# Patient Record
Sex: FEMALE | Race: WHITE | NOT HISPANIC OR LATINO | Employment: OTHER | RURAL
[De-identification: names, ages, dates, MRNs, and addresses within clinical notes are randomized per-mention and may not be internally consistent; named-entity substitution may affect disease eponyms.]

---

## 2021-08-10 ENCOUNTER — OFFICE VISIT (OUTPATIENT)
Dept: FAMILY MEDICINE | Facility: CLINIC | Age: 71
End: 2021-08-10
Payer: MEDICARE

## 2021-08-10 VITALS
HEART RATE: 92 BPM | WEIGHT: 132.63 LBS | DIASTOLIC BLOOD PRESSURE: 73 MMHG | TEMPERATURE: 98 F | BODY MASS INDEX: 21.32 KG/M2 | HEIGHT: 66 IN | SYSTOLIC BLOOD PRESSURE: 145 MMHG | OXYGEN SATURATION: 87 %

## 2021-08-10 DIAGNOSIS — L30.1 DYSHIDROTIC ECZEMA: Primary | ICD-10-CM

## 2021-08-10 PROCEDURE — 99204 OFFICE O/P NEW MOD 45 MIN: CPT | Mod: GC,,, | Performed by: FAMILY MEDICINE

## 2021-08-10 PROCEDURE — 99204 PR OFFICE/OUTPT VISIT, NEW, LEVL IV, 45-59 MIN: ICD-10-PCS | Mod: GC,,, | Performed by: FAMILY MEDICINE

## 2021-08-10 RX ORDER — POTASSIUM CHLORIDE 20 MEQ/1
TABLET, EXTENDED RELEASE ORAL
COMMUNITY
Start: 2021-05-24 | End: 2023-11-09 | Stop reason: SDUPTHER

## 2021-08-10 RX ORDER — HYDROXYZINE HYDROCHLORIDE 25 MG/1
25 TABLET, FILM COATED ORAL 3 TIMES DAILY
Qty: 30 TABLET | Refills: 0 | Status: SHIPPED | OUTPATIENT
Start: 2021-08-10 | End: 2021-08-20

## 2021-08-10 RX ORDER — APIXABAN 5 MG/1
5 TABLET, FILM COATED ORAL 2 TIMES DAILY
COMMUNITY
Start: 2021-07-20 | End: 2023-11-09 | Stop reason: SDUPTHER

## 2021-08-10 RX ORDER — BUPROPION HYDROCHLORIDE 100 MG/1
TABLET, EXTENDED RELEASE ORAL
COMMUNITY
Start: 2021-07-20

## 2021-08-10 RX ORDER — HYDROCHLOROTHIAZIDE 25 MG/1
25 TABLET ORAL DAILY
COMMUNITY
Start: 2021-05-16

## 2021-08-10 RX ORDER — FUROSEMIDE 40 MG/1
40 TABLET ORAL DAILY
COMMUNITY
Start: 2021-07-20 | End: 2023-11-09 | Stop reason: SDUPTHER

## 2021-08-10 RX ORDER — HYDROXYZINE HYDROCHLORIDE 25 MG/1
25 TABLET, FILM COATED ORAL 3 TIMES DAILY
Qty: 30 TABLET | Refills: 0 | Status: SHIPPED | OUTPATIENT
Start: 2021-08-10 | End: 2021-08-10

## 2021-08-10 RX ORDER — ALBUTEROL SULFATE 90 UG/1
2 AEROSOL, METERED RESPIRATORY (INHALATION) EVERY 4 HOURS PRN
COMMUNITY
Start: 2021-03-11

## 2021-08-10 RX ORDER — FLUOCINONIDE 0.5 MG/G
CREAM TOPICAL 2 TIMES DAILY
Qty: 1 TUBE | Refills: 0 | Status: SHIPPED | OUTPATIENT
Start: 2021-08-10 | End: 2023-11-09

## 2021-08-17 ENCOUNTER — OFFICE VISIT (OUTPATIENT)
Dept: FAMILY MEDICINE | Facility: CLINIC | Age: 71
End: 2021-08-17
Payer: MEDICARE

## 2021-08-17 VITALS
WEIGHT: 130.81 LBS | HEART RATE: 90 BPM | SYSTOLIC BLOOD PRESSURE: 103 MMHG | OXYGEN SATURATION: 80 % | TEMPERATURE: 98 F | DIASTOLIC BLOOD PRESSURE: 64 MMHG | BODY MASS INDEX: 21.11 KG/M2

## 2021-08-17 DIAGNOSIS — L30.1 DYSHIDROTIC ECZEMA: Primary | ICD-10-CM

## 2021-08-17 PROCEDURE — 99202 PR OFFICE/OUTPT VISIT, NEW, LEVL II, 15-29 MIN: ICD-10-PCS | Mod: GC,,, | Performed by: SPECIALIST

## 2021-08-17 PROCEDURE — 99202 OFFICE O/P NEW SF 15 MIN: CPT | Mod: GC,,, | Performed by: SPECIALIST

## 2021-08-17 RX ORDER — METHYLPREDNISOLONE 4 MG/1
TABLET ORAL
Qty: 1 PACKAGE | Refills: 0 | Status: SHIPPED | OUTPATIENT
Start: 2021-08-17 | End: 2021-09-07

## 2021-08-25 ENCOUNTER — OFFICE VISIT (OUTPATIENT)
Dept: FAMILY MEDICINE | Facility: CLINIC | Age: 71
End: 2021-08-25
Payer: MEDICARE

## 2021-08-25 DIAGNOSIS — Z20.828 EXPOSURE TO SARS-ASSOCIATED CORONAVIRUS: Primary | ICD-10-CM

## 2021-08-25 PROCEDURE — 99203 PR OFFICE/OUTPT VISIT, NEW, LEVL III, 30-44 MIN: ICD-10-PCS | Mod: GC,,, | Performed by: FAMILY MEDICINE

## 2021-08-25 PROCEDURE — 87635 SARS-COV-2 COVID-19 AMP PRB: CPT | Mod: ,,, | Performed by: CLINICAL MEDICAL LABORATORY

## 2021-08-25 PROCEDURE — 87635 SARS-COV-2 (COVID-19) QUALITATIVE PCR: ICD-10-PCS | Mod: ,,, | Performed by: CLINICAL MEDICAL LABORATORY

## 2021-08-25 PROCEDURE — 99203 OFFICE O/P NEW LOW 30 MIN: CPT | Mod: GC,,, | Performed by: FAMILY MEDICINE

## 2021-08-26 LAB — SARS-COV-2 RNA RESP QL NAA+PROBE: NEGATIVE

## 2022-03-11 DIAGNOSIS — Z71.89 COMPLEX CARE COORDINATION: ICD-10-CM

## 2022-06-10 ENCOUNTER — OUTSIDE PLACE OF SERVICE (OUTPATIENT)
Dept: ADMINISTRATIVE | Facility: HOSPITAL | Age: 72
End: 2022-06-10
Payer: MEDICARE

## 2022-07-06 ENCOUNTER — HOSPITAL ENCOUNTER (OUTPATIENT)
Dept: RADIOLOGY | Facility: HOSPITAL | Age: 72
Discharge: HOME | End: 2022-07-06
Attending: ORTHOPAEDIC SURGERY
Payer: MEDICARE

## 2022-07-06 DIAGNOSIS — M25.551 RIGHT HIP PAIN: ICD-10-CM

## 2022-07-06 PROBLEM — S72.001S CLOSED RIGHT HIP FRACTURE, SEQUELA: Status: ACTIVE | Noted: 2022-07-06

## 2022-07-06 PROCEDURE — 73502 X-RAY EXAM HIP UNI 2-3 VIEWS: CPT | Mod: TC,RT

## 2023-03-31 ENCOUNTER — OFFICE VISIT (OUTPATIENT)
Dept: NEUROLOGY | Facility: CLINIC | Age: 73
End: 2023-03-31
Payer: MEDICARE

## 2023-03-31 VITALS
SYSTOLIC BLOOD PRESSURE: 104 MMHG | BODY MASS INDEX: 20.89 KG/M2 | HEART RATE: 60 BPM | HEIGHT: 66 IN | DIASTOLIC BLOOD PRESSURE: 62 MMHG | OXYGEN SATURATION: 93 % | WEIGHT: 130 LBS

## 2023-03-31 DIAGNOSIS — R29.898 RUE WEAKNESS: Primary | ICD-10-CM

## 2023-03-31 DIAGNOSIS — R29.818 OTHER SYMPTOMS AND SIGNS INVOLVING THE NERVOUS SYSTEM: ICD-10-CM

## 2023-03-31 PROCEDURE — 99204 OFFICE O/P NEW MOD 45 MIN: CPT | Mod: S$PBB,,, | Performed by: PSYCHIATRY & NEUROLOGY

## 2023-03-31 PROCEDURE — 99214 OFFICE O/P EST MOD 30 MIN: CPT | Mod: PBBFAC | Performed by: PSYCHIATRY & NEUROLOGY

## 2023-03-31 PROCEDURE — 99204 PR OFFICE/OUTPT VISIT, NEW, LEVL IV, 45-59 MIN: ICD-10-PCS | Mod: S$PBB,,, | Performed by: PSYCHIATRY & NEUROLOGY

## 2023-03-31 RX ORDER — AMLODIPINE BESYLATE 2.5 MG/1
2.5 TABLET ORAL DAILY
COMMUNITY
End: 2023-11-09 | Stop reason: SDUPTHER

## 2023-03-31 NOTE — PROGRESS NOTES
Subjective:       Patient ID: Mayra Kelly is a 72 y.o. female     Chief Complaint:    Chief Complaint   Patient presents with    Dx right arm weakness        Allergies:  Patient has no known allergies.    Current Medications:    Outpatient Encounter Medications as of 3/31/2023   Medication Sig Dispense Refill    albuterol (PROVENTIL/VENTOLIN HFA) 90 mcg/actuation inhaler Inhale 2 puffs into the lungs every 4 (four) hours as needed.      amLODIPine (NORVASC) 2.5 MG tablet Take 2.5 mg by mouth once daily.      buPROPion (WELLBUTRIN SR) 100 MG TBSR 12 hr tablet TAKE 1 TABLET BY MOUTH ONCE DAILY TO CURB THE URGE TO SMOKE      ELIQUIS 5 mg Tab Take 5 mg by mouth 2 (two) times daily.      fluocinonide 0.05% (LIDEX) 0.05 % cream Apply topically 2 (two) times daily. 1 Tube 0    furosemide (LASIX) 40 MG tablet Take 40 mg by mouth once daily.      hydroCHLOROthiazide (HYDRODIURIL) 25 MG tablet Take 25 mg by mouth once daily.      potassium chloride SA (K-DUR,KLOR-CON) 20 MEQ tablet        No facility-administered encounter medications on file as of 3/31/2023.       History of Present Illness  73 yo WF in clinic after having acute onset of RUE weakness mainly in hand - occurred about month ago and denies any neck trauma but went to ER at [facility] but I have no records for review?  Pt has known COPD,PAH, HTN, lung Ca however she cont to smoke tobacco  On Eliquis for OAC   Has some intermittent cervical neck pain w/o any radiculopathy symptoms per patient        Past Medical History:   Diagnosis Date    COPD (chronic obstructive pulmonary disease)     Impetigo     Scabies        Past Surgical History:   Procedure Laterality Date    APPENDECTOMY       SECTION  1980    2x       Social History  Ms. Kelly  reports that she has been smoking cigarettes. She has never used smokeless tobacco. She reports that she does not currently use alcohol.    Family History  Ms.'s Kelly family history includes  "Cancer in her father, maternal aunt, and maternal grandmother; Heart disease in her paternal grandmother.    Review of Systems  Review of Systems   Respiratory:  Positive for cough, sputum production and shortness of breath.    Neurological:  Positive for focal weakness.   Psychiatric/Behavioral:  The patient is nervous/anxious.    All other systems reviewed and are negative.   Objective:   /62 (BP Location: Right arm, Patient Position: Sitting, BP Method: Large (Manual))   Pulse 60   Ht 5' 6" (1.676 m)   Wt 59 kg (130 lb)   LMP  (LMP Unknown)   SpO2 (!) 93%   BMI 20.98 kg/m²    NEUROLOGICAL EXAMINATION:     MENTAL STATUS   Oriented to person, place, and time.   Level of consciousness: alert  Knowledge: good.     CRANIAL NERVES   Cranial nerves II through XII intact.     MOTOR EXAM     Strength   Strength 5/5 throughout.        Mild subjective R arm weakness bu tper this examiner NO loss of  strenght     REFLEXES     Reflexes   Right brachioradialis: 2+  Left brachioradialis: 2+  Right biceps: 2+  Left biceps: 2+  Right triceps: 2+  Left triceps: 2+  Right patellar: 2+  Left patellar: 2+  Right achilles: 2+  Left achilles: 2+  Right plantar: normal  Left plantar: normal    GAIT AND COORDINATION     Gait  Gait: normal     Physical Exam  Vitals reviewed.   Constitutional:       Appearance: She is normal weight.   Neurological:      General: No focal deficit present.      Mental Status: She is alert and oriented to person, place, and time. Mental status is at baseline.      Cranial Nerves: Cranial nerves 2-12 are intact.      Motor: Motor strength is normal.      Gait: Gait is intact.      Deep Tendon Reflexes:      Reflex Scores:       Tricep reflexes are 2+ on the right side and 2+ on the left side.       Bicep reflexes are 2+ on the right side and 2+ on the left side.       Brachioradialis reflexes are 2+ on the right side and 2+ on the left side.       Patellar reflexes are 2+ on the right side and " 2+ on the left side.       Achilles reflexes are 2+ on the right side and 2+ on the left side.       Assessment:     RUE weakness         Primary Diagnosis and ICD10  RUE weakness [R29.898]    Plan:     Patient Instructions   MRI brain w contrast   MRI cervical r/o pathology  Stop smoking tobacco   Control risk factors   F/u Heme/Onc for lung cancer eval   Pt not desiring PT/OT referral  F/u 3 months       There are no discontinued medications.    Requested Prescriptions      No prescriptions requested or ordered in this encounter

## 2023-03-31 NOTE — PATIENT INSTRUCTIONS
MRI brain w contrast   MRI cervical r/o pathology  Stop smoking tobacco   Control risk factors   F/u Heme/Onc for lung cancer eval   Pt not desiring PT/OT referral  F/u 3 months

## 2023-04-18 ENCOUNTER — OFFICE VISIT (OUTPATIENT)
Dept: DERMATOLOGY | Facility: CLINIC | Age: 73
End: 2023-04-18
Payer: MEDICARE

## 2023-04-18 DIAGNOSIS — L30.9 DERMATITIS, UNSPECIFIED: Primary | ICD-10-CM

## 2023-04-18 PROCEDURE — 99204 OFFICE O/P NEW MOD 45 MIN: CPT | Mod: ,,, | Performed by: STUDENT IN AN ORGANIZED HEALTH CARE EDUCATION/TRAINING PROGRAM

## 2023-04-18 PROCEDURE — 99204 PR OFFICE/OUTPT VISIT, NEW, LEVL IV, 45-59 MIN: ICD-10-PCS | Mod: ,,, | Performed by: STUDENT IN AN ORGANIZED HEALTH CARE EDUCATION/TRAINING PROGRAM

## 2023-04-18 RX ORDER — TRIAMCINOLONE ACETONIDE 1 MG/G
OINTMENT TOPICAL 2 TIMES DAILY
Qty: 454 G | Refills: 5 | Status: SHIPPED | OUTPATIENT
Start: 2023-04-18 | End: 2023-11-09

## 2023-04-18 RX ORDER — PREDNISONE 20 MG/1
TABLET ORAL
Qty: 25 TABLET | Refills: 0 | Status: SHIPPED | OUTPATIENT
Start: 2023-04-18 | End: 2023-05-09

## 2023-05-16 ENCOUNTER — TELEPHONE (OUTPATIENT)
Dept: NEUROLOGY | Facility: CLINIC | Age: 73
End: 2023-05-16
Payer: MEDICARE

## 2023-07-14 NOTE — PROGRESS NOTES
[facility]  Bryon Priest MD    [address] [place] [address]  [phone]    Fax: [phone]    Patient Name: Mayra Kelly  Medical Record Number: [id number]  PCP: Sona Taveras DO (Inactive)  Age: 72 y.o. : 1950  Contact: [phone] (home)     CC: rash  History of Present Illness:     Mayra Kelly is a 72 y.o.  female with no history of skin cancer who presents to clinic today for rash of the upper body.  This has been present for 3 months. Symptoms include severe pruritus . Previous treatments include OTC lotions.  Only new med started on 3/31/2023 is Norvasc.  Pt hasn't changed anything from normal routine    The patient has no other concerns today.    Review of Systems:     Unremarkable other than mentioned above.     Physical Exam:     General: Relaxed, oriented, alert    Skin examination of the scalp, face, neck, chest, back, abdomen, upper extremities and lower extremities were normal except for as listed below    Assessment and Plan:     1. Dermatitis Unspecified  - eczematous patches of bilateral arms, upper chest and back  DDx: ACD vs drug eruption vs adult onset eczema     Plan: TAC ointment bid   - Prednisone taper 40 mg daily x 7 days, 20 mg daily x 7 days, and 10 mg daily x 7 days    Counseling  I counseled the patient regarding the following:  Skin care: Patient instructed to use gentle skin care including dove unscented soap, CeraVe moisturizing cream, and fragrance free laundry detergent.  Expectations: The patient understands that there is not a definitive diagnosis at this time. Further testing or empiric therapy may be necessary to diagnose and improve the condition.  Contact office if: The patient develops a fever, or rash dramatically worsens despite treatment.          Return to clinic in 4 weeks.     AVS printed with patient instructions     Bryon Priest MD   Mohs Surgery/Dermatologic Oncology  Dermatology        Body Location Override (Optional - Billing Will Still Be Based On Selected Body Map Location If Applicable): right medial calcaneus Detail Level: Detailed Depth Of Biopsy: dermis Was A Bandage Applied: Yes Size Of Lesion In Cm: 1.5 X Size Of Lesion In Cm: 0 Biopsy Type: H and E Biopsy Method: Dermablade Anesthesia Type: 1% lidocaine with epinephrine Anesthesia Volume In Cc (Will Not Render If 0): 0.5 Hemostasis: Drysol Wound Care: Petrolatum Dressing: bandage Destruction After The Procedure: No Type Of Destruction Used: Curettage Curettage Text: The wound bed was treated with curettage after the biopsy was performed. Cryotherapy Text: The wound bed was treated with cryotherapy after the biopsy was performed. Electrodesiccation Text: The wound bed was treated with electrodesiccation after the biopsy was performed. Electrodesiccation And Curettage Text: The wound bed was treated with electrodesiccation and curettage after the biopsy was performed. Silver Nitrate Text: The wound bed was treated with silver nitrate after the biopsy was performed. Lab: [id number] [facility] Lab Facility: [address] [place] [address] Path Notes (To The Dermatopathologist): R/O: DN vs LM vs NUB vs pyogenic granuloma \\nSize: 1.5 cm Consent: Written consent was obtained and risks were reviewed including but not limited to scarring, infection, bleeding, scabbing, incomplete removal, nerve damage and allergy to anesthesia. Post-Care Instructions: I reviewed with the patient in detail post-care instructions. Patient is to keep the biopsy site dry overnight, and then apply bacitracin twice daily until healed. Patient may apply hydrogen peroxide soaks to remove any crusting. Notification Instructions: Patient will be notified of biopsy results. However, patient instructed to call the office if not contacted within 2 weeks. Billing Type: [employer] Information: Selecting Yes will display possible errors in your note based on the variables you have selected. This validation is only offered as a suggestion for you. PLEASE NOTE THAT THE VALIDATION TEXT WILL BE REMOVED WHEN YOU FINALIZE YOUR NOTE. IF YOU WANT TO FAX A PRELIMINARY NOTE YOU WILL NEED TO TOGGLE THIS TO 'NO' IF YOU DO NOT WANT IT IN YOUR FAXED NOTE.

## 2023-07-18 ENCOUNTER — OFFICE VISIT (OUTPATIENT)
Dept: NEUROLOGY | Facility: CLINIC | Age: 73
End: 2023-07-18
Payer: MEDICARE

## 2023-07-18 VITALS
OXYGEN SATURATION: 95 % | RESPIRATION RATE: 18 BRPM | WEIGHT: 130 LBS | DIASTOLIC BLOOD PRESSURE: 64 MMHG | HEIGHT: 66 IN | HEART RATE: 86 BPM | SYSTOLIC BLOOD PRESSURE: 99 MMHG | BODY MASS INDEX: 20.89 KG/M2

## 2023-07-18 DIAGNOSIS — M50.90 CERVICAL NECK PAIN WITH EVIDENCE OF DISC DISEASE: Primary | ICD-10-CM

## 2023-07-18 PROCEDURE — 99214 OFFICE O/P EST MOD 30 MIN: CPT | Mod: PBBFAC | Performed by: PSYCHIATRY & NEUROLOGY

## 2023-07-18 PROCEDURE — 99214 PR OFFICE/OUTPT VISIT, EST, LEVL IV, 30-39 MIN: ICD-10-PCS | Mod: S$PBB,,, | Performed by: PSYCHIATRY & NEUROLOGY

## 2023-07-18 PROCEDURE — 99214 OFFICE O/P EST MOD 30 MIN: CPT | Mod: S$PBB,,, | Performed by: PSYCHIATRY & NEUROLOGY

## 2023-07-18 NOTE — PROGRESS NOTES
Subjective:       Patient ID: Mayra Kelly is a 72 y.o. female     Chief Complaint:    Chief Complaint   Patient presents with    Follow-up     WEakness        Allergies:  Patient has no known allergies.    Current Medications:    Outpatient Encounter Medications as of 2023   Medication Sig Dispense Refill    albuterol (PROVENTIL/VENTOLIN HFA) 90 mcg/actuation inhaler Inhale 2 puffs into the lungs every 4 (four) hours as needed.      amLODIPine (NORVASC) 2.5 MG tablet Take 2.5 mg by mouth once daily.      buPROPion (WELLBUTRIN SR) 100 MG TBSR 12 hr tablet TAKE 1 TABLET BY MOUTH ONCE DAILY TO CURB THE URGE TO SMOKE      ELIQUIS 5 mg Tab Take 5 mg by mouth 2 (two) times daily.      fluocinonide 0.05% (LIDEX) 0.05 % cream Apply topically 2 (two) times daily. 1 Tube 0    furosemide (LASIX) 40 MG tablet Take 40 mg by mouth once daily.      hydroCHLOROthiazide (HYDRODIURIL) 25 MG tablet Take 25 mg by mouth once daily.      potassium chloride SA (K-DUR,KLOR-CON) 20 MEQ tablet       triamcinolone acetonide 0.1% (KENALOG) 0.1 % ointment Apply topically 2 (two) times daily. 454 g 5     No facility-administered encounter medications on file as of 2023.       History of Present Illness  73 yo WF s/p chemo and XRT for lung Ca 3356-6752- still  following Heme/Onc   Recent MRI brain showed nothing acute but only microvascualr ischemia  MRI C spine showed mult level DDD but no abnormal cord activity       Past Medical History:   Diagnosis Date    COPD (chronic obstructive pulmonary disease)     Impetigo     Scabies        Past Surgical History:   Procedure Laterality Date    APPENDECTOMY       SECTION  1980    2x       Social History  Ms. Kelly  reports that she has been smoking cigarettes. She has never used smokeless tobacco. She reports that she does not currently use alcohol.    Family History  Ms.'s Kelly family history includes Cancer in her father, maternal aunt, and maternal  "grandmother; Heart disease in her paternal grandmother.    Review of Systems  Review of Systems   All other systems reviewed and are negative.   Objective:   BP 99/64 (BP Location: Left arm, Patient Position: Sitting, BP Method: Large (Manual))   Pulse 86   Resp 18   Ht 5' 6" (1.676 m)   Wt 59 kg (130 lb)   SpO2 95%   BMI 20.98 kg/m²    NEUROLOGICAL EXAMINATION:     MENTAL STATUS   Oriented to person, place, and time.   Level of consciousness: alert  Knowledge: good.     CRANIAL NERVES   Cranial nerves II through XII intact.     MOTOR EXAM     Strength   Strength 5/5 throughout.     REFLEXES     Reflexes   Right brachioradialis: 2+  Left brachioradialis: 2+  Right biceps: 2+  Left biceps: 2+  Right triceps: 2+  Left triceps: 2+  Right patellar: 2+  Left patellar: 2+  Right achilles: 2+  Left achilles: 2+  Right plantar: normal  Left plantar: normal    GAIT AND COORDINATION     Gait  Gait: normal     Physical Exam  Vitals reviewed.   Constitutional:       Appearance: She is normal weight.   Neurological:      General: No focal deficit present.      Mental Status: She is alert and oriented to person, place, and time. Mental status is at baseline.      Cranial Nerves: Cranial nerves 2-12 are intact.      Motor: Motor strength is normal.      Gait: Gait is intact.      Deep Tendon Reflexes:      Reflex Scores:       Tricep reflexes are 2+ on the right side and 2+ on the left side.       Bicep reflexes are 2+ on the right side and 2+ on the left side.       Brachioradialis reflexes are 2+ on the right side and 2+ on the left side.       Patellar reflexes are 2+ on the right side and 2+ on the left side.       Achilles reflexes are 2+ on the right side and 2+ on the left side.       Assessment:     Cervical neck pain with evidence of disc disease         Primary Diagnosis and ICD10  Cervical neck pain with evidence of disc disease [M50.90]    Plan:     Patient Instructions   Stop smoking if possible  Cont f/u " Hematology/Oncology   Cont Eliquis for Afib  F/u one year        There are no discontinued medications.    Requested Prescriptions      No prescriptions requested or ordered in this encounter

## 2023-11-03 ENCOUNTER — TELEPHONE (OUTPATIENT)
Dept: PULMONOLOGY | Facility: CLINIC | Age: 73
End: 2023-11-03
Payer: MEDICARE

## 2023-11-08 ENCOUNTER — OFFICE VISIT (OUTPATIENT)
Dept: PULMONOLOGY | Facility: CLINIC | Age: 73
End: 2023-11-08
Payer: MEDICARE

## 2023-11-08 VITALS
RESPIRATION RATE: 14 BRPM | DIASTOLIC BLOOD PRESSURE: 54 MMHG | HEART RATE: 73 BPM | HEIGHT: 66 IN | OXYGEN SATURATION: 83 % | SYSTOLIC BLOOD PRESSURE: 92 MMHG | BODY MASS INDEX: 20.89 KG/M2 | WEIGHT: 130 LBS

## 2023-11-08 DIAGNOSIS — F17.219 CIGARETTE NICOTINE DEPENDENCE WITH NICOTINE-INDUCED DISORDER: ICD-10-CM

## 2023-11-08 DIAGNOSIS — J44.9 CHRONIC OBSTRUCTIVE PULMONARY DISEASE, UNSPECIFIED COPD TYPE: Primary | ICD-10-CM

## 2023-11-08 DIAGNOSIS — R91.8 ABNORMAL CT SCAN, LUNG: ICD-10-CM

## 2023-11-08 PROCEDURE — 99203 PR OFFICE/OUTPT VISIT, NEW, LEVL III, 30-44 MIN: ICD-10-PCS | Mod: S$PBB,25,, | Performed by: STUDENT IN AN ORGANIZED HEALTH CARE EDUCATION/TRAINING PROGRAM

## 2023-11-08 PROCEDURE — 99203 OFFICE O/P NEW LOW 30 MIN: CPT | Mod: S$PBB,25,, | Performed by: STUDENT IN AN ORGANIZED HEALTH CARE EDUCATION/TRAINING PROGRAM

## 2023-11-08 PROCEDURE — 99215 OFFICE O/P EST HI 40 MIN: CPT | Mod: PBBFAC | Performed by: STUDENT IN AN ORGANIZED HEALTH CARE EDUCATION/TRAINING PROGRAM

## 2023-11-08 PROCEDURE — 99406 PR TOBACCO USE CESSATION INTERMEDIATE 3-10 MINUTES: ICD-10-PCS | Mod: S$PBB,,, | Performed by: STUDENT IN AN ORGANIZED HEALTH CARE EDUCATION/TRAINING PROGRAM

## 2023-11-08 PROCEDURE — 99406 BEHAV CHNG SMOKING 3-10 MIN: CPT | Mod: S$PBB,,, | Performed by: STUDENT IN AN ORGANIZED HEALTH CARE EDUCATION/TRAINING PROGRAM

## 2023-11-08 RX ORDER — ALBUTEROL SULFATE 2.5 MG/.5ML
2.5 SOLUTION RESPIRATORY (INHALATION) EVERY 6 HOURS PRN
Qty: 90 EACH | Refills: 11 | Status: SHIPPED | OUTPATIENT
Start: 2023-11-08 | End: 2024-11-07

## 2023-11-08 RX ORDER — FLUTICASONE FUROATE, UMECLIDINIUM BROMIDE AND VILANTEROL TRIFENATATE 200; 62.5; 25 UG/1; UG/1; UG/1
1 POWDER RESPIRATORY (INHALATION) DAILY
Qty: 1 EACH | Refills: 11 | Status: SHIPPED | OUTPATIENT
Start: 2023-11-08

## 2023-11-08 NOTE — PATIENT INSTRUCTIONS
Check in on GROUND FLOOR of Ambulatory building.      Dont eat or drink  Follow any directions you are given for not eating or drinking before surgery. If you don't follow instructions about when to stop eating and drinking, your procedure may be postponed or rescheduled for another day. This is a safety issue.  You can brush your teeth and rinse your mouth, but dont swallow any water.    Day of surgery  Leave jewelry (including rings), watches, and other valuables at home.  Be sure to bring health insurance cards or forms, and a photo ID.  Bring a list of your medicines (include the name, dose, how often you take them, and the time last dose was taken).  You will be sedated for the procedure so you must have a  present with you.  Arrive on time at the hospital or surgery facility.        Flexible Bronchoscopy  A flexible bronchoscopy is an exam of the airways of your lungs. A thin, flexible tube called a bronchoscope is used. It has a light and small camera that allow the healthcare provider to view your airways.    Before your test  Follow your healthcare provider's instructions carefully. If you dont, the exam may be canceled. Or you may need to take it again.  If you are taking blood-thinning medicine, ask your healthcare provider if you should stop taking the medicine before this test.  Have no food or drink starting at midnight on day of the test. Also, avoid smoking for 24 hours before the test.  You will need to remove any dentures or removable devices from your mouth.  Right before the test, you will be given sedating medicines to help you relax. The medicine may be given by an IV (intravenously) into one of your veins. In addition, your nose and throat may be numbed with a special spray to help prevent gagging and coughing.  If you are having this test as an outpatient, make sure you have an adult friend or family member to drive you home.    During your test  Bronchoscopy takes 45 to 60 minutes  (**EBUS takes longer**) and includes the following steps:  You may be given medicine (anesthesia) so that you are unconscious or asleep during the procedure.  The healthcare provider inserts the tube into your nose or mouth.  If you have not been given anesthesia, you might feel a gagging sensation. To help ease this feeling, you will be told to swallow or take deep breaths. Your airway will remain open even with the tube in place. But you wont be able to talk.  The provider checks your breathing passage. He or she may also remove tiny tissue samples for biopsy.  After your test  You may have a mild sore throat or cough. Your voice may also be hoarse.  Don't eat or drink until the anesthesia wears off.  If you had a biopsy, you might see traces of blood being coughed up.  When to call your healthcare provider  Call your healthcare provider right away if you have any of the following:  Shortness of breath  Chest pain  Bleeding from your nose or throat  Coughing up a large amount of blood  A fever above 100.4°F (38°C) for more than 24 hours  Call 911  Call 911 if you have:  Chest pain  Severe shortness of breath

## 2023-11-09 ENCOUNTER — OFFICE VISIT (OUTPATIENT)
Dept: FAMILY MEDICINE | Facility: CLINIC | Age: 73
End: 2023-11-09
Payer: MEDICARE

## 2023-11-09 VITALS
WEIGHT: 128.31 LBS | TEMPERATURE: 98 F | SYSTOLIC BLOOD PRESSURE: 100 MMHG | HEIGHT: 66 IN | RESPIRATION RATE: 16 BRPM | BODY MASS INDEX: 20.62 KG/M2 | OXYGEN SATURATION: 88 % | DIASTOLIC BLOOD PRESSURE: 68 MMHG | HEART RATE: 77 BPM

## 2023-11-09 DIAGNOSIS — J44.9 CHRONIC OBSTRUCTIVE PULMONARY DISEASE, UNSPECIFIED COPD TYPE: Primary | ICD-10-CM

## 2023-11-09 DIAGNOSIS — I48.20 CHRONIC ATRIAL FIBRILLATION, UNSPECIFIED: ICD-10-CM

## 2023-11-09 DIAGNOSIS — C34.92 MALIGNANT NEOPLASM OF UNSPECIFIED PART OF LEFT BRONCHUS OR LUNG: ICD-10-CM

## 2023-11-09 DIAGNOSIS — I50.9 CONGESTIVE HEART FAILURE, UNSPECIFIED HF CHRONICITY, UNSPECIFIED HEART FAILURE TYPE: ICD-10-CM

## 2023-11-09 DIAGNOSIS — I10 PRIMARY HYPERTENSION: ICD-10-CM

## 2023-11-09 PROBLEM — J44.1 CHRONIC OBSTRUCTIVE PULMONARY DISEASE WITH (ACUTE) EXACERBATION: Status: ACTIVE | Noted: 2023-11-09

## 2023-11-09 PROBLEM — R91.8 ABNORMAL CT SCAN, LUNG: Status: ACTIVE | Noted: 2023-11-09

## 2023-11-09 PROBLEM — F17.219 CIGARETTE NICOTINE DEPENDENCE WITH NICOTINE-INDUCED DISORDER: Status: ACTIVE | Noted: 2023-11-09

## 2023-11-09 PROBLEM — J43.8 OTHER EMPHYSEMA: Status: ACTIVE | Noted: 2023-11-09

## 2023-11-09 PROBLEM — G89.4 CHRONIC PAIN SYNDROME: Status: ACTIVE | Noted: 2023-11-09

## 2023-11-09 PROCEDURE — 99214 PR OFFICE/OUTPT VISIT, EST, LEVL IV, 30-39 MIN: ICD-10-PCS | Mod: ,,, | Performed by: NURSE PRACTITIONER

## 2023-11-09 PROCEDURE — 99214 OFFICE O/P EST MOD 30 MIN: CPT | Mod: ,,, | Performed by: NURSE PRACTITIONER

## 2023-11-09 RX ORDER — POTASSIUM CHLORIDE 20 MEQ/1
20 TABLET, EXTENDED RELEASE ORAL DAILY
Qty: 90 TABLET | Refills: 1 | Status: SHIPPED | OUTPATIENT
Start: 2023-11-09

## 2023-11-09 RX ORDER — FUROSEMIDE 40 MG/1
40 TABLET ORAL DAILY
Qty: 90 TABLET | Refills: 1 | Status: SHIPPED | OUTPATIENT
Start: 2023-11-09

## 2023-11-09 RX ORDER — APIXABAN 5 MG/1
5 TABLET, FILM COATED ORAL 2 TIMES DAILY
Qty: 180 TABLET | Refills: 1 | Status: SHIPPED | OUTPATIENT
Start: 2023-11-09

## 2023-11-09 RX ORDER — AMLODIPINE BESYLATE 2.5 MG/1
2.5 TABLET ORAL DAILY
Qty: 90 TABLET | Refills: 1 | Status: SHIPPED | OUTPATIENT
Start: 2023-11-09

## 2023-11-09 NOTE — ASSESSMENT & PLAN NOTE
Dangers of cigarette smoking were reviewed with patient in detail. Recommend complete cessation. Patient was Counseled for 3-10 minutes. Nicotine replacement options were discussed. Nicotine replacement was discussed- not prescribed per patient's request. Action phase; will decrease cigarettes by 2 with goal for 1/4 pack a day from 1/2ppd.

## 2023-11-09 NOTE — PROGRESS NOTES
[facility]  Pulmonology  NEW VISIT     Patient Name:  Mayra Kelly  Primary Care Provider: Sona Gupta DO  Date of Service: 2023  Reason for Referral: Abnormal CT, EBUS consult      Chief Complaint: Shortness of breath, cough    SUBJECTIVE   HPI:  Mayra Kelly is a 73 y.o. female with Afib on Eliquis, COPD and prior history of adenocarcinoma of the lung who presents today upon referral with complaints of shortness of breath.     Robin reports having shortness of breath with ambulation. She has cough productive of clear phlegm. She denies hemoptysis. She has had no recent hospitalizations for exacerbations. CAT score 23    She reports having a previous history of lung cancer treated with chemotherapy and radiation to her brain. She has had an abnormal scan of her lung and she states that she came today to discuss bronchoscopy. She is planned for a PET on  and follow up with Dr. Kraft . No records available at time of this visit.     Past Medical History:   Diagnosis Date    COPD (chronic obstructive pulmonary disease)     Impetigo     Scabies        Past Surgical History:   Procedure Laterality Date    APPENDECTOMY       SECTION  1980    2x       Family History   Problem Relation Age of Onset    Cancer Father     Cancer Maternal Grandmother     Heart disease Paternal Grandmother     Cancer Maternal Aunt         Social History     Socioeconomic History    Marital status:    Tobacco Use    Smoking status: Every Day     Types: Cigarettes    Smokeless tobacco: Never   Substance and Sexual Activity    Alcohol use: Not Currently       Social History     Social History Narrative    Not on file       Review of patient's allergies indicates:  No Known Allergies     Medications: Medications reviewed to include over the counter medications.    Review of Systems: A focused ROS was completed and found to be negative except for that mentioned  "above.      OBJECTIVE   PHYSICAL EXAM:  Vitals:    11/08/23 1323 11/08/23 1331   BP: (!) 96/50 (!) 92/54   BP Location: Left arm Right arm   Patient Position: Sitting Sitting   BP Method: Medium (Manual) Medium (Manual)   Pulse: 73    Resp: 14    SpO2: (!) 83%    Weight: 59 kg (130 lb)    Height: 5' 6" (1.676 m)         GENERAL: NAD, thin  HEENT: normocephalic, non-icteric conjunctivae  LYMPHATIC: no lymphadenopathy of neck  RESPIRATORY: bilateral posterior expiratory wheeze, no rales or rhonchi, normal pulmonary effort  CARDIOVASCULAR: Regular rate and rhythm, no murmurs rubs or gallops.  SKIN: no rash, jaundice, ecchymosis or ulcers  MUSCULOSKELETAL: No clubbing or cyanosis; no pedal edema  NEUROLOGIC: AO ×3, no gross deficits  PSYCH: Normal mood and affect    LABS:  Lab studies reviewed and notable for SCr 0.94 (03/2023)    IMAGING:  No imaging available for review at time of visit.    LUNG FUNCTION TESTING:  None available to review or report      ASSESSMENT & PLAN     1. Chronic obstructive pulmonary disease, unspecified COPD type  Assessment & Plan:  COPD with high CAT score with decreased exercise capacity and cough predominating symptoms. Prior history of exacerbation. Will strart Trelegy (sample afforded as well) with albuterol PRN.     Orders:  -     fluticasone-umeclidin-vilanter (TRELEGY ELLIPTA) 200-62.5-25 mcg inhaler; Inhale 1 puff into the lungs once daily.  Dispense: 1 each; Refill: 11  -     albuterol sulfate 2.5 mg/0.5 mL Nebu; Take 2.5 mg by nebulization every 6 (six) hours as needed (Shortness of breath). Rescue  Dispense: 90 each; Refill: 11    2. Abnormal CT scan, lung  Assessment & Plan:  Patient referred for bronch and ?EBUS. No imaging on records available to review. Will request these and f/u on PET from the 17th. Patient is amenable to biopsy with goal treatment. She would like to keep her Nov 27th appointment with Dr. Kraft; tentative date for procedure Nov 20th vs 27th. Will need to " obtain records for planning. With regards to her Eliquis, will plan to hold at min 2 days prior to procedure date.       3. Cigarette nicotine dependence with nicotine-induced disorder  Assessment & Plan:  Dangers of cigarette smoking were reviewed with patient in detail. Recommend complete cessation. Patient was Counseled for 3-10 minutes. Nicotine replacement options were discussed. Nicotine replacement was discussed- not prescribed per patient's request. Action phase; will decrease cigarettes by 2 with goal for 1/4 pack a day from 1/2ppd.              Follow up in about 2 weeks (around 11/22/2023) for POST PROCEDURE FOLLOW UP.      Case was discussed with patient; all questions were answered to patient's satisfaction and patient verbalized understanding.     Milagros Munguia MD  Pulmonary Medicine  [facility]  Phone: [phone]

## 2023-11-09 NOTE — ASSESSMENT & PLAN NOTE
Patient referred for bronch and ?EBUS. No imaging on records available to review. Will request these and f/u on PET from the 17th. Patient is amenable to biopsy with goal treatment. She would like to keep her Nov 27th appointment with Dr. Kraft; tentative date for procedure Nov 20th vs 27th. Will need to obtain records for planning. With regards to her Eliquis, will plan to hold at min 2 days prior to procedure date.

## 2023-11-09 NOTE — PROGRESS NOTES
Darlene Campoverde NP   [address] [place] [address]     PATIENT NAME: Mayra Kelly  : 1950  DATE: 23  MRN: [id number]      Billing Provider: Darlene Campoverde NP  Level of Service:   Patient PCP Information       Provider PCP Type    Sona SMILEY DO General            Reason for Visit / Chief Complaint: Medication Refill and Hypertension (HTN 6 month follow up.)       Update PCP  Update Chief Complaint         History of Present Illness / Problem Focused Workflow     Mayra Kelly presents to the clinic with Medication Refill and Hypertension (HTN 6 month follow up.)     Medication Refill  Pertinent negatives include no abdominal pain, change in bowel habit, chest pain, chills, coughing, fever, headaches, joint swelling, myalgias, numbness or weakness.   Hypertension  Pertinent negatives include no chest pain, headaches, palpitations or shortness of breath.     Patient presents today for medication refills and follow up. She is taking all medications as prescribed. BP is wnl today in clinic. O2 sats 88%. Patient reports this is wnl for her. She has O2 at home and wears it approximately 2-3x/day. She also has portable O2  but did not bring it today to clinic. She had a follow up and lab work with Dr. Kraft on the . She received news of a new lesion to right lung and has a PET scan scheduled for the . She also has an upcoming bronchoscopy with biopsy but is unsure of date. She will follow up with Dr. Kraft again on the . She has seen Dr. Leyva in the past for pulmonology but has not seen him in several months.     Review of Systems     Review of Systems   Constitutional:  Negative for activity change, appetite change, chills and fever.   HENT:  Negative for ear pain, hearing loss, trouble swallowing and voice change.    Eyes:  Negative for visual disturbance.   Respiratory:  Negative for apnea, cough, chest tightness and shortness of breath.    Cardiovascular:  Negative  for chest pain, palpitations and leg swelling.   Gastrointestinal:  Negative for abdominal pain, blood in stool, change in bowel habit and reflux.   Genitourinary:  Negative for bladder incontinence, difficulty urinating and flank pain.   Musculoskeletal:  Negative for back pain, gait problem, joint swelling and myalgias.   Integumentary:  Negative for color change and pallor.   Neurological:  Negative for dizziness, weakness, numbness and headaches.   Psychiatric/Behavioral:  Negative for sleep disturbance. The patient is not nervous/anxious.         Medical / Social / Family History     Past Medical History:   Diagnosis Date    COPD (chronic obstructive pulmonary disease)     Hypertension     Impetigo     Scabies        Past Surgical History:   Procedure Laterality Date    APPENDECTOMY       SECTION  1980    2x       Social History  Ms.  reports that she has been smoking cigarettes. She has never used smokeless tobacco. She reports that she does not drink alcohol and does not use drugs.    Family History  Ms.'s family history includes Cancer in her father, maternal aunt, and maternal grandmother; Heart disease in her paternal grandmother.    Medications and Allergies     Medications  Outpatient Medications Marked as Taking for the 23 encounter (Office Visit) with Darlene Camopverde NP   Medication Sig Dispense Refill    albuterol (PROVENTIL/VENTOLIN HFA) 90 mcg/actuation inhaler Inhale 2 puffs into the lungs every 4 (four) hours as needed.      albuterol sulfate 2.5 mg/0.5 mL Nebu Take 2.5 mg by nebulization every 6 (six) hours as needed (Shortness of breath). Rescue 90 each 11    fluticasone-umeclidin-vilanter (TRELEGY ELLIPTA) 200-62.5-25 mcg inhaler Inhale 1 puff into the lungs once daily. 1 each 11    [DISCONTINUED] amLODIPine (NORVASC) 2.5 MG tablet Take 2.5 mg by mouth once daily.      [DISCONTINUED] ELIQUIS 5 mg Tab Take 5 mg by mouth 2 (two) times daily.      [DISCONTINUED] furosemide  "(LASIX) 40 MG tablet Take 40 mg by mouth once daily.      [DISCONTINUED] potassium chloride SA (K-DUR,KLOR-CON) 20 MEQ tablet          Allergies  Review of patient's allergies indicates:  No Known Allergies    Physical Examination   /68 (BP Location: Left arm, Patient Position: Sitting, BP Method: Medium (Manual))   Pulse 77   Temp 98.1 °F (36.7 °C) (Oral)   Resp 16   Ht 5' 6" (1.676 m)   Wt 58.2 kg (128 lb 4.8 oz)   LMP  (LMP Unknown)   SpO2 (!) 88%   BMI 20.71 kg/m²    Physical Exam  Vitals and nursing note reviewed.   Constitutional:       Appearance: Normal appearance. She is normal weight.   HENT:      Head: Normocephalic.      Nose: Nose normal.      Mouth/Throat:      Mouth: Mucous membranes are moist.   Eyes:      Extraocular Movements: Extraocular movements intact.      Conjunctiva/sclera: Conjunctivae normal.      Pupils: Pupils are equal, round, and reactive to light.   Cardiovascular:      Rate and Rhythm: Normal rate and regular rhythm.      Pulses: Normal pulses.      Heart sounds: Normal heart sounds.   Pulmonary:      Effort: Pulmonary effort is normal.      Breath sounds: Wheezing present.   Abdominal:      General: Abdomen is flat. Bowel sounds are normal.      Palpations: Abdomen is soft.   Musculoskeletal:         General: Normal range of motion.      Cervical back: Normal range of motion and neck supple.   Skin:     General: Skin is warm and dry.      Capillary Refill: Capillary refill takes less than 2 seconds.   Neurological:      General: No focal deficit present.      Mental Status: She is alert and oriented to person, place, and time.   Psychiatric:         Behavior: Behavior normal.         Thought Content: Thought content normal.          Assessment and Plan (including Health Maintenance)      Problem List  Smart Sets  Document Outside HM   :    Plan:   Medication refills sent to pharmacy. Continue as prescribed.   Follow up with Dr. Kraft as scheduled.   Recommended wearing " O2 with sats less than 90%.  RTC as needed.      Health Maintenance Due   Topic Date Due    Hepatitis C Screening  Never done    COVID-19 Vaccine (1) Never done    Pneumococcal Vaccines (Age 65+) (1 - PCV) Never done    TETANUS VACCINE  Never done    Mammogram  Never done    Shingles Vaccine (1 of 2) Never done    DEXA Scan  Never done    Colorectal Cancer Screening  Never done    RSV Vaccine (Age 60+) (1 - 1-dose 60+ series) Never done    Influenza Vaccine (1) Never done       Problem List Items Addressed This Visit          Pulmonary    Chronic obstructive pulmonary disease       Cardiac/Vascular    Chronic atrial fibrillation, unspecified    Relevant Medications    ELIQUIS 5 mg Tab    Congestive heart failure, unspecified HF chronicity, unspecified heart failure type       Oncology    Malignant neoplasm of unspecified part of left bronchus or lung - Primary     Other Visit Diagnoses       Primary hypertension        Relevant Medications    amLODIPine (NORVASC) 2.5 MG tablet    furosemide (LASIX) 40 MG tablet    potassium chloride SA (K-DUR,KLOR-CON) 20 MEQ tablet            Health Maintenance Topics with due status: Not Due       Topic Last Completion Date    Lipid Panel 02/16/2022       Future Appointments   Date Time Provider Department Center   7/18/2024  9:30 AM Boby Duenas MD [facility] NEURO [facility]            Signature:  Darlene Campoverde NP      [address]  [place] [address]    Date of encounter: 11/9/23

## 2023-11-09 NOTE — ASSESSMENT & PLAN NOTE
COPD with high CAT score with decreased exercise capacity and cough predominating symptoms. Prior history of exacerbation. Will strart Trelegy (sample afforded as well) with albuterol PRN.

## 2023-11-09 NOTE — H&P (VIEW-ONLY)
[facility]  Pulmonology  NEW VISIT     Patient Name:  Mayra Kelly  Primary Care Provider: Sona Gupta DO  Date of Service: 2023  Reason for Referral: Abnormal CT, EBUS consult      Chief Complaint: Shortness of breath, cough    SUBJECTIVE   HPI:  Mayra Kelly is a 73 y.o. female with Afib on Eliquis, COPD and prior history of adenocarcinoma of the lung who presents today upon referral with complaints of shortness of breath.     Robin reports having shortness of breath with ambulation. She has cough productive of clear phlegm. She denies hemoptysis. She has had no recent hospitalizations for exacerbations. CAT score 23    She reports having a previous history of lung cancer treated with chemotherapy and radiation to her brain. She has had an abnormal scan of her lung and she states that she came today to discuss bronchoscopy. She is planned for a PET on  and follow up with Dr. Kraft . No records available at time of this visit.     Past Medical History:   Diagnosis Date    COPD (chronic obstructive pulmonary disease)     Impetigo     Scabies        Past Surgical History:   Procedure Laterality Date    APPENDECTOMY       SECTION  1980    2x       Family History   Problem Relation Age of Onset    Cancer Father     Cancer Maternal Grandmother     Heart disease Paternal Grandmother     Cancer Maternal Aunt         Social History     Socioeconomic History    Marital status:    Tobacco Use    Smoking status: Every Day     Types: Cigarettes    Smokeless tobacco: Never   Substance and Sexual Activity    Alcohol use: Not Currently       Social History     Social History Narrative    Not on file       Review of patient's allergies indicates:  No Known Allergies     Medications: Medications reviewed to include over the counter medications.    Review of Systems: A focused ROS was completed and found to be negative except for that mentioned  "above.      OBJECTIVE   PHYSICAL EXAM:  Vitals:    11/08/23 1323 11/08/23 1331   BP: (!) 96/50 (!) 92/54   BP Location: Left arm Right arm   Patient Position: Sitting Sitting   BP Method: Medium (Manual) Medium (Manual)   Pulse: 73    Resp: 14    SpO2: (!) 83%    Weight: 59 kg (130 lb)    Height: 5' 6" (1.676 m)         GENERAL: NAD, thin  HEENT: normocephalic, non-icteric conjunctivae  LYMPHATIC: no lymphadenopathy of neck  RESPIRATORY: bilateral posterior expiratory wheeze, no rales or rhonchi, normal pulmonary effort  CARDIOVASCULAR: Regular rate and rhythm, no murmurs rubs or gallops.  SKIN: no rash, jaundice, ecchymosis or ulcers  MUSCULOSKELETAL: No clubbing or cyanosis; no pedal edema  NEUROLOGIC: AO ×3, no gross deficits  PSYCH: Normal mood and affect    LABS:  Lab studies reviewed and notable for SCr 0.94 (03/2023)    IMAGING:  No imaging available for review at time of visit.    LUNG FUNCTION TESTING:  None available to review or report      ASSESSMENT & PLAN     1. Chronic obstructive pulmonary disease, unspecified COPD type  Assessment & Plan:  COPD with high CAT score with decreased exercise capacity and cough predominating symptoms. Prior history of exacerbation. Will strart Trelegy (sample afforded as well) with albuterol PRN.     Orders:  -     fluticasone-umeclidin-vilanter (TRELEGY ELLIPTA) 200-62.5-25 mcg inhaler; Inhale 1 puff into the lungs once daily.  Dispense: 1 each; Refill: 11  -     albuterol sulfate 2.5 mg/0.5 mL Nebu; Take 2.5 mg by nebulization every 6 (six) hours as needed (Shortness of breath). Rescue  Dispense: 90 each; Refill: 11    2. Abnormal CT scan, lung  Assessment & Plan:  Patient referred for bronch and ?EBUS. No imaging on records available to review. Will request these and f/u on PET from the 17th. Patient is amenable to biopsy with goal treatment. She would like to keep her Nov 27th appointment with Dr. Kraft; tentative date for procedure Nov 20th vs 27th. Will need to " obtain records for planning. With regards to her Eliquis, will plan to hold at min 2 days prior to procedure date.       3. Cigarette nicotine dependence with nicotine-induced disorder  Assessment & Plan:  Dangers of cigarette smoking were reviewed with patient in detail. Recommend complete cessation. Patient was Counseled for 3-10 minutes. Nicotine replacement options were discussed. Nicotine replacement was discussed- not prescribed per patient's request. Action phase; will decrease cigarettes by 2 with goal for 1/4 pack a day from 1/2ppd.              Follow up in about 2 weeks (around 11/22/2023) for POST PROCEDURE FOLLOW UP.      Case was discussed with patient; all questions were answered to patient's satisfaction and patient verbalized understanding.     Milagros Munguia MD  Pulmonary Medicine  [facility]  Phone: [phone]

## 2023-11-16 DIAGNOSIS — R91.1 NODULE OF UPPER LOBE OF RIGHT LUNG: Primary | ICD-10-CM

## 2023-11-16 DIAGNOSIS — R59.0 MEDIASTINAL LYMPHADENOPATHY: ICD-10-CM

## 2023-11-16 NOTE — PROGRESS NOTES
Called patient and discussed procedure for RUL nodule with mediastinal adenopathy. She is agreeable to it with goal for procedure tentative date 11/20/2023.   She is to stop taking Eliquis today with am dose and NPO after midnight on 11/20 Monday for planned am procedure. No am medications.   She is undergoing PET CT 11/17/2023, report to be faxed.   All questions answered. She was notified of upcoming phone call upon finalization of scheduling.       Milagros Munguia MD  Pulmonary and Critical Care  [facility]

## 2023-11-17 ENCOUNTER — TELEPHONE (OUTPATIENT)
Dept: GASTROENTEROLOGY | Facility: HOSPITAL | Age: 73
End: 2023-11-17
Payer: MEDICARE

## 2023-11-18 NOTE — TELEPHONE ENCOUNTER
I tried to contact the patient 5x at the primary number listed to relay pre-procedure instructions. Pt did not answer. I left 4 voicemail messages. Pt has not returned calls, as of yet.

## 2023-11-20 ENCOUNTER — HOSPITAL ENCOUNTER (OUTPATIENT)
Dept: GASTROENTEROLOGY | Facility: HOSPITAL | Age: 73
Discharge: HOME | End: 2023-11-20
Attending: STUDENT IN AN ORGANIZED HEALTH CARE EDUCATION/TRAINING PROGRAM
Payer: MEDICARE

## 2023-11-20 DIAGNOSIS — R59.0 MEDIASTINAL LYMPHADENOPATHY: ICD-10-CM

## 2023-11-20 DIAGNOSIS — I45.10 RIGHT BUNDLE BRANCH BLOCK: Primary | ICD-10-CM

## 2023-11-20 DIAGNOSIS — R91.1 NODULE OF UPPER LOBE OF RIGHT LUNG: ICD-10-CM

## 2023-11-22 DIAGNOSIS — R59.0 MEDIASTINAL LYMPHADENOPATHY: Primary | ICD-10-CM

## 2023-11-22 DIAGNOSIS — R91.1 NODULE OF UPPER LOBE OF RIGHT LUNG: ICD-10-CM

## 2023-11-22 DIAGNOSIS — R91.8 ABNORMAL CT SCAN, LUNG: ICD-10-CM

## 2023-11-22 NOTE — PROGRESS NOTES
Called patient and discussed procedure for RUL nodule with mediastinal adenopathy of which she is amenable.  We will reschedule her missed appointment for 11/27/2023.  She is holding her Eliquis to date and will continue this hold until after procedure.    PET CT 11/17 reviewed; FDG avid RUL mass (SUV 16.1), R hilar LAD (SUV 9.64) and HÉCTOR chronic post-treatment changes with slightly increased SUV 3.22; no extrathoracic areas of uptake. Labs completed 10/23/2023 reviewed in Media folder with normal renal function.       Milagros Munguia MD  Pulmonary and Critical Care  [facility]

## 2023-11-27 ENCOUNTER — HOSPITAL ENCOUNTER (OUTPATIENT)
Dept: GASTROENTEROLOGY | Facility: HOSPITAL | Age: 73
Discharge: HOME | End: 2023-11-27
Attending: STUDENT IN AN ORGANIZED HEALTH CARE EDUCATION/TRAINING PROGRAM
Payer: MEDICARE

## 2023-11-27 ENCOUNTER — HOSPITAL ENCOUNTER (OUTPATIENT)
Dept: RADIOLOGY | Facility: HOSPITAL | Age: 73
Discharge: HOME | End: 2023-11-27
Attending: ANESTHESIOLOGY
Payer: MEDICARE

## 2023-11-27 ENCOUNTER — ANESTHESIA EVENT (OUTPATIENT)
Dept: GASTROENTEROLOGY | Facility: HOSPITAL | Age: 73
End: 2023-11-27
Payer: MEDICARE

## 2023-11-27 ENCOUNTER — ANESTHESIA (OUTPATIENT)
Dept: GASTROENTEROLOGY | Facility: HOSPITAL | Age: 73
End: 2023-11-27
Payer: MEDICARE

## 2023-11-27 ENCOUNTER — HOSPITAL ENCOUNTER (OUTPATIENT)
Dept: RADIOLOGY | Facility: HOSPITAL | Age: 73
Discharge: HOME | End: 2023-11-27
Attending: STUDENT IN AN ORGANIZED HEALTH CARE EDUCATION/TRAINING PROGRAM
Payer: MEDICARE

## 2023-11-27 VITALS
HEART RATE: 70 BPM | BODY MASS INDEX: 20.57 KG/M2 | SYSTOLIC BLOOD PRESSURE: 97 MMHG | RESPIRATION RATE: 14 BRPM | DIASTOLIC BLOOD PRESSURE: 53 MMHG | OXYGEN SATURATION: 94 % | TEMPERATURE: 98 F | HEIGHT: 66 IN | WEIGHT: 128 LBS

## 2023-11-27 DIAGNOSIS — I48.91 ATRIAL FIBRILLATION: ICD-10-CM

## 2023-11-27 DIAGNOSIS — R91.1 NODULE OF UPPER LOBE OF RIGHT LUNG: ICD-10-CM

## 2023-11-27 DIAGNOSIS — R09.02 HYPOXIA: Primary | ICD-10-CM

## 2023-11-27 DIAGNOSIS — C34.92 MALIGNANT NEOPLASM OF UNSPECIFIED PART OF LEFT BRONCHUS OR LUNG: Primary | ICD-10-CM

## 2023-11-27 DIAGNOSIS — R59.0 MEDIASTINAL LYMPHADENOPATHY: ICD-10-CM

## 2023-11-27 DIAGNOSIS — R91.8 ABNORMAL CT SCAN, LUNG: ICD-10-CM

## 2023-11-27 PROCEDURE — 88305 CYTOLOGY, FNA EUS/EBUS: ICD-10-PCS | Mod: 26,,, | Performed by: PATHOLOGY

## 2023-11-27 PROCEDURE — D9220A PRA ANESTHESIA: ICD-10-PCS | Mod: ANES,,, | Performed by: ANESTHESIOLOGY

## 2023-11-27 PROCEDURE — 31654 PR BRONCH W/ EBUS, DIAG OR THERA INTERVENTION PERIPHERAL LESION(S), INCL GUID, ADD ON CODE: ICD-10-PCS | Mod: ,,, | Performed by: STUDENT IN AN ORGANIZED HEALTH CARE EDUCATION/TRAINING PROGRAM

## 2023-11-27 PROCEDURE — 71045 X-RAY EXAM CHEST 1 VIEW: CPT | Mod: TC

## 2023-11-27 PROCEDURE — 88172 CYTOLOGY, FNA EUS/EBUS: ICD-10-PCS | Mod: 26,,, | Performed by: PATHOLOGY

## 2023-11-27 PROCEDURE — 31629 PR BRONCHOSCOPY,TRANSBRON ASPIR BX: ICD-10-PCS | Mod: 59,RT,, | Performed by: STUDENT IN AN ORGANIZED HEALTH CARE EDUCATION/TRAINING PROGRAM

## 2023-11-27 PROCEDURE — 93005 ELECTROCARDIOGRAM TRACING: CPT

## 2023-11-27 PROCEDURE — D9220A PRA ANESTHESIA: ICD-10-PCS | Mod: CRNA,,,

## 2023-11-27 PROCEDURE — 27000177 HC AIRWAY, LARYNGEAL MASK: Performed by: ANESTHESIOLOGY

## 2023-11-27 PROCEDURE — D9220A PRA ANESTHESIA: Mod: ANES,,, | Performed by: ANESTHESIOLOGY

## 2023-11-27 PROCEDURE — 25000003 PHARM REV CODE 250

## 2023-11-27 PROCEDURE — 31629 BRONCHOSCOPY/NEEDLE BX EACH: CPT | Mod: 59,RT,, | Performed by: STUDENT IN AN ORGANIZED HEALTH CARE EDUCATION/TRAINING PROGRAM

## 2023-11-27 PROCEDURE — 31654 BRONCH EBUS IVNTJ PERPH LES: CPT | Mod: ,,, | Performed by: STUDENT IN AN ORGANIZED HEALTH CARE EDUCATION/TRAINING PROGRAM

## 2023-11-27 PROCEDURE — 31653 BRONCH EBUS SAMPLNG 3/> NODE: CPT | Performed by: STUDENT IN AN ORGANIZED HEALTH CARE EDUCATION/TRAINING PROGRAM

## 2023-11-27 PROCEDURE — 31652 BRONCH EBUS SAMPLNG 1/2 NODE: CPT | Mod: ,,, | Performed by: STUDENT IN AN ORGANIZED HEALTH CARE EDUCATION/TRAINING PROGRAM

## 2023-11-27 PROCEDURE — 37000009 HC ANESTHESIA EA ADD 15 MINS: Performed by: STUDENT IN AN ORGANIZED HEALTH CARE EDUCATION/TRAINING PROGRAM

## 2023-11-27 PROCEDURE — 27000689 HC BLADE LARYNGOSCOPE ANY SIZE: Performed by: ANESTHESIOLOGY

## 2023-11-27 PROCEDURE — 88305 TISSUE EXAM BY PATHOLOGIST: CPT | Mod: TC,MCY | Performed by: STUDENT IN AN ORGANIZED HEALTH CARE EDUCATION/TRAINING PROGRAM

## 2023-11-27 PROCEDURE — 71045 XR CHEST AP PORTABLE: ICD-10-PCS | Mod: 26,,, | Performed by: RADIOLOGY

## 2023-11-27 PROCEDURE — 63600175 PHARM REV CODE 636 W HCPCS

## 2023-11-27 PROCEDURE — 27000655: Performed by: ANESTHESIOLOGY

## 2023-11-27 PROCEDURE — 37000008 HC ANESTHESIA 1ST 15 MINUTES: Performed by: STUDENT IN AN ORGANIZED HEALTH CARE EDUCATION/TRAINING PROGRAM

## 2023-11-27 PROCEDURE — 27201423 OPTIME MED/SURG SUP & DEVICES STERILE SUPPLY: Performed by: STUDENT IN AN ORGANIZED HEALTH CARE EDUCATION/TRAINING PROGRAM

## 2023-11-27 PROCEDURE — 31652 BRONCH EBUS SAMPLNG 1/2 NODE: CPT | Performed by: STUDENT IN AN ORGANIZED HEALTH CARE EDUCATION/TRAINING PROGRAM

## 2023-11-27 PROCEDURE — 27000165 HC TUBE, ETT CUFFED: Performed by: ANESTHESIOLOGY

## 2023-11-27 PROCEDURE — D9220A PRA ANESTHESIA: Mod: CRNA,,,

## 2023-11-27 PROCEDURE — 27000716 HC OXISENSOR PROBE, ANY SIZE: Performed by: ANESTHESIOLOGY

## 2023-11-27 PROCEDURE — 88305 TISSUE EXAM BY PATHOLOGIST: CPT | Mod: 26,,, | Performed by: PATHOLOGY

## 2023-11-27 PROCEDURE — 71045 X-RAY EXAM CHEST 1 VIEW: CPT | Mod: 26,,, | Performed by: RADIOLOGY

## 2023-11-27 PROCEDURE — 93010 EKG 12-LEAD: ICD-10-PCS | Mod: ,,, | Performed by: HOSPITALIST

## 2023-11-27 PROCEDURE — 88305 TISSUE EXAM BY PATHOLOGIST: CPT | Mod: TC,MCY,59 | Performed by: STUDENT IN AN ORGANIZED HEALTH CARE EDUCATION/TRAINING PROGRAM

## 2023-11-27 PROCEDURE — 93010 ELECTROCARDIOGRAM REPORT: CPT | Mod: ,,, | Performed by: HOSPITALIST

## 2023-11-27 PROCEDURE — 31652 PR BRONCH W/ EBUS, SAMPLING 1 OR 2 NODES, INCL GUIDE: ICD-10-PCS | Mod: ,,, | Performed by: STUDENT IN AN ORGANIZED HEALTH CARE EDUCATION/TRAINING PROGRAM

## 2023-11-27 PROCEDURE — 27000510 HC BLANKET BAIR HUGGER ANY SIZE: Performed by: ANESTHESIOLOGY

## 2023-11-27 PROCEDURE — 88172 CYTP DX EVAL FNA 1ST EA SITE: CPT | Mod: 26,,, | Performed by: PATHOLOGY

## 2023-11-27 RX ORDER — PROPOFOL 10 MG/ML
VIAL (ML) INTRAVENOUS
Status: DISCONTINUED | OUTPATIENT
Start: 2023-11-27 | End: 2023-11-27

## 2023-11-27 RX ORDER — DIPHENHYDRAMINE HYDROCHLORIDE 50 MG/ML
25 INJECTION INTRAMUSCULAR; INTRAVENOUS EVERY 6 HOURS PRN
Status: DISCONTINUED | OUTPATIENT
Start: 2023-11-27 | End: 2023-11-28 | Stop reason: HOSPADM

## 2023-11-27 RX ORDER — PHENYLEPHRINE HYDROCHLORIDE 10 MG/ML
INJECTION INTRAVENOUS
Status: DISCONTINUED | OUTPATIENT
Start: 2023-11-27 | End: 2023-11-27

## 2023-11-27 RX ORDER — MORPHINE SULFATE 10 MG/ML
4 INJECTION INTRAMUSCULAR; INTRAVENOUS; SUBCUTANEOUS EVERY 5 MIN PRN
Status: DISCONTINUED | OUTPATIENT
Start: 2023-11-27 | End: 2023-11-28 | Stop reason: HOSPADM

## 2023-11-27 RX ORDER — MEPERIDINE HYDROCHLORIDE 25 MG/ML
25 INJECTION INTRAMUSCULAR; INTRAVENOUS; SUBCUTANEOUS EVERY 10 MIN PRN
Status: DISCONTINUED | OUTPATIENT
Start: 2023-11-27 | End: 2023-11-27 | Stop reason: HOSPADM

## 2023-11-27 RX ORDER — SODIUM CHLORIDE 9 MG/ML
INJECTION, SOLUTION INTRAVENOUS CONTINUOUS
Status: DISCONTINUED | OUTPATIENT
Start: 2023-11-27 | End: 2023-11-28 | Stop reason: HOSPADM

## 2023-11-27 RX ORDER — FENTANYL CITRATE 50 UG/ML
INJECTION, SOLUTION INTRAMUSCULAR; INTRAVENOUS
Status: DISCONTINUED | OUTPATIENT
Start: 2023-11-27 | End: 2023-11-27

## 2023-11-27 RX ORDER — HYDROMORPHONE HYDROCHLORIDE 2 MG/ML
0.5 INJECTION, SOLUTION INTRAMUSCULAR; INTRAVENOUS; SUBCUTANEOUS EVERY 5 MIN PRN
Status: DISCONTINUED | OUTPATIENT
Start: 2023-11-27 | End: 2023-11-28 | Stop reason: HOSPADM

## 2023-11-27 RX ORDER — LIDOCAINE HYDROCHLORIDE 20 MG/ML
INJECTION, SOLUTION EPIDURAL; INFILTRATION; INTRACAUDAL; PERINEURAL
Status: DISCONTINUED | OUTPATIENT
Start: 2023-11-27 | End: 2023-11-27

## 2023-11-27 RX ORDER — ROCURONIUM BROMIDE 10 MG/ML
INJECTION, SOLUTION INTRAVENOUS
Status: DISCONTINUED | OUTPATIENT
Start: 2023-11-27 | End: 2023-11-27

## 2023-11-27 RX ORDER — ONDANSETRON 2 MG/ML
4 INJECTION INTRAMUSCULAR; INTRAVENOUS DAILY PRN
Status: DISCONTINUED | OUTPATIENT
Start: 2023-11-27 | End: 2023-11-28 | Stop reason: HOSPADM

## 2023-11-27 RX ORDER — ONDANSETRON 2 MG/ML
INJECTION INTRAMUSCULAR; INTRAVENOUS
Status: DISCONTINUED | OUTPATIENT
Start: 2023-11-27 | End: 2023-11-27

## 2023-11-27 RX ORDER — CEFAZOLIN SODIUM 1 G/3ML
INJECTION, POWDER, FOR SOLUTION INTRAMUSCULAR; INTRAVENOUS
Status: DISCONTINUED | OUTPATIENT
Start: 2023-11-27 | End: 2023-11-27

## 2023-11-27 RX ADMIN — PHENYLEPHRINE HYDROCHLORIDE 100 MCG: 10 INJECTION INTRAVENOUS at 11:11

## 2023-11-27 RX ADMIN — ONDANSETRON 8 MG: 2 INJECTION INTRAMUSCULAR; INTRAVENOUS at 10:11

## 2023-11-27 RX ADMIN — PHENYLEPHRINE HYDROCHLORIDE 100 MCG: 10 INJECTION INTRAVENOUS at 10:11

## 2023-11-27 RX ADMIN — FENTANYL CITRATE 100 MCG: 50 INJECTION INTRAMUSCULAR; INTRAVENOUS at 11:11

## 2023-11-27 RX ADMIN — LIDOCAINE HYDROCHLORIDE 60 MG: 20 INJECTION, SOLUTION INTRAVENOUS at 10:11

## 2023-11-27 RX ADMIN — SUGAMMADEX 200 MG: 100 INJECTION, SOLUTION INTRAVENOUS at 12:11

## 2023-11-27 RX ADMIN — CEFAZOLIN 2 G: 1 INJECTION, POWDER, FOR SOLUTION INTRAMUSCULAR; INTRAVENOUS; PARENTERAL at 10:11

## 2023-11-27 RX ADMIN — PROPOFOL 120 MG: 10 INJECTION, EMULSION INTRAVENOUS at 10:11

## 2023-11-27 RX ADMIN — SODIUM CHLORIDE: 9 INJECTION, SOLUTION INTRAVENOUS at 10:11

## 2023-11-27 RX ADMIN — FENTANYL CITRATE 100 MCG: 50 INJECTION INTRAMUSCULAR; INTRAVENOUS at 10:11

## 2023-11-27 RX ADMIN — ROCURONIUM BROMIDE 40 MG: 10 INJECTION, SOLUTION INTRAVENOUS at 10:11

## 2023-11-27 NOTE — ANESTHESIA POSTPROCEDURE EVALUATION
Anesthesia Post Evaluation    Patient: Mayra Kelly    Procedure(s) Performed: * No procedures listed *    Final Anesthesia Type: general      Patient location during evaluation: PACU  Post-procedure vital signs: reviewed and stable  Pain management: adequate  Airway patency: patent    PONV status at discharge: No PONV  Anesthetic complications: no      Cardiovascular status: hemodynamically stable  Respiratory status: unassisted  Hydration status: euvolemic  Follow-up not needed.          Vitals Value Taken Time   BP 90/51 11/27/23 1346   Temp 36.7 °C (98 °F) 11/27/23 1247   Pulse 72 11/27/23 1347   Resp 13 11/27/23 1331   SpO2 91 % 11/27/23 1347   Vitals shown include unvalidated device data.      Event Time   Out of Recovery 12:30:00         Pain/Orlando Score: Orlando Score: 9 (11/27/2023  1:15 PM)

## 2023-11-27 NOTE — INTERVAL H&P NOTE
The patient has been examined and the H&P has been reviewed:    I concur with the findings and changes have been noted since the H&P was written: 74 yo F with Afib on Eliquis, COPD and prior history of L adenocarcinoma of lung (Dx 2017) with serial imaging with new R hilar nodule with interval increase in size now s/p PET 11/17 with PET avid RUL mass (SUV 16.1), R hilar LAD (SUV 9.64) and HÉCTOR chronic post-treatment changes with slightly increased SUV 3.22; no extrathoracic areas of uptake. Labs completed 10/23/2023 reviewed in Media folder with normal renal function. Eliquis held since  11/16 after am dose. Plan for EBUS with goal for RUL and LAD diagnostics and possible HÉCTOR Bx as well if atypical findings under ultrasound.        There are no hospital problems to display for this patient.

## 2023-11-27 NOTE — ANESTHESIA PROCEDURE NOTES
Intubation    Date/Time: 11/27/2023 10:36 AM    Performed by: Mian Lozano CRNA  Authorized by: Chilo Prasad MD    Intubation:     Induction:  Intravenous    Intubated:  Postinduction    Mask Ventilation:  Easy mask    Attempts:  1    Attempted By:  CRNA    Method of Intubation:  Direct    Blade:  Mcgee 2    Laryngeal View Grade: Grade I - full view of cords      Difficult Airway Encountered?: No      Complications:  None    Airway Device:  Oral endotracheal tube    Airway Device Size:  9.0    Style/Cuff Inflation:  Cuffed (inflated to minimal occlusive pressure)    Inflation Amount (mL):  8    Tube secured:  20    Secured at:  The lips    Placement Verified By:  Capnometry    Complicating Factors:  None    Findings Post-Intubation:  BS equal bilateral and atraumatic/condition of teeth unchanged

## 2023-11-27 NOTE — DISCHARGE SUMMARY
[facility] - Endoscopy  Discharge Note  Short Stay    EBUS  Mayra Kelly   74 yo F with new R hilar nodule with interval increase in size now s/p PET 11/17 with PET avid RUL mass (SUV 16.1), R hilar LAD (SUV 9.64)  presents 11/27/2023 in outpatient setting for planned EBUS with TBNA of RUL mass and lymph nodes.    PROCEDURES: Bronchoscopy with Endobronchial ultrasound (EBUS) and Transbronchial needle aspiration of 3 or more lymph nodes or structures  See procedure note for further details.    OUTCOME: Patient tolerated the procedure well without complication and is now ready for discharge    DISPOSITION: Home or self care    FINAL DIAGNOSIS: RUL mass, Right hilar and mediastinal adenopathy.     FOLLOW UP: Will coordinate follow up vs referral thereafter. Follow up in clinic to discuss results, will call to schedule f/u visit.    DISCHARGE INSTRUCTIONS: Post-bronchoscopy instructions discussed with patient. Entered into AVS.     TIME SPENT ON DISCHARGE: <30 minutes      Discussed with patient and accompanying family/friend management plans, all questions were answered and they verbalized understanding.     Milagros Munguia MD  Pulmonary and Critical Care  [facility]

## 2023-11-27 NOTE — TRANSFER OF CARE
"Anesthesia Transfer of Care Note    Patient: Mayra Kelly    Procedure(s) Performed: * EBUS *    Patient location: PACU    Anesthesia Type: general    Transport from OR: Transported from OR on 6-10 L/min O2 by face mask with adequate spontaneous ventilation    Post pain: adequate analgesia    Post assessment: no apparent anesthetic complications and tolerated procedure well    Post vital signs: stable    Level of consciousness: responds to stimulation and awake    Nausea/Vomiting: no nausea/vomiting    Complications: none    Transfer of care protocol was followedComments: Report Given to PACU rn JOSE      Last vitals: Visit Vitals  /62 (BP Location: Right arm, Patient Position: Lying)   Pulse 86   Temp 36.7 °C (98 °F) (Oral)   Resp 14   Ht 5' 6" (1.676 m)   Wt 58.1 kg (128 lb)   LMP  (LMP Unknown)   SpO2 96%   Breastfeeding No   BMI 20.66 kg/m²     "

## 2023-11-27 NOTE — PROGRESS NOTES
1241 RECEIVED TO RR AWAKE, ALERT, COLOR PINK. O2 VIA FM. HOB ELEVATED. MOIST COUGH, NON-PRODUCTIVE. LUNGS COARSE. NO C/O PAIN OR SOB. IV INFUSING LEFT AC 20G. CATH. SCD HOSE IN PROGRESS. SEE FLOW SHEET.    1300 CHEST X-RAY DONE PER TECHS AND VIEWED BY DR. ULLOA. ENCOURAGED COUGH DEEP BREATHS.     1303 ICE SHIPS GIVEN PER ORDER DR. ULLOA. O2 SATS LOW ON NC 3L. TO KEEP O2 SATS BETWEEN 88-92%. ENCOURAGED COUGH DEEP BREATHS.    1315 O2 SATS DROPPED TO 83% ON NC 3L. FACE MASK O2 REAPPLIED. O2 SATS INCREASED 98%. OBSERVING CLOSELY.    1330 TRANSFERRED TO ROOM. AWAKE, ALERT. NO C/O PAIN OR SOB.NO DISTRESS NOTED. ENCOURAGED COUGH DEEP BREATHS. O2 VIA FM AT 6L AT PRESENT. WILL CONTINUE TO TRY TO WEAN DOWN.

## 2023-11-27 NOTE — ANESTHESIA PREPROCEDURE EVALUATION
11/27/2023  Mayra Kelly is a 73 y.o., female.      Pre-op Assessment    I have reviewed the Patient Summary Reports.    I have reviewed the NPO Status.   I have reviewed the Medications.     Review of Systems         Anesthesia Plan  Type of Anesthesia, risks & benefits discussed:    Anesthesia Type: Gen ETT  Intra-op Monitoring Plan: Standard ASA Monitors  Post Op Pain Control Plan: IV/PO Opioids PRN  Induction:  IV  Informed Consent: Informed consent signed with the Patient and all parties understand the risks and agree with anesthesia plan.  All questions answered.   ASA Score: 3    Ready For Surgery From Anesthesia Perspective.     .  NPO greater than 8 hours  No anesthetic complications  NKDA    11/27/23 EKG: NSR 70 bpm; RBBB    Medical History   Impetigo Scabies   COPD (chronic obstructive pulmonary disease) Hypertension   Malignant neoplasm left lung  Chronic afib  H/o CHF  Eczema  Chronic pain syndrome    Airway exam deferred (COVID precautions); adequate ROM at neck.

## 2023-11-27 NOTE — DISCHARGE INSTRUCTIONS
POST BRONCHOSCOPY DISCHARGE INSTRUCTIONS:  Today you have undergone a procedure called a bronchoscopy with biopsy (also referred to as Bronchoscopy with EBUS and biopsy). You underwent general anesthesia and the medication will be in your body for the following hours up to 24 hours. It is essential that you are accompanied home by a responsible adult and I recommend that they stay with you during this period. You should NOT drive a vehicle, operate any form of machinery, consume alcohol or sign legal documentation during this time. After 24 hours, the effect of sedation should have worn off and you will be able to start normal activities.     OXYGEN: It is important that you use your oxygen at all times. You were noted to have low oxygen levels. Please use 3L NC during the day and at night.      DIET: You may begin a normal diet and fluids 2 HOURS following leaving the hospital. This will make sure your swallowing muscles have recovered properly.      MEDICATIONS: You may resume your usual prescriptions tomorrow. This includes your Eliquis which you can resume taking tomorrow 11/28/2023.     BIOPSIES AND SPECIMENS: The biopsies that were taken following your procedure have been taken for processing and testing. It may take up to 1 week, and sometimes longer, to get the final result. You will receive a call from my office to schedule a follow up to discuss the results. If you prefer, I can also call your with the results once I have received and reviewed them.      SYMPTOMS:  You may have a sore throat after the procedure; this typically resolves in a day.  Because of the biopsies taken, you may experience a low grade fever for <24 hrs and coughing with some blood coming up.  You may cough after surgery. This is normal to clear secretions in your lung that collected during the time  you were asleep in surgery and could not cough on your own. You may also see some blood in your sputum.  This is normal and as long as  it is only a small amount there is no need for alarm.      Awareness of the following unusual symptoms should prompt you to call our office at [phone] to discuss a plan for management. If the symptoms come on suddenly go to the Emergency Department for evaluation.  These unusual symptoms include:  Sudden breathing distress, such as being more breathless than you normally are  Chest pain not responding to medication  Persistently high temperature or fever of 100.4°F or higher  Coughing up large amount of blood or blood clots (more than a teaspoon)   Sudden swelling of your previous IV sites      It was my honor to be your doctor to perform this diagnostic procedure.     Milagros Munguia MD  Pulmonary and Critical Care  [facility]

## 2023-11-28 PROBLEM — R59.0 MEDIASTINAL LYMPHADENOPATHY: Status: ACTIVE | Noted: 2023-11-28

## 2023-11-28 LAB
ESTROGEN SERPL-MCNC: NORMAL PG/ML
INSULIN SERPL-ACNC: NORMAL U[IU]/ML
LAB AP COMMENTS: NORMAL
LAB AP GROSS DESCRIPTION: NORMAL
RUSH AP QUICK STAIN DIAGNOSIS: NORMAL
T3RU NFR SERPL: NORMAL %

## 2023-12-01 ENCOUNTER — TELEPHONE (OUTPATIENT)
Dept: PULMONOLOGY | Facility: CLINIC | Age: 73
End: 2023-12-01
Payer: MEDICARE

## 2023-12-01 ENCOUNTER — CLINICAL SUPPORT (OUTPATIENT)
Dept: PULMONOLOGY | Facility: HOSPITAL | Age: 73
End: 2023-12-01
Attending: FAMILY MEDICINE
Payer: MEDICARE

## 2023-12-01 VITALS — WEIGHT: 128 LBS | HEIGHT: 66 IN | BODY MASS INDEX: 20.57 KG/M2

## 2023-12-01 DIAGNOSIS — R09.02 HYPOXIA: ICD-10-CM

## 2023-12-01 PROCEDURE — 94618 PULMONARY STRESS TESTING: CPT | Mod: 26,,, | Performed by: STUDENT IN AN ORGANIZED HEALTH CARE EDUCATION/TRAINING PROGRAM

## 2023-12-01 PROCEDURE — 94618 PULMONARY STRESS TESTING: CPT

## 2023-12-01 PROCEDURE — 94618 PULMONARY STRESS TESTING: ICD-10-PCS | Mod: 26,,, | Performed by: STUDENT IN AN ORGANIZED HEALTH CARE EDUCATION/TRAINING PROGRAM

## 2023-12-04 ENCOUNTER — OFFICE VISIT (OUTPATIENT)
Dept: PULMONOLOGY | Facility: CLINIC | Age: 73
End: 2023-12-04
Payer: MEDICARE

## 2023-12-04 VITALS
OXYGEN SATURATION: 85 % | SYSTOLIC BLOOD PRESSURE: 108 MMHG | HEIGHT: 66 IN | DIASTOLIC BLOOD PRESSURE: 54 MMHG | HEART RATE: 61 BPM | RESPIRATION RATE: 18 BRPM | WEIGHT: 128 LBS | BODY MASS INDEX: 20.57 KG/M2

## 2023-12-04 DIAGNOSIS — R91.8 ABNORMAL CT SCAN, LUNG: ICD-10-CM

## 2023-12-04 DIAGNOSIS — J43.2 CENTRILOBULAR EMPHYSEMA: ICD-10-CM

## 2023-12-04 DIAGNOSIS — J44.9 CHRONIC OBSTRUCTIVE PULMONARY DISEASE, UNSPECIFIED COPD TYPE: ICD-10-CM

## 2023-12-04 DIAGNOSIS — F17.219 CIGARETTE NICOTINE DEPENDENCE WITH NICOTINE-INDUCED DISORDER: ICD-10-CM

## 2023-12-04 DIAGNOSIS — J96.11 CHRONIC RESPIRATORY FAILURE WITH HYPOXIA: Primary | ICD-10-CM

## 2023-12-04 PROCEDURE — 99406 PR TOBACCO USE CESSATION INTERMEDIATE 3-10 MINUTES: ICD-10-PCS | Mod: S$PBB,,, | Performed by: STUDENT IN AN ORGANIZED HEALTH CARE EDUCATION/TRAINING PROGRAM

## 2023-12-04 PROCEDURE — 99406 BEHAV CHNG SMOKING 3-10 MIN: CPT | Mod: S$PBB,,, | Performed by: STUDENT IN AN ORGANIZED HEALTH CARE EDUCATION/TRAINING PROGRAM

## 2023-12-04 PROCEDURE — 99212 OFFICE O/P EST SF 10 MIN: CPT | Mod: S$PBB,25,, | Performed by: STUDENT IN AN ORGANIZED HEALTH CARE EDUCATION/TRAINING PROGRAM

## 2023-12-04 PROCEDURE — 99212 PR OFFICE/OUTPT VISIT, EST, LEVL II, 10-19 MIN: ICD-10-PCS | Mod: S$PBB,25,, | Performed by: STUDENT IN AN ORGANIZED HEALTH CARE EDUCATION/TRAINING PROGRAM

## 2023-12-04 PROCEDURE — 99214 OFFICE O/P EST MOD 30 MIN: CPT | Mod: PBBFAC | Performed by: STUDENT IN AN ORGANIZED HEALTH CARE EDUCATION/TRAINING PROGRAM

## 2023-12-04 NOTE — PROGRESS NOTES
[facility]  Pulmonology  ESTABLISHED VISIT     Patient Name:  Mayra Kelly  Primary Care Provider: Sona Gupta DO  Date of Service: 2023      Chief Complaint: Shortness of breath, cough    SUBJECTIVE   HPI:  Mayra Kelly is a 73 y.o. female with Afib on Eliquis, COPD and prior history of adenocarcinoma of the HÉCTOR who presents today to follow up after EBUS 2023. Last seen  with plan for EBUS and initiation of Trelegy inhaler with albuterol PRN and 6MWT.    Robin feels well on this evaluation. She had one episode of emesis on the night following her EBUS with no other symptoms thereafter. She has shortness of breath with exertion. She has oxygen available at home that she uses while at home. She has difficulty with transporting the oxygen tanks when she is up and about. She feels improved after starting Trelegy; still utilizing the sample and pending  with pharmacy. With regards to her EBUS, pathology with rare atypical cells in RUL which is non diagnostic. Discussed with Dr. Kraft with plan for IR guided biopsy.     Initial HPI:  Robin reports having shortness of breath with ambulation. She has cough productive of clear phlegm. She denies hemoptysis. She has had no recent hospitalizations for exacerbations. CAT score 23    She reports having a previous history of lung cancer treated with chemotherapy and radiation to her brain. She has had an abnormal scan of her lung and she states that she came today to discuss bronchoscopy. She is planned for a PET on  and follow up with Dr. Kraft . No records available at time of this visit.     Past Medical History:   Diagnosis Date    COPD (chronic obstructive pulmonary disease)     Hypertension     Impetigo     Scabies        Past Surgical History:   Procedure Laterality Date    APPENDECTOMY       SECTION  1980    2x       Family History   Problem Relation Age of Onset    Cancer Father   "   Cancer Maternal Grandmother     Heart disease Paternal Grandmother     Cancer Maternal Aunt         Social History     Socioeconomic History    Marital status:    Tobacco Use    Smoking status: Every Day     Current packs/day: 1.00     Types: Cigarettes    Smokeless tobacco: Never   Substance and Sexual Activity    Alcohol use: Never    Drug use: Never       Social History     Social History Narrative    Not on file       Review of patient's allergies indicates:  No Known Allergies     Medications: Medications reviewed to include over the counter medications.    Review of Systems: A focused ROS was completed and found to be negative except for that mentioned above.      OBJECTIVE   PHYSICAL EXAM:  Vitals:    23 1544   BP: (!) 108/54   BP Location: Right arm   Patient Position: Sitting   BP Method: Large (Manual)   Pulse: 61   Resp: 18   SpO2: (!) 85%  Comment: pt. use 2l   Weight: 58.1 kg (128 lb)   Height: 5' 6" (1.676 m)          GENERAL: NAD, thin  HEENT: normocephalic, non-icteric conjunctivae  RESPIRATORY: diminished air movement in bilateral posterior lung fields, no wheezing, rales or rhonchi, normal pulmonary effort, on RA  CARDIOVASCULAR: Regular rate and rhythm, no murmurs rubs or gallops.  SKIN: no rash, jaundice, ecchymosis or ulcers  MUSCULOSKELETAL: No clubbing or cyanosis; no pedal edema  NEUROLOGIC: AO ×3, no gross deficits  PSYCH: Normal mood and affect    LABS:  Lab studies reviewed and notable for SCr 0.94 (2023)    IMAGING:  Serial imaging with new R hilar nodule with interval increase in size now s/p PET  with PET avid RUL mass (SUV 16.1), R hilar LAD (SUV 9.64) and HÉCTOR chronic post-treatment changes with slightly increased SUV 3.22; no extrathoracic areas of uptake.     LUNG FUNCTION TESTINMWD:  Date Distance (ft) Resting SpO2; [place] SpO2 O2 Required   2023 854 89%, RA; 83% 3L                ASSESSMENT & PLAN     1. Chronic respiratory failure with " hypoxia  Assessment & Plan:  Completed 6MWT with demonstrated hypoxia at rest and with exertion.   - recommend patient to be on 1 L nasal cannula at rest and 3 L nasal cannula with ambulation  - will obtain overnight oximetry as well    Orders:  -     OXYGEN FOR HOME USE  -     Complete PFT w/ bronchodilator; Future  -     PULSE OXIMETRY OVERNIGHT; Future    2. Chronic obstructive pulmonary disease, unspecified COPD type  Assessment & Plan:  COPD with high CAT score with decreased exercise capacity and cough predominating symptoms. Prior history of exacerbation. Started on Trelegy (sample afforded as well) with albuterol PRN. Imaging with L lung post radiation changes and bilateral upper lobe centrilobular emphysema. Plan to obtain PFTs.     Orders:  -     Complete PFT w/ bronchodilator; Future    3. Abnormal CT scan, lung  Assessment & Plan:  72 yo F with prior history of L adenocarcinoma of lung (Dx 2017) and ongoing nicotine dependence followed by Oncology (Dr. Kraft) on surveillance CT with new R hilar nodule now s/p PET 11/17 with FDG avid RUL mass (SUV 16.1), R hilar LAD (SUV 9.64) and HÉCTOR chronic post-treatment changes with slightly increased SUV 3.22; no extrathoracic areas of uptake. Underwent EBUS 11/27 with RUL bx non diagnostic with plan for IR guided Bx (negative Lns) with goal for RUL and       4. Centrilobular emphysema  Assessment & Plan:  COPD with high CAT score with decreased exercise capacity and cough predominating symptoms. Prior history of exacerbation. Started on Trelegy (sample afforded as well) with albuterol PRN. Imaging with L lung post radiation changes and bilateral upper lobe centrilobular emphysema. Plan to obtain PFTs.     Orders:  -     PULSE OXIMETRY OVERNIGHT; Future    5. Cigarette nicotine dependence with nicotine-induced disorder  Assessment & Plan:  Dangers of cigarette smoking were reviewed with patient in detail. Recommend complete cessation. Patient was Counseled for 3-10  minutes. Nicotine replacement options were discussed. Nicotine replacement was discussed- not prescribed per patient's request. Action phase; will decrease cigarettes by 2 with goal for 1/4 pack a day from 1/2ppd. She wants to hold on nicotine patch prescription until after her next paycheck.                 Follow up in about 3 months (around 3/4/2024) for Routine follow up.      Case was discussed with patient; all questions were answered to patient's satisfaction and patient verbalized understanding.     Milagros Munguia MD  Pulmonary Medicine  [facility]  Phone: [phone]

## 2023-12-05 PROBLEM — J96.11 CHRONIC RESPIRATORY FAILURE WITH HYPOXIA: Status: ACTIVE | Noted: 2023-12-05

## 2023-12-05 NOTE — ASSESSMENT & PLAN NOTE
74 yo F with prior history of L adenocarcinoma of lung (Dx 2017) and ongoing nicotine dependence followed by Oncology (Dr. Kraft) on surveillance CT with new R hilar nodule now s/p PET 11/17 with FDG avid RUL mass (SUV 16.1), R hilar LAD (SUV 9.64) and HÉCTOR chronic post-treatment changes with slightly increased SUV 3.22; no extrathoracic areas of uptake. Underwent EBUS 11/27 with RUL bx non diagnostic with plan for IR guided Bx (negative Lns) with goal for RUL and

## 2023-12-05 NOTE — ASSESSMENT & PLAN NOTE
Completed 6MWT with demonstrated hypoxia at rest and with exertion.   - recommend patient to be on 1 L nasal cannula at rest and 3 L nasal cannula with ambulation  - will obtain overnight oximetry as well

## 2023-12-05 NOTE — ASSESSMENT & PLAN NOTE
Dangers of cigarette smoking were reviewed with patient in detail. Recommend complete cessation. Patient was Counseled for 3-10 minutes. Nicotine replacement options were discussed. Nicotine replacement was discussed- not prescribed per patient's request. Action phase; will decrease cigarettes by 2 with goal for 1/4 pack a day from 1/2ppd. She wants to hold on nicotine patch prescription until after her next paycheck.

## 2023-12-11 PROBLEM — I45.10 RIGHT BUNDLE BRANCH BLOCK: Status: ACTIVE | Noted: 2023-12-11

## 2024-01-16 DIAGNOSIS — J96.11 CHRONIC RESPIRATORY FAILURE WITH HYPOXIA: Primary | ICD-10-CM

## 2024-01-16 DIAGNOSIS — J44.9 CHRONIC OBSTRUCTIVE PULMONARY DISEASE, UNSPECIFIED COPD TYPE: ICD-10-CM

## 2024-03-11 PROBLEM — J96.11 CHRONIC RESPIRATORY FAILURE WITH HYPOXIA: Status: RESOLVED | Noted: 2023-12-05 | Resolved: 2024-03-11

## 2024-08-07 ENCOUNTER — HOSPITAL ENCOUNTER (INPATIENT)
Facility: HOSPITAL | Age: 74
LOS: 7 days | Discharge: NURSING, REHAB OR LONG-TERM CARE | DRG: 166 | End: 2024-08-15
Attending: EMERGENCY MEDICINE | Admitting: HOSPITALIST
Payer: MEDICARE

## 2024-08-07 DIAGNOSIS — F17.219 CIGARETTE NICOTINE DEPENDENCE WITH NICOTINE-INDUCED DISORDER: ICD-10-CM

## 2024-08-07 DIAGNOSIS — J18.9 OBSTRUCTIVE PNEUMONIA: Primary | ICD-10-CM

## 2024-08-07 DIAGNOSIS — J18.9 OBSTRUCTIVE PNEUMONIA: ICD-10-CM

## 2024-08-07 DIAGNOSIS — J18.9 PNEUMONIA: ICD-10-CM

## 2024-08-07 DIAGNOSIS — N17.9 AKI (ACUTE KIDNEY INJURY): ICD-10-CM

## 2024-08-07 DIAGNOSIS — I27.20 PULMONARY HYPERTENSION: ICD-10-CM

## 2024-08-07 DIAGNOSIS — J96.22 ACUTE ON CHRONIC RESPIRATORY FAILURE WITH HYPOXIA AND HYPERCAPNIA: ICD-10-CM

## 2024-08-07 DIAGNOSIS — R06.02 SHORTNESS OF BREATH: ICD-10-CM

## 2024-08-07 DIAGNOSIS — I45.10 RIGHT BUNDLE BRANCH BLOCK: ICD-10-CM

## 2024-08-07 DIAGNOSIS — R53.1 WEAK: ICD-10-CM

## 2024-08-07 DIAGNOSIS — J43.2 CENTRILOBULAR EMPHYSEMA: ICD-10-CM

## 2024-08-07 DIAGNOSIS — C34.90 MALIGNANT NEOPLASM OF LUNG, UNSPECIFIED LATERALITY, UNSPECIFIED PART OF LUNG: ICD-10-CM

## 2024-08-07 DIAGNOSIS — J96.21 ACUTE ON CHRONIC RESPIRATORY FAILURE WITH HYPOXIA AND HYPERCAPNIA: ICD-10-CM

## 2024-08-07 LAB
ALBUMIN SERPL BCP-MCNC: 2.3 G/DL (ref 3.5–5)
ALBUMIN/GLOB SERPL: 0.5 {RATIO}
ALP SERPL-CCNC: 87 U/L (ref 55–142)
ALT SERPL W P-5'-P-CCNC: 11 U/L (ref 13–56)
ANION GAP SERPL CALCULATED.3IONS-SCNC: 11 MMOL/L (ref 7–16)
AST SERPL W P-5'-P-CCNC: 17 U/L (ref 15–37)
BASOPHILS # BLD AUTO: 0.06 K/UL (ref 0–0.2)
BASOPHILS NFR BLD AUTO: 0.5 % (ref 0–1)
BILIRUB SERPL-MCNC: 0.7 MG/DL (ref ?–1.2)
BUN SERPL-MCNC: 25 MG/DL (ref 7–18)
BUN/CREAT SERPL: 17 (ref 6–20)
CALCIUM SERPL-MCNC: 8.4 MG/DL (ref 8.5–10.1)
CHLORIDE SERPL-SCNC: 105 MMOL/L (ref 98–107)
CO2 SERPL-SCNC: 29 MMOL/L (ref 21–32)
CREAT SERPL-MCNC: 1.44 MG/DL (ref 0.55–1.02)
D DIMER PPP FEU-MCNC: 2.17 ΜG/ML (ref 0–0.47)
DIFFERENTIAL METHOD BLD: ABNORMAL
EGFR (NO RACE VARIABLE) (RUSH/TITUS): 39 ML/MIN/1.73M2
EOSINOPHIL # BLD AUTO: 0.06 K/UL (ref 0–0.5)
EOSINOPHIL NFR BLD AUTO: 0.5 % (ref 1–4)
ERYTHROCYTE [DISTWIDTH] IN BLOOD BY AUTOMATED COUNT: 17.2 % (ref 11.5–14.5)
GLOBULIN SER-MCNC: 4.5 G/DL (ref 2–4)
GLUCOSE SERPL-MCNC: 140 MG/DL (ref 74–106)
HCO3 UR-SCNC: 29.4 MMOL/L (ref 21–28)
HCT VFR BLD AUTO: 35.5 % (ref 38–47)
HCT VFR BLD CALC: 37 % (ref 35–51)
HGB BLD-MCNC: 10.4 G/DL (ref 12–16)
IMM GRANULOCYTES # BLD AUTO: 0.04 K/UL (ref 0–0.04)
IMM GRANULOCYTES NFR BLD: 0.3 % (ref 0–0.4)
LACTATE SERPL-SCNC: 2 MMOL/L (ref 0.4–2)
LDH SERPL L TO P-CCNC: 2.7 MMOL/L (ref 0.3–1.2)
LYMPHOCYTES # BLD AUTO: 0.97 K/UL (ref 1–4.8)
LYMPHOCYTES NFR BLD AUTO: 7.4 % (ref 27–41)
MCH RBC QN AUTO: 26.3 PG (ref 27–31)
MCHC RBC AUTO-ENTMCNC: 29.3 G/DL (ref 32–36)
MCV RBC AUTO: 89.9 FL (ref 80–96)
MONOCYTES # BLD AUTO: 1.22 K/UL (ref 0–0.8)
MONOCYTES NFR BLD AUTO: 9.3 % (ref 2–6)
MPC BLD CALC-MCNC: 10.4 FL (ref 9.4–12.4)
NEUTROPHILS # BLD AUTO: 10.8 K/UL (ref 1.8–7.7)
NEUTROPHILS NFR BLD AUTO: 82 % (ref 53–65)
NRBC # BLD AUTO: 0 X10E3/UL
NRBC, AUTO (.00): 0 %
PCO2 BLDA: 52 MMHG (ref 35–48)
PH SMN: 7.36 [PH] (ref 7.35–7.45)
PLATELET # BLD AUTO: 361 K/UL (ref 150–400)
PO2 BLDA: 70 MMHG (ref 83–108)
POC BASE EXCESS: 3.1 MMOL/L (ref -2–3)
POC CO2: 31 MMOL/L
POC IONIZED CALCIUM: 1.03 MMOL/L (ref 1.15–1.35)
POC SATURATED O2: 93 % (ref 95–98)
POCT GLUCOSE: 149 MG/DL (ref 60–95)
POTASSIUM BLD-SCNC: 3.2 MMOL/L (ref 3.4–4.5)
POTASSIUM SERPL-SCNC: 3.4 MMOL/L (ref 3.5–5.1)
PROT SERPL-MCNC: 6.8 G/DL (ref 6.4–8.2)
RBC # BLD AUTO: 3.95 M/UL (ref 4.2–5.4)
SARS-COV-2 RDRP RESP QL NAA+PROBE: NEGATIVE
SODIUM BLD-SCNC: 139 MMOL/L (ref 136–145)
SODIUM SERPL-SCNC: 142 MMOL/L (ref 136–145)
TROPONIN I SERPL DL<=0.01 NG/ML-MCNC: 29.7 PG/ML
WBC # BLD AUTO: 13.15 K/UL (ref 4.5–11)

## 2024-08-07 PROCEDURE — 80053 COMPREHEN METABOLIC PANEL: CPT | Performed by: EMERGENCY MEDICINE

## 2024-08-07 PROCEDURE — 83605 ASSAY OF LACTIC ACID: CPT | Performed by: EMERGENCY MEDICINE

## 2024-08-07 PROCEDURE — 84484 ASSAY OF TROPONIN QUANT: CPT | Performed by: EMERGENCY MEDICINE

## 2024-08-07 PROCEDURE — 85014 HEMATOCRIT: CPT

## 2024-08-07 PROCEDURE — 87635 SARS-COV-2 COVID-19 AMP PRB: CPT | Performed by: EMERGENCY MEDICINE

## 2024-08-07 PROCEDURE — 96365 THER/PROPH/DIAG IV INF INIT: CPT

## 2024-08-07 PROCEDURE — 87502 INFLUENZA DNA AMP PROBE: CPT | Performed by: EMERGENCY MEDICINE

## 2024-08-07 PROCEDURE — 36415 COLL VENOUS BLD VENIPUNCTURE: CPT | Performed by: EMERGENCY MEDICINE

## 2024-08-07 PROCEDURE — 81003 URINALYSIS AUTO W/O SCOPE: CPT | Performed by: EMERGENCY MEDICINE

## 2024-08-07 PROCEDURE — 82947 ASSAY GLUCOSE BLOOD QUANT: CPT

## 2024-08-07 PROCEDURE — 84132 ASSAY OF SERUM POTASSIUM: CPT

## 2024-08-07 PROCEDURE — 82330 ASSAY OF CALCIUM: CPT

## 2024-08-07 PROCEDURE — 87040 BLOOD CULTURE FOR BACTERIA: CPT | Performed by: EMERGENCY MEDICINE

## 2024-08-07 PROCEDURE — 63600175 PHARM REV CODE 636 W HCPCS: Performed by: EMERGENCY MEDICINE

## 2024-08-07 PROCEDURE — 93005 ELECTROCARDIOGRAM TRACING: CPT

## 2024-08-07 PROCEDURE — 83605 ASSAY OF LACTIC ACID: CPT

## 2024-08-07 PROCEDURE — 25000003 PHARM REV CODE 250: Performed by: EMERGENCY MEDICINE

## 2024-08-07 PROCEDURE — 85379 FIBRIN DEGRADATION QUANT: CPT | Performed by: EMERGENCY MEDICINE

## 2024-08-07 PROCEDURE — 85025 COMPLETE CBC W/AUTO DIFF WBC: CPT | Performed by: EMERGENCY MEDICINE

## 2024-08-07 PROCEDURE — 83880 ASSAY OF NATRIURETIC PEPTIDE: CPT | Performed by: EMERGENCY MEDICINE

## 2024-08-07 PROCEDURE — 93010 ELECTROCARDIOGRAM REPORT: CPT | Mod: ,,, | Performed by: HOSPITALIST

## 2024-08-07 PROCEDURE — 99291 CRITICAL CARE FIRST HOUR: CPT

## 2024-08-07 PROCEDURE — 84295 ASSAY OF SERUM SODIUM: CPT

## 2024-08-07 PROCEDURE — 82803 BLOOD GASES ANY COMBINATION: CPT

## 2024-08-07 RX ADMIN — PIPERACILLIN AND TAZOBACTAM 4.5 G: 4; .5 INJECTION, POWDER, LYOPHILIZED, FOR SOLUTION INTRAVENOUS; PARENTERAL at 10:08

## 2024-08-07 RX ADMIN — DEXTROSE MONOHYDRATE 750 MG: 50 INJECTION, SOLUTION INTRAVENOUS at 11:08

## 2024-08-07 RX ADMIN — SODIUM CHLORIDE 1000 ML: 9 INJECTION, SOLUTION INTRAVENOUS at 10:08

## 2024-08-08 PROBLEM — I48.0 PAROXYSMAL ATRIAL FIBRILLATION: Status: ACTIVE | Noted: 2024-08-08

## 2024-08-08 PROBLEM — J18.9 OBSTRUCTIVE PNEUMONIA: Status: ACTIVE | Noted: 2024-08-08

## 2024-08-08 PROBLEM — J96.21 ACUTE ON CHRONIC RESPIRATORY FAILURE WITH HYPOXIA AND HYPERCAPNIA: Status: ACTIVE | Noted: 2024-08-08

## 2024-08-08 PROBLEM — I27.20 PULMONARY HYPERTENSION: Status: ACTIVE | Noted: 2024-08-08

## 2024-08-08 PROBLEM — N17.9 AKI (ACUTE KIDNEY INJURY): Status: ACTIVE | Noted: 2024-08-08

## 2024-08-08 PROBLEM — I45.10 RIGHT BUNDLE BRANCH BLOCK: Status: ACTIVE | Noted: 2023-12-11

## 2024-08-08 PROBLEM — J96.22 ACUTE ON CHRONIC RESPIRATORY FAILURE WITH HYPOXIA AND HYPERCAPNIA: Status: ACTIVE | Noted: 2024-08-08

## 2024-08-08 LAB
AORTIC ROOT ANNULUS: 2.4 CM
AORTIC VALVE CUSP SEPERATION: 1.34 CM
APICAL FOUR CHAMBER EJECTION FRACTION: 81 %
AV INDEX (PROSTH): 0.46
AV MEAN GRADIENT: 16 MMHG
AV PEAK GRADIENT: 34 MMHG
AV VALVE AREA BY VELOCITY RATIO: 1.19 CM²
AV VALVE AREA: 1.6 CM²
AV VELOCITY RATIO: 0.34
BACTERIA #/AREA URNS HPF: ABNORMAL /HPF
BILIRUB UR QL STRIP: NEGATIVE
BSA FOR ECHO PROCEDURE: 1.49 M2
CLARITY UR: CLEAR
COARSE GRAN CASTS #/AREA URNS LPF: ABNORMAL /LPF
COLOR UR: YELLOW
CV ECHO LV RWT: 0.63 CM
DOP CALC AO PEAK VEL: 2.9 M/S
DOP CALC AO VTI: 50.3 CM
DOP CALC LVOT AREA: 3.5 CM2
DOP CALC LVOT DIAMETER: 2.1 CM
DOP CALC LVOT PEAK VEL: 1 M/S
DOP CALC LVOT STROKE VOLUME: 80.66 CM3
DOP CALC MV VTI: 15.8 CM
DOP CALCLVOT PEAK VEL VTI: 23.3 CM
E WAVE DECELERATION TIME: 184.73 MSEC
E/A RATIO: 0.74
E/E' RATIO: 13.38 M/S
ECHO LV POSTERIOR WALL: 1.11 CM (ref 0.6–1.1)
EST. AVERAGE GLUCOSE BLD GHB EST-MCNC: 111 MG/DL
FRACTIONAL SHORTENING: 39 % (ref 28–44)
GLUCOSE SERPL-MCNC: 104 MG/DL (ref 70–105)
GLUCOSE UR STRIP-MCNC: 30 MG/DL
HBA1C MFR BLD HPLC: 5.5 % (ref 4.5–6.6)
HCO3 UR-SCNC: 31.5 MMOL/L (ref 21–28)
HCO3 UR-SCNC: 34.5 MMOL/L (ref 21–28)
HCT VFR BLD CALC: 32 % (ref 35–51)
HYALINE CASTS #/AREA URNS LPF: ABNORMAL /LPF
INFLUENZA A MOLECULAR (OHS): NEGATIVE
INFLUENZA B MOLECULAR (OHS): NEGATIVE
INTERVENTRICULAR SEPTUM: 1.06 CM (ref 0.6–1.1)
IVC DIAMETER: 2.19 CM
KETONES UR STRIP-SCNC: NEGATIVE MG/DL
LDH SERPL L TO P-CCNC: 0.7 MMOL/L (ref 0.3–1.2)
LEFT ATRIUM AREA SYSTOLIC (APICAL 4 CHAMBER): 8.65 CM2
LEFT ATRIUM SIZE: 3.46 CM
LEFT INTERNAL DIMENSION IN SYSTOLE: 2.17 CM (ref 2.1–4)
LEFT VENTRICLE DIASTOLIC VOLUME INDEX: 34.61 ML/M2
LEFT VENTRICLE DIASTOLIC VOLUME: 52.6 ML
LEFT VENTRICLE END DIASTOLIC VOLUME APICAL 4 CHAMBER: 39.11 ML
LEFT VENTRICLE END SYSTOLIC VOLUME APICAL 4 CHAMBER: 16.2 ML
LEFT VENTRICLE MASS INDEX: 78 G/M2
LEFT VENTRICLE SYSTOLIC VOLUME INDEX: 10.3 ML/M2
LEFT VENTRICLE SYSTOLIC VOLUME: 15.69 ML
LEFT VENTRICULAR INTERNAL DIMENSION IN DIASTOLE: 3.55 CM (ref 3.5–6)
LEFT VENTRICULAR MASS: 119.09 G
LEUKOCYTE ESTERASE UR QL STRIP: NEGATIVE
LV LATERAL E/E' RATIO: 10.88 M/S
LV SEPTAL E/E' RATIO: 17.4 M/S
LVED V (TEICH): 52.6 ML
LVES V (TEICH): 15.69 ML
LVOT MG: 2.24 MMHG
LVOT MV: 0.72 CM/S
MUCOUS, UA: ABNORMAL /LPF
MV MEAN GRADIENT: 1 MMHG
MV PEAK A VEL: 1.17 M/S
MV PEAK E VEL: 0.87 M/S
MV PEAK GRADIENT: 1 MMHG
MV STENOSIS PRESSURE HALF TIME: 38.33 MS
MV VALVE AREA BY CONTINUITY EQUATION: 5.11 CM2
MV VALVE AREA P 1/2 METHOD: 5.74 CM2
NITRITE UR QL STRIP: NEGATIVE
NT-PROBNP SERPL-MCNC: ABNORMAL PG/ML (ref 1–125)
OHS LV EJECTION FRACTION SIMPSONS BIPLANE MOD: 65 %
OHS QRS DURATION: 138 MS
OHS QTC CALCULATION: 455 MS
PCO2 BLDA: 57 MMHG (ref 35–48)
PCO2 BLDA: 61 MMHG (ref 35–48)
PH SMN: 7.35 [PH] (ref 7.35–7.45)
PH SMN: 7.36 [PH] (ref 7.35–7.45)
PH UR STRIP: 5.5 PH UNITS
PISA MRMAX VEL: 3.71 M/S
PISA TR MAX VEL: 3.55 M/S
PO2 BLDA: 51 MMHG (ref 83–108)
PO2 BLDA: 60 MMHG (ref 83–108)
POC BASE EXCESS: 4.8 MMOL/L (ref -2–3)
POC BASE EXCESS: 7.2 MMOL/L (ref -2–3)
POC CO2: 33.2 MMOL/L
POC IONIZED CALCIUM: 1.05 MMOL/L (ref 1.15–1.35)
POC SATURATED O2: 84 % (ref 95–98)
POC SATURATED O2: 90 % (ref 95–98)
POCT GLUCOSE: 155 MG/DL (ref 60–95)
POTASSIUM BLD-SCNC: 3 MMOL/L (ref 3.4–4.5)
PREALB SERPL NEPH-MCNC: <3 MG/DL (ref 20–40)
PROT UR QL STRIP: 100
PV PEAK GRADIENT: 3 MMHG
PV PEAK VELOCITY: 0.85 M/S
RA MAJOR: 4.99 CM
RA PRESSURE ESTIMATED: 15 MMHG
RBC # UR STRIP: ABNORMAL /UL
RBC #/AREA URNS HPF: 1 /HPF
RIGHT VENTRICLE DIASTOLIC BASEL DIMENSION: 5 CM
RIGHT VENTRICLE DIASTOLIC LENGTH: 5.5 CM
RIGHT VENTRICLE DIASTOLIC MID DIMENSION: 3.8 CM
RIGHT VENTRICULAR LENGTH IN DIASTOLE (APICAL 4-CHAMBER VIEW): 5.47 CM
RV MID DIAMA: 3.82 CM
RV TB RVSP: 19 MMHG
SODIUM BLD-SCNC: 140 MMOL/L (ref 136–145)
SP GR UR STRIP: 1.02
SQUAMOUS #/AREA URNS LPF: ABNORMAL /HPF
TDI LATERAL: 0.08 M/S
TDI SEPTAL: 0.05 M/S
TDI: 0.07 M/S
TR MAX PG: 50 MMHG
TROPONIN I SERPL DL<=0.01 NG/ML-MCNC: 25.1 PG/ML
TSH SERPL DL<=0.005 MIU/L-ACNC: 0.65 UIU/ML (ref 0.36–3.74)
TV REST PULMONARY ARTERY PRESSURE: 65 MMHG
UROBILINOGEN UR STRIP-ACNC: 3 MG/DL
WBC #/AREA URNS HPF: 5 /HPF
Z-SCORE OF LEFT VENTRICULAR DIMENSION IN END DIASTOLE: -2.27
Z-SCORE OF LEFT VENTRICULAR DIMENSION IN END SYSTOLE: -1.91

## 2024-08-08 PROCEDURE — 93005 ELECTROCARDIOGRAM TRACING: CPT

## 2024-08-08 PROCEDURE — 25000003 PHARM REV CODE 250: Performed by: HOSPITALIST

## 2024-08-08 PROCEDURE — 83036 HEMOGLOBIN GLYCOSYLATED A1C: CPT | Performed by: HOSPITALIST

## 2024-08-08 PROCEDURE — 63600175 PHARM REV CODE 636 W HCPCS: Performed by: INTERNAL MEDICINE

## 2024-08-08 PROCEDURE — 99223 1ST HOSP IP/OBS HIGH 75: CPT | Mod: ,,, | Performed by: HOSPITALIST

## 2024-08-08 PROCEDURE — 82803 BLOOD GASES ANY COMBINATION: CPT

## 2024-08-08 PROCEDURE — 25500020 PHARM REV CODE 255: Performed by: EMERGENCY MEDICINE

## 2024-08-08 PROCEDURE — 99233 SBSQ HOSP IP/OBS HIGH 50: CPT | Mod: ,,, | Performed by: HOSPITALIST

## 2024-08-08 PROCEDURE — 82962 GLUCOSE BLOOD TEST: CPT

## 2024-08-08 PROCEDURE — 96372 THER/PROPH/DIAG INJ SC/IM: CPT | Mod: 59

## 2024-08-08 PROCEDURE — 99900031 HC PATIENT EDUCATION (STAT)

## 2024-08-08 PROCEDURE — 27000221 HC OXYGEN, UP TO 24 HOURS

## 2024-08-08 PROCEDURE — 63600175 PHARM REV CODE 636 W HCPCS: Performed by: HOSPITALIST

## 2024-08-08 PROCEDURE — 25000242 PHARM REV CODE 250 ALT 637 W/ HCPCS: Performed by: HOSPITALIST

## 2024-08-08 PROCEDURE — 94640 AIRWAY INHALATION TREATMENT: CPT

## 2024-08-08 PROCEDURE — S4991 NICOTINE PATCH NONLEGEND: HCPCS | Performed by: HOSPITALIST

## 2024-08-08 PROCEDURE — 36415 COLL VENOUS BLD VENIPUNCTURE: CPT | Performed by: HOSPITALIST

## 2024-08-08 PROCEDURE — 99223 1ST HOSP IP/OBS HIGH 75: CPT | Mod: ,,, | Performed by: INTERNAL MEDICINE

## 2024-08-08 PROCEDURE — 84134 ASSAY OF PREALBUMIN: CPT | Performed by: HOSPITALIST

## 2024-08-08 PROCEDURE — 87641 MR-STAPH DNA AMP PROBE: CPT | Performed by: HOSPITALIST

## 2024-08-08 PROCEDURE — 94761 N-INVAS EAR/PLS OXIMETRY MLT: CPT

## 2024-08-08 PROCEDURE — 84443 ASSAY THYROID STIM HORMONE: CPT | Performed by: HOSPITALIST

## 2024-08-08 PROCEDURE — 99900035 HC TECH TIME PER 15 MIN (STAT)

## 2024-08-08 PROCEDURE — 11000001 HC ACUTE MED/SURG PRIVATE ROOM

## 2024-08-08 PROCEDURE — 84484 ASSAY OF TROPONIN QUANT: CPT | Performed by: HOSPITALIST

## 2024-08-08 RX ORDER — POTASSIUM CHLORIDE 20 MEQ/1
20 TABLET, EXTENDED RELEASE ORAL DAILY
Status: DISCONTINUED | OUTPATIENT
Start: 2024-08-08 | End: 2024-08-08

## 2024-08-08 RX ORDER — VITAMIN E 268 MG
400 CAPSULE ORAL DAILY
Status: ON HOLD | COMMUNITY

## 2024-08-08 RX ORDER — SIMETHICONE 80 MG
1 TABLET,CHEWABLE ORAL 3 TIMES DAILY PRN
Status: DISCONTINUED | OUTPATIENT
Start: 2024-08-08 | End: 2024-08-09

## 2024-08-08 RX ORDER — CHOLECALCIFEROL (VITAMIN D3) 25 MCG
1000 TABLET ORAL DAILY
Status: ON HOLD | COMMUNITY

## 2024-08-08 RX ORDER — AMLODIPINE BESYLATE 2.5 MG/1
2.5 TABLET ORAL DAILY
Status: DISCONTINUED | OUTPATIENT
Start: 2024-08-08 | End: 2024-08-08

## 2024-08-08 RX ORDER — IBUPROFEN 200 MG
1 TABLET ORAL DAILY
Status: DISCONTINUED | OUTPATIENT
Start: 2024-08-08 | End: 2024-08-15 | Stop reason: HOSPADM

## 2024-08-08 RX ORDER — BISACODYL 5 MG
10 TABLET, DELAYED RELEASE (ENTERIC COATED) ORAL DAILY PRN
Status: DISCONTINUED | OUTPATIENT
Start: 2024-08-08 | End: 2024-08-15 | Stop reason: HOSPADM

## 2024-08-08 RX ORDER — IPRATROPIUM BROMIDE AND ALBUTEROL SULFATE 2.5; .5 MG/3ML; MG/3ML
3 SOLUTION RESPIRATORY (INHALATION) 4 TIMES DAILY
Status: DISCONTINUED | OUTPATIENT
Start: 2024-08-08 | End: 2024-08-15 | Stop reason: HOSPADM

## 2024-08-08 RX ORDER — ENOXAPARIN SODIUM 100 MG/ML
30 INJECTION SUBCUTANEOUS EVERY 24 HOURS
Status: DISCONTINUED | OUTPATIENT
Start: 2024-08-08 | End: 2024-08-10

## 2024-08-08 RX ORDER — IOPAMIDOL 755 MG/ML
100 INJECTION, SOLUTION INTRAVASCULAR
Status: COMPLETED | OUTPATIENT
Start: 2024-08-08 | End: 2024-08-08

## 2024-08-08 RX ORDER — BUPROPION HYDROCHLORIDE 100 MG/1
100 TABLET, EXTENDED RELEASE ORAL 2 TIMES DAILY
Status: DISCONTINUED | OUTPATIENT
Start: 2024-08-08 | End: 2024-08-15 | Stop reason: HOSPADM

## 2024-08-08 RX ORDER — ACETAMINOPHEN 500 MG
1000 TABLET ORAL EVERY 6 HOURS PRN
Status: DISCONTINUED | OUTPATIENT
Start: 2024-08-08 | End: 2024-08-15 | Stop reason: HOSPADM

## 2024-08-08 RX ORDER — POTASSIUM CHLORIDE 20 MEQ/1
20 TABLET, EXTENDED RELEASE ORAL 2 TIMES DAILY
Status: DISCONTINUED | OUTPATIENT
Start: 2024-08-08 | End: 2024-08-15 | Stop reason: HOSPADM

## 2024-08-08 RX ORDER — FUROSEMIDE 40 MG/1
40 TABLET ORAL DAILY
Status: DISCONTINUED | OUTPATIENT
Start: 2024-08-08 | End: 2024-08-08

## 2024-08-08 RX ORDER — ALBUTEROL SULFATE 0.83 MG/ML
2.5 SOLUTION RESPIRATORY (INHALATION) EVERY 4 HOURS PRN
Status: DISCONTINUED | OUTPATIENT
Start: 2024-08-08 | End: 2024-08-15 | Stop reason: HOSPADM

## 2024-08-08 RX ORDER — TALC
6 POWDER (GRAM) TOPICAL NIGHTLY PRN
Status: DISCONTINUED | OUTPATIENT
Start: 2024-08-08 | End: 2024-08-15 | Stop reason: HOSPADM

## 2024-08-08 RX ORDER — IPRATROPIUM BROMIDE AND ALBUTEROL SULFATE 2.5; .5 MG/3ML; MG/3ML
3 SOLUTION RESPIRATORY (INHALATION) EVERY 6 HOURS
Status: DISCONTINUED | OUTPATIENT
Start: 2024-08-08 | End: 2024-08-08

## 2024-08-08 RX ORDER — ONDANSETRON HYDROCHLORIDE 2 MG/ML
8 INJECTION, SOLUTION INTRAVENOUS EVERY 6 HOURS PRN
Status: DISCONTINUED | OUTPATIENT
Start: 2024-08-08 | End: 2024-08-15 | Stop reason: HOSPADM

## 2024-08-08 RX ORDER — GUAIFENESIN AND DEXTROMETHORPHAN HYDROBROMIDE 10; 100 MG/5ML; MG/5ML
10 SYRUP ORAL EVERY 6 HOURS PRN
Status: DISCONTINUED | OUTPATIENT
Start: 2024-08-08 | End: 2024-08-15 | Stop reason: HOSPADM

## 2024-08-08 RX ORDER — HYDROCHLOROTHIAZIDE 25 MG/1
25 TABLET ORAL DAILY
Status: DISCONTINUED | OUTPATIENT
Start: 2024-08-08 | End: 2024-08-08

## 2024-08-08 RX ORDER — TRAZODONE HYDROCHLORIDE 50 MG/1
50 TABLET ORAL NIGHTLY PRN
Status: DISCONTINUED | OUTPATIENT
Start: 2024-08-08 | End: 2024-08-15 | Stop reason: HOSPADM

## 2024-08-08 RX ADMIN — PIPERACILLIN SODIUM AND TAZOBACTAM SODIUM 4.5 G: 4; .5 INJECTION, POWDER, LYOPHILIZED, FOR SOLUTION INTRAVENOUS at 07:08

## 2024-08-08 RX ADMIN — IPRATROPIUM BROMIDE AND ALBUTEROL SULFATE 3 ML: .5; 3 SOLUTION RESPIRATORY (INHALATION) at 08:08

## 2024-08-08 RX ADMIN — ENOXAPARIN SODIUM 30 MG: 30 INJECTION SUBCUTANEOUS at 06:08

## 2024-08-08 RX ADMIN — BUPROPION HYDROCHLORIDE 100 MG: 100 TABLET, FILM COATED, EXTENDED RELEASE ORAL at 08:08

## 2024-08-08 RX ADMIN — METHYLPREDNISOLONE SODIUM SUCCINATE 60 MG: 40 INJECTION, POWDER, FOR SOLUTION INTRAMUSCULAR; INTRAVENOUS at 11:08

## 2024-08-08 RX ADMIN — PIPERACILLIN SODIUM AND TAZOBACTAM SODIUM 4.5 G: 4; .5 INJECTION, POWDER, LYOPHILIZED, FOR SOLUTION INTRAVENOUS at 11:08

## 2024-08-08 RX ADMIN — IPRATROPIUM BROMIDE AND ALBUTEROL SULFATE 3 ML: .5; 3 SOLUTION RESPIRATORY (INHALATION) at 11:08

## 2024-08-08 RX ADMIN — NICOTINE 1 PATCH: 14 PATCH, EXTENDED RELEASE TRANSDERMAL at 12:08

## 2024-08-08 RX ADMIN — PIPERACILLIN SODIUM AND TAZOBACTAM SODIUM 4.5 G: 4; .5 INJECTION, POWDER, LYOPHILIZED, FOR SOLUTION INTRAVENOUS at 03:08

## 2024-08-08 RX ADMIN — METHYLPREDNISOLONE SODIUM SUCCINATE 60 MG: 40 INJECTION, POWDER, FOR SOLUTION INTRAMUSCULAR; INTRAVENOUS at 06:08

## 2024-08-08 RX ADMIN — IPRATROPIUM BROMIDE AND ALBUTEROL SULFATE 3 ML: .5; 3 SOLUTION RESPIRATORY (INHALATION) at 07:08

## 2024-08-08 RX ADMIN — IOPAMIDOL 65 ML: 755 INJECTION, SOLUTION INTRAVENOUS at 12:08

## 2024-08-08 RX ADMIN — IPRATROPIUM BROMIDE AND ALBUTEROL SULFATE 3 ML: .5; 3 SOLUTION RESPIRATORY (INHALATION) at 03:08

## 2024-08-08 RX ADMIN — APIXABAN 5 MG: 5 TABLET, FILM COATED ORAL at 08:08

## 2024-08-08 RX ADMIN — POTASSIUM CHLORIDE 20 MEQ: 1500 TABLET, EXTENDED RELEASE ORAL at 08:08

## 2024-08-08 NOTE — ASSESSMENT & PLAN NOTE
Patient's COPD is with exacerbation noted by continued dyspnea and worsening of baseline hypoxia currently.  Patient is currently on COPD Pathway. Continue scheduled inhalers Antibiotics and Supplemental oxygen and monitor respiratory status closely.     If scheduled bronchodilators and prn bronchodilators due not improve respiratory status may need to add steroids but would be cautious in due to pneumonia most likely caused by obstruction from bilateral hilar masses  (R>L)

## 2024-08-08 NOTE — HPI
73-year-old female with l history significant for adenocarcinoma (2018 per chart review), status post EBUS bronchoscopy in 11/20/23 by Dr. Munguia (station 4R, station 7 and bronchoalveolar lavage done)-negative for definitive malignancy at the time who then presented to the hospital in the setting of productive cough worsening with no orthopnea or edema.      I reviewed her imaging myself (CTA chest from 8/8/24 which showed evidence of right mainstem obstruction (endobronchial versus extrinsic compression) with a right upper lobe infiltrate along with increase in bilateral hilar lymph nodes/lesions.  Since her admission she has been continued on steroids at 60 mg thrice a day, broad-spectrum antibiotics, now down to Zosyn and scheduled DuoNebs.  She is not in any therapeutic anticoagulation.  No evidence of pulmonary embolus on her CTA.    When I saw her bedside she was satting at 93% on 2.5-3 L of oxygen.  She was not in any obvious distress at the time that I saw her.  She does have a history of COPD on oxygen at home.  From Dr. Morse note, it appears that she was diagnosed with squamous cell carcinoma with treatment discussed in January/February, but apparently she was lost to follow-up.

## 2024-08-08 NOTE — ASSESSMENT & PLAN NOTE
Echo pending to evaluate for PHTN.  She is on norvasc low dose without h/o HTN and BP soft at this time.  Will hold.  May have previous diagnosis and may be worsening.

## 2024-08-08 NOTE — ED TRIAGE NOTES
EMS from home for progressive weakness & cough that's been going on aprox 3 days. Pt reports she has hx of COPD & lung cancer. RA sats 55%-NRB placed on pt, 02 sats up to 91.

## 2024-08-08 NOTE — ASSESSMENT & PLAN NOTE
Patient has a diagnosis of pneumonia. The cause of the pneumonia is suspected to be bacterial in etiology but organism is not known. The pneumonia is stable. The patient has the following signs/symptoms of pneumonia: persistent hypoxia , cough, and shortness of breath. The patient does have a current oxygen requirement and the patient does have a home oxygen requirement. I have reviewed the pertinent imaging. The following cultures have been collected: Blood cultures The culture results are listed below.     Current antimicrobial regimen consists of the antibiotics listed below. Will monitor patient closely and Adjust treatment plan as follows add zithromax for atypicals if indicated.  MRSA nares pending.      Antibiotics (From admission, onward)      Start     Stop Route Frequency Ordered    08/08/24 0800  piperacillin-tazobactam (ZOSYN) 4.5 g in D5W 100 mL IVPB (MB+)         -- IV Every 8 hours (non-standard times) 08/08/24 0321            Microbiology Results (last 7 days)       Procedure Component Value Units Date/Time    Influenza A & B by Molecular [[id number]]  (Normal) Collected: 08/07/24 2302    Order Status: Completed Specimen: Nasopharyngeal Swab Updated: 08/08/24 0109     INFLUENZA A MOLECULAR Negative     INFLUENZA B MOLECULAR  Negative    Blood culture x two cultures. Draw prior to antibiotics. [[id number]] Collected: 08/07/24 2254    Order Status: Sent Specimen: Blood Updated: 08/07/24 2259    Blood culture x two cultures. Draw prior to antibiotics. [[id number]] Collected: 08/07/24 2251    Order Status: Sent Specimen: Blood Updated: 08/07/24 2256

## 2024-08-08 NOTE — PHARMACY MED REC
"Admission Medication History     The home medication history was taken by Kirstie Montemayor.    You may go to "Admission" then "Reconcile Home Medications" tabs to review and/or act upon these items.     The home medication list has been updated by the Pharmacy department.   Please read ALL comments highlighted in yellow.   Please address this information as you see fit.    Feel free to contact us if you have any questions or require assistance.    The patient stated that due to no transportation she has not been able to  any refills from the pharmacy in over a month      The medications listed below were removed from the home medication list. Please reorder if appropriate:  Patient reports no longer taking the following medication(s):  Bupropion 100 mg  Hydrochlorothiazide 25 mg  Prochlorperazine 5 mg    Medications listed below were obtained from: Patient/family, Analytic software- [employer], and Medical records  (Not in a hospital admission)        Current Outpatient Medications on File Prior to Encounter   Medication Sig Dispense Refill Last Dose    vitamin D (VITAMIN D3) 1000 units Tab Take 1,000 Units by mouth once daily.   Past Week    vitamin E 400 UNIT capsule Take 400 Units by mouth once daily.   Past Week    albuterol (PROVENTIL/VENTOLIN HFA) 90 mcg/actuation inhaler Inhale 2 puffs into the lungs every 4 (four) hours as needed.   More than a month    albuterol sulfate 2.5 mg/0.5 mL Nebu Take 2.5 mg by nebulization every 6 (six) hours as needed (Shortness of breath). Rescue 90 each 11 More than a month    amLODIPine (NORVASC) 2.5 MG tablet Take 1 tablet (2.5 mg total) by mouth once daily. 90 tablet 1 More than a month    ELIQUIS 5 mg Tab Take 1 tablet (5 mg total) by mouth 2 (two) times daily. 180 tablet 1 More than a month    fluticasone-umeclidin-vilanter (TRELEGY ELLIPTA) 200-62.5-25 mcg inhaler Inhale 1 puff into the lungs once daily. 1 each 11 More than a month    furosemide (LASIX) 40 MG tablet " Take 1 tablet (40 mg total) by mouth once daily. 90 tablet 1 More than a month    potassium chloride SA (K-DUR,KLOR-CON) 20 MEQ tablet Take 1 tablet (20 mEq total) by mouth once daily. 90 tablet 1 More than a month    [DISCONTINUED] buPROPion (WELLBUTRIN SR) 100 MG TBSR 12 hr tablet TAKE 1 TABLET BY MOUTH ONCE DAILY TO CURB THE URGE TO SMOKE       [DISCONTINUED] hydroCHLOROthiazide (HYDRODIURIL) 25 MG tablet Take 25 mg by mouth once daily.       [DISCONTINUED] prochlorperazine (COMPAZINE) 5 MG tablet Take by mouth.          Potential issues to be addressed PRIOR TO DISCHARGE  Please discuss with the patient barriers to adherence with medication treatment plans  Patient requested refills for the following medications: (All)  Prescriptions could not be filled prior to admission: (No transportation)      Kirstie Montemayor  EXT [id number]                 .

## 2024-08-08 NOTE — PLAN OF CARE
Problem: Skin Injury Risk Increased  Goal: Skin Health and Integrity  Outcome: Progressing  Intervention: Optimize Skin Protection  Flowsheets (Taken 8/8/2024 1807)  Pressure Reduction Techniques:   frequent weight shift encouraged   weight shift assistance provided   heels elevated off bed  Skin Protection:   drying agents applied   skin sealant/moisture barrier applied  Activity Management: Arm raise - L1  Head of Bed (HOB) Positioning: HOB at 30-45 degrees  Intervention: Promote and Optimize Oral Intake  Flowsheets (Taken 8/8/2024 1807)  Oral Nutrition Promotion:   adaptive equipment use encouraged   social interaction promoted   rest periods promoted   physical activity promoted  Nutrition Interventions: meal set-up provided

## 2024-08-08 NOTE — ASSESSMENT & PLAN NOTE
Looks like patient has obstructing lesion in the right upper right middle lobe she was too sick at this time to do bronchoscopy she may need some kind of intervention done there we improve her outcome I would treat with antibiotics and steroids.  Would ask would also ask Dr. Rush see her and make recommendations for any treatment for her lung cancer.

## 2024-08-08 NOTE — SUBJECTIVE & OBJECTIVE
Past Medical History:   Diagnosis Date    Chronic respiratory failure with hypoxia and hypercapnia     on home oxygen but hasn't been using it    Cigarette nicotine dependence with nicotine-induced disorder 2023    COPD (chronic obstructive pulmonary disease)     Dyshidrotic eczema 08/10/2021    Lung cancer     follows with Dr. Kraft    Right bundle branch block 2023    Scabies        Past Surgical History:   Procedure Laterality Date    APPENDECTOMY       SECTION  1980    2x    INSERTION OF VENOUS ACCESS PORT         Review of patient's allergies indicates:  No Known Allergies    Family History       Problem Relation (Age of Onset)    Cancer Father, Maternal Grandmother, Maternal Aunt    Heart disease Paternal Grandmother          Tobacco Use    Smoking status: Every Day     Current packs/day: 1.00     Types: Cigarettes    Smokeless tobacco: Never   Substance and Sexual Activity    Alcohol use: Never    Drug use: Never    Sexual activity: Not on file      Review of Systems   Constitutional:  Negative for activity change and appetite change.   HENT:  Negative for congestion and dental problem.    Eyes:  Negative for discharge and itching.   Respiratory:  Negative for apnea and chest tightness.    Cardiovascular:  Negative for chest pain and leg swelling.   Gastrointestinal:  Negative for abdominal distention.   Endocrine: Negative for cold intolerance and heat intolerance.   Genitourinary:  Negative for difficulty urinating and dysuria.   Musculoskeletal:  Negative for arthralgias and back pain.   Skin:  Negative for color change.   Allergic/Immunologic: Negative for environmental allergies.   Neurological:  Negative for dizziness and facial asymmetry.   Hematological:  Negative for adenopathy. Does not bruise/bleed easily.   Psychiatric/Behavioral:  Negative for agitation and behavioral problems.      Objective:     Vital Signs (Most Recent):  Temp: 98.5 °F (36.9 °C) (24 0758)  Pulse:  78 (08/08/24 1124)  Resp: 18 (08/08/24 1124)  BP: (!) 84/41 (08/08/24 1021)  SpO2: 95 % (08/08/24 1124) Vital Signs (24h Range):  Temp:  [97.6 °F (36.4 °C)-98.5 °F (36.9 °C)] 98.5 °F (36.9 °C)  Pulse:  [] 78  Resp:  [8-29] 18  SpO2:  [56 %-99 %] 95 %  BP: ()/(41-67) 84/41   Weight: 47.6 kg (105 lb)  Body mass index is 16.95 kg/m².      Intake/Output Summary (Last 24 hours) at 8/8/2024 1220  Last data filed at 8/8/2024 0057  Gross per 24 hour   Intake 1349.05 ml   Output --   Net 1349.05 ml          Physical Exam  Vitals reviewed.   Constitutional:       Appearance: Normal appearance.      Interventions: She is not intubated.  HENT:      Head: Normocephalic and atraumatic.      Nose: Nose normal.      Mouth/Throat:      Mouth: Mucous membranes are dry.      Pharynx: Oropharynx is clear.   Eyes:      Extraocular Movements: Extraocular movements intact.      Conjunctiva/sclera: Conjunctivae normal.      Pupils: Pupils are equal, round, and reactive to light.   Cardiovascular:      Rate and Rhythm: Normal rate.      Heart sounds: Normal heart sounds. No murmur heard.  Pulmonary:      Effort: Pulmonary effort is normal. She is not intubated.      Breath sounds: Normal breath sounds.   Abdominal:      General: Abdomen is flat. Bowel sounds are normal.      Palpations: Abdomen is soft.   Musculoskeletal:         General: Normal range of motion.      Cervical back: Normal range of motion and neck supple.      Right lower leg: No edema.      Left lower leg: No edema.   Skin:     General: Skin is warm and dry.      Capillary Refill: Capillary refill takes less than 2 seconds.   Neurological:      General: No focal deficit present.      Mental Status: She is alert and oriented to person, place, and time.   Psychiatric:         Mood and Affect: Mood normal.         Behavior: Behavior normal.            Vents:  Oxygen Concentration (%): 50 (08/08/24 1124)  Lines/Drains/Airways       Peripheral Intravenous Line   Duration                  Peripheral IV - Single Lumen 08/07/24 18 G Left Antecubital 1 day         Peripheral IV - Single Lumen 08/07/24 2255 20 G Anterior;Proximal;Right Forearm <1 day                  Significant Labs:    CBC/Anemia Profile:  Recent Labs   Lab 08/07/24  2148 08/07/24  2156 08/07/24  2356   WBC  --  13.15*  --    HGB  --  10.4*  --    HCT 37 35.5* 32*   PLT  --  361  --    MCV  --  89.9  --    RDW  --  17.2*  --         Chemistries:  Recent Labs   Lab 08/07/24  2156      K 3.4*      CO2 29   BUN 25*   CREATININE 1.44*   CALCIUM 8.4*   ALBUMIN 2.3*   PROT 6.8   BILITOT 0.7   ALKPHOS 87   ALT 11*   AST 17       Recent Lab Results  (Last 5 results in the past 24 hours)        08/08/24  0639   08/08/24  0600   08/08/24  0556   08/07/24  2356   08/07/24  2342        POC CO2       33.2         Appearance, UA         Clear       Bacteria, UA         Occasional       Bilirubin (UA)         Negative       Coarse Granular Casts, UA         2-5       Color, UA         Yellow       Estimated Avg Glucose 111               Glucose, UA         30       Hemoglobin A1C External 5.5  Comment:   Normal:               <5.7%  Pre-Diabetic:       5.7% to 6.4%  Diabetic:             >6.4%  Diabetic Goal:     <7%               Hyaline Casts, UA         25-50       Ketones, UA         Negative       Leukocyte Esterase, UA         Negative       Mucous         Few       NITRITE UA         Negative       Blood, UA         Trace       pH, UA         5.5       POC Base Excess   7.2     4.8         POC Glucose     104           POC HCO3   34.5     31.5         POC Hematocrit       32         POC Ionized Calcium       1.05         POC Lactate       0.7         POC PCO2   61     57         POC PH   7.36     7.35         POC PO2   60     51         POC Potassium       3.0         POC SATURATED O2   90     84         POC Sodium       140         POCT Glucose       155         Prealbumin <3               Protein, UA          100       RBC, UA         1       Spec Grav UA         1.021       Squamous Epithelial Cells, UA         Occasional       Troponin I High Sensitivity 25.1               TSH 0.647               UROBILINOGEN UA         3       WBC, UA         5                              Significant Imaging: I have reviewed all pertinent imaging results/findings within the past 24 hours.

## 2024-08-08 NOTE — ED PROVIDER NOTES
Encounter Date: 2024    SCRIBE #1 NOTE: I, Apryl Valenzuela, am scribing for, and in the presence of,  Allen Lockhart MD. I have scribed the entire note.       History     Chief Complaint   Patient presents with    Hypotension     Ems from home - low  bp     This is a 72 y/o female,who presents to the ED via EMS with complaints of shortness of breath. She has a known hx of COPD and lung cancer. EMS states the pt was sating on 55% and nonrebreather was placed on the pt and her O2 sats increased to 91. EMS states the pt's family reported a cough for the past 3 days as well. There are no other complaints/pain in the ED at this time. She is a current every day smoker.     The history is provided by the EMS personnel. The history is limited by the condition of the patient. No  was used.     Review of patient's allergies indicates:  No Known Allergies  Past Medical History:   Diagnosis Date    Chronic respiratory failure with hypoxia and hypercapnia     on home oxygen but hasn't been using it    Cigarette nicotine dependence with nicotine-induced disorder 2023    COPD (chronic obstructive pulmonary disease)     Dyshidrotic eczema 08/10/2021    Right bundle branch block 2023    Scabies     Squamous cell carcinoma of lung     follows with Dr. Kraft     Past Surgical History:   Procedure Laterality Date    APPENDECTOMY       SECTION  1980    2x    INSERTION OF VENOUS ACCESS PORT       Family History   Problem Relation Name Age of Onset    Cancer Father      Cancer Maternal Grandmother      Heart disease Paternal Grandmother      Cancer Maternal Aunt       Social History     Tobacco Use    Smoking status: Every Day     Current packs/day: 1.00     Types: Cigarettes    Smokeless tobacco: Never   Substance Use Topics    Alcohol use: Never    Drug use: Never     Review of Systems    Physical Exam     Initial Vitals [24 2143]   BP Pulse Resp Temp SpO2   107/60 103 20  97.6 °F (36.4 °C) (!) 91 %      MAP       --         Physical Exam    Nursing note and vitals reviewed.  Constitutional:   Thin appearing.      HENT:   Head: Normocephalic and atraumatic.   Eyes: Conjunctivae and EOM are normal. Pupils are equal, round, and reactive to light.   Neck: Neck supple.   Normal range of motion.  Cardiovascular:  Normal rate, regular rhythm and intact distal pulses.           Coarse breath sounds.      Pulmonary/Chest: Breath sounds normal.   Abdominal: Abdomen is soft. Bowel sounds are normal. There is abdominal tenderness (Diffuse abdominal tenderness.).   Musculoskeletal:         General: Normal range of motion.      Cervical back: Normal range of motion and neck supple.     Neurological: She has normal strength.   Delayed capillary refill .   Pt is nonverbal but will grunt in an attempt to communicate.   Somnolent and not responding.      Skin: Skin is warm and dry. Capillary refill takes less than 2 seconds.   Psychiatric: She has a normal mood and affect. Thought content normal.         ED Course   Procedures  Labs Reviewed   COMPREHENSIVE METABOLIC PANEL - Abnormal       Result Value    Sodium 142      Potassium 3.4 (*)     Chloride 105      CO2 29      Anion Gap 11      Glucose 140 (*)     BUN 25 (*)     Creatinine 1.44 (*)     BUN/Creatinine Ratio 17      Calcium 8.4 (*)     Total Protein 6.8      Albumin 2.3 (*)     Globulin 4.5 (*)     A/G Ratio 0.5      Bilirubin, Total 0.7      Alk Phos 87      ALT 11 (*)     AST 17      eGFR 39 (*)    CBC WITH DIFFERENTIAL - Abnormal    WBC 13.15 (*)     RBC 3.95 (*)     Hemoglobin 10.4 (*)     Hematocrit 35.5 (*)     MCV 89.9      MCH 26.3 (*)     MCHC 29.3 (*)     RDW 17.2 (*)     Platelet Count 361      MPV 10.4      Neutrophils % 82.0 (*)     Lymphocytes % 7.4 (*)     Monocytes % 9.3 (*)     Eosinophils % 0.5 (*)     Basophils % 0.5      Immature Granulocytes % 0.3      nRBC, Auto 0.0      Neutrophils, Abs 10.80 (*)     Lymphocytes,  Absolute 0.97 (*)     Monocytes, Absolute 1.22 (*)     Eosinophils, Absolute 0.06      Basophils, Absolute 0.06      Immature Granulocytes, Absolute 0.04      nRBC, Absolute 0.00      Diff Type Auto     NT-PRO NATRIURETIC PEPTIDE - Abnormal    ProBNP 82,316 (*)    URINALYSIS, REFLEX TO URINE CULTURE - Abnormal    Color, UA Yellow      Clarity, UA Clear      pH, UA 5.5      Leukocytes, UA Negative      Nitrites, UA Negative      Protein,  (*)     Glucose, UA 30 (*)     Ketones, UA Negative      Urobilinogen, UA 3 (*)     Bilirubin, UA Negative      Blood, UA Trace (*)     Specific Gravity, UA 1.021     D DIMER, QUANTITATIVE - Abnormal    D-Dimer 2.17 (*)    URINALYSIS, MICROSCOPIC - Abnormal    WBC, UA 5      RBC, UA 1      Bacteria, UA Occasional (*)     Squamous Epithelial Cells, UA Occasional (*)     Hyaline Casts, UA 25-50 (*)     Coarse Granular Casts, UA 2-5 (*)     Mucous Few (*)    PREALBUMIN - Abnormal    Prealbumin <3 (*)    INFLUENZA A & B BY MOLECULAR - Normal    INFLUENZA A MOLECULAR Negative      INFLUENZA B MOLECULAR  Negative     TROPONIN I - Normal    Troponin I High Sensitivity 29.7     LACTIC ACID, PLASMA - Normal    Lactic Acid 2.0     SARS-COV-2 RNA AMPLIFICATION, QUAL - Normal    SARS COV-2 Molecular Negative      Narrative:     Negative SARS-CoV results should not be used as the sole basis for treatment or patient management decisions; negative results should be considered in the context of a patient's recent exposures, history and the presene of clinical signs and symptoms consistent with COVID-19.  Negative results should be treated as presumptive and confirmed by molecular assay, if necessary for patient management.   TROPONIN I - Normal    Troponin I High Sensitivity 25.1     TSH - Normal    TSH 0.647     CBC W/ AUTO DIFFERENTIAL    Narrative:     The following orders were created for panel order CBC auto differential.  Procedure                               Abnormality          Status                     ---------                               -----------         ------                     CBC with Differential[[id number]]       Abnormal            Final result                 Please view results for these tests on the individual orders.   HEMOGLOBIN A1C    Hemoglobin A1C 5.5      Estimated Average Glucose 111     POCT GLUCOSE MONITORING CONTINUOUS    POC Glucose 104          ECG Results              EKG 12-lead (Final result)        Collection Time Result Time QRS Duration OHS QTC Calculation    08/07/24 21:43:14 08/08/24 17:10:09 138 455                     Final result by Interface, Lab In [facility] (08/08/24 17:10:15)                   Narrative:    Test Reason : R53.1,    Vent. Rate : 104 BPM     Atrial Rate : 000 BPM     P-R Int : 156 ms          QRS Dur : 138 ms      QT Int : 378 ms       P-R-T Axes : 094 114 -81 degrees     QTc Int : 455 ms    Sinus tachycardia with sinus arrhythmia  Right axis deviation  Right bundle branch block  Lateral infarct - age undetermined  Cannot rule out septal infarct - age undetermined  Inferior ST-T abnormality may be due to myocardial ischemia      Confirmed by Pierre BROCK, Jina DOUGHERTY ([id number]) on 8/8/2024 5:10:05 PM    Referred By: [email or web address]   SELF           Confirmed By:Jina Jay MD                                  Imaging Results              US Lower Extremity Veins Bilateral (Final result)  Result time 08/08/24 06:37:19      Final result by Yvon Shaw MD (08/08/24 06:37:19)                   Impression:      No evidence of deep venous thrombosis in either lower extremity.      Electronically signed by: Yvon Shaw  Date:    08/08/2024  Time:    06:37               Narrative:    EXAMINATION:  US LOWER EXTREMITY VEINS BILATERAL    CLINICAL HISTORY:  elevated DD with dyspnea and lung cancer;    TECHNIQUE:  Duplex and color flow Doppler and dynamic compression was performed of the bilateral lower extremity veins.  Ultrasound images captured  and stored.    COMPARISON:  None    FINDINGS:  Right thigh veins: The common femoral, femoral, popliteal, upper greater saphenous, and deep femoral veins are patent and free of thrombus. The veins are normally compressible and have normal phasic flow and augmentation response.    Right calf veins: The visualized calf veins are patent.    Left thigh veins: The common femoral, femoral, popliteal, upper greater saphenous, and deep femoral veins are patent and free of thrombus. The veins are normally compressible and have normal phasic flow and augmentation response.    Left calf veins: The visualized calf veins are patent.    Miscellaneous: Left popliteal fossa Baker's cyst incidentally noted.                                       CTA Chest Non-Coronary (PE Studies) (Final result)  Result time 08/08/24 06:25:30      Final result by Yvon Shaw MD (08/08/24 06:25:30)                   Impression:      No pulmonary embolus.    The bilateral hilar lesions appear more prominent than the prior cross-sectional imaging 11/17/2023.  There also bilateral opacities.  Findings may reflect progression of disease.  Underlying infectious/inflammatory process a consideration as well.      Electronically signed by: Yvon Shaw  Date:    08/08/2024  Time:    06:25               Narrative:    EXAMINATION:  CTA CHEST NON CORONARY (PE STUDIES)    CLINICAL HISTORY:  Pulmonary embolism (PE) suspected, high prob;    TECHNIQUE:  Low dose axial images, sagittal and coronal reformations were obtained from the thoracic inlet to the lung bases following the IV administration of 65 mL of Isovue 370.  Contrast timing was optimized to evaluate the pulmonary arteries.  MIP images were performed.    The CT examination was performed using one or more of the following dose reduction techniques: Automated exposure control, adjustment of the mA and kV according to patient's size, use of acute or iterative reconstruction  techniques.    COMPARISON:  08/07/2024 chest radiograph    FINDINGS:  No pulmonary embolus detected.    Heart size enlarged mildly.  Coronary and thoracic aortic calcifications.  Right and left hilar lesions are present.  There is opacification and consolidation of the anterior right upper lobe as well as of the right lower lobe.  There is emphysema.  Some motion degrades the exam.  No pneumothorax.  Bronchial thickening seen of the lung bases as well.  Interlobular septal thickening more notably of the lung bases.    No acute abnormality of the upper abdomen.  There is a left adrenal lesion that did not show uptake on prior PET CT.  No fracture detected.  Compression deformities lower thoracic spine.                                       X-Ray Chest AP (Final result)  Result time 08/08/24 06:38:35      Final result by Yvon Shaw MD (08/08/24 06:38:35)                   Impression:      Worsened bilateral opacities.  Progression of disease is a consideration as well as infectious/inflammatory process.      Electronically signed by: Yvon Shaw  Date:    08/08/2024  Time:    06:38               Narrative:    EXAMINATION:  XR CHEST AP PORTABLE    CLINICAL HISTORY:  weak;    TECHNIQUE:  Single frontal view of the chest was performed.    COMPARISON:  11/27/2023    FINDINGS:  Since the prior exam there is now increase in the perihilar opacities.  Development of right upper and lower lung opacities.  No pneumothorax or pleural effusion.                                       Medications   acetaminophen tablet 1,000 mg (has no administration in time range)   bisacodyL EC tablet 10 mg (has no administration in time range)   dextromethorphan-guaiFENesin  mg/5 ml liquid 10 mL (has no administration in time range)   melatonin tablet 6 mg (6 mg Oral Given 8/13/24 2112)   ondansetron injection 8 mg (has no administration in time range)   traZODone tablet 50 mg (50 mg Oral Given 8/13/24 2112)   piperacillin-tazobactam  (ZOSYN) 4.5 g in D5W 100 mL IVPB (MB+) (0 g Intravenous Stopped 8/14/24 0428)   albuterol nebulizer solution 2.5 mg (has no administration in time range)   potassium chloride SA CR tablet 20 mEq (20 mEq Oral Given 8/13/24 2112)   albuterol-ipratropium 2.5 mg-0.5 mg/3 mL nebulizer solution 3 mL (3 mLs Nebulization Given 8/13/24 1902)   buPROPion TBSR 12 hr tablet 100 mg (100 mg Oral Given 8/13/24 2112)   nicotine 14 mg/24 hr 1 patch (1 patch Transdermal Patch Removed 8/13/24 0835)   enoxaparin injection 40 mg (40 mg Subcutaneous Given 8/13/24 1511)   0.9% NaCl 100 mL flush bag ( Intravenous Verify Only 8/13/24 0359)   methylPREDNISolone sodium succinate injection 60 mg (60 mg Intravenous Given 8/13/24 2113)   piperacillin-tazobactam (ZOSYN) 4.5 g in D5W 100 mL IVPB (MB+) (0 g Intravenous Stopped 8/7/24 2329)   sodium chloride 0.9% bolus 1,000 mL 1,000 mL (0 mLs Intravenous Stopped 8/7/24 2357)   vancomycin (VANCOCIN) 750 mg in D5W 250 mL IVPB (Vial-Mate) (0 mg Intravenous Stopped 8/8/24 0057)   iopamidoL (ISOVUE-370) injection 100 mL (65 mLs Intravenous Given 8/8/24 0037)     Medical Decision Making  This is a 74 y/o female,who presents to the ED via EMS with complaints of shortness of breath. She has a known hx of COPD and lung cancer. EMS states the pt was sating on 55% and nonrebreather was placed on the pt and her O2 sats increased to 91. EMS states the pt's family reported a cough for the past 3 days as well. There are no other complaints/pain in the ED at this time. She is a current every day smoker.     Amount and/or Complexity of Data Reviewed  Independent Historian: EMS     Details: We spoke with EMS.     Labs: ordered.  Radiology: ordered.  ECG/medicine tests: independent interpretation performed.     Details: No stemi    Risk  Prescription drug management.  Decision regarding hospitalization.    Critical Care  Total time providing critical care: 45 minutes              Attending Attestation:            Physician Attestation for Scribe:  Physician Attestation Statement for Scribe #1: I, Allen Lockhart MD, reviewed documentation, as scribed by Apryl Cleaning in my presence, and it is both accurate and complete.             ED Course as of 08/14/24 0557   Wed Aug 07, 2024   2240 Patient was given 1 L of normal saline by EMS that was just completed 5 minutes ago at 10:35 p.m. [PK]   u Aug 08, 2024   0110 Patient much more awake than earlier.  She was very somnolent earlier.  Also her capillary refill had been down.  Now her capillary refill is brisk.  So the noninvasive perfusion exam is now normal [PK]   0117 Discussed with the hospitalist about need to hospitalize patient for sepsis and pneumonia.  Patient has lung cancer and is on chemotherapy.  She presents with sepsis with altered mental status.  Patient had elevated lactic acid and poor capillary refill.  However she was given a couple L normal saline now he was perfusing well it was documented.  Also lactic acid improved.  However she was requiring more oxygen than normal.  CT PE protocol shows right upper lobe pneumonia.  Treated with broad-spectrum antibiotics.   [PK]   0130 CTA Chest:  No pulmonary embolus. There is prominence of pulmonary arteries concerning for pulmonary artery hypertension. No aortic aneurysm or dissection. Diffuse bilateral airspace opacities are concerning for multifocal pneumonia and/or aspiration. There is underlying emphysema. There is narrowing of the right mainstem bronchus with bilateral hilar masses greater on the right, this likely represents the patient's malignancy. Suspect superimposed of pulmonary edema. The heart size within normal limits. No pathologically enlarged lymph nodes. Age in determinant mild L1 and L2  compression fractures.    [BW]      ED Course User Index  [BW] Apryl Valenzuela  [PK] Allen Lockhart MD                           Clinical Impression:  Final diagnoses:  [R53.1]  Weak  [J18.9] Pneumonia  [I27.20] Pulmonary hypertension  [J18.9] Obstructive pneumonia [J18.9] (Primary)  [J96.21, J96.22] Acute on chronic respiratory failure with hypoxia and hypercapnia [J96.21, J96.22]  [N17.9] JAMI (acute kidney injury) [N17.9]  [C34.90] Malignant neoplasm of lung, unspecified laterality, unspecified part of lung [C34.90]  [J43.2] Centrilobular emphysema [J43.2]  [I45.10] Right bundle branch block [I45.10]          ED Disposition Condition    Admit                 Allen Lockhart MD  08/14/24 0557

## 2024-08-08 NOTE — ASSESSMENT & PLAN NOTE
Patient with Hypercapnic and Hypoxic Respiratory failure which is Acute on chronic.  she is on home oxygen at 2 LPM. Supplemental oxygen was provided and noted- Oxygen Concentration (%):  [50] 50    .   Signs/symptoms of respiratory failure include- wheezing and lethargy. Contributing diagnoses includes - Pneumonia Labs and images were reviewed. Patient Has recent ABG, which has been reviewed. Will treat underlying causes and adjust management of respiratory failure as follows-     Will continue with supplemental oxygen.  May need bipap if continues with CO2 retention.  Monitor respiratory status closely.  Patient has home oxygen but she states that she does not use it but she thinks she may need to use it.  Will check echo for RVSP for PHTN.  She does have RBBB but no PTE.  Venous dopplers pending.

## 2024-08-08 NOTE — ASSESSMENT & PLAN NOTE
Follows with Dr. Kraft.  Has chemo every six weeks.  Mediport looks good without tenderness or erythema.  She says she never had it taken out when she finished chemo the first time in 2019.  It has always worked fine.

## 2024-08-08 NOTE — PROGRESS NOTES
"Pharmacokinetic Initial Assessment: IV Vancomycin    Assessment/Plan:    Initiate intravenous vancomycin given 8/7 in ED with dose of 750 mg once.  Will follow with a maintenance dose of vancomycin 1000mg IV every 36 hours starting 8/9 @ 1130.  Desired empiric serum trough concentration is 15 to 20 mcg/mL  Draw vancomycin trough level 30 min prior to fourth dose on 8/12 at approximately 1100  Pharmacy will continue to follow and monitor vancomycin.  Will check random levels as needed if renal function worsens.    Please contact pharmacy at extension [phone] with any questions regarding this assessment.        Patient brief summary:  Mayra Kelly is a 73 y.o. female initiated on antimicrobial therapy with IV Vancomycin for treatment of suspected  pneumonia.    Drug Allergies:   Review of patient's allergies indicates:  No Known Allergies    Actual Body Weight:   47.6kg    Renal Function:   Estimated Creatinine Clearance: 26.5 mL/min (A) (based on SCr of 1.44 mg/dL (H)).,     CBC (last 72 hours):  Recent Labs   Lab Result Units 08/07/24  2156   WBC K/uL 13.15*   Hemoglobin g/dL 10.4*   Hematocrit % 35.5*   Platelet Count K/uL 361   Lymphocytes % % 7.4*   Monocytes % % 9.3*   Eosinophils % % 0.5*   Basophils % % 0.5   Diff Type  Auto       Metabolic Panel (last 72 hours):  Recent Labs   Lab Result Units 08/07/24  2156 08/07/24  2342   Sodium mmol/L 142  --    Potassium mmol/L 3.4*  --    Chloride mmol/L 105  --    CO2 mmol/L 29  --    Glucose mg/dL 140*  --    Glucose, UA mg/dL  --  30*   BUN mg/dL 25*  --    Creatinine mg/dL 1.44*  --    Albumin g/dL 2.3*  --    Bilirubin, Total mg/dL 0.7  --    Alk Phos U/L 87  --    AST U/L 17  --    ALT U/L 11*  --        Drug levels (last 3 results):  No results for input(s): "VANCOMYCINRA", "VANCORANDOM", "VANCOMYCINPE", "VANCOPEAK", "VANCOMYCINTR", "VANCOTROUGH" in the last 72 hours.    Microbiologic Results:  Microbiology Results (last 7 days)       Procedure Component Value " Units Date/Time    Influenza A & B by Molecular [[id number]]  (Normal) Collected: 08/07/24 2302    Order Status: Completed Specimen: Nasopharyngeal Swab Updated: 08/08/24 0109     INFLUENZA A MOLECULAR Negative     INFLUENZA B MOLECULAR  Negative    Blood culture x two cultures. Draw prior to antibiotics. [[id number]] Collected: 08/07/24 2254    Order Status: Sent Specimen: Blood Updated: 08/07/24 2259    Blood culture x two cultures. Draw prior to antibiotics. [[id number]] Collected: 08/07/24 2251    Order Status: Sent Specimen: Blood Updated: 08/07/24 2256          Ki Faust, PharmD, BCPS

## 2024-08-08 NOTE — SUBJECTIVE & OBJECTIVE
Past Medical History:   Diagnosis Date    Chronic respiratory failure with hypoxia and hypercapnia     on home oxygen but hasn't been using it    Cigarette nicotine dependence with nicotine-induced disorder 2023    COPD (chronic obstructive pulmonary disease)     Dyshidrotic eczema 08/10/2021    Lung cancer     follows with Dr. Kraft    Right bundle branch block 2023    Scabies        Past Surgical History:   Procedure Laterality Date    APPENDECTOMY       SECTION  1980    2x    INSERTION OF VENOUS ACCESS PORT         Review of patient's allergies indicates:  No Known Allergies    No current facility-administered medications on file prior to encounter.     Current Outpatient Medications on File Prior to Encounter   Medication Sig    albuterol (PROVENTIL/VENTOLIN HFA) 90 mcg/actuation inhaler Inhale 2 puffs into the lungs every 4 (four) hours as needed.    albuterol sulfate 2.5 mg/0.5 mL Nebu Take 2.5 mg by nebulization every 6 (six) hours as needed (Shortness of breath). Rescue    amLODIPine (NORVASC) 2.5 MG tablet Take 1 tablet (2.5 mg total) by mouth once daily.    buPROPion (WELLBUTRIN SR) 100 MG TBSR 12 hr tablet TAKE 1 TABLET BY MOUTH ONCE DAILY TO CURB THE URGE TO SMOKE    ELIQUIS 5 mg Tab Take 1 tablet (5 mg total) by mouth 2 (two) times daily.    fluticasone-umeclidin-vilanter (TRELEGY ELLIPTA) 200-62.5-25 mcg inhaler Inhale 1 puff into the lungs once daily.    furosemide (LASIX) 40 MG tablet Take 1 tablet (40 mg total) by mouth once daily.    hydroCHLOROthiazide (HYDRODIURIL) 25 MG tablet Take 25 mg by mouth once daily.    potassium chloride SA (K-DUR,KLOR-CON) 20 MEQ tablet Take 1 tablet (20 mEq total) by mouth once daily.    prochlorperazine (COMPAZINE) 5 MG tablet Take by mouth.     Family History       Problem Relation (Age of Onset)    Cancer Father, Maternal Grandmother, Maternal Aunt    Heart disease Paternal Grandmother          Tobacco Use    Smoking status: Every Day      Current packs/day: 1.00     Types: Cigarettes    Smokeless tobacco: Never   Substance and Sexual Activity    Alcohol use: Never    Drug use: Never    Sexual activity: Not on file     Review of Systems   Constitutional:  Positive for fatigue. Negative for appetite change, chills, fever and unexpected weight change.   HENT:  Negative for congestion, mouth sores, nosebleeds, sinus pain, sore throat and trouble swallowing.    Respiratory:  Positive for cough and shortness of breath. Negative for apnea and chest tightness.    Cardiovascular:  Negative for chest pain, palpitations and leg swelling.   Gastrointestinal:  Negative for abdominal pain, blood in stool, constipation, diarrhea, nausea and vomiting.   Endocrine: Negative for polyuria.   Genitourinary:  Negative for decreased urine volume, difficulty urinating, dysuria and frequency.   Musculoskeletal:  Negative for arthralgias, back pain and neck pain.   Skin:  Negative for rash.   Neurological:  Negative for syncope, light-headedness and headaches.   Hematological:  Does not bruise/bleed easily.   Psychiatric/Behavioral:  Negative for confusion and suicidal ideas.      Objective:     Vital Signs (Most Recent):  Temp: 97.6 °F (36.4 °C) (08/07/24 2143)  Pulse: 77 (08/08/24 0601)  Resp: 14 (08/08/24 0601)  BP: (!) 104/59 (08/08/24 0601)  SpO2: (!) 90 % (08/08/24 0601) Vital Signs (24h Range):  Temp:  [97.6 °F (36.4 °C)] 97.6 °F (36.4 °C)  Pulse:  [] 77  Resp:  [14-23] 14  SpO2:  [81 %-97 %] 90 %  BP: ()/(52-67) 104/59     Weight: 47.6 kg (105 lb)  Body mass index is 16.95 kg/m².     Physical Exam  Vitals and nursing note reviewed. Exam conducted with a chaperone present.   Constitutional:       General: She is not in acute distress.     Appearance: She is ill-appearing. She is not toxic-appearing.   HENT:      Head: Atraumatic.      Mouth/Throat:      Mouth: Mucous membranes are moist.      Pharynx: Oropharynx is clear.   Eyes:      Conjunctiva/sclera:  Conjunctivae normal.      Pupils: Pupils are equal, round, and reactive to light.   Neck:      Vascular: No carotid bruit.   Cardiovascular:      Rate and Rhythm: Normal rate and regular rhythm.      Pulses: Normal pulses.      Heart sounds: Normal heart sounds.   Pulmonary:      Effort: Pulmonary effort is normal.      Breath sounds: Wheezing and rhonchi present. No rales.   Chest:      Chest wall: No tenderness.   Abdominal:      General: Abdomen is flat. Bowel sounds are normal.      Palpations: Abdomen is soft.      Tenderness: There is no abdominal tenderness. There is no right CVA tenderness or left CVA tenderness.   Musculoskeletal:         General: No deformity or signs of injury. Normal range of motion.      Cervical back: Neck supple.      Right lower leg: No edema.      Left lower leg: No edema.   Skin:     General: Skin is warm and dry.      Capillary Refill: Capillary refill takes less than 2 seconds.      Coloration: Skin is not jaundiced or pale.      Findings: No bruising, lesion or rash.   Neurological:      General: No focal deficit present.      Mental Status: She is alert and oriented to person, place, and time.   Psychiatric:         Mood and Affect: Mood normal.              CRANIAL NERVES     CN III, IV, VI   Pupils are equal, round, and reactive to light.       Significant Labs: All pertinent labs within the past 24 hours have been reviewed.    Significant Imaging: I have reviewed all pertinent imaging results/findings within the past 24 hours.

## 2024-08-08 NOTE — ASSESSMENT & PLAN NOTE
I do not have the complete records will get Dr. Kraft to come see and make recommendations about further therapy if needed

## 2024-08-08 NOTE — H&P
[facility] - Emergency Department  Hospital Medicine  History & Physical    Patient Name: Mayra Kelly  MRN: [id number]  Patient Class: IP- Inpatient  Admission Date: 8/7/2024  Attending Physician: Mark Barraza MD   Primary Care Provider: Darlene Campoverde NP         Patient information was obtained from patient, past medical records, and ER records.     Subjective:     Principal Problem:Obstructive pneumonia    Chief Complaint:   Chief Complaint   Patient presents with    Hypotension     Ems from home - low  bp        HPI: 72 yo F presents to [facility] ED with increasing dyspnea.  She has had a cough productive of whitish sputum but no hemoptysis.  She has been worsening over the past three days but no orthopnea or edema.  No chest pain or palpitations.  She does take eliquis for PAF and is currently in sinus tach.  Initial trop unremarkable and second is pending for the trend.  Her proBNP is now about 37K which is up from her baseline of 650 and with the RBBB could think PTE but is on full dose eliquis.  CT was negative for PTE but she has bilateral hilar masses with right greater than left with multilobar pneumonia.  She does meet sepsis criteria with WBC, tachycardia and JAMI (creat usually 0.9 and now 1.4) but lactic acid is normal.      Patient states that she was treated for lung cancer [id number] with XRT and chemo and though her FOBT with lymph node and endobronchial biopsy not diagnostic for malignancy in 2023, she was eventually diagnosed with lung cancer and follow with Dr. Kraft for chemo every six weeks.  COVID is negative and MRSA pcr nares swab pending.      Remainder of ROS as below.   See assessment and plan below for problem based evaluation      Past Medical History:   Diagnosis Date    Chronic respiratory failure with hypoxia and hypercapnia     on home oxygen but hasn't been using it    Cigarette nicotine dependence with nicotine-induced disorder 11/09/2023    COPD (chronic obstructive  pulmonary disease)     Dyshidrotic eczema 08/10/2021    Lung cancer     follows with Dr. Kraft    Right bundle branch block 2023    Scabies        Past Surgical History:   Procedure Laterality Date    APPENDECTOMY       SECTION  1980    2x    INSERTION OF VENOUS ACCESS PORT         Review of patient's allergies indicates:  No Known Allergies    No current facility-administered medications on file prior to encounter.     Current Outpatient Medications on File Prior to Encounter   Medication Sig    albuterol (PROVENTIL/VENTOLIN HFA) 90 mcg/actuation inhaler Inhale 2 puffs into the lungs every 4 (four) hours as needed.    albuterol sulfate 2.5 mg/0.5 mL Nebu Take 2.5 mg by nebulization every 6 (six) hours as needed (Shortness of breath). Rescue    amLODIPine (NORVASC) 2.5 MG tablet Take 1 tablet (2.5 mg total) by mouth once daily.    buPROPion (WELLBUTRIN SR) 100 MG TBSR 12 hr tablet TAKE 1 TABLET BY MOUTH ONCE DAILY TO CURB THE URGE TO SMOKE    ELIQUIS 5 mg Tab Take 1 tablet (5 mg total) by mouth 2 (two) times daily.    fluticasone-umeclidin-vilanter (TRELEGY ELLIPTA) 200-62.5-25 mcg inhaler Inhale 1 puff into the lungs once daily.    furosemide (LASIX) 40 MG tablet Take 1 tablet (40 mg total) by mouth once daily.    hydroCHLOROthiazide (HYDRODIURIL) 25 MG tablet Take 25 mg by mouth once daily.    potassium chloride SA (K-DUR,KLOR-CON) 20 MEQ tablet Take 1 tablet (20 mEq total) by mouth once daily.    prochlorperazine (COMPAZINE) 5 MG tablet Take by mouth.     Family History       Problem Relation (Age of Onset)    Cancer Father, Maternal Grandmother, Maternal Aunt    Heart disease Paternal Grandmother          Tobacco Use    Smoking status: Every Day     Current packs/day: 1.00     Types: Cigarettes    Smokeless tobacco: Never   Substance and Sexual Activity    Alcohol use: Never    Drug use: Never    Sexual activity: Not on file     Review of Systems   Constitutional:  Positive for fatigue.  Negative for appetite change, chills, fever and unexpected weight change.   HENT:  Negative for congestion, mouth sores, nosebleeds, sinus pain, sore throat and trouble swallowing.    Respiratory:  Positive for cough and shortness of breath. Negative for apnea and chest tightness.    Cardiovascular:  Negative for chest pain, palpitations and leg swelling.   Gastrointestinal:  Negative for abdominal pain, blood in stool, constipation, diarrhea, nausea and vomiting.   Endocrine: Negative for polyuria.   Genitourinary:  Negative for decreased urine volume, difficulty urinating, dysuria and frequency.   Musculoskeletal:  Negative for arthralgias, back pain and neck pain.   Skin:  Negative for rash.   Neurological:  Negative for syncope, light-headedness and headaches.   Hematological:  Does not bruise/bleed easily.   Psychiatric/Behavioral:  Negative for confusion and suicidal ideas.      Objective:     Vital Signs (Most Recent):  Temp: 97.6 °F (36.4 °C) (08/07/24 2143)  Pulse: 77 (08/08/24 0601)  Resp: 14 (08/08/24 0601)  BP: (!) 104/59 (08/08/24 0601)  SpO2: (!) 90 % (08/08/24 0601) Vital Signs (24h Range):  Temp:  [97.6 °F (36.4 °C)] 97.6 °F (36.4 °C)  Pulse:  [] 77  Resp:  [14-23] 14  SpO2:  [81 %-97 %] 90 %  BP: ()/(52-67) 104/59     Weight: 47.6 kg (105 lb)  Body mass index is 16.95 kg/m².     Physical Exam  Vitals and nursing note reviewed. Exam conducted with a chaperone present.   Constitutional:       General: She is not in acute distress.     Appearance: She is ill-appearing. She is not toxic-appearing.   HENT:      Head: Atraumatic.      Mouth/Throat:      Mouth: Mucous membranes are moist.      Pharynx: Oropharynx is clear.   Eyes:      Conjunctiva/sclera: Conjunctivae normal.      Pupils: Pupils are equal, round, and reactive to light.   Neck:      Vascular: No carotid bruit.   Cardiovascular:      Rate and Rhythm: Normal rate and regular rhythm.      Pulses: Normal pulses.      Heart sounds:  Normal heart sounds.   Pulmonary:      Effort: Pulmonary effort is normal.      Breath sounds: Wheezing and rhonchi present. No rales.   Chest:      Chest wall: No tenderness.   Abdominal:      General: Abdomen is flat. Bowel sounds are normal.      Palpations: Abdomen is soft.      Tenderness: There is no abdominal tenderness. There is no right CVA tenderness or left CVA tenderness.   Musculoskeletal:         General: No deformity or signs of injury. Normal range of motion.      Cervical back: Neck supple.      Right lower leg: No edema.      Left lower leg: No edema.   Skin:     General: Skin is warm and dry.      Capillary Refill: Capillary refill takes less than 2 seconds.      Coloration: Skin is not jaundiced or pale.      Findings: No bruising, lesion or rash.   Neurological:      General: No focal deficit present.      Mental Status: She is alert and oriented to person, place, and time.   Psychiatric:         Mood and Affect: Mood normal.              CRANIAL NERVES     CN III, IV, VI   Pupils are equal, round, and reactive to light.       Significant Labs: All pertinent labs within the past 24 hours have been reviewed.    Significant Imaging: I have reviewed all pertinent imaging results/findings within the past 24 hours.  Assessment/Plan:     * Obstructive pneumonia  Patient has a diagnosis of pneumonia. The cause of the pneumonia is suspected to be bacterial in etiology but organism is not known. The pneumonia is stable. The patient has the following signs/symptoms of pneumonia: persistent hypoxia , cough, and shortness of breath. The patient does have a current oxygen requirement and the patient does have a home oxygen requirement. I have reviewed the pertinent imaging. The following cultures have been collected: Blood cultures The culture results are listed below.     Current antimicrobial regimen consists of the antibiotics listed below. Will monitor patient closely and Adjust treatment plan as follows  add zithromax for atypicals if indicated.  MRSA nares pending.      Antibiotics (From admission, onward)      Start     Stop Route Frequency Ordered    08/08/24 0800  piperacillin-tazobactam (ZOSYN) 4.5 g in D5W 100 mL IVPB (MB+)         -- IV Every 8 hours (non-standard times) 08/08/24 0321            Microbiology Results (last 7 days)       Procedure Component Value Units Date/Time    Influenza A & B by Molecular [[id number]]  (Normal) Collected: 08/07/24 2302    Order Status: Completed Specimen: Nasopharyngeal Swab Updated: 08/08/24 0109     INFLUENZA A MOLECULAR Negative     INFLUENZA B MOLECULAR  Negative    Blood culture x two cultures. Draw prior to antibiotics. [[id number]] Collected: 08/07/24 2254    Order Status: Sent Specimen: Blood Updated: 08/07/24 2259    Blood culture x two cultures. Draw prior to antibiotics. [[id number]] Collected: 08/07/24 2251    Order Status: Sent Specimen: Blood Updated: 08/07/24 2256            Acute on chronic respiratory failure with hypoxia and hypercapnia  Patient with Hypercapnic and Hypoxic Respiratory failure which is Acute on chronic.  she is on home oxygen at 2 LPM. Supplemental oxygen was provided and noted- Oxygen Concentration (%):  [50] 50    .   Signs/symptoms of respiratory failure include- wheezing and lethargy. Contributing diagnoses includes - Pneumonia Labs and images were reviewed. Patient Has recent ABG, which has been reviewed. Will treat underlying causes and adjust management of respiratory failure as follows-     Will continue with supplemental oxygen.  May need bipap if continues with CO2 retention.  Monitor respiratory status closely.  Patient has home oxygen but she states that she does not use it but she thinks she may need to use it.  Will check echo for RVSP for PHTN.  She does have RBBB but no PTE.  Venous dopplers pending.      JAMI (acute kidney injury)  JAMI is likely due to pre-renal azotemia due to dehydration. Baseline creatinine is  o.9 .  Most recent creatinine and eGFR are listed below.  Recent Labs     08/07/24  2156   CREATININE 1.44*   EGFRNORACEVR 39*      Plan  - JAMI is stable  - Avoid nephrotoxins and renally dose meds for GFR listed above  - Monitor urine output, serial BMP, and adjust therapy as needed  - hold HCT and lasix.  Due to markedly elevated BNP will hold off for now on IVF   - hold all nephrotoxic agents.      Lung cancer  Follows with Dr. Kraft.  Has chemo every six weeks.  Mediport looks good without tenderness or erythema.  She says she never had it taken out when she finished chemo the first time in 2019.  It has always worked fine.        COPD (chronic obstructive pulmonary disease)  Patient's COPD is with exacerbation noted by continued dyspnea and worsening of baseline hypoxia currently.  Patient is currently on COPD Pathway. Continue scheduled inhalers Antibiotics and Supplemental oxygen and monitor respiratory status closely.     If scheduled bronchodilators and prn bronchodilators due not improve respiratory status may need to add steroids but would be cautious in due to pneumonia most likely caused by obstruction from bilateral hilar masses  (R>L)    Right bundle branch block  Echo pending to evaluate for PHTN.  She is on norvasc low dose without h/o HTN and BP soft at this time.  Will hold.  May have previous diagnosis and may be worsening.          VTE Risk Mitigation (From admission, onward)           Ordered     apixaban tablet 5 mg  2 times daily         08/08/24 0320                                    Jina Jay MD  Department of Hospital Medicine  [facility] - Emergency Department

## 2024-08-08 NOTE — PLAN OF CARE
[facility] - Emergency Department  Initial Discharge Assessment       Primary Care Provider: Darlene Campoverde NP    Admission Diagnosis: Pneumonia [J18.9]  Pneumonia [J18.9]    Admission Date: 8/7/2024  Expected Discharge Date:     Transition of Care Barriers: None    Payor: MEDICARE / Plan: MEDICARE PART A & B / Product Type: Government /     Extended Emergency Contact Information  Primary Emergency Contact: BREANA RAMOS  Mobile Phone: [phone]  Relation: Son  Preferred language: English   needed? No    Discharge Plan A: Home with family  Discharge Plan B: Home with family, Home Health      [facility] [address] [place] [address] [address] [place] [address]  [address] [place] [address] [place] [address]  Phone: [phone] Fax: [phone]      Initial Assessment (most recent)       Adult Discharge Assessment - 08/08/24 1107          Discharge Assessment    Assessment Type Discharge Planning Assessment     Confirmed/corrected address, phone number and insurance Yes     Confirmed Demographics Correct on Facesheet     Source of Information patient     People in Home child(areli), adult     Do you expect to return to your current living situation? Yes     Do you have help at home or someone to help you manage your care at home? Yes     Who are your caregiver(s) and their phone number(s)? Fredis Ramos, jessenia, [phone]     Prior to hospitilization cognitive status: Unable to Assess     Current cognitive status: Alert/Oriented     Walking or Climbing Stairs Difficulty yes     Walking or Climbing Stairs ambulation difficulty, requires equipment     Mobility Management rw     Dressing/Bathing Difficulty no     Home Accessibility wheelchair accessible     Home Layout Able to live on 1st floor     Equipment Currently Used at Home oxygen;nebulizer;walker, rolling;bedside commode     Readmission within 30 days? No     Patient currently being followed by outpatient case management? No     Do you currently  have service(s) that help you manage your care at home? No     Do you take prescription medications? Yes     Do you have prescription coverage? Yes     Coverage Medicare     Do you have any problems affording any of your prescribed medications? No     Is the patient taking medications as prescribed? yes     Who is going to help you get home at discharge? jessenia Mcneal     How do you get to doctors appointments? family or friend will provide     Are you on dialysis? No     Do you take coumadin? No     Discharge Plan A Home with family     Discharge Plan B Home with family;Home Health     DME Needed Upon Discharge  none     Discharge Plan discussed with: Patient     Transition of Care Barriers None        Physical Activity    On average, how many days per week do you engage in moderate to strenuous exercise (like a brisk walk)? 0 days     On average, how many minutes do you engage in exercise at this level? 0 min        Financial Resource Strain    How hard is it for you to pay for the very basics like food, housing, medical care, and heating? Not hard at all        Housing Stability    In the last 12 months, was there a time when you were not able to pay the mortgage or rent on time? No     At any time in the past 12 months, were you homeless or living in a shelter (including now)? No        Transportation Needs    Has the lack of transportation kept you from medical appointments, meetings, work or from getting things needed for daily living? No        Food Insecurity    Within the past 12 months, you worried that your food would run out before you got the money to buy more. Never true     Within the past 12 months, the food you bought just didn't last and you didn't have money to get more. Never true        Stress    Do you feel stress - tense, restless, nervous, or anxious, or unable to sleep at night because your mind is troubled all the time - these days? Not at all        Social Isolation    How often do you feel  lonely or isolated from those around you?  Never        Alcohol Use    Q1: How often do you have a drink containing alcohol? Never     Q2: How many drinks containing alcohol do you have on a typical day when you are drinking? Patient does not drink     Q3: How often do you have six or more drinks on one occasion? Never        Utilities    In the past 12 months has the electric, gas, oil, or water company threatened to shut off services in your home? No        Health Literacy    How often do you need to have someone help you when you read instructions, pamphlets, or other written material from your doctor or pharmacy? Rarely        OTHER    Name(s) of People in Home Fredis Kelly, son                   SS spoke with pt at bedside. Pt lives at home with her son, Fredis. Pt plans to return to current living arrangements when medically ready for discharge. Pt has required DME including O2. Pt is not current with hh. SDOH completed. IM obtained. SS following for anticipated dc needs.

## 2024-08-08 NOTE — CONSULTS
[facility] - Emergency Department  Critical Care Medicine  Consult Note    Patient Name: Mayra Kelly  MRN: [id number]  Admission Date: 2024  Hospital Length of Stay: 0 days  Code Status: No Order  Attending Physician: Mark Barraza MD   Primary Care Provider: Darlene Campoverde NP   Principal Problem: Obstructive pneumonia    Consults  Subjective:     HPI:  Seventy 73-year-old white female who presents with shortness of breath and respiratory distress her story starts in  when she was diagnosed with left-sided cancer sounds like she got radiation to the chest and to chemotherapy and she says she got brain radiation at that time I do not know the cell type.  She was seen by  last year and had a nondiagnostic EBUS on the right but had no endobronchial lesions she followed up with a needle biopsy with Dr. Kraft and he tells me that she was diagnosed with squamous cell carcinoma and they discussed in January and February treatment with radiation chemotherapy she apparently did not keep her follow-up and presents now with more shortness of breath productive sputum she has a history of bad COPD in chronic oxygen and acute on top of chronic respiratory failure during workup she was found to what looks like a right upper lobe obstruction with postobstructive pneumonia    Hospital/ICU Course:  No notes on file    Past Medical History:   Diagnosis Date    Chronic respiratory failure with hypoxia and hypercapnia     on home oxygen but hasn't been using it    Cigarette nicotine dependence with nicotine-induced disorder 2023    COPD (chronic obstructive pulmonary disease)     Dyshidrotic eczema 08/10/2021    Lung cancer     follows with Dr. Kraft    Right bundle branch block 2023    Scabies        Past Surgical History:   Procedure Laterality Date    APPENDECTOMY       SECTION  1980    2x    INSERTION OF VENOUS ACCESS PORT         Review of patient's allergies indicates:  No  Known Allergies    Family History       Problem Relation (Age of Onset)    Cancer Father, Maternal Grandmother, Maternal Aunt    Heart disease Paternal Grandmother          Tobacco Use    Smoking status: Every Day     Current packs/day: 1.00     Types: Cigarettes    Smokeless tobacco: Never   Substance and Sexual Activity    Alcohol use: Never    Drug use: Never    Sexual activity: Not on file      Review of Systems   Constitutional:  Negative for activity change and appetite change.   HENT:  Negative for congestion and dental problem.    Eyes:  Negative for discharge and itching.   Respiratory:  Negative for apnea and chest tightness.    Cardiovascular:  Negative for chest pain and leg swelling.   Gastrointestinal:  Negative for abdominal distention.   Endocrine: Negative for cold intolerance and heat intolerance.   Genitourinary:  Negative for difficulty urinating and dysuria.   Musculoskeletal:  Negative for arthralgias and back pain.   Skin:  Negative for color change.   Allergic/Immunologic: Negative for environmental allergies.   Neurological:  Negative for dizziness and facial asymmetry.   Hematological:  Negative for adenopathy. Does not bruise/bleed easily.   Psychiatric/Behavioral:  Negative for agitation and behavioral problems.      Objective:     Vital Signs (Most Recent):  Temp: 98.5 °F (36.9 °C) (08/08/24 0758)  Pulse: 78 (08/08/24 1124)  Resp: 18 (08/08/24 1124)  BP: (!) 84/41 (08/08/24 1021)  SpO2: 95 % (08/08/24 1124) Vital Signs (24h Range):  Temp:  [97.6 °F (36.4 °C)-98.5 °F (36.9 °C)] 98.5 °F (36.9 °C)  Pulse:  [] 78  Resp:  [8-29] 18  SpO2:  [56 %-99 %] 95 %  BP: ()/(41-67) 84/41   Weight: 47.6 kg (105 lb)  Body mass index is 16.95 kg/m².      Intake/Output Summary (Last 24 hours) at 8/8/2024 1220  Last data filed at 8/8/2024 0057  Gross per 24 hour   Intake 1349.05 ml   Output --   Net 1349.05 ml          Physical Exam  Vitals reviewed.   Constitutional:       Appearance: Normal  appearance.      Interventions: She is not intubated.  HENT:      Head: Normocephalic and atraumatic.      Nose: Nose normal.      Mouth/Throat:      Mouth: Mucous membranes are dry.      Pharynx: Oropharynx is clear.   Eyes:      Extraocular Movements: Extraocular movements intact.      Conjunctiva/sclera: Conjunctivae normal.      Pupils: Pupils are equal, round, and reactive to light.   Cardiovascular:      Rate and Rhythm: Normal rate.      Heart sounds: Normal heart sounds. No murmur heard.  Pulmonary:      Effort: Pulmonary effort is normal. She is not intubated.      Breath sounds: Normal breath sounds.   Abdominal:      General: Abdomen is flat. Bowel sounds are normal.      Palpations: Abdomen is soft.   Musculoskeletal:         General: Normal range of motion.      Cervical back: Normal range of motion and neck supple.      Right lower leg: No edema.      Left lower leg: No edema.   Skin:     General: Skin is warm and dry.      Capillary Refill: Capillary refill takes less than 2 seconds.   Neurological:      General: No focal deficit present.      Mental Status: She is alert and oriented to person, place, and time.   Psychiatric:         Mood and Affect: Mood normal.         Behavior: Behavior normal.            Vents:  Oxygen Concentration (%): 50 (08/08/24 1124)  Lines/Drains/Airways       Peripheral Intravenous Line  Duration                  Peripheral IV - Single Lumen 08/07/24 18 G Left Antecubital 1 day         Peripheral IV - Single Lumen 08/07/24 2255 20 G Anterior;Proximal;Right Forearm <1 day                  Significant Labs:    CBC/Anemia Profile:  Recent Labs   Lab 08/07/24  2148 08/07/24  2156 08/07/24  2356   WBC  --  13.15*  --    HGB  --  10.4*  --    HCT 37 35.5* 32*   PLT  --  361  --    MCV  --  89.9  --    RDW  --  17.2*  --         Chemistries:  Recent Labs   Lab 08/07/24  2156      K 3.4*      CO2 29   BUN 25*   CREATININE 1.44*   CALCIUM 8.4*   ALBUMIN 2.3*   PROT 6.8    BILITOT 0.7   ALKPHOS 87   ALT 11*   AST 17       Recent Lab Results  (Last 5 results in the past 24 hours)        08/08/24  0639   08/08/24  0600   08/08/24  0556   08/07/24  2356   08/07/24  2342        POC CO2       33.2         Appearance, UA         Clear       Bacteria, UA         Occasional       Bilirubin (UA)         Negative       Coarse Granular Casts, UA         2-5       Color, UA         Yellow       Estimated Avg Glucose 111               Glucose, UA         30       Hemoglobin A1C External 5.5  Comment:   Normal:               <5.7%  Pre-Diabetic:       5.7% to 6.4%  Diabetic:             >6.4%  Diabetic Goal:     <7%               Hyaline Casts, UA         25-50       Ketones, UA         Negative       Leukocyte Esterase, UA         Negative       Mucous         Few       NITRITE UA         Negative       Blood, UA         Trace       pH, UA         5.5       POC Base Excess   7.2     4.8         POC Glucose     104           POC HCO3   34.5     31.5         POC Hematocrit       32         POC Ionized Calcium       1.05         POC Lactate       0.7         POC PCO2   61     57         POC PH   7.36     7.35         POC PO2   60     51         POC Potassium       3.0         POC SATURATED O2   90     84         POC Sodium       140         POCT Glucose       155         Prealbumin <3               Protein, UA         100       RBC, UA         1       Spec Grav UA         1.021       Squamous Epithelial Cells, UA         Occasional       Troponin I High Sensitivity 25.1               TSH 0.647               UROBILINOGEN UA         3       WBC, UA         5                              Significant Imaging: I have reviewed all pertinent imaging results/findings within the past 24 hours.    ABG  Recent Labs   Lab 08/08/24  0600   PH 7.36   PO2 60*   PCO2 61*   HCO3 34.5*     Assessment/Plan:     Pulmonary  * Obstructive pneumonia  Looks like patient has obstructing lesion in the right upper right  middle lobe she was too sick at this time to do bronchoscopy she may need some kind of intervention done there we improve her outcome I would treat with antibiotics and steroids.  Would ask would also ask Dr. [facility] see her and make recommendations for any treatment for her lung cancer.    COPD (chronic obstructive pulmonary disease)  Certainly COPD exacerbation is playing a role.  Treat for COPD also antibiotics and I agree with getting an echo and making sure volume status is okay think her prognosis from her COPD is poor we do not have any PFTs on her but she had an abnormal 6 minute walk test last year uses oxygen full-time    Oncology  Lung cancer  I do not have the complete records will get Dr. Kraft to come see and make recommendations about further therapy if needed             Thank you for your consult. I will follow-up with patient. Please contact us if you have any additional questions.     Jose Carlos Whitmore MD  Critical Care Medicine  [facility] - Emergency Department

## 2024-08-08 NOTE — ASSESSMENT & PLAN NOTE
Certainly COPD exacerbation is playing a role.  Treat for COPD also antibiotics and I agree with getting an echo and making sure volume status is okay think her prognosis from her COPD is poor we do not have any PFTs on her but she had an abnormal 6 minute walk test last year uses oxygen full-time

## 2024-08-08 NOTE — ASSESSMENT & PLAN NOTE
JAMI is likely due to pre-renal azotemia due to dehydration. Baseline creatinine is  o.9 . Most recent creatinine and eGFR are listed below.  Recent Labs     08/07/24  2156   CREATININE 1.44*   EGFRNORACEVR 39*      Plan  - JAMI is stable  - Avoid nephrotoxins and renally dose meds for GFR listed above  - Monitor urine output, serial BMP, and adjust therapy as needed  - hold HCT and lasix.  Due to markedly elevated BNP will hold off for now on IVF   - hold all nephrotoxic agents.

## 2024-08-08 NOTE — HPI
74 yo F presents to [facility] ED with increasing dyspnea.  She has had a cough productive of whitish sputum but no hemoptysis.  She has been worsening over the past three days but no orthopnea or edema.  No chest pain or palpitations.  She does take eliquis for PAF and is currently in sinus tach.  Initial trop unremarkable and second is pending for the trend.  Her proBNP is now about 37K which is up from her baseline of 650 and with the RBBB could think PTE but is on full dose eliquis.  CT was negative for PTE but she has bilateral hilar masses with right greater than left with multilobar pneumonia.  She does meet sepsis criteria with WBC, tachycardia and JAMI (creat usually 0.9 and now 1.4) but lactic acid is normal.      Patient states that she was treated for lung cancer [id number] with XRT and chemo and though her FOBT with lymph node and endobronchial biopsy not diagnostic for malignancy in 2023, she was eventually diagnosed with lung cancer and follow with Dr. Kraft for chemo every six weeks.  COVID is negative and MRSA pcr nares swab pending.      Remainder of ROS as below.   See assessment and plan below for problem based evaluation

## 2024-08-08 NOTE — ED NOTES
Pt resting in bed eating breakfast. Denies CP/SOB. Sacral wound cleansed. Cream applied with meplex. Wound care consult completed

## 2024-08-08 NOTE — PROGRESS NOTES
Pharmacist Renal Dose Adjustment Note    Mayra Kelly is a 73 y.o. female being treated with the medication enoxaparin 40mg daily    Patient Data:    Vital Signs (Most Recent):  Temp: 98.5 °F (36.9 °C) (08/08/24 0758)  Pulse: 81 (08/08/24 0800)  Resp: (!) 21 (08/08/24 0800)  BP: (!) 90/58 (08/08/24 0758)  SpO2: (!) 87 % (08/08/24 0800) Vital Signs (72h Range):  Temp:  [97.6 °F (36.4 °C)-98.5 °F (36.9 °C)]   Pulse:  []   Resp:  [8-24]   BP: ()/(52-67)   SpO2:  [66 %-97 %]      Recent Labs   Lab 08/07/24 2156   CREATININE 1.44*     Serum creatinine: 1.44 mg/dL (H) 08/07/24 2156  Estimated creatinine clearance: 26.5 mL/min (A)    Medication: Enoxaparin dose: 40mg  frequency q24h will be changed to medication:enoxaparin dose:30mg frequency:q24hr    Pharmacist's Name: Basia Colbert  Pharmacist's Extension: [phone]

## 2024-08-09 PROBLEM — E43 SEVERE PROTEIN-CALORIE MALNUTRITION: Status: ACTIVE | Noted: 2024-08-09

## 2024-08-09 LAB
25(OH)D3 SERPL-MCNC: 33 NG/ML
ANION GAP SERPL CALCULATED.3IONS-SCNC: 8 MMOL/L (ref 7–16)
BASOPHILS # BLD AUTO: 0.01 K/UL (ref 0–0.2)
BASOPHILS NFR BLD AUTO: 0.1 % (ref 0–1)
BUN SERPL-MCNC: 18 MG/DL (ref 7–18)
BUN/CREAT SERPL: 20 (ref 6–20)
CALCIUM SERPL-MCNC: 8.1 MG/DL (ref 8.5–10.1)
CHLORIDE SERPL-SCNC: 104 MMOL/L (ref 98–107)
CO2 SERPL-SCNC: 31 MMOL/L (ref 21–32)
CREAT SERPL-MCNC: 0.9 MG/DL (ref 0.55–1.02)
CRP SERPL-MCNC: 25.4 MG/DL (ref 0–0.8)
DIFFERENTIAL METHOD BLD: ABNORMAL
EGFR (NO RACE VARIABLE) (RUSH/TITUS): 68 ML/MIN/1.73M2
EOSINOPHIL # BLD AUTO: 0 K/UL (ref 0–0.5)
EOSINOPHIL NFR BLD AUTO: 0 % (ref 1–4)
ERYTHROCYTE [DISTWIDTH] IN BLOOD BY AUTOMATED COUNT: 17.2 % (ref 11.5–14.5)
FOLATE SERPL-MCNC: 3.5 NG/ML (ref 3.1–17.5)
GLUCOSE SERPL-MCNC: 96 MG/DL (ref 74–106)
HCT VFR BLD AUTO: 30.6 % (ref 38–47)
HGB BLD-MCNC: 9.3 G/DL (ref 12–16)
IMM GRANULOCYTES # BLD AUTO: 0.03 K/UL (ref 0–0.04)
IMM GRANULOCYTES NFR BLD: 0.4 % (ref 0–0.4)
LYMPHOCYTES # BLD AUTO: 0.35 K/UL (ref 1–4.8)
LYMPHOCYTES NFR BLD AUTO: 4.5 % (ref 27–41)
MCH RBC QN AUTO: 26.5 PG (ref 27–31)
MCHC RBC AUTO-ENTMCNC: 30.4 G/DL (ref 32–36)
MCV RBC AUTO: 87.2 FL (ref 80–96)
METHICILLIN RESISTANT STAPHYLOCOCCUS AUREUS: NEGATIVE
MONOCYTES # BLD AUTO: 0.15 K/UL (ref 0–0.8)
MONOCYTES NFR BLD AUTO: 1.9 % (ref 2–6)
MPC BLD CALC-MCNC: 10.6 FL (ref 9.4–12.4)
NEUTROPHILS # BLD AUTO: 7.29 K/UL (ref 1.8–7.7)
NEUTROPHILS NFR BLD AUTO: 93.1 % (ref 53–65)
NRBC # BLD AUTO: 0 X10E3/UL
NRBC, AUTO (.00): 0 %
PLATELET # BLD AUTO: 296 K/UL (ref 150–400)
POTASSIUM SERPL-SCNC: 3.4 MMOL/L (ref 3.5–5.1)
PREALB SERPL NEPH-MCNC: <3 MG/DL (ref 20–40)
RBC # BLD AUTO: 3.51 M/UL (ref 4.2–5.4)
SODIUM SERPL-SCNC: 140 MMOL/L (ref 136–145)
VIT B12 SERPL-MCNC: 426 PG/ML (ref 193–986)
WBC # BLD AUTO: 7.83 K/UL (ref 4.5–11)

## 2024-08-09 PROCEDURE — 27000221 HC OXYGEN, UP TO 24 HOURS

## 2024-08-09 PROCEDURE — 80048 BASIC METABOLIC PNL TOTAL CA: CPT | Performed by: HOSPITALIST

## 2024-08-09 PROCEDURE — 86140 C-REACTIVE PROTEIN: CPT | Performed by: HOSPITALIST

## 2024-08-09 PROCEDURE — 25000242 PHARM REV CODE 250 ALT 637 W/ HCPCS: Performed by: HOSPITALIST

## 2024-08-09 PROCEDURE — 63600175 PHARM REV CODE 636 W HCPCS: Performed by: INTERNAL MEDICINE

## 2024-08-09 PROCEDURE — 99233 SBSQ HOSP IP/OBS HIGH 50: CPT | Mod: ,,, | Performed by: HOSPITALIST

## 2024-08-09 PROCEDURE — 94761 N-INVAS EAR/PLS OXIMETRY MLT: CPT

## 2024-08-09 PROCEDURE — 63600175 PHARM REV CODE 636 W HCPCS: Performed by: HOSPITALIST

## 2024-08-09 PROCEDURE — 82746 ASSAY OF FOLIC ACID SERUM: CPT | Performed by: HOSPITALIST

## 2024-08-09 PROCEDURE — 94640 AIRWAY INHALATION TREATMENT: CPT

## 2024-08-09 PROCEDURE — 99900035 HC TECH TIME PER 15 MIN (STAT)

## 2024-08-09 PROCEDURE — 25000003 PHARM REV CODE 250: Performed by: HOSPITALIST

## 2024-08-09 PROCEDURE — 84134 ASSAY OF PREALBUMIN: CPT | Performed by: HOSPITALIST

## 2024-08-09 PROCEDURE — 82607 VITAMIN B-12: CPT | Performed by: HOSPITALIST

## 2024-08-09 PROCEDURE — 82306 VITAMIN D 25 HYDROXY: CPT | Performed by: HOSPITALIST

## 2024-08-09 PROCEDURE — 85025 COMPLETE CBC W/AUTO DIFF WBC: CPT | Performed by: HOSPITALIST

## 2024-08-09 PROCEDURE — 36415 COLL VENOUS BLD VENIPUNCTURE: CPT | Performed by: HOSPITALIST

## 2024-08-09 PROCEDURE — 11000001 HC ACUTE MED/SURG PRIVATE ROOM

## 2024-08-09 PROCEDURE — 99232 SBSQ HOSP IP/OBS MODERATE 35: CPT | Mod: ,,, | Performed by: INTERNAL MEDICINE

## 2024-08-09 RX ADMIN — METHYLPREDNISOLONE SODIUM SUCCINATE 60 MG: 40 INJECTION, POWDER, FOR SOLUTION INTRAMUSCULAR; INTRAVENOUS at 01:08

## 2024-08-09 RX ADMIN — IPRATROPIUM BROMIDE AND ALBUTEROL SULFATE 3 ML: .5; 3 SOLUTION RESPIRATORY (INHALATION) at 04:08

## 2024-08-09 RX ADMIN — ENOXAPARIN SODIUM 30 MG: 30 INJECTION SUBCUTANEOUS at 04:08

## 2024-08-09 RX ADMIN — METHYLPREDNISOLONE SODIUM SUCCINATE 60 MG: 40 INJECTION, POWDER, FOR SOLUTION INTRAMUSCULAR; INTRAVENOUS at 05:08

## 2024-08-09 RX ADMIN — IPRATROPIUM BROMIDE AND ALBUTEROL SULFATE 3 ML: .5; 3 SOLUTION RESPIRATORY (INHALATION) at 08:08

## 2024-08-09 RX ADMIN — METHYLPREDNISOLONE SODIUM SUCCINATE 60 MG: 40 INJECTION, POWDER, FOR SOLUTION INTRAMUSCULAR; INTRAVENOUS at 04:08

## 2024-08-09 RX ADMIN — IPRATROPIUM BROMIDE AND ALBUTEROL SULFATE 3 ML: .5; 3 SOLUTION RESPIRATORY (INHALATION) at 01:08

## 2024-08-09 RX ADMIN — DEXTROSE MONOHYDRATE 1000 MG: 50 INJECTION, SOLUTION INTRAVENOUS at 01:08

## 2024-08-09 RX ADMIN — PIPERACILLIN SODIUM AND TAZOBACTAM SODIUM 4.5 G: 4; .5 INJECTION, POWDER, LYOPHILIZED, FOR SOLUTION INTRAVENOUS at 05:08

## 2024-08-09 RX ADMIN — POTASSIUM CHLORIDE 20 MEQ: 1500 TABLET, EXTENDED RELEASE ORAL at 08:08

## 2024-08-09 RX ADMIN — IPRATROPIUM BROMIDE AND ALBUTEROL SULFATE 3 ML: .5; 3 SOLUTION RESPIRATORY (INHALATION) at 07:08

## 2024-08-09 RX ADMIN — PIPERACILLIN SODIUM AND TAZOBACTAM SODIUM 4.5 G: 4; .5 INJECTION, POWDER, LYOPHILIZED, FOR SOLUTION INTRAVENOUS at 08:08

## 2024-08-09 RX ADMIN — POTASSIUM CHLORIDE 20 MEQ: 1500 TABLET, EXTENDED RELEASE ORAL at 09:08

## 2024-08-09 NOTE — ASSESSMENT & PLAN NOTE
Noted Dr. Barraza's note on treatment the issue here is I would treat her with antibiotics and whether or not she would tolerate more aggressive therapy for her obstruction.  This disease is clinically progressed.  Treat her COPD pneumonia over the weekend will get Dr. Munguia by look add again for possible intervention

## 2024-08-09 NOTE — HOSPITAL COURSE
Hx treated adenoCa in 2018. Last Nov underwent evaluation by Dr Munguia. Seen then again also by Dr Kraft. Dx new primary squamous cell CA. Treated chemotx. In April saw Dr Kraft and started on Keytruda and given through infusion center. Has continued this through July. Missed her appt with Dr Kraft in May. This information mainly from Dr Kraft in pt was not good historian. Pt continued to smoke 1/2 ppd; encourage to stop. Dr Whitmore and Dr Kraft ask to consult. Requires 50% ventimask to keep O2 sats in low 90s. At home been prior on 2L BNC O2. PHMx: also Eliquis for PAF. Now NSR.     Code status discussed this admission and pt changed her mind about DNR, Discussed with nurse in room and pt now wishes to be DNI.    Plan for EBUS on Wednesday 8/14 with disposition to be determined.

## 2024-08-09 NOTE — PROGRESS NOTES
08/09/24 1211        Wound 08/08/24 0230 Pressure Injury Right Coccyx   Date First Assessed/Time First Assessed: 08/08/24 0230   Present on Original Admission: Yes  Primary Wound Type: Pressure Injury  Side: Right  Location: Coccyx   Wound Image Images linked   Pressure Injury Stage 2   Dressing Appearance Dry;Intact;Clean   Drainage Amount None   Appearance Intact;Pink   Black (%), Wound Tissue Color 0 %   Red (%), Wound Tissue Color 0 %   Yellow (%), Wound Tissue Color 0 %   Periwound Area Intact   Wound Edges Defined   Wound Length (cm) 4.8 cm   Wound Width (cm) 2.2 cm   Wound Depth (cm) 0.1 cm   Wound Volume (cm^3) 1.056 cm^3   Wound Surface Area (cm^2) 10.56 cm^2   Care Cleansed with:;Antimicrobial agent   Dressing Applied;Silicone;Foam;Island/border  (Thin layer of protective cream applied)   Periwound Care Moisture barrier applied      Wound Care Recommendations:    Clean with Vashe  Apply a thin layer of protective cream  Apply mepilex sacral border  Change every 3 days  Peel back border each shift, assess, then reapply

## 2024-08-09 NOTE — PLAN OF CARE
SS consulted for medicaid eval. SS contacted [employer] in [employer] to request medicaid packet be delivered to pt, ss left vm. SS following

## 2024-08-09 NOTE — PROGRESS NOTES
[facility]  Hematology/Oncology  Progress Note    Patient Name: Mayra Kelly  Admission Date: 8/7/2024  Hospital Length of Stay: 1 days  Code Status: No Order     Subjective:     Interval History: patient seen an examine this morning. Known to me with left sided pulmonary adenocarcinoma 2017 with a new right side squamous cell primary in late 2023.  she received a few cycles of weekly chemotherapy around February or March in anticipation of radiation but radiation was not performed. Sidenote she received radiation chemotherapy as well as immunotherapy 2018.    she was last seen by me in April and the office and had a no-show from May. Shes broken both of her hips and 22 and 2023 and says she cannot get out of bed without assistance. She endorses pressure ulcers over the sacrum at this time. She is very frail and appearance. she has continued to receive immunotherapy with Keytruda IVQ6 weeks beginning in April with last dose in early July.    she has home oxygen, but states that she does not use this often. She has continued to smoke up until this present mission. Her chief complaint was primarily Malays. Unknown if she had fever although she did report sputum changing from clear to Green. She is currently being treated for pneumonia. hemoptysis denied. CT scan this admission shows some increase in bilateral hilar findings although the radiology report gives no dimensions.    exam shows decreased breath sounds consistent with advanced COPD with no wheezes at this time. She is on simple face mask, but has improved and hopefully can transition to nasal cannula later today. Will need to consider swing bad placement. She does have one son that lives with her who assist some with her care. She reports she does not have transportation at this time. Im doubtful that we will be able to pursue much more meaningful cancer therapy.    Oncology Treatment Plan:   [No matching plan  found]    Medications:  Continuous Infusions:  Scheduled Meds:   albuterol-ipratropium  3 mL Nebulization QID    buPROPion  100 mg Oral BID    enoxparin  30 mg Subcutaneous Q24H (prophylaxis, 1700)    methylPREDNISolone injection (PEDS and ADULTS)  60 mg Intravenous Q6H    nicotine  1 patch Transdermal Daily    piperacillin-tazobactam (Zosyn) IV (PEDS and ADULTS) (extended infusion is not appropriate)  4.5 g Intravenous Q8H    potassium chloride SA  20 mEq Oral BID    vancomycin (VANCOCIN) IV (PEDS and ADULTS)  1,000 mg Intravenous Q36H     PRN Meds:    Current Facility-Administered Medications:     acetaminophen, 1,000 mg, Oral, Q6H PRN    albuterol sulfate, 2.5 mg, Nebulization, Q4H PRN    bisacodyL, 10 mg, Oral, Daily PRN    dextromethorphan-guaiFENesin  mg/5 ml, 10 mL, Oral, Q6H PRN    melatonin, 6 mg, Oral, Nightly PRN    ondansetron, 8 mg, Intravenous, Q6H PRN    simethicone, 1 tablet, Oral, TID PRN    traZODone, 50 mg, Oral, Nightly PRN    vancomycin - pharmacy to dose, , Intravenous, pharmacy to manage frequency       Review of Systems  Objective:     Vital Signs (Most Recent):  Temp: 98.2 °F (36.8 °C) (08/09/24 0729)  Pulse: 61 (08/09/24 0729)  Resp: 18 (08/09/24 0729)  BP: (!) 114/53 (08/09/24 0729)  SpO2: 97 % (08/09/24 0729)   Vital Signs (24h Range):  Temp:  [97.4 °F (36.3 °C)-98.2 °F (36.8 °C)] 98.2 °F (36.8 °C)  Pulse:  [] 61  Resp:  [9-29] 18  SpO2:  [56 %-100 %] 97 %  BP: ()/(41-55) 114/53       Weight: 47.6 kg (105 lb)  Body mass index is 16.95 kg/m².  Body surface area is 1.49 meters squared.      Intake/Output Summary (Last 24 hours) at 8/9/2024 0850  Last data filed at 8/9/2024 0329  Gross per 24 hour   Intake 292.14 ml   Output --   Net 292.14 ml       Physical Exam    Significant Labs:   BMP:   Recent Labs   Lab 08/07/24 2156 08/09/24  0440   * 96    140   K 3.4* 3.4*    104   CO2 29 31   BUN 25* 18   CREATININE 1.44* 0.90   CALCIUM 8.4*  8.1*     Diagnostic Results:  CT: lung cancer    Assessment/Plan:     Active Diagnoses:    Diagnosis Date Noted POA    PRINCIPAL PROBLEM:  Obstructive pneumonia [J18.9] 08/08/2024 Yes    Acute on chronic respiratory failure with hypoxia and hypercapnia [J96.21, J96.22] 08/08/2024 Yes    JAMI (acute kidney injury) [N17.9] 08/08/2024 Yes    Pulmonary hypertension [I27.20] 08/08/2024 Yes    Paroxysmal atrial fibrillation [I48.0] 08/08/2024 Yes    Lung cancer [C34.90]  Yes    COPD (chronic obstructive pulmonary disease) [J44.9]  Yes    Right bundle branch block [I45.10] 12/11/2023 Yes    Cigarette nicotine dependence with nicotine-induced disorder [F17.219] 11/09/2023 Yes      Problems Resolved During this Admission:       Thank you for your consult. I will follow-up with patient. Please contact us if you have any additional questions.     Mina Kraft MD  Hematology/Oncology  [facility]

## 2024-08-09 NOTE — ASSESSMENT & PLAN NOTE
Follows with Dr. Kraft.  Has chemo every six weeks.  Mediport looks good without tenderness or erythema.  She says she never had it taken out when she finished chemo the first time in 2019.  It has always worked fine.      08/08 Talked with Dr Kraft. Treated 2018 for adenoCa and earlier this year for squamous cell ca both of lung. Last Nov underwent evaluation by Dr Munguia. Seen then again also by Dr Kraft. Dx new primary squamous cell CA. Treated chemotx. In April saw Dr Kraft and started on Keytruda and given through infusion center. Has continued this through July. Missed her appt with Dr Kraft in May

## 2024-08-09 NOTE — PROGRESS NOTES
[facility]  Critical Care Medicine  Progress Note    Patient Name: Mayra Kelly  MRN: [id number]  Admission Date: 8/7/2024  Hospital Length of Stay: 1 days  Code Status: No Order  Attending Provider: Mark Barraza MD  Primary Care Provider: Darlene Campoverde NP   Principal Problem: Obstructive pneumonia    Subjective:     HPI:  Seventy 73-year-old white female who presents with shortness of breath and respiratory distress her story starts in 2017 when she was diagnosed with left-sided cancer sounds like she got radiation to the chest and to chemotherapy and she says she got brain radiation at that time I do not know the cell type.  She was seen by  last year and had a nondiagnostic EBUS on the right but had no endobronchial lesions she followed up with a needle biopsy with Dr. Kraft and he tells me that she was diagnosed with squamous cell carcinoma and they discussed in January and February treatment with radiation chemotherapy she apparently did not keep her follow-up and presents now with more shortness of breath productive sputum she has a history of bad COPD in chronic oxygen and acute on top of chronic respiratory failure during workup she was found to what looks like a right upper lobe obstruction with postobstructive pneumonia    Hospital/ICU Course:  No notes on file    Interval History/Significant Events:  Patient without complaints breathing better    Review of Systems  Objective:     Vital Signs (Most Recent):  Temp: 97.6 °F (36.4 °C) (08/08/24 2034)  Pulse: 75 (08/09/24 0432)  Resp: 18 (08/09/24 0432)  BP: (!) 89/44 (08/09/24 0432)  SpO2: 95 % (08/09/24 0432) Vital Signs (24h Range):  Temp:  [97.4 °F (36.3 °C)-98.5 °F (36.9 °C)] 97.6 °F (36.4 °C)  Pulse:  [] 75  Resp:  [8-29] 18  SpO2:  [56 %-100 %] 95 %  BP: ()/(41-67) 89/44   Weight: 47.6 kg (105 lb)  Body mass index is 16.95 kg/m².      Intake/Output Summary (Last 24 hours) at 8/9/2024 0534  Last data  filed at 8/9/2024 0329  Gross per 24 hour   Intake 292.14 ml   Output --   Net 292.14 ml          Physical Exam  Vitals reviewed.   Constitutional:       Appearance: Normal appearance.      Interventions: She is not intubated.  HENT:      Head: Normocephalic and atraumatic.      Nose: Nose normal.      Mouth/Throat:      Mouth: Mucous membranes are dry.      Pharynx: Oropharynx is clear.   Eyes:      Extraocular Movements: Extraocular movements intact.      Conjunctiva/sclera: Conjunctivae normal.      Pupils: Pupils are equal, round, and reactive to light.   Cardiovascular:      Rate and Rhythm: Normal rate.      Heart sounds: Normal heart sounds. No murmur heard.  Pulmonary:      Effort: Pulmonary effort is normal. She is not intubated.      Breath sounds: Normal breath sounds.   Abdominal:      General: Abdomen is flat. Bowel sounds are normal.      Palpations: Abdomen is soft.   Musculoskeletal:         General: Normal range of motion.      Cervical back: Normal range of motion and neck supple.      Right lower leg: No edema.      Left lower leg: No edema.   Skin:     General: Skin is warm and dry.      Capillary Refill: Capillary refill takes less than 2 seconds.   Neurological:      General: No focal deficit present.      Mental Status: She is alert and oriented to person, place, and time.   Psychiatric:         Mood and Affect: Mood normal.         Behavior: Behavior normal.            Vents:  Oxygen Concentration (%): 40 (08/08/24 2013)  Lines/Drains/Airways       Peripheral Intravenous Line  Duration                  Peripheral IV - Single Lumen 08/07/24 18 G Left Antecubital 2 days         Peripheral IV - Single Lumen 08/07/24 2255 20 G Anterior;Proximal;Right Forearm 1 day                  Significant Labs:    CBC/Anemia Profile:  Recent Labs   Lab 08/07/24  2148 08/07/24  2156 08/07/24  2356   WBC  --  13.15*  --    HGB  --  10.4*  --    HCT 37 35.5* 32*   PLT  --  361  --    MCV  --  89.9  --    RDW  --   17.2*  --         Chemistries:  Recent Labs   Lab 08/07/24  2156      K 3.4*      CO2 29   BUN 25*   CREATININE 1.44*   CALCIUM 8.4*   ALBUMIN 2.3*   PROT 6.8   BILITOT 0.7   ALKPHOS 87   ALT 11*   AST 17       Recent Lab Results         08/08/24  0731   08/08/24  0639   08/08/24  0600   08/08/24  0556        A4C EF 81             Ao root annulus 2.40             Ao peak leonidas 2.90             Ao VTI 50.30             AV valve area 1.60             NATHALIE by Velocity Ratio 1.19             AORTIC VALVE CUSP SEPERATION 1.34             AV mean gradient 16             AV index (prosthetic) 0.46             AV peak gradient 34             AV Velocity Ratio 0.34             BSA 1.49             Left Ventricle Relative Wall Thickness 0.63             E/A ratio 0.74             E/E' ratio 13.38             Estimated Avg Glucose   111           E wave deceleration time 184.73             FS 39             Hemoglobin A1C External   5.5  Comment:   Normal:               <5.7%  Pre-Diabetic:       5.7% to 6.4%  Diabetic:             >6.4%  Diabetic Goal:     <7%           IVC diameter 2.19             IVSd 1.06             LA area A4C 8.65             LA size 3.46             LVOT area 3.5             LV LATERAL E/E' RATIO 10.88             LV SEPTAL E/E' RATIO 17.40             LV EDV BP 52.60             LV Diastolic Volume Index 34.61             Left Ventricular End Diastolic Volume by Teichholz Method 52.60             LV EDV A4C 39.11             Left Ventricular End Systolic Volume by Teichholz Method 15.69             LV ESV A4C 16.20             LVIDd 3.55             LVIDs 2.17             LV mass 119.09             LV Mass Index 78             Left Ventricular Outflow Tract Mean Gradient 2.24             Left Ventricular Outflow Tract Mean Velocity 0.72             LVOT diameter 2.1             LVOT peak leonidas 1.00             LVOT stroke volume 80.66             LVOT peak VTI 23.30             LV ESV BP  15.69             LV Systolic Volume Index 10.3             Mean e' 0.07             Mr max jorge 3.71             MV valve area p 1/2 method 5.74             MV valve area by continuity eq 5.11             MV mean gradient 1             MV peak gradient 1             MV Peak A Jorge 1.17             MV Peak E Jorge 0.87             MV stenosis pressure 1/2 time 38.33             MV VTI 15.8             Feng's Biplane MOD Ejection Fraction 65             POC Base Excess     7.2         POC Glucose       104       POC HCO3     34.5         POC PCO2     61         POC PH     7.36         POC PO2     60         POC SATURATED O2     90         Prealbumin   <3           PV peak gradient 3             PV PEAK VELOCITY 0.85             Posterior Wall 1.11             RA Major Axis 4.99             Est. RA pres 15             RV mid diameter 3.82             RV TB RVSP 19             RV- spencer basal diam 5.0             RV-spencer length 5.5             RV-spencer mid d 3.8             Right ventricular length in diastole (apical 4-chamber view) 5.47             TDI SEPTAL 0.05             TDI LATERAL 0.08             Triscuspid Valve Regurgitation Peak Gradient 50             TR Max Jorge 3.55             Troponin I High Sensitivity   25.1           TSH   0.647           TV resting pulmonary artery pressure 65             ZLVIDD -2.27             ZLVIDS -1.91                     Significant Imaging:  I have reviewed all pertinent imaging results/findings within the past 24 hours.    ABG  Recent Labs   Lab 08/08/24  0600   PH 7.36   PO2 60*   PCO2 61*   HCO3 34.5*     Assessment/Plan:     Pulmonary  * Obstructive pneumonia  Noted Dr. Barraza's note on treatment the issue here is I would treat her with antibiotics and whether or not she would tolerate more aggressive therapy for her obstruction.  This disease is clinically progressed.  Treat her COPD pneumonia over the weekend will get Dr. Munguia by look add again for possible  intervention    COPD (chronic obstructive pulmonary disease)  Patient is clinically better wean O2 as tolerated echo noted.  No evidence of clot does have right-sided heart disease and pulmonary hypertension secondary to COPD.    Oncology  Lung cancer  I do not have the complete records will get Dr. Kraft to come see and make recommendations about further therapy if needed    See Dr. Barraza's note         Jose Carlos Whitmore MD  Critical Care Medicine  [facility]

## 2024-08-09 NOTE — PLAN OF CARE
Problem: Adult Inpatient Plan of Care  Goal: Plan of Care Review  Outcome: Progressing  Goal: Patient-Specific Goal (Individualized)  Outcome: Progressing  Goal: Absence of Hospital-Acquired Illness or Injury  Outcome: Progressing  Goal: Optimal Comfort and Wellbeing  Outcome: Progressing  Goal: Readiness for Transition of Care  Outcome: Progressing     Problem: Skin Injury Risk Increased  Goal: Skin Health and Integrity  Outcome: Progressing     Problem: Breathing Pattern Ineffective  Goal: Effective Breathing Pattern  Outcome: Progressing     Problem: Gas Exchange Impaired  Goal: Optimal Gas Exchange  Outcome: Progressing     Problem: Acute Kidney Injury/Impairment  Goal: Fluid and Electrolyte Balance  Outcome: Progressing  Goal: Improved Oral Intake  Outcome: Progressing  Goal: Effective Renal Function  Outcome: Progressing     Problem: Pneumonia  Goal: Fluid Balance  Outcome: Progressing  Goal: Resolution of Infection Signs and Symptoms  Outcome: Progressing  Goal: Effective Oxygenation and Ventilation  Outcome: Progressing     Problem: Wound  Goal: Optimal Coping  Outcome: Progressing  Goal: Optimal Functional Ability  Outcome: Progressing  Goal: Absence of Infection Signs and Symptoms  Outcome: Progressing  Goal: Improved Oral Intake  Outcome: Progressing  Goal: Optimal Pain Control and Function  Outcome: Progressing  Goal: Skin Health and Integrity  Outcome: Progressing  Goal: Optimal Wound Healing  Outcome: Progressing

## 2024-08-09 NOTE — PROGRESS NOTES
[facility]  Adult Nutrition  First Assessment Note         Reason for Assessment  Reason For Assessment: identified at risk by screening criteria, low BMI (MST2)   Nutrition Risk Screen: no indicators present    Assessment and Plan  Patient is a 74yo female admitted 8/7 for obstructive pneumonia. She was identified at risk by screening criteria with MST2.     Patient is 47.6kg with a BMI of 16.95 and is severely underweight per geriatric standards. She has a PMH of lung cancer and advanced COPD. Visited with patient this morning and she stated she has been eating well while in the hospital due to the steroid therapy she is receiving, but has not had an appetite at home prior to admission. She is edentulous without her dentures. RD asked if she needed texture modification, patient declined.     Physical exam reveals severe fat and muscle depletion to orbital, upper arm, temple, and clavicle regions.     Per ASPEN guidelines, patient meets criteria for severe protein-calorie malnutrition as evidenced by poor PO intakes <50% for greater than one month and severe fat and muscle depletion to orbital, upper arm, temple, and clavicle regions.     Patient is ordered a cardiac diet. Recommend consider liberalizing to a Regular diet as appropriate. Encourage good PO intakes.     Last BM 8/8 per flowsheet.    Medications/labs reviewed. RD following.      Learning Needs/Social Determinants of Health  Learning Assessment       08/08/2024 1319 [facility] (8/7/2024 - Present)   Created by Barb Abarca, RN - RN (Nurse) Status: Complete                 PRIMARY LEARNER     Primary Learner Name:  Mayra Kelly BN - 08/08/2024 1319    Relationship:  Patient BN - 08/08/2024 1319    Does the primary learner have any barriers to learning?:  No Barriers  - 08/08/2024 1319    What is the preferred language of the primary learner?:  English  - 08/08/2024 1319    Is an   required?:  Yes  - 08/08/2024 1319    How does the primary learner prefer to learn new concepts?:  Listening  - 08/08/2024 1319    How often do you need to have someone help you read instructions, pamphlets, or written material from your doctor or pharmacy?:  Never  - 08/08/2024 1319        CO-LEARNER #1     No question answered        CO-LEARNER #2     No question answered        SPECIAL TOPICS     No question answered        ANSWERED BY:     No question answered        Comments         Edit History       Barb Abarca, RN - RN (Nurse)   08/08/2024 1319                           Social Determinants of Health     Tobacco Use: High Risk (8/8/2024)    Patient History     Smoking Tobacco Use: Every Day     Smokeless Tobacco Use: Never     Passive Exposure: Not on file   Alcohol Use: Not At Risk (8/8/2024)    AUDIT-C     Frequency of Alcohol Consumption: Never     Average Number of Drinks: Patient does not drink     Frequency of Binge Drinking: Never   Financial Resource Strain: Low Risk  (8/8/2024)    Overall Financial Resource Strain (CARDIA)     Difficulty of Paying Living Expenses: Not hard at all   Food Insecurity: No Food Insecurity (8/8/2024)    Hunger Vital Sign     Worried About Running Out of Food in the Last Year: Never true     Ran Out of Food in the Last Year: Never true   Transportation Needs: No Transportation Needs (8/8/2024)    TRANSPORTATION NEEDS     Transportation : No   Physical Activity: Inactive (8/8/2024)    Exercise Vital Sign     Days of Exercise per Week: 0 days     Minutes of Exercise per Session: 0 min   Stress: No Stress Concern Present (8/8/2024)    [employer] - Occupational Stress Questionnaire     Feeling of Stress : Not at all   Housing Stability: Low Risk  (8/8/2024)    Housing Stability Vital Sign     Unable to Pay for Housing in the Last Year: No     Homeless in the Last Year: No   Depression: Low Risk  (12/4/2023)    Depression     Last PHQ-4:  Flowsheet Data: 0   Utilities: Not At Risk (8/8/2024)    [facility] Utilities     Threatened with loss of utilities: No   Health Literacy: Adequate Health Literacy (8/8/2024)     Health Literacy     Frequency of need for help with medical instructions: Rarely   Social Isolation: Socially Integrated (8/8/2024)    Social Isolation     Social Isolation: 1            Malnutrition  Is Patient Malnourished: Yes Malnutrition Assessment  Malnutrition Context: chronic illness  Malnutrition Level: severe  Teeth (Micronutrient): edentulous       Energy Intake (Malnutrition): less than or equal to 50% for greater than or equal to 1 month  Subcutaneous Fat (Malnutrition): severe depletion  Muscle Mass (Malnutrition): severe depletion   Orbital Region (Subcutaneous Fat Loss): severe depletion  Upper Arm Region (Subcutaneous Fat Loss): severe depletion   Denominational Region (Muscle Loss): severe depletion  Clavicle Bone Region (Muscle Loss): severe depletion                 Nutrition Diagnosis  Malnutrition (Severe) related to Appetite loss, Catabolic illness, Chronic illness, and Inadequate Caloric intake as evidenced by poor PO intakes <50% for greater than one month and severe fat and muscle depletion to orbital, upper arm, temple, and clavicle regions.   Comments: consider liberalizing to Regular diet    Recent Labs   Lab 08/08/24  0556 08/09/24  0440   GLU  --  96   POCGLU 104  --        Nutrition Prescription / Recommendations  Recommendation/Intervention: Recommend consider liberalizing diet to a Regular diet as appropriate. Encourage good PO intakes.  Goals: Weight maintenance during admission, intake 50-75% of meals during admission  Nutrition Goal Status: new  Current Diet Order: Cardiac  Chewing or Swallowing Difficulty?: Edentulous : no dentures present  Recommended Diet: Regular  Recommended Oral Supplement: No Oral Supplements  Is Nutrition Support Recommended: [facility] Support: No  Is Nutrition Education  Recommended: No    Monitor and Evaluation  % current Intake: P.O. intake of 50 - 75 %  % intake to meet estimated needs: 50 - 75 %  Food and Nutrient Intake: food and beverage intake  Food and Nutrient Adminstration: diet order  Anthropometric Measurements: weight, weight change  Biochemical Data, Medical Tests and Procedures: gastrointestinal profile, electrolyte and renal panel, glucose/endocrine profile, inflammatory profile, lipid profile       Current Medical Diagnosis and Past Medical History     Past Medical History:   Diagnosis Date    Chronic respiratory failure with hypoxia and hypercapnia     on home oxygen but hasn't been using it    Cigarette nicotine dependence with nicotine-induced disorder 11/09/2023    COPD (chronic obstructive pulmonary disease)     Dyshidrotic eczema 08/10/2021    Right bundle branch block 12/11/2023    Scabies     Squamous cell carcinoma of lung     follows with Dr. Kraft       Nutrition/Diet History       Lab/Procedures/Meds  Recent Labs   Lab 08/07/24  2156 08/09/24  0440    140   K 3.4* 3.4*   BUN 25* 18   CREATININE 1.44* 0.90   CALCIUM 8.4* 8.1*   ALBUMIN 2.3*  --     104   ALT 11*  --    AST 17  --    Note: K+, Ca++ low    Last A1c:   Lab Results   Component Value Date    HGBA1C 5.5 08/08/2024     Lab Results   Component Value Date    RBC 3.51 (L) 08/09/2024    HGB 9.3 (L) 08/09/2024    HCT 30.6 (L) 08/09/2024    MCV 87.2 08/09/2024    MCH 26.5 (L) 08/09/2024    MCHC 30.4 (L) 08/09/2024   Note: H&H low    Pertinent Labs Reviewed: reviewed  Pertinent Medications Reviewed: reviewed  Scheduled Meds:   albuterol-ipratropium  3 mL Nebulization QID    buPROPion  100 mg Oral BID    enoxparin  30 mg Subcutaneous Q24H (prophylaxis, 1700)    methylPREDNISolone injection (PEDS and ADULTS)  60 mg Intravenous Q6H    nicotine  1 patch Transdermal Daily    piperacillin-tazobactam (Zosyn) IV (PEDS and ADULTS) (extended infusion is not appropriate)  4.5 g Intravenous Q8H     "potassium chloride SA  20 mEq Oral BID    vancomycin (VANCOCIN) IV (PEDS and ADULTS)  1,000 mg Intravenous Q36H     Continuous Infusions:  PRN Meds:.  Current Facility-Administered Medications:     acetaminophen, 1,000 mg, Oral, Q6H PRN    albuterol sulfate, 2.5 mg, Nebulization, Q4H PRN    bisacodyL, 10 mg, Oral, Daily PRN    dextromethorphan-guaiFENesin  mg/5 ml, 10 mL, Oral, Q6H PRN    melatonin, 6 mg, Oral, Nightly PRN    ondansetron, 8 mg, Intravenous, Q6H PRN    simethicone, 1 tablet, Oral, TID PRN    traZODone, 50 mg, Oral, Nightly PRN    vancomycin - pharmacy to dose, , Intravenous, pharmacy to manage frequency    Anthropometrics  Temp: 98.2 °F (36.8 °C)  Height Method: Stated  Height: 5' 6" (167.6 cm)  Height (inches): 66 in  Weight Method: Bed Scale  Weight: 47.6 kg (105 lb)  Weight (lb): 105 lb  Ideal Body Weight (IBW), Female: 130 lb  % Ideal Body Weight, Female (lb): 80.77 %  BMI (Calculated): 17       Estimated/Assessed Needs  RMR (Cooke-St. Jeor Equation): 1003.03     Temp: 98.2 °F (36.8 °C)Oral  Weight Used For Calorie Calculations: 47.6 kg (104 lb 15 oz)     Energy Calorie Requirements (kcal): 1666-1904kcal (35-40kcal/kg)  Weight Used For Protein Calculations: 59 kg (130 lb)  Protein Requirements: 71-89g (1.2-1.5g/kg ideal body weight)       RDA Method (mL): 1666       Nutrition by Nursing  Diet/Nutrition Received: regular  Intake (%): 50%  Diet/Feeding Assistance: tray set-up  Diet/Feeding Tolerance: good  Last Bowel Movement: 08/08/24                Nutrition Follow-Up  RD Follow-up?: Yes      Nutrition Discharge Planning: Unsure of discharge plans at this time. Will monitor and assess closer to discharge.          Rosa Maria Laws, MS, RD, LD  Available via Secure Chat  "

## 2024-08-09 NOTE — ASSESSMENT & PLAN NOTE
Dangers of cigarette smoking were reviewed with patient in detail. Patient was Counseled for 3-10 minutes. Nicotine replacement options were discussed. Nicotine replacement was discussed- prescribed    Patient counseled 4 minutes on importance to stopping use of tobacco. Patient was told that stopping smoking was one of the most important actions that could be taken to improve personal health. Discussed tobacco increases risk for poor outcomes in cardiovascular disease, COPD and cancer. Informed stopping smoking reduces risk of premature death by as much as 10 years. Numerous other benefits associated with quitting including helping others by protection from risks associated with secondhand smoke. Told written information with treatment options and help lines will be given at discharge. Patient given opportunity to ask questions. Treated nicoderm 14mg daily

## 2024-08-09 NOTE — NURSING
at the bedside to discuss code status with the patient in great length. Pt verbalizes an understanding of the conversation. Pt verbalizes wanting to be a DNI.

## 2024-08-09 NOTE — ASSESSMENT & PLAN NOTE
Patient is clinically better wean O2 as tolerated echo noted.  No evidence of clot does have right-sided heart disease and pulmonary hypertension secondary to COPD.

## 2024-08-09 NOTE — SUBJECTIVE & OBJECTIVE
Interval History/Significant Events:  Patient without complaints breathing better    Review of Systems  Objective:     Vital Signs (Most Recent):  Temp: 97.6 °F (36.4 °C) (08/08/24 2034)  Pulse: 75 (08/09/24 0432)  Resp: 18 (08/09/24 0432)  BP: (!) 89/44 (08/09/24 0432)  SpO2: 95 % (08/09/24 0432) Vital Signs (24h Range):  Temp:  [97.4 °F (36.3 °C)-98.5 °F (36.9 °C)] 97.6 °F (36.4 °C)  Pulse:  [] 75  Resp:  [8-29] 18  SpO2:  [56 %-100 %] 95 %  BP: ()/(41-67) 89/44   Weight: 47.6 kg (105 lb)  Body mass index is 16.95 kg/m².      Intake/Output Summary (Last 24 hours) at 8/9/2024 0534  Last data filed at 8/9/2024 0329  Gross per 24 hour   Intake 292.14 ml   Output --   Net 292.14 ml          Physical Exam  Vitals reviewed.   Constitutional:       Appearance: Normal appearance.      Interventions: She is not intubated.  HENT:      Head: Normocephalic and atraumatic.      Nose: Nose normal.      Mouth/Throat:      Mouth: Mucous membranes are dry.      Pharynx: Oropharynx is clear.   Eyes:      Extraocular Movements: Extraocular movements intact.      Conjunctiva/sclera: Conjunctivae normal.      Pupils: Pupils are equal, round, and reactive to light.   Cardiovascular:      Rate and Rhythm: Normal rate.      Heart sounds: Normal heart sounds. No murmur heard.  Pulmonary:      Effort: Pulmonary effort is normal. She is not intubated.      Breath sounds: Normal breath sounds.   Abdominal:      General: Abdomen is flat. Bowel sounds are normal.      Palpations: Abdomen is soft.   Musculoskeletal:         General: Normal range of motion.      Cervical back: Normal range of motion and neck supple.      Right lower leg: No edema.      Left lower leg: No edema.   Skin:     General: Skin is warm and dry.      Capillary Refill: Capillary refill takes less than 2 seconds.   Neurological:      General: No focal deficit present.      Mental Status: She is alert and oriented to person, place, and time.   Psychiatric:          Mood and Affect: Mood normal.         Behavior: Behavior normal.            Vents:  Oxygen Concentration (%): 40 (08/08/24 2013)  Lines/Drains/Airways       Peripheral Intravenous Line  Duration                  Peripheral IV - Single Lumen 08/07/24 18 G Left Antecubital 2 days         Peripheral IV - Single Lumen 08/07/24 2255 20 G Anterior;Proximal;Right Forearm 1 day                  Significant Labs:    CBC/Anemia Profile:  Recent Labs   Lab 08/07/24  2148 08/07/24  2156 08/07/24  2356   WBC  --  13.15*  --    HGB  --  10.4*  --    HCT 37 35.5* 32*   PLT  --  361  --    MCV  --  89.9  --    RDW  --  17.2*  --         Chemistries:  Recent Labs   Lab 08/07/24 2156      K 3.4*      CO2 29   BUN 25*   CREATININE 1.44*   CALCIUM 8.4*   ALBUMIN 2.3*   PROT 6.8   BILITOT 0.7   ALKPHOS 87   ALT 11*   AST 17       Recent Lab Results         08/08/24  0731   08/08/24  0639   08/08/24  0600   08/08/24  0556        A4C EF 81             Ao root annulus 2.40             Ao peak leonidas 2.90             Ao VTI 50.30             AV valve area 1.60             NATHALIE by Velocity Ratio 1.19             AORTIC VALVE CUSP SEPERATION 1.34             AV mean gradient 16             AV index (prosthetic) 0.46             AV peak gradient 34             AV Velocity Ratio 0.34             BSA 1.49             Left Ventricle Relative Wall Thickness 0.63             E/A ratio 0.74             E/E' ratio 13.38             Estimated Avg Glucose   111           E wave deceleration time 184.73             FS 39             Hemoglobin A1C External   5.5  Comment:   Normal:               <5.7%  Pre-Diabetic:       5.7% to 6.4%  Diabetic:             >6.4%  Diabetic Goal:     <7%           IVC diameter 2.19             IVSd 1.06             LA area A4C 8.65             LA size 3.46             LVOT area 3.5             LV LATERAL E/E' RATIO 10.88             LV SEPTAL E/E' RATIO 17.40             LV EDV BP 52.60             LV  Diastolic Volume Index 34.61             Left Ventricular End Diastolic Volume by Teichholz Method 52.60             LV EDV A4C 39.11             Left Ventricular End Systolic Volume by Teichholz Method 15.69             LV ESV A4C 16.20             LVIDd 3.55             LVIDs 2.17             LV mass 119.09             LV Mass Index 78             Left Ventricular Outflow Tract Mean Gradient 2.24             Left Ventricular Outflow Tract Mean Velocity 0.72             LVOT diameter 2.1             LVOT peak jorge 1.00             LVOT stroke volume 80.66             LVOT peak VTI 23.30             LV ESV BP 15.69             LV Systolic Volume Index 10.3             Mean e' 0.07             Mr max jorge 3.71             MV valve area p 1/2 method 5.74             MV valve area by continuity eq 5.11             MV mean gradient 1             MV peak gradient 1             MV Peak A Jorge 1.17             MV Peak E Jorge 0.87             MV stenosis pressure 1/2 time 38.33             MV VTI 15.8             Feng's Biplane MOD Ejection Fraction 65             POC Base Excess     7.2         POC Glucose       104       POC HCO3     34.5         POC PCO2     61         POC PH     7.36         POC PO2     60         POC SATURATED O2     90         Prealbumin   <3           PV peak gradient 3             PV PEAK VELOCITY 0.85             Posterior Wall 1.11             RA Major Axis 4.99             Est. RA pres 15             RV mid diameter 3.82             RV TB RVSP 19             RV- spencer basal diam 5.0             RV-spencer length 5.5             RV-spencer mid d 3.8             Right ventricular length in diastole (apical 4-chamber view) 5.47             TDI SEPTAL 0.05             TDI LATERAL 0.08             Triscuspid Valve Regurgitation Peak Gradient 50             TR Max Jorge 3.55             Troponin I High Sensitivity   25.1           TSH   0.647           TV resting pulmonary artery pressure 65             ZLVIDD  -2.27             ZLVIDS -1.91                     Significant Imaging:  I have reviewed all pertinent imaging results/findings within the past 24 hours.

## 2024-08-09 NOTE — PROGRESS NOTES
[facility]  [facility] Medicine  Progress Note    Patient Name: Mayra Kelly  MRN: [id number]  Patient Class: IP- Inpatient   Admission Date: 8/7/2024  Length of Stay: 0 days  Attending Physician: Mark Barraza MD  Primary Care Provider: Darlene Campoverde NP        Subjective:     Principal Problem:Obstructive pneumonia        HPI:  74 yo F presents to [facility] ED with increasing dyspnea.  She has had a cough productive of whitish sputum but no hemoptysis.  She has been worsening over the past three days but no orthopnea or edema.  No chest pain or palpitations.  She does take eliquis for PAF and is currently in sinus tach.  Initial trop unremarkable and second is pending for the trend.  Her proBNP is now about 37K which is up from her baseline of 650 and with the RBBB could think PTE but is on full dose eliquis.  CT was negative for PTE but she has bilateral hilar masses with right greater than left with multilobar pneumonia.  She does meet sepsis criteria with WBC, tachycardia and JAMI (creat usually 0.9 and now 1.4) but lactic acid is normal.      Patient states that she was treated for lung cancer [id number] with XRT and chemo and though her FOBT with lymph node and endobronchial biopsy not diagnostic for malignancy in 2023, she was eventually diagnosed with lung cancer and follow with Dr. Kraft for chemo every six weeks.  COVID is negative and MRSA pcr nares swab pending.      Remainder of ROS as below.   See assessment and plan below for problem based evaluation      Overview/Hospital Course:  08/08 Records reviewed. Presented increase SOB, cough and fatique. Some weight loss. Noted on evaluation worse CXR and CT chest. Hx treated adenoCa in 2018. Last Nov underwent evaluation by Dr Munguia. Seen then again also by Dr Kraft. Dx new primary squamous cell CA. Treated chemotx. In April saw Dr Kraft and started on Keytruda and given through infusion center. Has continued this through July. Missed her  appt with Dr Kraft in May. This information mainly from Dr Kraft in pt was not good historian. Pt continued to smoke 1/2 ppd; encourage to stop. Dr Whitmore and Dr Kraft ask to consult. Requires 50% ventimask to keep O2 sats in low 90s. At home been prior on 2L BNC O2. PHMx: also Eliquis for PAF. Now NSR.     Interval History:       Review of Systems  Objective:     Vital Signs (Most Recent):  Temp: 97.6 °F (36.4 °C) (08/08/24 2034)  Pulse: 96 (08/08/24 2035)  Resp: 18 (08/08/24 2013)  BP: (!) 84/43 (08/08/24 2035)  SpO2: (!) 94 % (08/08/24 2034) Vital Signs (24h Range):  Temp:  [97.4 °F (36.3 °C)-98.5 °F (36.9 °C)] 97.6 °F (36.4 °C)  Pulse:  [] 96  Resp:  [8-29] 18  SpO2:  [56 %-100 %] 94 %  BP: ()/(41-67) 84/43     Weight: 47.6 kg (105 lb)  Body mass index is 16.95 kg/m².    Intake/Output Summary (Last 24 hours) at 8/8/2024 2259  Last data filed at 8/8/2024 1812  Gross per 24 hour   Intake 1550.1 ml   Output --   Net 1550.1 ml         Physical Exam  Vitals reviewed.   Constitutional:       General: She is awake. She is not in acute distress.     Appearance: She is well-developed and underweight. She is ill-appearing.      Comments: Actually looks much better than would suggest by xrays and lab   HENT:      Head: Normocephalic.      Nose: Nose normal.      Mouth/Throat:      Pharynx: Oropharynx is clear.   Eyes:      Extraocular Movements: Extraocular movements intact.      Pupils: Pupils are equal, round, and reactive to light.   Neck:      Thyroid: No thyroid mass.      Vascular: No carotid bruit.   Cardiovascular:      Rate and Rhythm: Normal rate and regular rhythm.      Pulses: Normal pulses.      Heart sounds: Normal heart sounds. No murmur heard.  Pulmonary:      Effort: Pulmonary effort is normal.      Breath sounds: Normal air entry. Rales present. No wheezing.      Comments: Fairly good air exchange bilateral  Abdominal:      General: Bowel sounds are normal. There is no distension.       Palpations: Abdomen is soft.      Tenderness: There is no abdominal tenderness.   Musculoskeletal:         General: Normal range of motion.      Cervical back: Neck supple. No rigidity.   Skin:     General: Skin is warm.      Coloration: Skin is not jaundiced.      Findings: No lesion.   Neurological:      General: No focal deficit present.      Mental Status: She is alert and oriented to person, place, and time.      Cranial Nerves: No cranial nerve deficit.   Psychiatric:         Attention and Perception: Attention normal.         Mood and Affect: Mood normal.         Behavior: Behavior normal. Behavior is cooperative.         Thought Content: Thought content normal.         Cognition and Memory: Cognition normal.             Significant Labs: All pertinent labs within the past 24 hours have been reviewed.  BMP:   Recent Labs   Lab 08/07/24 2156   *      K 3.4*      CO2 29   BUN 25*   CREATININE 1.44*   CALCIUM 8.4*     CBC:   Recent Labs   Lab 08/07/24 2148 08/07/24 2156 08/07/24  2356   WBC  --  13.15*  --    HGB  --  10.4*  --    HCT 37 35.5* 32*   PLT  --  361  --      CMP:   Recent Labs   Lab 08/07/24 2156      K 3.4*      CO2 29   *   BUN 25*   CREATININE 1.44*   CALCIUM 8.4*   PROT 6.8   ALBUMIN 2.3*   BILITOT 0.7   ALKPHOS 87   AST 17   ALT 11*   ANIONGAP 11       Significant Imaging: I have reviewed all pertinent imaging results/findings within the past 24 hours.    Imaging Results              US Lower Extremity Veins Bilateral (Final result)  Result time 08/08/24 06:37:19      Final result by Yvon Shaw MD (08/08/24 06:37:19)                   Impression:      No evidence of deep venous thrombosis in either lower extremity.      Electronically signed by: Yvon Shaw  Date:    08/08/2024  Time:    06:37               Narrative:    EXAMINATION:  US LOWER EXTREMITY VEINS BILATERAL    CLINICAL HISTORY:  elevated DD with dyspnea and lung  cancer;    TECHNIQUE:  Duplex and color flow Doppler and dynamic compression was performed of the bilateral lower extremity veins.  Ultrasound images captured and stored.    COMPARISON:  None    FINDINGS:  Right thigh veins: The common femoral, femoral, popliteal, upper greater saphenous, and deep femoral veins are patent and free of thrombus. The veins are normally compressible and have normal phasic flow and augmentation response.    Right calf veins: The visualized calf veins are patent.    Left thigh veins: The common femoral, femoral, popliteal, upper greater saphenous, and deep femoral veins are patent and free of thrombus. The veins are normally compressible and have normal phasic flow and augmentation response.    Left calf veins: The visualized calf veins are patent.    Miscellaneous: Left popliteal fossa Baker's cyst incidentally noted.                                       CTA Chest Non-Coronary (PE Studies) (Final result)  Result time 08/08/24 06:25:30      Final result by Yvon Shaw MD (08/08/24 06:25:30)                   Impression:      No pulmonary embolus.    The bilateral hilar lesions appear more prominent than the prior cross-sectional imaging 11/17/2023.  There also bilateral opacities.  Findings may reflect progression of disease.  Underlying infectious/inflammatory process a consideration as well.      Electronically signed by: Yvon Shaw  Date:    08/08/2024  Time:    06:25               Narrative:    EXAMINATION:  CTA CHEST NON CORONARY (PE STUDIES)    CLINICAL HISTORY:  Pulmonary embolism (PE) suspected, high prob;    TECHNIQUE:  Low dose axial images, sagittal and coronal reformations were obtained from the thoracic inlet to the lung bases following the IV administration of 65 mL of Isovue 370.  Contrast timing was optimized to evaluate the pulmonary arteries.  MIP images were performed.    The CT examination was performed using one or more of the following dose reduction techniques:  Automated exposure control, adjustment of the mA and kV according to patient's size, use of acute or iterative reconstruction techniques.    COMPARISON:  08/07/2024 chest radiograph    FINDINGS:  No pulmonary embolus detected.    Heart size enlarged mildly.  Coronary and thoracic aortic calcifications.  Right and left hilar lesions are present.  There is opacification and consolidation of the anterior right upper lobe as well as of the right lower lobe.  There is emphysema.  Some motion degrades the exam.  No pneumothorax.  Bronchial thickening seen of the lung bases as well.  Interlobular septal thickening more notably of the lung bases.    No acute abnormality of the upper abdomen.  There is a left adrenal lesion that did not show uptake on prior PET CT.  No fracture detected.  Compression deformities lower thoracic spine.                                       X-Ray Chest AP (Final result)  Result time 08/08/24 06:38:35      Final result by Yvon Shaw MD (08/08/24 06:38:35)                   Impression:      Worsened bilateral opacities.  Progression of disease is a consideration as well as infectious/inflammatory process.      Electronically signed by: Yvon Shaw  Date:    08/08/2024  Time:    06:38               Narrative:    EXAMINATION:  XR CHEST AP PORTABLE    CLINICAL HISTORY:  weak;    TECHNIQUE:  Single frontal view of the chest was performed.    COMPARISON:  11/27/2023    FINDINGS:  Since the prior exam there is now increase in the perihilar opacities.  Development of right upper and lower lung opacities.  No pneumothorax or pleural effusion.                                    Intake/Output - Last 3 Shifts         08/07 0700  08/08 0659 08/08 0700 08/09 0659    P.O.  100    IV Piggyback 1349.1 101.1    Total Intake(mL/kg) 1349.1 (28.3) 201.1 (4.2)    Net +1349.1 +201.1          Urine Occurrence  3 x    Stool Occurrence  3 x          Microbiology Results (last 7 days)       Procedure Component Value  Units Date/Time    Influenza A & B by Molecular [[id number]]  (Normal) Collected: 08/07/24 2302    Order Status: Completed Specimen: Nasopharyngeal Swab Updated: 08/08/24 0109     INFLUENZA A MOLECULAR Negative     INFLUENZA B MOLECULAR  Negative    Blood culture x two cultures. Draw prior to antibiotics. [[id number]] Collected: 08/07/24 2254    Order Status: Sent Specimen: Blood Updated: 08/07/24 2259    Blood culture x two cultures. Draw prior to antibiotics. [[id number]] Collected: 08/07/24 2251    Order Status: Sent Specimen: Blood Updated: 08/07/24 2256              Assessment/Plan:      * Obstructive pneumonia  Patient has a diagnosis of pneumonia. The cause of the pneumonia is suspected to be bacterial in etiology but organism is not known. The pneumonia is stable. The patient has the following signs/symptoms of pneumonia: persistent hypoxia , cough, and shortness of breath. The patient does have a current oxygen requirement and the patient does have a home oxygen requirement. I have reviewed the pertinent imaging. The following cultures have been collected: Blood cultures The culture results are listed below.     Current antimicrobial regimen consists of the antibiotics listed below. Will monitor patient closely and Adjust treatment plan as follows add zithromax for atypicals if indicated.  MRSA nares pending.      Antibiotics (From admission, onward)      Start     Stop Route Frequency Ordered    08/08/24 0800  piperacillin-tazobactam (ZOSYN) 4.5 g in D5W 100 mL IVPB (MB+)         -- IV Every 8 hours (non-standard times) 08/08/24 0321            Microbiology Results (last 7 days)       Procedure Component Value Units Date/Time    Influenza A & B by Molecular [[id number]]  (Normal) Collected: 08/07/24 2302    Order Status: Completed Specimen: Nasopharyngeal Swab Updated: 08/08/24 0109     INFLUENZA A MOLECULAR Negative     INFLUENZA B MOLECULAR  Negative    Blood culture x two cultures. Draw prior to  antibiotics. [[id number]] Collected: 08/07/24 2254    Order Status: Sent Specimen: Blood Updated: 08/07/24 2259    Blood culture x two cultures. Draw prior to antibiotics. [[id number]] Collected: 08/07/24 2251    Order Status: Sent Specimen: Blood Updated: 08/07/24 2256            Paroxysmal atrial fibrillation  Patient with Paroxysmal (<7 days) atrial fibrillation which is controlled currently with Beta Blocker. Patient is currently in sinus rhythm.[id number] Score: 2. HASBLED Score: 2. Anticoagulation indicated. Anticoagulation done with eliquis . Eliquis hold if procedures needed    Pulmonary hypertension    Echo Summary  08/08/24     Show Result Comparison     Left Ventricle: The left ventricle is normal in size. Increased wall thickness. There is concentric remodeling. There is normal systolic function. Biplane (2D) method of discs ejection fraction is 65%.    Right Ventricle: Severe right ventricular enlargement. Systolic function could not be assesed.    Right Atrium: Right atrium is severely dilated.    Aortic Valve: The aortic valve is a trileaflet valve. Moderately calcified cusps. Mildly restricted motion. There is mild stenosis. Aortic valve area by VTI is 1.60 cm². Aortic valve peak velocity is 2.90 m/s. Mean gradient is 16 mmHg. The dimensionless index is 0.46.    Mitral Valve: There is bileaflet sclerosis. Moderately thickened anterior leaflet. There is no stenosis. The mean pressure gradient across the mitral valve is 1 mmHg at a heart rate of  bpm.    Tricuspid Valve: There is moderate regurgitation.    Pulmonary Artery: There is pulmonary hypertension. The estimated pulmonary artery systolic pressure is 65 mmHg.    IVC/SVC: Elevated venous pressure at 15 mmHg.       Right bundle branch block  Echo pending to evaluate for PHTN.  She is on norvasc low dose without h/o HTN and BP soft at this time.  Will hold.  May have previous diagnosis and may be worsening.        JAMI (acute kidney injury)  JAMI is  likely due to pre-renal azotemia due to dehydration. Baseline creatinine is  o.9 . Most recent creatinine and eGFR are listed below.  Recent Labs     08/07/24  2156   CREATININE 1.44*   EGFRNORACEVR 39*      Plan  - JAMI is stable  - Avoid nephrotoxins and renally dose meds for GFR listed above  - Monitor urine output, serial BMP, and adjust therapy as needed  - hold HCT and lasix.  Due to markedly elevated BNP will hold off for now on IVF   - hold all nephrotoxic agents.      COPD (chronic obstructive pulmonary disease)  Patient's COPD is with exacerbation noted by continued dyspnea and worsening of baseline hypoxia currently.  Patient is currently on COPD Pathway. Continue scheduled inhalers Antibiotics and Supplemental oxygen and monitor respiratory status closely.     If scheduled bronchodilators and prn bronchodilators due not improve respiratory status may need to add steroids but would be cautious in due to pneumonia most likely caused by obstruction from bilateral hilar masses  (R>L)    Lung cancer  Follows with Dr. Kraft.  Has chemo every six weeks.  Mediport looks good without tenderness or erythema.  She says she never had it taken out when she finished chemo the first time in 2019.  It has always worked fine.      08/08 Talked with Dr Kraft. Treated 2018 for adenoCa and earlier this year for squamous cell ca both of lung. Last Nov underwent evaluation by Dr Munguia. Seen then again also by Dr Kraft. Dx new primary squamous cell CA. Treated chemotx. In April saw Dr Kraft and started on Keytruda and given through infusion center. Has continued this through July. Missed her appt with Dr Kraft in May    Acute on chronic respiratory failure with hypoxia and hypercapnia  Patient with Hypercapnic and Hypoxic Respiratory failure which is Acute on chronic.  she is on home oxygen at 2 LPM. Supplemental oxygen was provided and noted- Oxygen Concentration (%):  [50] 50    .   Signs/symptoms of respiratory failure include-  wheezing and lethargy. Contributing diagnoses includes - Pneumonia Labs and images were reviewed. Patient Has recent ABG, which has been reviewed. Will treat underlying causes and adjust management of respiratory failure as follows-     Will continue with supplemental oxygen.  May need bipap if continues with CO2 retention.  Monitor respiratory status closely.  Patient has home oxygen but she states that she does not use it but she thinks she may need to use it.  Will check echo for RVSP for PHTN.  She does have RBBB but no PTE.  Venous dopplers pending.      Cigarette nicotine dependence with nicotine-induced disorder  Dangers of cigarette smoking were reviewed with patient in detail. Patient was Counseled for 3-10 minutes. Nicotine replacement options were discussed. Nicotine replacement was discussed- prescribed    Patient counseled 4 minutes on importance to stopping use of tobacco. Patient was told that stopping smoking was one of the most important actions that could be taken to improve personal health. Discussed tobacco increases risk for poor outcomes in cardiovascular disease, COPD and cancer. Informed stopping smoking reduces risk of premature death by as much as 10 years. Numerous other benefits associated with quitting including helping others by protection from risks associated with secondhand smoke. Told written information with treatment options and help lines will be given at discharge. Patient given opportunity to ask questions. Treated nicoderm 14mg daily            VTE Risk Mitigation (From admission, onward)           Ordered     enoxaparin injection 30 mg  Every 24 hours         08/08/24 1008                    Discharge Planning   TERE:      Code Status: Not on file   Is the patient medically ready for discharge?:     Reason for patient still in hospital (select all that apply): Laboratory test, Treatment, Imaging, and Consult recommendations  Discharge Plan A: Home with family                   Mark Barraza MD  Department of Hospital Medicine   [facility]

## 2024-08-09 NOTE — SUBJECTIVE & OBJECTIVE
Interval History:       Review of Systems  Objective:     Vital Signs (Most Recent):  Temp: 97.6 °F (36.4 °C) (08/08/24 2034)  Pulse: 96 (08/08/24 2035)  Resp: 18 (08/08/24 2013)  BP: (!) 84/43 (08/08/24 2035)  SpO2: (!) 94 % (08/08/24 2034) Vital Signs (24h Range):  Temp:  [97.4 °F (36.3 °C)-98.5 °F (36.9 °C)] 97.6 °F (36.4 °C)  Pulse:  [] 96  Resp:  [8-29] 18  SpO2:  [56 %-100 %] 94 %  BP: ()/(41-67) 84/43     Weight: 47.6 kg (105 lb)  Body mass index is 16.95 kg/m².    Intake/Output Summary (Last 24 hours) at 8/8/2024 0059  Last data filed at 8/8/2024 1812  Gross per 24 hour   Intake 1550.1 ml   Output --   Net 1550.1 ml         Physical Exam  Vitals reviewed.   Constitutional:       General: She is awake. She is not in acute distress.     Appearance: She is well-developed and underweight. She is ill-appearing.      Comments: Actually looks much better than would suggest by xrays and lab   HENT:      Head: Normocephalic.      Nose: Nose normal.      Mouth/Throat:      Pharynx: Oropharynx is clear.   Eyes:      Extraocular Movements: Extraocular movements intact.      Pupils: Pupils are equal, round, and reactive to light.   Neck:      Thyroid: No thyroid mass.      Vascular: No carotid bruit.   Cardiovascular:      Rate and Rhythm: Normal rate and regular rhythm.      Pulses: Normal pulses.      Heart sounds: Normal heart sounds. No murmur heard.  Pulmonary:      Effort: Pulmonary effort is normal.      Breath sounds: Normal air entry. Rales present. No wheezing.      Comments: Fairly good air exchange bilateral  Abdominal:      General: Bowel sounds are normal. There is no distension.      Palpations: Abdomen is soft.      Tenderness: There is no abdominal tenderness.   Musculoskeletal:         General: Normal range of motion.      Cervical back: Neck supple. No rigidity.   Skin:     General: Skin is warm.      Coloration: Skin is not jaundiced.      Findings: No lesion.   Neurological:      General:  No focal deficit present.      Mental Status: She is alert and oriented to person, place, and time.      Cranial Nerves: No cranial nerve deficit.   Psychiatric:         Attention and Perception: Attention normal.         Mood and Affect: Mood normal.         Behavior: Behavior normal. Behavior is cooperative.         Thought Content: Thought content normal.         Cognition and Memory: Cognition normal.             Significant Labs: All pertinent labs within the past 24 hours have been reviewed.  BMP:   Recent Labs   Lab 08/07/24 2156   *      K 3.4*      CO2 29   BUN 25*   CREATININE 1.44*   CALCIUM 8.4*     CBC:   Recent Labs   Lab 08/07/24 2148 08/07/24 2156 08/07/24  2356   WBC  --  13.15*  --    HGB  --  10.4*  --    HCT 37 35.5* 32*   PLT  --  361  --      CMP:   Recent Labs   Lab 08/07/24 2156      K 3.4*      CO2 29   *   BUN 25*   CREATININE 1.44*   CALCIUM 8.4*   PROT 6.8   ALBUMIN 2.3*   BILITOT 0.7   ALKPHOS 87   AST 17   ALT 11*   ANIONGAP 11       Significant Imaging: I have reviewed all pertinent imaging results/findings within the past 24 hours.    Imaging Results              US Lower Extremity Veins Bilateral (Final result)  Result time 08/08/24 06:37:19      Final result by Yvon Shaw MD (08/08/24 06:37:19)                   Impression:      No evidence of deep venous thrombosis in either lower extremity.      Electronically signed by: Yvon hSaw  Date:    08/08/2024  Time:    06:37               Narrative:    EXAMINATION:  US LOWER EXTREMITY VEINS BILATERAL    CLINICAL HISTORY:  elevated DD with dyspnea and lung cancer;    TECHNIQUE:  Duplex and color flow Doppler and dynamic compression was performed of the bilateral lower extremity veins.  Ultrasound images captured and stored.    COMPARISON:  None    FINDINGS:  Right thigh veins: The common femoral, femoral, popliteal, upper greater saphenous, and deep femoral veins are patent and free of  thrombus. The veins are normally compressible and have normal phasic flow and augmentation response.    Right calf veins: The visualized calf veins are patent.    Left thigh veins: The common femoral, femoral, popliteal, upper greater saphenous, and deep femoral veins are patent and free of thrombus. The veins are normally compressible and have normal phasic flow and augmentation response.    Left calf veins: The visualized calf veins are patent.    Miscellaneous: Left popliteal fossa Baker's cyst incidentally noted.                                       CTA Chest Non-Coronary (PE Studies) (Final result)  Result time 08/08/24 06:25:30      Final result by Yvon Shaw MD (08/08/24 06:25:30)                   Impression:      No pulmonary embolus.    The bilateral hilar lesions appear more prominent than the prior cross-sectional imaging 11/17/2023.  There also bilateral opacities.  Findings may reflect progression of disease.  Underlying infectious/inflammatory process a consideration as well.      Electronically signed by: Yvon Shaw  Date:    08/08/2024  Time:    06:25               Narrative:    EXAMINATION:  CTA CHEST NON CORONARY (PE STUDIES)    CLINICAL HISTORY:  Pulmonary embolism (PE) suspected, high prob;    TECHNIQUE:  Low dose axial images, sagittal and coronal reformations were obtained from the thoracic inlet to the lung bases following the IV administration of 65 mL of Isovue 370.  Contrast timing was optimized to evaluate the pulmonary arteries.  MIP images were performed.    The CT examination was performed using one or more of the following dose reduction techniques: Automated exposure control, adjustment of the mA and kV according to patient's size, use of acute or iterative reconstruction techniques.    COMPARISON:  08/07/2024 chest radiograph    FINDINGS:  No pulmonary embolus detected.    Heart size enlarged mildly.  Coronary and thoracic aortic calcifications.  Right and left hilar lesions are  present.  There is opacification and consolidation of the anterior right upper lobe as well as of the right lower lobe.  There is emphysema.  Some motion degrades the exam.  No pneumothorax.  Bronchial thickening seen of the lung bases as well.  Interlobular septal thickening more notably of the lung bases.    No acute abnormality of the upper abdomen.  There is a left adrenal lesion that did not show uptake on prior PET CT.  No fracture detected.  Compression deformities lower thoracic spine.                                       X-Ray Chest AP (Final result)  Result time 08/08/24 06:38:35      Final result by Yvon Shaw MD (08/08/24 06:38:35)                   Impression:      Worsened bilateral opacities.  Progression of disease is a consideration as well as infectious/inflammatory process.      Electronically signed by: Yvon Shaw  Date:    08/08/2024  Time:    06:38               Narrative:    EXAMINATION:  XR CHEST AP PORTABLE    CLINICAL HISTORY:  weak;    TECHNIQUE:  Single frontal view of the chest was performed.    COMPARISON:  11/27/2023    FINDINGS:  Since the prior exam there is now increase in the perihilar opacities.  Development of right upper and lower lung opacities.  No pneumothorax or pleural effusion.                                    Intake/Output - Last 3 Shifts         08/07 0700 08/08 0659 08/08 0700 08/09 0659    P.O.  100    IV Piggyback 1349.1 101.1    Total Intake(mL/kg) 1349.1 (28.3) 201.1 (4.2)    Net +1349.1 +201.1          Urine Occurrence  3 x    Stool Occurrence  3 x          Microbiology Results (last 7 days)       Procedure Component Value Units Date/Time    Influenza A & B by Molecular [[id number]]  (Normal) Collected: 08/07/24 2302    Order Status: Completed Specimen: Nasopharyngeal Swab Updated: 08/08/24 0109     INFLUENZA A MOLECULAR Negative     INFLUENZA B MOLECULAR  Negative    Blood culture x two cultures. Draw prior to antibiotics. [[id number]] Collected: 08/07/24  2254    Order Status: Sent Specimen: Blood Updated: 08/07/24 2259    Blood culture x two cultures. Draw prior to antibiotics. [[id number]] Collected: 08/07/24 2251    Order Status: Sent Specimen: Blood Updated: 08/07/24 2256

## 2024-08-09 NOTE — ASSESSMENT & PLAN NOTE
Echo Summary  08/08/24     Show Result Comparison     Left Ventricle: The left ventricle is normal in size. Increased wall thickness. There is concentric remodeling. There is normal systolic function. Biplane (2D) method of discs ejection fraction is 65%.    Right Ventricle: Severe right ventricular enlargement. Systolic function could not be assesed.    Right Atrium: Right atrium is severely dilated.    Aortic Valve: The aortic valve is a trileaflet valve. Moderately calcified cusps. Mildly restricted motion. There is mild stenosis. Aortic valve area by VTI is 1.60 cm². Aortic valve peak velocity is 2.90 m/s. Mean gradient is 16 mmHg. The dimensionless index is 0.46.    Mitral Valve: There is bileaflet sclerosis. Moderately thickened anterior leaflet. There is no stenosis. The mean pressure gradient across the mitral valve is 1 mmHg at a heart rate of  bpm.    Tricuspid Valve: There is moderate regurgitation.    Pulmonary Artery: There is pulmonary hypertension. The estimated pulmonary artery systolic pressure is 65 mmHg.    IVC/SVC: Elevated venous pressure at 15 mmHg.

## 2024-08-09 NOTE — ACP (ADVANCE CARE PLANNING)
Advance Care Planning     Date: 08/09/2024    Today a voluntary meeting took place: bedside    Patient Participation: Patient is able to participate     Attendees (Name and  Relationship to patient):  Patient's son present    Staff attendees (Name and  Role): Dr. Barraza but will have patient's day nurse go and confirm and documented in her note    ACP Conversation (General):  Advanced cancer    Code Status: DNR; status confirmed/order placed in chart     ACP Documents: None    Goals of care: The patient endorses that what is most important right now is to focus on spending time at home, remaining as independent as possible, symptom/pain control, and quality of life, even if it means sacrificing a little time    Accordingly, we have decided that the best plan to meet the patient's goals includes continuing with treatment.   to look into options to help with transportation problems patient has had in past to make better in future      Recommendations/  Follow-up tasks: To follow up with her oncologist as outpatient for further planning.        Length of ACP   conversation in minutes: 15 minutes

## 2024-08-09 NOTE — PLAN OF CARE
08/09/24 1348   Rounds   Attendance Provider;;Charge nurse;Physical therapist;Pharmacist   Discharge Plan A Home with family   Why the patient remains in the hospital Requires continued medical care   Transition of Care Barriers None     Chart reviewed. MD awaiting records from Dr. Kraft. SS following

## 2024-08-09 NOTE — ASSESSMENT & PLAN NOTE
Patient with Paroxysmal (<7 days) atrial fibrillation which is controlled currently with Beta Blocker. Patient is currently in sinus rhythm.[id number] Score: 2. HASBLED Score: 2. Anticoagulation indicated. Anticoagulation done with eliquis . Eliquis hold if procedures needed

## 2024-08-09 NOTE — PROGRESS NOTES
[facility]    Patient Name:  Mayra Kelly   MRN:  [id number]  Date: 8/9/2024  Diagnosis: Obstructive pneumonia    History:     Past Medical History:   Diagnosis Date    Chronic respiratory failure with hypoxia and hypercapnia     on home oxygen but hasn't been using it    Cigarette nicotine dependence with nicotine-induced disorder 11/09/2023    COPD (chronic obstructive pulmonary disease)     Dyshidrotic eczema 08/10/2021    Right bundle branch block 12/11/2023    Scabies     Squamous cell carcinoma of lung     follows with Dr. Kraft       Social History     Socioeconomic History    Marital status:    Tobacco Use    Smoking status: Every Day     Current packs/day: 1.00     Types: Cigarettes    Smokeless tobacco: Never   Substance and Sexual Activity    Alcohol use: Never    Drug use: Never     Social Determinants of Health     Financial Resource Strain: Low Risk  (8/8/2024)    Overall Financial Resource Strain (CARDIA)     Difficulty of Paying Living Expenses: Not hard at all   Food Insecurity: No Food Insecurity (8/8/2024)    Hunger Vital Sign     Worried About Running Out of Food in the Last Year: Never true     Ran Out of Food in the Last Year: Never true   Transportation Needs: No Transportation Needs (8/8/2024)    TRANSPORTATION NEEDS     Transportation : No   Physical Activity: Inactive (8/8/2024)    Exercise Vital Sign     Days of Exercise per Week: 0 days     Minutes of Exercise per Session: 0 min   Stress: No Stress Concern Present (8/8/2024)    [employer] - Occupational Stress Questionnaire     Feeling of Stress : Not at all   Housing Stability: Low Risk  (8/8/2024)    Housing Stability Vital Sign     Unable to Pay for Housing in the Last Year: No     Homeless in the Last Year: No       Precautions:     Allergies as of 08/07/2024    (No Known Allergies)       WO Assessment Details/Treatment       Narrative: Seen patient for  initiation of preventative skin care measures    Left buttock has pink area. Applied Remedy zinc cream to area per patient request. Reapplied new Mepliex foam border. Picture on chart, has improved.  Overlay to bed  Oxygen in use, no redness noted  Bilateral heels with out pressure injury noted.    Consult skin care for any skin issues          08/09/2024

## 2024-08-10 LAB
BUN SERPL-MCNC: 17 MG/DL (ref 7–18)
BUN/CREAT SERPL: 22 (ref 6–20)
CREAT SERPL-MCNC: 0.76 MG/DL (ref 0.55–1.02)
EGFR (NO RACE VARIABLE) (RUSH/TITUS): 83 ML/MIN/1.73M2
VANCOMYCIN SERPL-MCNC: 5.6 ΜG/ML (ref 0–20)

## 2024-08-10 PROCEDURE — 63600175 PHARM REV CODE 636 W HCPCS: Performed by: HOSPITALIST

## 2024-08-10 PROCEDURE — S4991 NICOTINE PATCH NONLEGEND: HCPCS | Performed by: HOSPITALIST

## 2024-08-10 PROCEDURE — 25000003 PHARM REV CODE 250: Performed by: HOSPITALIST

## 2024-08-10 PROCEDURE — 27000221 HC OXYGEN, UP TO 24 HOURS

## 2024-08-10 PROCEDURE — 99232 SBSQ HOSP IP/OBS MODERATE 35: CPT | Mod: ,,, | Performed by: FAMILY MEDICINE

## 2024-08-10 PROCEDURE — 94761 N-INVAS EAR/PLS OXIMETRY MLT: CPT

## 2024-08-10 PROCEDURE — 63600175 PHARM REV CODE 636 W HCPCS: Performed by: INTERNAL MEDICINE

## 2024-08-10 PROCEDURE — 99900035 HC TECH TIME PER 15 MIN (STAT)

## 2024-08-10 PROCEDURE — 11000001 HC ACUTE MED/SURG PRIVATE ROOM

## 2024-08-10 PROCEDURE — 94640 AIRWAY INHALATION TREATMENT: CPT

## 2024-08-10 PROCEDURE — 82565 ASSAY OF CREATININE: CPT | Performed by: HOSPITALIST

## 2024-08-10 PROCEDURE — 36415 COLL VENOUS BLD VENIPUNCTURE: CPT | Performed by: HOSPITALIST

## 2024-08-10 PROCEDURE — 25000242 PHARM REV CODE 250 ALT 637 W/ HCPCS: Performed by: HOSPITALIST

## 2024-08-10 PROCEDURE — 80202 ASSAY OF VANCOMYCIN: CPT | Performed by: FAMILY MEDICINE

## 2024-08-10 PROCEDURE — 99232 SBSQ HOSP IP/OBS MODERATE 35: CPT | Mod: ,,, | Performed by: INTERNAL MEDICINE

## 2024-08-10 PROCEDURE — 63600175 PHARM REV CODE 636 W HCPCS: Performed by: FAMILY MEDICINE

## 2024-08-10 PROCEDURE — 25000003 PHARM REV CODE 250: Performed by: FAMILY MEDICINE

## 2024-08-10 PROCEDURE — 36415 COLL VENOUS BLD VENIPUNCTURE: CPT | Performed by: FAMILY MEDICINE

## 2024-08-10 PROCEDURE — 84520 ASSAY OF UREA NITROGEN: CPT | Performed by: HOSPITALIST

## 2024-08-10 RX ORDER — ENOXAPARIN SODIUM 100 MG/ML
40 INJECTION SUBCUTANEOUS EVERY 24 HOURS
Status: DISCONTINUED | OUTPATIENT
Start: 2024-08-10 | End: 2024-08-15 | Stop reason: HOSPADM

## 2024-08-10 RX ADMIN — METHYLPREDNISOLONE SODIUM SUCCINATE 60 MG: 40 INJECTION, POWDER, FOR SOLUTION INTRAMUSCULAR; INTRAVENOUS at 12:08

## 2024-08-10 RX ADMIN — IPRATROPIUM BROMIDE AND ALBUTEROL SULFATE 3 ML: .5; 3 SOLUTION RESPIRATORY (INHALATION) at 07:08

## 2024-08-10 RX ADMIN — METHYLPREDNISOLONE SODIUM SUCCINATE 60 MG: 40 INJECTION, POWDER, FOR SOLUTION INTRAMUSCULAR; INTRAVENOUS at 11:08

## 2024-08-10 RX ADMIN — NICOTINE 1 PATCH: 14 PATCH, EXTENDED RELEASE TRANSDERMAL at 09:08

## 2024-08-10 RX ADMIN — PIPERACILLIN SODIUM AND TAZOBACTAM SODIUM 4.5 G: 4; .5 INJECTION, POWDER, LYOPHILIZED, FOR SOLUTION INTRAVENOUS at 12:08

## 2024-08-10 RX ADMIN — PIPERACILLIN SODIUM AND TAZOBACTAM SODIUM 4.5 G: 4; .5 INJECTION, POWDER, LYOPHILIZED, FOR SOLUTION INTRAVENOUS at 04:08

## 2024-08-10 RX ADMIN — METHYLPREDNISOLONE SODIUM SUCCINATE 60 MG: 40 INJECTION, POWDER, FOR SOLUTION INTRAMUSCULAR; INTRAVENOUS at 06:08

## 2024-08-10 RX ADMIN — ENOXAPARIN SODIUM 40 MG: 40 INJECTION SUBCUTANEOUS at 04:08

## 2024-08-10 RX ADMIN — IPRATROPIUM BROMIDE AND ALBUTEROL SULFATE 3 ML: .5; 3 SOLUTION RESPIRATORY (INHALATION) at 04:08

## 2024-08-10 RX ADMIN — PIPERACILLIN SODIUM AND TAZOBACTAM SODIUM 4.5 G: 4; .5 INJECTION, POWDER, LYOPHILIZED, FOR SOLUTION INTRAVENOUS at 08:08

## 2024-08-10 RX ADMIN — POTASSIUM CHLORIDE 20 MEQ: 1500 TABLET, EXTENDED RELEASE ORAL at 09:08

## 2024-08-10 RX ADMIN — METHYLPREDNISOLONE SODIUM SUCCINATE 60 MG: 40 INJECTION, POWDER, FOR SOLUTION INTRAMUSCULAR; INTRAVENOUS at 05:08

## 2024-08-10 RX ADMIN — POTASSIUM CHLORIDE 20 MEQ: 1500 TABLET, EXTENDED RELEASE ORAL at 08:08

## 2024-08-10 RX ADMIN — BUPROPION HYDROCHLORIDE 100 MG: 100 TABLET, FILM COATED, EXTENDED RELEASE ORAL at 08:08

## 2024-08-10 RX ADMIN — IPRATROPIUM BROMIDE AND ALBUTEROL SULFATE 3 ML: .5; 3 SOLUTION RESPIRATORY (INHALATION) at 12:08

## 2024-08-10 RX ADMIN — VANCOMYCIN HYDROCHLORIDE 1000 MG: 1 INJECTION, POWDER, LYOPHILIZED, FOR SOLUTION INTRAVENOUS at 05:08

## 2024-08-10 RX ADMIN — PIPERACILLIN SODIUM AND TAZOBACTAM SODIUM 4.5 G: 4; .5 INJECTION, POWDER, LYOPHILIZED, FOR SOLUTION INTRAVENOUS at 11:08

## 2024-08-10 RX ADMIN — BUPROPION HYDROCHLORIDE 100 MG: 100 TABLET, FILM COATED, EXTENDED RELEASE ORAL at 09:08

## 2024-08-10 NOTE — PROGRESS NOTES
Pharmacy Consult    Consulted to assist in the management of  Vanc  therapy.  Patient is currently receiving Vanc 1000mg iv q36hr.  Renal function has been improving so ran Vanc Random level 24hr post previous admin. Random was 5.6. Bun 17 Scr 0.76 Crcl = 51.   Adjusted frequency to Vanc 1000mg iv q18hr.  Next Trough due on 8/12 at 0330.    Will recheck trough prior to 3rd dose of new regimen and make further adjustments as necessary.    CAROLYNE Ortega.Ph.

## 2024-08-10 NOTE — ASSESSMENT & PLAN NOTE
Nutrition consulted. Most recent weight and BMI monitored-     Measurements:  Wt Readings from Last 1 Encounters:   08/08/24 47.6 kg (105 lb)   Body mass index is 16.95 kg/m².    Patient has been screened and assessed by RD.    Malnutrition Type:  Context: chronic illness  Level: severe    Malnutrition Characteristic Summary:  Energy Intake (Malnutrition): less than or equal to 50% for greater than or equal to 1 month  Subcutaneous Fat (Malnutrition): severe depletion  Muscle Mass (Malnutrition): severe depletion    Interventions/Recommendations (treatment strategy):  Recommend consider liberalizing diet to a Regular diet as appropriate. Encourage good PO intakes.    Per dietary:    Per ASPEN guidelines, patient meets criteria for severe protein-calorie malnutrition as evidenced by poor PO intakes <50% for greater than one month and severe fat and muscle depletion to orbital, upper arm, temple, and clavicle regions.     Prealbumin 3 and BMI 16

## 2024-08-10 NOTE — SUBJECTIVE & OBJECTIVE
Interval History:       Review of Systems   Constitutional:  Positive for fatigue and unexpected weight change. Negative for appetite change and fever.   HENT:  Negative for congestion, hearing loss and trouble swallowing.    Respiratory:  Positive for cough and shortness of breath. Negative for chest tightness and wheezing.    Cardiovascular:  Negative for chest pain and palpitations.   Gastrointestinal:  Negative for abdominal pain, constipation and nausea.   Genitourinary:  Negative for difficulty urinating and dysuria.   Musculoskeletal:  Positive for gait problem. Negative for back pain and neck stiffness.   Skin:  Negative for pallor and rash.   Neurological:  Negative for dizziness, speech difficulty and headaches.   Psychiatric/Behavioral:  Negative for confusion and suicidal ideas.      Objective:     Vital Signs (Most Recent):  Temp: 97.6 °F (36.4 °C) (08/10/24 1503)  Pulse: 60 (08/10/24 1503)  Resp: 17 (08/10/24 1503)  BP: 137/68 (08/10/24 1503)  SpO2: 96 % (08/10/24 1503) Vital Signs (24h Range):  Temp:  [97.1 °F (36.2 °C)-97.9 °F (36.6 °C)] 97.6 °F (36.4 °C)  Pulse:  [51-85] 60  Resp:  [15-28] 17  SpO2:  [86 %-97 %] 96 %  BP: (100-137)/(40-73) 137/68     Weight: 47.6 kg (105 lb)  Body mass index is 16.95 kg/m².    Intake/Output Summary (Last 24 hours) at 8/10/2024 1552  Last data filed at 8/9/2024 1838  Gross per 24 hour   Intake 392.68 ml   Output --   Net 392.68 ml         Physical Exam  Vitals reviewed.   Constitutional:       General: She is awake. She is not in acute distress.     Appearance: She is well-developed and underweight. She is not toxic-appearing.      Comments: Actually looks much better than would suggest by xrays and lab   HENT:      Head: Normocephalic.      Nose: Nose normal.      Mouth/Throat:      Pharynx: Oropharynx is clear.   Eyes:      Extraocular Movements: Extraocular movements intact.      Pupils: Pupils are equal, round, and reactive to light.   Neck:      Thyroid: No  thyroid mass.      Vascular: No carotid bruit.   Cardiovascular:      Rate and Rhythm: Normal rate and regular rhythm.      Pulses: Normal pulses.      Heart sounds: Normal heart sounds. No murmur heard.  Pulmonary:      Effort: Pulmonary effort is normal.      Breath sounds: Normal air entry. Rales present. No wheezing.      Comments: Fairly good air exchange bilateral  Abdominal:      General: Bowel sounds are normal. There is no distension.      Palpations: Abdomen is soft.      Tenderness: There is no abdominal tenderness.   Musculoskeletal:         General: Normal range of motion.      Cervical back: Neck supple. No rigidity.   Skin:     General: Skin is warm.      Coloration: Skin is not jaundiced.      Findings: No lesion.   Neurological:      General: No focal deficit present.      Mental Status: She is alert and oriented to person, place, and time.      Cranial Nerves: No cranial nerve deficit.   Psychiatric:         Attention and Perception: Attention normal.         Mood and Affect: Mood normal.         Behavior: Behavior normal. Behavior is cooperative.         Thought Content: Thought content normal.         Cognition and Memory: Cognition normal.             Significant Labs: All pertinent labs within the past 24 hours have been reviewed.  BMP:   Recent Labs   Lab 08/09/24  0440 08/10/24  0408   GLU 96  --      --    K 3.4*  --      --    CO2 31  --    BUN 18 17   CREATININE 0.90 0.76   CALCIUM 8.1*  --      CBC:   Recent Labs   Lab 08/09/24  0440   WBC 7.83   HGB 9.3*   HCT 30.6*        CMP:   Recent Labs   Lab 08/09/24 0440 08/10/24  0408     --    K 3.4*  --      --    CO2 31  --    GLU 96  --    BUN 18 17   CREATININE 0.90 0.76   CALCIUM 8.1*  --    ANIONGAP 8  --        Significant Imaging: I have reviewed all pertinent imaging results/findings within the past 24 hours.    Imaging Results              US Lower Extremity Veins Bilateral (Final result)  Result time  08/08/24 06:37:19      Final result by Yvon Shaw MD (08/08/24 06:37:19)                   Impression:      No evidence of deep venous thrombosis in either lower extremity.      Electronically signed by: vYon Shaw  Date:    08/08/2024  Time:    06:37               Narrative:    EXAMINATION:  US LOWER EXTREMITY VEINS BILATERAL    CLINICAL HISTORY:  elevated DD with dyspnea and lung cancer;    TECHNIQUE:  Duplex and color flow Doppler and dynamic compression was performed of the bilateral lower extremity veins.  Ultrasound images captured and stored.    COMPARISON:  None    FINDINGS:  Right thigh veins: The common femoral, femoral, popliteal, upper greater saphenous, and deep femoral veins are patent and free of thrombus. The veins are normally compressible and have normal phasic flow and augmentation response.    Right calf veins: The visualized calf veins are patent.    Left thigh veins: The common femoral, femoral, popliteal, upper greater saphenous, and deep femoral veins are patent and free of thrombus. The veins are normally compressible and have normal phasic flow and augmentation response.    Left calf veins: The visualized calf veins are patent.    Miscellaneous: Left popliteal fossa Baker's cyst incidentally noted.                                       CTA Chest Non-Coronary (PE Studies) (Final result)  Result time 08/08/24 06:25:30      Final result by Yvon Shaw MD (08/08/24 06:25:30)                   Impression:      No pulmonary embolus.    The bilateral hilar lesions appear more prominent than the prior cross-sectional imaging 11/17/2023.  There also bilateral opacities.  Findings may reflect progression of disease.  Underlying infectious/inflammatory process a consideration as well.      Electronically signed by: Yvon Shaw  Date:    08/08/2024  Time:    06:25               Narrative:    EXAMINATION:  CTA CHEST NON CORONARY (PE STUDIES)    CLINICAL HISTORY:  Pulmonary embolism (PE) suspected,  high prob;    TECHNIQUE:  Low dose axial images, sagittal and coronal reformations were obtained from the thoracic inlet to the lung bases following the IV administration of 65 mL of Isovue 370.  Contrast timing was optimized to evaluate the pulmonary arteries.  MIP images were performed.    The CT examination was performed using one or more of the following dose reduction techniques: Automated exposure control, adjustment of the mA and kV according to patient's size, use of acute or iterative reconstruction techniques.    COMPARISON:  08/07/2024 chest radiograph    FINDINGS:  No pulmonary embolus detected.    Heart size enlarged mildly.  Coronary and thoracic aortic calcifications.  Right and left hilar lesions are present.  There is opacification and consolidation of the anterior right upper lobe as well as of the right lower lobe.  There is emphysema.  Some motion degrades the exam.  No pneumothorax.  Bronchial thickening seen of the lung bases as well.  Interlobular septal thickening more notably of the lung bases.    No acute abnormality of the upper abdomen.  There is a left adrenal lesion that did not show uptake on prior PET CT.  No fracture detected.  Compression deformities lower thoracic spine.                                       X-Ray Chest AP (Final result)  Result time 08/08/24 06:38:35      Final result by Yvon Shaw MD (08/08/24 06:38:35)                   Impression:      Worsened bilateral opacities.  Progression of disease is a consideration as well as infectious/inflammatory process.      Electronically signed by: Yvon Shaw  Date:    08/08/2024  Time:    06:38               Narrative:    EXAMINATION:  XR CHEST AP PORTABLE    CLINICAL HISTORY:  weak;    TECHNIQUE:  Single frontal view of the chest was performed.    COMPARISON:  11/27/2023    FINDINGS:  Since the prior exam there is now increase in the perihilar opacities.  Development of right upper and lower lung opacities.  No pneumothorax  or pleural effusion.                                    Intake/Output - Last 3 Shifts         08/08 0700 08/09 0659 08/09 0700  08/10 0659 08/10 0700  08/11 0659    P.O. 100 360     IV Piggyback 192.1 392.7     Total Intake(mL/kg) 292.1 (6.1) 752.7 (15.8)     Net +292.1 +752.7            Urine Occurrence 3 x 2 x 2 x    Stool Occurrence 3 x 2 x           Microbiology Results (last 7 days)       Procedure Component Value Units Date/Time    Blood culture x two cultures. Draw prior to antibiotics. [[id number]] Collected: 08/07/24 2251    Order Status: Completed Specimen: Blood Updated: 08/10/24 0623     Culture, Blood No Growth At 48 Hours    Blood culture x two cultures. Draw prior to antibiotics. [[id number]] Collected: 08/07/24 2254    Order Status: Completed Specimen: Blood Updated: 08/10/24 0623     Culture, Blood No Growth At 48 Hours    Influenza A & B by Molecular [[id number]]  (Normal) Collected: 08/07/24 2302    Order Status: Completed Specimen: Nasopharyngeal Swab Updated: 08/08/24 0109     INFLUENZA A MOLECULAR Negative     INFLUENZA B MOLECULAR  Negative

## 2024-08-10 NOTE — PROGRESS NOTES
[facility]  [facility] Medicine  Progress Note    Patient Name: Mayra Kelly  MRN: [id number]  Patient Class: IP- Inpatient   Admission Date: 8/7/2024  Length of Stay: 1 days  Attending Physician: Mark Barraza MD  Primary Care Provider: Darlene Campoverde NP        Subjective:     Principal Problem:Obstructive pneumonia        HPI:  74 yo F presents to [facility] ED with increasing dyspnea.  She has had a cough productive of whitish sputum but no hemoptysis.  She has been worsening over the past three days but no orthopnea or edema.  No chest pain or palpitations.  She does take eliquis for PAF and is currently in sinus tach.  Initial trop unremarkable and second is pending for the trend.  Her proBNP is now about 37K which is up from her baseline of 650 and with the RBBB could think PTE but is on full dose eliquis.  CT was negative for PTE but she has bilateral hilar masses with right greater than left with multilobar pneumonia.  She does meet sepsis criteria with WBC, tachycardia and JAMI (creat usually 0.9 and now 1.4) but lactic acid is normal.      Patient states that she was treated for lung cancer [id number] with XRT and chemo and though her FOBT with lymph node and endobronchial biopsy not diagnostic for malignancy in 2023, she was eventually diagnosed with lung cancer and follow with Dr. Kraft for chemo every six weeks.  COVID is negative and MRSA pcr nares swab pending.      Remainder of ROS as below.   See assessment and plan below for problem based evaluation      Overview/Hospital Course:  08/08 Records reviewed. Presented increase SOB, cough and fatique. Some weight loss. Noted on evaluation worse CXR and CT chest. Hx treated adenoCa in 2018. Last Nov underwent evaluation by Dr Munguia. Seen then again also by Dr Kraft. Dx new primary squamous cell CA. Treated chemotx. In April saw Dr Kraft and started on Keytruda and given through infusion center. Has continued this through July. Missed her  appt with Dr Kraft in May. This information mainly from Dr Kraft in pt was not good historian. Pt continued to smoke 1/2 ppd; encourage to stop. Dr Whitmore and Dr Kraft ask to consult. Requires 50% ventimask to keep O2 sats in low 90s. At home been prior on 2L BNC O2. PHMx: also Eliquis for PAF. Now NSR.   08/09 Better now on only 4 L. Continue treat pneumonia. Talked a couple times today with her about code status in pt changed her mind about DNR, Discussed with nurse in room and pt now wishes to be DNI. SW to look if can get help with transportation outpt like medicaid. Discussed earlier with Dr Kraft. As improves DC home and follow up outpt.    Interval History:       Review of Systems   Constitutional:  Positive for fatigue and unexpected weight change. Negative for appetite change and fever.   HENT:  Negative for congestion, hearing loss and trouble swallowing.    Respiratory:  Positive for cough and shortness of breath. Negative for chest tightness and wheezing.    Cardiovascular:  Negative for chest pain and palpitations.   Gastrointestinal:  Negative for abdominal pain, constipation and nausea.   Genitourinary:  Negative for difficulty urinating and dysuria.   Musculoskeletal:  Positive for gait problem. Negative for back pain and neck stiffness.   Skin:  Negative for pallor and rash.   Neurological:  Negative for dizziness, speech difficulty and headaches.   Psychiatric/Behavioral:  Negative for confusion and suicidal ideas.      Objective:     Vital Signs (Most Recent):  Temp: 97.9 °F (36.6 °C) (08/09/24 1956)  Pulse: 65 (08/09/24 2003)  Resp: 18 (08/09/24 2003)  BP: (!) 103/40 (08/09/24 1956)  SpO2: (!) 94 % (08/09/24 2003) Vital Signs (24h Range):  Temp:  [97.3 °F (36.3 °C)-98.2 °F (36.8 °C)] 97.9 °F (36.6 °C)  Pulse:  [58-90] 65  Resp:  [18-28] 18  SpO2:  [83 %-97 %] 94 %  BP: ()/(40-55) 103/40     Weight: 47.6 kg (105 lb)  Body mass index is 16.95 kg/m².    Intake/Output Summary (Last 24 hours) at  8/9/2024 2301  Last data filed at 8/9/2024 1838  Gross per 24 hour   Intake 843.77 ml   Output --   Net 843.77 ml         Physical Exam  Vitals reviewed.   Constitutional:       General: She is awake. She is not in acute distress.     Appearance: She is well-developed and underweight. She is not toxic-appearing.      Comments: Actually looks much better than would suggest by xrays and lab   HENT:      Head: Normocephalic.      Nose: Nose normal.      Mouth/Throat:      Pharynx: Oropharynx is clear.   Eyes:      Extraocular Movements: Extraocular movements intact.      Pupils: Pupils are equal, round, and reactive to light.   Neck:      Thyroid: No thyroid mass.      Vascular: No carotid bruit.   Cardiovascular:      Rate and Rhythm: Normal rate and regular rhythm.      Pulses: Normal pulses.      Heart sounds: Normal heart sounds. No murmur heard.  Pulmonary:      Effort: Pulmonary effort is normal.      Breath sounds: Normal air entry. Rales present. No wheezing.      Comments: Fairly good air exchange bilateral  Abdominal:      General: Bowel sounds are normal. There is no distension.      Palpations: Abdomen is soft.      Tenderness: There is no abdominal tenderness.   Musculoskeletal:         General: Normal range of motion.      Cervical back: Neck supple. No rigidity.   Skin:     General: Skin is warm.      Coloration: Skin is not jaundiced.      Findings: No lesion.   Neurological:      General: No focal deficit present.      Mental Status: She is alert and oriented to person, place, and time.      Cranial Nerves: No cranial nerve deficit.   Psychiatric:         Attention and Perception: Attention normal.         Mood and Affect: Mood normal.         Behavior: Behavior normal. Behavior is cooperative.         Thought Content: Thought content normal.         Cognition and Memory: Cognition normal.             Significant Labs: All pertinent labs within the past 24 hours have been reviewed.  BMP:   Recent Labs    Lab 08/09/24  0440   GLU 96      K 3.4*      CO2 31   BUN 18   CREATININE 0.90   CALCIUM 8.1*     CBC:   Recent Labs   Lab 08/07/24  2356 08/09/24  0440   WBC  --  7.83   HGB  --  9.3*   HCT 32* 30.6*   PLT  --  296     CMP:   Recent Labs   Lab 08/09/24  0440      K 3.4*      CO2 31   GLU 96   BUN 18   CREATININE 0.90   CALCIUM 8.1*   ANIONGAP 8       Significant Imaging: I have reviewed all pertinent imaging results/findings within the past 24 hours.    Imaging Results              US Lower Extremity Veins Bilateral (Final result)  Result time 08/08/24 06:37:19      Final result by Yvon Shaw MD (08/08/24 06:37:19)                   Impression:      No evidence of deep venous thrombosis in either lower extremity.      Electronically signed by: Yvon Shaw  Date:    08/08/2024  Time:    06:37               Narrative:    EXAMINATION:  US LOWER EXTREMITY VEINS BILATERAL    CLINICAL HISTORY:  elevated DD with dyspnea and lung cancer;    TECHNIQUE:  Duplex and color flow Doppler and dynamic compression was performed of the bilateral lower extremity veins.  Ultrasound images captured and stored.    COMPARISON:  None    FINDINGS:  Right thigh veins: The common femoral, femoral, popliteal, upper greater saphenous, and deep femoral veins are patent and free of thrombus. The veins are normally compressible and have normal phasic flow and augmentation response.    Right calf veins: The visualized calf veins are patent.    Left thigh veins: The common femoral, femoral, popliteal, upper greater saphenous, and deep femoral veins are patent and free of thrombus. The veins are normally compressible and have normal phasic flow and augmentation response.    Left calf veins: The visualized calf veins are patent.    Miscellaneous: Left popliteal fossa Baker's cyst incidentally noted.                                       CTA Chest Non-Coronary (PE Studies) (Final result)  Result time 08/08/24 06:25:30       Final result by Yvon Shaw MD (08/08/24 06:25:30)                   Impression:      No pulmonary embolus.    The bilateral hilar lesions appear more prominent than the prior cross-sectional imaging 11/17/2023.  There also bilateral opacities.  Findings may reflect progression of disease.  Underlying infectious/inflammatory process a consideration as well.      Electronically signed by: Yvon Shaw  Date:    08/08/2024  Time:    06:25               Narrative:    EXAMINATION:  CTA CHEST NON CORONARY (PE STUDIES)    CLINICAL HISTORY:  Pulmonary embolism (PE) suspected, high prob;    TECHNIQUE:  Low dose axial images, sagittal and coronal reformations were obtained from the thoracic inlet to the lung bases following the IV administration of 65 mL of Isovue 370.  Contrast timing was optimized to evaluate the pulmonary arteries.  MIP images were performed.    The CT examination was performed using one or more of the following dose reduction techniques: Automated exposure control, adjustment of the mA and kV according to patient's size, use of acute or iterative reconstruction techniques.    COMPARISON:  08/07/2024 chest radiograph    FINDINGS:  No pulmonary embolus detected.    Heart size enlarged mildly.  Coronary and thoracic aortic calcifications.  Right and left hilar lesions are present.  There is opacification and consolidation of the anterior right upper lobe as well as of the right lower lobe.  There is emphysema.  Some motion degrades the exam.  No pneumothorax.  Bronchial thickening seen of the lung bases as well.  Interlobular septal thickening more notably of the lung bases.    No acute abnormality of the upper abdomen.  There is a left adrenal lesion that did not show uptake on prior PET CT.  No fracture detected.  Compression deformities lower thoracic spine.                                       X-Ray Chest AP (Final result)  Result time 08/08/24 06:38:35      Final result by Yvon Shaw MD (08/08/24  06:38:35)                   Impression:      Worsened bilateral opacities.  Progression of disease is a consideration as well as infectious/inflammatory process.      Electronically signed by: Yvon Shaw  Date:    08/08/2024  Time:    06:38               Narrative:    EXAMINATION:  XR CHEST AP PORTABLE    CLINICAL HISTORY:  weak;    TECHNIQUE:  Single frontal view of the chest was performed.    COMPARISON:  11/27/2023    FINDINGS:  Since the prior exam there is now increase in the perihilar opacities.  Development of right upper and lower lung opacities.  No pneumothorax or pleural effusion.                                    Intake/Output - Last 3 Shifts         08/08 0700  08/09 0659 08/09 0700  08/10 0659    P.O. 100 360    IV Piggyback 192.1 392.7    Total Intake(mL/kg) 292.1 (6.1) 752.7 (15.8)    Net +292.1 +752.7          Urine Occurrence 3 x 2 x    Stool Occurrence 3 x 2 x          Microbiology Results (last 7 days)       Procedure Component Value Units Date/Time    Blood culture x two cultures. Draw prior to antibiotics. [[id number]] Collected: 08/07/24 2251    Order Status: Completed Specimen: Blood Updated: 08/09/24 0715     Culture, Blood No Growth At 24 Hours    Blood culture x two cultures. Draw prior to antibiotics. [[id number]] Collected: 08/07/24 2254    Order Status: Completed Specimen: Blood Updated: 08/09/24 0715     Culture, Blood No Growth At 24 Hours    Influenza A & B by Molecular [[id number]]  (Normal) Collected: 08/07/24 2302    Order Status: Completed Specimen: Nasopharyngeal Swab Updated: 08/08/24 0109     INFLUENZA A MOLECULAR Negative     INFLUENZA B MOLECULAR  Negative              Assessment/Plan:      * Obstructive pneumonia  Patient has a diagnosis of pneumonia. The cause of the pneumonia is suspected to be bacterial in etiology but organism is not known. The pneumonia is stable. The patient has the following signs/symptoms of pneumonia: persistent hypoxia , cough, and shortness of  breath. The patient does have a current oxygen requirement and the patient does have a home oxygen requirement. I have reviewed the pertinent imaging. The following cultures have been collected: Blood cultures The culture results are listed below.     Current antimicrobial regimen consists of the antibiotics listed below. Will monitor patient closely and Adjust treatment plan as follows add zithromax for atypicals if indicated.  MRSA nares pending.      Antibiotics (From admission, onward)      Start     Stop Route Frequency Ordered    08/09/24 1130  vancomycin (VANCOCIN) 1,000 mg in D5W 250 mL IVPB         -- IV Every 36 hours 08/08/24 1042    08/08/24 1141  vancomycin - pharmacy to dose         -- IV pharmacy to manage frequency 08/08/24 1042    08/08/24 0800  piperacillin-tazobactam (ZOSYN) 4.5 g in D5W 100 mL IVPB (MB+)         -- IV Every 8 hours (non-standard times) 08/08/24 0321            Microbiology Results (last 7 days)       Procedure Component Value Units Date/Time    Blood culture x two cultures. Draw prior to antibiotics. [[id number]] Collected: 08/07/24 2251    Order Status: Completed Specimen: Blood Updated: 08/09/24 0715     Culture, Blood No Growth At 24 Hours    Blood culture x two cultures. Draw prior to antibiotics. [[id number]] Collected: 08/07/24 2254    Order Status: Completed Specimen: Blood Updated: 08/09/24 0715     Culture, Blood No Growth At 24 Hours    Influenza A & B by Molecular [[id number]]  (Normal) Collected: 08/07/24 2302    Order Status: Completed Specimen: Nasopharyngeal Swab Updated: 08/08/24 0109     INFLUENZA A MOLECULAR Negative     INFLUENZA B MOLECULAR  Negative            Paroxysmal atrial fibrillation  Patient with Paroxysmal (<7 days) atrial fibrillation which is controlled currently with Beta Blocker. Patient is currently in sinus rhythm.[id number] Score: 2. HASBLED Score: 2. Anticoagulation indicated. Anticoagulation done with eliquis . Eliquis hold if procedures  needed    Pulmonary hypertension    Echo Summary  08/08/24     Show Result Comparison     Left Ventricle: The left ventricle is normal in size. Increased wall thickness. There is concentric remodeling. There is normal systolic function. Biplane (2D) method of discs ejection fraction is 65%.    Right Ventricle: Severe right ventricular enlargement. Systolic function could not be assesed.    Right Atrium: Right atrium is severely dilated.    Aortic Valve: The aortic valve is a trileaflet valve. Moderately calcified cusps. Mildly restricted motion. There is mild stenosis. Aortic valve area by VTI is 1.60 cm². Aortic valve peak velocity is 2.90 m/s. Mean gradient is 16 mmHg. The dimensionless index is 0.46.    Mitral Valve: There is bileaflet sclerosis. Moderately thickened anterior leaflet. There is no stenosis. The mean pressure gradient across the mitral valve is 1 mmHg at a heart rate of  bpm.    Tricuspid Valve: There is moderate regurgitation.    Pulmonary Artery: There is pulmonary hypertension. The estimated pulmonary artery systolic pressure is 65 mmHg.    IVC/SVC: Elevated venous pressure at 15 mmHg.       Right bundle branch block  Echo pending to evaluate for PHTN.  She is on norvasc low dose without h/o HTN and BP soft at this time.  Will hold.  May have previous diagnosis and may be worsening.        JAMI (acute kidney injury)  JAMI is likely due to pre-renal azotemia due to dehydration. Baseline creatinine is  o.9 . Most recent creatinine and eGFR are listed below.  Recent Labs     08/07/24  2156 08/09/24  0440   CREATININE 1.44* 0.90   EGFRNORACEVR 39* 68        Plan  - JAMI is stable  - Avoid nephrotoxins and renally dose meds for GFR listed above  - Monitor urine output, serial BMP, and adjust therapy as needed  - hold HCT and lasix.  Due to markedly elevated BNP will hold off for now on IVF   - hold all nephrotoxic agents.      COPD (chronic obstructive pulmonary disease)  Patient's COPD is with  exacerbation noted by continued dyspnea and worsening of baseline hypoxia currently.  Patient is currently on COPD Pathway. Continue scheduled inhalers Antibiotics and Supplemental oxygen and monitor respiratory status closely.     If scheduled bronchodilators and prn bronchodilators due not improve respiratory status may need to add steroids but would be cautious in due to pneumonia most likely caused by obstruction from bilateral hilar masses  (R>L)    Lung cancer  Follows with Dr. Kraft.  Has chemo every six weeks.  Mediport looks good without tenderness or erythema.  She says she never had it taken out when she finished chemo the first time in 2019.  It has always worked fine.      08/08 Talked with Dr Kraft. Treated 2018 for adenoCa and earlier this year for squamous cell ca both of lung. Last Nov underwent evaluation by Dr Munguia. Seen then again also by Dr Kraft. Dx new primary squamous cell CA. Treated chemotx. In April saw Dr Kraft and started on Keytruda and given through infusion center. Has continued this through July. Missed her appt with Dr Kraft in May    Acute on chronic respiratory failure with hypoxia and hypercapnia  Patient with Hypercapnic and Hypoxic Respiratory failure which is Acute on chronic.  she is on home oxygen at 2 LPM. Supplemental oxygen was provided and noted- Oxygen Concentration (%):  [50] 50    .   Signs/symptoms of respiratory failure include- wheezing and lethargy. Contributing diagnoses includes - Pneumonia Labs and images were reviewed. Patient Has recent ABG, which has been reviewed. Will treat underlying causes and adjust management of respiratory failure as follows-     Will continue with supplemental oxygen.  May need bipap if continues with CO2 retention.  Monitor respiratory status closely.  Patient has home oxygen but she states that she does not use it but she thinks she may need to use it.  Will check echo for RVSP for PHTN.  She does have RBBB but no PTE.  Venous dopplers  pending.      Cigarette nicotine dependence with nicotine-induced disorder  Dangers of cigarette smoking were reviewed with patient in detail. Patient was Counseled for 3-10 minutes. Nicotine replacement options were discussed. Nicotine replacement was discussed- prescribed    Patient counseled 4 minutes on importance to stopping use of tobacco. Patient was told that stopping smoking was one of the most important actions that could be taken to improve personal health. Discussed tobacco increases risk for poor outcomes in cardiovascular disease, COPD and cancer. Informed stopping smoking reduces risk of premature death by as much as 10 years. Numerous other benefits associated with quitting including helping others by protection from risks associated with secondhand smoke. Told written information with treatment options and help lines will be given at discharge. Patient given opportunity to ask questions. Treated nicoderm 14mg daily      Severe protein-calorie malnutrition                  Code: [id number]: 8/9/2024Share w/ Pt: []Updated:   Today    Mark Barraza MD       Nutrition consulted. Most recent weight and BMI monitored-      Measurements:      Wt Readings from Last 1 Encounters:   08/08/24 47.6 kg (105 lb)   Body mass index is 16.95 kg/m².     Patient has been screened and assessed by RD.     Malnutrition Type:  Context: chronic illness  Level: severe     Malnutrition Characteristic Summary:  Energy Intake (Malnutrition): less than or equal to 50% for greater than or equal to 1 month  Subcutaneous Fat (Malnutrition): severe depletion  Muscle Mass (Malnutrition): severe depletion     Interventions/Recommendations (treatment strategy):  Recommend consider liberalizing diet to a Regular diet as appropriate. Encourage good PO intakes.     Per dietary:     Per ASPEN guidelines, patient meets criteria for severe protein-calorie malnutrition as evidenced by poor PO intakes <50% for greater than one month and  severe fat and muscle depletion to orbital, upper arm, temple, and clavicle regions.      Prealbumin 3 and BMI 16               VTE Risk Mitigation (From admission, onward)           Ordered     enoxaparin injection 30 mg  Every 24 hours         08/08/24 1008                    Discharge Planning   TERE:      Code Status: Partial Code   Is the patient medically ready for discharge?:     Reason for patient still in hospital (select all that apply): Laboratory test, Treatment, Imaging, and Consult recommendations  Discharge Plan A: Home with family                  Mark Barraza MD  Department of Hospital Medicine   [facility]

## 2024-08-10 NOTE — ASSESSMENT & PLAN NOTE
JAMI is likely due to pre-renal azotemia due to dehydration. Baseline creatinine is  o.9 . Most recent creatinine and eGFR are listed below.  Recent Labs     08/07/24  2156 08/09/24  0440   CREATININE 1.44* 0.90   EGFRNORACEVR 39* 68        Plan  - JAMI is stable  - Avoid nephrotoxins and renally dose meds for GFR listed above  - Monitor urine output, serial BMP, and adjust therapy as needed  - hold HCT and lasix.  Due to markedly elevated BNP will hold off for now on IVF   - hold all nephrotoxic agents.

## 2024-08-10 NOTE — PROGRESS NOTES
[facility]  Critical Care Medicine  Progress Note    Patient Name: Mayra Kelly  MRN: [id number]  Admission Date: 8/7/2024  Hospital Length of Stay: 2 days  Code Status: Partial Code  Attending Provider: Devendra Palmer DO  Primary Care Provider: Darlene Campoverde NP   Principal Problem: Obstructive pneumonia    Subjective:     HPI:  Seventy 73-year-old white female who presents with shortness of breath and respiratory distress her story starts in 2017 when she was diagnosed with left-sided cancer sounds like she got radiation to the chest and to chemotherapy and she says she got brain radiation at that time I do not know the cell type.  She was seen by  last year and had a nondiagnostic EBUS on the right but had no endobronchial lesions she followed up with a needle biopsy with Dr. Kraft and he tells me that she was diagnosed with squamous cell carcinoma and they discussed in January and February treatment with radiation chemotherapy she apparently did not keep her follow-up and presents now with more shortness of breath productive sputum she has a history of bad COPD in chronic oxygen and acute on top of chronic respiratory failure during workup she was found to what looks like a right upper lobe obstruction with postobstructive pneumonia    Hospital/ICU Course:  No notes on file    Interval History/Significant Events:  Patient without complaints    Review of Systems  Objective:     Vital Signs (Most Recent):  Temp: 97.1 °F (36.2 °C) (08/10/24 0428)  Pulse: (!) 51 (08/10/24 0428)  Resp: 15 (08/10/24 0428)  BP: 127/73 (08/10/24 0428)  SpO2: 95 % (08/10/24 0428) Vital Signs (24h Range):  Temp:  [97.1 °F (36.2 °C)-98.2 °F (36.8 °C)] 97.1 °F (36.2 °C)  Pulse:  [51-90] 51  Resp:  [15-28] 15  SpO2:  [91 %-97 %] 95 %  BP: (102-127)/(40-73) 127/73   Weight: 47.6 kg (105 lb)  Body mass index is 16.95 kg/m².      Intake/Output Summary (Last 24 hours) at 8/10/2024 0632  Last data filed at  8/9/2024 1838  Gross per 24 hour   Intake 752.68 ml   Output --   Net 752.68 ml          Physical Exam  Vitals reviewed.   Constitutional:       Appearance: Normal appearance.      Interventions: She is not intubated.  HENT:      Head: Normocephalic and atraumatic.      Nose: Nose normal.      Mouth/Throat:      Mouth: Mucous membranes are dry.      Pharynx: Oropharynx is clear.   Eyes:      Extraocular Movements: Extraocular movements intact.      Conjunctiva/sclera: Conjunctivae normal.      Pupils: Pupils are equal, round, and reactive to light.   Cardiovascular:      Rate and Rhythm: Normal rate.      Heart sounds: Normal heart sounds. No murmur heard.  Pulmonary:      Effort: Pulmonary effort is normal. She is not intubated.      Breath sounds: Normal breath sounds.   Abdominal:      General: Abdomen is flat. Bowel sounds are normal.      Palpations: Abdomen is soft.   Musculoskeletal:         General: Normal range of motion.      Cervical back: Normal range of motion and neck supple.      Right lower leg: No edema.      Left lower leg: No edema.   Skin:     General: Skin is warm and dry.      Capillary Refill: Capillary refill takes less than 2 seconds.   Neurological:      General: No focal deficit present.      Mental Status: She is alert and oriented to person, place, and time.   Psychiatric:         Mood and Affect: Mood normal.         Behavior: Behavior normal.            Vents:  Oxygen Concentration (%): 32 (08/09/24 2003)  Lines/Drains/Airways       Peripheral Intravenous Line  Duration                  Peripheral IV - Single Lumen 08/07/24 18 G Left Antecubital 3 days         Peripheral IV - Single Lumen 08/07/24 2255 20 G Anterior;Proximal;Right Forearm 2 days                  Significant Labs:    CBC/Anemia Profile:  Recent Labs   Lab 08/09/24  0440   WBC 7.83   HGB 9.3*   HCT 30.6*      MCV 87.2   RDW 17.2*   FOLATE 3.5   [id number]        Chemistries:  Recent Labs   Lab 08/09/24  0440       K 3.4*      CO2 31   BUN 18   CREATININE 0.90   CALCIUM 8.1*       Recent Lab Results       None            Significant Imaging:  I have reviewed all pertinent imaging results/findings within the past 24 hours.    ABG  Recent Labs   Lab 08/08/24  0600   PH 7.36   PO2 60*   PCO2 61*   HCO3 34.5*     Assessment/Plan:     Pulmonary  * Obstructive pneumonia   and Cande plan to do an EBUS on Wednesday he can be inpatient her out patient and consider intervention with a stent.  She is not a great candidate for further chemotherapy however this may give her significant palliation of her symptoms    COPD (chronic obstructive pulmonary disease)  Patient continues to improve    Oncology  Lung cancer  Reviewed Dr. Jose's note        .     Jose Carlos Whitmore MD  Critical Care Medicine  [facility]

## 2024-08-10 NOTE — PROGRESS NOTES
[facility]  [facility] Medicine  Progress Note    Patient Name: Mayra Kelly  MRN: [id number]  Patient Class: IP- Inpatient   Admission Date: 8/7/2024  Length of Stay: 2 days  Attending Physician: Devendra Palmer DO  Primary Care Provider: Darlene Campoverde NP        Subjective:     Principal Problem:Obstructive pneumonia        HPI:  74 yo F presents to [facility] ED with increasing dyspnea.  She has had a cough productive of whitish sputum but no hemoptysis.  She has been worsening over the past three days but no orthopnea or edema.  No chest pain or palpitations.  She does take eliquis for PAF and is currently in sinus tach.  Initial trop unremarkable and second is pending for the trend.  Her proBNP is now about 37K which is up from her baseline of 650 and with the RBBB could think PTE but is on full dose eliquis.  CT was negative for PTE but she has bilateral hilar masses with right greater than left with multilobar pneumonia.  She does meet sepsis criteria with WBC, tachycardia and JAMI (creat usually 0.9 and now 1.4) but lactic acid is normal.      Patient states that she was treated for lung cancer [id number] with XRT and chemo and though her FOBT with lymph node and endobronchial biopsy not diagnostic for malignancy in 2023, she was eventually diagnosed with lung cancer and follow with Dr. Kraft for chemo every six weeks.  COVID is negative and MRSA pcr nares swab pending.      Remainder of ROS as below.   See assessment and plan below for problem based evaluation      Overview/Hospital Course:  08/08 Records reviewed. Presented increase SOB, cough and fatique. Some weight loss. Noted on evaluation worse CXR and CT chest. Hx treated adenoCa in 2018. Last Nov underwent evaluation by Dr Munguia. Seen then again also by Dr Kraft. Dx new primary squamous cell CA. Treated chemotx. In April saw Dr Kraft and started on Keytruda and given through infusion center. Has continued this through July. Missed  her appt with Dr Kraft in May. This information mainly from Dr Kraft in pt was not good historian. Pt continued to smoke 1/2 ppd; encourage to stop. Dr Whitmore and Dr Kraft ask to consult. Requires 50% ventimask to keep O2 sats in low 90s. At home been prior on 2L BNC O2. PHMx: also Eliquis for PAF. Now NSR.   08/09 Better now on only 4 L. Continue treat pneumonia. Talked a couple times today with her about code status in pt changed her mind about DNR, Discussed with nurse in room and pt now wishes to be DNI. SW to look if can get help with transportation outpt like medicaid. Discussed earlier with Dr Kraft. As improves DC home and follow up outpt.  08/10- the patient was seen examined resting comfortably in bed today, in no acute distress, with no acute events overnight.  Patient currently being treated for obstructive pneumonia and COPD.  Patient states that her respiratory status continues to improve.  She denies any shortness of breath or chest pain overnight.  Feels she is actually doing better.  Continues on nasal cannula O2 and IV antibiotics.  Plan is for EBUS this coming Wednesday by Interventional pulmonology.    Interval History:       Review of Systems   Constitutional:  Positive for fatigue and unexpected weight change. Negative for appetite change and fever.   HENT:  Negative for congestion, hearing loss and trouble swallowing.    Respiratory:  Positive for cough and shortness of breath. Negative for chest tightness and wheezing.    Cardiovascular:  Negative for chest pain and palpitations.   Gastrointestinal:  Negative for abdominal pain, constipation and nausea.   Genitourinary:  Negative for difficulty urinating and dysuria.   Musculoskeletal:  Positive for gait problem. Negative for back pain and neck stiffness.   Skin:  Negative for pallor and rash.   Neurological:  Negative for dizziness, speech difficulty and headaches.   Psychiatric/Behavioral:  Negative for confusion and suicidal ideas.       Objective:     Vital Signs (Most Recent):  Temp: 97.6 °F (36.4 °C) (08/10/24 1503)  Pulse: 60 (08/10/24 1503)  Resp: 17 (08/10/24 1503)  BP: 137/68 (08/10/24 1503)  SpO2: 96 % (08/10/24 1503) Vital Signs (24h Range):  Temp:  [97.1 °F (36.2 °C)-97.9 °F (36.6 °C)] 97.6 °F (36.4 °C)  Pulse:  [51-85] 60  Resp:  [15-28] 17  SpO2:  [86 %-97 %] 96 %  BP: (100-137)/(40-73) 137/68     Weight: 47.6 kg (105 lb)  Body mass index is 16.95 kg/m².    Intake/Output Summary (Last 24 hours) at 8/10/2024 1552  Last data filed at 8/9/2024 1838  Gross per 24 hour   Intake 392.68 ml   Output --   Net 392.68 ml         Physical Exam  Vitals reviewed.   Constitutional:       General: She is awake. She is not in acute distress.     Appearance: She is well-developed and underweight. She is not toxic-appearing.      Comments: Actually looks much better than would suggest by xrays and lab   HENT:      Head: Normocephalic.      Nose: Nose normal.      Mouth/Throat:      Pharynx: Oropharynx is clear.   Eyes:      Extraocular Movements: Extraocular movements intact.      Pupils: Pupils are equal, round, and reactive to light.   Neck:      Thyroid: No thyroid mass.      Vascular: No carotid bruit.   Cardiovascular:      Rate and Rhythm: Normal rate and regular rhythm.      Pulses: Normal pulses.      Heart sounds: Normal heart sounds. No murmur heard.  Pulmonary:      Effort: Pulmonary effort is normal.      Breath sounds: Normal air entry. Rales present. No wheezing.      Comments: Fairly good air exchange bilateral  Abdominal:      General: Bowel sounds are normal. There is no distension.      Palpations: Abdomen is soft.      Tenderness: There is no abdominal tenderness.   Musculoskeletal:         General: Normal range of motion.      Cervical back: Neck supple. No rigidity.   Skin:     General: Skin is warm.      Coloration: Skin is not jaundiced.      Findings: No lesion.   Neurological:      General: No focal deficit present.      Mental  Status: She is alert and oriented to person, place, and time.      Cranial Nerves: No cranial nerve deficit.   Psychiatric:         Attention and Perception: Attention normal.         Mood and Affect: Mood normal.         Behavior: Behavior normal. Behavior is cooperative.         Thought Content: Thought content normal.         Cognition and Memory: Cognition normal.             Significant Labs: All pertinent labs within the past 24 hours have been reviewed.  BMP:   Recent Labs   Lab 08/09/24 0440 08/10/24  0408   GLU 96  --      --    K 3.4*  --      --    CO2 31  --    BUN 18 17   CREATININE 0.90 0.76   CALCIUM 8.1*  --      CBC:   Recent Labs   Lab 08/09/24 0440   WBC 7.83   HGB 9.3*   HCT 30.6*        CMP:   Recent Labs   Lab 08/09/24  0440 08/10/24  0408     --    K 3.4*  --      --    CO2 31  --    GLU 96  --    BUN 18 17   CREATININE 0.90 0.76   CALCIUM 8.1*  --    ANIONGAP 8  --        Significant Imaging: I have reviewed all pertinent imaging results/findings within the past 24 hours.    Imaging Results              US Lower Extremity Veins Bilateral (Final result)  Result time 08/08/24 06:37:19      Final result by Yvon Shaw MD (08/08/24 06:37:19)                   Impression:      No evidence of deep venous thrombosis in either lower extremity.      Electronically signed by: Yvon Shaw  Date:    08/08/2024  Time:    06:37               Narrative:    EXAMINATION:  US LOWER EXTREMITY VEINS BILATERAL    CLINICAL HISTORY:  elevated DD with dyspnea and lung cancer;    TECHNIQUE:  Duplex and color flow Doppler and dynamic compression was performed of the bilateral lower extremity veins.  Ultrasound images captured and stored.    COMPARISON:  None    FINDINGS:  Right thigh veins: The common femoral, femoral, popliteal, upper greater saphenous, and deep femoral veins are patent and free of thrombus. The veins are normally compressible and have normal phasic flow and  augmentation response.    Right calf veins: The visualized calf veins are patent.    Left thigh veins: The common femoral, femoral, popliteal, upper greater saphenous, and deep femoral veins are patent and free of thrombus. The veins are normally compressible and have normal phasic flow and augmentation response.    Left calf veins: The visualized calf veins are patent.    Miscellaneous: Left popliteal fossa Baker's cyst incidentally noted.                                       CTA Chest Non-Coronary (PE Studies) (Final result)  Result time 08/08/24 06:25:30      Final result by Yvon Shaw MD (08/08/24 06:25:30)                   Impression:      No pulmonary embolus.    The bilateral hilar lesions appear more prominent than the prior cross-sectional imaging 11/17/2023.  There also bilateral opacities.  Findings may reflect progression of disease.  Underlying infectious/inflammatory process a consideration as well.      Electronically signed by: Yvon Shaw  Date:    08/08/2024  Time:    06:25               Narrative:    EXAMINATION:  CTA CHEST NON CORONARY (PE STUDIES)    CLINICAL HISTORY:  Pulmonary embolism (PE) suspected, high prob;    TECHNIQUE:  Low dose axial images, sagittal and coronal reformations were obtained from the thoracic inlet to the lung bases following the IV administration of 65 mL of Isovue 370.  Contrast timing was optimized to evaluate the pulmonary arteries.  MIP images were performed.    The CT examination was performed using one or more of the following dose reduction techniques: Automated exposure control, adjustment of the mA and kV according to patient's size, use of acute or iterative reconstruction techniques.    COMPARISON:  08/07/2024 chest radiograph    FINDINGS:  No pulmonary embolus detected.    Heart size enlarged mildly.  Coronary and thoracic aortic calcifications.  Right and left hilar lesions are present.  There is opacification and consolidation of the anterior right  upper lobe as well as of the right lower lobe.  There is emphysema.  Some motion degrades the exam.  No pneumothorax.  Bronchial thickening seen of the lung bases as well.  Interlobular septal thickening more notably of the lung bases.    No acute abnormality of the upper abdomen.  There is a left adrenal lesion that did not show uptake on prior PET CT.  No fracture detected.  Compression deformities lower thoracic spine.                                       X-Ray Chest AP (Final result)  Result time 08/08/24 06:38:35      Final result by Yvon Shaw MD (08/08/24 06:38:35)                   Impression:      Worsened bilateral opacities.  Progression of disease is a consideration as well as infectious/inflammatory process.      Electronically signed by: Yvon Shaw  Date:    08/08/2024  Time:    06:38               Narrative:    EXAMINATION:  XR CHEST AP PORTABLE    CLINICAL HISTORY:  weak;    TECHNIQUE:  Single frontal view of the chest was performed.    COMPARISON:  11/27/2023    FINDINGS:  Since the prior exam there is now increase in the perihilar opacities.  Development of right upper and lower lung opacities.  No pneumothorax or pleural effusion.                                    Intake/Output - Last 3 Shifts         08/08 0700 08/09 0659 08/09 0700  08/10 0659 08/10 0700  08/11 0659    P.O. 100 360     IV Piggyback 192.1 392.7     Total Intake(mL/kg) 292.1 (6.1) 752.7 (15.8)     Net +292.1 +752.7            Urine Occurrence 3 x 2 x 2 x    Stool Occurrence 3 x 2 x           Microbiology Results (last 7 days)       Procedure Component Value Units Date/Time    Blood culture x two cultures. Draw prior to antibiotics. [[id number]] Collected: 08/07/24 2251    Order Status: Completed Specimen: Blood Updated: 08/10/24 0623     Culture, Blood No Growth At 48 Hours    Blood culture x two cultures. Draw prior to antibiotics. [[id number]] Collected: 08/07/24 2254    Order Status: Completed Specimen: Blood Updated:  08/10/24 0623     Culture, Blood No Growth At 48 Hours    Influenza A & B by Molecular [[id number]]  (Normal) Collected: 08/07/24 2302    Order Status: Completed Specimen: Nasopharyngeal Swab Updated: 08/08/24 0109     INFLUENZA A MOLECULAR Negative     INFLUENZA B MOLECULAR  Negative              Assessment/Plan:      * Obstructive pneumonia  Patient has a diagnosis of pneumonia. The cause of the pneumonia is suspected to be bacterial in etiology but organism is not known. The pneumonia is stable. The patient has the following signs/symptoms of pneumonia: persistent hypoxia , cough, and shortness of breath. The patient does have a current oxygen requirement and the patient does have a home oxygen requirement. I have reviewed the pertinent imaging. The following cultures have been collected: Blood cultures The culture results are listed below.     Current antimicrobial regimen consists of the antibiotics listed below. Will monitor patient closely and Adjust treatment plan as follows add zithromax for atypicals if indicated.  MRSA nares pending.      Antibiotics (From admission, onward)      Start     Stop Route Frequency Ordered    08/09/24 1130  vancomycin (VANCOCIN) 1,000 mg in D5W 250 mL IVPB         -- IV Every 36 hours 08/08/24 1042    08/08/24 1141  vancomycin - pharmacy to dose         -- IV pharmacy to manage frequency 08/08/24 1042    08/08/24 0800  piperacillin-tazobactam (ZOSYN) 4.5 g in D5W 100 mL IVPB (MB+)         -- IV Every 8 hours (non-standard times) 08/08/24 0321            Microbiology Results (last 7 days)       Procedure Component Value Units Date/Time    Blood culture x two cultures. Draw prior to antibiotics. [[id number]] Collected: 08/07/24 2251    Order Status: Completed Specimen: Blood Updated: 08/09/24 0715     Culture, Blood No Growth At 24 Hours    Blood culture x two cultures. Draw prior to antibiotics. [[id number]] Collected: 08/07/24 2254    Order Status: Completed Specimen: Blood  Updated: 08/09/24 0715     Culture, Blood No Growth At 24 Hours    Influenza A & B by Molecular [[id number]]  (Normal) Collected: 08/07/24 2302    Order Status: Completed Specimen: Nasopharyngeal Swab Updated: 08/08/24 0109     INFLUENZA A MOLECULAR Negative     INFLUENZA B MOLECULAR  Negative            Severe protein-calorie malnutrition  Nutrition consulted. Most recent weight and BMI monitored-     Measurements:  Wt Readings from Last 1 Encounters:   08/08/24 47.6 kg (105 lb)   Body mass index is 16.95 kg/m².    Patient has been screened and assessed by RD.    Malnutrition Type:  Context: chronic illness  Level: severe    Malnutrition Characteristic Summary:  Energy Intake (Malnutrition): less than or equal to 50% for greater than or equal to 1 month  Subcutaneous Fat (Malnutrition): severe depletion  Muscle Mass (Malnutrition): severe depletion    Interventions/Recommendations (treatment strategy):  Recommend consider liberalizing diet to a Regular diet as appropriate. Encourage good PO intakes.    Per dietary:    Per ASPEN guidelines, patient meets criteria for severe protein-calorie malnutrition as evidenced by poor PO intakes <50% for greater than one month and severe fat and muscle depletion to orbital, upper arm, temple, and clavicle regions.     Prealbumin 3 and BMI 16       Paroxysmal atrial fibrillation  Patient with Paroxysmal (<7 days) atrial fibrillation which is controlled currently with Beta Blocker. Patient is currently in sinus rhythm.[id number] Score: 2. HASBLED Score: 2. Anticoagulation indicated. Anticoagulation done with eliquis . Eliquis hold if procedures needed    Pulmonary hypertension    Echo Summary  08/08/24     Show Result Comparison     Left Ventricle: The left ventricle is normal in size. Increased wall thickness. There is concentric remodeling. There is normal systolic function. Biplane (2D) method of discs ejection fraction is 65%.    Right Ventricle: Severe right ventricular  enlargement. Systolic function could not be assesed.    Right Atrium: Right atrium is severely dilated.    Aortic Valve: The aortic valve is a trileaflet valve. Moderately calcified cusps. Mildly restricted motion. There is mild stenosis. Aortic valve area by VTI is 1.60 cm². Aortic valve peak velocity is 2.90 m/s. Mean gradient is 16 mmHg. The dimensionless index is 0.46.    Mitral Valve: There is bileaflet sclerosis. Moderately thickened anterior leaflet. There is no stenosis. The mean pressure gradient across the mitral valve is 1 mmHg at a heart rate of  bpm.    Tricuspid Valve: There is moderate regurgitation.    Pulmonary Artery: There is pulmonary hypertension. The estimated pulmonary artery systolic pressure is 65 mmHg.    IVC/SVC: Elevated venous pressure at 15 mmHg.       Right bundle branch block  Echo pending to evaluate for PHTN.  She is on norvasc low dose without h/o HTN and BP soft at this time.  Will hold.  May have previous diagnosis and may be worsening.        AJMI (acute kidney injury)  JAMI is likely due to pre-renal azotemia due to dehydration. Baseline creatinine is  o.9 . Most recent creatinine and eGFR are listed below.  Recent Labs     08/07/24  2156 08/09/24  0440   CREATININE 1.44* 0.90   EGFRNORACEVR 39* 68        Plan  - JAMI is stable  - Avoid nephrotoxins and renally dose meds for GFR listed above  - Monitor urine output, serial BMP, and adjust therapy as needed  - hold HCT and lasix.  Due to markedly elevated BNP will hold off for now on IVF   - hold all nephrotoxic agents.      COPD (chronic obstructive pulmonary disease)  Patient's COPD is with exacerbation noted by continued dyspnea and worsening of baseline hypoxia currently.  Patient is currently on COPD Pathway. Continue scheduled inhalers Antibiotics and Supplemental oxygen and monitor respiratory status closely.     If scheduled bronchodilators and prn bronchodilators due not improve respiratory status may need to add steroids  but would be cautious in due to pneumonia most likely caused by obstruction from bilateral hilar masses  (R>L)    Lung cancer  Follows with Dr. Kraft.  Has chemo every six weeks.  Mediport looks good without tenderness or erythema.  She says she never had it taken out when she finished chemo the first time in 2019.  It has always worked fine.      08/08 Talked with Dr Kraft. Treated 2018 for adenoCa and earlier this year for squamous cell ca both of lung. Last Nov underwent evaluation by Dr Munguia. Seen then again also by Dr Kraft. Dx new primary squamous cell CA. Treated chemotx. In April saw Dr Kraft and started on Keytruda and given through infusion center. Has continued this through July. Missed her appt with Dr Kraft in May    Acute on chronic respiratory failure with hypoxia and hypercapnia  Patient with Hypercapnic and Hypoxic Respiratory failure which is Acute on chronic.  she is on home oxygen at 2 LPM. Supplemental oxygen was provided and noted- Oxygen Concentration (%):  [50] 50    .   Signs/symptoms of respiratory failure include- wheezing and lethargy. Contributing diagnoses includes - Pneumonia Labs and images were reviewed. Patient Has recent ABG, which has been reviewed. Will treat underlying causes and adjust management of respiratory failure as follows-     Will continue with supplemental oxygen.  May need bipap if continues with CO2 retention.  Monitor respiratory status closely.  Patient has home oxygen but she states that she does not use it but she thinks she may need to use it.  Will check echo for RVSP for PHTN.  She does have RBBB but no PTE.  Venous dopplers pending.      Cigarette nicotine dependence with nicotine-induced disorder  Dangers of cigarette smoking were reviewed with patient in detail. Patient was Counseled for 3-10 minutes. Nicotine replacement options were discussed. Nicotine replacement was discussed- prescribed    Patient counseled 4 minutes on importance to stopping use of  tobacco. Patient was told that stopping smoking was one of the most important actions that could be taken to improve personal health. Discussed tobacco increases risk for poor outcomes in cardiovascular disease, COPD and cancer. Informed stopping smoking reduces risk of premature death by as much as 10 years. Numerous other benefits associated with quitting including helping others by protection from risks associated with secondhand smoke. Told written information with treatment options and help lines will be given at discharge. Patient given opportunity to ask questions. Treated nicoderm 14mg daily            VTE Risk Mitigation (From admission, onward)           Ordered     enoxaparin injection 40 mg  Every 24 hours         08/10/24 1307                    Discharge Planning   TERE:      Code Status: Partial Code   Is the patient medically ready for discharge?:     Reason for patient still in hospital (select all that apply): Treatment  Discharge Plan A: Home with family                  Devendra Palmer DO  Department of Hospital Medicine   [facility]

## 2024-08-10 NOTE — SUBJECTIVE & OBJECTIVE
Interval History/Significant Events:  Patient without complaints    Review of Systems  Objective:     Vital Signs (Most Recent):  Temp: 97.1 °F (36.2 °C) (08/10/24 0428)  Pulse: (!) 51 (08/10/24 0428)  Resp: 15 (08/10/24 0428)  BP: 127/73 (08/10/24 0428)  SpO2: 95 % (08/10/24 0428) Vital Signs (24h Range):  Temp:  [97.1 °F (36.2 °C)-98.2 °F (36.8 °C)] 97.1 °F (36.2 °C)  Pulse:  [51-90] 51  Resp:  [15-28] 15  SpO2:  [91 %-97 %] 95 %  BP: (102-127)/(40-73) 127/73   Weight: 47.6 kg (105 lb)  Body mass index is 16.95 kg/m².      Intake/Output Summary (Last 24 hours) at 8/10/2024 0632  Last data filed at 8/9/2024 1838  Gross per 24 hour   Intake 752.68 ml   Output --   Net 752.68 ml          Physical Exam  Vitals reviewed.   Constitutional:       Appearance: Normal appearance.      Interventions: She is not intubated.  HENT:      Head: Normocephalic and atraumatic.      Nose: Nose normal.      Mouth/Throat:      Mouth: Mucous membranes are dry.      Pharynx: Oropharynx is clear.   Eyes:      Extraocular Movements: Extraocular movements intact.      Conjunctiva/sclera: Conjunctivae normal.      Pupils: Pupils are equal, round, and reactive to light.   Cardiovascular:      Rate and Rhythm: Normal rate.      Heart sounds: Normal heart sounds. No murmur heard.  Pulmonary:      Effort: Pulmonary effort is normal. She is not intubated.      Breath sounds: Normal breath sounds.   Abdominal:      General: Abdomen is flat. Bowel sounds are normal.      Palpations: Abdomen is soft.   Musculoskeletal:         General: Normal range of motion.      Cervical back: Normal range of motion and neck supple.      Right lower leg: No edema.      Left lower leg: No edema.   Skin:     General: Skin is warm and dry.      Capillary Refill: Capillary refill takes less than 2 seconds.   Neurological:      General: No focal deficit present.      Mental Status: She is alert and oriented to person, place, and time.   Psychiatric:         Mood and  Affect: Mood normal.         Behavior: Behavior normal.            Vents:  Oxygen Concentration (%): 32 (08/09/24 2003)  Lines/Drains/Airways       Peripheral Intravenous Line  Duration                  Peripheral IV - Single Lumen 08/07/24 18 G Left Antecubital 3 days         Peripheral IV - Single Lumen 08/07/24 2255 20 G Anterior;Proximal;Right Forearm 2 days                  Significant Labs:    CBC/Anemia Profile:  Recent Labs   Lab 08/09/24  0440   WBC 7.83   HGB 9.3*   HCT 30.6*      MCV 87.2   RDW 17.2*   FOLATE 3.5   [id number]        Chemistries:  Recent Labs   Lab 08/09/24  0440      K 3.4*      CO2 31   BUN 18   CREATININE 0.90   CALCIUM 8.1*       Recent Lab Results       None            Significant Imaging:  I have reviewed all pertinent imaging results/findings within the past 24 hours.

## 2024-08-10 NOTE — ASSESSMENT & PLAN NOTE
and Cande plan to do an EBUS on Wednesday he can be inpatient her out patient and consider intervention with a stent.  She is not a great candidate for further chemotherapy however this may give her significant palliation of her symptoms

## 2024-08-10 NOTE — ASSESSMENT & PLAN NOTE
Patient has a diagnosis of pneumonia. The cause of the pneumonia is suspected to be bacterial in etiology but organism is not known. The pneumonia is stable. The patient has the following signs/symptoms of pneumonia: persistent hypoxia , cough, and shortness of breath. The patient does have a current oxygen requirement and the patient does have a home oxygen requirement. I have reviewed the pertinent imaging. The following cultures have been collected: Blood cultures The culture results are listed below.     Current antimicrobial regimen consists of the antibiotics listed below. Will monitor patient closely and Adjust treatment plan as follows add zithromax for atypicals if indicated.  MRSA nares pending.      Antibiotics (From admission, onward)    Start     Stop Route Frequency Ordered    08/09/24 1130  vancomycin (VANCOCIN) 1,000 mg in D5W 250 mL IVPB         -- IV Every 36 hours 08/08/24 1042    08/08/24 1141  vancomycin - pharmacy to dose         -- IV pharmacy to manage frequency 08/08/24 1042    08/08/24 0800  piperacillin-tazobactam (ZOSYN) 4.5 g in D5W 100 mL IVPB (MB+)         -- IV Every 8 hours (non-standard times) 08/08/24 0321          Microbiology Results (last 7 days)     Procedure Component Value Units Date/Time    Blood culture x two cultures. Draw prior to antibiotics. [[id number]] Collected: 08/07/24 2251    Order Status: Completed Specimen: Blood Updated: 08/09/24 0715     Culture, Blood No Growth At 24 Hours    Blood culture x two cultures. Draw prior to antibiotics. [[id number]] Collected: 08/07/24 2254    Order Status: Completed Specimen: Blood Updated: 08/09/24 0715     Culture, Blood No Growth At 24 Hours    Influenza A & B by Molecular [[id number]]  (Normal) Collected: 08/07/24 2302    Order Status: Completed Specimen: Nasopharyngeal Swab Updated: 08/08/24 0109     INFLUENZA A MOLECULAR Negative     INFLUENZA B MOLECULAR  Negative

## 2024-08-10 NOTE — PROGRESS NOTES
Pharmacist Renal Dose Adjustment Note    Mayra Kelly is a 73 y.o. female being treated with the medication Lovenox    Patient Data:    Vital Signs (Most Recent):  Temp: 97.6 °F (36.4 °C) (08/10/24 1136)  Pulse: (!) 52 (08/10/24 1213)  Resp: 20 (08/10/24 1213)  BP: (!) 121/59 (08/10/24 1136)  SpO2: 97 % (08/10/24 1213) Vital Signs (72h Range):  Temp:  [97.1 °F (36.2 °C)-98.5 °F (36.9 °C)]   Pulse:  []   Resp:  [8-29]   BP: ()/(40-73)   SpO2:  [56 %-100 %]      Recent Labs   Lab 08/07/24  2156 08/09/24  0440 08/10/24  0408   CREATININE 1.44* 0.90 0.76     Serum creatinine: 0.76 mg/dL 08/10/24 0408  Estimated creatinine clearance: 50.3 mL/min    Medication:Lovenox dose: 30mg frequency q24hr will be changed to medication:Lovenox dose:40mg frequency:q24hr    Pharmacist's Name: Antonio Dnun  Pharmacist's Extension: [phone]

## 2024-08-10 NOTE — SUBJECTIVE & OBJECTIVE
Interval History:       Review of Systems   Constitutional:  Positive for fatigue and unexpected weight change. Negative for appetite change and fever.   HENT:  Negative for congestion, hearing loss and trouble swallowing.    Respiratory:  Positive for cough and shortness of breath. Negative for chest tightness and wheezing.    Cardiovascular:  Negative for chest pain and palpitations.   Gastrointestinal:  Negative for abdominal pain, constipation and nausea.   Genitourinary:  Negative for difficulty urinating and dysuria.   Musculoskeletal:  Positive for gait problem. Negative for back pain and neck stiffness.   Skin:  Negative for pallor and rash.   Neurological:  Negative for dizziness, speech difficulty and headaches.   Psychiatric/Behavioral:  Negative for confusion and suicidal ideas.      Objective:     Vital Signs (Most Recent):  Temp: 97.9 °F (36.6 °C) (08/09/24 1956)  Pulse: 65 (08/09/24 2003)  Resp: 18 (08/09/24 2003)  BP: (!) 103/40 (08/09/24 1956)  SpO2: (!) 94 % (08/09/24 2003) Vital Signs (24h Range):  Temp:  [97.3 °F (36.3 °C)-98.2 °F (36.8 °C)] 97.9 °F (36.6 °C)  Pulse:  [58-90] 65  Resp:  [18-28] 18  SpO2:  [83 %-97 %] 94 %  BP: ()/(40-55) 103/40     Weight: 47.6 kg (105 lb)  Body mass index is 16.95 kg/m².    Intake/Output Summary (Last 24 hours) at 8/9/2024 2301  Last data filed at 8/9/2024 1838  Gross per 24 hour   Intake 843.77 ml   Output --   Net 843.77 ml         Physical Exam  Vitals reviewed.   Constitutional:       General: She is awake. She is not in acute distress.     Appearance: She is well-developed and underweight. She is not toxic-appearing.      Comments: Actually looks much better than would suggest by xrays and lab   HENT:      Head: Normocephalic.      Nose: Nose normal.      Mouth/Throat:      Pharynx: Oropharynx is clear.   Eyes:      Extraocular Movements: Extraocular movements intact.      Pupils: Pupils are equal, round, and reactive to light.   Neck:      Thyroid:  No thyroid mass.      Vascular: No carotid bruit.   Cardiovascular:      Rate and Rhythm: Normal rate and regular rhythm.      Pulses: Normal pulses.      Heart sounds: Normal heart sounds. No murmur heard.  Pulmonary:      Effort: Pulmonary effort is normal.      Breath sounds: Normal air entry. Rales present. No wheezing.      Comments: Fairly good air exchange bilateral  Abdominal:      General: Bowel sounds are normal. There is no distension.      Palpations: Abdomen is soft.      Tenderness: There is no abdominal tenderness.   Musculoskeletal:         General: Normal range of motion.      Cervical back: Neck supple. No rigidity.   Skin:     General: Skin is warm.      Coloration: Skin is not jaundiced.      Findings: No lesion.   Neurological:      General: No focal deficit present.      Mental Status: She is alert and oriented to person, place, and time.      Cranial Nerves: No cranial nerve deficit.   Psychiatric:         Attention and Perception: Attention normal.         Mood and Affect: Mood normal.         Behavior: Behavior normal. Behavior is cooperative.         Thought Content: Thought content normal.         Cognition and Memory: Cognition normal.             Significant Labs: All pertinent labs within the past 24 hours have been reviewed.  BMP:   Recent Labs   Lab 08/09/24  0440   GLU 96      K 3.4*      CO2 31   BUN 18   CREATININE 0.90   CALCIUM 8.1*     CBC:   Recent Labs   Lab 08/07/24  2356 08/09/24  0440   WBC  --  7.83   HGB  --  9.3*   HCT 32* 30.6*   PLT  --  296     CMP:   Recent Labs   Lab 08/09/24  0440      K 3.4*      CO2 31   GLU 96   BUN 18   CREATININE 0.90   CALCIUM 8.1*   ANIONGAP 8       Significant Imaging: I have reviewed all pertinent imaging results/findings within the past 24 hours.    Imaging Results              US Lower Extremity Veins Bilateral (Final result)  Result time 08/08/24 06:37:19      Final result by Yvon Shaw MD (08/08/24 06:37:19)                    Impression:      No evidence of deep venous thrombosis in either lower extremity.      Electronically signed by: Yvon Shaw  Date:    08/08/2024  Time:    06:37               Narrative:    EXAMINATION:  US LOWER EXTREMITY VEINS BILATERAL    CLINICAL HISTORY:  elevated DD with dyspnea and lung cancer;    TECHNIQUE:  Duplex and color flow Doppler and dynamic compression was performed of the bilateral lower extremity veins.  Ultrasound images captured and stored.    COMPARISON:  None    FINDINGS:  Right thigh veins: The common femoral, femoral, popliteal, upper greater saphenous, and deep femoral veins are patent and free of thrombus. The veins are normally compressible and have normal phasic flow and augmentation response.    Right calf veins: The visualized calf veins are patent.    Left thigh veins: The common femoral, femoral, popliteal, upper greater saphenous, and deep femoral veins are patent and free of thrombus. The veins are normally compressible and have normal phasic flow and augmentation response.    Left calf veins: The visualized calf veins are patent.    Miscellaneous: Left popliteal fossa Baker's cyst incidentally noted.                                       CTA Chest Non-Coronary (PE Studies) (Final result)  Result time 08/08/24 06:25:30      Final result by Yvon Shaw MD (08/08/24 06:25:30)                   Impression:      No pulmonary embolus.    The bilateral hilar lesions appear more prominent than the prior cross-sectional imaging 11/17/2023.  There also bilateral opacities.  Findings may reflect progression of disease.  Underlying infectious/inflammatory process a consideration as well.      Electronically signed by: Yvon Shaw  Date:    08/08/2024  Time:    06:25               Narrative:    EXAMINATION:  CTA CHEST NON CORONARY (PE STUDIES)    CLINICAL HISTORY:  Pulmonary embolism (PE) suspected, high prob;    TECHNIQUE:  Low dose axial images, sagittal and coronal  reformations were obtained from the thoracic inlet to the lung bases following the IV administration of 65 mL of Isovue 370.  Contrast timing was optimized to evaluate the pulmonary arteries.  MIP images were performed.    The CT examination was performed using one or more of the following dose reduction techniques: Automated exposure control, adjustment of the mA and kV according to patient's size, use of acute or iterative reconstruction techniques.    COMPARISON:  08/07/2024 chest radiograph    FINDINGS:  No pulmonary embolus detected.    Heart size enlarged mildly.  Coronary and thoracic aortic calcifications.  Right and left hilar lesions are present.  There is opacification and consolidation of the anterior right upper lobe as well as of the right lower lobe.  There is emphysema.  Some motion degrades the exam.  No pneumothorax.  Bronchial thickening seen of the lung bases as well.  Interlobular septal thickening more notably of the lung bases.    No acute abnormality of the upper abdomen.  There is a left adrenal lesion that did not show uptake on prior PET CT.  No fracture detected.  Compression deformities lower thoracic spine.                                       X-Ray Chest AP (Final result)  Result time 08/08/24 06:38:35      Final result by Yvon Shaw MD (08/08/24 06:38:35)                   Impression:      Worsened bilateral opacities.  Progression of disease is a consideration as well as infectious/inflammatory process.      Electronically signed by: Yvon Shaw  Date:    08/08/2024  Time:    06:38               Narrative:    EXAMINATION:  XR CHEST AP PORTABLE    CLINICAL HISTORY:  weak;    TECHNIQUE:  Single frontal view of the chest was performed.    COMPARISON:  11/27/2023    FINDINGS:  Since the prior exam there is now increase in the perihilar opacities.  Development of right upper and lower lung opacities.  No pneumothorax or pleural effusion.                                    Intake/Output  - Last 3 Shifts         08/08 0700 08/09 0659 08/09 0700  08/10 0659    P.O. 100 360    IV Piggyback 192.1 392.7    Total Intake(mL/kg) 292.1 (6.1) 752.7 (15.8)    Net +292.1 +752.7          Urine Occurrence 3 x 2 x    Stool Occurrence 3 x 2 x          Microbiology Results (last 7 days)       Procedure Component Value Units Date/Time    Blood culture x two cultures. Draw prior to antibiotics. [[id number]] Collected: 08/07/24 2251    Order Status: Completed Specimen: Blood Updated: 08/09/24 0715     Culture, Blood No Growth At 24 Hours    Blood culture x two cultures. Draw prior to antibiotics. [[id number]] Collected: 08/07/24 2254    Order Status: Completed Specimen: Blood Updated: 08/09/24 0715     Culture, Blood No Growth At 24 Hours    Influenza A & B by Molecular [[id number]]  (Normal) Collected: 08/07/24 2302    Order Status: Completed Specimen: Nasopharyngeal Swab Updated: 08/08/24 0109     INFLUENZA A MOLECULAR Negative     INFLUENZA B MOLECULAR  Negative

## 2024-08-11 LAB
ANION GAP SERPL CALCULATED.3IONS-SCNC: 6 MMOL/L (ref 7–16)
BASOPHILS # BLD AUTO: 0.01 K/UL (ref 0–0.2)
BASOPHILS NFR BLD AUTO: 0.1 % (ref 0–1)
BUN SERPL-MCNC: 19 MG/DL (ref 7–18)
BUN/CREAT SERPL: 26 (ref 6–20)
CALCIUM SERPL-MCNC: 8.9 MG/DL (ref 8.5–10.1)
CHLORIDE SERPL-SCNC: 104 MMOL/L (ref 98–107)
CO2 SERPL-SCNC: 35 MMOL/L (ref 21–32)
CREAT SERPL-MCNC: 0.73 MG/DL (ref 0.55–1.02)
DIFFERENTIAL METHOD BLD: ABNORMAL
EGFR (NO RACE VARIABLE) (RUSH/TITUS): 88 ML/MIN/1.73M2
EOSINOPHIL # BLD AUTO: 0 K/UL (ref 0–0.5)
EOSINOPHIL NFR BLD AUTO: 0 % (ref 1–4)
ERYTHROCYTE [DISTWIDTH] IN BLOOD BY AUTOMATED COUNT: 17.3 % (ref 11.5–14.5)
GLUCOSE SERPL-MCNC: 165 MG/DL (ref 74–106)
HCT VFR BLD AUTO: 31.6 % (ref 38–47)
HGB BLD-MCNC: 9.1 G/DL (ref 12–16)
IMM GRANULOCYTES # BLD AUTO: 0.09 K/UL (ref 0–0.04)
IMM GRANULOCYTES NFR BLD: 0.9 % (ref 0–0.4)
LYMPHOCYTES # BLD AUTO: 0.37 K/UL (ref 1–4.8)
LYMPHOCYTES NFR BLD AUTO: 3.8 % (ref 27–41)
MCH RBC QN AUTO: 26 PG (ref 27–31)
MCHC RBC AUTO-ENTMCNC: 28.8 G/DL (ref 32–36)
MCV RBC AUTO: 90.3 FL (ref 80–96)
MONOCYTES # BLD AUTO: 0.22 K/UL (ref 0–0.8)
MONOCYTES NFR BLD AUTO: 2.3 % (ref 2–6)
MPC BLD CALC-MCNC: 10.4 FL (ref 9.4–12.4)
NEUTROPHILS # BLD AUTO: 8.99 K/UL (ref 1.8–7.7)
NEUTROPHILS NFR BLD AUTO: 92.9 % (ref 53–65)
NRBC # BLD AUTO: 0 X10E3/UL
NRBC, AUTO (.00): 0 %
PLATELET # BLD AUTO: 334 K/UL (ref 150–400)
POTASSIUM SERPL-SCNC: 4.1 MMOL/L (ref 3.5–5.1)
RBC # BLD AUTO: 3.5 M/UL (ref 4.2–5.4)
SODIUM SERPL-SCNC: 141 MMOL/L (ref 136–145)
WBC # BLD AUTO: 9.68 K/UL (ref 4.5–11)

## 2024-08-11 PROCEDURE — 25000003 PHARM REV CODE 250: Performed by: HOSPITALIST

## 2024-08-11 PROCEDURE — 85025 COMPLETE CBC W/AUTO DIFF WBC: CPT | Performed by: FAMILY MEDICINE

## 2024-08-11 PROCEDURE — 80048 BASIC METABOLIC PNL TOTAL CA: CPT | Performed by: FAMILY MEDICINE

## 2024-08-11 PROCEDURE — 63600175 PHARM REV CODE 636 W HCPCS: Performed by: FAMILY MEDICINE

## 2024-08-11 PROCEDURE — S4991 NICOTINE PATCH NONLEGEND: HCPCS | Performed by: HOSPITALIST

## 2024-08-11 PROCEDURE — 63600175 PHARM REV CODE 636 W HCPCS: Performed by: HOSPITALIST

## 2024-08-11 PROCEDURE — 27000221 HC OXYGEN, UP TO 24 HOURS

## 2024-08-11 PROCEDURE — 11000001 HC ACUTE MED/SURG PRIVATE ROOM

## 2024-08-11 PROCEDURE — 99232 SBSQ HOSP IP/OBS MODERATE 35: CPT | Mod: ,,, | Performed by: FAMILY MEDICINE

## 2024-08-11 PROCEDURE — 36415 COLL VENOUS BLD VENIPUNCTURE: CPT | Performed by: FAMILY MEDICINE

## 2024-08-11 PROCEDURE — 25000003 PHARM REV CODE 250: Performed by: FAMILY MEDICINE

## 2024-08-11 PROCEDURE — 25000242 PHARM REV CODE 250 ALT 637 W/ HCPCS: Performed by: HOSPITALIST

## 2024-08-11 PROCEDURE — 63600175 PHARM REV CODE 636 W HCPCS: Performed by: INTERNAL MEDICINE

## 2024-08-11 PROCEDURE — 94640 AIRWAY INHALATION TREATMENT: CPT

## 2024-08-11 PROCEDURE — 99900035 HC TECH TIME PER 15 MIN (STAT)

## 2024-08-11 PROCEDURE — 94761 N-INVAS EAR/PLS OXIMETRY MLT: CPT

## 2024-08-11 RX ADMIN — BUPROPION HYDROCHLORIDE 100 MG: 100 TABLET, FILM COATED, EXTENDED RELEASE ORAL at 09:08

## 2024-08-11 RX ADMIN — PIPERACILLIN SODIUM AND TAZOBACTAM SODIUM 4.5 G: 4; .5 INJECTION, POWDER, LYOPHILIZED, FOR SOLUTION INTRAVENOUS at 04:08

## 2024-08-11 RX ADMIN — POTASSIUM CHLORIDE 20 MEQ: 1500 TABLET, EXTENDED RELEASE ORAL at 09:08

## 2024-08-11 RX ADMIN — IPRATROPIUM BROMIDE AND ALBUTEROL SULFATE 3 ML: .5; 3 SOLUTION RESPIRATORY (INHALATION) at 07:08

## 2024-08-11 RX ADMIN — METHYLPREDNISOLONE SODIUM SUCCINATE 60 MG: 40 INJECTION, POWDER, FOR SOLUTION INTRAMUSCULAR; INTRAVENOUS at 06:08

## 2024-08-11 RX ADMIN — SODIUM CHLORIDE: 9 INJECTION, SOLUTION INTRAVENOUS at 01:08

## 2024-08-11 RX ADMIN — IPRATROPIUM BROMIDE AND ALBUTEROL SULFATE 3 ML: .5; 3 SOLUTION RESPIRATORY (INHALATION) at 04:08

## 2024-08-11 RX ADMIN — METHYLPREDNISOLONE SODIUM SUCCINATE 60 MG: 40 INJECTION, POWDER, FOR SOLUTION INTRAMUSCULAR; INTRAVENOUS at 12:08

## 2024-08-11 RX ADMIN — PIPERACILLIN SODIUM AND TAZOBACTAM SODIUM 4.5 G: 4; .5 INJECTION, POWDER, LYOPHILIZED, FOR SOLUTION INTRAVENOUS at 09:08

## 2024-08-11 RX ADMIN — VANCOMYCIN HYDROCHLORIDE 1000 MG: 1 INJECTION, POWDER, LYOPHILIZED, FOR SOLUTION INTRAVENOUS at 01:08

## 2024-08-11 RX ADMIN — IPRATROPIUM BROMIDE AND ALBUTEROL SULFATE 3 ML: .5; 3 SOLUTION RESPIRATORY (INHALATION) at 12:08

## 2024-08-11 RX ADMIN — NICOTINE 1 PATCH: 14 PATCH, EXTENDED RELEASE TRANSDERMAL at 09:08

## 2024-08-11 RX ADMIN — ENOXAPARIN SODIUM 40 MG: 40 INJECTION SUBCUTANEOUS at 05:08

## 2024-08-11 NOTE — SUBJECTIVE & OBJECTIVE
Interval History:       Review of Systems   Constitutional:  Positive for fatigue and unexpected weight change. Negative for appetite change and fever.   HENT:  Negative for congestion, hearing loss and trouble swallowing.    Respiratory:  Positive for cough and shortness of breath. Negative for chest tightness and wheezing.    Cardiovascular:  Negative for chest pain and palpitations.   Gastrointestinal:  Negative for abdominal pain, constipation and nausea.   Genitourinary:  Negative for difficulty urinating and dysuria.   Musculoskeletal:  Positive for gait problem. Negative for back pain and neck stiffness.   Skin:  Negative for pallor and rash.   Neurological:  Negative for dizziness, speech difficulty and headaches.   Psychiatric/Behavioral:  Negative for confusion and suicidal ideas.      Objective:     Vital Signs (Most Recent):  Temp: 97.3 °F (36.3 °C) (08/11/24 1143)  Pulse: 73 (08/11/24 1235)  Resp: (!) 24 (08/11/24 1235)  BP: 129/61 (08/11/24 1143)  SpO2: (!) 86 % (Patient having coughing spell upon entering room. 96% post treatment) (08/11/24 1235) Vital Signs (24h Range):  Temp:  [97.3 °F (36.3 °C)-97.7 °F (36.5 °C)] 97.3 °F (36.3 °C)  Pulse:  [] 73  Resp:  [16-24] 24  SpO2:  [75 %-99 %] 86 %  BP: ()/(48-68) 129/61     Weight: 47.6 kg (105 lb)  Body mass index is 16.95 kg/m².    Intake/Output Summary (Last 24 hours) at 8/11/2024 1431  Last data filed at 8/11/2024 1244  Gross per 24 hour   Intake 700 ml   Output 6 ml   Net 694 ml         Physical Exam  Vitals reviewed.   Constitutional:       General: She is awake. She is not in acute distress.     Appearance: She is well-developed and underweight. She is not toxic-appearing.      Comments: Actually looks much better than would suggest by xrays and lab   HENT:      Head: Normocephalic.      Nose: Nose normal.      Mouth/Throat:      Pharynx: Oropharynx is clear.   Eyes:      Extraocular Movements: Extraocular movements intact.      Pupils:  Pupils are equal, round, and reactive to light.   Neck:      Thyroid: No thyroid mass.      Vascular: No carotid bruit.   Cardiovascular:      Rate and Rhythm: Normal rate and regular rhythm.      Pulses: Normal pulses.      Heart sounds: Normal heart sounds. No murmur heard.  Pulmonary:      Effort: Pulmonary effort is normal.      Breath sounds: Normal air entry. Rales present. No wheezing.      Comments: Fairly good air exchange bilateral  Abdominal:      General: Bowel sounds are normal. There is no distension.      Palpations: Abdomen is soft.      Tenderness: There is no abdominal tenderness.   Musculoskeletal:         General: Normal range of motion.      Cervical back: Neck supple. No rigidity.   Skin:     General: Skin is warm.      Coloration: Skin is not jaundiced.      Findings: No lesion.   Neurological:      General: No focal deficit present.      Mental Status: She is alert and oriented to person, place, and time.      Cranial Nerves: No cranial nerve deficit.   Psychiatric:         Attention and Perception: Attention normal.         Mood and Affect: Mood normal.         Behavior: Behavior normal. Behavior is cooperative.         Thought Content: Thought content normal.         Cognition and Memory: Cognition normal.             Significant Labs: All pertinent labs within the past 24 hours have been reviewed.  BMP:   Recent Labs   Lab 08/11/24 0312   *      K 4.1      CO2 35*   BUN 19*   CREATININE 0.73   CALCIUM 8.9     CBC:   Recent Labs   Lab 08/11/24 0312   WBC 9.68   HGB 9.1*   HCT 31.6*        CMP:   Recent Labs   Lab 08/10/24  0408 08/11/24 0312   NA  --  141   K  --  4.1   CL  --  104   CO2  --  35*   GLU  --  165*   BUN 17 19*   CREATININE 0.76 0.73   CALCIUM  --  8.9   ANIONGAP  --  6*       Significant Imaging: I have reviewed all pertinent imaging results/findings within the past 24 hours.    Imaging Results              US Lower Extremity Veins Bilateral  (Final result)  Result time 08/08/24 06:37:19      Final result by Yvon Shaw MD (08/08/24 06:37:19)                   Impression:      No evidence of deep venous thrombosis in either lower extremity.      Electronically signed by: Yvon Shaw  Date:    08/08/2024  Time:    06:37               Narrative:    EXAMINATION:  US LOWER EXTREMITY VEINS BILATERAL    CLINICAL HISTORY:  elevated DD with dyspnea and lung cancer;    TECHNIQUE:  Duplex and color flow Doppler and dynamic compression was performed of the bilateral lower extremity veins.  Ultrasound images captured and stored.    COMPARISON:  None    FINDINGS:  Right thigh veins: The common femoral, femoral, popliteal, upper greater saphenous, and deep femoral veins are patent and free of thrombus. The veins are normally compressible and have normal phasic flow and augmentation response.    Right calf veins: The visualized calf veins are patent.    Left thigh veins: The common femoral, femoral, popliteal, upper greater saphenous, and deep femoral veins are patent and free of thrombus. The veins are normally compressible and have normal phasic flow and augmentation response.    Left calf veins: The visualized calf veins are patent.    Miscellaneous: Left popliteal fossa Baker's cyst incidentally noted.                                       CTA Chest Non-Coronary (PE Studies) (Final result)  Result time 08/08/24 06:25:30      Final result by Yvon Shaw MD (08/08/24 06:25:30)                   Impression:      No pulmonary embolus.    The bilateral hilar lesions appear more prominent than the prior cross-sectional imaging 11/17/2023.  There also bilateral opacities.  Findings may reflect progression of disease.  Underlying infectious/inflammatory process a consideration as well.      Electronically signed by: Yvon Shaw  Date:    08/08/2024  Time:    06:25               Narrative:    EXAMINATION:  CTA CHEST NON CORONARY (PE STUDIES)    CLINICAL  HISTORY:  Pulmonary embolism (PE) suspected, high prob;    TECHNIQUE:  Low dose axial images, sagittal and coronal reformations were obtained from the thoracic inlet to the lung bases following the IV administration of 65 mL of Isovue 370.  Contrast timing was optimized to evaluate the pulmonary arteries.  MIP images were performed.    The CT examination was performed using one or more of the following dose reduction techniques: Automated exposure control, adjustment of the mA and kV according to patient's size, use of acute or iterative reconstruction techniques.    COMPARISON:  08/07/2024 chest radiograph    FINDINGS:  No pulmonary embolus detected.    Heart size enlarged mildly.  Coronary and thoracic aortic calcifications.  Right and left hilar lesions are present.  There is opacification and consolidation of the anterior right upper lobe as well as of the right lower lobe.  There is emphysema.  Some motion degrades the exam.  No pneumothorax.  Bronchial thickening seen of the lung bases as well.  Interlobular septal thickening more notably of the lung bases.    No acute abnormality of the upper abdomen.  There is a left adrenal lesion that did not show uptake on prior PET CT.  No fracture detected.  Compression deformities lower thoracic spine.                                       X-Ray Chest AP (Final result)  Result time 08/08/24 06:38:35      Final result by Yvon Shaw MD (08/08/24 06:38:35)                   Impression:      Worsened bilateral opacities.  Progression of disease is a consideration as well as infectious/inflammatory process.      Electronically signed by: Yvon Shaw  Date:    08/08/2024  Time:    06:38               Narrative:    EXAMINATION:  XR CHEST AP PORTABLE    CLINICAL HISTORY:  weak;    TECHNIQUE:  Single frontal view of the chest was performed.    COMPARISON:  11/27/2023    FINDINGS:  Since the prior exam there is now increase in the perihilar opacities.  Development of right  upper and lower lung opacities.  No pneumothorax or pleural effusion.                                    Intake/Output - Last 3 Shifts         08/09 0700  08/10 0659 08/10 0700 08/11 0659 08/11 0700 08/12 0659    P.O. 360 960 360    IV Piggyback 392.7 100     Total Intake(mL/kg) 752.7 (15.8) 1060 (22.3) 360 (7.6)    Urine (mL/kg/hr)  6 (0)     Total Output  6     Net +752.7 +1054 +360           Urine Occurrence 2 x 2 x     Stool Occurrence 2 x            Microbiology Results (last 7 days)       Procedure Component Value Units Date/Time    Blood culture x two cultures. Draw prior to antibiotics. [[id number]] Collected: 08/07/24 2251    Order Status: Completed Specimen: Blood Updated: 08/11/24 0651     Culture, Blood No Growth At 72 Hours    Blood culture x two cultures. Draw prior to antibiotics. [[id number]] Collected: 08/07/24 2254    Order Status: Completed Specimen: Blood Updated: 08/11/24 0651     Culture, Blood No Growth At 72 Hours    Influenza A & B by Molecular [[id number]]  (Normal) Collected: 08/07/24 2302    Order Status: Completed Specimen: Nasopharyngeal Swab Updated: 08/08/24 0109     INFLUENZA A MOLECULAR Negative     INFLUENZA B MOLECULAR  Negative

## 2024-08-11 NOTE — PROGRESS NOTES
[facility]  [facility] Medicine  Progress Note    Patient Name: Mayra Kelly  MRN: [id number]  Patient Class: IP- Inpatient   Admission Date: 8/7/2024  Length of Stay: 3 days  Attending Physician: Devendra Palmer DO  Primary Care Provider: Darlene Campoverde NP        Subjective:     Principal Problem:Obstructive pneumonia        HPI:  72 yo F presents to [facility] ED with increasing dyspnea.  She has had a cough productive of whitish sputum but no hemoptysis.  She has been worsening over the past three days but no orthopnea or edema.  No chest pain or palpitations.  She does take eliquis for PAF and is currently in sinus tach.  Initial trop unremarkable and second is pending for the trend.  Her proBNP is now about 37K which is up from her baseline of 650 and with the RBBB could think PTE but is on full dose eliquis.  CT was negative for PTE but she has bilateral hilar masses with right greater than left with multilobar pneumonia.  She does meet sepsis criteria with WBC, tachycardia and JAMI (creat usually 0.9 and now 1.4) but lactic acid is normal.      Patient states that she was treated for lung cancer [id number] with XRT and chemo and though her FOBT with lymph node and endobronchial biopsy not diagnostic for malignancy in 2023, she was eventually diagnosed with lung cancer and follow with Dr. Kraft for chemo every six weeks.  COVID is negative and MRSA pcr nares swab pending.      Remainder of ROS as below.   See assessment and plan below for problem based evaluation      Overview/Hospital Course:  08/08 Records reviewed. Presented increase SOB, cough and fatique. Some weight loss. Noted on evaluation worse CXR and CT chest. Hx treated adenoCa in 2018. Last Nov underwent evaluation by Dr Munguia. Seen then again also by Dr Kraft. Dx new primary squamous cell CA. Treated chemotx. In April saw Dr Kraft and started on Keytruda and given through infusion center. Has continued this through July. Missed  her appt with Dr Kraft in May. This information mainly from Dr Kraft in pt was not good historian. Pt continued to smoke 1/2 ppd; encourage to stop. Dr Whitmore and Dr Kraft ask to consult. Requires 50% ventimask to keep O2 sats in low 90s. At home been prior on 2L BNC O2. PHMx: also Eliquis for PAF. Now NSR.   08/09 Better now on only 4 L. Continue treat pneumonia. Talked a couple times today with her about code status in pt changed her mind about DNR, Discussed with nurse in room and pt now wishes to be DNI. SW to look if can get help with transportation outpt like medicaid. Discussed earlier with Dr Kraft. As improves DC home and follow up outpt.  08/10- the patient was seen examined resting comfortably in bed today, in no acute distress, with no acute events overnight.  Patient currently being treated for obstructive pneumonia and COPD.  Patient states that her respiratory status continues to improve.  She denies any shortness of breath or chest pain overnight.  Feels she is actually doing better.  Continues on nasal cannula O2 and IV antibiotics.  Plan is for EBUS this coming Wednesday by Interventional pulmonology.  08/11-   The patient was seen examined resting comfortably in bed,  in no acute distress with no acute events overnight.  Patient states that she continues to have improved respiratory status.  Denies any complaints today.  Scheduled for EBUS on Wednesday by Interventional pulmonology.    Interval History:       Review of Systems   Constitutional:  Positive for fatigue and unexpected weight change. Negative for appetite change and fever.   HENT:  Negative for congestion, hearing loss and trouble swallowing.    Respiratory:  Positive for cough and shortness of breath. Negative for chest tightness and wheezing.    Cardiovascular:  Negative for chest pain and palpitations.   Gastrointestinal:  Negative for abdominal pain, constipation and nausea.   Genitourinary:  Negative for difficulty urinating and  dysuria.   Musculoskeletal:  Positive for gait problem. Negative for back pain and neck stiffness.   Skin:  Negative for pallor and rash.   Neurological:  Negative for dizziness, speech difficulty and headaches.   Psychiatric/Behavioral:  Negative for confusion and suicidal ideas.      Objective:     Vital Signs (Most Recent):  Temp: 97.3 °F (36.3 °C) (08/11/24 1143)  Pulse: 73 (08/11/24 1235)  Resp: (!) 24 (08/11/24 1235)  BP: 129/61 (08/11/24 1143)  SpO2: (!) 86 % (Patient having coughing spell upon entering room. 96% post treatment) (08/11/24 1235) Vital Signs (24h Range):  Temp:  [97.3 °F (36.3 °C)-97.7 °F (36.5 °C)] 97.3 °F (36.3 °C)  Pulse:  [] 73  Resp:  [16-24] 24  SpO2:  [75 %-99 %] 86 %  BP: ()/(48-68) 129/61     Weight: 47.6 kg (105 lb)  Body mass index is 16.95 kg/m².    Intake/Output Summary (Last 24 hours) at 8/11/2024 1431  Last data filed at 8/11/2024 1244  Gross per 24 hour   Intake 700 ml   Output 6 ml   Net 694 ml         Physical Exam  Vitals reviewed.   Constitutional:       General: She is awake. She is not in acute distress.     Appearance: She is well-developed and underweight. She is not toxic-appearing.      Comments: Actually looks much better than would suggest by xrays and lab   HENT:      Head: Normocephalic.      Nose: Nose normal.      Mouth/Throat:      Pharynx: Oropharynx is clear.   Eyes:      Extraocular Movements: Extraocular movements intact.      Pupils: Pupils are equal, round, and reactive to light.   Neck:      Thyroid: No thyroid mass.      Vascular: No carotid bruit.   Cardiovascular:      Rate and Rhythm: Normal rate and regular rhythm.      Pulses: Normal pulses.      Heart sounds: Normal heart sounds. No murmur heard.  Pulmonary:      Effort: Pulmonary effort is normal.      Breath sounds: Normal air entry. Rales present. No wheezing.      Comments: Fairly good air exchange bilateral  Abdominal:      General: Bowel sounds are normal. There is no distension.       Palpations: Abdomen is soft.      Tenderness: There is no abdominal tenderness.   Musculoskeletal:         General: Normal range of motion.      Cervical back: Neck supple. No rigidity.   Skin:     General: Skin is warm.      Coloration: Skin is not jaundiced.      Findings: No lesion.   Neurological:      General: No focal deficit present.      Mental Status: She is alert and oriented to person, place, and time.      Cranial Nerves: No cranial nerve deficit.   Psychiatric:         Attention and Perception: Attention normal.         Mood and Affect: Mood normal.         Behavior: Behavior normal. Behavior is cooperative.         Thought Content: Thought content normal.         Cognition and Memory: Cognition normal.             Significant Labs: All pertinent labs within the past 24 hours have been reviewed.  BMP:   Recent Labs   Lab 08/11/24 0312   *      K 4.1      CO2 35*   BUN 19*   CREATININE 0.73   CALCIUM 8.9     CBC:   Recent Labs   Lab 08/11/24 0312   WBC 9.68   HGB 9.1*   HCT 31.6*        CMP:   Recent Labs   Lab 08/10/24  0408 08/11/24 0312   NA  --  141   K  --  4.1   CL  --  104   CO2  --  35*   GLU  --  165*   BUN 17 19*   CREATININE 0.76 0.73   CALCIUM  --  8.9   ANIONGAP  --  6*       Significant Imaging: I have reviewed all pertinent imaging results/findings within the past 24 hours.    Imaging Results              US Lower Extremity Veins Bilateral (Final result)  Result time 08/08/24 06:37:19      Final result by Yvon Shaw MD (08/08/24 06:37:19)                   Impression:      No evidence of deep venous thrombosis in either lower extremity.      Electronically signed by: Yvon Shaw  Date:    08/08/2024  Time:    06:37               Narrative:    EXAMINATION:  US LOWER EXTREMITY VEINS BILATERAL    CLINICAL HISTORY:  elevated DD with dyspnea and lung cancer;    TECHNIQUE:  Duplex and color flow Doppler and dynamic compression was performed of the bilateral  lower extremity veins.  Ultrasound images captured and stored.    COMPARISON:  None    FINDINGS:  Right thigh veins: The common femoral, femoral, popliteal, upper greater saphenous, and deep femoral veins are patent and free of thrombus. The veins are normally compressible and have normal phasic flow and augmentation response.    Right calf veins: The visualized calf veins are patent.    Left thigh veins: The common femoral, femoral, popliteal, upper greater saphenous, and deep femoral veins are patent and free of thrombus. The veins are normally compressible and have normal phasic flow and augmentation response.    Left calf veins: The visualized calf veins are patent.    Miscellaneous: Left popliteal fossa Baker's cyst incidentally noted.                                       CTA Chest Non-Coronary (PE Studies) (Final result)  Result time 08/08/24 06:25:30      Final result by Yvon Shaw MD (08/08/24 06:25:30)                   Impression:      No pulmonary embolus.    The bilateral hilar lesions appear more prominent than the prior cross-sectional imaging 11/17/2023.  There also bilateral opacities.  Findings may reflect progression of disease.  Underlying infectious/inflammatory process a consideration as well.      Electronically signed by: Yvon Shaw  Date:    08/08/2024  Time:    06:25               Narrative:    EXAMINATION:  CTA CHEST NON CORONARY (PE STUDIES)    CLINICAL HISTORY:  Pulmonary embolism (PE) suspected, high prob;    TECHNIQUE:  Low dose axial images, sagittal and coronal reformations were obtained from the thoracic inlet to the lung bases following the IV administration of 65 mL of Isovue 370.  Contrast timing was optimized to evaluate the pulmonary arteries.  MIP images were performed.    The CT examination was performed using one or more of the following dose reduction techniques: Automated exposure control, adjustment of the mA and kV according to patient's size, use of acute or  iterative reconstruction techniques.    COMPARISON:  08/07/2024 chest radiograph    FINDINGS:  No pulmonary embolus detected.    Heart size enlarged mildly.  Coronary and thoracic aortic calcifications.  Right and left hilar lesions are present.  There is opacification and consolidation of the anterior right upper lobe as well as of the right lower lobe.  There is emphysema.  Some motion degrades the exam.  No pneumothorax.  Bronchial thickening seen of the lung bases as well.  Interlobular septal thickening more notably of the lung bases.    No acute abnormality of the upper abdomen.  There is a left adrenal lesion that did not show uptake on prior PET CT.  No fracture detected.  Compression deformities lower thoracic spine.                                       X-Ray Chest AP (Final result)  Result time 08/08/24 06:38:35      Final result by Yvon Shaw MD (08/08/24 06:38:35)                   Impression:      Worsened bilateral opacities.  Progression of disease is a consideration as well as infectious/inflammatory process.      Electronically signed by: Yvon Shaw  Date:    08/08/2024  Time:    06:38               Narrative:    EXAMINATION:  XR CHEST AP PORTABLE    CLINICAL HISTORY:  weak;    TECHNIQUE:  Single frontal view of the chest was performed.    COMPARISON:  11/27/2023    FINDINGS:  Since the prior exam there is now increase in the perihilar opacities.  Development of right upper and lower lung opacities.  No pneumothorax or pleural effusion.                                    Intake/Output - Last 3 Shifts         08/09 0700  08/10 0659 08/10 0700  08/11 0659 08/11 0700 08/12 0659    P.O. 360 960 360    IV Piggyback 392.7 100     Total Intake(mL/kg) 752.7 (15.8) 1060 (22.3) 360 (7.6)    Urine (mL/kg/hr)  6 (0)     Total Output  6     Net +752.7 +1054 +360           Urine Occurrence 2 x 2 x     Stool Occurrence 2 x            Microbiology Results (last 7 days)       Procedure Component Value Units  Date/Time    Blood culture x two cultures. Draw prior to antibiotics. [[id number]] Collected: 08/07/24 2251    Order Status: Completed Specimen: Blood Updated: 08/11/24 0651     Culture, Blood No Growth At 72 Hours    Blood culture x two cultures. Draw prior to antibiotics. [[id number]] Collected: 08/07/24 2254    Order Status: Completed Specimen: Blood Updated: 08/11/24 0651     Culture, Blood No Growth At 72 Hours    Influenza A & B by Molecular [[id number]]  (Normal) Collected: 08/07/24 2302    Order Status: Completed Specimen: Nasopharyngeal Swab Updated: 08/08/24 0109     INFLUENZA A MOLECULAR Negative     INFLUENZA B MOLECULAR  Negative              Assessment/Plan:      * Obstructive pneumonia  Patient has a diagnosis of pneumonia. The cause of the pneumonia is suspected to be bacterial in etiology but organism is not known. The pneumonia is stable. The patient has the following signs/symptoms of pneumonia: persistent hypoxia , cough, and shortness of breath. The patient does have a current oxygen requirement and the patient does have a home oxygen requirement. I have reviewed the pertinent imaging. The following cultures have been collected: Blood cultures The culture results are listed below.     Current antimicrobial regimen consists of the antibiotics listed below. Will monitor patient closely and Adjust treatment plan as follows add zithromax for atypicals if indicated.  MRSA nares pending.      Antibiotics (From admission, onward)      Start     Stop Route Frequency Ordered    08/09/24 1130  vancomycin (VANCOCIN) 1,000 mg in D5W 250 mL IVPB         -- IV Every 36 hours 08/08/24 1042    08/08/24 1141  vancomycin - pharmacy to dose         -- IV pharmacy to manage frequency 08/08/24 1042    08/08/24 0800  piperacillin-tazobactam (ZOSYN) 4.5 g in D5W 100 mL IVPB (MB+)         -- IV Every 8 hours (non-standard times) 08/08/24 0321            Microbiology Results (last 7 days)       Procedure Component Value  Units Date/Time    Blood culture x two cultures. Draw prior to antibiotics. [[id number]] Collected: 08/07/24 2251    Order Status: Completed Specimen: Blood Updated: 08/09/24 0715     Culture, Blood No Growth At 24 Hours    Blood culture x two cultures. Draw prior to antibiotics. [[id number]] Collected: 08/07/24 2254    Order Status: Completed Specimen: Blood Updated: 08/09/24 0715     Culture, Blood No Growth At 24 Hours    Influenza A & B by Molecular [[id number]]  (Normal) Collected: 08/07/24 2302    Order Status: Completed Specimen: Nasopharyngeal Swab Updated: 08/08/24 0109     INFLUENZA A MOLECULAR Negative     INFLUENZA B MOLECULAR  Negative            Severe protein-calorie malnutrition  Nutrition consulted. Most recent weight and BMI monitored-     Measurements:  Wt Readings from Last 1 Encounters:   08/08/24 47.6 kg (105 lb)   Body mass index is 16.95 kg/m².    Patient has been screened and assessed by RD.    Malnutrition Type:  Context: chronic illness  Level: severe    Malnutrition Characteristic Summary:  Energy Intake (Malnutrition): less than or equal to 50% for greater than or equal to 1 month  Subcutaneous Fat (Malnutrition): severe depletion  Muscle Mass (Malnutrition): severe depletion    Interventions/Recommendations (treatment strategy):  Recommend consider liberalizing diet to a Regular diet as appropriate. Encourage good PO intakes.    Per dietary:    Per ASPEN guidelines, patient meets criteria for severe protein-calorie malnutrition as evidenced by poor PO intakes <50% for greater than one month and severe fat and muscle depletion to orbital, upper arm, temple, and clavicle regions.     Prealbumin 3 and BMI 16       Paroxysmal atrial fibrillation  Patient with Paroxysmal (<7 days) atrial fibrillation which is controlled currently with Beta Blocker. Patient is currently in sinus rhythm.[id number] Score: 2. HASBLED Score: 2. Anticoagulation indicated. Anticoagulation done with eliquis .  Eliquis hold if procedures needed    Pulmonary hypertension    Echo Summary  08/08/24     Show Result Comparison     Left Ventricle: The left ventricle is normal in size. Increased wall thickness. There is concentric remodeling. There is normal systolic function. Biplane (2D) method of discs ejection fraction is 65%.    Right Ventricle: Severe right ventricular enlargement. Systolic function could not be assesed.    Right Atrium: Right atrium is severely dilated.    Aortic Valve: The aortic valve is a trileaflet valve. Moderately calcified cusps. Mildly restricted motion. There is mild stenosis. Aortic valve area by VTI is 1.60 cm². Aortic valve peak velocity is 2.90 m/s. Mean gradient is 16 mmHg. The dimensionless index is 0.46.    Mitral Valve: There is bileaflet sclerosis. Moderately thickened anterior leaflet. There is no stenosis. The mean pressure gradient across the mitral valve is 1 mmHg at a heart rate of  bpm.    Tricuspid Valve: There is moderate regurgitation.    Pulmonary Artery: There is pulmonary hypertension. The estimated pulmonary artery systolic pressure is 65 mmHg.    IVC/SVC: Elevated venous pressure at 15 mmHg.       Right bundle branch block  Echo pending to evaluate for PHTN.  She is on norvasc low dose without h/o HTN and BP soft at this time.  Will hold.  May have previous diagnosis and may be worsening.        JAMI (acute kidney injury)  JAMI is likely due to pre-renal azotemia due to dehydration. Baseline creatinine is  o.9 . Most recent creatinine and eGFR are listed below.  Recent Labs     08/07/24  2156 08/09/24  0440   CREATININE 1.44* 0.90   EGFRNORACEVR 39* 68        Plan  - JAMI is stable  - Avoid nephrotoxins and renally dose meds for GFR listed above  - Monitor urine output, serial BMP, and adjust therapy as needed  - hold HCT and lasix.  Due to markedly elevated BNP will hold off for now on IVF   - hold all nephrotoxic agents.      COPD (chronic obstructive pulmonary  disease)  Patient's COPD is with exacerbation noted by continued dyspnea and worsening of baseline hypoxia currently.  Patient is currently on COPD Pathway. Continue scheduled inhalers Antibiotics and Supplemental oxygen and monitor respiratory status closely.     If scheduled bronchodilators and prn bronchodilators due not improve respiratory status may need to add steroids but would be cautious in due to pneumonia most likely caused by obstruction from bilateral hilar masses  (R>L)    Lung cancer  Follows with Dr. Kraft.  Has chemo every six weeks.  Mediport looks good without tenderness or erythema.  She says she never had it taken out when she finished chemo the first time in 2019.  It has always worked fine.      08/08 Talked with Dr Kraft. Treated 2018 for adenoCa and earlier this year for squamous cell ca both of lung. Last Nov underwent evaluation by Dr Munguia. Seen then again also by Dr Kraft. Dx new primary squamous cell CA. Treated chemotx. In April saw Dr Kraft and started on Keytruda and given through infusion center. Has continued this through July. Missed her appt with Dr Kraft in May    Acute on chronic respiratory failure with hypoxia and hypercapnia  Patient with Hypercapnic and Hypoxic Respiratory failure which is Acute on chronic.  she is on home oxygen at 2 LPM. Supplemental oxygen was provided and noted- Oxygen Concentration (%):  [50] 50    .   Signs/symptoms of respiratory failure include- wheezing and lethargy. Contributing diagnoses includes - Pneumonia Labs and images were reviewed. Patient Has recent ABG, which has been reviewed. Will treat underlying causes and adjust management of respiratory failure as follows-     Will continue with supplemental oxygen.  May need bipap if continues with CO2 retention.  Monitor respiratory status closely.  Patient has home oxygen but she states that she does not use it but she thinks she may need to use it.  Will check echo for RVSP for PHTN.  She does have  RBBB but no PTE.  Venous dopplers pending.      Cigarette nicotine dependence with nicotine-induced disorder  Dangers of cigarette smoking were reviewed with patient in detail. Patient was Counseled for 3-10 minutes. Nicotine replacement options were discussed. Nicotine replacement was discussed- prescribed    Patient counseled 4 minutes on importance to stopping use of tobacco. Patient was told that stopping smoking was one of the most important actions that could be taken to improve personal health. Discussed tobacco increases risk for poor outcomes in cardiovascular disease, COPD and cancer. Informed stopping smoking reduces risk of premature death by as much as 10 years. Numerous other benefits associated with quitting including helping others by protection from risks associated with secondhand smoke. Told written information with treatment options and help lines will be given at discharge. Patient given opportunity to ask questions. Treated nicoderm 14mg daily            VTE Risk Mitigation (From admission, onward)           Ordered     enoxaparin injection 40 mg  Every 24 hours         08/10/24 1307                    Discharge Planning   TERE:      Code Status: Partial Code   Is the patient medically ready for discharge?:     Reason for patient still in hospital (select all that apply): Treatment  Discharge Plan A: Home with family                  Devendra Palmer DO  Department of Hospital Medicine   [facility]

## 2024-08-11 NOTE — PLAN OF CARE
Problem: Gas Exchange Impaired  Goal: Optimal Gas Exchange  Outcome: Progressing     Problem: Pneumonia  Goal: Effective Oxygenation and Ventilation  Outcome: Progressing

## 2024-08-11 NOTE — PLAN OF CARE
Problem: Breathing Pattern Ineffective  Goal: Effective Breathing Pattern  Outcome: Progressing  Intervention: Promote Improved Breathing Pattern  Flowsheets (Taken 8/10/2024 1913)  Airway/Ventilation Management: airway patency maintained  Breathing Techniques/Airway Clearance:   deep/controlled cough encouraged   pursed-lip breathing encouraged  Supportive Measures: active listening utilized  Head of Bed (HOB) Positioning:   HOB at 30-45 degrees   HOB at 45 degrees

## 2024-08-12 PROBLEM — N17.9 AKI (ACUTE KIDNEY INJURY): Status: RESOLVED | Noted: 2024-08-08 | Resolved: 2024-08-12

## 2024-08-12 LAB
BASOPHILS # BLD AUTO: 0.02 K/UL (ref 0–0.2)
BASOPHILS NFR BLD AUTO: 0.2 % (ref 0–1)
BUN SERPL-MCNC: 22 MG/DL (ref 7–18)
BUN/CREAT SERPL: 26 (ref 6–20)
CREAT SERPL-MCNC: 0.84 MG/DL (ref 0.55–1.02)
DIFFERENTIAL METHOD BLD: ABNORMAL
EGFR (NO RACE VARIABLE) (RUSH/TITUS): 74 ML/MIN/1.73M2
EOSINOPHIL # BLD AUTO: 0 K/UL (ref 0–0.5)
EOSINOPHIL NFR BLD AUTO: 0 % (ref 1–4)
ERYTHROCYTE [DISTWIDTH] IN BLOOD BY AUTOMATED COUNT: 17.4 % (ref 11.5–14.5)
HCT VFR BLD AUTO: 33.1 % (ref 38–47)
HGB BLD-MCNC: 9.8 G/DL (ref 12–16)
IMM GRANULOCYTES # BLD AUTO: 0.17 K/UL (ref 0–0.04)
IMM GRANULOCYTES NFR BLD: 1.7 % (ref 0–0.4)
LYMPHOCYTES # BLD AUTO: 0.39 K/UL (ref 1–4.8)
LYMPHOCYTES NFR BLD AUTO: 3.9 % (ref 27–41)
MCH RBC QN AUTO: 26.4 PG (ref 27–31)
MCHC RBC AUTO-ENTMCNC: 29.6 G/DL (ref 32–36)
MCV RBC AUTO: 89.2 FL (ref 80–96)
MONOCYTES # BLD AUTO: 0.32 K/UL (ref 0–0.8)
MONOCYTES NFR BLD AUTO: 3.2 % (ref 2–6)
MPC BLD CALC-MCNC: 10.4 FL (ref 9.4–12.4)
NEUTROPHILS # BLD AUTO: 9.08 K/UL (ref 1.8–7.7)
NEUTROPHILS NFR BLD AUTO: 91 % (ref 53–65)
NRBC # BLD AUTO: 0 X10E3/UL
NRBC, AUTO (.00): 0 %
PLATELET # BLD AUTO: 342 K/UL (ref 150–400)
RBC # BLD AUTO: 3.71 M/UL (ref 4.2–5.4)
VANCOMYCIN TROUGH SERPL-MCNC: 10.7 ΜG/ML (ref 10–20)
WBC # BLD AUTO: 9.98 K/UL (ref 4.5–11)

## 2024-08-12 PROCEDURE — S4991 NICOTINE PATCH NONLEGEND: HCPCS | Performed by: HOSPITALIST

## 2024-08-12 PROCEDURE — 25000003 PHARM REV CODE 250: Performed by: FAMILY MEDICINE

## 2024-08-12 PROCEDURE — 99233 SBSQ HOSP IP/OBS HIGH 50: CPT | Mod: ,,, | Performed by: FAMILY MEDICINE

## 2024-08-12 PROCEDURE — 25000003 PHARM REV CODE 250: Performed by: HOSPITALIST

## 2024-08-12 PROCEDURE — 85025 COMPLETE CBC W/AUTO DIFF WBC: CPT | Performed by: FAMILY MEDICINE

## 2024-08-12 PROCEDURE — 27000221 HC OXYGEN, UP TO 24 HOURS

## 2024-08-12 PROCEDURE — 63600175 PHARM REV CODE 636 W HCPCS: Performed by: INTERNAL MEDICINE

## 2024-08-12 PROCEDURE — 11000001 HC ACUTE MED/SURG PRIVATE ROOM

## 2024-08-12 PROCEDURE — 63600175 PHARM REV CODE 636 W HCPCS: Performed by: FAMILY MEDICINE

## 2024-08-12 PROCEDURE — 82565 ASSAY OF CREATININE: CPT | Performed by: HOSPITALIST

## 2024-08-12 PROCEDURE — 80202 ASSAY OF VANCOMYCIN: CPT | Performed by: FAMILY MEDICINE

## 2024-08-12 PROCEDURE — 94640 AIRWAY INHALATION TREATMENT: CPT

## 2024-08-12 PROCEDURE — 99900035 HC TECH TIME PER 15 MIN (STAT)

## 2024-08-12 PROCEDURE — 94761 N-INVAS EAR/PLS OXIMETRY MLT: CPT

## 2024-08-12 PROCEDURE — 36415 COLL VENOUS BLD VENIPUNCTURE: CPT | Performed by: FAMILY MEDICINE

## 2024-08-12 PROCEDURE — 99232 SBSQ HOSP IP/OBS MODERATE 35: CPT | Mod: ,,, | Performed by: INTERNAL MEDICINE

## 2024-08-12 PROCEDURE — 84520 ASSAY OF UREA NITROGEN: CPT | Performed by: HOSPITALIST

## 2024-08-12 PROCEDURE — 63600175 PHARM REV CODE 636 W HCPCS: Performed by: HOSPITALIST

## 2024-08-12 PROCEDURE — 25000242 PHARM REV CODE 250 ALT 637 W/ HCPCS: Performed by: HOSPITALIST

## 2024-08-12 RX ADMIN — METHYLPREDNISOLONE SODIUM SUCCINATE 60 MG: 40 INJECTION, POWDER, FOR SOLUTION INTRAMUSCULAR; INTRAVENOUS at 05:08

## 2024-08-12 RX ADMIN — PIPERACILLIN SODIUM AND TAZOBACTAM SODIUM 4.5 G: 4; .5 INJECTION, POWDER, LYOPHILIZED, FOR SOLUTION INTRAVENOUS at 11:08

## 2024-08-12 RX ADMIN — IPRATROPIUM BROMIDE AND ALBUTEROL SULFATE 3 ML: .5; 3 SOLUTION RESPIRATORY (INHALATION) at 07:08

## 2024-08-12 RX ADMIN — BUPROPION HYDROCHLORIDE 100 MG: 100 TABLET, FILM COATED, EXTENDED RELEASE ORAL at 08:08

## 2024-08-12 RX ADMIN — SODIUM CHLORIDE: 9 INJECTION, SOLUTION INTRAVENOUS at 11:08

## 2024-08-12 RX ADMIN — PIPERACILLIN SODIUM AND TAZOBACTAM SODIUM 4.5 G: 4; .5 INJECTION, POWDER, LYOPHILIZED, FOR SOLUTION INTRAVENOUS at 10:08

## 2024-08-12 RX ADMIN — PIPERACILLIN SODIUM AND TAZOBACTAM SODIUM 4.5 G: 4; .5 INJECTION, POWDER, LYOPHILIZED, FOR SOLUTION INTRAVENOUS at 04:08

## 2024-08-12 RX ADMIN — METHYLPREDNISOLONE SODIUM SUCCINATE 60 MG: 40 INJECTION, POWDER, FOR SOLUTION INTRAMUSCULAR; INTRAVENOUS at 11:08

## 2024-08-12 RX ADMIN — IPRATROPIUM BROMIDE AND ALBUTEROL SULFATE 3 ML: .5; 3 SOLUTION RESPIRATORY (INHALATION) at 02:08

## 2024-08-12 RX ADMIN — PIPERACILLIN SODIUM AND TAZOBACTAM SODIUM 4.5 G: 4; .5 INJECTION, POWDER, LYOPHILIZED, FOR SOLUTION INTRAVENOUS at 12:08

## 2024-08-12 RX ADMIN — POTASSIUM CHLORIDE 20 MEQ: 1500 TABLET, EXTENDED RELEASE ORAL at 08:08

## 2024-08-12 RX ADMIN — ENOXAPARIN SODIUM 40 MG: 40 INJECTION SUBCUTANEOUS at 05:08

## 2024-08-12 RX ADMIN — IPRATROPIUM BROMIDE AND ALBUTEROL SULFATE 3 ML: .5; 3 SOLUTION RESPIRATORY (INHALATION) at 11:08

## 2024-08-12 RX ADMIN — VANCOMYCIN HYDROCHLORIDE 1000 MG: 1 INJECTION, POWDER, LYOPHILIZED, FOR SOLUTION INTRAVENOUS at 08:08

## 2024-08-12 RX ADMIN — NICOTINE 1 PATCH: 14 PATCH, EXTENDED RELEASE TRANSDERMAL at 08:08

## 2024-08-12 RX ADMIN — METHYLPREDNISOLONE SODIUM SUCCINATE 60 MG: 40 INJECTION, POWDER, FOR SOLUTION INTRAMUSCULAR; INTRAVENOUS at 12:08

## 2024-08-12 RX ADMIN — METHYLPREDNISOLONE SODIUM SUCCINATE 60 MG: 40 INJECTION, POWDER, FOR SOLUTION INTRAMUSCULAR; INTRAVENOUS at 06:08

## 2024-08-12 NOTE — PROGRESS NOTES
[facility]  [facility] Medicine  Progress Note    Patient Name: Mayra Kelly  MRN: [id number]  Patient Class: IP- Inpatient   Admission Date: 8/7/2024  Length of Stay: 4 days  Attending Physician: Yeni Jones DO  Primary Care Provider: Darlene Campoverde NP        Subjective:     Principal Problem:Obstructive pneumonia        HPI:  74 yo F presents to [facility] ED with increasing dyspnea.  She has had a cough productive of whitish sputum but no hemoptysis.  She has been worsening over the past three days but no orthopnea or edema.  No chest pain or palpitations.  She does take eliquis for PAF and is currently in sinus tach.  Initial trop unremarkable and second is pending for the trend.  Her proBNP is now about 37K which is up from her baseline of 650 and with the RBBB could think PTE but is on full dose eliquis.  CT was negative for PTE but she has bilateral hilar masses with right greater than left with multilobar pneumonia.  She does meet sepsis criteria with WBC, tachycardia and JAMI (creat usually 0.9 and now 1.4) but lactic acid is normal.      Patient states that she was treated for lung cancer [id number] with XRT and chemo and though her FOBT with lymph node and endobronchial biopsy not diagnostic for malignancy in 2023, she was eventually diagnosed with lung cancer and follow with Dr. Kraft for chemo every six weeks.  COVID is negative and MRSA pcr nares swab pending.      Remainder of ROS as below.   See assessment and plan below for problem based evaluation      Overview/Hospital Course:  Hx treated adenoCa in 2018. Last Nov underwent evaluation by Dr Munguia. Seen then again also by Dr Kraft. Dx new primary squamous cell CA. Treated chemotx. In April saw Dr Kraft and started on Keytruda and given through infusion center. Has continued this through July. Missed her appt with Dr Kraft in May. This information mainly from Dr Kraft in pt was not good historian. Pt continued to smoke 1/2 ppd;  encourage to stop. Dr Whitmore and Dr Kraft ask to consult. Requires 50% ventimask to keep O2 sats in low 90s. At home been prior on 2L BNC O2. PHMx: also Eliquis for PAF. Now NSR.     Code status discussed this admission and pt changed her mind about DNR, Discussed with nurse in room and pt now wishes to be DNI.    Plan for EBUS on Wednesday 8/14 with disposition to be determined.         Interval History:     No significant events overnight, no new complaints or concerns. Continue antibiotics for underlying pneumonia.     Review of Systems   Constitutional:  Positive for fatigue and unexpected weight change. Negative for appetite change and fever.   HENT:  Negative for trouble swallowing.    Respiratory:  Positive for cough and shortness of breath. Negative for chest tightness and wheezing.    Cardiovascular:  Negative for chest pain and palpitations.   Gastrointestinal:  Negative for abdominal pain, constipation and nausea.   Musculoskeletal:  Positive for gait problem. Negative for back pain and neck stiffness.   Skin:  Negative for pallor and rash.   Neurological:  Negative for dizziness, speech difficulty and headaches.   Psychiatric/Behavioral:  Negative for confusion and suicidal ideas.      Objective:     Vital Signs (Most Recent):  Temp: 97.6 °F (36.4 °C) (08/12/24 1130)  Pulse: 96 (08/12/24 1130)  Resp: 16 (08/12/24 1130)  BP: (!) 106/46 (08/12/24 1130)  SpO2: (!) 90 % (08/12/24 1130) Vital Signs (24h Range):  Temp:  [97.3 °F (36.3 °C)-98.3 °F (36.8 °C)] 97.6 °F (36.4 °C)  Pulse:  [62-96] 96  Resp:  [16-24] 16  SpO2:  [84 %-96 %] 90 %  BP: ()/(46-75) 106/46     Weight: 47.6 kg (105 lb)  Body mass index is 16.95 kg/m².    Intake/Output Summary (Last 24 hours) at 8/12/2024 1218  Last data filed at 8/12/2024 0902  Gross per 24 hour   Intake 480 ml   Output --   Net 480 ml         Physical Exam  Constitutional:       General: She is awake. She is not in acute distress.     Appearance: She is well-developed  and underweight.   HENT:      Head: Normocephalic.      Mouth/Throat:      Pharynx: Oropharynx is clear.   Eyes:      Extraocular Movements: Extraocular movements intact.      Pupils: Pupils are equal, round, and reactive to light.   Neck:      Thyroid: No thyroid mass.   Cardiovascular:      Rate and Rhythm: Normal rate and regular rhythm.   Pulmonary:      Effort: Pulmonary effort is normal.      Breath sounds: Normal air entry. Rales present. No wheezing.   Abdominal:      Palpations: Abdomen is soft.      Tenderness: There is no abdominal tenderness.   Musculoskeletal:         General: Normal range of motion.   Skin:     Coloration: Skin is not jaundiced.      Findings: No lesion.   Neurological:      General: No focal deficit present.      Mental Status: She is alert and oriented to person, place, and time.   Psychiatric:         Behavior: Behavior is cooperative.             Significant Labs: All pertinent labs within the past 24 hours have been reviewed.    Significant Imaging: I have reviewed all pertinent imaging results/findings within the past 24 hours.    Assessment/Plan:      * Obstructive pneumonia  Patient has a diagnosis of pneumonia. The cause of the pneumonia is suspected to be bacterial in etiology but organism is not known. The pneumonia is stable. The patient has the following signs/symptoms of pneumonia: persistent hypoxia , cough, and shortness of breath. The patient does have a current oxygen requirement and the patient does have a home oxygen requirement. I have reviewed the pertinent imaging. The following cultures have been collected: Blood cultures The culture results are listed below.     Current antimicrobial regimen consists of the antibiotics listed below. Will monitor patient closely and continue current treatment plan unchanged.    Antibiotics (From admission, onward)      Start     Stop Route Frequency Ordered    08/10/24 1600  vancomycin (VANCOCIN) 1,000 mg in D5W 250 mL IVPB          -- IV Every 18 hours 08/10/24 1438    08/08/24 1141  vancomycin - pharmacy to dose         -- IV pharmacy to manage frequency 08/08/24 1042    08/08/24 0800  piperacillin-tazobactam (ZOSYN) 4.5 g in D5W 100 mL IVPB (MB+)         -- IV Every 8 hours (non-standard times) 08/08/24 0321            Microbiology Results (last 7 days)       Procedure Component Value Units Date/Time    Blood culture x two cultures. Draw prior to antibiotics. [[id number]] Collected: 08/07/24 2251    Order Status: Completed Specimen: Blood Updated: 08/12/24 0715     Culture, Blood No Growth At 72 Hours    Blood culture x two cultures. Draw prior to antibiotics. [[id number]] Collected: 08/07/24 2254    Order Status: Completed Specimen: Blood Updated: 08/12/24 0715     Culture, Blood No Growth At 72 Hours    Influenza A & B by Molecular [[id number]]  (Normal) Collected: 08/07/24 2302    Order Status: Completed Specimen: Nasopharyngeal Swab Updated: 08/08/24 0109     INFLUENZA A MOLECULAR Negative     INFLUENZA B MOLECULAR  Negative            Severe protein-calorie malnutrition  Nutrition consulted. Most recent weight and BMI monitored-     Measurements:  Wt Readings from Last 1 Encounters:   08/08/24 47.6 kg (105 lb)   Body mass index is 16.95 kg/m².    Patient has been screened and assessed by RD.    Malnutrition Type:  Context: chronic illness  Level: severe    Malnutrition Characteristic Summary:  Energy Intake (Malnutrition): less than or equal to 50% for greater than or equal to 1 month  Subcutaneous Fat (Malnutrition): severe depletion  Muscle Mass (Malnutrition): severe depletion    Interventions/Recommendations (treatment strategy):  Recommend consider liberalizing diet to a Regular diet as appropriate. Encourage good PO intakes.    Per dietary:    Per ASPEN guidelines, patient meets criteria for severe protein-calorie malnutrition as evidenced by poor PO intakes <50% for greater than one month and severe fat and muscle depletion to  orbital, upper arm, temple, and clavicle regions.     Prealbumin 3 and BMI 16       Paroxysmal atrial fibrillation  Patient with Paroxysmal (<7 days) atrial fibrillation which is controlled currently with Beta Blocker. Patient is currently in sinus rhythm.[id number] Score: 2. HASBLED Score: 2. Anticoagulation indicated. Anticoagulation done with eliquis . Eliquis hold if procedures needed    Pulmonary hypertension    Echo Summary  08/08/24     Show Result Comparison     Left Ventricle: The left ventricle is normal in size. Increased wall thickness. There is concentric remodeling. There is normal systolic function. Biplane (2D) method of discs ejection fraction is 65%.    Right Ventricle: Severe right ventricular enlargement. Systolic function could not be assesed.    Right Atrium: Right atrium is severely dilated.    Aortic Valve: The aortic valve is a trileaflet valve. Moderately calcified cusps. Mildly restricted motion. There is mild stenosis. Aortic valve area by VTI is 1.60 cm². Aortic valve peak velocity is 2.90 m/s. Mean gradient is 16 mmHg. The dimensionless index is 0.46.    Mitral Valve: There is bileaflet sclerosis. Moderately thickened anterior leaflet. There is no stenosis. The mean pressure gradient across the mitral valve is 1 mmHg at a heart rate of  bpm.    Tricuspid Valve: There is moderate regurgitation.    Pulmonary Artery: There is pulmonary hypertension. The estimated pulmonary artery systolic pressure is 65 mmHg.    IVC/SVC: Elevated venous pressure at 15 mmHg.       Right bundle branch block  She is on norvasc low dose without h/o HTN and BP soft at this time.  Will hold.      COPD (chronic obstructive pulmonary disease)  Patient's COPD is with exacerbation noted by continued dyspnea and worsening of baseline hypoxia currently.  Patient is currently on COPD Pathway. Continue scheduled inhalers Antibiotics and Supplemental oxygen and monitor respiratory status closely.     If scheduled  bronchodilators and prn bronchodilators due not improve respiratory status may need to add steroids but would be cautious in due to pneumonia most likely caused by obstruction from bilateral hilar masses  (R>L)    Lung cancer  Follows with Dr. Kraft.  Has chemo every six weeks.  Mediport looks good without tenderness or erythema.  She says she never had it taken out when she finished chemo the first time in 2019.  It has always worked fine.      08/08 Talked with Dr Kraft. Treated 2018 for adenoCa and earlier this year for squamous cell ca both of lung. Last Nov underwent evaluation by Dr Munguia. Seen then again also by Dr Kraft. Dx new primary squamous cell CA. Treated chemotx. In April saw Dr Kraft and started on Keytruda and given through infusion center. Has continued this through July. Missed her appt with Dr Kraft in May    Acute on chronic respiratory failure with hypoxia and hypercapnia  Patient with Hypercapnic and Hypoxic Respiratory failure which is Acute on chronic.  she is on home oxygen at 2 LPM. Supplemental oxygen was provided and noted- Oxygen Concentration (%):  [32] 32    .  Signs/symptoms of respiratory failure include- wheezing and lethargy. Contributing diagnoses includes - Pneumonia Labs and images were reviewed. Patient Has recent ABG, which has been reviewed. Will treat underlying causes and adjust management of respiratory failure as follows-     Will continue with supplemental oxygen.  May need bipap if continues with CO2 retention.  Monitor respiratory status closely.      Cigarette nicotine dependence with nicotine-induced disorder  Dangers of cigarette smoking were reviewed with patient in detail. Patient was Counseled for 3-10 minutes. Nicotine replacement options were discussed. Nicotine replacement was discussed- prescribed    Patient counseled 4 minutes on importance to stopping use of tobacco. Patient was told that stopping smoking was one of the most important actions that could be taken  to improve personal health. Discussed tobacco increases risk for poor outcomes in cardiovascular disease, COPD and cancer. Informed stopping smoking reduces risk of premature death by as much as 10 years. Numerous other benefits associated with quitting including helping others by protection from risks associated with secondhand smoke. Told written information with treatment options and help lines will be given at discharge. Patient given opportunity to ask questions. Treated nicoderm 14mg daily            VTE Risk Mitigation (From admission, onward)           Ordered     enoxaparin injection 40 mg  Every 24 hours         08/10/24 1307                    Discharge Planning   TERE:      Code Status: Partial Code   Is the patient medically ready for discharge?:     Reason for patient still in hospital (select all that apply): Treatment  Discharge Plan A: Home with family                  Yeni Jones DO  Department of Hospital Medicine   [facility]

## 2024-08-12 NOTE — PROGRESS NOTES
[facility]  Progress Note    Patient Name: Mayra Kelly  MRN: [id number]  Admission Date: 8/7/2024  Hospital Length of Stay: 4 days  Code Status: Partial Code  Attending Provider: Devendra Palmer DO  Primary Care Provider: Darlene Campoverde NP   Principal Problem: Obstructive pneumonia    Subjective:     HPI:  Seventy 73-year-old white female who presents with shortness of breath and respiratory distress her story starts in 2017 when she was diagnosed with left-sided cancer sounds like she got radiation to the chest and to chemotherapy and she says she got brain radiation at that time I do not know the cell type.  She was seen by  last year and had a nondiagnostic EBUS on the right but had no endobronchial lesions she followed up with a needle biopsy with Dr. Kraft and he tells me that she was diagnosed with squamous cell carcinoma and they discussed in January and February treatment with radiation chemotherapy she apparently did not keep her follow-up and presents now with more shortness of breath productive sputum she has a history of bad COPD in chronic oxygen and acute on top of chronic respiratory failure during workup she was found to what looks like a right upper lobe obstruction with postobstructive pneumonia    Hospital/ICU Course:  No notes on file    Interval History/Significant Events:  Patient without complaints this morning    Review of Systems  Objective:     Vital Signs (Most Recent):  Temp: 97.3 °F (36.3 °C) (08/12/24 0506)  Pulse: 64 (08/12/24 0506)  Resp: 16 (08/12/24 0506)  BP: (!) 118/50 (08/12/24 0506)  SpO2: (!) 94 % (08/12/24 0506) Vital Signs (24h Range):  Temp:  [97.3 °F (36.3 °C)-98.3 °F (36.8 °C)] 97.3 °F (36.3 °C)  Pulse:  [58-83] 64  Resp:  [16-24] 16  SpO2:  [81 %-96 %] 94 %  BP: ()/(50-75) 118/50   Weight: 47.6 kg (105 lb)  Body mass index is 16.95 kg/m².      Intake/Output Summary (Last 24 hours) at 8/12/2024 0525  Last  data filed at 8/11/2024 1244  Gross per 24 hour   Intake 360 ml   Output 5 ml   Net 355 ml          Physical Exam  Vitals reviewed.   Constitutional:       Appearance: Normal appearance.      Interventions: She is not intubated.  HENT:      Head: Normocephalic and atraumatic.      Nose: Nose normal.      Mouth/Throat:      Mouth: Mucous membranes are dry.      Pharynx: Oropharynx is clear.   Eyes:      Extraocular Movements: Extraocular movements intact.      Conjunctiva/sclera: Conjunctivae normal.      Pupils: Pupils are equal, round, and reactive to light.   Cardiovascular:      Rate and Rhythm: Normal rate.      Heart sounds: Normal heart sounds. No murmur heard.  Pulmonary:      Effort: Pulmonary effort is normal. She is not intubated.      Breath sounds: Normal breath sounds.   Abdominal:      General: Abdomen is flat. Bowel sounds are normal.      Palpations: Abdomen is soft.   Musculoskeletal:         General: Normal range of motion.      Cervical back: Normal range of motion and neck supple.      Right lower leg: No edema.      Left lower leg: No edema.   Skin:     General: Skin is warm and dry.      Capillary Refill: Capillary refill takes less than 2 seconds.   Neurological:      General: No focal deficit present.      Mental Status: She is alert and oriented to person, place, and time.   Psychiatric:         Mood and Affect: Mood normal.         Behavior: Behavior normal.            Vents:  Oxygen Concentration (%): 32 (08/11/24 1928)  Lines/Drains/Airways       Peripheral Intravenous Line  Duration                  Peripheral IV - Single Lumen 08/11/24 1816 22 G Left Antecubital <1 day                  Significant Labs:    CBC/Anemia Profile:  Recent Labs   Lab 08/11/24  0312   WBC 9.68   HGB 9.1*   HCT 31.6*      MCV 90.3   RDW 17.3*        Chemistries:  Recent Labs   Lab 08/11/24  0312      K 4.1      CO2 35*   BUN 19*   CREATININE 0.73   CALCIUM 8.9       Recent Lab Results        None            Significant Imaging:  I have reviewed all pertinent imaging results/findings within the past 24 hours.    ABG  Recent Labs   Lab 08/08/24  0600   PH 7.36   PO2 60*   PCO2 61*   HCO3 34.5*     Assessment/Plan:     Pulmonary  * Obstructive pneumonia   and Cande plan to do an EBUS on Wednesday he can be inpatient her out patient and consider intervention with a stent.  She is not a great candidate for further chemotherapy however this may give her significant palliation of her symptoms    COPD (chronic obstructive pulmonary disease)  Patient improving wean steroids as tolerated    Oncology  Lung cancer  Reviewed Dr. Jose's note             Jose Carlos Whitmore MD  Critical Care Medicine  [facility]

## 2024-08-12 NOTE — SUBJECTIVE & OBJECTIVE
Interval History/Significant Events:  Patient without complaints this morning    Review of Systems  Objective:     Vital Signs (Most Recent):  Temp: 97.3 °F (36.3 °C) (08/12/24 0506)  Pulse: 64 (08/12/24 0506)  Resp: 16 (08/12/24 0506)  BP: (!) 118/50 (08/12/24 0506)  SpO2: (!) 94 % (08/12/24 0506) Vital Signs (24h Range):  Temp:  [97.3 °F (36.3 °C)-98.3 °F (36.8 °C)] 97.3 °F (36.3 °C)  Pulse:  [58-83] 64  Resp:  [16-24] 16  SpO2:  [81 %-96 %] 94 %  BP: ()/(50-75) 118/50   Weight: 47.6 kg (105 lb)  Body mass index is 16.95 kg/m².      Intake/Output Summary (Last 24 hours) at 8/12/2024 0525  Last data filed at 8/11/2024 1244  Gross per 24 hour   Intake 360 ml   Output 5 ml   Net 355 ml          Physical Exam  Vitals reviewed.   Constitutional:       Appearance: Normal appearance.      Interventions: She is not intubated.  HENT:      Head: Normocephalic and atraumatic.      Nose: Nose normal.      Mouth/Throat:      Mouth: Mucous membranes are dry.      Pharynx: Oropharynx is clear.   Eyes:      Extraocular Movements: Extraocular movements intact.      Conjunctiva/sclera: Conjunctivae normal.      Pupils: Pupils are equal, round, and reactive to light.   Cardiovascular:      Rate and Rhythm: Normal rate.      Heart sounds: Normal heart sounds. No murmur heard.  Pulmonary:      Effort: Pulmonary effort is normal. She is not intubated.      Breath sounds: Normal breath sounds.   Abdominal:      General: Abdomen is flat. Bowel sounds are normal.      Palpations: Abdomen is soft.   Musculoskeletal:         General: Normal range of motion.      Cervical back: Normal range of motion and neck supple.      Right lower leg: No edema.      Left lower leg: No edema.   Skin:     General: Skin is warm and dry.      Capillary Refill: Capillary refill takes less than 2 seconds.   Neurological:      General: No focal deficit present.      Mental Status: She is alert and oriented to person, place, and time.   Psychiatric:          Mood and Affect: Mood normal.         Behavior: Behavior normal.            Vents:  Oxygen Concentration (%): 32 (08/11/24 1928)  Lines/Drains/Airways       Peripheral Intravenous Line  Duration                  Peripheral IV - Single Lumen 08/11/24 1816 22 G Left Antecubital <1 day                  Significant Labs:    CBC/Anemia Profile:  Recent Labs   Lab 08/11/24 0312   WBC 9.68   HGB 9.1*   HCT 31.6*      MCV 90.3   RDW 17.3*        Chemistries:  Recent Labs   Lab 08/11/24 0312      K 4.1      CO2 35*   BUN 19*   CREATININE 0.73   CALCIUM 8.9       Recent Lab Results       None            Significant Imaging:  I have reviewed all pertinent imaging results/findings within the past 24 hours.

## 2024-08-12 NOTE — PROGRESS NOTES
Pharmacokinetic Assessment Follow Up: IV Vancomycin    Vancomycin serum concentration assessment(s):    This trough level is not a true trough due to nursing getting off schedule with hanging vanc.    Vancomycin Regimen Plan:    Continue vanc as 1000 mg IV q18h.  A vanc trough has been ordered for 8/13 at 1930.    Drug levels (last 3 results):  Recent Labs   Lab Result Units 08/10/24  1312 08/12/24  0727   Vancomycin, Random µg/mL 5.6  --    Vancomycin, Trough µg/mL  --  10.7       Pharmacy will continue to follow and monitor vancomycin.    Please contact pharmacy at extension [phone] for questions regarding this assessment.    Patient brief summary:  Mayra Kelly is a 73 y.o. female initiated on antimicrobial therapy with IV Vancomycin for treatment of  pneumonia    The patient's current regimen is vanc 1000 mg IV q18h    Drug Allergies:   Review of patient's allergies indicates:  No Known Allergies    Actual Body Weight:   47.6 kg    Renal Function:   Estimated Creatinine Clearance: 45.5 mL/min (based on SCr of 0.84 mg/dL).,     Dialysis Method (if applicable):  N/A    CBC (last 72 hours):  Recent Labs   Lab Result Units 08/11/24  0312 08/12/24  0451   WBC K/uL 9.68 9.98   Hemoglobin g/dL 9.1* 9.8*   Hematocrit % 31.6* 33.1*   Platelet Count K/uL 334 342   Lymphocytes % % 3.8* 3.9*   Monocytes % % 2.3 3.2   Eosinophils % % 0.0* 0.0*   Basophils % % 0.1 0.2   Diff Type  Auto Auto       Metabolic Panel (last 72 hours):  Recent Labs   Lab Result Units 08/10/24  0408 08/11/24  0312 08/12/24  0451   Sodium mmol/L  --  141  --    Potassium mmol/L  --  4.1  --    Chloride mmol/L  --  104  --    CO2 mmol/L  --  35*  --    Glucose mg/dL  --  165*  --    BUN mg/dL 17 19* 22*   Creatinine mg/dL 0.76 0.73 0.84       Vancomycin Administrations:  vancomycin given in the last 96 hours                     vancomycin (VANCOCIN) 1,000 mg in D5W 250 mL IVPB (mg) 1,000 mg New Bag 08/11/24 1338     1,000 mg New Bag 08/10/24 0151     vancomycin (VANCOCIN) 1,000 mg in D5W 250 mL IVPB (mg) 1,000 mg New Bag 08/09/24 1325                    Microbiologic Results:  Microbiology Results (last 7 days)       Procedure Component Value Units Date/Time    Blood culture x two cultures. Draw prior to antibiotics. [[id number]] Collected: 08/07/24 2251    Order Status: Completed Specimen: Blood Updated: 08/12/24 0715     Culture, Blood No Growth At 72 Hours    Blood culture x two cultures. Draw prior to antibiotics. [[id number]] Collected: 08/07/24 2254    Order Status: Completed Specimen: Blood Updated: 08/12/24 0715     Culture, Blood No Growth At 72 Hours    Influenza A & B by Molecular [[id number]]  (Normal) Collected: 08/07/24 2302    Order Status: Completed Specimen: Nasopharyngeal Swab Updated: 08/08/24 0109     INFLUENZA A MOLECULAR Negative     INFLUENZA B MOLECULAR  Negative

## 2024-08-12 NOTE — PROGRESS NOTES
08/12/24 0713   Wound Care Follow Up   Wound Care Follow-up? Yes   Wound Care- Next Visit Date 08/14/24   Follow Up Plan Kassandra POC

## 2024-08-12 NOTE — ASSESSMENT & PLAN NOTE
Patient has a diagnosis of pneumonia. The cause of the pneumonia is suspected to be bacterial in etiology but organism is not known. The pneumonia is stable. The patient has the following signs/symptoms of pneumonia: persistent hypoxia , cough, and shortness of breath. The patient does have a current oxygen requirement and the patient does have a home oxygen requirement. I have reviewed the pertinent imaging. The following cultures have been collected: Blood cultures The culture results are listed below.     Current antimicrobial regimen consists of the antibiotics listed below. Will monitor patient closely and continue current treatment plan unchanged.    Antibiotics (From admission, onward)      Start     Stop Route Frequency Ordered    08/10/24 1600  vancomycin (VANCOCIN) 1,000 mg in D5W 250 mL IVPB         -- IV Every 18 hours 08/10/24 1438    08/08/24 1141  vancomycin - pharmacy to dose         -- IV pharmacy to manage frequency 08/08/24 1042    08/08/24 0800  piperacillin-tazobactam (ZOSYN) 4.5 g in D5W 100 mL IVPB (MB+)         -- IV Every 8 hours (non-standard times) 08/08/24 0321            Microbiology Results (last 7 days)       Procedure Component Value Units Date/Time    Blood culture x two cultures. Draw prior to antibiotics. [[id number]] Collected: 08/07/24 2251    Order Status: Completed Specimen: Blood Updated: 08/12/24 0715     Culture, Blood No Growth At 72 Hours    Blood culture x two cultures. Draw prior to antibiotics. [[id number]] Collected: 08/07/24 2254    Order Status: Completed Specimen: Blood Updated: 08/12/24 0715     Culture, Blood No Growth At 72 Hours    Influenza A & B by Molecular [[id number]]  (Normal) Collected: 08/07/24 2302    Order Status: Completed Specimen: Nasopharyngeal Swab Updated: 08/08/24 0109     INFLUENZA A MOLECULAR Negative     INFLUENZA B MOLECULAR  Negative

## 2024-08-12 NOTE — PLAN OF CARE
Problem: Adult Inpatient Plan of Care  Goal: Plan of Care Review  Outcome: Progressing  Goal: Patient-Specific Goal (Individualized)  Outcome: Progressing  Goal: Absence of Hospital-Acquired Illness or Injury  Outcome: Progressing  Goal: Optimal Comfort and Wellbeing  Outcome: Progressing  Goal: Readiness for Transition of Care  Outcome: Progressing     Problem: Skin Injury Risk Increased  Goal: Skin Health and Integrity  Outcome: Progressing     Problem: Breathing Pattern Ineffective  Goal: Effective Breathing Pattern  Outcome: Progressing     Problem: Gas Exchange Impaired  Goal: Optimal Gas Exchange  Outcome: Progressing     Problem: Acute Kidney Injury/Impairment  Goal: Fluid and Electrolyte Balance  Outcome: Progressing  Goal: Improved Oral Intake  Outcome: Progressing  Goal: Effective Renal Function  Outcome: Progressing     Problem: Pneumonia  Goal: Fluid Balance  Outcome: Progressing  Goal: Resolution of Infection Signs and Symptoms  Outcome: Progressing  Goal: Effective Oxygenation and Ventilation  Outcome: Progressing     Problem: Wound  Goal: Optimal Coping  Outcome: Progressing  Goal: Optimal Functional Ability  Outcome: Progressing  Goal: Absence of Infection Signs and Symptoms  Outcome: Progressing  Goal: Improved Oral Intake  Outcome: Progressing  Goal: Optimal Pain Control and Function  Outcome: Progressing  Goal: Skin Health and Integrity  Outcome: Progressing  Goal: Optimal Wound Healing  Outcome: Progressing     Problem: Fall Injury Risk  Goal: Absence of Fall and Fall-Related Injury  Outcome: Progressing     Problem: Airway Clearance Ineffective  Goal: Effective Airway Clearance  Outcome: Progressing

## 2024-08-12 NOTE — PLAN OF CARE
Problem: Gas Exchange Impaired  Goal: Optimal Gas Exchange  Outcome: Progressing     Problem: Airway Clearance Ineffective  Goal: Effective Airway Clearance  8/12/2024 0720 by Adelaida Mckeon, RRT  Outcome: Progressing  8/12/2024 0720 by Adelaida Mckeon, RRT  Outcome: Progressing

## 2024-08-12 NOTE — SUBJECTIVE & OBJECTIVE
Interval History:     No significant events overnight, no new complaints or concerns. Continue antibiotics for underlying pneumonia.     Review of Systems   Constitutional:  Positive for fatigue and unexpected weight change. Negative for appetite change and fever.   HENT:  Negative for trouble swallowing.    Respiratory:  Positive for cough and shortness of breath. Negative for chest tightness and wheezing.    Cardiovascular:  Negative for chest pain and palpitations.   Gastrointestinal:  Negative for abdominal pain, constipation and nausea.   Musculoskeletal:  Positive for gait problem. Negative for back pain and neck stiffness.   Skin:  Negative for pallor and rash.   Neurological:  Negative for dizziness, speech difficulty and headaches.   Psychiatric/Behavioral:  Negative for confusion and suicidal ideas.      Objective:     Vital Signs (Most Recent):  Temp: 97.6 °F (36.4 °C) (08/12/24 1130)  Pulse: 96 (08/12/24 1130)  Resp: 16 (08/12/24 1130)  BP: (!) 106/46 (08/12/24 1130)  SpO2: (!) 90 % (08/12/24 1130) Vital Signs (24h Range):  Temp:  [97.3 °F (36.3 °C)-98.3 °F (36.8 °C)] 97.6 °F (36.4 °C)  Pulse:  [62-96] 96  Resp:  [16-24] 16  SpO2:  [84 %-96 %] 90 %  BP: ()/(46-75) 106/46     Weight: 47.6 kg (105 lb)  Body mass index is 16.95 kg/m².    Intake/Output Summary (Last 24 hours) at 8/12/2024 1218  Last data filed at 8/12/2024 0902  Gross per 24 hour   Intake 480 ml   Output --   Net 480 ml         Physical Exam  Constitutional:       General: She is awake. She is not in acute distress.     Appearance: She is well-developed and underweight.   HENT:      Head: Normocephalic.      Mouth/Throat:      Pharynx: Oropharynx is clear.   Eyes:      Extraocular Movements: Extraocular movements intact.      Pupils: Pupils are equal, round, and reactive to light.   Neck:      Thyroid: No thyroid mass.   Cardiovascular:      Rate and Rhythm: Normal rate and regular rhythm.   Pulmonary:      Effort: Pulmonary effort is  normal.      Breath sounds: Normal air entry. Rales present. No wheezing.   Abdominal:      Palpations: Abdomen is soft.      Tenderness: There is no abdominal tenderness.   Musculoskeletal:         General: Normal range of motion.   Skin:     Coloration: Skin is not jaundiced.      Findings: No lesion.   Neurological:      General: No focal deficit present.      Mental Status: She is alert and oriented to person, place, and time.   Psychiatric:         Behavior: Behavior is cooperative.             Significant Labs: All pertinent labs within the past 24 hours have been reviewed.    Significant Imaging: I have reviewed all pertinent imaging results/findings within the past 24 hours.

## 2024-08-12 NOTE — ASSESSMENT & PLAN NOTE
JAMI is likely due to pre-renal azotemia due to dehydration. Baseline creatinine is  o.9 . Most recent creatinine and eGFR are listed below.  Recent Labs     08/10/24  0408 08/11/24  0312 08/12/24  0451   CREATININE 0.76 0.73 0.84   EGFRNORACEVR 83 88 74        Plan  - JAMI is stable  - Avoid nephrotoxins and renally dose meds for GFR listed above  - Monitor urine output, serial BMP, and adjust therapy as needed  - hold HCT and lasix.  Due to markedly elevated BNP will hold off for now on IVF   - hold all nephrotoxic agents.

## 2024-08-12 NOTE — ASSESSMENT & PLAN NOTE
Patient with Hypercapnic and Hypoxic Respiratory failure which is Acute on chronic.  she is on home oxygen at 2 LPM. Supplemental oxygen was provided and noted- Oxygen Concentration (%):  [32] 32    .  Signs/symptoms of respiratory failure include- wheezing and lethargy. Contributing diagnoses includes - Pneumonia Labs and images were reviewed. Patient Has recent ABG, which has been reviewed. Will treat underlying causes and adjust management of respiratory failure as follows-     Will continue with supplemental oxygen.  May need bipap if continues with CO2 retention.  Monitor respiratory status closely.

## 2024-08-12 NOTE — PLAN OF CARE
08/12/24 1334   Rounds   Attendance Provider;;Charge nurse;Physical therapist;Pharmacist   Discharge Plan A Home with family   Why the patient remains in the hospital Requires continued medical care   Transition of Care Barriers None     Chart reviewed. Pt scheduled for EBUS on Wednesday. SS following for anticipated needs

## 2024-08-13 ENCOUNTER — ANESTHESIA EVENT (OUTPATIENT)
Dept: GASTROENTEROLOGY | Facility: HOSPITAL | Age: 74
End: 2024-08-13
Payer: MEDICARE

## 2024-08-13 LAB
BACTERIA BLD CULT: NORMAL
BACTERIA BLD CULT: NORMAL
BASOPHILS # BLD AUTO: 0.02 K/UL (ref 0–0.2)
BASOPHILS NFR BLD AUTO: 0.2 % (ref 0–1)
BUN SERPL-MCNC: 19 MG/DL (ref 7–18)
BUN/CREAT SERPL: 28 (ref 6–20)
CREAT SERPL-MCNC: 0.69 MG/DL (ref 0.55–1.02)
DIFFERENTIAL METHOD BLD: ABNORMAL
EGFR (NO RACE VARIABLE) (RUSH/TITUS): 92 ML/MIN/1.73M2
EOSINOPHIL # BLD AUTO: 0 K/UL (ref 0–0.5)
EOSINOPHIL NFR BLD AUTO: 0 % (ref 1–4)
ERYTHROCYTE [DISTWIDTH] IN BLOOD BY AUTOMATED COUNT: 17.5 % (ref 11.5–14.5)
HCT VFR BLD AUTO: 35.9 % (ref 38–47)
HGB BLD-MCNC: 10.4 G/DL (ref 12–16)
IMM GRANULOCYTES # BLD AUTO: 0.2 K/UL (ref 0–0.04)
IMM GRANULOCYTES NFR BLD: 1.9 % (ref 0–0.4)
LYMPHOCYTES # BLD AUTO: 0.46 K/UL (ref 1–4.8)
LYMPHOCYTES NFR BLD AUTO: 4.3 % (ref 27–41)
MCH RBC QN AUTO: 25.9 PG (ref 27–31)
MCHC RBC AUTO-ENTMCNC: 29 G/DL (ref 32–36)
MCV RBC AUTO: 89.5 FL (ref 80–96)
MONOCYTES # BLD AUTO: 0.37 K/UL (ref 0–0.8)
MONOCYTES NFR BLD AUTO: 3.5 % (ref 2–6)
MPC BLD CALC-MCNC: 10.2 FL (ref 9.4–12.4)
NEUTROPHILS # BLD AUTO: 9.61 K/UL (ref 1.8–7.7)
NEUTROPHILS NFR BLD AUTO: 90.1 % (ref 53–65)
NRBC # BLD AUTO: 0 X10E3/UL
NRBC, AUTO (.00): 0 %
PLATELET # BLD AUTO: 406 K/UL (ref 150–400)
RBC # BLD AUTO: 4.01 M/UL (ref 4.2–5.4)
WBC # BLD AUTO: 10.66 K/UL (ref 4.5–11)

## 2024-08-13 PROCEDURE — 36415 COLL VENOUS BLD VENIPUNCTURE: CPT | Performed by: FAMILY MEDICINE

## 2024-08-13 PROCEDURE — 25000003 PHARM REV CODE 250: Performed by: HOSPITALIST

## 2024-08-13 PROCEDURE — 11000001 HC ACUTE MED/SURG PRIVATE ROOM

## 2024-08-13 PROCEDURE — 85025 COMPLETE CBC W/AUTO DIFF WBC: CPT | Performed by: FAMILY MEDICINE

## 2024-08-13 PROCEDURE — 63600175 PHARM REV CODE 636 W HCPCS: Performed by: HOSPITALIST

## 2024-08-13 PROCEDURE — 94761 N-INVAS EAR/PLS OXIMETRY MLT: CPT

## 2024-08-13 PROCEDURE — 99231 SBSQ HOSP IP/OBS SF/LOW 25: CPT | Mod: ,,, | Performed by: INTERNAL MEDICINE

## 2024-08-13 PROCEDURE — 63600175 PHARM REV CODE 636 W HCPCS: Performed by: FAMILY MEDICINE

## 2024-08-13 PROCEDURE — S4991 NICOTINE PATCH NONLEGEND: HCPCS | Performed by: HOSPITALIST

## 2024-08-13 PROCEDURE — 27000221 HC OXYGEN, UP TO 24 HOURS

## 2024-08-13 PROCEDURE — 94640 AIRWAY INHALATION TREATMENT: CPT

## 2024-08-13 PROCEDURE — 63600175 PHARM REV CODE 636 W HCPCS: Performed by: INTERNAL MEDICINE

## 2024-08-13 PROCEDURE — 99232 SBSQ HOSP IP/OBS MODERATE 35: CPT | Mod: ,,, | Performed by: FAMILY MEDICINE

## 2024-08-13 PROCEDURE — 82565 ASSAY OF CREATININE: CPT | Performed by: FAMILY MEDICINE

## 2024-08-13 PROCEDURE — 99900035 HC TECH TIME PER 15 MIN (STAT)

## 2024-08-13 PROCEDURE — 25000003 PHARM REV CODE 250: Performed by: FAMILY MEDICINE

## 2024-08-13 PROCEDURE — 25000242 PHARM REV CODE 250 ALT 637 W/ HCPCS: Performed by: HOSPITALIST

## 2024-08-13 RX ADMIN — BUPROPION HYDROCHLORIDE 100 MG: 100 TABLET, FILM COATED, EXTENDED RELEASE ORAL at 08:08

## 2024-08-13 RX ADMIN — POTASSIUM CHLORIDE 20 MEQ: 1500 TABLET, EXTENDED RELEASE ORAL at 09:08

## 2024-08-13 RX ADMIN — POTASSIUM CHLORIDE 20 MEQ: 1500 TABLET, EXTENDED RELEASE ORAL at 08:08

## 2024-08-13 RX ADMIN — IPRATROPIUM BROMIDE AND ALBUTEROL SULFATE 3 ML: .5; 3 SOLUTION RESPIRATORY (INHALATION) at 07:08

## 2024-08-13 RX ADMIN — IPRATROPIUM BROMIDE AND ALBUTEROL SULFATE 3 ML: .5; 3 SOLUTION RESPIRATORY (INHALATION) at 04:08

## 2024-08-13 RX ADMIN — VANCOMYCIN HYDROCHLORIDE 1000 MG: 1 INJECTION, POWDER, LYOPHILIZED, FOR SOLUTION INTRAVENOUS at 01:08

## 2024-08-13 RX ADMIN — TRAZODONE HYDROCHLORIDE 50 MG: 50 TABLET ORAL at 09:08

## 2024-08-13 RX ADMIN — METHYLPREDNISOLONE SODIUM SUCCINATE 60 MG: 40 INJECTION, POWDER, FOR SOLUTION INTRAMUSCULAR; INTRAVENOUS at 09:08

## 2024-08-13 RX ADMIN — BUPROPION HYDROCHLORIDE 100 MG: 100 TABLET, FILM COATED, EXTENDED RELEASE ORAL at 09:08

## 2024-08-13 RX ADMIN — NICOTINE 1 PATCH: 14 PATCH, EXTENDED RELEASE TRANSDERMAL at 08:08

## 2024-08-13 RX ADMIN — Medication 6 MG: at 09:08

## 2024-08-13 RX ADMIN — PIPERACILLIN SODIUM AND TAZOBACTAM SODIUM 4.5 G: 4; .5 INJECTION, POWDER, LYOPHILIZED, FOR SOLUTION INTRAVENOUS at 08:08

## 2024-08-13 RX ADMIN — METHYLPREDNISOLONE SODIUM SUCCINATE 60 MG: 40 INJECTION, POWDER, FOR SOLUTION INTRAMUSCULAR; INTRAVENOUS at 08:08

## 2024-08-13 RX ADMIN — METHYLPREDNISOLONE SODIUM SUCCINATE 60 MG: 40 INJECTION, POWDER, FOR SOLUTION INTRAMUSCULAR; INTRAVENOUS at 05:08

## 2024-08-13 RX ADMIN — IPRATROPIUM BROMIDE AND ALBUTEROL SULFATE 3 ML: .5; 3 SOLUTION RESPIRATORY (INHALATION) at 12:08

## 2024-08-13 RX ADMIN — METHYLPREDNISOLONE SODIUM SUCCINATE 60 MG: 40 INJECTION, POWDER, FOR SOLUTION INTRAMUSCULAR; INTRAVENOUS at 03:08

## 2024-08-13 RX ADMIN — ENOXAPARIN SODIUM 40 MG: 40 INJECTION SUBCUTANEOUS at 03:08

## 2024-08-13 RX ADMIN — PIPERACILLIN SODIUM AND TAZOBACTAM SODIUM 4.5 G: 4; .5 INJECTION, POWDER, LYOPHILIZED, FOR SOLUTION INTRAVENOUS at 03:08

## 2024-08-13 NOTE — PROGRESS NOTES
[facility]  Critical Care Medicine  Progress Note    Patient Name: Mayra Kelly  MRN: [id number]  Admission Date: 8/7/2024  Hospital Length of Stay: 5 days  Code Status: Partial Code  Attending Provider: Yeni Jones DO  Primary Care Provider: Darlene Campoverde NP   Principal Problem: Obstructive pneumonia    Subjective:     HPI:  Seventy 73-year-old white female who presents with shortness of breath and respiratory distress her story starts in 2017 when she was diagnosed with left-sided cancer sounds like she got radiation to the chest and to chemotherapy and she says she got brain radiation at that time I do not know the cell type.  She was seen by  last year and had a nondiagnostic EBUS on the right but had no endobronchial lesions she followed up with a needle biopsy with Dr. Kraft and he tells me that she was diagnosed with squamous cell carcinoma and they discussed in January and February treatment with radiation chemotherapy she apparently did not keep her follow-up and presents now with more shortness of breath productive sputum she has a history of bad COPD in chronic oxygen and acute on top of chronic respiratory failure during workup she was found to what looks like a right upper lobe obstruction with postobstructive pneumonia    Hospital/ICU Course:  No notes on file    Interval History/Significant Events:  Patient without complaints    Review of Systems  Objective:     Vital Signs (Most Recent):  Temp: 97.7 °F (36.5 °C) (08/13/24 0448)  Pulse: 60 (08/13/24 0448)  Resp: 18 (08/13/24 0448)  BP: 116/66 (08/13/24 0448)  SpO2: 96 % (08/13/24 0448) Vital Signs (24h Range):  Temp:  [97.4 °F (36.3 °C)-98 °F (36.7 °C)] 97.7 °F (36.5 °C)  Pulse:  [60-96] 60  Resp:  [14-20] 18  SpO2:  [84 %-96 %] 96 %  BP: ()/(46-76) 116/66   Weight: 47.6 kg (105 lb)  Body mass index is 16.95 kg/m².      Intake/Output Summary (Last 24 hours) at 8/13/2024 0536  Last data filed at  8/13/2024 0359  Gross per 24 hour   Intake 2257.53 ml   Output --   Net 2257.53 ml          Physical Exam  Vitals reviewed.   Constitutional:       Appearance: Normal appearance.      Interventions: She is not intubated.  HENT:      Head: Normocephalic and atraumatic.      Nose: Nose normal.      Mouth/Throat:      Mouth: Mucous membranes are dry.      Pharynx: Oropharynx is clear.   Eyes:      Extraocular Movements: Extraocular movements intact.      Conjunctiva/sclera: Conjunctivae normal.      Pupils: Pupils are equal, round, and reactive to light.   Cardiovascular:      Rate and Rhythm: Normal rate.      Heart sounds: Normal heart sounds. No murmur heard.  Pulmonary:      Effort: Pulmonary effort is normal. She is not intubated.      Breath sounds: Normal breath sounds.   Abdominal:      General: Abdomen is flat. Bowel sounds are normal.      Palpations: Abdomen is soft.   Musculoskeletal:         General: Normal range of motion.      Cervical back: Normal range of motion and neck supple.      Right lower leg: No edema.      Left lower leg: No edema.   Skin:     General: Skin is warm and dry.      Capillary Refill: Capillary refill takes less than 2 seconds.   Neurological:      General: No focal deficit present.      Mental Status: She is alert and oriented to person, place, and time.   Psychiatric:         Mood and Affect: Mood normal.         Behavior: Behavior normal.            Vents:  Oxygen Concentration (%): 32 (08/12/24 1944)  Lines/Drains/Airways       Peripheral Intravenous Line  Duration                  Peripheral IV - Single Lumen 08/11/24 1816 22 G Left Antecubital 1 day                  Significant Labs:    CBC/Anemia Profile:  Recent Labs   Lab 08/12/24  0451   WBC 9.98   HGB 9.8*   HCT 33.1*      MCV 89.2   RDW 17.4*        Chemistries:  Recent Labs   Lab 08/12/24  0451   BUN 22*   CREATININE 0.84       Recent Lab Results         08/12/24  0727        Vancomycin-Trough 10.7                Significant Imaging:  I have reviewed all pertinent imaging results/findings within the past 24 hours.    ABG  Recent Labs   Lab 08/08/24  0600   PH 7.36   PO2 60*   PCO2 61*   HCO3 34.5*     Assessment/Plan:     Pulmonary  * Obstructive pneumonia   and Cande plan to do an EBUS on Wednesday     COPD (chronic obstructive pulmonary disease)  Will start weaning steroids I will sign off Dr. Castellon and Dr. Munguia will work through the issues around her possible recurrence    Oncology  Lung cancer  Reviewed Dr. Jose's note             Jose Carlos Whitmore MD  Critical Care Medicine  [facility]

## 2024-08-13 NOTE — ASSESSMENT & PLAN NOTE
Will start weaning steroids I will sign off Dr. Castellon and Dr. Munguia will work through the issues around her possible recurrence

## 2024-08-13 NOTE — PLAN OF CARE
Problem: Skin Injury Risk Increased  Goal: Skin Health and Integrity  Outcome: Progressing     Problem: Breathing Pattern Ineffective  Goal: Effective Breathing Pattern  Outcome: Progressing     Problem: Gas Exchange Impaired  Goal: Optimal Gas Exchange  Outcome: Progressing

## 2024-08-13 NOTE — SUBJECTIVE & OBJECTIVE
Interval History/Significant Events:  Patient without complaints    Review of Systems  Objective:     Vital Signs (Most Recent):  Temp: 97.7 °F (36.5 °C) (08/13/24 0448)  Pulse: 60 (08/13/24 0448)  Resp: 18 (08/13/24 0448)  BP: 116/66 (08/13/24 0448)  SpO2: 96 % (08/13/24 0448) Vital Signs (24h Range):  Temp:  [97.4 °F (36.3 °C)-98 °F (36.7 °C)] 97.7 °F (36.5 °C)  Pulse:  [60-96] 60  Resp:  [14-20] 18  SpO2:  [84 %-96 %] 96 %  BP: ()/(46-76) 116/66   Weight: 47.6 kg (105 lb)  Body mass index is 16.95 kg/m².      Intake/Output Summary (Last 24 hours) at 8/13/2024 0536  Last data filed at 8/13/2024 0359  Gross per 24 hour   Intake 2257.53 ml   Output --   Net 2257.53 ml          Physical Exam  Vitals reviewed.   Constitutional:       Appearance: Normal appearance.      Interventions: She is not intubated.  HENT:      Head: Normocephalic and atraumatic.      Nose: Nose normal.      Mouth/Throat:      Mouth: Mucous membranes are dry.      Pharynx: Oropharynx is clear.   Eyes:      Extraocular Movements: Extraocular movements intact.      Conjunctiva/sclera: Conjunctivae normal.      Pupils: Pupils are equal, round, and reactive to light.   Cardiovascular:      Rate and Rhythm: Normal rate.      Heart sounds: Normal heart sounds. No murmur heard.  Pulmonary:      Effort: Pulmonary effort is normal. She is not intubated.      Breath sounds: Normal breath sounds.   Abdominal:      General: Abdomen is flat. Bowel sounds are normal.      Palpations: Abdomen is soft.   Musculoskeletal:         General: Normal range of motion.      Cervical back: Normal range of motion and neck supple.      Right lower leg: No edema.      Left lower leg: No edema.   Skin:     General: Skin is warm and dry.      Capillary Refill: Capillary refill takes less than 2 seconds.   Neurological:      General: No focal deficit present.      Mental Status: She is alert and oriented to person, place, and time.   Psychiatric:         Mood and  Affect: Mood normal.         Behavior: Behavior normal.            Vents:  Oxygen Concentration (%): 32 (08/12/24 1944)  Lines/Drains/Airways       Peripheral Intravenous Line  Duration                  Peripheral IV - Single Lumen 08/11/24 1816 22 G Left Antecubital 1 day                  Significant Labs:    CBC/Anemia Profile:  Recent Labs   Lab 08/12/24  0451   WBC 9.98   HGB 9.8*   HCT 33.1*      MCV 89.2   RDW 17.4*        Chemistries:  Recent Labs   Lab 08/12/24  0451   BUN 22*   CREATININE 0.84       Recent Lab Results         08/12/24  0727        Vancomycin-Trough 10.7               Significant Imaging:  I have reviewed all pertinent imaging results/findings within the past 24 hours.

## 2024-08-14 ENCOUNTER — ANESTHESIA (OUTPATIENT)
Dept: GASTROENTEROLOGY | Facility: HOSPITAL | Age: 74
End: 2024-08-14
Payer: MEDICARE

## 2024-08-14 LAB
ABORH RETYPE: NORMAL
ANION GAP SERPL CALCULATED.3IONS-SCNC: 4 MMOL/L (ref 7–16)
BASOPHILS # BLD AUTO: 0.02 K/UL (ref 0–0.2)
BASOPHILS NFR BLD AUTO: 0.2 % (ref 0–1)
BUN SERPL-MCNC: 24 MG/DL (ref 7–18)
BUN SERPL-MCNC: 26 MG/DL (ref 7–18)
BUN/CREAT SERPL: 35 (ref 6–20)
BUN/CREAT SERPL: 35 (ref 6–20)
CALCIUM SERPL-MCNC: 8.6 MG/DL (ref 8.5–10.1)
CHLORIDE SERPL-SCNC: 102 MMOL/L (ref 98–107)
CO2 SERPL-SCNC: 39 MMOL/L (ref 21–32)
CREAT SERPL-MCNC: 0.68 MG/DL (ref 0.55–1.02)
CREAT SERPL-MCNC: 0.74 MG/DL (ref 0.55–1.02)
DIFFERENTIAL METHOD BLD: ABNORMAL
EGFR (NO RACE VARIABLE) (RUSH/TITUS): 86 ML/MIN/1.73M2
EGFR (NO RACE VARIABLE) (RUSH/TITUS): 93 ML/MIN/1.73M2
EOSINOPHIL # BLD AUTO: 0 K/UL (ref 0–0.5)
EOSINOPHIL NFR BLD AUTO: 0 % (ref 1–4)
ERYTHROCYTE [DISTWIDTH] IN BLOOD BY AUTOMATED COUNT: 17.4 % (ref 11.5–14.5)
GLUCOSE SERPL-MCNC: 95 MG/DL (ref 74–106)
GRAM STN SPEC: NORMAL
GRAM STN SPEC: NORMAL
HCT VFR BLD AUTO: 35.9 % (ref 38–47)
HGB BLD-MCNC: 10.8 G/DL (ref 12–16)
IMM GRANULOCYTES # BLD AUTO: 0.13 K/UL (ref 0–0.04)
IMM GRANULOCYTES NFR BLD: 1.3 % (ref 0–0.4)
INDIRECT COOMBS: NORMAL
LYMPHOCYTES # BLD AUTO: 0.59 K/UL (ref 1–4.8)
LYMPHOCYTES NFR BLD AUTO: 5.7 % (ref 27–41)
MCH RBC QN AUTO: 26.3 PG (ref 27–31)
MCHC RBC AUTO-ENTMCNC: 30.1 G/DL (ref 32–36)
MCV RBC AUTO: 87.3 FL (ref 80–96)
MONOCYTES # BLD AUTO: 0.46 K/UL (ref 0–0.8)
MONOCYTES NFR BLD AUTO: 4.5 % (ref 2–6)
MPC BLD CALC-MCNC: 10 FL (ref 9.4–12.4)
NEUTROPHILS # BLD AUTO: 9.07 K/UL (ref 1.8–7.7)
NEUTROPHILS NFR BLD AUTO: 88.3 % (ref 53–65)
NRBC # BLD AUTO: 0 X10E3/UL
NRBC, AUTO (.00): 0 %
PLATELET # BLD AUTO: 405 K/UL (ref 150–400)
POTASSIUM SERPL-SCNC: 4.8 MMOL/L (ref 3.5–5.1)
RBC # BLD AUTO: 4.11 M/UL (ref 4.2–5.4)
RH BLD: NORMAL
SODIUM SERPL-SCNC: 140 MMOL/L (ref 136–145)
SPECIMEN OUTDATE: NORMAL
WBC # BLD AUTO: 10.27 K/UL (ref 4.5–11)

## 2024-08-14 PROCEDURE — 82565 ASSAY OF CREATININE: CPT | Performed by: FAMILY MEDICINE

## 2024-08-14 PROCEDURE — 94640 AIRWAY INHALATION TREATMENT: CPT

## 2024-08-14 PROCEDURE — 36415 COLL VENOUS BLD VENIPUNCTURE: CPT | Performed by: STUDENT IN AN ORGANIZED HEALTH CARE EDUCATION/TRAINING PROGRAM

## 2024-08-14 PROCEDURE — 88172 CYTP DX EVAL FNA 1ST EA SITE: CPT | Mod: 26,,, | Performed by: PATHOLOGY

## 2024-08-14 PROCEDURE — 36415 COLL VENOUS BLD VENIPUNCTURE: CPT | Performed by: FAMILY MEDICINE

## 2024-08-14 PROCEDURE — 27000221 HC OXYGEN, UP TO 24 HOURS

## 2024-08-14 PROCEDURE — 25000003 PHARM REV CODE 250: Performed by: STUDENT IN AN ORGANIZED HEALTH CARE EDUCATION/TRAINING PROGRAM

## 2024-08-14 PROCEDURE — 85025 COMPLETE CBC W/AUTO DIFF WBC: CPT | Performed by: STUDENT IN AN ORGANIZED HEALTH CARE EDUCATION/TRAINING PROGRAM

## 2024-08-14 PROCEDURE — 0B738DZ DILATION OF RIGHT MAIN BRONCHUS WITH INTRALUMINAL DEVICE, VIA NATURAL OR ARTIFICIAL OPENING ENDOSCOPIC: ICD-10-PCS | Performed by: STUDENT IN AN ORGANIZED HEALTH CARE EDUCATION/TRAINING PROGRAM

## 2024-08-14 PROCEDURE — 94761 N-INVAS EAR/PLS OXIMETRY MLT: CPT

## 2024-08-14 PROCEDURE — 21400001 HC TELEMETRY ROOM

## 2024-08-14 PROCEDURE — 87205 SMEAR GRAM STAIN: CPT | Performed by: STUDENT IN AN ORGANIZED HEALTH CARE EDUCATION/TRAINING PROGRAM

## 2024-08-14 PROCEDURE — 07B74ZX EXCISION OF THORAX LYMPHATIC, PERCUTANEOUS ENDOSCOPIC APPROACH, DIAGNOSTIC: ICD-10-PCS | Performed by: STUDENT IN AN ORGANIZED HEALTH CARE EDUCATION/TRAINING PROGRAM

## 2024-08-14 PROCEDURE — 99900035 HC TECH TIME PER 15 MIN (STAT)

## 2024-08-14 PROCEDURE — 25000242 PHARM REV CODE 250 ALT 637 W/ HCPCS: Performed by: HOSPITALIST

## 2024-08-14 PROCEDURE — 25000003 PHARM REV CODE 250: Performed by: HOSPITALIST

## 2024-08-14 PROCEDURE — 63600175 PHARM REV CODE 636 W HCPCS: Performed by: INTERNAL MEDICINE

## 2024-08-14 PROCEDURE — 31624 DX BRONCHOSCOPE/LAVAGE: CPT | Performed by: STUDENT IN AN ORGANIZED HEALTH CARE EDUCATION/TRAINING PROGRAM

## 2024-08-14 PROCEDURE — 31652 BRONCH EBUS SAMPLNG 1/2 NODE: CPT | Performed by: STUDENT IN AN ORGANIZED HEALTH CARE EDUCATION/TRAINING PROGRAM

## 2024-08-14 PROCEDURE — 99233 SBSQ HOSP IP/OBS HIGH 50: CPT | Mod: ,,, | Performed by: STUDENT IN AN ORGANIZED HEALTH CARE EDUCATION/TRAINING PROGRAM

## 2024-08-14 PROCEDURE — 27000716 HC OXISENSOR PROBE, ANY SIZE: Performed by: ANESTHESIOLOGY

## 2024-08-14 PROCEDURE — 87070 CULTURE OTHR SPECIMN AEROBIC: CPT | Performed by: STUDENT IN AN ORGANIZED HEALTH CARE EDUCATION/TRAINING PROGRAM

## 2024-08-14 PROCEDURE — 63600175 PHARM REV CODE 636 W HCPCS: Performed by: HOSPITALIST

## 2024-08-14 PROCEDURE — 63600175 PHARM REV CODE 636 W HCPCS: Performed by: NURSE ANESTHETIST, CERTIFIED REGISTERED

## 2024-08-14 PROCEDURE — 86900 BLOOD TYPING SEROLOGIC ABO: CPT | Performed by: STUDENT IN AN ORGANIZED HEALTH CARE EDUCATION/TRAINING PROGRAM

## 2024-08-14 PROCEDURE — 27000655: Performed by: ANESTHESIOLOGY

## 2024-08-14 PROCEDURE — 25000242 PHARM REV CODE 250 ALT 637 W/ HCPCS: Performed by: STUDENT IN AN ORGANIZED HEALTH CARE EDUCATION/TRAINING PROGRAM

## 2024-08-14 PROCEDURE — 63600175 PHARM REV CODE 636 W HCPCS: Performed by: FAMILY MEDICINE

## 2024-08-14 PROCEDURE — 99232 SBSQ HOSP IP/OBS MODERATE 35: CPT | Mod: ,,, | Performed by: FAMILY MEDICINE

## 2024-08-14 PROCEDURE — 88112 CYTOPATH CELL ENHANCE TECH: CPT | Mod: 26,,, | Performed by: PATHOLOGY

## 2024-08-14 PROCEDURE — 88305 TISSUE EXAM BY PATHOLOGIST: CPT | Mod: TC,MCY | Performed by: STUDENT IN AN ORGANIZED HEALTH CARE EDUCATION/TRAINING PROGRAM

## 2024-08-14 PROCEDURE — 84520 ASSAY OF UREA NITROGEN: CPT | Performed by: FAMILY MEDICINE

## 2024-08-14 PROCEDURE — 80048 BASIC METABOLIC PNL TOTAL CA: CPT | Performed by: STUDENT IN AN ORGANIZED HEALTH CARE EDUCATION/TRAINING PROGRAM

## 2024-08-14 PROCEDURE — 86850 RBC ANTIBODY SCREEN: CPT | Performed by: STUDENT IN AN ORGANIZED HEALTH CARE EDUCATION/TRAINING PROGRAM

## 2024-08-14 PROCEDURE — 27202055 HC BRONCHOSCOPE, DISP

## 2024-08-14 PROCEDURE — 0B9J8ZX DRAINAGE OF LEFT LOWER LUNG LOBE, VIA NATURAL OR ARTIFICIAL OPENING ENDOSCOPIC, DIAGNOSTIC: ICD-10-PCS | Performed by: STUDENT IN AN ORGANIZED HEALTH CARE EDUCATION/TRAINING PROGRAM

## 2024-08-14 PROCEDURE — 31636 BRONCHOSCOPY BRONCH STENTS: CPT | Performed by: STUDENT IN AN ORGANIZED HEALTH CARE EDUCATION/TRAINING PROGRAM

## 2024-08-14 PROCEDURE — 27000177 HC AIRWAY, LARYNGEAL MASK: Performed by: ANESTHESIOLOGY

## 2024-08-14 PROCEDURE — 25000003 PHARM REV CODE 250: Performed by: NURSE ANESTHETIST, CERTIFIED REGISTERED

## 2024-08-14 PROCEDURE — 88305 TISSUE EXAM BY PATHOLOGIST: CPT | Mod: 26,,, | Performed by: PATHOLOGY

## 2024-08-14 PROCEDURE — 27000510 HC BLANKET BAIR HUGGER ANY SIZE: Performed by: ANESTHESIOLOGY

## 2024-08-14 RX ORDER — ONDANSETRON HYDROCHLORIDE 2 MG/ML
INJECTION, SOLUTION INTRAVENOUS
Status: DISCONTINUED | OUTPATIENT
Start: 2024-08-14 | End: 2024-08-14

## 2024-08-14 RX ORDER — MEPERIDINE HYDROCHLORIDE 25 MG/ML
25 INJECTION INTRAMUSCULAR; INTRAVENOUS; SUBCUTANEOUS EVERY 10 MIN PRN
Status: ACTIVE | OUTPATIENT
Start: 2024-08-14 | End: 2024-08-14

## 2024-08-14 RX ORDER — HYDROMORPHONE HYDROCHLORIDE 2 MG/ML
0.5 INJECTION, SOLUTION INTRAMUSCULAR; INTRAVENOUS; SUBCUTANEOUS EVERY 5 MIN PRN
Status: DISCONTINUED | OUTPATIENT
Start: 2024-08-14 | End: 2024-08-15 | Stop reason: HOSPADM

## 2024-08-14 RX ORDER — LIDOCAINE HYDROCHLORIDE 20 MG/ML
INJECTION, SOLUTION EPIDURAL; INFILTRATION; INTRACAUDAL; PERINEURAL
Status: DISCONTINUED | OUTPATIENT
Start: 2024-08-14 | End: 2024-08-14

## 2024-08-14 RX ORDER — PHENYLEPHRINE HYDROCHLORIDE 10 MG/ML
INJECTION INTRAVENOUS
Status: DISCONTINUED | OUTPATIENT
Start: 2024-08-14 | End: 2024-08-14

## 2024-08-14 RX ORDER — ROCURONIUM BROMIDE 10 MG/ML
INJECTION, SOLUTION INTRAVENOUS
Status: DISCONTINUED | OUTPATIENT
Start: 2024-08-14 | End: 2024-08-14

## 2024-08-14 RX ORDER — DIPHENHYDRAMINE HYDROCHLORIDE 50 MG/ML
25 INJECTION INTRAMUSCULAR; INTRAVENOUS EVERY 6 HOURS PRN
Status: DISCONTINUED | OUTPATIENT
Start: 2024-08-14 | End: 2024-08-15 | Stop reason: HOSPADM

## 2024-08-14 RX ORDER — IPRATROPIUM BROMIDE AND ALBUTEROL SULFATE 2.5; .5 MG/3ML; MG/3ML
3 SOLUTION RESPIRATORY (INHALATION) ONCE
Status: COMPLETED | OUTPATIENT
Start: 2024-08-14 | End: 2024-08-14

## 2024-08-14 RX ORDER — SODIUM CHLORIDE FOR INHALATION 3 %
4 VIAL, NEBULIZER (ML) INHALATION EVERY 8 HOURS
Status: DISCONTINUED | OUTPATIENT
Start: 2024-08-14 | End: 2024-08-15 | Stop reason: HOSPADM

## 2024-08-14 RX ORDER — MORPHINE SULFATE 10 MG/ML
4 INJECTION INTRAMUSCULAR; INTRAVENOUS; SUBCUTANEOUS EVERY 5 MIN PRN
Status: DISCONTINUED | OUTPATIENT
Start: 2024-08-14 | End: 2024-08-15 | Stop reason: HOSPADM

## 2024-08-14 RX ORDER — TRANEXAMIC ACID 100 MG/ML
INJECTION, SOLUTION INTRAVENOUS
Status: COMPLETED | OUTPATIENT
Start: 2024-08-14 | End: 2024-08-14

## 2024-08-14 RX ORDER — GLUCAGON 1 MG
1 KIT INJECTION
Status: DISCONTINUED | OUTPATIENT
Start: 2024-08-14 | End: 2024-08-15 | Stop reason: HOSPADM

## 2024-08-14 RX ORDER — ONDANSETRON HYDROCHLORIDE 2 MG/ML
4 INJECTION, SOLUTION INTRAVENOUS DAILY PRN
Status: DISCONTINUED | OUTPATIENT
Start: 2024-08-14 | End: 2024-08-15 | Stop reason: HOSPADM

## 2024-08-14 RX ORDER — PROPOFOL 10 MG/ML
VIAL (ML) INTRAVENOUS
Status: DISCONTINUED | OUTPATIENT
Start: 2024-08-14 | End: 2024-08-14

## 2024-08-14 RX ORDER — FENTANYL CITRATE 50 UG/ML
INJECTION, SOLUTION INTRAMUSCULAR; INTRAVENOUS
Status: DISCONTINUED | OUTPATIENT
Start: 2024-08-14 | End: 2024-08-14

## 2024-08-14 RX ADMIN — LIDOCAINE HYDROCHLORIDE 50 MG: 20 INJECTION, SOLUTION INTRAVENOUS at 11:08

## 2024-08-14 RX ADMIN — ENOXAPARIN SODIUM 40 MG: 40 INJECTION SUBCUTANEOUS at 05:08

## 2024-08-14 RX ADMIN — IPRATROPIUM BROMIDE AND ALBUTEROL SULFATE 3 ML: .5; 3 SOLUTION RESPIRATORY (INHALATION) at 07:08

## 2024-08-14 RX ADMIN — IPRATROPIUM BROMIDE AND ALBUTEROL SULFATE 3 ML: .5; 3 SOLUTION RESPIRATORY (INHALATION) at 03:08

## 2024-08-14 RX ADMIN — PROPOFOL 100 MCG/KG/MIN: 10 INJECTION, EMULSION INTRAVENOUS at 11:08

## 2024-08-14 RX ADMIN — SODIUM CHLORIDE: 9 INJECTION, SOLUTION INTRAVENOUS at 11:08

## 2024-08-14 RX ADMIN — PIPERACILLIN SODIUM AND TAZOBACTAM SODIUM 4.5 G: 4; .5 INJECTION, POWDER, LYOPHILIZED, FOR SOLUTION INTRAVENOUS at 12:08

## 2024-08-14 RX ADMIN — SODIUM CHLORIDE SOLN NEBU 3% 4 ML: 3 NEBU SOLN at 05:08

## 2024-08-14 RX ADMIN — PROPOFOL 100 MG: 10 INJECTION, EMULSION INTRAVENOUS at 11:08

## 2024-08-14 RX ADMIN — TRANEXAMIC ACID 200 MG: 100 INJECTION, SOLUTION INTRAVENOUS at 12:08

## 2024-08-14 RX ADMIN — TRAZODONE HYDROCHLORIDE 50 MG: 50 TABLET ORAL at 09:08

## 2024-08-14 RX ADMIN — PIPERACILLIN SODIUM AND TAZOBACTAM SODIUM 4.5 G: 4; .5 INJECTION, POWDER, LYOPHILIZED, FOR SOLUTION INTRAVENOUS at 05:08

## 2024-08-14 RX ADMIN — ROCURONIUM BROMIDE 50 MG: 10 INJECTION, SOLUTION INTRAVENOUS at 12:08

## 2024-08-14 RX ADMIN — BUPROPION HYDROCHLORIDE 100 MG: 100 TABLET, FILM COATED, EXTENDED RELEASE ORAL at 09:08

## 2024-08-14 RX ADMIN — ONDANSETRON 4 MG: 2 INJECTION INTRAMUSCULAR; INTRAVENOUS at 11:08

## 2024-08-14 RX ADMIN — PHENYLEPHRINE HYDROCHLORIDE 100 MCG: 10 INJECTION INTRAVENOUS at 12:08

## 2024-08-14 RX ADMIN — POTASSIUM CHLORIDE 20 MEQ: 1500 TABLET, EXTENDED RELEASE ORAL at 09:08

## 2024-08-14 RX ADMIN — ROCURONIUM BROMIDE 50 MG: 10 INJECTION, SOLUTION INTRAVENOUS at 11:08

## 2024-08-14 RX ADMIN — SUGAMMADEX 200 MG: 100 INJECTION, SOLUTION INTRAVENOUS at 12:08

## 2024-08-14 RX ADMIN — METHYLPREDNISOLONE SODIUM SUCCINATE 60 MG: 40 INJECTION, POWDER, FOR SOLUTION INTRAMUSCULAR; INTRAVENOUS at 09:08

## 2024-08-14 RX ADMIN — METHYLPREDNISOLONE SODIUM SUCCINATE 60 MG: 40 INJECTION, POWDER, FOR SOLUTION INTRAMUSCULAR; INTRAVENOUS at 03:08

## 2024-08-14 RX ADMIN — PIPERACILLIN SODIUM AND TAZOBACTAM SODIUM 4.5 G: 4; .5 INJECTION, POWDER, LYOPHILIZED, FOR SOLUTION INTRAVENOUS at 08:08

## 2024-08-14 RX ADMIN — IPRATROPIUM BROMIDE AND ALBUTEROL SULFATE 3 ML: .5; 3 SOLUTION RESPIRATORY (INHALATION) at 01:08

## 2024-08-14 RX ADMIN — METHYLPREDNISOLONE SODIUM SUCCINATE 60 MG: 40 INJECTION, POWDER, FOR SOLUTION INTRAMUSCULAR; INTRAVENOUS at 08:08

## 2024-08-14 RX ADMIN — FENTANYL CITRATE 100 MCG: 50 INJECTION, SOLUTION INTRAMUSCULAR; INTRAVENOUS at 11:08

## 2024-08-14 NOTE — NURSING
Called respiratory to make sure someone was coming up to give patient her breathing treatment, informed them that her breathing treatments are time critical due to her stent.

## 2024-08-14 NOTE — ASSESSMENT & PLAN NOTE
Appears to have malignant airway obstruction (intrinsic versus extrinsic compression), mostly affecting the right mainstem, and proximal bronchus intermedius.  This is appears to be some aeration of the right upper lobe (although on CT scan there is no opening seen).      She is now a candidate for rigid bronchoscopy plus/minus placement of self expanding metallic stent along with EBUS bronchoscopy of hilar lesions in order to make a diagnosis of progression of disease.    Given her clinical status, oxygen requirements and anatomy, she is considered a high-risk bronchoscopy procedure.  I discussed this in considerable detail with her, and she understands this aspect.  I offered her the option of continuing to wait with the possibility that she may have worsening extrinsic compression (before she is considered for any oncological treatment) and at this time she has opted to proceed with the procedure understanding the risks.  I attempted to get in touch with the son (unfortunately her son who is listed on the medical chart passed away in 2023 as per the patient, and her other son does not have a cell phone.  We were unable to get in touch with the son in-person though we will continue to try).  She is not on any therapeutic anticoagulation at this time.     We will attempt airway examination, bronchoalveolar lavage, balloon dilation and placement of endobronchial stent.  If she does have a stent placed, would recommend scheduled DuoNebs and hypertonic saline in an attempt to keep the stent patent.        The risks of bronchoscopy, and endobronchial ultrasound-guided biopsies +/- endobronchial and transbronchial biopsies    +/-  rigid bronchoscopy +/-ablative therapies were discussed with the patient in detail, including the alternatives to bronchoscopy.  I also discussed the possibility of balloon dilation and placing a stent, which she has agreed to.  Risks discussed included, but not limited to, bleeding,  infection, injury to the teeth and vocal cords, pneumothorax, worsening shortness of breath and respiratory failure.  The patient/ their family verbalized understanding of this risk and agreed to proceed with the procedure as above.      Rapid on-site evaluation (FELIPE)  may be used during the case above, which we will provide a preliminary diagnosis.  I have offered to share this preliminary diagnosis with the patient/their family with their understanding that these results are preliminary and the final results me change.  The patient's/their family have opted to be made aware of these preliminary results.       Final pathology results will be subjected to a delayed release in the electronic medical record to the patient as per their request, so that the results can be interpreted by their physicians and explained to them in a satisfactory manner.  The patient is aware and has agreed to this.

## 2024-08-14 NOTE — PROGRESS NOTES
[facility]  [facility] Medicine  Progress Note    Patient Name: Mayra Kelly  MRN: [id number]  Patient Class: IP- Inpatient   Admission Date: 8/7/2024  Length of Stay: 6 days  Attending Physician: Yeni Jones DO  Primary Care Provider: Darlene Campoevrde NP        Subjective:     Principal Problem:Obstructive pneumonia        HPI:  74 yo F presents to [facility] ED with increasing dyspnea.  She has had a cough productive of whitish sputum but no hemoptysis.  She has been worsening over the past three days but no orthopnea or edema.  No chest pain or palpitations.  She does take eliquis for PAF and is currently in sinus tach.  Initial trop unremarkable and second is pending for the trend.  Her proBNP is now about 37K which is up from her baseline of 650 and with the RBBB could think PTE but is on full dose eliquis.  CT was negative for PTE but she has bilateral hilar masses with right greater than left with multilobar pneumonia.  She does meet sepsis criteria with WBC, tachycardia and JAMI (creat usually 0.9 and now 1.4) but lactic acid is normal.      Patient states that she was treated for lung cancer [id number] with XRT and chemo and though her FOBT with lymph node and endobronchial biopsy not diagnostic for malignancy in 2023, she was eventually diagnosed with lung cancer and follow with Dr. Kraft for chemo every six weeks.  COVID is negative and MRSA pcr nares swab pending.      Remainder of ROS as below.   See assessment and plan below for problem based evaluation      Overview/Hospital Course:  Hx treated adenoCa in 2018. Last Nov underwent evaluation by Dr Munguia. Seen then again also by Dr Kraft. Dx new primary squamous cell CA. Treated chemotx. In April saw Dr Kraft and started on Keytruda and given through infusion center. Has continued this through July. Missed her appt with Dr Kraft in May. This information mainly from Dr Kraft in pt was not good historian. Pt continued to smoke 1/2 ppd;  encourage to stop. Dr Whitmore and Dr Kraft ask to consult. Requires 50% ventimask to keep O2 sats in low 90s. At home been prior on 2L BNC O2. PHMx: also Eliquis for PAF. Now NSR.     Code status discussed this admission and pt changed her mind about DNR, Discussed with nurse in room and pt now wishes to be DNI.    Plan for EBUS on Wednesday 8/14 with disposition to be determined.         Interval History:     No significant events overnight, no new complaints or concerns. Status-post EBUS with bronchial stent and tolerated well. Continue antibiotics and respiratory therapy.     Review of Systems   Constitutional:  Positive for fatigue and unexpected weight change. Negative for appetite change and fever.   HENT:  Negative for trouble swallowing.    Respiratory:  Positive for cough and shortness of breath. Negative for chest tightness and wheezing.    Cardiovascular:  Negative for chest pain and palpitations.   Gastrointestinal:  Negative for abdominal pain, constipation and nausea.   Musculoskeletal:  Positive for gait problem. Negative for back pain and neck stiffness.   Skin:  Negative for pallor and rash.   Neurological:  Negative for dizziness, speech difficulty and headaches.   Psychiatric/Behavioral:  Negative for confusion and suicidal ideas.      Objective:     Vital Signs (Most Recent):  Temp: 97.7 °F (36.5 °C) (08/14/24 1700)  Pulse: 71 (08/14/24 1755)  Resp: 15 (08/14/24 1755)  BP: (!) 94/48 (08/14/24 1700)  SpO2: (!) 90 % (08/14/24 1755) Vital Signs (24h Range):  Temp:  [97.3 °F (36.3 °C)-98.7 °F (37.1 °C)] 97.7 °F (36.5 °C)  Pulse:  [57-90] 71  Resp:  [12-22] 15  SpO2:  [84 %-98 %] 90 %  BP: ()/(43-66) 94/48     Weight: 47.6 kg (105 lb)  Body mass index is 16.95 kg/m².    Intake/Output Summary (Last 24 hours) at 8/14/2024 5560  Last data filed at 8/14/2024 1248  Gross per 24 hour   Intake 840 ml   Output --   Net 840 ml         Physical Exam  Constitutional:       General: She is awake. She is not  in acute distress.     Appearance: She is well-developed and underweight.   HENT:      Head: Normocephalic.      Mouth/Throat:      Pharynx: Oropharynx is clear.   Eyes:      Extraocular Movements: Extraocular movements intact.      Pupils: Pupils are equal, round, and reactive to light.   Neck:      Thyroid: No thyroid mass.   Cardiovascular:      Rate and Rhythm: Normal rate and regular rhythm.   Pulmonary:      Effort: Pulmonary effort is normal.      Breath sounds: Normal air entry. Rales present. No wheezing.   Abdominal:      Palpations: Abdomen is soft.      Tenderness: There is no abdominal tenderness.   Musculoskeletal:         General: Normal range of motion.   Skin:     Coloration: Skin is not jaundiced.      Findings: No lesion.   Neurological:      General: No focal deficit present.      Mental Status: She is alert and oriented to person, place, and time.   Psychiatric:         Behavior: Behavior is cooperative.             Significant Labs: All pertinent labs within the past 24 hours have been reviewed.    Significant Imaging: I have reviewed all pertinent imaging results/findings within the past 24 hours.    Assessment/Plan:      * Obstructive pneumonia  Status-post bronchoscopy with bronchoalveolar lavage, balloon dilation and placement of endobronchial stent earlier today and tolerated well.     Pulmonology managing, strict orders for scheduled DuoNebs and hypertonic saline in an attempt to keep the stent patent.Goal 4-5L NC to maintain O2 >88%.  ------  Patient has a diagnosis of pneumonia. The cause of the pneumonia is suspected to be bacterial in etiology but organism is not known. The pneumonia is stable. The patient has the following signs/symptoms of pneumonia: persistent hypoxia , cough, and shortness of breath. The patient does have a current oxygen requirement and the patient does have a home oxygen requirement. I have reviewed the pertinent imaging. The following cultures have been  collected: Blood cultures The culture results are listed below.     Current antimicrobial regimen consists of the antibiotics listed below. Will monitor patient closely and continue current treatment plan unchanged.    Antibiotics (From admission, onward)      Start     Stop Route Frequency Ordered    08/08/24 0800  piperacillin-tazobactam (ZOSYN) 4.5 g in D5W 100 mL IVPB (MB+)         -- IV Every 8 hours (non-standard times) 08/08/24 0321            Microbiology Results (last 7 days)       Procedure Component Value Units Date/Time    Gram Stain [[id number]] Collected: 08/14/24 1244    Order Status: Completed Specimen: Respiratory from Bronchial Alveolar Lavage (BAL) Updated: 08/14/24 1550     Gram Stain Result Moderate WBC observed      No organisms seen    Culture, Lower Respiratory [[id number]] Collected: 08/14/24 1244    Order Status: Resulted Specimen: Respiratory from Bronchial Alveolar Lavage (BAL) Updated: 08/14/24 1348    Blood culture x two cultures. Draw prior to antibiotics. [[id number]] Collected: 08/07/24 2254    Order Status: Completed Specimen: Blood Updated: 08/13/24 0615     Culture, Blood No Growth at 5 days    Blood culture x two cultures. Draw prior to antibiotics. [[id number]] Collected: 08/07/24 2251    Order Status: Completed Specimen: Blood Updated: 08/13/24 0615     Culture, Blood No Growth at 5 days    Influenza A & B by Molecular [[id number]]  (Normal) Collected: 08/07/24 2302    Order Status: Completed Specimen: Nasopharyngeal Swab Updated: 08/08/24 0109     INFLUENZA A MOLECULAR Negative     INFLUENZA B MOLECULAR  Negative          MRSA PCR negative    Severe protein-calorie malnutrition  Nutrition consulted. Most recent weight and BMI monitored-     Measurements:  Wt Readings from Last 1 Encounters:   08/08/24 47.6 kg (105 lb)   Body mass index is 16.95 kg/m².    Patient has been screened and assessed by RD.    Malnutrition Type:  Context: chronic illness  Level: severe    Malnutrition  Characteristic Summary:  Energy Intake (Malnutrition): less than or equal to 50% for greater than or equal to 1 month  Subcutaneous Fat (Malnutrition): severe depletion  Muscle Mass (Malnutrition): severe depletion    Interventions/Recommendations (treatment strategy):  Recommend consider liberalizing diet to a Regular diet as appropriate. Encourage good PO intakes.    Per dietary:    Per ASPEN guidelines, patient meets criteria for severe protein-calorie malnutrition as evidenced by poor PO intakes <50% for greater than one month and severe fat and muscle depletion to orbital, upper arm, temple, and clavicle regions.     Prealbumin 3 and BMI 16       Paroxysmal atrial fibrillation  Patient with Paroxysmal (<7 days) atrial fibrillation which is controlled currently with Beta Blocker. Patient is currently in sinus rhythm.[id number] Score: 2. HASBLED Score: 2. Anticoagulation indicated. Anticoagulation done with eliquis . Eliquis hold if procedures needed    Pulmonary hypertension    Echo Summary  08/08/24     Show Result Comparison     Left Ventricle: The left ventricle is normal in size. Increased wall thickness. There is concentric remodeling. There is normal systolic function. Biplane (2D) method of discs ejection fraction is 65%.    Right Ventricle: Severe right ventricular enlargement. Systolic function could not be assesed.    Right Atrium: Right atrium is severely dilated.    Aortic Valve: The aortic valve is a trileaflet valve. Moderately calcified cusps. Mildly restricted motion. There is mild stenosis. Aortic valve area by VTI is 1.60 cm². Aortic valve peak velocity is 2.90 m/s. Mean gradient is 16 mmHg. The dimensionless index is 0.46.    Mitral Valve: There is bileaflet sclerosis. Moderately thickened anterior leaflet. There is no stenosis. The mean pressure gradient across the mitral valve is 1 mmHg at a heart rate of  bpm.    Tricuspid Valve: There is moderate regurgitation.    Pulmonary Artery: There  is pulmonary hypertension. The estimated pulmonary artery systolic pressure is 65 mmHg.    IVC/SVC: Elevated venous pressure at 15 mmHg.       Right bundle branch block  She is on norvasc low dose without h/o HTN and BP soft at this time.  Will hold.      COPD (chronic obstructive pulmonary disease)  Patient's COPD is with exacerbation noted by continued dyspnea and worsening of baseline hypoxia currently.  Patient is currently on COPD Pathway. Continue scheduled inhalers Antibiotics and Supplemental oxygen and monitor respiratory status closely.     If scheduled bronchodilators and prn bronchodilators due not improve respiratory status may need to add steroids but would be cautious in due to pneumonia most likely caused by obstruction from bilateral hilar masses  (R>L)    Lung cancer  Follows with Dr. Kraft.  Has chemo every six weeks.  Mediport looks good without tenderness or erythema.  She says she never had it taken out when she finished chemo the first time in 2019.  It has always worked fine.      08/08 Talked with Dr Kraft. Treated 2018 for adenoCa and earlier this year for squamous cell ca both of lung. Last Nov underwent evaluation by Dr Munguia. Seen then again also by Dr Kraft. Dx new primary squamous cell CA. Treated chemotx. In April saw Dr Kraft and started on Keytruda and given through infusion center. Has continued this through July. Missed her appt with Dr Kraft in May    Acute on chronic respiratory failure with hypoxia and hypercapnia  Patient with Hypercapnic and Hypoxic Respiratory failure which is Acute on chronic.  she is on home oxygen at 2 LPM. Supplemental oxygen was provided and noted- Oxygen Concentration (%):  [32] 32    .  Signs/symptoms of respiratory failure include- wheezing and lethargy. Contributing diagnoses includes - Pneumonia Labs and images were reviewed. Patient Has recent ABG, which has been reviewed. Will treat underlying causes and adjust management of respiratory failure as  follows-     Will continue with supplemental oxygen.  May need bipap if continues with CO2 retention.  Monitor respiratory status closely.      Cigarette nicotine dependence with nicotine-induced disorder  Dangers of cigarette smoking were reviewed with patient in detail. Patient was Counseled for 3-10 minutes. Nicotine replacement options were discussed. Nicotine replacement was discussed- prescribed    Patient counseled 4 minutes on importance to stopping use of tobacco. Patient was told that stopping smoking was one of the most important actions that could be taken to improve personal health. Discussed tobacco increases risk for poor outcomes in cardiovascular disease, COPD and cancer. Informed stopping smoking reduces risk of premature death by as much as 10 years. Numerous other benefits associated with quitting including helping others by protection from risks associated with secondhand smoke. Told written information with treatment options and help lines will be given at discharge. Patient given opportunity to ask questions. Treated nicoderm 14mg daily            VTE Risk Mitigation (From admission, onward)           Ordered     enoxaparin injection 40 mg  Every 24 hours         08/10/24 1307                    Discharge Planning   TERE: 8/16/2024     Code Status: Partial Code   Is the patient medically ready for discharge?:     Reason for patient still in hospital (select all that apply): Treatment  Discharge Plan A: Home with family                  Yeni Jones DO  Department of Hospital Medicine   [facility]

## 2024-08-14 NOTE — ASSESSMENT & PLAN NOTE
History of home oxygen use, we will continue with monitoring her O2 levels.  Now at 2.5-3 L of oxygen and saturating at 93%.  No acute respiratory distress.

## 2024-08-14 NOTE — PROGRESS NOTES
[facility]  Adult Nutrition  First Assessment Note         Reason for Assessment  Reason For Assessment: RD follow-up   Nutrition Risk Screen: no indicators present    Assessment and Plan  8/14/2024 RD Follow up: Patient currently NPO s/p bronchoscopy. Patient with prior diet order for cardiac diet with 50% intakes per flowsheets. No new weight noted. Last BM noted 8/11.     Recommend regular diet when diet resumed. Consider bowel regimen as constipation can affect po tolerance. RD Following.     Patient is  72yo female admitted 8/7 for obstructive pneumonia. She was identified at risk by screening criteria with MST2.     Patient is 47.6kg with a BMI of 16.95 and is severely underweight per geriatric standards. She has a PMH of lung cancer and advanced COPD. Visited with patient this morning and she stated she has been eating well while in the hospital due to the steroid therapy she is receiving, but has not had an appetite at home prior to admission. She is edentulous without her dentures. RD asked if she needed texture modification, patient declined.     Physical exam reveals severe fat and muscle depletion to orbital, upper arm, temple, and clavicle regions.     Per ASPEN guidelines, patient meets criteria for severe protein-calorie malnutrition as evidenced by poor PO intakes <50% for greater than one month and severe fat and muscle depletion to orbital, upper arm, temple, and clavicle regions.     Patient is ordered a cardiac diet. Recommend consider liberalizing to a Regular diet as appropriate. Encourage good PO intakes.     Last BM 8/8 per flowsheet.    Medications/labs reviewed. RD following.      Learning Needs/Social Determinants of Health  Learning Assessment       08/08/2024 1319 [facility] (8/7/2024 - Present)   Created by Barb Abarca, RN - RN (Nurse) Status: Complete                 PRIMARY LEARNER     Primary Learner Name:  Mayra WHITE -  08/08/2024 1319    Relationship:  Patient BN - 08/08/2024 1319    Does the primary learner have any barriers to learning?:  No Barriers BN - 08/08/2024 1319    What is the preferred language of the primary learner?:  English BN - 08/08/2024 1319    Is an  required?:  Yes BN - 08/08/2024 1319    How does the primary learner prefer to learn new concepts?:  Listening BN - 08/08/2024 1319    How often do you need to have someone help you read instructions, pamphlets, or written material from your doctor or pharmacy?:  Never BN - 08/08/2024 1319        CO-LEARNER #1     No question answered        CO-LEARNER #2     No question answered        SPECIAL TOPICS     No question answered        ANSWERED BY:     No question answered        Comments         Edit History       Barb Abarca, RN - RN (Nurse)   08/08/2024 1319                           Social Determinants of Health     Tobacco Use: High Risk (8/8/2024)    Patient History     Smoking Tobacco Use: Every Day     Smokeless Tobacco Use: Never     Passive Exposure: Not on file   Alcohol Use: Not At Risk (8/8/2024)    AUDIT-C     Frequency of Alcohol Consumption: Never     Average Number of Drinks: Patient does not drink     Frequency of Binge Drinking: Never   Financial Resource Strain: Low Risk  (8/8/2024)    Overall Financial Resource Strain (CARDIA)     Difficulty of Paying Living Expenses: Not hard at all   Food Insecurity: No Food Insecurity (8/8/2024)    Hunger Vital Sign     Worried About Running Out of Food in the Last Year: Never true     Ran Out of Food in the Last Year: Never true   Transportation Needs: No Transportation Needs (8/8/2024)    TRANSPORTATION NEEDS     Transportation : No   Physical Activity: Inactive (8/8/2024)    Exercise Vital Sign     Days of Exercise per Week: 0 days     Minutes of Exercise per Session: 0 min   Stress: No Stress Concern Present (8/8/2024)    [employer] - Occupational Stress  Questionnaire     Feeling of Stress : Not at all   Housing Stability: Low Risk  (8/8/2024)    Housing Stability Vital Sign     Unable to Pay for Housing in the Last Year: No     Homeless in the Last Year: No   Depression: Low Risk  (12/4/2023)    Depression     Last PHQ-4: Flowsheet Data: 0   Utilities: Not At Risk (8/8/2024)    [facility] Utilities     Threatened with loss of utilities: No   Health Literacy: Adequate Health Literacy (8/8/2024)     Health Literacy     Frequency of need for help with medical instructions: Rarely   Social Isolation: Socially Integrated (8/8/2024)    Social Isolation     Social Isolation: 1            Malnutrition  Is Patient Malnourished: Yes Malnutrition Assessment  Malnutrition Context: chronic illness  Malnutrition Level: severe  Teeth (Micronutrient): edentulous       Energy Intake (Malnutrition): less than or equal to 50% for greater than or equal to 1 month  Subcutaneous Fat (Malnutrition): severe depletion  Muscle Mass (Malnutrition): severe depletion   Orbital Region (Subcutaneous Fat Loss): severe depletion  Upper Arm Region (Subcutaneous Fat Loss): severe depletion   Sabianism Region (Muscle Loss): severe depletion  Clavicle Bone Region (Muscle Loss): severe depletion                 Nutrition Diagnosis  Malnutrition (Severe) related to Appetite loss, Catabolic illness, Chronic illness, and Inadequate Caloric intake as evidenced by poor PO intakes <50% for greater than one month and severe fat and muscle depletion to orbital, upper arm, temple, and clavicle regions.   Comments: consider liberalizing to Regular diet    Recent Labs   Lab 08/14/24  0636   GLU 95       Nutrition Prescription / Recommendations  Recommendation/Intervention: Recommend consider liberalizing diet to a Regular diet as appropriate. Encourage good PO intakes.  Goals: Weight maintenance during admission, intake 50-75% of meals during admission  Nutrition Goal Status: progressing towards goal  Current Diet  Order: Cardiac  Chewing or Swallowing Difficulty?: Edentulous : no dentures present  Recommended Diet: Regular  Recommended Oral Supplement: No Oral Supplements  Is Nutrition Support Recommended: [facility] Support: No  Is Nutrition Education Recommended: No    Monitor and Evaluation  % current Intake: P.O. intake of 50 - 75 %  % intake to meet estimated needs: 50 - 75 %  Food and Nutrient Intake: food and beverage intake  Food and Nutrient Adminstration: diet order  Anthropometric Measurements: weight, weight change  Biochemical Data, Medical Tests and Procedures: gastrointestinal profile, electrolyte and renal panel, glucose/endocrine profile, inflammatory profile, lipid profile  Nutrition-Focused Physical Findings: overall appearance  Tolerance: tolerating    Current Medical Diagnosis and Past Medical History     Past Medical History:   Diagnosis Date    Chronic respiratory failure with hypoxia and hypercapnia     on home oxygen but hasn't been using it    Cigarette nicotine dependence with nicotine-induced disorder 11/09/2023    COPD (chronic obstructive pulmonary disease)     Dyshidrotic eczema 08/10/2021    Right bundle branch block 12/11/2023    Scabies     Squamous cell carcinoma of lung     follows with Dr. Kraft       Nutrition/Diet History  Food Allergies: NKFA    Lab/Procedures/Meds  Recent Labs   Lab 08/14/24  0636      K 4.8   BUN 24*   CREATININE 0.68   CALCIUM 8.6      Note: BUN elevated; encourage po fluids    Last A1c:   Lab Results   Component Value Date    HGBA1C 5.5 08/08/2024     Lab Results   Component Value Date    RBC 4.11 (L) 08/14/2024    HGB 10.8 (L) 08/14/2024    HCT 35.9 (L) 08/14/2024    MCV 87.3 08/14/2024    MCH 26.3 (L) 08/14/2024    MCHC 30.1 (L) 08/14/2024   Note: H&H low    Pertinent Labs Reviewed: reviewed  Pertinent Medications Reviewed: reviewed  Scheduled Meds:   albuterol-ipratropium  3 mL Nebulization QID    albuterol-ipratropium  3 mL Nebulization  "Once    buPROPion  100 mg Oral BID    enoxparin  40 mg Subcutaneous Q24H (prophylaxis, 1700)    methylPREDNISolone injection (PEDS and ADULTS)  60 mg Intravenous TID    nicotine  1 patch Transdermal Daily    piperacillin-tazobactam (Zosyn) IV (PEDS and ADULTS) (extended infusion is not appropriate)  4.5 g Intravenous Q8H    potassium chloride SA  20 mEq Oral BID     Continuous Infusions:  PRN Meds:.  Current Facility-Administered Medications:     0.9% NaCl 100 mL flush bag, , Intravenous, PRN    acetaminophen, 1,000 mg, Oral, Q6H PRN    albuterol sulfate, 2.5 mg, Nebulization, Q4H PRN    bisacodyL, 10 mg, Oral, Daily PRN    dextromethorphan-guaiFENesin  mg/5 ml, 10 mL, Oral, Q6H PRN    dextrose 10%, 12.5 g, Intravenous, PRN    dextrose 10%, 25 g, Intravenous, PRN    diphenhydrAMINE, 25 mg, Intravenous, Q6H PRN    glucagon (human recombinant), 1 mg, Intramuscular, PRN    HYDROmorphone, 0.5 mg, Intravenous, Q5 Min PRN    melatonin, 6 mg, Oral, Nightly PRN    meperidine, 25 mg, Intravenous, Q10 Min PRN    morphine, 4 mg, Intravenous, Q5 Min PRN    ondansetron, 4 mg, Intravenous, Daily PRN    ondansetron, 8 mg, Intravenous, Q6H PRN    traZODone, 50 mg, Oral, Nightly PRN    Anthropometrics  Temp: 97.3 °F (36.3 °C)  Height Method: Stated  Height: 5' 6" (167.6 cm)  Height (inches): 66 in  Weight Method: Bed Scale  Weight: 47.6 kg (105 lb)  Weight (lb): 105 lb  Ideal Body Weight (IBW), Female: 130 lb  % Ideal Body Weight, Female (lb): 80.77 %  BMI (Calculated): 17       Estimated/Assessed Needs  RMR ([place] Equation): 1003.03     Temp: 97.3 °F (36.3 °C)Oral  Weight Used For Calorie Calculations: 47.6 kg (104 lb 15 oz)     Energy Calorie Requirements (kcal): 1666-1904kcal (35-40kcal/kg)  Weight Used For Protein Calculations: 59 kg (130 lb)  Protein Requirements: 71-89g (1.2-1.5g/kg ideal body weight)       RDA Method (mL): 1666       Nutrition by Nursing  Diet/Nutrition Received: low saturated fat/low " cholesterol  Intake (%): 50%  Diet/Feeding Assistance: none  Diet/Feeding Tolerance: good  Last Bowel Movement: 08/11/24                Nutrition Follow-Up  RD Follow-up?: Yes      Nutrition Discharge Planning: unsure of d/c plans; recommend regular diet on d/c          Available via Secure Chat

## 2024-08-14 NOTE — PLAN OF CARE
SS spoke with MD, pt qualifies for Specialty for short term stay. SS and MD spoke with pt at bedside and obtained choice for [facility]. [facility]. [facility] following

## 2024-08-14 NOTE — PROGRESS NOTES
[facility]  [facility] Medicine  Progress Note    Patient Name: Mayra Kelly  MRN: [id number]  Patient Class: IP- Inpatient   Admission Date: 8/7/2024  Length of Stay: 5 days  Attending Physician: Yeni Jones DO  Primary Care Provider: Darlene Campoverde NP        Subjective:     Principal Problem:Obstructive pneumonia        HPI:  72 yo F presents to [facility] ED with increasing dyspnea.  She has had a cough productive of whitish sputum but no hemoptysis.  She has been worsening over the past three days but no orthopnea or edema.  No chest pain or palpitations.  She does take eliquis for PAF and is currently in sinus tach.  Initial trop unremarkable and second is pending for the trend.  Her proBNP is now about 37K which is up from her baseline of 650 and with the RBBB could think PTE but is on full dose eliquis.  CT was negative for PTE but she has bilateral hilar masses with right greater than left with multilobar pneumonia.  She does meet sepsis criteria with WBC, tachycardia and JAMI (creat usually 0.9 and now 1.4) but lactic acid is normal.      Patient states that she was treated for lung cancer [id number] with XRT and chemo and though her FOBT with lymph node and endobronchial biopsy not diagnostic for malignancy in 2023, she was eventually diagnosed with lung cancer and follow with Dr. Kraft for chemo every six weeks.  COVID is negative and MRSA pcr nares swab pending.      Remainder of ROS as below.   See assessment and plan below for problem based evaluation      Overview/Hospital Course:  Hx treated adenoCa in 2018. Last Nov underwent evaluation by Dr Munguia. Seen then again also by Dr Kraft. Dx new primary squamous cell CA. Treated chemotx. In April saw Dr Kraft and started on Keytruda and given through infusion center. Has continued this through July. Missed her appt with Dr Kraft in May. This information mainly from Dr Kraft in pt was not good historian. Pt continued to smoke 1/2 ppd;  encourage to stop. Dr Whitmore and Dr Kraft ask to consult. Requires 50% ventimask to keep O2 sats in low 90s. At home been prior on 2L BNC O2. PHMx: also Eliquis for PAF. Now NSR.     Code status discussed this admission and pt changed her mind about DNR, Discussed with nurse in room and pt now wishes to be DNI.    Plan for EBUS on Wednesday 8/14 with disposition to be determined.         Interval History:     No significant events overnight, no new complaints or concerns. MRSA PCR negative so will discontinue vancomycin. Plan to continue antibiotics for 10-14 days. EBUS tomorrow morning.     Review of Systems   Constitutional:  Positive for fatigue and unexpected weight change. Negative for appetite change and fever.   HENT:  Negative for trouble swallowing.    Respiratory:  Positive for cough and shortness of breath. Negative for chest tightness and wheezing.    Cardiovascular:  Negative for chest pain and palpitations.   Gastrointestinal:  Negative for abdominal pain, constipation and nausea.   Musculoskeletal:  Positive for gait problem. Negative for back pain and neck stiffness.   Skin:  Negative for pallor and rash.   Neurological:  Negative for dizziness, speech difficulty and headaches.   Psychiatric/Behavioral:  Negative for confusion and suicidal ideas.      Objective:     Vital Signs (Most Recent):  Temp: 97.7 °F (36.5 °C) (08/13/24 1628)  Pulse: 62 (08/13/24 1640)  Resp: 18 (08/13/24 1640)  BP: (!) 125/55 (08/13/24 1628)  SpO2: 96 % (08/13/24 1640) Vital Signs (24h Range):  Temp:  [97.5 °F (36.4 °C)-98.9 °F (37.2 °C)] 97.7 °F (36.5 °C)  Pulse:  [58-80] 62  Resp:  [14-20] 18  SpO2:  [90 %-97 %] 96 %  BP: (116-133)/(55-69) 125/55     Weight: 47.6 kg (105 lb)  Body mass index is 16.95 kg/m².    Intake/Output Summary (Last 24 hours) at 8/13/2024 1901  Last data filed at 8/13/2024 1702  Gross per 24 hour   Intake 2980.53 ml   Output --   Net 2980.53 ml         Physical Exam  Constitutional:       General: She  is awake. She is not in acute distress.     Appearance: She is well-developed and underweight.   HENT:      Head: Normocephalic.      Mouth/Throat:      Pharynx: Oropharynx is clear.   Eyes:      Extraocular Movements: Extraocular movements intact.      Pupils: Pupils are equal, round, and reactive to light.   Neck:      Thyroid: No thyroid mass.   Cardiovascular:      Rate and Rhythm: Normal rate and regular rhythm.   Pulmonary:      Effort: Pulmonary effort is normal.      Breath sounds: Normal air entry. Rales present. No wheezing.   Abdominal:      Palpations: Abdomen is soft.      Tenderness: There is no abdominal tenderness.   Musculoskeletal:         General: Normal range of motion.   Skin:     Coloration: Skin is not jaundiced.      Findings: No lesion.   Neurological:      General: No focal deficit present.      Mental Status: She is alert and oriented to person, place, and time.   Psychiatric:         Behavior: Behavior is cooperative.             Significant Labs: All pertinent labs within the past 24 hours have been reviewed.    Significant Imaging: I have reviewed all pertinent imaging results/findings within the past 24 hours.    Assessment/Plan:      * Obstructive pneumonia  Patient has a diagnosis of pneumonia. The cause of the pneumonia is suspected to be bacterial in etiology but organism is not known. The pneumonia is stable. The patient has the following signs/symptoms of pneumonia: persistent hypoxia , cough, and shortness of breath. The patient does have a current oxygen requirement and the patient does have a home oxygen requirement. I have reviewed the pertinent imaging. The following cultures have been collected: Blood cultures The culture results are listed below.     Current antimicrobial regimen consists of the antibiotics listed below. Will monitor patient closely and continue current treatment plan unchanged.    Antibiotics (From admission, onward)      Start     Stop Route Frequency  Ordered    08/08/24 0800  piperacillin-tazobactam (ZOSYN) 4.5 g in D5W 100 mL IVPB (MB+)         -- IV Every 8 hours (non-standard times) 08/08/24 0321            Microbiology Results (last 7 days)       Procedure Component Value Units Date/Time    Blood culture x two cultures. Draw prior to antibiotics. [[id number]] Collected: 08/07/24 2254    Order Status: Completed Specimen: Blood Updated: 08/13/24 0615     Culture, Blood No Growth at 5 days    Blood culture x two cultures. Draw prior to antibiotics. [[id number]] Collected: 08/07/24 2251    Order Status: Completed Specimen: Blood Updated: 08/13/24 0615     Culture, Blood No Growth at 5 days    Influenza A & B by Molecular [[id number]]  (Normal) Collected: 08/07/24 2302    Order Status: Completed Specimen: Nasopharyngeal Swab Updated: 08/08/24 0109     INFLUENZA A MOLECULAR Negative     INFLUENZA B MOLECULAR  Negative          MRSA PCR negative    Severe protein-calorie malnutrition  Nutrition consulted. Most recent weight and BMI monitored-     Measurements:  Wt Readings from Last 1 Encounters:   08/08/24 47.6 kg (105 lb)   Body mass index is 16.95 kg/m².    Patient has been screened and assessed by RD.    Malnutrition Type:  Context: chronic illness  Level: severe    Malnutrition Characteristic Summary:  Energy Intake (Malnutrition): less than or equal to 50% for greater than or equal to 1 month  Subcutaneous Fat (Malnutrition): severe depletion  Muscle Mass (Malnutrition): severe depletion    Interventions/Recommendations (treatment strategy):  Recommend consider liberalizing diet to a Regular diet as appropriate. Encourage good PO intakes.    Per dietary:    Per ASPEN guidelines, patient meets criteria for severe protein-calorie malnutrition as evidenced by poor PO intakes <50% for greater than one month and severe fat and muscle depletion to orbital, upper arm, temple, and clavicle regions.     Prealbumin 3 and BMI 16       Paroxysmal atrial  fibrillation  Patient with Paroxysmal (<7 days) atrial fibrillation which is controlled currently with Beta Blocker. Patient is currently in sinus rhythm.[id number] Score: 2. HASBLED Score: 2. Anticoagulation indicated. Anticoagulation done with eliquis . Eliquis hold if procedures needed    Pulmonary hypertension    Echo Summary  08/08/24     Show Result Comparison     Left Ventricle: The left ventricle is normal in size. Increased wall thickness. There is concentric remodeling. There is normal systolic function. Biplane (2D) method of discs ejection fraction is 65%.    Right Ventricle: Severe right ventricular enlargement. Systolic function could not be assesed.    Right Atrium: Right atrium is severely dilated.    Aortic Valve: The aortic valve is a trileaflet valve. Moderately calcified cusps. Mildly restricted motion. There is mild stenosis. Aortic valve area by VTI is 1.60 cm². Aortic valve peak velocity is 2.90 m/s. Mean gradient is 16 mmHg. The dimensionless index is 0.46.    Mitral Valve: There is bileaflet sclerosis. Moderately thickened anterior leaflet. There is no stenosis. The mean pressure gradient across the mitral valve is 1 mmHg at a heart rate of  bpm.    Tricuspid Valve: There is moderate regurgitation.    Pulmonary Artery: There is pulmonary hypertension. The estimated pulmonary artery systolic pressure is 65 mmHg.    IVC/SVC: Elevated venous pressure at 15 mmHg.       Right bundle branch block  She is on norvasc low dose without h/o HTN and BP soft at this time.  Will hold.      COPD (chronic obstructive pulmonary disease)  Patient's COPD is with exacerbation noted by continued dyspnea and worsening of baseline hypoxia currently.  Patient is currently on COPD Pathway. Continue scheduled inhalers Antibiotics and Supplemental oxygen and monitor respiratory status closely.     If scheduled bronchodilators and prn bronchodilators due not improve respiratory status may need to add steroids but would  be cautious in due to pneumonia most likely caused by obstruction from bilateral hilar masses  (R>L)    Lung cancer  Follows with Dr. Kraft.  Has chemo every six weeks.  Arnoldo looks good without tenderness or erythema.  She says she never had it taken out when she finished chemo the first time in 2019.  It has always worked fine.      08/08 Talked with Dr Kraft. Treated 2018 for adenoCa and earlier this year for squamous cell ca both of lung. Last Nov underwent evaluation by Dr Munguia. Seen then again also by Dr Kraft. Dx new primary squamous cell CA. Treated chemotx. In April saw Dr Kraft and started on Keytruda and given through infusion center. Has continued this through July. Missed her appt with Dr Kraft in May    Acute on chronic respiratory failure with hypoxia and hypercapnia  Patient with Hypercapnic and Hypoxic Respiratory failure which is Acute on chronic.  she is on home oxygen at 2 LPM. Supplemental oxygen was provided and noted- Oxygen Concentration (%):  [32] 32    .  Signs/symptoms of respiratory failure include- wheezing and lethargy. Contributing diagnoses includes - Pneumonia Labs and images were reviewed. Patient Has recent ABG, which has been reviewed. Will treat underlying causes and adjust management of respiratory failure as follows-     Will continue with supplemental oxygen.  May need bipap if continues with CO2 retention.  Monitor respiratory status closely.      Cigarette nicotine dependence with nicotine-induced disorder  Dangers of cigarette smoking were reviewed with patient in detail. Patient was Counseled for 3-10 minutes. Nicotine replacement options were discussed. Nicotine replacement was discussed- prescribed    Patient counseled 4 minutes on importance to stopping use of tobacco. Patient was told that stopping smoking was one of the most important actions that could be taken to improve personal health. Discussed tobacco increases risk for poor outcomes in cardiovascular disease,  COPD and cancer. Informed stopping smoking reduces risk of premature death by as much as 10 years. Numerous other benefits associated with quitting including helping others by protection from risks associated with secondhand smoke. Told written information with treatment options and help lines will be given at discharge. Patient given opportunity to ask questions. Treated nicoderm 14mg daily            VTE Risk Mitigation (From admission, onward)           Ordered     enoxaparin injection 40 mg  Every 24 hours         08/10/24 1307                    Discharge Planning   TERE:      Code Status: Partial Code   Is the patient medically ready for discharge?:     Reason for patient still in hospital (select all that apply): Treatment  Discharge Plan A: Home with family                  Yeni Jones DO  Department of Hospital Medicine   [facility]

## 2024-08-14 NOTE — TRANSFER OF CARE
"Anesthesia Transfer of Care Note    Patient: Mayra Kelly    Procedure(s) Performed: EBUS, Stent insertion    Patient location: PACU    Anesthesia Type: general    Transport from OR: Transported from OR on 6-10 L/min O2 by face mask with adequate spontaneous ventilation    Post pain: adequate analgesia    Post assessment: no apparent anesthetic complications    Post vital signs: stable    Level of consciousness: responds to stimulation, awake and sedated    Nausea/Vomiting: no nausea/vomiting    Complications: none    Transfer of care protocol was followed    Last vitals: Visit Vitals  BP (!) 113/53 (BP Location: Right arm, Patient Position: Lying)   Pulse 64   Temp 37.1 °C (98.7 °F) (Oral)   Resp 12   Ht 5' 6" (1.676 m)   Wt 47.6 kg (105 lb)   LMP  (LMP Unknown)   SpO2 (!) 92%   Breastfeeding No   BMI 16.95 kg/m²     "

## 2024-08-14 NOTE — SUBJECTIVE & OBJECTIVE
Interval History:     No significant events overnight, no new complaints or concerns. Status-post EBUS with bronchial stent and tolerated well. Continue antibiotics and respiratory therapy.     Review of Systems   Constitutional:  Positive for fatigue and unexpected weight change. Negative for appetite change and fever.   HENT:  Negative for trouble swallowing.    Respiratory:  Positive for cough and shortness of breath. Negative for chest tightness and wheezing.    Cardiovascular:  Negative for chest pain and palpitations.   Gastrointestinal:  Negative for abdominal pain, constipation and nausea.   Musculoskeletal:  Positive for gait problem. Negative for back pain and neck stiffness.   Skin:  Negative for pallor and rash.   Neurological:  Negative for dizziness, speech difficulty and headaches.   Psychiatric/Behavioral:  Negative for confusion and suicidal ideas.      Objective:     Vital Signs (Most Recent):  Temp: 97.7 °F (36.5 °C) (08/14/24 1700)  Pulse: 71 (08/14/24 1755)  Resp: 15 (08/14/24 1755)  BP: (!) 94/48 (08/14/24 1700)  SpO2: (!) 90 % (08/14/24 1755) Vital Signs (24h Range):  Temp:  [97.3 °F (36.3 °C)-98.7 °F (37.1 °C)] 97.7 °F (36.5 °C)  Pulse:  [57-90] 71  Resp:  [12-22] 15  SpO2:  [84 %-98 %] 90 %  BP: ()/(43-66) 94/48     Weight: 47.6 kg (105 lb)  Body mass index is 16.95 kg/m².    Intake/Output Summary (Last 24 hours) at 8/14/2024 1819  Last data filed at 8/14/2024 1248  Gross per 24 hour   Intake 840 ml   Output --   Net 840 ml         Physical Exam  Constitutional:       General: She is awake. She is not in acute distress.     Appearance: She is well-developed and underweight.   HENT:      Head: Normocephalic.      Mouth/Throat:      Pharynx: Oropharynx is clear.   Eyes:      Extraocular Movements: Extraocular movements intact.      Pupils: Pupils are equal, round, and reactive to light.   Neck:      Thyroid: No thyroid mass.   Cardiovascular:      Rate and Rhythm: Normal rate and regular  rhythm.   Pulmonary:      Effort: Pulmonary effort is normal.      Breath sounds: Normal air entry. Rales present. No wheezing.   Abdominal:      Palpations: Abdomen is soft.      Tenderness: There is no abdominal tenderness.   Musculoskeletal:         General: Normal range of motion.   Skin:     Coloration: Skin is not jaundiced.      Findings: No lesion.   Neurological:      General: No focal deficit present.      Mental Status: She is alert and oriented to person, place, and time.   Psychiatric:         Behavior: Behavior is cooperative.             Significant Labs: All pertinent labs within the past 24 hours have been reviewed.    Significant Imaging: I have reviewed all pertinent imaging results/findings within the past 24 hours.

## 2024-08-14 NOTE — PROGRESS NOTES
[facility]  Wound Care    Patient Name:  Mayra Kelly   MRN:  [id number]  Date: 8/14/2024  Diagnosis: Obstructive pneumonia    History:     Past Medical History:   Diagnosis Date    Chronic respiratory failure with hypoxia and hypercapnia     on home oxygen but hasn't been using it    Cigarette nicotine dependence with nicotine-induced disorder 11/09/2023    COPD (chronic obstructive pulmonary disease)     Dyshidrotic eczema 08/10/2021    Right bundle branch block 12/11/2023    Scabies     Squamous cell carcinoma of lung     follows with Dr. Kraft       Social History     Socioeconomic History    Marital status:    Tobacco Use    Smoking status: Every Day     Current packs/day: 1.00     Types: Cigarettes    Smokeless tobacco: Never   Substance and Sexual Activity    Alcohol use: Never    Drug use: Never     Social Determinants of Health     Financial Resource Strain: Low Risk  (8/8/2024)    Overall Financial Resource Strain (CARDIA)     Difficulty of Paying Living Expenses: Not hard at all   Food Insecurity: No Food Insecurity (8/8/2024)    Hunger Vital Sign     Worried About Running Out of Food in the Last Year: Never true     Ran Out of Food in the Last Year: Never true   Transportation Needs: No Transportation Needs (8/8/2024)    TRANSPORTATION NEEDS     Transportation : No   Physical Activity: Inactive (8/8/2024)    Exercise Vital Sign     Days of Exercise per Week: 0 days     Minutes of Exercise per Session: 0 min   Stress: No Stress Concern Present (8/8/2024)    [place] [employer] - Occupational Stress Questionnaire     Feeling of Stress : Not at all   Housing Stability: Low Risk  (8/8/2024)    Housing Stability Vital Sign     Unable to Pay for Housing in the Last Year: No     Homeless in the Last Year: No       Precautions:     Allergies as of 08/07/2024    (No Known Allergies)       WO Assessment Details/Treatment        08/14/24 09   WOCN Assessment  "  WOCN Total Time (mins) 75   Visit Date 08/14/24   Visit Time 0945   Consult Type Follow Up   WOCN Speciality Wound   Wound pressure   Number of Wounds 1   Intervention chart review;assessed;applied;orders   Teaching on-going   Skin Interventions   Pressure Reduction Devices foam padding utilized   Pressure Reduction Techniques frequent weight shift encouraged   Skin Protection incontinence pads utilized   Positioning   Body Position position changed independently;weight shifting   Head of Bed (HOB) Positioning HOB at 20-30 degrees   Positioning/Transfer Devices pillows;in use   Pressure Injury Prevention    Check Moisture Management Pad Done   Sacral Foam Dressing Peel back sacral foam dressing, assess skin and reapply        Wound 08/08/24 0230 Pressure Injury Right Coccyx   Date First Assessed/Time First Assessed: 08/08/24 0230   Present on Original Admission: Yes  Primary Wound Type: Pressure Injury  Side: Right  Location: Coccyx   Wound Image   (0.5 x 0.5)   Pressure Injury Stage 2   Dressing Appearance Intact;Dry;Clean   Drainage Amount None   Appearance [place];Dry   Periwound Area Intact;Dry   Wound Edges Defined   Care Cleansed with:;Antimicrobial agent;Wound cleanser   Dressing Changed;Silicone;[place]/border;Foam  (Thin layer of protective cream)   Periwound Care Absorptive dressing applied;Cleansed with pH balanced cleanser;Moisture barrier applied     WOC Team consulted for "Buttocks"    Narrative: Pt alert and oriented, assist pt to bedside commode, pt back in bed with bed in lowest position, call bell in reach. Pt tolerated wound care well.      Active Wounds and Recommendations: Continue POC    Goals for Wound Healing: Moisture management, Apply antimicrobial, Reduce bioburden, and Educate on proper wound management post D/C     Barriers to Wound Healing: multiple co-morbidities advanced age fragile skin no protective sensation    Orders placed.    Thank you for the consult.     We will continue to " follow. See additional note under Notes Tab for tentative f/u plan/dates.    08/14/2024

## 2024-08-14 NOTE — SUBJECTIVE & OBJECTIVE
Interval History/Significant Events:  Refer to HPI    Review of Systems  Objective:     Vital Signs (Most Recent):  Temp: 97.6 °F (36.4 °C) (08/14/24 0439)  Pulse: (!) 59 (08/14/24 0439)  Resp: 16 (08/14/24 0439)  BP: 121/61 (08/14/24 0439)  SpO2: (!) 92 % (08/14/24 0439) Vital Signs (24h Range):  Temp:  [97.6 °F (36.4 °C)-98.9 °F (37.2 °C)] 97.6 °F (36.4 °C)  Pulse:  [58-67] 59  Resp:  [16-20] 16  SpO2:  [90 %-98 %] 92 %  BP: (114-133)/(51-69) 121/61   Weight: 47.6 kg (105 lb)  Body mass index is 16.95 kg/m².      Intake/Output Summary (Last 24 hours) at 8/14/2024 0628  Last data filed at 8/13/2024 1702  Gross per 24 hour   Intake 1200 ml   Output --   Net 1200 ml          Physical Exam  Constitutional:       General: She is awake. She is not in acute distress.     Appearance: She is well-developed and underweight.   HENT:      Head: Normocephalic.      Mouth/Throat:      Pharynx: Oropharynx is clear.   Eyes:      Extraocular Movements: Extraocular movements intact.      Pupils: Pupils are equal, round, and reactive to light.   Neck:      Thyroid: No thyroid mass.   Cardiovascular:      Rate and Rhythm: Normal rate and regular rhythm.   Pulmonary:      Effort: Pulmonary effort is normal. No respiratory distress.      Breath sounds: Normal air entry. Rales present. No wheezing.      Comments: Decreased breath sounds right base.  Abdominal:      Palpations: Abdomen is soft.      Tenderness: There is no abdominal tenderness.   Musculoskeletal:         General: Normal range of motion.   Skin:     Coloration: Skin is not jaundiced.      Findings: No lesion.   Neurological:      General: No focal deficit present.      Mental Status: She is alert and oriented to person, place, and time.   Psychiatric:         Behavior: Behavior is cooperative.            Vents:  Oxygen Concentration (%): 32 (08/13/24 1902)  Lines/Drains/Airways       Peripheral Intravenous Line  Duration                  Peripheral IV - Single Lumen  08/11/24 1816 22 G Left Antecubital 2 days         Peripheral IV - Single Lumen 08/13/24 1026 22 G Anterior;Left Forearm <1 day                  Significant Labs:    CBC/Anemia Profile:  Recent Labs   Lab 08/13/24  0521   WBC 10.66   HGB 10.4*   HCT 35.9*   *   MCV 89.5   RDW 17.5*        Chemistries:  Recent Labs   Lab 08/13/24  0646   BUN 19*   CREATININE 0.69       All pertinent labs within the past 24 hours have been reviewed.    Significant Imaging:  I have reviewed all pertinent imaging results/findings within the past 24 hours.

## 2024-08-14 NOTE — ASSESSMENT & PLAN NOTE
Patient has a diagnosis of pneumonia. The cause of the pneumonia is suspected to be bacterial in etiology but organism is not known. The pneumonia is stable. The patient has the following signs/symptoms of pneumonia: persistent hypoxia , cough, and shortness of breath. The patient does have a current oxygen requirement and the patient does have a home oxygen requirement. I have reviewed the pertinent imaging. The following cultures have been collected: Blood cultures The culture results are listed below.     Current antimicrobial regimen consists of the antibiotics listed below. Will monitor patient closely and continue current treatment plan unchanged.    Antibiotics (From admission, onward)      Start     Stop Route Frequency Ordered    08/08/24 0800  piperacillin-tazobactam (ZOSYN) 4.5 g in D5W 100 mL IVPB (MB+)         -- IV Every 8 hours (non-standard times) 08/08/24 0321            Microbiology Results (last 7 days)       Procedure Component Value Units Date/Time    Blood culture x two cultures. Draw prior to antibiotics. [[id number]] Collected: 08/07/24 2254    Order Status: Completed Specimen: Blood Updated: 08/13/24 0615     Culture, Blood No Growth at 5 days    Blood culture x two cultures. Draw prior to antibiotics. [[id number]] Collected: 08/07/24 2251    Order Status: Completed Specimen: Blood Updated: 08/13/24 0615     Culture, Blood No Growth at 5 days    Influenza A & B by Molecular [[id number]]  (Normal) Collected: 08/07/24 2302    Order Status: Completed Specimen: Nasopharyngeal Swab Updated: 08/08/24 0109     INFLUENZA A MOLECULAR Negative     INFLUENZA B MOLECULAR  Negative          MRSA PCR negative

## 2024-08-14 NOTE — PROGRESS NOTES
Dr. Castellon at bedside to evaluate pt's oxygenation status. He is okay for pt to be on 4-5L NC to maintain O2 sat 88% or above. Okay per Dr. Castellon to transfer to floor.

## 2024-08-14 NOTE — PROGRESS NOTES
Arterial line removed. Pressure held for 15 min and then pressure dressing applied. No active bleeding noted. No hematoma noted. Cap refill < 3 seconds. Pt tolerated well.

## 2024-08-14 NOTE — ASSESSMENT & PLAN NOTE
We will obtain bronchoalveolar lavage.  Has completed 5-6 days of antibiotic treatment at least.  We will recommend at least 10 days of treatment pending clinical improvement and can target any organisms isolated on bronchoalveolar lavage.

## 2024-08-14 NOTE — ANESTHESIA PREPROCEDURE EVALUATION
08/14/2024  Mayra Kelly is a 73 y.o., female.      Pre-op Assessment    I have reviewed the Patient Summary Reports.    I have reviewed the NPO Status.   I have reviewed the Medications.     Review of Systems         Anesthesia Plan  Type of Anesthesia, risks & benefits discussed:    Anesthesia Type: Gen Supraglottic Airway  Intra-op Monitoring Plan: Standard ASA Monitors  Post Op Pain Control Plan: IV/PO Opioids PRN  Induction:  IV  Informed Consent: Informed consent signed with the Patient and all parties understand the risks and agree with anesthesia plan.  All questions answered.   ASA Score: 3    Ready For Surgery From Anesthesia Perspective.     .  NPO greater than 8 hours  No anesthetic complications  NKDA    Hct 36  8/7/24 EKG: Sinus tachycardia with sinus arrhythmia   Right bundle branch block   Lateral infarct - age undetermined   Cannot rule out septal infarct - age undetermined   Inferior ST-T abnormality may be due to myocardial ischemia   8/8/24 Echo: mild AS; trace MR; pulmonary HTN; EF 65%    Obstructive pneumonia  Lung cancer  COPD  Pulmonary HTN  Paroxysmal atrial fibrillation    Airway exam deferred (COVID precautions)

## 2024-08-14 NOTE — PLAN OF CARE
08/14/24 1245   Rounds   Attendance Provider;;Physical therapist;Pharmacist   Discharge Plan A Home with family   Why the patient remains in the hospital Requires continued medical care   Transition of Care Barriers None     SS discussed plan of care with MD at [facility] rounds. SS messaged [facility] via secure chat to determine if pt will qualify for short term stay in specialty. SS following

## 2024-08-14 NOTE — PROGRESS NOTES
Report given and care released to Janette IBARRA RN. VSS- 108/60, HR 62, O2 88% 4L NC, resp 15, temp 97.5 oral. Resp even and unlabored. Denies needs. Family at bedside. JEANMARIE.

## 2024-08-14 NOTE — SUBJECTIVE & OBJECTIVE
Interval History:     No significant events overnight, no new complaints or concerns. MRSA PCR negative so will discontinue vancomycin. Plan to continue antibiotics for 10-14 days. EBUS tomorrow morning.     Review of Systems   Constitutional:  Positive for fatigue and unexpected weight change. Negative for appetite change and fever.   HENT:  Negative for trouble swallowing.    Respiratory:  Positive for cough and shortness of breath. Negative for chest tightness and wheezing.    Cardiovascular:  Negative for chest pain and palpitations.   Gastrointestinal:  Negative for abdominal pain, constipation and nausea.   Musculoskeletal:  Positive for gait problem. Negative for back pain and neck stiffness.   Skin:  Negative for pallor and rash.   Neurological:  Negative for dizziness, speech difficulty and headaches.   Psychiatric/Behavioral:  Negative for confusion and suicidal ideas.      Objective:     Vital Signs (Most Recent):  Temp: 97.7 °F (36.5 °C) (08/13/24 1628)  Pulse: 62 (08/13/24 1640)  Resp: 18 (08/13/24 1640)  BP: (!) 125/55 (08/13/24 1628)  SpO2: 96 % (08/13/24 1640) Vital Signs (24h Range):  Temp:  [97.5 °F (36.4 °C)-98.9 °F (37.2 °C)] 97.7 °F (36.5 °C)  Pulse:  [58-80] 62  Resp:  [14-20] 18  SpO2:  [90 %-97 %] 96 %  BP: (116-133)/(55-69) 125/55     Weight: 47.6 kg (105 lb)  Body mass index is 16.95 kg/m².    Intake/Output Summary (Last 24 hours) at 8/13/2024 1901  Last data filed at 8/13/2024 1702  Gross per 24 hour   Intake 2980.53 ml   Output --   Net 2980.53 ml         Physical Exam  Constitutional:       General: She is awake. She is not in acute distress.     Appearance: She is well-developed and underweight.   HENT:      Head: Normocephalic.      Mouth/Throat:      Pharynx: Oropharynx is clear.   Eyes:      Extraocular Movements: Extraocular movements intact.      Pupils: Pupils are equal, round, and reactive to light.   Neck:      Thyroid: No thyroid mass.   Cardiovascular:      Rate and Rhythm:  Normal rate and regular rhythm.   Pulmonary:      Effort: Pulmonary effort is normal.      Breath sounds: Normal air entry. Rales present. No wheezing.   Abdominal:      Palpations: Abdomen is soft.      Tenderness: There is no abdominal tenderness.   Musculoskeletal:         General: Normal range of motion.   Skin:     Coloration: Skin is not jaundiced.      Findings: No lesion.   Neurological:      General: No focal deficit present.      Mental Status: She is alert and oriented to person, place, and time.   Psychiatric:         Behavior: Behavior is cooperative.             Significant Labs: All pertinent labs within the past 24 hours have been reviewed.    Significant Imaging: I have reviewed all pertinent imaging results/findings within the past 24 hours.

## 2024-08-14 NOTE — ASSESSMENT & PLAN NOTE
Status-post bronchoscopy with bronchoalveolar lavage, balloon dilation and placement of endobronchial stent earlier today and tolerated well.     Pulmonology managing, strict orders for scheduled DuoNebs and hypertonic saline in an attempt to keep the stent patent.Goal 4-5L NC to maintain O2 >88%.  ------  Patient has a diagnosis of pneumonia. The cause of the pneumonia is suspected to be bacterial in etiology but organism is not known. The pneumonia is stable. The patient has the following signs/symptoms of pneumonia: persistent hypoxia , cough, and shortness of breath. The patient does have a current oxygen requirement and the patient does have a home oxygen requirement. I have reviewed the pertinent imaging. The following cultures have been collected: Blood cultures The culture results are listed below.     Current antimicrobial regimen consists of the antibiotics listed below. Will monitor patient closely and continue current treatment plan unchanged.    Antibiotics (From admission, onward)      Start     Stop Route Frequency Ordered    08/08/24 0800  piperacillin-tazobactam (ZOSYN) 4.5 g in D5W 100 mL IVPB (MB+)         -- IV Every 8 hours (non-standard times) 08/08/24 0321            Microbiology Results (last 7 days)       Procedure Component Value Units Date/Time    Gram Stain [[id number]] Collected: 08/14/24 1244    Order Status: Completed Specimen: Respiratory from Bronchial Alveolar Lavage (BAL) Updated: 08/14/24 1550     Gram Stain Result Moderate WBC observed      No organisms seen    Culture, Lower Respiratory [[id number]] Collected: 08/14/24 1244    Order Status: Resulted Specimen: Respiratory from Bronchial Alveolar Lavage (BAL) Updated: 08/14/24 1348    Blood culture x two cultures. Draw prior to antibiotics. [[id number]] Collected: 08/07/24 2255    Order Status: Completed Specimen: Blood Updated: 08/13/24 0615     Culture, Blood No Growth at 5 days    Blood culture x two cultures. Draw prior to  antibiotics. [[id number]] Collected: 08/07/24 2251    Order Status: Completed Specimen: Blood Updated: 08/13/24 0615     Culture, Blood No Growth at 5 days    Influenza A & B by Molecular [[id number]]  (Normal) Collected: 08/07/24 2302    Order Status: Completed Specimen: Nasopharyngeal Swab Updated: 08/08/24 0109     INFLUENZA A MOLECULAR Negative     INFLUENZA B MOLECULAR  Negative          MRSA PCR negative

## 2024-08-14 NOTE — PROGRESS NOTES
[facility]  Critical Care Medicine  Progress Note    Patient Name: Mayra Kelly  MRN: [id number]  Admission Date: 8/7/2024  Hospital Length of Stay: 6 days  Code Status: Partial Code  Attending Provider: Yeni Jones DO  Primary Care Provider: Darlene Campoverde NP   Principal Problem: Obstructive pneumonia    Subjective:     HPI:  73-year-old female with l history significant for adenocarcinoma (2018 per chart review), status post EBUS bronchoscopy in 11/20/23 by Dr. Munguia (station 4R, station 7 and bronchoalveolar lavage done)-negative for definitive malignancy at the time who then presented to the hospital in the setting of productive cough worsening with no orthopnea or edema.      I reviewed her imaging myself (CTA chest from 8/8/24 which showed evidence of right mainstem obstruction (endobronchial versus extrinsic compression) with a right upper lobe infiltrate along with increase in bilateral hilar lymph nodes/lesions.  Since her admission she has been continued on steroids at 60 mg thrice a day, broad-spectrum antibiotics, now down to Zosyn and scheduled DuoNebs.  She is not in any therapeutic anticoagulation.  No evidence of pulmonary embolus on her CTA.    When I saw her bedside she was satting at 93% on 2.5-3 L of oxygen.  She was not in any obvious distress at the time that I saw her.  She does have a history of COPD on oxygen at home.  From Dr. Whitmore's note, it appears that she was diagnosed with squamous cell carcinoma with treatment discussed in January/February, but apparently she was lost to follow-up.      Hospital/ICU Course:  No notes on file    Interval History/Significant Events:  Refer to [facility]    Review of Systems  Objective:     Vital Signs (Most Recent):  Temp: 97.6 °F (36.4 °C) (08/14/24 0439)  Pulse: (!) 59 (08/14/24 0439)  Resp: 16 (08/14/24 0439)  BP: 121/61 (08/14/24 0439)  SpO2: (!) 92 % (08/14/24 0439) Vital Signs (24h Range):  Temp:  [97.6 °F (36.4  °C)-98.9 °F (37.2 °C)] 97.6 °F (36.4 °C)  Pulse:  [58-67] 59  Resp:  [16-20] 16  SpO2:  [90 %-98 %] 92 %  BP: (114-133)/(51-69) 121/61   Weight: 47.6 kg (105 lb)  Body mass index is 16.95 kg/m².      Intake/Output Summary (Last 24 hours) at 8/14/2024 0628  Last data filed at 8/13/2024 1702  Gross per 24 hour   Intake 1200 ml   Output --   Net 1200 ml          Physical Exam  Constitutional:       General: She is awake. She is not in acute distress.     Appearance: She is well-developed and underweight.   HENT:      Head: Normocephalic.      Mouth/Throat:      Pharynx: Oropharynx is clear.   Eyes:      Extraocular Movements: Extraocular movements intact.      Pupils: Pupils are equal, round, and reactive to light.   Neck:      Thyroid: No thyroid mass.   Cardiovascular:      Rate and Rhythm: Normal rate and regular rhythm.   Pulmonary:      Effort: Pulmonary effort is normal. No respiratory distress.      Breath sounds: Normal air entry. Rales present. No wheezing.      Comments: Decreased breath sounds right base.  Abdominal:      Palpations: Abdomen is soft.      Tenderness: There is no abdominal tenderness.   Musculoskeletal:         General: Normal range of motion.   Skin:     Coloration: Skin is not jaundiced.      Findings: No lesion.   Neurological:      General: No focal deficit present.      Mental Status: She is alert and oriented to person, place, and time.   Psychiatric:         Behavior: Behavior is cooperative.            Vents:  Oxygen Concentration (%): 32 (08/13/24 1902)  Lines/Drains/Airways       Peripheral Intravenous Line  Duration                  Peripheral IV - Single Lumen 08/11/24 1816 22 G Left Antecubital 2 days         Peripheral IV - Single Lumen 08/13/24 1026 22 G Anterior;Left Forearm <1 day                  Significant Labs:    CBC/Anemia Profile:  Recent Labs   Lab 08/13/24  0521   WBC 10.66   HGB 10.4*   HCT 35.9*   *   MCV 89.5   RDW 17.5*        Chemistries:  Recent Labs    Lab 08/13/24  0646   BUN 19*   CREATININE 0.69       All pertinent labs within the past 24 hours have been reviewed.    Significant Imaging:  I have reviewed all pertinent imaging results/findings within the past 24 hours.    ABG  Recent Labs   Lab 08/08/24  0600   PH 7.36   PO2 60*   PCO2 61*   HCO3 34.5*     Assessment/Plan:     Pulmonary  * Obstructive pneumonia  We will obtain bronchoalveolar lavage.  Has completed 5-6 days of antibiotic treatment at least.  We will recommend at least 10 days of treatment pending clinical improvement and can target any organisms isolated on bronchoalveolar lavage.    COPD (chronic obstructive pulmonary disease)  Post procedure will wean off steroids    Acute on chronic respiratory failure with hypoxia and hypercapnia  History of home oxygen use, we will continue with monitoring her O2 levels.  Now at 2.5-3 L of oxygen and saturating at 93%.  No acute respiratory distress.    Oncology  Lung cancer  Appears to have malignant airway obstruction (intrinsic versus extrinsic compression), mostly affecting the right mainstem, and proximal bronchus intermedius.  This is appears to be some aeration of the right upper lobe (although on CT scan there is no opening seen).      She is now a candidate for rigid bronchoscopy plus/minus placement of self expanding metallic stent along with EBUS bronchoscopy of hilar lesions in order to make a diagnosis of progression of disease.    Given her clinical status, oxygen requirements and anatomy, she is considered a high-risk bronchoscopy procedure.  I discussed this in considerable detail with her, and she understands this aspect.  I offered her the option of continuing to wait with the possibility that she may have worsening extrinsic compression (before she is considered for any oncological treatment) and at this time she has opted to proceed with the procedure understanding the risks.  I attempted to get in touch with the son (unfortunately her  son who is listed on the medical chart passed away in 2023 as per the patient, and her other son does not have a cell phone.  We were unable to get in touch with the son in-person though we will continue to try).  She is not on any therapeutic anticoagulation at this time.     We will attempt airway examination, bronchoalveolar lavage, balloon dilation and placement of endobronchial stent.  If she does have a stent placed, would recommend scheduled DuoNebs and hypertonic saline in an attempt to keep the stent patent.        The risks of bronchoscopy, and endobronchial ultrasound-guided biopsies +/- endobronchial and transbronchial biopsies    +/-  rigid bronchoscopy +/-ablative therapies were discussed with the patient in detail, including the alternatives to bronchoscopy.  I also discussed the possibility of balloon dilation and placing a stent, which she has agreed to.  Risks discussed included, but not limited to, bleeding, infection, injury to the teeth and vocal cords, pneumothorax, worsening shortness of breath and respiratory failure.  The patient/ their family verbalized understanding of this risk and agreed to proceed with the procedure as above.      Rapid on-site evaluation (FELIPE)  may be used during the case above, which we will provide a preliminary diagnosis.  I have offered to share this preliminary diagnosis with the patient/their family with their understanding that these results are preliminary and the final results me change.  The patient's/their family have opted to be made aware of these preliminary results.       Final pathology results will be subjected to a delayed release in the electronic medical record to the patient as per their request, so that the results can be interpreted by their physicians and explained to them in a satisfactory manner.  The patient is aware and has agreed to this.               Varghese Castellon MD  Critical Care Medicine  [facility]

## 2024-08-14 NOTE — PROGRESS NOTES
08/14/24 1126   Wound Care Follow Up   Wound Care Follow-up? Yes   Wound Care- Next Visit Date 08/20/24   Follow Up Plan Kassandra POC

## 2024-08-14 NOTE — ANESTHESIA PROCEDURE NOTES
Intubation    Date/Time: 8/14/2024 11:11 AM    Performed by: Austin Joe CRNA  Authorized by: Chilo Prasad MD    Intubation:     Induction:  Intravenous    Intubated:  Postinduction    Mask Ventilation:  Easy mask    Attempts:  1    Attempted By:  CRNA    Method of Intubation:  Direct    Difficult Airway Encountered?: No      Complications:  None    Airway Device:  Supraglottic airway/LMA    Airway Device Size:  5.0    Style/Cuff Inflation:  Cuffed (inflated to minimal occlusive pressure)    Placement Verified By:  Capnometry    Complicating Factors:  None    Findings Post-Intubation:  BS equal bilateral and atraumatic/condition of teeth unchanged

## 2024-08-15 ENCOUNTER — HOSPITAL ENCOUNTER (INPATIENT)
Facility: HOSPITAL | Age: 74
LOS: 14 days | Discharge: NURSING, REHAB OR LONG-TERM CARE | DRG: 177 | End: 2024-08-29
Attending: FAMILY MEDICINE | Admitting: FAMILY MEDICINE
Payer: MEDICARE

## 2024-08-15 VITALS
HEIGHT: 66 IN | WEIGHT: 105 LBS | HEART RATE: 64 BPM | OXYGEN SATURATION: 86 % | BODY MASS INDEX: 16.88 KG/M2 | TEMPERATURE: 98 F | RESPIRATION RATE: 16 BRPM | SYSTOLIC BLOOD PRESSURE: 101 MMHG | DIASTOLIC BLOOD PRESSURE: 57 MMHG

## 2024-08-15 DIAGNOSIS — J96.21 ACUTE ON CHRONIC RESPIRATORY FAILURE WITH HYPOXIA AND HYPERCAPNIA: ICD-10-CM

## 2024-08-15 DIAGNOSIS — J96.22 ACUTE ON CHRONIC RESPIRATORY FAILURE WITH HYPOXIA AND HYPERCAPNIA: ICD-10-CM

## 2024-08-15 DIAGNOSIS — J18.9 OBSTRUCTIVE PNEUMONIA: Primary | ICD-10-CM

## 2024-08-15 DIAGNOSIS — F17.219 CIGARETTE NICOTINE DEPENDENCE WITH NICOTINE-INDUCED DISORDER: ICD-10-CM

## 2024-08-15 DIAGNOSIS — R53.0 NEOPLASTIC MALIGNANT RELATED FATIGUE: ICD-10-CM

## 2024-08-15 DIAGNOSIS — C34.91 MALIGNANT NEOPLASM OF RIGHT LUNG, UNSPECIFIED PART OF LUNG: ICD-10-CM

## 2024-08-15 DIAGNOSIS — I48.0 PAROXYSMAL ATRIAL FIBRILLATION: ICD-10-CM

## 2024-08-15 DIAGNOSIS — J44.1 COPD EXACERBATION: ICD-10-CM

## 2024-08-15 DIAGNOSIS — J18.9 POSTOBSTRUCTIVE PNEUMONIA: ICD-10-CM

## 2024-08-15 DIAGNOSIS — E43 SEVERE PROTEIN-CALORIE MALNUTRITION: ICD-10-CM

## 2024-08-15 LAB
BUN SERPL-MCNC: 22 MG/DL (ref 7–18)
BUN/CREAT SERPL: 31 (ref 6–20)
CREAT SERPL-MCNC: 0.72 MG/DL (ref 0.55–1.02)
EGFR (NO RACE VARIABLE) (RUSH/TITUS): 89 ML/MIN/1.73M2
ESTROGEN SERPL-MCNC: NORMAL PG/ML
INSULIN SERPL-ACNC: NORMAL U[IU]/ML
LAB AP GROSS DESCRIPTION: NORMAL
RUSH AP QUICK STAIN DIAGNOSIS: NORMAL
T3RU NFR SERPL: NORMAL %

## 2024-08-15 PROCEDURE — 25000242 PHARM REV CODE 250 ALT 637 W/ HCPCS: Performed by: STUDENT IN AN ORGANIZED HEALTH CARE EDUCATION/TRAINING PROGRAM

## 2024-08-15 PROCEDURE — S4991 NICOTINE PATCH NONLEGEND: HCPCS | Performed by: HOSPITALIST

## 2024-08-15 PROCEDURE — 94640 AIRWAY INHALATION TREATMENT: CPT

## 2024-08-15 PROCEDURE — 25000003 PHARM REV CODE 250: Performed by: FAMILY MEDICINE

## 2024-08-15 PROCEDURE — 25000003 PHARM REV CODE 250: Performed by: HOSPITALIST

## 2024-08-15 PROCEDURE — 27000221 HC OXYGEN, UP TO 24 HOURS

## 2024-08-15 PROCEDURE — 25000242 PHARM REV CODE 250 ALT 637 W/ HCPCS: Performed by: FAMILY MEDICINE

## 2024-08-15 PROCEDURE — 84520 ASSAY OF UREA NITROGEN: CPT | Performed by: FAMILY MEDICINE

## 2024-08-15 PROCEDURE — 94761 N-INVAS EAR/PLS OXIMETRY MLT: CPT

## 2024-08-15 PROCEDURE — 99900035 HC TECH TIME PER 15 MIN (STAT)

## 2024-08-15 PROCEDURE — 94667 MNPJ CHEST WALL 1ST: CPT

## 2024-08-15 PROCEDURE — 63600175 PHARM REV CODE 636 W HCPCS: Performed by: INTERNAL MEDICINE

## 2024-08-15 PROCEDURE — 63600175 PHARM REV CODE 636 W HCPCS: Performed by: FAMILY MEDICINE

## 2024-08-15 PROCEDURE — 82565 ASSAY OF CREATININE: CPT | Performed by: FAMILY MEDICINE

## 2024-08-15 PROCEDURE — 36415 COLL VENOUS BLD VENIPUNCTURE: CPT | Performed by: FAMILY MEDICINE

## 2024-08-15 PROCEDURE — A6212 FOAM DRG <=16 SQ IN W/BORDER: HCPCS

## 2024-08-15 PROCEDURE — 11000008

## 2024-08-15 PROCEDURE — 63600175 PHARM REV CODE 636 W HCPCS: Performed by: HOSPITALIST

## 2024-08-15 PROCEDURE — 99239 HOSP IP/OBS DSCHRG MGMT >30: CPT | Mod: ,,, | Performed by: FAMILY MEDICINE

## 2024-08-15 RX ORDER — IPRATROPIUM BROMIDE AND ALBUTEROL SULFATE 2.5; .5 MG/3ML; MG/3ML
3 SOLUTION RESPIRATORY (INHALATION) 4 TIMES DAILY
Status: DISCONTINUED | OUTPATIENT
Start: 2024-08-15 | End: 2024-08-29 | Stop reason: HOSPADM

## 2024-08-15 RX ORDER — POTASSIUM CHLORIDE 20 MEQ/1
20 TABLET, EXTENDED RELEASE ORAL 2 TIMES DAILY
Status: DISCONTINUED | OUTPATIENT
Start: 2024-08-15 | End: 2024-08-29 | Stop reason: HOSPADM

## 2024-08-15 RX ORDER — IBUPROFEN 200 MG
1 TABLET ORAL DAILY
Status: DISCONTINUED | OUTPATIENT
Start: 2024-08-16 | End: 2024-08-29 | Stop reason: HOSPADM

## 2024-08-15 RX ORDER — DIPHENHYDRAMINE HYDROCHLORIDE 50 MG/ML
25 INJECTION INTRAMUSCULAR; INTRAVENOUS EVERY 6 HOURS PRN
Status: DISCONTINUED | OUTPATIENT
Start: 2024-08-15 | End: 2024-08-29 | Stop reason: HOSPADM

## 2024-08-15 RX ORDER — GLUCAGON 1 MG
1 KIT INJECTION
Status: DISCONTINUED | OUTPATIENT
Start: 2024-08-15 | End: 2024-08-29 | Stop reason: HOSPADM

## 2024-08-15 RX ORDER — ENOXAPARIN SODIUM 100 MG/ML
40 INJECTION SUBCUTANEOUS EVERY 24 HOURS
Status: DISCONTINUED | OUTPATIENT
Start: 2024-08-15 | End: 2024-08-29 | Stop reason: HOSPADM

## 2024-08-15 RX ORDER — BUPROPION HYDROCHLORIDE 100 MG/1
100 TABLET, EXTENDED RELEASE ORAL 2 TIMES DAILY
Status: DISCONTINUED | OUTPATIENT
Start: 2024-08-15 | End: 2024-08-29 | Stop reason: HOSPADM

## 2024-08-15 RX ORDER — HYDROMORPHONE HYDROCHLORIDE 2 MG/ML
0.5 INJECTION, SOLUTION INTRAMUSCULAR; INTRAVENOUS; SUBCUTANEOUS EVERY 5 MIN PRN
Status: DISCONTINUED | OUTPATIENT
Start: 2024-08-15 | End: 2024-08-29 | Stop reason: HOSPADM

## 2024-08-15 RX ORDER — MUPIROCIN 20 MG/G
OINTMENT TOPICAL 2 TIMES DAILY
Status: DISPENSED | OUTPATIENT
Start: 2024-08-15 | End: 2024-08-20

## 2024-08-15 RX ORDER — GUAIFENESIN AND DEXTROMETHORPHAN HYDROBROMIDE 10; 100 MG/5ML; MG/5ML
10 SYRUP ORAL EVERY 6 HOURS PRN
Status: DISCONTINUED | OUTPATIENT
Start: 2024-08-15 | End: 2024-08-29 | Stop reason: HOSPADM

## 2024-08-15 RX ORDER — TALC
6 POWDER (GRAM) TOPICAL NIGHTLY PRN
Status: DISCONTINUED | OUTPATIENT
Start: 2024-08-15 | End: 2024-08-29 | Stop reason: HOSPADM

## 2024-08-15 RX ORDER — ONDANSETRON HYDROCHLORIDE 2 MG/ML
8 INJECTION, SOLUTION INTRAVENOUS EVERY 6 HOURS PRN
Status: DISCONTINUED | OUTPATIENT
Start: 2024-08-15 | End: 2024-08-29 | Stop reason: HOSPADM

## 2024-08-15 RX ORDER — MORPHINE SULFATE 4 MG/ML
4 INJECTION, SOLUTION INTRAMUSCULAR; INTRAVENOUS EVERY 5 MIN PRN
Status: DISCONTINUED | OUTPATIENT
Start: 2024-08-15 | End: 2024-08-29 | Stop reason: HOSPADM

## 2024-08-15 RX ORDER — ALBUTEROL SULFATE 0.83 MG/ML
2.5 SOLUTION RESPIRATORY (INHALATION) EVERY 4 HOURS PRN
Status: DISCONTINUED | OUTPATIENT
Start: 2024-08-15 | End: 2024-08-29 | Stop reason: HOSPADM

## 2024-08-15 RX ORDER — BISACODYL 5 MG
10 TABLET, DELAYED RELEASE (ENTERIC COATED) ORAL DAILY PRN
Status: DISCONTINUED | OUTPATIENT
Start: 2024-08-15 | End: 2024-08-29 | Stop reason: HOSPADM

## 2024-08-15 RX ORDER — ONDANSETRON HYDROCHLORIDE 2 MG/ML
4 INJECTION, SOLUTION INTRAVENOUS DAILY PRN
Status: DISCONTINUED | OUTPATIENT
Start: 2024-08-15 | End: 2024-08-29 | Stop reason: HOSPADM

## 2024-08-15 RX ORDER — SODIUM CHLORIDE FOR INHALATION 3 %
4 VIAL, NEBULIZER (ML) INHALATION EVERY 8 HOURS
Status: DISCONTINUED | OUTPATIENT
Start: 2024-08-15 | End: 2024-08-29 | Stop reason: HOSPADM

## 2024-08-15 RX ORDER — TRAZODONE HYDROCHLORIDE 50 MG/1
50 TABLET ORAL NIGHTLY PRN
Status: DISCONTINUED | OUTPATIENT
Start: 2024-08-15 | End: 2024-08-29 | Stop reason: HOSPADM

## 2024-08-15 RX ORDER — ACETAMINOPHEN 500 MG
1000 TABLET ORAL EVERY 6 HOURS PRN
Status: DISCONTINUED | OUTPATIENT
Start: 2024-08-15 | End: 2024-08-29 | Stop reason: HOSPADM

## 2024-08-15 RX ADMIN — IPRATROPIUM BROMIDE AND ALBUTEROL SULFATE 3 ML: .5; 3 SOLUTION RESPIRATORY (INHALATION) at 07:08

## 2024-08-15 RX ADMIN — NICOTINE 1 PATCH: 14 PATCH, EXTENDED RELEASE TRANSDERMAL at 08:08

## 2024-08-15 RX ADMIN — SODIUM CHLORIDE SOLN NEBU 3% 4 ML: 3 NEBU SOLN at 12:08

## 2024-08-15 RX ADMIN — SODIUM CHLORIDE SOLN NEBU 3% 4 ML: 3 NEBU SOLN at 03:08

## 2024-08-15 RX ADMIN — POTASSIUM CHLORIDE 20 MEQ: 1500 TABLET, EXTENDED RELEASE ORAL at 08:08

## 2024-08-15 RX ADMIN — IPRATROPIUM BROMIDE AND ALBUTEROL SULFATE 3 ML: 2.5; .5 SOLUTION RESPIRATORY (INHALATION) at 07:08

## 2024-08-15 RX ADMIN — PIPERACILLIN SODIUM AND TAZOBACTAM SODIUM 4.5 G: 4; .5 INJECTION, POWDER, LYOPHILIZED, FOR SOLUTION INTRAVENOUS at 08:08

## 2024-08-15 RX ADMIN — ENOXAPARIN SODIUM 40 MG: 40 INJECTION SUBCUTANEOUS at 04:08

## 2024-08-15 RX ADMIN — PIPERACILLIN SODIUM AND TAZOBACTAM SODIUM 4.5 G: 4; .5 INJECTION, POWDER, LYOPHILIZED, FOR SOLUTION INTRAVENOUS at 12:08

## 2024-08-15 RX ADMIN — METHYLPREDNISOLONE SODIUM SUCCINATE 60 MG: 40 INJECTION, POWDER, FOR SOLUTION INTRAMUSCULAR; INTRAVENOUS at 08:08

## 2024-08-15 RX ADMIN — PIPERACILLIN SODIUM AND TAZOBACTAM SODIUM 4.5 G: 4; .5 INJECTION, POWDER, LYOPHILIZED, FOR SOLUTION INTRAVENOUS at 04:08

## 2024-08-15 RX ADMIN — POTASSIUM CHLORIDE 20 MEQ: 20 TABLET, EXTENDED RELEASE ORAL at 08:08

## 2024-08-15 RX ADMIN — IPRATROPIUM BROMIDE AND ALBUTEROL SULFATE 3 ML: 2.5; .5 SOLUTION RESPIRATORY (INHALATION) at 03:08

## 2024-08-15 RX ADMIN — SODIUM CHLORIDE SOLN NEBU 3% 4 ML: 3 NEBU SOLN at 07:08

## 2024-08-15 RX ADMIN — BUPROPION HYDROCHLORIDE 100 MG: 100 TABLET, FILM COATED, EXTENDED RELEASE ORAL at 08:08

## 2024-08-15 NOTE — HOSPITAL COURSE
8/15/24-no acute events overnight I reviewed her chest x-ray from this morning which showed that the stent continues to remain patent.  Clinically she is doing well today.  Continues to require 3-5 L of oxygen to keep her saturations at above 88%.

## 2024-08-15 NOTE — ANESTHESIA POSTPROCEDURE EVALUATION
Anesthesia Post Evaluation    Patient: Mayra Kelly    Procedure(s) Performed: * No procedures listed *    Final Anesthesia Type: general      Patient location during evaluation: PACU  Post-procedure vital signs: reviewed and stable  Pain management: adequate  Airway patency: patent    PONV status at discharge: No PONV  Anesthetic complications: no      Cardiovascular status: hemodynamically stable  Respiratory status: unassisted  Hydration status: euvolemic  Follow-up not needed.              Vitals Value Taken Time   /51 08/15/24 1100   Temp 36.7 °C (98.1 °F) 08/15/24 1200   Pulse 57 08/15/24 1534   Resp 16 08/15/24 1534   SpO2 94 % 08/15/24 1534         Event Time   Out of Recovery 14:20:00         Pain/Orlando Score: Orlando Score: 9 (8/14/2024  2:15 PM)

## 2024-08-15 NOTE — BRIEF OP NOTE
Mayra Kelly  presents 08/14/2024 in inpatient setting for planned rigid bronchoscopy, right mainstem stent placement EBUS TBNA.    PROCEDURES: Bronchoscopy with Endobronchial ultrasound (EBUS), rigid bronchoscopy, right mainstem stent placement (10 x 40 mm stent), bronchoalveolar lavage.        See procedure note for further details.    OUTCOME: Patient tolerated the procedure well without complication and is now ready for discharge    DISPOSITION:  Back to inpatient bed    FINAL DIAGNOSIS:  Successful placement of right mainstem stent.  Station 11 L negative for malignancy (biopsies from endobronchial lesion, right hilum and station 4R not obtained due to significantly elevated bleeding risk in the setting of elevated pulmonary artery systolic pressures)      FOLLOW UP: Will coordinate follow up vs referral thereafter.  Patient follows outpatient with Dr. Munguia.    Inpatient INSTRUCTIONS:  I have conveyed these instructions to the patient's primary attending as well as the nursing staff and respiratory staff.  I have placed orders for scheduled DuoNebs and hypertonic saline which must be administered on a scheduled basis.    Discussed with patient management plans, all questions were answered and they verbalized understanding.       Varghese Castellon MD  Interventional Pulmonary and Critical Care  [facility]       Wire inserted in the left axillary vein.

## 2024-08-15 NOTE — PROGRESS NOTES
[facility]  Critical Care Medicine  Progress Note    Patient Name: Mayra Kelly  MRN: [id number]  Admission Date: 8/15/2024  Hospital Length of Stay: 0 days  Code Status: Partial Code  Attending Provider: Yeni Jones DO  Primary Care Provider: Darlene Campoverde NP   Principal Problem: <principal problem not specified>    Subjective:     HPI:  No notes on file    Hospital/ICU Course:  8/15/24-no acute events overnight I reviewed her chest x-ray from this morning which showed that the stent continues to remain patent.  Clinically she is doing well today.  Continues to require 3-5 L of oxygen to keep her saturations at above 88%.    Interval History/Significant Events:  Refer to hospital course    Review of Systems  Objective:     Vital Signs (Most Recent):  Temp: 98 °F (36.7 °C) (08/15/24 1015)  Pulse: (!) 57 (08/15/24 1130)  Resp: 14 (08/15/24 1130)  BP: (!) 117/51 (08/15/24 1100)  SpO2: (!) 89 % (08/15/24 1130) Vital Signs (24h Range):  Temp:  [97.5 °F (36.4 °C)-98.7 °F (37.1 °C)] 98 °F (36.7 °C)  Pulse:  [56-77] 57  Resp:  [11-22] 14  SpO2:  [84 %-96 %] 89 %  BP: ()/(43-71) 117/51      There is no height or weight on file to calculate BMI.    No intake or output data in the 24 hours ending 08/15/24 1206       Physical Exam  Constitutional:       General: She is awake. She is not in acute distress.     Appearance: She is well-developed and underweight.   HENT:      Head: Normocephalic.      Mouth/Throat:      Pharynx: Oropharynx is clear.   Eyes:      Extraocular Movements: Extraocular movements intact.      Pupils: Pupils are equal, round, and reactive to light.   Neck:      Thyroid: No thyroid mass.   Cardiovascular:      Rate and Rhythm: Normal rate and regular rhythm.   Pulmonary:      Effort: Pulmonary effort is normal. No respiratory distress.      Breath sounds: Normal air entry. Rales present. No wheezing.      Comments: Decreased breath sounds right  "base.  Abdominal:      Palpations: Abdomen is soft.      Tenderness: There is no abdominal tenderness.   Musculoskeletal:         General: Normal range of motion.   Skin:     Coloration: Skin is not jaundiced.      Findings: No lesion.   Neurological:      General: No focal deficit present.      Mental Status: She is alert and oriented to person, place, and time.   Psychiatric:         Behavior: Behavior is cooperative.            Vents:  Oxygen Concentration (%): 36 (08/15/24 1020)  Lines/Drains/Airways       Airway  Duration                  Airway - Non-Surgical 08/14/24 1111 LMA 1 day              Peripheral Intravenous Line  Duration                  Peripheral IV - Single Lumen 08/11/24 1816 22 G Left Antecubital 3 days         Peripheral IV - Single Lumen 08/13/24 1026 22 G Anterior;Left Forearm 2 days         Peripheral IV - Single Lumen 08/14/24 1112 18 G 1 in No Anterior;Right Forearm 1 day                  Significant Labs:    CBC/Anemia Profile:  Recent Labs   Lab 08/14/24  0545   WBC 10.27   HGB 10.8*   HCT 35.9*   *   MCV 87.3   RDW 17.4*        Chemistries:  Recent Labs   Lab 08/14/24  0545 08/14/24  0636 08/15/24  0546   NA  --  140  --    K  --  4.8  --    CL  --  102  --    CO2  --  39*  --    BUN 26* 24* 22*   CREATININE 0.74 0.68 0.72   CALCIUM  --  8.6  --        All pertinent labs within the past 24 hours have been reviewed.    Significant Imaging:  I have reviewed all pertinent imaging results/findings within the past 24 hours.    ABG  No results for input(s): "PH", "PO2", "PCO2", "HCO3", "BE" in the last 168 hours.  Assessment/Plan:     Pulmonary  COPD exacerbation  Severe COPD exacerbation when she initially came in.  Is on oxygen at home at 3 L. maintain her SpO2 at 88% or higher (may require 3-5 L).  Recommend tapering down her systemic corticosteroids use from 60 t.i.d. down to 60 b.i.d. today and then can continue at 50 mg prednisone from the third day.  Would recommend 10 day " taper down from 50 mg prednisone, based on her symptoms.          Oncology  Malignant neoplasm of right lung  Status post placement of endobronchial stent in the right mainstem/bronchus intermedius.  Right upper lobe completely stenosed and therefore jailed off with endobronchial stent.      Must get scheduled DuoNebs and scheduled hypertonic saline.  DuoNebs are every 6 hours and hypotonic saline is every 8 hours (to be combined with DuoNebs so that she does not have bronchospasm).  I have also placed orders for chest physical therapy and physical therapy which he should get.             Varghese Castellon MD  Critical Care Medicine  [facility]

## 2024-08-15 NOTE — ASSESSMENT & PLAN NOTE
Severe COPD exacerbation when she initially came in.  Is on oxygen at home at 3 L. maintain her SpO2 at 88% or higher (may require 3-5 L).  Recommend tapering down her systemic corticosteroids use from 60 t.i.d. down to 60 b.i.d. today and then can continue at 50 mg prednisone from the third day.  Would recommend 10 day taper down from 50 mg prednisone, based on her symptoms.

## 2024-08-15 NOTE — SUBJECTIVE & OBJECTIVE
Interval History/Significant Events:  Refer to hospital course    Review of Systems  Objective:     Vital Signs (Most Recent):  Temp: 98 °F (36.7 °C) (08/15/24 1015)  Pulse: (!) 57 (08/15/24 1130)  Resp: 14 (08/15/24 1130)  BP: (!) 117/51 (08/15/24 1100)  SpO2: (!) 89 % (08/15/24 1130) Vital Signs (24h Range):  Temp:  [97.5 °F (36.4 °C)-98.7 °F (37.1 °C)] 98 °F (36.7 °C)  Pulse:  [56-77] 57  Resp:  [11-22] 14  SpO2:  [84 %-96 %] 89 %  BP: ()/(43-71) 117/51      There is no height or weight on file to calculate BMI.    No intake or output data in the 24 hours ending 08/15/24 1206       Physical Exam  Constitutional:       General: She is awake. She is not in acute distress.     Appearance: She is well-developed and underweight.   HENT:      Head: Normocephalic.      Mouth/Throat:      Pharynx: Oropharynx is clear.   Eyes:      Extraocular Movements: Extraocular movements intact.      Pupils: Pupils are equal, round, and reactive to light.   Neck:      Thyroid: No thyroid mass.   Cardiovascular:      Rate and Rhythm: Normal rate and regular rhythm.   Pulmonary:      Effort: Pulmonary effort is normal. No respiratory distress.      Breath sounds: Normal air entry. Rales present. No wheezing.      Comments: Decreased breath sounds right base.  Abdominal:      Palpations: Abdomen is soft.      Tenderness: There is no abdominal tenderness.   Musculoskeletal:         General: Normal range of motion.   Skin:     Coloration: Skin is not jaundiced.      Findings: No lesion.   Neurological:      General: No focal deficit present.      Mental Status: She is alert and oriented to person, place, and time.   Psychiatric:         Behavior: Behavior is cooperative.            Vents:  Oxygen Concentration (%): 36 (08/15/24 1020)  Lines/Drains/Airways       Airway  Duration                  Airway - Non-Surgical 08/14/24 1111 LMA 1 day              Peripheral Intravenous Line  Duration                  Peripheral IV - Single  Lumen 08/11/24 1816 22 G Left Antecubital 3 days         Peripheral IV - Single Lumen 08/13/24 1026 22 G Anterior;Left Forearm 2 days         Peripheral IV - Single Lumen 08/14/24 1112 18 G 1 in No Anterior;Right Forearm 1 day                  Significant Labs:    CBC/Anemia Profile:  Recent Labs   Lab 08/14/24  0545   WBC 10.27   HGB 10.8*   HCT 35.9*   *   MCV 87.3   RDW 17.4*        Chemistries:  Recent Labs   Lab 08/14/24  0545 08/14/24  0636 08/15/24  0546   NA  --  140  --    K  --  4.8  --    CL  --  102  --    CO2  --  39*  --    BUN 26* 24* 22*   CREATININE 0.74 0.68 0.72   CALCIUM  --  8.6  --        All pertinent labs within the past 24 hours have been reviewed.    Significant Imaging:  I have reviewed all pertinent imaging results/findings within the past 24 hours.

## 2024-08-15 NOTE — PLAN OF CARE
[facility]  Discharge Final Note    Primary Care Provider: Darlene Campoverde NP    Expected Discharge Date: 8/15/2024    Final Discharge Note (most recent)       Final Note - 08/15/24 0923          Final Note    Assessment Type Final Discharge Note     Anticipated Discharge Disposition Long Term Acute Care     What phone number can be called within the next 1-3 days to see how you are doing after discharge? [phone]        Post-Acute Status    Post-Acute Authorization Placement     Post-Acute Placement Status Set-up Complete/Auth obtained     Coverage Medicare     Patient choice form signed by patient/caregiver List with quality metrics by geographic area provided;List from CMS Compare;List from System Post-Acute Care     Discharge Delays None known at this time                     Important Message from Medicare  Important Message from Medicare regarding Discharge Appeal Rights: Given to patient/caregiver, Explained to patient/caregiver, Signed/date by patient/caregiver     Date IMM was signed: 08/15/24  Time IMM was signed: 0900    After-discharge care                Destination       [facility]   Service: Long Term Acute Care    [address] [facility] [address]   Phone: [phone]                     Pt to dc to [facility]. IM updated. No further needs noted.

## 2024-08-15 NOTE — PLAN OF CARE
Problem: Adult Inpatient Plan of Care  Goal: Plan of Care Review  Outcome: Progressing  Goal: Absence of Hospital-Acquired Illness or Injury  Outcome: Progressing  Goal: Optimal Comfort and Wellbeing  Outcome: Progressing     Problem: Pneumonia  Goal: Fluid Balance  Outcome: Progressing  Goal: Resolution of Infection Signs and Symptoms  Outcome: Progressing  Goal: Effective Oxygenation and Ventilation  Outcome: Progressing     Problem: Wound  Goal: Optimal Coping  Outcome: Progressing  Goal: Optimal Functional Ability  Outcome: Progressing  Goal: Absence of Infection Signs and Symptoms  Outcome: Progressing  Goal: Improved Oral Intake  Outcome: Progressing

## 2024-08-15 NOTE — PLAN OF CARE
Problem: Adult Inpatient Plan of Care  Goal: Plan of Care Review  8/15/2024 0543 by Leroy Morgan RN  Outcome: Progressing  8/15/2024 0046 by Leroy Morgan RN  Outcome: Progressing  Goal: Patient-Specific Goal (Individualized)  8/15/2024 0543 by Leroy Morgan RN  Outcome: Progressing  8/15/2024 0046 by Leroy Morgan RN  Outcome: Progressing  Goal: Absence of Hospital-Acquired Illness or Injury  8/15/2024 0543 by Leroy Morgan RN  Outcome: Progressing  8/15/2024 0046 by Leroy Morgan RN  Outcome: Progressing  Goal: Optimal Comfort and Wellbeing  8/15/2024 0543 by Leroy Morgan RN  Outcome: Progressing  8/15/2024 0046 by Leroy Morgan RN  Outcome: Progressing  Goal: Readiness for Transition of Care  8/15/2024 0543 by Leroy Morgan RN  Outcome: Progressing  8/15/2024 0046 by Leroy Morgan RN  Outcome: Progressing     Problem: Skin Injury Risk Increased  Goal: Skin Health and Integrity  8/15/2024 0543 by Leroy Morgan RN  Outcome: Progressing  8/15/2024 0046 by Leroy Morgan RN  Outcome: Progressing     Problem: Breathing Pattern Ineffective  Goal: Effective Breathing Pattern  8/15/2024 0543 by Leroy Morgan RN  Outcome: Progressing  8/15/2024 0046 by Leroy Morgan RN  Outcome: Progressing     Problem: Gas Exchange Impaired  Goal: Optimal Gas Exchange  8/15/2024 0543 by Leroy Morgan RN  Outcome: Progressing  8/15/2024 0046 by Leroy Morgan RN  Outcome: Progressing     Problem: Acute Kidney Injury/Impairment  Goal: Fluid and Electrolyte Balance  8/15/2024 0543 by Leroy Morgan RN  Outcome: Progressing  8/15/2024 0046 by Leroy Morgan RN  Outcome: Progressing  Goal: Improved Oral Intake  8/15/2024 0543 by Leroy Morgan RN  Outcome: Progressing  8/15/2024 0046 by Leroy Morgan RN  Outcome: Progressing  Goal: Effective Renal Function  8/15/2024 0543 by Leroy Morgan RN  Outcome: Progressing  8/15/2024 0046 by Leroy Morgan,  RN  Outcome: Progressing     Problem: Pneumonia  Goal: Fluid Balance  8/15/2024 0543 by Leroy Morgan RN  Outcome: Progressing  8/15/2024 0046 by Leroy Morgan RN  Outcome: Progressing  Goal: Resolution of Infection Signs and Symptoms  8/15/2024 0543 by Leroy Morgan RN  Outcome: Progressing  8/15/2024 0046 by Leroy Morgan RN  Outcome: Progressing  Goal: Effective Oxygenation and Ventilation  8/15/2024 0543 by Leroy Morgan RN  Outcome: Progressing  8/15/2024 0046 by Leroy oMrgan RN  Outcome: Progressing     Problem: Wound  Goal: Optimal Coping  8/15/2024 0543 by Leroy Morgan RN  Outcome: Progressing  8/15/2024 0046 by Leroy Morgan RN  Outcome: Progressing  Goal: Optimal Functional Ability  8/15/2024 0543 by Leroy Morgan RN  Outcome: Progressing  8/15/2024 0046 by Leroy Morgan RN  Outcome: Progressing  Goal: Absence of Infection Signs and Symptoms  8/15/2024 0543 by Leroy Morgan RN  Outcome: Progressing  8/15/2024 0046 by Leroy Morgan RN  Outcome: Progressing  Goal: Improved Oral Intake  8/15/2024 0543 by Leroy Morgan RN  Outcome: Progressing  8/15/2024 0046 by Leroy Morgan RN  Outcome: Progressing  Goal: Optimal Pain Control and Function  8/15/2024 0543 by Leroy Morgan RN  Outcome: Progressing  8/15/2024 0046 by Leroy Morgan RN  Outcome: Progressing  Goal: Skin Health and Integrity  8/15/2024 0543 by Leroy Morgan RN  Outcome: Progressing  8/15/2024 0046 by Leroy Morgan RN  Outcome: Progressing  Goal: Optimal Wound Healing  8/15/2024 0543 by Leroy Morgan RN  Outcome: Progressing  8/15/2024 0046 by Leroy Morgan RN  Outcome: Progressing     Problem: Fall Injury Risk  Goal: Absence of Fall and Fall-Related Injury  8/15/2024 0543 by Leroy Morgan RN  Outcome: Progressing  8/15/2024 0046 by Leroy Morgan RN  Outcome: Progressing     Problem: Airway Clearance Ineffective  Goal: Effective Airway Clearance  8/15/2024 0529  by Leroy Morgan, RN  Outcome: Progressing  8/15/2024 0046 by Leroy Morgan, RN  Outcome: Progressing

## 2024-08-15 NOTE — ASSESSMENT & PLAN NOTE
Status post placement of endobronchial stent in the right mainstem/bronchus intermedius.  Right upper lobe completely stenosed and therefore jailed off with endobronchial stent.      Must get scheduled DuoNebs and scheduled hypertonic saline.  DuoNebs are every 6 hours and hypotonic saline is every 8 hours (to be combined with DuoNebs so that she does not have bronchospasm).  I have also placed orders for chest physical therapy and physical therapy which he should get.

## 2024-08-16 PROBLEM — R53.0 NEOPLASTIC MALIGNANT RELATED FATIGUE: Status: ACTIVE | Noted: 2024-08-16

## 2024-08-16 LAB
CULTURE, LOWER RESPIRATORY: ABNORMAL
GRAM STN SPEC: ABNORMAL
GRAM STN SPEC: ABNORMAL

## 2024-08-16 PROCEDURE — 99223 1ST HOSP IP/OBS HIGH 75: CPT | Mod: AI,,, | Performed by: FAMILY MEDICINE

## 2024-08-16 PROCEDURE — 94668 MNPJ CHEST WALL SBSQ: CPT

## 2024-08-16 PROCEDURE — 99900031 HC PATIENT EDUCATION (STAT)

## 2024-08-16 PROCEDURE — 27000221 HC OXYGEN, UP TO 24 HOURS

## 2024-08-16 PROCEDURE — 94761 N-INVAS EAR/PLS OXIMETRY MLT: CPT

## 2024-08-16 PROCEDURE — 99232 SBSQ HOSP IP/OBS MODERATE 35: CPT | Mod: ,,, | Performed by: INTERNAL MEDICINE

## 2024-08-16 PROCEDURE — 25000242 PHARM REV CODE 250 ALT 637 W/ HCPCS: Performed by: FAMILY MEDICINE

## 2024-08-16 PROCEDURE — 25000003 PHARM REV CODE 250: Performed by: FAMILY MEDICINE

## 2024-08-16 PROCEDURE — S4991 NICOTINE PATCH NONLEGEND: HCPCS | Performed by: FAMILY MEDICINE

## 2024-08-16 PROCEDURE — 63600175 PHARM REV CODE 636 W HCPCS: Performed by: FAMILY MEDICINE

## 2024-08-16 PROCEDURE — 97162 PT EVAL MOD COMPLEX 30 MIN: CPT

## 2024-08-16 PROCEDURE — 99900035 HC TECH TIME PER 15 MIN (STAT)

## 2024-08-16 PROCEDURE — 11000008

## 2024-08-16 PROCEDURE — 94640 AIRWAY INHALATION TREATMENT: CPT

## 2024-08-16 RX ORDER — PREDNISONE 20 MG/1
20 TABLET ORAL DAILY
Status: COMPLETED | OUTPATIENT
Start: 2024-08-24 | End: 2024-08-25

## 2024-08-16 RX ORDER — DEXTROSE MONOHYDRATE 50 MG/ML
INJECTION, SOLUTION INTRAVENOUS
Status: DISCONTINUED | OUTPATIENT
Start: 2024-08-16 | End: 2024-08-29 | Stop reason: HOSPADM

## 2024-08-16 RX ORDER — PREDNISONE 5 MG/1
5 TABLET ORAL DAILY
Status: COMPLETED | OUTPATIENT
Start: 2024-08-28 | End: 2024-08-29

## 2024-08-16 RX ORDER — PREDNISONE 20 MG/1
40 TABLET ORAL DAILY
Status: COMPLETED | OUTPATIENT
Start: 2024-08-19 | End: 2024-08-21

## 2024-08-16 RX ORDER — PREDNISONE 10 MG/1
10 TABLET ORAL DAILY
Status: COMPLETED | OUTPATIENT
Start: 2024-08-26 | End: 2024-08-27

## 2024-08-16 RX ADMIN — BUPROPION HYDROCHLORIDE 100 MG: 100 TABLET, FILM COATED, EXTENDED RELEASE ORAL at 08:08

## 2024-08-16 RX ADMIN — SODIUM CHLORIDE SOLN NEBU 3% 4 ML: 3 NEBU SOLN at 03:08

## 2024-08-16 RX ADMIN — NICOTINE 1 PATCH: 14 PATCH, EXTENDED RELEASE TRANSDERMAL at 08:08

## 2024-08-16 RX ADMIN — SODIUM CHLORIDE SOLN NEBU 3% 4 ML: 3 NEBU SOLN at 07:08

## 2024-08-16 RX ADMIN — IPRATROPIUM BROMIDE AND ALBUTEROL SULFATE 3 ML: 2.5; .5 SOLUTION RESPIRATORY (INHALATION) at 03:08

## 2024-08-16 RX ADMIN — SODIUM CHLORIDE: 9 INJECTION, SOLUTION INTRAVENOUS at 12:08

## 2024-08-16 RX ADMIN — PIPERACILLIN SODIUM AND TAZOBACTAM SODIUM 4.5 G: 4; .5 INJECTION, POWDER, LYOPHILIZED, FOR SOLUTION INTRAVENOUS at 03:08

## 2024-08-16 RX ADMIN — PIPERACILLIN SODIUM AND TAZOBACTAM SODIUM 4.5 G: 4; .5 INJECTION, POWDER, LYOPHILIZED, FOR SOLUTION INTRAVENOUS at 12:08

## 2024-08-16 RX ADMIN — IPRATROPIUM BROMIDE AND ALBUTEROL SULFATE 3 ML: 2.5; .5 SOLUTION RESPIRATORY (INHALATION) at 07:08

## 2024-08-16 RX ADMIN — ENOXAPARIN SODIUM 40 MG: 40 INJECTION SUBCUTANEOUS at 04:08

## 2024-08-16 RX ADMIN — POTASSIUM CHLORIDE 20 MEQ: 20 TABLET, EXTENDED RELEASE ORAL at 08:08

## 2024-08-16 RX ADMIN — SODIUM CHLORIDE SOLN NEBU 3% 4 ML: 3 NEBU SOLN at 12:08

## 2024-08-16 RX ADMIN — MELATONIN 6 MG: 3 TAB ORAL at 08:08

## 2024-08-16 RX ADMIN — DEXTROSE MONOHYDRATE: 12500 INJECTION, SOLUTION INTRAVENOUS at 03:08

## 2024-08-16 RX ADMIN — MUPIROCIN: 20 OINTMENT TOPICAL at 08:08

## 2024-08-16 RX ADMIN — PIPERACILLIN SODIUM AND TAZOBACTAM SODIUM 4.5 G: 4; .5 INJECTION, POWDER, LYOPHILIZED, FOR SOLUTION INTRAVENOUS at 08:08

## 2024-08-16 RX ADMIN — VANCOMYCIN HYDROCHLORIDE 1000 MG: 1 INJECTION, POWDER, LYOPHILIZED, FOR SOLUTION INTRAVENOUS at 04:08

## 2024-08-16 RX ADMIN — PIPERACILLIN SODIUM AND TAZOBACTAM SODIUM 4.5 G: 4; .5 INJECTION, POWDER, LYOPHILIZED, FOR SOLUTION INTRAVENOUS at 11:08

## 2024-08-16 RX ADMIN — DEXTROSE MONOHYDRATE: 12500 INJECTION, SOLUTION INTRAVENOUS at 11:08

## 2024-08-16 RX ADMIN — PREDNISONE 50 MG: 10 TABLET ORAL at 08:08

## 2024-08-16 RX ADMIN — IPRATROPIUM BROMIDE AND ALBUTEROL SULFATE 3 ML: 2.5; .5 SOLUTION RESPIRATORY (INHALATION) at 12:08

## 2024-08-16 NOTE — ASSESSMENT & PLAN NOTE
With general debility and failure to thrive. Currently requiring assistance for most ADLs. Will continue physical therapy this admission however her home situation is not ideal. She will likely need to consider long-term placement upon discharge.

## 2024-08-16 NOTE — ASSESSMENT & PLAN NOTE
Patient has a diagnosis of pneumonia. The cause of the pneumonia is suspected to be bacterial in etiology but organism is not known. The pneumonia is improving. The patient has the following signs/symptoms of pneumonia: persistent hypoxia , cough, and shortness of breath. The patient does have a current oxygen requirement and the patient does have a home oxygen requirement. I have reviewed the pertinent imaging. The following cultures have been collected: Bronchial lavage The culture results are listed below.     Current antimicrobial regimen consists of the antibiotics listed below. Will monitor patient closely and continue current treatment plan unchanged.    Recommend antibiotics until at least 8/22 (total 14 days therapy).    Antibiotics (From admission, onward)      Start     Stop Route Frequency Ordered    08/15/24 1600  piperacillin-tazobactam (ZOSYN) 4.5 g in D5W 100 mL IVPB (MB+)         -- IV Every 8 hours (non-standard times) 08/15/24 1036    08/15/24 1230  mupirocin 2 % ointment         08/20/24 0859 Nasl 2 times daily 08/15/24 1127            Microbiology Results (last 7 days)       ** No results found for the last 168 hours. **          Rigid bronchoscopy 8/14 with stent placed. Scheduled Duo Nebs and hypertonic saline in attempt to keep stent patent. Pulmonology managing.     Surgical Progress Note    Author: Venancio Castillo Date & Time created: 2017   8:32 AM     Interval Events:  Multiple issues between patient/ and staff noted  Complains of pain, LUQ abdomen.  Had BM ysesterday after suppository (large, well formed).  Denies flatus since    Review of Systems   Gastrointestinal: Positive for abdominal pain. Negative for nausea and vomiting.     Hemodynamics:  Temp (24hrs), Av.9 °C (98.4 °F), Min:36.6 °C (97.9 °F), Max:37.3 °C (99.1 °F)  Temperature: 37.3 °C (99.1 °F)  Pulse  Av.8  Min: 62  Max: 86   Blood Pressure: 145/76 mmHg     Respiratory:    Respiration: 18, Pulse Oximetry: 93 %        RUL Breath Sounds: Clear, RML Breath Sounds: Diminished, RLL Breath Sounds: Diminished, MANJINDER Breath Sounds: Clear, LLL Breath Sounds: Diminished  Neuro:  GCS = 15       Fluids:    Intake/Output Summary (Last 24 hours) at 17 0832  Last data filed at 17 0400   Gross per 24 hour   Intake   2000 ml   Output    515 ml   Net   1485 ml        Current Diet Order   Procedures   • Diet NPO     Physical Exam   Constitutional: She is oriented to person, place, and time. She appears well-developed and well-nourished. No distress.   HENT:   Head: Normocephalic.   Eyes: Pupils are equal, round, and reactive to light. No scleral icterus.   Cardiovascular: Normal rate and regular rhythm.    Pulmonary/Chest: Breath sounds normal. She is in respiratory distress.   Abdominal: Soft. She exhibits no distension. There is tenderness (Minimal tenderness, LUQ). There is no rebound and no guarding.   Minimal bowel sounds present   Neurological: She is alert and oriented to person, place, and time.     Labs:  Recent Results (from the past 24 hour(s))   BASIC METABOLIC PANEL    Collection Time: 17  1:04 PM   Result Value Ref Range    Sodium 140 135 - 145 mmol/L    Potassium 4.5 3.6 - 5.5 mmol/L    Chloride 107 96 - 112 mmol/L    Co2 25 20 - 33 mmol/L    Glucose 102 (H) 65 - 99 mg/dL     Bun 12 8 - 22 mg/dL    Creatinine 0.91 0.50 - 1.40 mg/dL    Calcium 9.5 8.5 - 10.5 mg/dL    Anion Gap 8.0 0.0 - 11.9   CBC WITH DIFFERENTIAL    Collection Time: 01/04/17  1:04 PM   Result Value Ref Range    WBC 4.3 (L) 4.8 - 10.8 K/uL    RBC 4.50 4.20 - 5.40 M/uL    Hemoglobin 12.8 12.0 - 16.0 g/dL    Hematocrit 38.6 37.0 - 47.0 %    MCV 85.8 81.4 - 97.8 fL    MCH 28.4 27.0 - 33.0 pg    MCHC 33.2 (L) 33.6 - 35.0 g/dL    RDW 45.5 35.9 - 50.0 fL    Platelet Count 74 (L) 164 - 446 K/uL    MPV 9.0 9.0 - 12.9 fL    Neutrophils-Polys 80.70 (H) 44.00 - 72.00 %    Lymphocytes 13.90 (L) 22.00 - 41.00 %    Monocytes 4.00 0.00 - 13.40 %    Eosinophils 0.50 0.00 - 6.90 %    Basophils 0.70 0.00 - 1.80 %    Immature Granulocytes 0.20 0.00 - 0.90 %    Nucleated RBC 0.00 /100 WBC    Neutrophils (Absolute) 3.43 2.00 - 7.15 K/uL    Lymphs (Absolute) 0.59 (L) 1.00 - 4.80 K/uL    Monos (Absolute) 0.17 0.00 - 0.85 K/uL    Eos (Absolute) 0.02 0.00 - 0.51 K/uL    Baso (Absolute) 0.03 0.00 - 0.12 K/uL    Immature Granulocytes (abs) 0.01 0.00 - 0.11 K/uL    NRBC (Absolute) 0.00 K/uL   PROTHROMBIN TIME    Collection Time: 01/04/17  1:04 PM   Result Value Ref Range    PT 13.3 12.0 - 14.6 sec    INR 0.98 0.87 - 1.13   ESTIMATED GFR    Collection Time: 01/04/17  1:04 PM   Result Value Ref Range    GFR If African American >60 >60 mL/min/1.73 m 2    GFR If Non African American >60 >60 mL/min/1.73 m 2   ACCU-CHEK GLUCOSE    Collection Time: 01/04/17  4:30 PM   Result Value Ref Range    Glucose - Accu-Ck 95 65 - 99 mg/dL   ACCU-CHEK GLUCOSE    Collection Time: 01/04/17  9:18 PM   Result Value Ref Range    Glucose - Accu-Ck 86 65 - 99 mg/dL   ACCU-CHEK GLUCOSE    Collection Time: 01/05/17  6:40 AM   Result Value Ref Range    Glucose - Accu-Ck 88 65 - 99 mg/dL     Medical Decision Making, by Problem:  Active Hospital Problems    Diagnosis   • Chronic respiratory failure (HCC) [J96.10]     Priority: Medium   • Diabetes mellitus, type 2 (HCC) [E11.9]      Priority: Medium   • Status post liver transplant Dr. Canada ([facility]) [Z94.4]     Priority: Medium     -December 2011: Status post liver [place] for end stage liver [place] [facility] [place]. [place].       • Anxiety [F41.9]     Priority: Low   • Hypothyroidism, adult [E03.9]     Priority: Low   • GERD (gastroesophageal reflux disease) [K21.9]   • SBO (small bowel obstruction) (HCC) [K56.69]   • Immunocompromised state (HCC) [D84.9]     Plan:  Small bowel follow through today    Quality Measures:  Medications reviewed and Radiology images reviewed (Labs not yet obtained)  Holbrook catheter: No Holbrook      DVT Prophylaxis: Enoxaparin (Lovenox)    Ulcer prophylaxis: Yes          Discussed patient condition with RN and Patient

## 2024-08-16 NOTE — ASSESSMENT & PLAN NOTE
Patient with Hypercapnic and Hypoxic Respiratory failure which is Acute on chronic. Supplemental oxygen was provided and noted- Oxygen Concentration (%):  [36] 36    Signs/symptoms of respiratory failure include- increased work of breathing. Contributing diagnoses includes - COPD, Pneumonia, and lung cancer  Labs and images were reviewed. Patient Has not had a recent ABG. Will treat underlying causes and adjust management of respiratory failure as follows- continue supplemental O2 to maintain saturation >88%, scheduled nebulizer therapy.

## 2024-08-16 NOTE — PROGRESS NOTES
[facility]  Critical Care Medicine  Progress Note    Patient Name: Mayra Kelly  MRN: [id number]  Admission Date: 8/15/2024  Hospital Length of Stay: 1 days  Code Status: Partial Code  Attending Provider: Yeni Jones DO  Primary Care Provider: Darlene Campoverde NP   Principal Problem: <principal problem not specified>    Subjective:     HPI:  No notes on file    Hospital/ICU Course:  8/15/24-no acute events overnight I reviewed her chest x-ray from this morning which showed that the stent continues to remain patent.  Clinically she is doing well today.  Continues to require 3-5 L of oxygen to keep her saturations at above 88%.    Interval History/Significant Events:  Patient without complaints    Review of Systems  Objective:     Vital Signs (Most Recent):  Temp: 98.2 °F (36.8 °C) (08/16/24 0400)  Pulse: (!) 55 (08/16/24 0500)  Resp: 19 (08/16/24 0500)  BP: (!) 100/41 (08/16/24 0500)  SpO2: (!) 90 % (08/16/24 0500) Vital Signs (24h Range):  Temp:  [97.5 °F (36.4 °C)-98.2 °F (36.8 °C)] 98.2 °F (36.8 °C)  Pulse:  [53-67] 55  Resp:  [0-21] 19  SpO2:  [79 %-96 %] 90 %  BP: ()/(40-71) 100/41   Weight: 51.8 kg (114 lb 3.2 oz)  Body mass index is 18.43 kg/m².      Intake/Output Summary (Last 24 hours) at 8/16/2024 0559  Last data filed at 8/16/2024 0505  Gross per 24 hour   Intake 958.83 ml   Output 1300 ml   Net -341.17 ml          Physical Exam  Vitals reviewed.   Constitutional:       Appearance: Normal appearance.      Interventions: She is not intubated.  HENT:      Head: Normocephalic and atraumatic.      Nose: Nose normal.      Mouth/Throat:      Mouth: Mucous membranes are dry.      Pharynx: Oropharynx is clear.   Eyes:      Extraocular Movements: Extraocular movements intact.      Conjunctiva/sclera: Conjunctivae normal.      Pupils: Pupils are equal, round, and reactive to light.   Cardiovascular:      Rate and Rhythm: Normal rate.      Heart sounds: Normal heart  "sounds. No murmur heard.  Pulmonary:      Effort: Pulmonary effort is normal. She is not intubated.      Breath sounds: Normal breath sounds.   Abdominal:      General: Abdomen is flat. Bowel sounds are normal.      Palpations: Abdomen is soft.   Musculoskeletal:         General: Normal range of motion.      Cervical back: Normal range of motion and neck supple.      Right lower leg: No edema.      Left lower leg: No edema.   Skin:     General: Skin is warm and dry.      Capillary Refill: Capillary refill takes less than 2 seconds.   Neurological:      General: No focal deficit present.      Mental Status: She is alert and oriented to person, place, and time.   Psychiatric:         Mood and Affect: Mood normal.         Behavior: Behavior normal.            Vents:  Oxygen Concentration (%): 36 (08/15/24 1918)  Lines/Drains/Airways       Drain  Duration             Female External Urinary Catheter w/ Suction 08/15/24 1700 <1 day              Airway  Duration                  Airway - Non-Surgical 08/14/24 1111 LMA 1 day              Peripheral Intravenous Line  Duration                  Peripheral IV - Single Lumen 08/11/24 1816 22 G Left Antecubital 4 days         Peripheral IV - Single Lumen 08/13/24 1026 22 G Anterior;Left Forearm 2 days         Peripheral IV - Single Lumen 08/14/24 1112 18 G 1 in No Anterior;Right Forearm 1 day                  Significant Labs:    CBC/Anemia Profile:  No results for input(s): "WBC", "HGB", "HCT", "PLT", "MCV", "RDW", "IRON", "FERRITIN", "RETIC", "FOLATE", "[id number]", "OCCULTBLOOD" in the last 48 hours.     Chemistries:  Recent Labs   Lab 08/14/24  0636 08/15/24  0546     --    K 4.8  --      --    CO2 39*  --    BUN 24* 22*   CREATININE 0.68 0.72   CALCIUM 8.6  --        Recent Lab Results       None            Significant Imaging:  I have reviewed all pertinent imaging results/findings within the past 24 hours.    ABG  No results for input(s): "PH", "PO2", "PCO2", " ""HCO3", "BE" in the last 168 hours.  Assessment/Plan:     Pulmonary  COPD exacerbation  Severe COPD exacerbation when she initially came in.  Is on oxygen at home at 3 L. maintain her SpO2 at 88% or higher (may require 3-5 L).  Recommend tapering down her systemic corticosteroids use from 60 t.i.d. down to 60 b.i.d. today and then can continue at 50 mg prednisone from the third day.  Would recommend 10 day taper down from 50 mg prednisone, based on her symptoms.          Acute on chronic respiratory failure with hypoxia and hypercapnia  Patient on low-flow O2 doing well    Obstructive pneumonia  Patient is status post stent placement doing well x-ray yesterday was clear no complaints of shortness of breath    Oncology  Malignant neoplasm of right lung  Status post placement of endobronchial stent in the right mainstem/bronchus intermedius.  Right upper lobe completely stenosed and therefore jailed off with endobronchial stent.      Must get scheduled DuoNebs and scheduled hypertonic saline.  DuoNebs are every 6 hours and hypotonic saline is every 8 hours (to be combined with DuoNebs so that she does not have bronchospasm).  I have also placed orders for chest physical therapy and physical therapy which he should get.                   Jose Carlos Whitmore MD  Critical Care Medicine  [facility]  "

## 2024-08-16 NOTE — PLAN OF CARE
Problem: Pneumonia  Goal: Fluid Balance  Outcome: Progressing  Goal: Effective Oxygenation and Ventilation  Outcome: Progressing     Problem: Gas Exchange Impaired  Goal: Optimal Gas Exchange  Outcome: Progressing

## 2024-08-16 NOTE — PROGRESS NOTES
[facility]  Adult Nutrition  Follow up         Reason for Assessment  Reason For Assessment: identified at risk by screening criteria   Nutrition Risk Screen: no indicators present    Assessment and Plan  8/16/2024 MST Notification sent. Patient admitted to LTAC from [facility]. Patient meets ASPEN criteria for severe protein calorie malnutrition. See Malnutrition assessment. Recommend to liberalize diet to regular. Encourage po intakes. RD Following.     8/14/2024 RD Follow up: Patient currently NPO s/p bronchoscopy. Patient with prior diet order for cardiac diet with 50% intakes per flowsheets. No new weight noted. Last BM noted 8/11.     Recommend regular diet when diet resumed. Consider bowel regimen as constipation can affect po tolerance. RD Following.     Patient is  72yo female admitted 8/7 for obstructive pneumonia. She was identified at risk by screening criteria with MST2.     Patient is 47.6kg with a BMI of 16.95 and is severely underweight per geriatric standards. She has a PMH of lung cancer and advanced COPD. Visited with patient this morning and she stated she has been eating well while in the hospital due to the steroid therapy she is receiving, but has not had an appetite at home prior to admission. She is edentulous without her dentures. RD asked if she needed texture modification, patient declined.     Physical exam reveals severe fat and muscle depletion to orbital, upper arm, temple, and clavicle regions.     Per ASPEN guidelines, patient meets criteria for severe protein-calorie malnutrition as evidenced by poor PO intakes <50% for greater than one month and severe fat and muscle depletion to orbital, upper arm, temple, and clavicle regions.     Patient is ordered a cardiac diet. Recommend consider liberalizing to a Regular diet as appropriate. Encourage good PO intakes.     Last BM 8/8 per flowsheet.    Medications/labs reviewed. RD following.      Learning Needs/Social  Determinants of Health  Learning Assessment       08/08/2024 1319 [facility] (8/7/2024 - Present)   Created by Barb Abarca RN - RN (Nurse) Status: Complete                 PRIMARY LEARNER     Primary Learner Name:  Mayra Kelly BN - 08/08/2024 1319    Relationship:  Patient BN - 08/08/2024 1319    Does the primary learner have any barriers to learning?:  No Barriers BN - 08/08/2024 1319    What is the preferred language of the primary learner?:  English BN - 08/08/2024 1319    Is an  required?:  Yes BN - 08/08/2024 1319    How does the primary learner prefer to learn new concepts?:  Listening BN - 08/08/2024 1319    How often do you need to have someone help you read instructions, pamphlets, or written material from your doctor or pharmacy?:  Never BN - 08/08/2024 1319        CO-LEARNER #1     No question answered        CO-LEARNER #2     No question answered        SPECIAL TOPICS     No question answered        ANSWERED BY:     No question answered        Comments         Edit History       Barb Abarca, RN - RN (Nurse)   08/08/2024 1319                           Social Determinants of Health     Tobacco Use: High Risk (8/8/2024)    Patient History     Smoking Tobacco Use: Every Day     Smokeless Tobacco Use: Never     Passive Exposure: Not on file   Alcohol Use: Not At Risk (8/8/2024)    AUDIT-C     Frequency of Alcohol Consumption: Never     Average Number of Drinks: Patient does not drink     Frequency of Binge Drinking: Never   Financial Resource Strain: Low Risk  (8/8/2024)    Overall Financial Resource Strain (CARDIA)     Difficulty of Paying Living Expenses: Not hard at all   Food Insecurity: No Food Insecurity (8/8/2024)    Hunger Vital Sign     Worried About Running Out of Food in the Last Year: Never true     Ran Out of Food in the Last Year: Never true   Transportation Needs: No Transportation Needs (8/8/2024)    TRANSPORTATION NEEDS     Transportation : No    Physical Activity: Inactive (8/8/2024)    Exercise Vital Sign     Days of Exercise per Week: 0 days     Minutes of Exercise per Session: 0 min   Stress: No Stress Concern Present (8/8/2024)    [employer] - Occupational Stress Questionnaire     Feeling of Stress : Not at all   Housing Stability: Low Risk  (8/8/2024)    Housing Stability Vital Sign     Unable to Pay for Housing in the Last Year: No     Homeless in the Last Year: No   Depression: Low Risk  (12/4/2023)    Depression     Last PHQ-4: Flowsheet Data: 0   Utilities: Not At Risk (8/8/2024)    [facility] Utilities     Threatened with loss of utilities: No   Health Literacy: Adequate Health Literacy (8/8/2024)     Health Literacy     Frequency of need for help with medical instructions: Rarely   Social Isolation: Socially Integrated (8/8/2024)    Social Isolation     Social Isolation: 1            Malnutrition  Is Patient Malnourished: Yes Malnutrition Assessment                                       Nutrition Diagnosis  Malnutrition (Severe) related to Appetite loss, Catabolic illness, Chronic illness, and Inadequate Caloric intake as evidenced by poor PO intakes <50% for greater than one month and severe fat and muscle depletion to orbital, upper arm, temple, and clavicle regions.   Comments: consider liberalizing to Regular diet    Recent Labs   Lab 08/14/24  0636   GLU 95       Nutrition Prescription / Recommendations  Nutrition Goal Status: progressing towards goal  Current Diet Order: cardiac  Nutrition Order Comments: rec diet change to regular  Chewing or Swallowing Difficulty?: Edentulous : no dentures present  Recommended Diet: Regular  Recommended Oral Supplement: No Oral Supplements  Is Nutrition Support Recommended: [facility] Support: No  Is Nutrition Education Recommended: No    Monitor and Evaluation  % current Intake: P.O. intake of 50 - 75 %  % intake to meet estimated needs: 50 - 75  %  Nutrition-Focused Physical Findings: overall appearance       Current Medical Diagnosis and Past Medical History     Past Medical History:   Diagnosis Date    Chronic respiratory failure with hypoxia and hypercapnia     on home oxygen but hasn't been using it    Cigarette nicotine dependence with nicotine-induced disorder 11/09/2023    COPD (chronic obstructive pulmonary disease)     Dyshidrotic eczema 08/10/2021    Right bundle branch block 12/11/2023    Scabies     Squamous cell carcinoma of lung     follows with Dr. Kraft       Nutrition/Diet History       Lab/Procedures/Meds  Recent Labs   Lab 08/14/24  0636 08/15/24  0546     --    K 4.8  --    BUN 24* 22*   CREATININE 0.68 0.72   CALCIUM 8.6  --      --    Note: BUN elevated; encourage po fluids    Last A1c:   Lab Results   Component Value Date    HGBA1C 5.5 08/08/2024     Lab Results   Component Value Date    RBC 4.11 (L) 08/14/2024    HGB 10.8 (L) 08/14/2024    HCT 35.9 (L) 08/14/2024    MCV 87.3 08/14/2024    MCH 26.3 (L) 08/14/2024    MCHC 30.1 (L) 08/14/2024   Note: H&H low    Pertinent Labs Reviewed: reviewed  Pertinent Medications Reviewed: reviewed  Scheduled Meds:   albuterol-ipratropium  3 mL Nebulization QID    buPROPion  100 mg Oral BID    enoxparin  40 mg Subcutaneous Q24H (prophylaxis, 1700)    mupirocin   Nasal BID    nicotine  1 patch Transdermal Daily    piperacillin-tazobactam (Zosyn) IV (PEDS and ADULTS) (extended infusion is not appropriate)  4.5 g Intravenous Q8H    potassium chloride SA  20 mEq Oral BID    predniSONE  50 mg Oral Daily    sodium chloride 3%  4 mL Nebulization Q8H     Continuous Infusions:  PRN Meds:.  Current Facility-Administered Medications:     0.9% NaCl 100 mL flush bag, , Intravenous, PRN    acetaminophen, 1,000 mg, Oral, Q6H PRN    albuterol sulfate, 2.5 mg, Nebulization, Q4H PRN    bisacodyL, 10 mg, Oral, Daily PRN    D5W, , Intravenous, PRN    dextromethorphan-guaiFENesin  mg/5 ml, 10 mL, Oral,  "Q6H PRN    diphenhydrAMINE, 25 mg, Intravenous, Q6H PRN    glucagon (human recombinant), 1 mg, Intramuscular, PRN    HYDROmorphone, 0.5 mg, Intravenous, Q5 Min PRN    melatonin, 6 mg, Oral, Nightly PRN    morphine, 4 mg, Intravenous, Q5 Min PRN    ondansetron, 4 mg, Intravenous, Daily PRN    ondansetron, 8 mg, Intravenous, Q6H PRN    traZODone, 50 mg, Oral, Nightly PRN    Anthropometrics  Temp: 97.6 °F (36.4 °C)  Height Method: Stated  Height: 5' 6" (167.6 cm)  Height (inches): 66 in  Weight Method: Bed Scale  Weight: 51.8 kg (114 lb 3.2 oz)  Weight (lb): 114.2 lb  Ideal Body Weight (IBW), Female: 130 lb  % Ideal Body Weight, Female (lb): 87.85 %  BMI (Calculated): 18.4       Estimated/Assessed Needs  RMR ([place]-St. Jeor Equation): 1044.75     Temp: 97.6 °F (36.4 °C)Oral  Weight Used For Calorie Calculations: 51.8 kg (114 lb 3.2 oz)   Energy Need Method: Kcal/kg Energy Calorie Requirements (kcal): 2776-0007  Weight Used For Protein Calculations: 51.8 kg (114 lb 3.2 oz)  Protein Requirements: 52-62       RDA Method (mL): 1813       Nutrition by Nursing  Diet/Nutrition Received: low saturated fat/low cholesterol  Intake (%): 75%  Diet/Feeding Assistance: none  Diet/Feeding Tolerance: good  Last Bowel Movement: 08/14/24                Nutrition Follow-Up  RD Follow-up?: Yes      Nutrition Discharge Planning: new admit to LTAC          Available via Secure Chat  "

## 2024-08-16 NOTE — PROGRESS NOTES
[facility]  Wound Care    Patient Name:  Mayra Kelly   MRN:  [id number]  Date: 8/16/2024  Diagnosis: <principal problem not specified>    History:     Past Medical History:   Diagnosis Date    Chronic respiratory failure with hypoxia and hypercapnia     on home oxygen but hasn't been using it    Cigarette nicotine dependence with nicotine-induced disorder 11/09/2023    COPD (chronic obstructive pulmonary disease)     Dyshidrotic eczema 08/10/2021    Right bundle branch block 12/11/2023    Scabies     Squamous cell carcinoma of lung     follows with Dr. Kraft       Social History     Socioeconomic History    Marital status:    Tobacco Use    Smoking status: Every Day     Current packs/day: 1.00     Types: Cigarettes    Smokeless tobacco: Never   Substance and Sexual Activity    Alcohol use: Never    Drug use: Never     Social Determinants of Health     Financial Resource Strain: Low Risk  (8/8/2024)    Overall Financial Resource Strain (CARDIA)     Difficulty of Paying Living Expenses: Not hard at all   Food Insecurity: No Food Insecurity (8/8/2024)    Hunger Vital Sign     Worried About Running Out of Food in the Last Year: Never true     Ran Out of Food in the Last Year: Never true   Transportation Needs: No Transportation Needs (8/8/2024)    TRANSPORTATION NEEDS     Transportation : No   Physical Activity: Inactive (8/8/2024)    Exercise Vital Sign     Days of Exercise per Week: 0 days     Minutes of Exercise per Session: 0 min   Stress: No Stress Concern Present (8/8/2024)    [employer] - Occupational Stress Questionnaire     Feeling of Stress : Not at all   Housing Stability: Low Risk  (8/8/2024)    Housing Stability Vital Sign     Unable to Pay for Housing in the Last Year: No     Homeless in the Last Year: No       Precautions:     Allergies as of 08/15/2024    (No Known Allergies)       WOC Assessment Details/Treatment       Narrative:  Seen patient for initiation of preventative skin care measures    Patient in bed, Alert. Has  wound of 0.5 cm x 0.5 cm x 0.1 cm to Left lower buttock. Photo in medial. Dry, open to air. Pink partial thickness skin loss noted. Sacral and Bilateral heels with out pressure injury.   On low air loss mattress  Oxygen in use, no redness noted. No Ez wrap noted.     Consult skin care for any issues      08/16/2024

## 2024-08-16 NOTE — H&P
[facility] Medicine  History & Physical    Patient Name: Mayra Kelly  MRN: [id number]  Patient Class: IP- Inpatient  Admission Date: 8/15/2024  Attending Physician: Yeni Jones DO   Primary Care Provider: Darlene Campoverde NP         Patient information was obtained from patient, past medical records, and ER records.     Subjective:     Principal Problem:Obstructive pneumonia    Chief Complaint: No chief complaint on file.       HPI:   Mayra Kelly is a 73-year-old female with history of lung cancer, COPD, pulmonary HTN, and a-fib. She initially presented to [facility] with acute-on-chronic respiratory failure secondary to obstructive pneumonia. This was treated with extended course of broad-spectrum IV antibiotics with some improvement. Patient underwent rigid bronchoscopy with bronchial lavage, dilation, and stent by Dr. Castellon 24. Patient tolerated procedure well with improvement in respiratory status. CXR 8/15 revealed stent has remained patent however will require aggressive respiratory therapy and continued antibiotics. Patient subsequently admitted to [facility] for continued IV antibiotics, scheduled respiratory therapy, and physical rehabilitation.             Past Medical History:   Diagnosis Date    Chronic respiratory failure with hypoxia and hypercapnia     on home oxygen but hasn't been using it    Cigarette nicotine dependence with nicotine-induced disorder 2023    COPD (chronic obstructive pulmonary disease)     Dyshidrotic eczema 08/10/2021    Right bundle branch block 2023    Scabies     Squamous cell carcinoma of lung     follows with Dr. Kraft       Past Surgical History:   Procedure Laterality Date    APPENDECTOMY       SECTION  1980    2x    INSERTION OF VENOUS ACCESS PORT         Review of patient's allergies indicates:  No Known Allergies    No current facility-administered medications on file  prior to encounter.     Current Outpatient Medications on File Prior to Encounter   Medication Sig    albuterol (PROVENTIL/VENTOLIN HFA) 90 mcg/actuation inhaler Inhale 2 puffs into the lungs every 4 (four) hours as needed.    albuterol sulfate 2.5 mg/0.5 mL Nebu Take 2.5 mg by nebulization every 6 (six) hours as needed (Shortness of breath). Rescue    amLODIPine (NORVASC) 2.5 MG tablet Take 1 tablet (2.5 mg total) by mouth once daily.    ELIQUIS 5 mg Tab Take 1 tablet (5 mg total) by mouth 2 (two) times daily.    fluticasone-umeclidin-vilanter (TRELEGY ELLIPTA) 200-62.5-25 mcg inhaler Inhale 1 puff into the lungs once daily.    furosemide (LASIX) 40 MG tablet Take 1 tablet (40 mg total) by mouth once daily.    potassium chloride SA (K-DUR,KLOR-CON) 20 MEQ tablet Take 1 tablet (20 mEq total) by mouth once daily.    vitamin D (VITAMIN D3) 1000 units Tab Take 1,000 Units by mouth once daily.    vitamin E 400 UNIT capsule Take 400 Units by mouth once daily.     Family History       Problem Relation (Age of Onset)    Cancer Father, Maternal Grandmother, Maternal Aunt    Heart disease Paternal Grandmother          Tobacco Use    Smoking status: Every Day     Current packs/day: 1.00     Types: Cigarettes    Smokeless tobacco: Never   Substance and Sexual Activity    Alcohol use: Never    Drug use: Never    Sexual activity: Not on file     Review of Systems   Constitutional:  Positive for activity change and fatigue.   Respiratory:  Positive for cough and shortness of breath.    Neurological:  Positive for weakness.   All other systems reviewed and are negative.    Objective:     Vital Signs (Most Recent):  Temp: 98 °F (36.7 °C) (08/16/24 1100)  Pulse: (!) 53 (08/16/24 1400)  Resp: 17 (08/16/24 1400)  BP: (!) 104/41 (08/16/24 1400)  SpO2: (!) 90 % (08/16/24 1400) Vital Signs (24h Range):  Temp:  [97.5 °F (36.4 °C)-98.2 °F (36.8 °C)] 98 °F (36.7 °C)  Pulse:  [51-67] 53  Resp:  [0-23] 17  SpO2:  [83 %-96 %] 90 %  BP:  ()/(40-55) 104/41     Weight: 51.8 kg (114 lb 3.2 oz)  Body mass index is 18.43 kg/m².     Physical Exam  Constitutional:       General: She is awake. She is not in acute distress.     Appearance: She is well-developed and underweight.   HENT:      Head: Normocephalic.      Mouth/Throat:      Pharynx: Oropharynx is clear.   Eyes:      Extraocular Movements: Extraocular movements intact.      Pupils: Pupils are equal, round, and reactive to light.   Neck:      Thyroid: No thyroid mass.   Cardiovascular:      Rate and Rhythm: Normal rate and regular rhythm.   Pulmonary:      Effort: Pulmonary effort is normal. No respiratory distress.      Breath sounds: Normal air entry. Rales present. No wheezing.      Comments: Decreased breath sounds right base.  Abdominal:      Palpations: Abdomen is soft.      Tenderness: There is no abdominal tenderness.   Musculoskeletal:         General: Normal range of motion.   Skin:     Coloration: Skin is not jaundiced.      Findings: No lesion.   Neurological:      General: No focal deficit present.      Mental Status: She is alert and oriented to person, place, and time.   Psychiatric:         Behavior: Behavior is cooperative.              CRANIAL NERVES     CN III, IV, VI   Pupils are equal, round, and reactive to light.       Significant Labs: All pertinent labs within the past 24 hours have been reviewed.    Significant Imaging: I have reviewed all pertinent imaging results/findings within the past 24 hours.  Assessment/Plan:     * Obstructive pneumonia  Patient has a diagnosis of pneumonia. The cause of the pneumonia is suspected to be bacterial in etiology but organism is not known. The pneumonia is improving. The patient has the following signs/symptoms of pneumonia: persistent hypoxia , cough, and shortness of breath. The patient does have a current oxygen requirement and the patient does have a home oxygen requirement. I have reviewed the pertinent imaging. The following  cultures have been collected: Bronchial lavage The culture results are listed below.     Current antimicrobial regimen consists of the antibiotics listed below. Will monitor patient closely and continue current treatment plan unchanged.    Recommend antibiotics until at least 8/22 (total 14 days therapy).    Antibiotics (From admission, onward)      Start     Stop Route Frequency Ordered    08/15/24 1600  piperacillin-tazobactam (ZOSYN) 4.5 g in D5W 100 mL IVPB (MB+)         -- IV Every 8 hours (non-standard times) 08/15/24 1036    08/15/24 1230  mupirocin 2 % ointment         08/20/24 0859 Nasl 2 times daily 08/15/24 1127            Microbiology Results (last 7 days)       ** No results found for the last 168 hours. **          Rigid bronchoscopy 8/14 with stent placed. Scheduled Duo Nebs and hypertonic saline in attempt to keep stent patent. Pulmonology managing.      Neoplastic malignant related fatigue  With general debility and failure to thrive. Currently requiring assistance for most ADLs. Will continue physical therapy this admission however her home situation is not ideal. She will likely need to consider long-term placement upon discharge.       Severe protein-calorie malnutrition  Nutrition consulted. Most recent weight and BMI monitored-     Measurements:  Wt Readings from Last 1 Encounters:   08/15/24 51.8 kg (114 lb 3.2 oz)   Body mass index is 18.43 kg/m².    Patient has been screened and assessed by RD.    Malnutrition Type:  Context:    Level:      Malnutrition Characteristic Summary:       Interventions/Recommendations (treatment strategy):         Paroxysmal atrial fibrillation  Patient with Paroxysmal (<7 days) atrial fibrillation which is controlled currently with  no medications . Patient is currently in sinus rhythm.[id number] Score: 2. HASBLED Score: 1. Anticoagulation indicated. Anticoagulation done with Eliquis - held for bronchoscopy w/biopsy and stent, resume as appropriate .    Malignant  neoplasm of right lung  Known to Dr. Kraft.     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 She received a few cycles of weekly chemotherapy around February or March in anticipation of radiation but radiation was not performed. Sidenote she received radiation chemotherapy as well as immunotherapy 2018.    She was last seen by me in April and the office and had a no-show from May....She has continued to receive immunotherapy with Keytruda IVQ6 weeks beginning in April with last dose in early July.      Acute on chronic respiratory failure with hypoxia and hypercapnia  Patient with Hypercapnic and Hypoxic Respiratory failure which is Acute on chronic. Supplemental oxygen was provided and noted- Oxygen Concentration (%):  [36] 36    Signs/symptoms of respiratory failure include- increased work of breathing. Contributing diagnoses includes - COPD, Pneumonia, and lung cancer  Labs and images were reviewed. Patient Has not had a recent ABG. Will treat underlying causes and adjust management of respiratory failure as follows- continue supplemental O2 to maintain saturation >88%, scheduled nebulizer therapy.    Cigarette nicotine dependence with nicotine-induced disorder  Dangers of cigarette smoking were reviewed with patient in detail. Patient was Counseled for 3-10 minutes. Nicotine replacement options were discussed. Nicotine replacement was discussed- prescribed      VTE Risk Mitigation (From admission, onward)           Ordered     enoxaparin injection 40 mg  Every 24 hours         08/15/24 1036                               [facility]  Wound Care    Patient Name:  Mayra eKlly   MRN:  [id number]  Date: 8/16/2024  Diagnosis: <principal problem not specified>    History:     Past Medical History:   Diagnosis Date    Chronic respiratory failure with hypoxia and hypercapnia     on home oxygen but hasn't  been using it    Cigarette nicotine dependence with nicotine-induced disorder 11/09/2023    COPD (chronic obstructive pulmonary disease)     Dyshidrotic eczema 08/10/2021    Right bundle branch block 12/11/2023    Scabies     Squamous cell carcinoma of lung     follows with Dr. Kraft       Social History     Socioeconomic History    Marital status:    Tobacco Use    Smoking status: Every Day     Current packs/day: 1.00     Types: Cigarettes    Smokeless tobacco: Never   Substance and Sexual Activity    Alcohol use: Never    Drug use: Never     Social Determinants of Health     Financial Resource Strain: Low Risk  (8/8/2024)    Overall Financial Resource Strain (CARDIA)     Difficulty of Paying Living Expenses: Not hard at all   Food Insecurity: No Food Insecurity (8/8/2024)    Hunger Vital Sign     Worried About Running Out of Food in the Last Year: Never true     Ran Out of Food in the Last Year: Never true   Transportation Needs: No Transportation Needs (8/8/2024)    TRANSPORTATION NEEDS     Transportation : No   Physical Activity: Inactive (8/8/2024)    Exercise Vital Sign     Days of Exercise per Week: 0 days     Minutes of Exercise per Session: 0 min   Stress: No Stress Concern Present (8/8/2024)    [employer] [place] [employer] - Occupational Stress Questionnaire     Feeling of Stress : Not at all   Housing Stability: Low Risk  (8/8/2024)    Housing Stability Vital Sign     Unable to Pay for Housing in the Last Year: No     Homeless in the Last Year: No       Precautions:     Allergies as of 08/15/2024    (No Known Allergies)       [facility] Assessment Details/Treatment       Narrative: Seen patient for initiation of preventative skin care measures    Patient in bed, Alert. Has  wound of 0.5 cm x 0.5 cm x 0.1 cm to Left lower buttock. Photo in medial. Dry, open to air. Pink partial thickness skin loss noted. Sacral and Bilateral heels with out pressure injury.   On low air loss mattress  Oxygen in  use, no redness noted. No Ez wrap noted.     Consult skin care for any issues      08/16/2024         Yeni Jones DO  Department of Hospital Medicine  [facility]

## 2024-08-16 NOTE — PLAN OF CARE
[facility]  Initial Discharge Assessment       Primary Care Provider: Darlene Campoverde NP    Admission Diagnosis: Postobstructive pneumonia [J18.9]    Admission Date: 8/15/2024  Expected Discharge Date:     Transition of Care Barriers: None    Payor: MEDICARE / Plan: MEDICARE PART A & B / Product Type: Government /     Extended Emergency Contact Information  Primary Emergency Contact: BREANA RAMOS  Mobile Phone: [phone]  Relation: Son  Preferred language: English   needed? No  Secondary Emergency Contact: Rainer North  Mobile Phone: [phone]  Relation: Brother    Discharge Plan A: Rehab  Discharge Plan B: Home with family, Home Health      [facility] [address] [place] [address] [address] [place] [id number]  Phone: [phone] Fax: [phone]      Initial Assessment (most recent)       Adult Discharge Assessment - 08/16/24 1413          Discharge Assessment    Assessment Type Discharge Planning Assessment     Confirmed/corrected address, phone number and insurance Yes     Source of Information patient     Communicated TERE with patient/caregiver Date not available/Unable to determine     Reason For Admission Malignant neoplasm of right lung     People in Home child(areli), adult     Do you expect to return to your current living situation? Yes     Do you have help at home or someone to help you manage your care at home? Yes     Who are your caregiver(s) and their phone number(s)? BREANA RAMOS (Son)  [phone]     Prior to hospitilization cognitive status: Unable to Assess     Current cognitive status: Alert/Oriented     Walking or Climbing Stairs Difficulty yes     Walking or Climbing Stairs ambulation difficulty, requires equipment     Mobility Management rolling walker     Dressing/Bathing Difficulty no     Home Accessibility wheelchair accessible     Home Layout Able to live on 1st floor     Equipment Currently Used  at Home oxygen;walker, rolling;bedside commode;nebulizer     Readmission within 30 days? No     Do you currently have service(s) that help you manage your care at home? No     Do you take prescription medications? Yes     Do you have prescription coverage? Yes     Coverage Medicare     Do you have any problems affording any of your prescribed medications? No     Is the patient taking medications as prescribed? yes     How do you get to doctors appointments? family or friend will provide     Are you on dialysis? No     Do you take coumadin? No     Discharge Plan A Rehab     Discharge Plan B Home with family;Home Health     DME Needed Upon Discharge  none     Discharge Plan discussed with: Patient     Transition of Care Barriers None        Physical Activity    On average, how many days per week do you engage in moderate to strenuous exercise (like a brisk walk)? 0 days     On average, how many minutes do you engage in exercise at this level? 0 min        Financial Resource Strain    How hard is it for you to pay for the very basics like food, housing, medical care, and heating? Not hard at all        Housing Stability    In the last 12 months, was there a time when you were not able to pay the mortgage or rent on time? No     At any time in the past 12 months, were you homeless or living in a shelter (including now)? No        Transportation Needs    Has the lack of transportation kept you from medical appointments, meetings, work or from getting things needed for daily living? No        Food Insecurity    Within the past 12 months, you worried that your food would run out before you got the money to buy more. Never true     Within the past 12 months, the food you bought just didn't last and you didn't have money to get more. Never true        Stress    Do you feel stress - tense, restless, nervous, or anxious, or unable to sleep at night because your mind is troubled all the time - these days? Not at all         Social Isolation    How often do you feel lonely or isolated from those around you?  Never        Alcohol Use    Q2: How many drinks containing alcohol do you have on a typical day when you are drinking? Patient does not drink     Q3: How often do you have six or more drinks on one occasion? Never        Utilities    In the past 12 months has the electric, gas, oil, or water company threatened to shut off services in your home? No        Health Literacy    How often do you need to have someone help you when you read instructions, pamphlets, or other written material from your doctor or pharmacy? Never                   SS spoke with pt at bedside. Pt resides with son, Otilio and is not current with . Pt uses a rolling walker, oxygen, nebulizer and BSC. Pt used [facility] in the past and if needed would like to use them again. Pt plan is to return home when medically ready. SS following.

## 2024-08-16 NOTE — ASSESSMENT & PLAN NOTE
Known to Dr. Kraft.     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 She received a few cycles of weekly chemotherapy around February or March in anticipation of radiation but radiation was not performed. Sidenote she received radiation chemotherapy as well as immunotherapy 2018.    She was last seen by me in April and the office and had a no-show from May....She has continued to receive immunotherapy with Keytruda IVQ6 weeks beginning in April with last dose in early July.

## 2024-08-16 NOTE — ASSESSMENT & PLAN NOTE
Nutrition consulted. Most recent weight and BMI monitored-     Measurements:  Wt Readings from Last 1 Encounters:   08/15/24 51.8 kg (114 lb 3.2 oz)   Body mass index is 18.43 kg/m².    Patient has been screened and assessed by RD.    Malnutrition Type:  Context:    Level:      Malnutrition Characteristic Summary:       Interventions/Recommendations (treatment strategy):

## 2024-08-16 NOTE — PLAN OF CARE
[facility]  Initial Discharge Assessment       Primary Care Provider: Darlene Campoverde NP    Admission Diagnosis: Right bundle branch block [I45.10]  Shortness of breath [R06.02]  Pneumonia [J18.9]  Weak [R53.1]  Pulmonary hypertension [I27.20]  Centrilobular emphysema [J43.2]  JAMI (acute kidney injury) [N17.9]  Obstructive pneumonia [J18.9]  Malignant neoplasm of lung, unspecified laterality, unspecified part of lung [C34.90]  Acute on chronic respiratory failure with hypoxia and hypercapnia [J96.21, J96.22]    Admission Date: 8/7/2024  Expected Discharge Date: 8/23/2024    Transition of Care Barriers: None    Payor: MEDICARE / Plan: MEDICARE PART A & B / Product Type: Government /     Extended Emergency Contact Information  Primary Emergency Contact: MENABREANA MAGDALENO  Mobile Phone: [phone]  Relation: Son  Preferred language: English   needed? No  Secondary Emergency Contact: Rainer North  Mobile Phone: [phone]  Relation: Brother    Discharge Plan A: Rehab  Discharge Plan B: Home with family, Home Health      [facility] [address] [place] [address]  Phone: [phone] Fax: [phone]      Initial Assessment (most recent)       Adult Discharge Assessment - 08/16/24 1401          Discharge Assessment    Assessment Type Discharge Planning Assessment     Confirmed Demographics Correct on Facesheet     Source of Information patient     Communicated TERE with patient/caregiver Date not available/Unable to determine     Reason For Admission Malignant neoplasm of right lung     People in Home child(areli), adult     Do you expect to return to your current living situation? No     Do you have help at home or someone to help you manage your care at home? Yes     Prior to hospitilization cognitive status: Unable to Assess     Current cognitive status: Alert/Oriented     Walking or Climbing Stairs Difficulty yes     Walking  or Climbing Stairs ambulation difficulty, requires equipment     Mobility Management Rollinf walker     Dressing/Bathing Difficulty no     Home Accessibility wheelchair accessible     Home Layout Able to live on 1st floor     Equipment Currently Used at Home oxygen;walker, rolling;bedside commode;nebulizer     Readmission within 30 days? No     Patient currently being followed by outpatient case management? No     Do you currently have service(s) that help you manage your care at home? No     Do you take prescription medications? Yes     Do you have prescription coverage? Yes     Do you have any problems affording any of your prescribed medications? No     Is the patient taking medications as prescribed? yes     How do you get to doctors appointments? family or friend will provide     Are you on dialysis? No     Do you take coumadin? No     Discharge Plan A Rehab     Discharge Plan B Home with family;Home Health     DME Needed Upon Discharge  none     Discharge Plan discussed with: Patient     Transition of Care Barriers None        Physical Activity    On average, how many days per week do you engage in moderate to strenuous exercise (like a brisk walk)? 0 days     On average, how many minutes do you engage in exercise at this level? 0 min        Financial Resource Strain    How hard is it for you to pay for the very basics like food, housing, medical care, and heating? Not hard at all        Housing Stability    In the last 12 months, was there a time when you were not able to pay the mortgage or rent on time? No     At any time in the past 12 months, were you homeless or living in a shelter (including now)? No        Transportation Needs    Has the lack of transportation kept you from medical appointments, meetings, work or from getting things needed for daily living? No        Food Insecurity    Within the past 12 months, you worried that your food would run out before you got the money to buy more. Never true      Within the past 12 months, the food you bought just didn't last and you didn't have money to get more. Never true        Stress    Do you feel stress - tense, restless, nervous, or anxious, or unable to sleep at night because your mind is troubled all the time - these days? Not at all        Social Isolation    How often do you feel lonely or isolated from those around you?  Never        Alcohol Use    Q1: How often do you have a drink containing alcohol? Never     Q2: How many drinks containing alcohol do you have on a typical day when you are drinking? Patient does not drink     Q3: How often do you have six or more drinks on one occasion? Never        Utilities    In the past 12 months has the electric, gas, oil, or water company threatened to shut off services in your home? No        OTHER    Name(s) of People in Home Fredis Kelly-son                 SS spoke with pt at bedside. Pt currently has a rolling ealker, BSC, nebulizer and oxygen at home. Not current with HH but used [facility] in the past and would like to resume if needed. Pt lives at home with son and plan is return home when medically stable. SS following.

## 2024-08-16 NOTE — HPI
Mayra Kelly is a 73-year-old female with history of lung cancer, COPD, pulmonary HTN, and a-fib. She initially presented to [facility] with acute-on-chronic respiratory failure secondary to obstructive pneumonia. This was treated with extended course of broad-spectrum IV antibiotics with some improvement. Patient underwent rigid bronchoscopy with bronchial lavage, dilation, and stent by Dr. Castellon 8/14/24. Patient tolerated procedure well with improvement in respiratory status. CXR 8/15 revealed stent has remained patent however will require aggressive respiratory therapy and continued antibiotics. [facility], scheduled respiratory therapy, and physical rehabilitation.

## 2024-08-16 NOTE — PLAN OF CARE
Problem: Adult Inpatient Plan of Care  Goal: Plan of Care Review  Outcome: Progressing  Goal: Patient-Specific Goal (Individualized)  Outcome: Progressing  Goal: Absence of Hospital-Acquired Illness or Injury  Outcome: Progressing  Goal: Optimal Comfort and Wellbeing  Outcome: Progressing  Goal: Readiness for Transition of Care  Outcome: Progressing     Problem: Pneumonia  Goal: Fluid Balance  Outcome: Progressing  Goal: Resolution of Infection Signs and Symptoms  Outcome: Progressing  Goal: Effective Oxygenation and Ventilation  Outcome: Progressing     Problem: Wound  Goal: Optimal Coping  Outcome: Progressing  Goal: Optimal Functional Ability  Outcome: Progressing  Goal: Absence of Infection Signs and Symptoms  Outcome: Progressing  Goal: Improved Oral Intake  Outcome: Progressing  Goal: Optimal Pain Control and Function  Outcome: Progressing  Goal: Skin Health and Integrity  Outcome: Progressing  Goal: Optimal Wound Healing  Outcome: Progressing     Problem: Fall Injury Risk  Goal: Absence of Fall and Fall-Related Injury  Outcome: Progressing     Problem: Gas Exchange Impaired  Goal: Optimal Gas Exchange  Outcome: Progressing

## 2024-08-16 NOTE — PLAN OF CARE
Problem: Physical Therapy  Goal: Physical Therapy Goal  Description: Short Term Goals to be met by: 24    Patient will increase functional independence with mobility by performin. Supine to sit with Stand by assist  2. Sit to stand transfer with contact guard assist using Rolling walker  3. Bed to chair transfer with contact guard assist using Rolling walker  4. Gait  x 100 feet with contact guard assist using Rolling walker  5. Lower extremity exercise program x30 reps per handout, with assistance as needed  6. Pt to negotiate 3 steps with rails with CGA.     Long Term Goals to be met by: 24    Pt will regain full independent functional mobility with Rolling walker to return to home situation and prior activities of daily living.   Outcome: Progressing       PT eval completed. Please see eval note for details.

## 2024-08-16 NOTE — ASSESSMENT & PLAN NOTE
Patient with Paroxysmal (<7 days) atrial fibrillation which is controlled currently with  no medications . Patient is currently in sinus rhythm.[id number] Score: 2. HASBLED Score: 1. Anticoagulation indicated. Anticoagulation done with Eliquis - held for bronchoscopy w/biopsy and stent, resume as appropriate .

## 2024-08-16 NOTE — SUBJECTIVE & OBJECTIVE
Past Medical History:   Diagnosis Date    Chronic respiratory failure with hypoxia and hypercapnia     on home oxygen but hasn't been using it    Cigarette nicotine dependence with nicotine-induced disorder 2023    COPD (chronic obstructive pulmonary disease)     Dyshidrotic eczema 08/10/2021    Right bundle branch block 2023    Scabies     Squamous cell carcinoma of lung     follows with Dr. Kraft       Past Surgical History:   Procedure Laterality Date    APPENDECTOMY       SECTION  1980    2x    INSERTION OF VENOUS ACCESS PORT         Review of patient's allergies indicates:  No Known Allergies    No current facility-administered medications on file prior to encounter.     Current Outpatient Medications on File Prior to Encounter   Medication Sig    albuterol (PROVENTIL/VENTOLIN HFA) 90 mcg/actuation inhaler Inhale 2 puffs into the lungs every 4 (four) hours as needed.    albuterol sulfate 2.5 mg/0.5 mL Nebu Take 2.5 mg by nebulization every 6 (six) hours as needed (Shortness of breath). Rescue    amLODIPine (NORVASC) 2.5 MG tablet Take 1 tablet (2.5 mg total) by mouth once daily.    ELIQUIS 5 mg Tab Take 1 tablet (5 mg total) by mouth 2 (two) times daily.    fluticasone-umeclidin-vilanter (TRELEGY ELLIPTA) 200-62.5-25 mcg inhaler Inhale 1 puff into the lungs once daily.    furosemide (LASIX) 40 MG tablet Take 1 tablet (40 mg total) by mouth once daily.    potassium chloride SA (K-DUR,KLOR-CON) 20 MEQ tablet Take 1 tablet (20 mEq total) by mouth once daily.    vitamin D (VITAMIN D3) 1000 units Tab Take 1,000 Units by mouth once daily.    vitamin E 400 UNIT capsule Take 400 Units by mouth once daily.     Family History       Problem Relation (Age of Onset)    Cancer Father, Maternal Grandmother, Maternal Aunt    Heart disease Paternal Grandmother          Tobacco Use    Smoking status: Every Day     Current packs/day: 1.00     Types: Cigarettes    Smokeless tobacco: Never   Substance and  Sexual Activity    Alcohol use: Never    Drug use: Never    Sexual activity: Not on file     Review of Systems   Constitutional:  Positive for activity change and fatigue.   Respiratory:  Positive for cough and shortness of breath.    Neurological:  Positive for weakness.   All other systems reviewed and are negative.    Objective:     Vital Signs (Most Recent):  Temp: 98 °F (36.7 °C) (08/16/24 1100)  Pulse: (!) 53 (08/16/24 1400)  Resp: 17 (08/16/24 1400)  BP: (!) 104/41 (08/16/24 1400)  SpO2: (!) 90 % (08/16/24 1400) Vital Signs (24h Range):  Temp:  [97.5 °F (36.4 °C)-98.2 °F (36.8 °C)] 98 °F (36.7 °C)  Pulse:  [51-67] 53  Resp:  [0-23] 17  SpO2:  [83 %-96 %] 90 %  BP: ()/(40-55) 104/41     Weight: 51.8 kg (114 lb 3.2 oz)  Body mass index is 18.43 kg/m².     Physical Exam  Constitutional:       General: She is awake. She is not in acute distress.     Appearance: She is well-developed and underweight.   HENT:      Head: Normocephalic.      Mouth/Throat:      Pharynx: Oropharynx is clear.   Eyes:      Extraocular Movements: Extraocular movements intact.      Pupils: Pupils are equal, round, and reactive to light.   Neck:      Thyroid: No thyroid mass.   Cardiovascular:      Rate and Rhythm: Normal rate and regular rhythm.   Pulmonary:      Effort: Pulmonary effort is normal. No respiratory distress.      Breath sounds: Normal air entry. Rales present. No wheezing.      Comments: Decreased breath sounds right base.  Abdominal:      Palpations: Abdomen is soft.      Tenderness: There is no abdominal tenderness.   Musculoskeletal:         General: Normal range of motion.   Skin:     Coloration: Skin is not jaundiced.      Findings: No lesion.   Neurological:      General: No focal deficit present.      Mental Status: She is alert and oriented to person, place, and time.   Psychiatric:         Behavior: Behavior is cooperative.              CRANIAL NERVES     CN III, IV, VI   Pupils are equal, round, and reactive  to light.       Significant Labs: All pertinent labs within the past 24 hours have been reviewed.    Significant Imaging: I have reviewed all pertinent imaging results/findings within the past 24 hours.

## 2024-08-16 NOTE — ASSESSMENT & PLAN NOTE
Patient is status post stent placement doing well x-ray yesterday was clear no complaints of shortness of breath

## 2024-08-16 NOTE — PROGRESS NOTES
"Pharmacokinetic Initial Assessment: IV Vancomycin    Assessment/Plan:    Initiate intravenous vancomycin [id number] mg every 18 hours   Desired empiric serum trough concentration is 15 to 20 mcg/mL  Draw vancomycin trough level 30 min prior to fourth dose on 8/18 at approximately 2200  Pharmacy will continue to follow and monitor vancomycin.      Please contact pharmacy at extension [phone] with any questions regarding this assessment.     Thank you for the consult,   Lina Mcneal       Patient brief summary:  Mayra Kelly is a 73 y.o. female initiated on antimicrobial therapy with IV Vancomycin for treatment of suspected  pneumonia    Drug Allergies:   Review of patient's allergies indicates:  No Known Allergies    Actual Body Weight:   51.8 kg     Renal Function:   Estimated Creatinine Clearance: 57.8 mL/min (based on SCr of 0.72 mg/dL).,     Dialysis Method (if applicable):  N/A    CBC (last 72 hours):  Recent Labs   Lab Result Units 08/14/24  0545   WBC K/uL 10.27   Hemoglobin g/dL 10.8*   Hematocrit % 35.9*   Platelet Count K/uL 405*   Lymphocytes % % 5.7*   Monocytes % % 4.5   Eosinophils % % 0.0*   Basophils % % 0.2   Diff Type  Auto       Metabolic Panel (last 72 hours):  Recent Labs   Lab Result Units 08/14/24  0545 08/14/24  0636 08/15/24  0546   Sodium mmol/L  --  140  --    Potassium mmol/L  --  4.8  --    Chloride mmol/L  --  102  --    CO2 mmol/L  --  39*  --    Glucose mg/dL  --  95  --    BUN mg/dL 26* 24* 22*   Creatinine mg/dL 0.74 0.68 0.72       Drug levels (last 3 results):  No results for input(s): "VANCOMYCINRA", "VANCORANDOM", "VANCOMYCINPE", "VANCOPEAK", "VANCOMYCINTR", "VANCOTROUGH" in the last 72 hours.    Microbiologic Results:  Microbiology Results (last 7 days)       ** No results found for the last 168 hours. **            "

## 2024-08-16 NOTE — SUBJECTIVE & OBJECTIVE
Interval History/Significant Events:  Patient without complaints    Review of Systems  Objective:     Vital Signs (Most Recent):  Temp: 98.2 °F (36.8 °C) (08/16/24 0400)  Pulse: (!) 55 (08/16/24 0500)  Resp: 19 (08/16/24 0500)  BP: (!) 100/41 (08/16/24 0500)  SpO2: (!) 90 % (08/16/24 0500) Vital Signs (24h Range):  Temp:  [97.5 °F (36.4 °C)-98.2 °F (36.8 °C)] 98.2 °F (36.8 °C)  Pulse:  [53-67] 55  Resp:  [0-21] 19  SpO2:  [79 %-96 %] 90 %  BP: ()/(40-71) 100/41   Weight: 51.8 kg (114 lb 3.2 oz)  Body mass index is 18.43 kg/m².      Intake/Output Summary (Last 24 hours) at 8/16/2024 0559  Last data filed at 8/16/2024 0505  Gross per 24 hour   Intake 958.83 ml   Output 1300 ml   Net -341.17 ml          Physical Exam  Vitals reviewed.   Constitutional:       Appearance: Normal appearance.      Interventions: She is not intubated.  HENT:      Head: Normocephalic and atraumatic.      Nose: Nose normal.      Mouth/Throat:      Mouth: Mucous membranes are dry.      Pharynx: Oropharynx is clear.   Eyes:      Extraocular Movements: Extraocular movements intact.      Conjunctiva/sclera: Conjunctivae normal.      Pupils: Pupils are equal, round, and reactive to light.   Cardiovascular:      Rate and Rhythm: Normal rate.      Heart sounds: Normal heart sounds. No murmur heard.  Pulmonary:      Effort: Pulmonary effort is normal. She is not intubated.      Breath sounds: Normal breath sounds.   Abdominal:      General: Abdomen is flat. Bowel sounds are normal.      Palpations: Abdomen is soft.   Musculoskeletal:         General: Normal range of motion.      Cervical back: Normal range of motion and neck supple.      Right lower leg: No edema.      Left lower leg: No edema.   Skin:     General: Skin is warm and dry.      Capillary Refill: Capillary refill takes less than 2 seconds.   Neurological:      General: No focal deficit present.      Mental Status: She is alert and oriented to person, place, and time.   Psychiatric:  "        Mood and Affect: Mood normal.         Behavior: Behavior normal.            Vents:  Oxygen Concentration (%): 36 (08/15/24 1918)  Lines/Drains/Airways       Drain  Duration             Female External Urinary Catheter w/ Suction 08/15/24 1700 <1 day              Airway  Duration                  Airway - Non-Surgical 08/14/24 1111 LMA 1 day              Peripheral Intravenous Line  Duration                  Peripheral IV - Single Lumen 08/11/24 1816 22 G Left Antecubital 4 days         Peripheral IV - Single Lumen 08/13/24 1026 22 G Anterior;Left Forearm 2 days         Peripheral IV - Single Lumen 08/14/24 1112 18 G 1 in No Anterior;Right Forearm 1 day                  Significant Labs:    CBC/Anemia Profile:  No results for input(s): "WBC", "HGB", "HCT", "PLT", "MCV", "RDW", "IRON", "FERRITIN", "RETIC", "FOLATE", "ELPURSQC21", "OCCULTBLOOD" in the last 48 hours.     Chemistries:  Recent Labs   Lab 08/14/24  0636 08/15/24  0546     --    K 4.8  --      --    CO2 39*  --    BUN 24* 22*   CREATININE 0.68 0.72   CALCIUM 8.6  --        Recent Lab Results       None            Significant Imaging:  I have reviewed all pertinent imaging results/findings within the past 24 hours.  "

## 2024-08-16 NOTE — PT/OT/SLP EVAL
Physical Therapy Evaluation     Patient Name: Mayra Kelly   MRN: [id number]  Recent Surgery: * No surgery found *      Recommendations:     Discharge Recommendations: Moderate Intensity Therapy   Discharge Equipment Recommendations: none   Barriers to discharge: Increased level of assist and Ongoing medical treatment    Assessment:     Mayra Kelly is a 73 y.o. female admitted with a medical diagnosis of lung cancer and Obstructive pneumonia. She presents with the following impairments/functional limitations: weakness, impaired endurance, impaired functional mobility, gait instability, impaired balance, decreased lower extremity function, decreased safety awareness, impaired cardiopulmonary response to activity. Pt demo significant weakness with posterior trunk lean when attempting standing act. Pt also demo some confusion, with slightly difficulty following instructions, req freq redirection.     Rehab Prognosis: Good and Fair; patient would benefit from acute PT services to address these deficits and reach maximum level of function.    Plan:     During this hospitalization, patient to be seen   to address the above listed problems via gait training, therapeutic activities, therapeutic exercises, neuromuscular re-education    Plan of Care Expires: 09/16/24    Subjective     Chief Complaint: Lung Cancer, obstructive pneumonia  Patient Comments/Goals: agreeable to eval  Pain/Comfort:  Pain Rating 1: 0/10    Social History:  Living Environment: Patient lives with their son in a single story home with number of outside stair(s): 3 without rails  Prior Level of Function: Prior to admission, patient was modified independent with ADLs using straight cane for mobility and driving  Equipment Used at Home: oxygen, walker, rolling, bedside commode, shower chair  DME owned (not currently used): none  Assistance Upon Discharge: family and facility staff    Objective:     Communicated with RN prior to session. Patient  found HOB elevated with oxygen, telemetry, peripheral IV, PureWick, blood pressure cuff, pulse ox (continuous) upon PT entry to room.    General Precautions: Standard, fall, respiratory   Orthopedic Precautions: N/A   Braces: N/A    Respiratory Status: Nasal cannula, flow 4.5 L/min    Exams:  Cognition: Patient is oriented to Person and Situation  RLE ROM: WFL  RLE Strength: Deficits: 3/5  LLE ROM: WFL  LLE Strength: Deficits: 3/5  Sensation: -       Intact    Functional Mobility:  Gait belt applied - Yes  Bed Mobility  Scooting: minimum assistance, moderate assistance, and of 2 persons  Supine to Sit: minimum assistance and of 2 persons for LE management and trunk management  Sit to Supine: minimum assistance and of 2 persons for LE management and trunk management  Transfers  Sit to Stand: minimum assistance and of 2 persons with hand-held assist and with cues for hand placement, foot placement, and upright posture  Gait  Patient ambulated short sidesteps to HOB with  manual assist using gait belt  and minimum assistance and of 2 persons. Patient demonstrates unsteady gait, decreased step length, narrow base of support, decreased weight shift, decreased foot clearance, flexed posture, decreased alessandro, and inconsistent bilateral foot placement. . All lines remained intact throughout ambulation trail.  Balance  Sitting: minimum assistance  Standing: minimum assistance and of 2 persons      Therapeutic Activities and Exercises:   Patient educated on role of acute care PT and PT POC, safety while in hospital including calling nurse for mobility, and call light usage  Patient educated about importance of OOB mobility and remaining up in chair most of the day.  Patient educated about pursed lip breathing technique and cued for use with mobility.      AM-PAC 6 CLICK MOBILITY  Total Score:13    Patient left HOB elevated with all lines intact, call button in reach, and RN notified.    GOALS:   Multidisciplinary Problems        Physical Therapy Goals          Problem: Physical Therapy    Goal Priority Disciplines Outcome Goal Variances Interventions   Physical Therapy Goal     PT, PT/OT Progressing     Description: Short Term Goals to be met by: 24    Patient will increase functional independence with mobility by performin. Supine to sit with Stand by assist  2. Sit to stand transfer with contact guard assist using Rolling walker  3. Bed to chair transfer with contact guard assist using Rolling walker  4. Gait  x 100 feet with contact guard assist using Rolling walker  5. Lower extremity exercise program x30 reps per handout, with assistance as needed  6. Pt to negotiate 3 steps with rails with CGA.     Long Term Goals to be met by: 24    Pt will regain full independent functional mobility with Rolling walker to return to home situation and prior activities of daily living.                        History:     Past Medical History:   Diagnosis Date    Chronic respiratory failure with hypoxia and hypercapnia     on home oxygen but hasn't been using it    Cigarette nicotine dependence with nicotine-induced disorder 2023    COPD (chronic obstructive pulmonary disease)     Dyshidrotic eczema 08/10/2021    Right bundle branch block 2023    Scabies     Squamous cell carcinoma of lung     follows with Dr. Kraft       Past Surgical History:   Procedure Laterality Date    APPENDECTOMY       SECTION  1980    2x    INSERTION OF VENOUS ACCESS PORT         Time Tracking:     PT Received On: 24  PT Start Time: 1534  PT Stop Time: 1545  PT Total Time (min): 11 min     Billable Minutes: Evaluation moderate complexity    2024

## 2024-08-17 PROBLEM — A49.02 MRSA INFECTION: Status: ACTIVE | Noted: 2024-08-17

## 2024-08-17 LAB
ANION GAP SERPL CALCULATED.3IONS-SCNC: 5 MMOL/L (ref 7–16)
BASOPHILS # BLD AUTO: 0.01 K/UL (ref 0–0.2)
BASOPHILS NFR BLD AUTO: 0.1 % (ref 0–1)
BUN SERPL-MCNC: 22 MG/DL (ref 7–18)
BUN/CREAT SERPL: 31 (ref 6–20)
CALCIUM SERPL-MCNC: 8.5 MG/DL (ref 8.5–10.1)
CHLORIDE SERPL-SCNC: 101 MMOL/L (ref 98–107)
CO2 SERPL-SCNC: 34 MMOL/L (ref 21–32)
CREAT SERPL-MCNC: 0.72 MG/DL (ref 0.55–1.02)
DIFFERENTIAL METHOD BLD: ABNORMAL
EGFR (NO RACE VARIABLE) (RUSH/TITUS): 89 ML/MIN/1.73M2
EOSINOPHIL # BLD AUTO: 0.09 K/UL (ref 0–0.5)
EOSINOPHIL NFR BLD AUTO: 0.9 % (ref 1–4)
ERYTHROCYTE [DISTWIDTH] IN BLOOD BY AUTOMATED COUNT: 17.6 % (ref 11.5–14.5)
GLUCOSE SERPL-MCNC: 106 MG/DL (ref 74–106)
HCT VFR BLD AUTO: 39.7 % (ref 38–47)
HGB BLD-MCNC: 12.1 G/DL (ref 12–16)
IMM GRANULOCYTES # BLD AUTO: 0.1 K/UL (ref 0–0.04)
IMM GRANULOCYTES NFR BLD: 1 % (ref 0–0.4)
LYMPHOCYTES # BLD AUTO: 1.04 K/UL (ref 1–4.8)
LYMPHOCYTES NFR BLD AUTO: 10.4 % (ref 27–41)
MCH RBC QN AUTO: 26 PG (ref 27–31)
MCHC RBC AUTO-ENTMCNC: 30.5 G/DL (ref 32–36)
MCV RBC AUTO: 85.4 FL (ref 80–96)
MONOCYTES # BLD AUTO: 0.53 K/UL (ref 0–0.8)
MONOCYTES NFR BLD AUTO: 5.3 % (ref 2–6)
MPC BLD CALC-MCNC: 9.7 FL (ref 9.4–12.4)
NEUTROPHILS # BLD AUTO: 8.24 K/UL (ref 1.8–7.7)
NEUTROPHILS NFR BLD AUTO: 82.3 % (ref 53–65)
NRBC # BLD AUTO: 0 X10E3/UL
NRBC, AUTO (.00): 0 %
PLATELET # BLD AUTO: 390 K/UL (ref 150–400)
POTASSIUM SERPL-SCNC: 4.2 MMOL/L (ref 3.5–5.1)
RBC # BLD AUTO: 4.65 M/UL (ref 4.2–5.4)
SODIUM SERPL-SCNC: 136 MMOL/L (ref 136–145)
WBC # BLD AUTO: 10.01 K/UL (ref 4.5–11)

## 2024-08-17 PROCEDURE — 94640 AIRWAY INHALATION TREATMENT: CPT

## 2024-08-17 PROCEDURE — 97165 OT EVAL LOW COMPLEX 30 MIN: CPT

## 2024-08-17 PROCEDURE — 25000003 PHARM REV CODE 250: Performed by: FAMILY MEDICINE

## 2024-08-17 PROCEDURE — 94668 MNPJ CHEST WALL SBSQ: CPT

## 2024-08-17 PROCEDURE — 99900031 HC PATIENT EDUCATION (STAT)

## 2024-08-17 PROCEDURE — S4991 NICOTINE PATCH NONLEGEND: HCPCS | Performed by: FAMILY MEDICINE

## 2024-08-17 PROCEDURE — 99233 SBSQ HOSP IP/OBS HIGH 50: CPT | Mod: ,,, | Performed by: HOSPITALIST

## 2024-08-17 PROCEDURE — 94761 N-INVAS EAR/PLS OXIMETRY MLT: CPT

## 2024-08-17 PROCEDURE — 80048 BASIC METABOLIC PNL TOTAL CA: CPT | Performed by: FAMILY MEDICINE

## 2024-08-17 PROCEDURE — 99900035 HC TECH TIME PER 15 MIN (STAT)

## 2024-08-17 PROCEDURE — 27000221 HC OXYGEN, UP TO 24 HOURS

## 2024-08-17 PROCEDURE — 36415 COLL VENOUS BLD VENIPUNCTURE: CPT | Performed by: FAMILY MEDICINE

## 2024-08-17 PROCEDURE — 11000008

## 2024-08-17 PROCEDURE — 99231 SBSQ HOSP IP/OBS SF/LOW 25: CPT | Mod: ,,, | Performed by: INTERNAL MEDICINE

## 2024-08-17 PROCEDURE — 63600175 PHARM REV CODE 636 W HCPCS: Performed by: FAMILY MEDICINE

## 2024-08-17 PROCEDURE — 85025 COMPLETE CBC W/AUTO DIFF WBC: CPT | Performed by: FAMILY MEDICINE

## 2024-08-17 PROCEDURE — 25000242 PHARM REV CODE 250 ALT 637 W/ HCPCS: Performed by: FAMILY MEDICINE

## 2024-08-17 RX ADMIN — BUPROPION HYDROCHLORIDE 100 MG: 100 TABLET, FILM COATED, EXTENDED RELEASE ORAL at 08:08

## 2024-08-17 RX ADMIN — POTASSIUM CHLORIDE 20 MEQ: 20 TABLET, EXTENDED RELEASE ORAL at 08:08

## 2024-08-17 RX ADMIN — TRAZODONE HYDROCHLORIDE 50 MG: 50 TABLET ORAL at 08:08

## 2024-08-17 RX ADMIN — IPRATROPIUM BROMIDE AND ALBUTEROL SULFATE 3 ML: 2.5; .5 SOLUTION RESPIRATORY (INHALATION) at 05:08

## 2024-08-17 RX ADMIN — IPRATROPIUM BROMIDE AND ALBUTEROL SULFATE 3 ML: 2.5; .5 SOLUTION RESPIRATORY (INHALATION) at 08:08

## 2024-08-17 RX ADMIN — VANCOMYCIN HYDROCHLORIDE 1000 MG: 1 INJECTION, POWDER, LYOPHILIZED, FOR SOLUTION INTRAVENOUS at 02:08

## 2024-08-17 RX ADMIN — PREDNISONE 50 MG: 20 TABLET ORAL at 09:08

## 2024-08-17 RX ADMIN — BUPROPION HYDROCHLORIDE 100 MG: 100 TABLET, FILM COATED, EXTENDED RELEASE ORAL at 09:08

## 2024-08-17 RX ADMIN — POTASSIUM CHLORIDE 20 MEQ: 20 TABLET, EXTENDED RELEASE ORAL at 09:08

## 2024-08-17 RX ADMIN — SODIUM CHLORIDE SOLN NEBU 3% 4 ML: 3 NEBU SOLN at 01:08

## 2024-08-17 RX ADMIN — ENOXAPARIN SODIUM 40 MG: 40 INJECTION SUBCUTANEOUS at 04:08

## 2024-08-17 RX ADMIN — MUPIROCIN: 20 OINTMENT TOPICAL at 08:08

## 2024-08-17 RX ADMIN — DEXTROSE MONOHYDRATE: 12500 INJECTION, SOLUTION INTRAVENOUS at 04:08

## 2024-08-17 RX ADMIN — NICOTINE 1 PATCH: 14 PATCH, EXTENDED RELEASE TRANSDERMAL at 09:08

## 2024-08-17 RX ADMIN — SODIUM CHLORIDE SOLN NEBU 3% 4 ML: 3 NEBU SOLN at 08:08

## 2024-08-17 RX ADMIN — PIPERACILLIN SODIUM AND TAZOBACTAM SODIUM 4.5 G: 4; .5 INJECTION, POWDER, LYOPHILIZED, FOR SOLUTION INTRAVENOUS at 09:08

## 2024-08-17 RX ADMIN — SODIUM CHLORIDE SOLN NEBU 3% 4 ML: 3 NEBU SOLN at 05:08

## 2024-08-17 RX ADMIN — MUPIROCIN: 20 OINTMENT TOPICAL at 09:08

## 2024-08-17 RX ADMIN — IPRATROPIUM BROMIDE AND ALBUTEROL SULFATE 3 ML: 2.5; .5 SOLUTION RESPIRATORY (INHALATION) at 01:08

## 2024-08-17 RX ADMIN — PIPERACILLIN SODIUM AND TAZOBACTAM SODIUM 4.5 G: 4; .5 INJECTION, POWDER, LYOPHILIZED, FOR SOLUTION INTRAVENOUS at 04:08

## 2024-08-17 NOTE — PT/OT/SLP EVAL
"Occupational Therapy   Evaluation    Name: Mayra Kelly  MRN: [id number]  Admitting Diagnosis: Obstructive pneumonia  Recent Surgery: * No surgery found *      Recommendations:     Discharge Recommendations: Moderate Intensity Therapy  Discharge Equipment Recommendations:     Barriers to discharge:  Other (Comment) (decreased endurance, requires increased assist for self-care)    Assessment:     Mayra Kelly is a 73 y.o. female with a medical diagnosis of Obstructive pneumonia.  She presents with peripheral IV, O2, telemetry, continuous pulse ox, blood pressure cuff. Performance deficits affecting function: weakness, impaired endurance, impaired self care skills, impaired functional mobility, impaired balance.      Rehab Prognosis: Good; patient would benefit from acute skilled OT services to address these deficits and reach maximum level of function.       Plan:     Patient to be seen 5 x/week to address the above listed problems via self-care/home management, therapeutic exercises, therapeutic activities  Plan of Care Expires: 09/17/24  Plan of Care Reviewed with: patient    Subjective     Chief Complaint: obstructive pneumonia   Patient/Family Comments/goals: Pt agreeable to OT treatment.  She reports feeling "drowsy" this morning.      Occupational Profile:  Living Environment: lives at home with son   Previous level of function: IND with ADLs  Roles and Routines: perform self-care  Equipment Used at Home: bedside commode, walker, rolling  Assistance upon Discharge: will need assist to complete ADLs and IADLs    Pain/Comfort:  Pain Rating 1: 0/10    Patients cultural, spiritual, Taoist conflicts given the current situation:      Objective:     Communicated with: Sydnee CARTER prior to session.  Patient found HOB elevated with peripheral IV, telemetry, blood pressure cuff, pulse ox (continuous), oxygen upon OT entry to room.    General Precautions: Standard, fall  Orthopedic Precautions:    Braces:  "   Respiratory Status: Nasal cannula, flow 4 L/min    Occupational Performance:    Bed Mobility:    Patient completed Rolling/Turning to Left with  minimum assistance  Patient completed Rolling/Turning to Right with minimum assistance      Activities of Daily Living:  Lower Body Dressing: maximal assistance donning socks     Cognitive/Visual Perceptual:  Cognitive/Psychosocial Skills:     -       Follows Commands/attention:Follows one-step commands and needs some repetition and cues.      Physical Exam:  Upper Extremity Range of Motion:     -       Right Upper Extremity: WFL  -       Left Upper Extremity: WFL  Upper Extremity Strength:    -       Right Upper Extremity: WFL except overall weakness, 3+/4- grossly   -       Left Upper Extremity: WFL except overall weakness, 3+/4- grossly     AMPAC 6 Click ADL:  AMPAC Total Score: 15    Treatment & Education:  Pt educated on OT role/POC.   Importance of OOB activity with staff assistance.  Importance of sitting up in the chair throughout the day as tolerated, especially for meals   Safety during functional t/f and mobility with use of AD  Importance of assisting with self-care activities   All questions/concerns answered within OT scope of practice     Patient left HOB elevated with all lines intact and call button in reach    GOALS:   Multidisciplinary Problems       Occupational Therapy Goals          Problem: Occupational Therapy    Goal Priority Disciplines Outcome Interventions   Occupational Therapy Goal     OT, PT/OT Progressing    Description: STG:  Pt will perform grooming with setup  Pt will bathe with CGA  Pt will perform UE dressing with CGA  Pt will perform LE dressing with CGA  Pt will sit EOB x 5 min with SBA assistance  Pt will transfer bed/chair/bsc with CGA  Pt will perform standing task x 5 min with CGA assistance  Pt will tolerate 15 minutes of tx without fatigue      LT.Restore to max I with self care and mobility.                           History:     Past Medical History:   Diagnosis Date    Chronic respiratory failure with hypoxia and hypercapnia     on home oxygen but hasn't been using it    Cigarette nicotine dependence with nicotine-induced disorder 2023    COPD (chronic obstructive pulmonary disease)     Dyshidrotic eczema 08/10/2021    Right bundle branch block 2023    Scabies     Squamous cell carcinoma of lung     follows with Dr. Kraft         Past Surgical History:   Procedure Laterality Date    APPENDECTOMY       SECTION  1980    2x    INSERTION OF VENOUS ACCESS PORT         Time Tracking:     OT Date of Treatment: 24  OT Start Time: 853  OT Stop Time: 903  OT Total Time (min): 10 min    Billable Minutes:Evaluation 10 minutes    2024

## 2024-08-17 NOTE — ASSESSMENT & PLAN NOTE
Known to Dr. Kraft.     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 She received a few cycles of weekly chemotherapy around February or March in anticipation of radiation but radiation was not performed. Sidenote she received radiation chemotherapy as well as immunotherapy 2018.    She was last seen by me in April and the office and had a no-show from May....She has continued to receive immunotherapy with Keytruda IVQ6 weeks beginning in April with last dose in early July.    08/17 can clarify treatment options if any by Dr. Kraft, Patient does look better than when last seen

## 2024-08-17 NOTE — SUBJECTIVE & OBJECTIVE
Interval History:       Review of Systems   Constitutional:  Positive for fatigue and unexpected weight change. Negative for appetite change and fever.   HENT:  Negative for congestion, hearing loss and trouble swallowing.    Respiratory:  Positive for shortness of breath. Negative for chest tightness and wheezing.    Cardiovascular:  Negative for chest pain and palpitations.   Gastrointestinal:  Negative for abdominal pain, constipation and nausea.   Genitourinary:  Negative for difficulty urinating and dysuria.   Musculoskeletal:  Positive for gait problem. Negative for back pain and neck stiffness.   Skin:  Negative for pallor and rash.   Neurological:  Negative for dizziness, speech difficulty and headaches.   Psychiatric/Behavioral:  Negative for confusion and suicidal ideas.      Objective:     Vital Signs (Most Recent):  Temp: 97.1 °F (36.2 °C) (08/17/24 1200)  Pulse: 64 (08/17/24 1300)  Resp: (!) 22 (08/17/24 1300)  BP: (!) 94/40 (08/17/24 1201)  SpO2: (!) 88 % (08/17/24 1300) Vital Signs (24h Range):  Temp:  [97.1 °F (36.2 °C)-98.3 °F (36.8 °C)] 97.1 °F (36.2 °C)  Pulse:  [51-64] 64  Resp:  [12-22] 22  SpO2:  [88 %-96 %] 88 %  BP: ()/(35-49) 94/40     Weight: 51.8 kg (114 lb 3.2 oz)  Body mass index is 18.43 kg/m².    Intake/Output Summary (Last 24 hours) at 8/17/2024 1523  Last data filed at 8/17/2024 1202  Gross per 24 hour   Intake 1016.97 ml   Output 2300 ml   Net -1283.03 ml         Physical Exam  Vitals reviewed.   Constitutional:       General: She is awake. She is not in acute distress.     Appearance: She is well-developed and underweight. She is not toxic-appearing.      Comments: Actually looks much better than would suggest by xrays and lab   HENT:      Head: Normocephalic.      Nose: Nose normal.      Mouth/Throat:      Pharynx: Oropharynx is clear.   Eyes:      Extraocular Movements: Extraocular movements intact.      Pupils: Pupils are equal, round, and reactive to light.   Neck:       Thyroid: No thyroid mass.      Vascular: No carotid bruit.   Cardiovascular:      Rate and Rhythm: Normal rate and regular rhythm.      Pulses: Normal pulses.      Heart sounds: Normal heart sounds. No murmur heard.  Pulmonary:      Effort: Pulmonary effort is normal.      Breath sounds: Normal air entry. No wheezing.      Comments: Fairly good air exchange bilateral  Abdominal:      General: Bowel sounds are normal. There is no distension.      Palpations: Abdomen is soft.      Tenderness: There is no abdominal tenderness.   Musculoskeletal:         General: Normal range of motion.      Cervical back: Neck supple. No rigidity.   Skin:     General: Skin is warm.      Coloration: Skin is not jaundiced.      Findings: No lesion.   Neurological:      General: No focal deficit present.      Mental Status: She is alert and oriented to person, place, and time.      Cranial Nerves: No cranial nerve deficit.   Psychiatric:         Attention and Perception: Attention normal.         Mood and Affect: Mood normal.         Behavior: Behavior normal. Behavior is cooperative.         Thought Content: Thought content normal.         Cognition and Memory: Cognition normal.             Significant Labs: All pertinent labs within the past 24 hours have been reviewed.  BMP:   Recent Labs   Lab 08/17/24  0459         K 4.2      CO2 34*   BUN 22*   CREATININE 0.72   CALCIUM 8.5     CBC:   Recent Labs   Lab 08/17/24 0459   WBC 10.01   HGB 12.1   HCT 39.7        CMP:   Recent Labs   Lab 08/17/24 0459      K 4.2      CO2 34*      BUN 22*   CREATININE 0.72   CALCIUM 8.5   ANIONGAP 5*       Significant Imaging: I have reviewed all pertinent imaging results/findings within the past 24 hours.        Intake/Output - Last 3 Shifts         08/15 0700 08/16 0659 08/16 0700 08/17 0659 08/17 0700 08/18 0659    P.O. 560 720 600    I.V. (mL/kg)  28.3 (0.5)     IV Piggyback 398.8 220.5     Total  Intake(mL/kg) 958.8 (18.5) 968.8 (18.7) 600 (11.6)    Urine (mL/kg/hr) 1300 2300 (1.9)     Other  0     Total Output 1300 2300     Net -341.2 -1331.2 +600           Urine Occurrence 2 x 2 x 1 x          Microbiology Results (last 7 days)       ** No results found for the last 168 hours. **

## 2024-08-17 NOTE — PLAN OF CARE
Problem: Gas Exchange Impaired  Goal: Optimal Gas Exchange  Outcome: Progressing     Problem: Breathing Pattern Ineffective  Goal: Effective Breathing Pattern  Outcome: Progressing

## 2024-08-17 NOTE — PROGRESS NOTES
[facility]  Critical Care Medicine  Progress Note    Patient Name: Mayra Kelly  MRN: [id number]  Admission Date: 8/15/2024  Hospital Length of Stay: 2 days  Code Status: Partial Code  Attending Provider: No att. providers found  Primary Care Provider: Darlene Campoverde NP   Principal Problem: Obstructive pneumonia    Subjective:     HPI:  No notes on file    Hospital/ICU Course:  8/15/24-no acute events overnight I reviewed her chest x-ray from this morning which showed that the stent continues to remain patent.  Clinically she is doing well today.  Continues to require 3-5 L of oxygen to keep her saturations at above 88%.    Interval History/Significant Events:  Patient without complaints    Review of Systems  Objective:     Vital Signs (Most Recent):  Temp: 97.4 °F (36.3 °C) (08/17/24 0802)  Pulse: (!) 55 (08/17/24 0802)  Resp: 12 (08/17/24 0802)  BP: (!) 97/35 (08/17/24 0802)  SpO2: (!) 91 % (08/17/24 0802) Vital Signs (24h Range):  Temp:  [97.4 °F (36.3 °C)-98.3 °F (36.8 °C)] 97.4 °F (36.3 °C)  Pulse:  [52-61] 55  Resp:  [11-23] 12  SpO2:  [84 %-96 %] 91 %  BP: ()/(34-49) 97/35   Weight: 51.8 kg (114 lb 3.2 oz)  Body mass index is 18.43 kg/m².      Intake/Output Summary (Last 24 hours) at 8/17/2024 0815  Last data filed at 8/17/2024 0607  Gross per 24 hour   Intake 968.84 ml   Output 2300 ml   Net -1331.16 ml          Physical Exam  Vitals reviewed.   Constitutional:       Appearance: Normal appearance.      Interventions: She is not intubated.  HENT:      Head: Normocephalic and atraumatic.      Nose: Nose normal.      Mouth/Throat:      Mouth: Mucous membranes are dry.      Pharynx: Oropharynx is clear.   Eyes:      Extraocular Movements: Extraocular movements intact.      Conjunctiva/sclera: Conjunctivae normal.      Pupils: Pupils are equal, round, and reactive to light.   Cardiovascular:      Rate and Rhythm: Normal rate.      Heart sounds: Normal heart sounds. No  murmur heard.  Pulmonary:      Effort: Pulmonary effort is normal. She is not intubated.      Breath sounds: Normal breath sounds.   Abdominal:      General: Abdomen is flat. Bowel sounds are normal.      Palpations: Abdomen is soft.   Musculoskeletal:         General: Normal range of motion.      Cervical back: Normal range of motion and neck supple.      Right lower leg: No edema.      Left lower leg: No edema.   Skin:     General: Skin is warm and dry.      Capillary Refill: Capillary refill takes less than 2 seconds.   Neurological:      General: No focal deficit present.      Mental Status: She is alert and oriented to person, place, and time.   Psychiatric:         Mood and Affect: Mood normal.         Behavior: Behavior normal.            Vents:  Oxygen Concentration (%): 36 (08/16/24 1520)  Lines/Drains/Airways       Drain  Duration             Female External Urinary Catheter w/ Suction 08/15/24 1700 1 day              Peripheral Intravenous Line  Duration                  Peripheral IV - Single Lumen 08/13/24 1026 22 G Anterior;Left Forearm 3 days                  Significant Labs:    CBC/Anemia Profile:  Recent Labs   Lab 08/17/24  0459   WBC 10.01   HGB 12.1   HCT 39.7      MCV 85.4   RDW 17.6*        Chemistries:  Recent Labs   Lab 08/17/24  0459      K 4.2      CO2 34*   BUN 22*   CREATININE 0.72   CALCIUM 8.5       Recent Lab Results         08/17/24  0459        Anion Gap 5       Baso # 0.01       Basophil % 0.1       BUN 22       BUN/CREAT RATIO 31       Calcium 8.5       Chloride 101       CO2 34       Creatinine 0.72       Differential Method Auto       eGFR 89       Eos # 0.09       Eos % 0.9       Glucose 106       Hematocrit 39.7       Hemoglobin 12.1       Immature Grans (Abs) 0.10       Immature Granulocytes 1.0       Lymph # 1.04       Lymph % 10.4       MCH 26.0       MCHC 30.5       MCV 85.4       Mono # 0.53       Mono % 5.3       MPV 9.7       Neutrophils, Abs 8.24     "   Neutrophils Relative 82.3       nRBC 0.0       NUCLEATED RBC ABSOLUTE 0.00       Platelet Count 390       Potassium 4.2       RBC 4.65       RDW 17.6       Sodium 136       WBC 10.01               Significant Imaging:  I have reviewed all pertinent imaging results/findings within the past 24 hours.    ABG  No results for input(s): "PH", "PO2", "PCO2", "HCO3", "BE" in the last 168 hours.  Assessment/Plan:     Pulmonary  * Obstructive pneumonia  Patient is status post stent placement doing well x-ray yesterday was clear no complaints of shortness of breath    COPD exacerbation  Severe COPD exacerbation when she initially came in.  Is on oxygen at home at 3 L. maintain her SpO2 at 88% or higher (may require 3-5 L).  Recommend tapering down her systemic corticosteroids use from 60 t.i.d. down to 60 b.i.d. today and then can continue at 50 mg prednisone from the third day.  Would recommend 10 day taper down from 50 mg prednisone, based on her symptoms.          Acute on chronic respiratory failure with hypoxia and hypercapnia  Patient on low-flow O2 doing well    Oncology  Malignant neoplasm of right lung  Status post placement of endobronchial stent in the right mainstem/bronchus intermedius.  Right upper lobe completely stenosed and therefore jailed off with endobronchial stent.      Must get scheduled DuoNebs and scheduled hypertonic saline.  DuoNebs are every 6 hours and hypotonic saline is every 8 hours (to be combined with DuoNebs so that she does not have bronchospasm).  I have also placed orders for chest physical therapy and physical therapy which he should get.      Patient without complaints no new issues             Jose Carlos Whitmore MD  Critical Care Medicine  [facility]  "

## 2024-08-17 NOTE — PROGRESS NOTES
[facility] Medicine  Progress Note    Patient Name: Mayra Kelly  MRN: [id number]  Patient Class: IP- Inpatient   Admission Date: 8/15/2024  Length of Stay: 2 days  Attending Physician: No att. providers found  Primary Care Provider: Darlene Campoverde NP        Subjective:     Principal Problem:Obstructive pneumonia        HPI:    Mayra Kelly is a 73-year-old female with history of lung cancer, COPD, pulmonary HTN, and a-fib. She initially presented to [facility] with acute-on-chronic respiratory failure secondary to obstructive pneumonia. This was treated with extended course of broad-spectrum IV antibiotics with some improvement. Patient underwent rigid bronchoscopy with bronchial lavage, dilation, and stent by Dr. Castellon 8/14/24. Patient tolerated procedure well with improvement in respiratory status. CXR 8/15 revealed stent has remained patent however will require aggressive respiratory therapy and continued antibiotics. Patient subsequently admitted to [facility] for continued IV antibiotics, scheduled respiratory therapy, and physical rehabilitation.             Overview/Hospital Course:        08/17-  Records reviewed. Presented 08/07 to [facility]  withincrease SOB, cough and fatique. Some weight loss. Noted on evaluation worse CXR and CT chest. Hx treated adenoCa in 2018. Last Nov underwent evaluation by Dr Munguia. Seen then again also by Dr Kraft. Dx new primary squamous cell CA. Treated chemotx. In April saw Dr Kraft and started on Keytruda and given through infusion center. Has continued this through July. Missed her appt with Dr Kraft in May. This information mainly from Dr Kraft in pt was not good historian. Pt continued to smoke 1/2 ppd; encourage to stop. Dr Whitmore and Dr Kraft ask to consult. Requires 50% ventimask to keep O2 sats in low 90s. At home been prior on 2L BNC O2. PHMx: also Eliquis for PAF. Now NSR.  Treated for pneumonia.  Seen during stay  by Dr. Kraft and pulmonary. Patient underwent rigid bronchoscopy with bronchial lavage, dilation, and stent by Dr. Castellon 8/14/24.  Admitted to [facility] 08/15 for continued treatment and general strengthening by therapy team.  Patient looks better than when last seen by me.  Seems to be in good spirits.           Interval History:       Review of Systems   Constitutional:  Positive for fatigue and unexpected weight change. Negative for appetite change and fever.   HENT:  Negative for congestion, hearing loss and trouble swallowing.    Respiratory:  Positive for shortness of breath. Negative for chest tightness and wheezing.    Cardiovascular:  Negative for chest pain and palpitations.   Gastrointestinal:  Negative for abdominal pain, constipation and nausea.   Genitourinary:  Negative for difficulty urinating and dysuria.   Musculoskeletal:  Positive for gait problem. Negative for back pain and neck stiffness.   Skin:  Negative for pallor and rash.   Neurological:  Negative for dizziness, speech difficulty and headaches.   Psychiatric/Behavioral:  Negative for confusion and suicidal ideas.      Objective:     Vital Signs (Most Recent):  Temp: 97.1 °F (36.2 °C) (08/17/24 1200)  Pulse: 64 (08/17/24 1300)  Resp: (!) 22 (08/17/24 1300)  BP: (!) 94/40 (08/17/24 1201)  SpO2: (!) 88 % (08/17/24 1300) Vital Signs (24h Range):  Temp:  [97.1 °F (36.2 °C)-98.3 °F (36.8 °C)] 97.1 °F (36.2 °C)  Pulse:  [51-64] 64  Resp:  [12-22] 22  SpO2:  [88 %-96 %] 88 %  BP: ()/(35-49) 94/40     Weight: 51.8 kg (114 lb 3.2 oz)  Body mass index is 18.43 kg/m².    Intake/Output Summary (Last 24 hours) at 8/17/2024 1523  Last data filed at 8/17/2024 1202  Gross per 24 hour   Intake 1016.97 ml   Output 2300 ml   Net -1283.03 ml         Physical Exam  Vitals reviewed.   Constitutional:       General: She is awake. She is not in acute distress.     Appearance: She is well-developed and underweight. She is not toxic-appearing.       Comments: Actually looks much better than would suggest by xrays and lab   HENT:      Head: Normocephalic.      Nose: Nose normal.      Mouth/Throat:      Pharynx: Oropharynx is clear.   Eyes:      Extraocular Movements: Extraocular movements intact.      Pupils: Pupils are equal, round, and reactive to light.   Neck:      Thyroid: No thyroid mass.      Vascular: No carotid bruit.   Cardiovascular:      Rate and Rhythm: Normal rate and regular rhythm.      Pulses: Normal pulses.      Heart sounds: Normal heart sounds. No murmur heard.  Pulmonary:      Effort: Pulmonary effort is normal.      Breath sounds: Normal air entry. No wheezing.      Comments: Fairly good air exchange bilateral  Abdominal:      General: Bowel sounds are normal. There is no distension.      Palpations: Abdomen is soft.      Tenderness: There is no abdominal tenderness.   Musculoskeletal:         General: Normal range of motion.      Cervical back: Neck supple. No rigidity.   Skin:     General: Skin is warm.      Coloration: Skin is not jaundiced.      Findings: No lesion.   Neurological:      General: No focal deficit present.      Mental Status: She is alert and oriented to person, place, and time.      Cranial Nerves: No cranial nerve deficit.   Psychiatric:         Attention and Perception: Attention normal.         Mood and Affect: Mood normal.         Behavior: Behavior normal. Behavior is cooperative.         Thought Content: Thought content normal.         Cognition and Memory: Cognition normal.             Significant Labs: All pertinent labs within the past 24 hours have been reviewed.  BMP:   Recent Labs   Lab 08/17/24  0459         K 4.2      CO2 34*   BUN 22*   CREATININE 0.72   CALCIUM 8.5     CBC:   Recent Labs   Lab 08/17/24 0459   WBC 10.01   HGB 12.1   HCT 39.7        CMP:   Recent Labs   Lab 08/17/24 0459      K 4.2      CO2 34*      BUN 22*   CREATININE 0.72   CALCIUM 8.5    ANIONGAP 5*       Significant Imaging: I have reviewed all pertinent imaging results/findings within the past 24 hours.        Intake/Output - Last 3 Shifts         08/15 0700 08/16 0659 08/16 0700 08/17 0659 08/17 0700 08/18 0659    P.O. 560 720 600    I.V. (mL/kg)  28.3 (0.5)     IV Piggyback 398.8 220.5     Total Intake(mL/kg) 958.8 (18.5) 968.8 (18.7) 600 (11.6)    Urine (mL/kg/hr) 1300 2300 (1.9)     Other  0     Total Output 1300 2300     Net -341.2 -1331.2 +600           Urine Occurrence 2 x 2 x 1 x          Microbiology Results (last 7 days)       ** No results found for the last 168 hours. **              Assessment/Plan:      * Obstructive pneumonia  Patient has a diagnosis of pneumonia. The cause of the pneumonia is suspected to be bacterial in etiology but organism is not known. The pneumonia is improving. The patient has the following signs/symptoms of pneumonia: persistent hypoxia , cough, and shortness of breath. The patient does have a current oxygen requirement and the patient does have a home oxygen requirement. I have reviewed the pertinent imaging. The following cultures have been collected: Bronchial lavage The culture results are listed below.     Current antimicrobial regimen consists of the antibiotics listed below. Will monitor patient closely and continue current treatment plan unchanged.    Recommend antibiotics until at least 8/22 (total 14 days therapy).    Antibiotics (From admission, onward)      Start     Stop Route Frequency Ordered    08/16/24 1632  vancomycin - pharmacy to dose         -- IV pharmacy to manage frequency 08/16/24 1534    08/16/24 1630  vancomycin (VANCOCIN) 1,000 mg in D5W 250 mL IVPB         -- IV Every 18 hours 08/16/24 1534    08/15/24 1600  piperacillin-tazobactam (ZOSYN) 4.5 g in D5W 100 mL IVPB (MB+)         -- IV Every 8 hours (non-standard times) 08/15/24 1036    08/15/24 1230  mupirocin 2 % ointment         08/20/24 0859 Nasl 2 times daily 08/15/24  1127            Microbiology Results (last 7 days)       ** No results found for the last 168 hours. **          Rigid bronchoscopy 8/14 with stent placed. Scheduled Duo Nebs and hypertonic saline in attempt to keep stent patent. Pulmonology managing.      Neoplastic malignant related fatigue  With general debility and failure to thrive. Currently requiring assistance for most ADLs. Will continue physical therapy this admission however her home situation is not ideal. She will likely need to consider long-term placement upon discharge.       Severe protein-calorie malnutrition  Nutrition consulted. Most recent weight and BMI monitored-     Measurements:  Wt Readings from Last 1 Encounters:   08/15/24 51.8 kg (114 lb 3.2 oz)   Body mass index is 18.43 kg/m².    Patient has been screened and assessed by RD.    Malnutrition Type:  Context:    Level:      Malnutrition Characteristic Summary:       Interventions/Recommendations (treatment strategy):         Paroxysmal atrial fibrillation  Patient with Paroxysmal (<7 days) atrial fibrillation which is controlled currently with  no medications . Patient is currently in sinus rhythm.[id number] Score: 2. HASBLED Score: 1. Anticoagulation indicated. Anticoagulation done with Eliquis - held for bronchoscopy w/biopsy and stent, resume as appropriate .    COPD exacerbation  Patient's COPD is controlled currently.  Patient is currently off COPD Pathway. Continue scheduled inhalers Steroids, Antibiotics, and Supplemental oxygen and monitor respiratory status closely.     Malignant neoplasm of right lung  Known to Dr. Kraft.     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 She received a few cycles of weekly chemotherapy around February or March in anticipation of radiation but radiation was not performed. Sidenote she received radiation chemotherapy as well as immunotherapy 2018.    She was last seen by  me in April and the office and had a no-show from May....She has continued to receive immunotherapy with Keytruda IVQ6 weeks beginning in April with last dose in early July.    08/17 can clarify treatment options if any by Dr. Kraft, Patient does look better than when last seen    Acute on chronic respiratory failure with hypoxia and hypercapnia  Patient with Hypercapnic and Hypoxic Respiratory failure which is Acute on chronic. Supplemental oxygen was provided and noted- Oxygen Concentration (%):  [36] 36    Signs/symptoms of respiratory failure include- increased work of breathing. Contributing diagnoses includes - COPD, Pneumonia, and lung cancer  Labs and images were reviewed. Patient Has not had a recent ABG. Will treat underlying causes and adjust management of respiratory failure as follows- continue supplemental O2 to maintain saturation >88%, scheduled nebulizer therapy.    Cigarette nicotine dependence with nicotine-induced disorder  Dangers of cigarette smoking were reviewed with patient in detail. Patient was Counseled for 3-10 minutes. Nicotine replacement options were discussed. Nicotine replacement was discussed- prescribed      VTE Risk Mitigation (From admission, onward)           Ordered     enoxaparin injection 40 mg  Every 24 hours         08/15/24 1036                    Discharge Planning   TERE:      Code Status: Partial Code   Is the patient medically ready for discharge?:     Reason for patient still in hospital (select all that apply): Laboratory test, Treatment, Imaging, and PT / OT recommendations  Discharge Plan A: Rehab                  Mark Barraza MD  Department of Hospital Medicine   [facility]

## 2024-08-17 NOTE — PLAN OF CARE
Problem: Occupational Therapy  Goal: Occupational Therapy Goal  Description: STG:  Pt will perform grooming with setup  Pt will bathe with CGA  Pt will perform UE dressing with CGA  Pt will perform LE dressing with CGA  Pt will sit EOB x 5 min with SBA assistance  Pt will transfer bed/chair/bsc with CGA  Pt will perform standing task x 5 min with CGA assistance  Pt will tolerate 15 minutes of tx without fatigue      LT.Restore to max I with self care and mobility.     Outcome: Progressing

## 2024-08-17 NOTE — ASSESSMENT & PLAN NOTE
Patient has a diagnosis of pneumonia. The cause of the pneumonia is suspected to be bacterial in etiology but organism is not known. The pneumonia is improving. The patient has the following signs/symptoms of pneumonia: persistent hypoxia , cough, and shortness of breath. The patient does have a current oxygen requirement and the patient does have a home oxygen requirement. I have reviewed the pertinent imaging. The following cultures have been collected: Bronchial lavage The culture results are listed below.     Current antimicrobial regimen consists of the antibiotics listed below. Will monitor patient closely and continue current treatment plan unchanged.    Recommend antibiotics until at least 8/22 (total 14 days therapy).    Antibiotics (From admission, onward)    Start     Stop Route Frequency Ordered    08/16/24 1632  vancomycin - pharmacy to dose         -- IV pharmacy to manage frequency 08/16/24 1534    08/16/24 1630  vancomycin (VANCOCIN) 1,000 mg in D5W 250 mL IVPB         -- IV Every 18 hours 08/16/24 1534    08/15/24 1600  piperacillin-tazobactam (ZOSYN) 4.5 g in D5W 100 mL IVPB (MB+)         -- IV Every 8 hours (non-standard times) 08/15/24 1036    08/15/24 1230  mupirocin 2 % ointment         08/20/24 0859 Nasl 2 times daily 08/15/24 1127          Microbiology Results (last 7 days)     ** No results found for the last 168 hours. **        Rigid bronchoscopy 8/14 with stent placed. Scheduled Duo Nebs and hypertonic saline in attempt to keep stent patent. Pulmonology managing.

## 2024-08-17 NOTE — PLAN OF CARE
Problem: Adult Inpatient Plan of Care  Goal: Absence of Hospital-Acquired Illness or Injury  Outcome: Progressing  Goal: Optimal Comfort and Wellbeing  Outcome: Progressing     Problem: Pneumonia  Goal: Fluid Balance  Outcome: Progressing  Goal: Resolution of Infection Signs and Symptoms  Outcome: Progressing  Goal: Effective Oxygenation and Ventilation  Outcome: Progressing     Problem: Wound  Goal: Optimal Coping  Outcome: Progressing  Goal: Optimal Functional Ability  Outcome: Progressing  Goal: Absence of Infection Signs and Symptoms  Outcome: Progressing  Goal: Improved Oral Intake  Outcome: Progressing     Problem: Fall Injury Risk  Goal: Absence of Fall and Fall-Related Injury  Outcome: Progressing     Problem: Gas Exchange Impaired  Goal: Optimal Gas Exchange  Outcome: Progressing

## 2024-08-17 NOTE — ASSESSMENT & PLAN NOTE
Status post placement of endobronchial stent in the right mainstem/bronchus intermedius.  Right upper lobe completely stenosed and therefore jailed off with endobronchial stent.      Must get scheduled DuoNebs and scheduled hypertonic saline.  DuoNebs are every 6 hours and hypotonic saline is every 8 hours (to be combined with DuoNebs so that she does not have bronchospasm).  I have also placed orders for chest physical therapy and physical therapy which he should get.      Patient without complaints no new issues

## 2024-08-17 NOTE — HOSPITAL COURSE
08/17-  Records reviewed. Presented 08/07 to [facility]  withincrease SOB, cough and fatique. Some weight loss. Noted on evaluation worse CXR and CT chest. Hx treated adenoCa in 2018. Last Nov underwent evaluation by Dr Munguia. Seen then again also by Dr Kraft. Dx new primary squamous cell CA. Treated chemotx. In April saw Dr Kraft and started on Keytruda and given through infusion center. Has continued this through July. Missed her appt with Dr Kraft in May. This information mainly from Dr Kraft in pt was not good historian. Pt continued to smoke 1/2 ppd; encourage to stop. Dr Whitmore and Dr Kraft ask to consult. Requires 50% ventimask to keep O2 sats in low 90s. At home been prior on 2L BNC O2. PHMx: also Eliquis for PAF. Now NSR.  Treated for pneumonia.  Seen during stay by Dr. Kraft and pulmonary. Patient underwent rigid bronchoscopy with bronchial lavage, dilation, and stent by Dr. Castellon 8/14/24.  Admitted to [facility] 08/15 for continued treatment and general strengthening by therapy team.  Patient looks better than when last seen by me.  Seems to be in good spirits.   08/18 continues to feel better without new complaints.  To work with therapy.  Treatment includes vancomycin for MRSA with positive respiratory culture from 08/14.   08/19 Better. CXR better. Reviewed Dr Kraft note. Some confusion. Stop zosyn and consider stop vancomycin soon. Look at DC needs.  08/20 Continues feeling / doing better. Look into DC needs.  08/21 Continues to do well and work with therapy  08/22 No complaints except wellness and not able to walk far. Recommendation from therapy SNF unit. Completed ATB    08/23 SW looking into SWB / SNF placement. Pt no new issues  08/24 continue to monitor  08/25 seen with no complaints watching TV, good appetite, weak but still issue with ambulating and caring for herself, look into swing bed , SNF unit placement.  As outpatient she would need to follow up with Dr. Kraft concerning treatment  options for cancer

## 2024-08-17 NOTE — SUBJECTIVE & OBJECTIVE
Interval History/Significant Events:  Patient without complaints    Review of Systems  Objective:     Vital Signs (Most Recent):  Temp: 97.4 °F (36.3 °C) (08/17/24 0802)  Pulse: (!) 55 (08/17/24 0802)  Resp: 12 (08/17/24 0802)  BP: (!) 97/35 (08/17/24 0802)  SpO2: (!) 91 % (08/17/24 0802) Vital Signs (24h Range):  Temp:  [97.4 °F (36.3 °C)-98.3 °F (36.8 °C)] 97.4 °F (36.3 °C)  Pulse:  [52-61] 55  Resp:  [11-23] 12  SpO2:  [84 %-96 %] 91 %  BP: ()/(34-49) 97/35   Weight: 51.8 kg (114 lb 3.2 oz)  Body mass index is 18.43 kg/m².      Intake/Output Summary (Last 24 hours) at 8/17/2024 0815  Last data filed at 8/17/2024 0607  Gross per 24 hour   Intake 968.84 ml   Output 2300 ml   Net -1331.16 ml          Physical Exam  Vitals reviewed.   Constitutional:       Appearance: Normal appearance.      Interventions: She is not intubated.  HENT:      Head: Normocephalic and atraumatic.      Nose: Nose normal.      Mouth/Throat:      Mouth: Mucous membranes are dry.      Pharynx: Oropharynx is clear.   Eyes:      Extraocular Movements: Extraocular movements intact.      Conjunctiva/sclera: Conjunctivae normal.      Pupils: Pupils are equal, round, and reactive to light.   Cardiovascular:      Rate and Rhythm: Normal rate.      Heart sounds: Normal heart sounds. No murmur heard.  Pulmonary:      Effort: Pulmonary effort is normal. She is not intubated.      Breath sounds: Normal breath sounds.   Abdominal:      General: Abdomen is flat. Bowel sounds are normal.      Palpations: Abdomen is soft.   Musculoskeletal:         General: Normal range of motion.      Cervical back: Normal range of motion and neck supple.      Right lower leg: No edema.      Left lower leg: No edema.   Skin:     General: Skin is warm and dry.      Capillary Refill: Capillary refill takes less than 2 seconds.   Neurological:      General: No focal deficit present.      Mental Status: She is alert and oriented to person, place, and time.   Psychiatric:          Mood and Affect: Mood normal.         Behavior: Behavior normal.            Vents:  Oxygen Concentration (%): 36 (08/16/24 1520)  Lines/Drains/Airways       Drain  Duration             Female External Urinary Catheter w/ Suction 08/15/24 1700 1 day              Peripheral Intravenous Line  Duration                  Peripheral IV - Single Lumen 08/13/24 1026 22 G Anterior;Left Forearm 3 days                  Significant Labs:    CBC/Anemia Profile:  Recent Labs   Lab 08/17/24  0459   WBC 10.01   HGB 12.1   HCT 39.7      MCV 85.4   RDW 17.6*        Chemistries:  Recent Labs   Lab 08/17/24  0459      K 4.2      CO2 34*   BUN 22*   CREATININE 0.72   CALCIUM 8.5       Recent Lab Results         08/17/24  0459        Anion Gap 5       Baso # 0.01       Basophil % 0.1       BUN 22       BUN/CREAT RATIO 31       Calcium 8.5       Chloride 101       CO2 34       Creatinine 0.72       Differential Method Auto       eGFR 89       Eos # 0.09       Eos % 0.9       Glucose 106       Hematocrit 39.7       Hemoglobin 12.1       Immature Grans (Abs) 0.10       Immature Granulocytes 1.0       Lymph # 1.04       Lymph % 10.4       MCH 26.0       MCHC 30.5       MCV 85.4       Mono # 0.53       Mono % 5.3       MPV 9.7       Neutrophils, Abs 8.24       Neutrophils Relative 82.3       nRBC 0.0       NUCLEATED RBC ABSOLUTE 0.00       Platelet Count 390       Potassium 4.2       RBC 4.65       RDW 17.6       Sodium 136       WBC 10.01               Significant Imaging:  I have reviewed all pertinent imaging results/findings within the past 24 hours.

## 2024-08-18 PROCEDURE — 94640 AIRWAY INHALATION TREATMENT: CPT

## 2024-08-18 PROCEDURE — 99231 SBSQ HOSP IP/OBS SF/LOW 25: CPT | Mod: ,,, | Performed by: INTERNAL MEDICINE

## 2024-08-18 PROCEDURE — 94668 MNPJ CHEST WALL SBSQ: CPT

## 2024-08-18 PROCEDURE — 97530 THERAPEUTIC ACTIVITIES: CPT

## 2024-08-18 PROCEDURE — 94761 N-INVAS EAR/PLS OXIMETRY MLT: CPT

## 2024-08-18 PROCEDURE — S4991 NICOTINE PATCH NONLEGEND: HCPCS | Performed by: FAMILY MEDICINE

## 2024-08-18 PROCEDURE — 99900035 HC TECH TIME PER 15 MIN (STAT)

## 2024-08-18 PROCEDURE — 25000003 PHARM REV CODE 250: Performed by: FAMILY MEDICINE

## 2024-08-18 PROCEDURE — 99232 SBSQ HOSP IP/OBS MODERATE 35: CPT | Mod: ,,, | Performed by: HOSPITALIST

## 2024-08-18 PROCEDURE — 63600175 PHARM REV CODE 636 W HCPCS: Performed by: FAMILY MEDICINE

## 2024-08-18 PROCEDURE — 97110 THERAPEUTIC EXERCISES: CPT

## 2024-08-18 PROCEDURE — 25000242 PHARM REV CODE 250 ALT 637 W/ HCPCS: Performed by: FAMILY MEDICINE

## 2024-08-18 PROCEDURE — 11000008

## 2024-08-18 PROCEDURE — 27000221 HC OXYGEN, UP TO 24 HOURS

## 2024-08-18 RX ADMIN — VANCOMYCIN HYDROCHLORIDE 1000 MG: 1 INJECTION, POWDER, LYOPHILIZED, FOR SOLUTION INTRAVENOUS at 08:08

## 2024-08-18 RX ADMIN — BUPROPION HYDROCHLORIDE 100 MG: 100 TABLET, FILM COATED, EXTENDED RELEASE ORAL at 08:08

## 2024-08-18 RX ADMIN — IPRATROPIUM BROMIDE AND ALBUTEROL SULFATE 3 ML: 2.5; .5 SOLUTION RESPIRATORY (INHALATION) at 04:08

## 2024-08-18 RX ADMIN — IPRATROPIUM BROMIDE AND ALBUTEROL SULFATE 3 ML: 2.5; .5 SOLUTION RESPIRATORY (INHALATION) at 07:08

## 2024-08-18 RX ADMIN — PIPERACILLIN SODIUM AND TAZOBACTAM SODIUM 4.5 G: 4; .5 INJECTION, POWDER, LYOPHILIZED, FOR SOLUTION INTRAVENOUS at 04:08

## 2024-08-18 RX ADMIN — MUPIROCIN: 20 OINTMENT TOPICAL at 08:08

## 2024-08-18 RX ADMIN — POTASSIUM CHLORIDE 20 MEQ: 20 TABLET, EXTENDED RELEASE ORAL at 08:08

## 2024-08-18 RX ADMIN — PIPERACILLIN SODIUM AND TAZOBACTAM SODIUM 4.5 G: 4; .5 INJECTION, POWDER, LYOPHILIZED, FOR SOLUTION INTRAVENOUS at 09:08

## 2024-08-18 RX ADMIN — IPRATROPIUM BROMIDE AND ALBUTEROL SULFATE 3 ML: 2.5; .5 SOLUTION RESPIRATORY (INHALATION) at 08:08

## 2024-08-18 RX ADMIN — ENOXAPARIN SODIUM 40 MG: 40 INJECTION SUBCUTANEOUS at 04:08

## 2024-08-18 RX ADMIN — NICOTINE 1 PATCH: 14 PATCH, EXTENDED RELEASE TRANSDERMAL at 08:08

## 2024-08-18 RX ADMIN — PREDNISONE 50 MG: 20 TABLET ORAL at 08:08

## 2024-08-18 RX ADMIN — IPRATROPIUM BROMIDE AND ALBUTEROL SULFATE 3 ML: 2.5; .5 SOLUTION RESPIRATORY (INHALATION) at 12:08

## 2024-08-18 RX ADMIN — DEXTROSE MONOHYDRATE 30 ML: 12500 INJECTION, SOLUTION INTRAVENOUS at 09:08

## 2024-08-18 RX ADMIN — PIPERACILLIN SODIUM AND TAZOBACTAM SODIUM 4.5 G: 4; .5 INJECTION, POWDER, LYOPHILIZED, FOR SOLUTION INTRAVENOUS at 12:08

## 2024-08-18 RX ADMIN — SODIUM CHLORIDE SOLN NEBU 3% 4 ML: 3 NEBU SOLN at 12:08

## 2024-08-18 RX ADMIN — DEXTROSE MONOHYDRATE: 12500 INJECTION, SOLUTION INTRAVENOUS at 12:08

## 2024-08-18 RX ADMIN — DEXTROSE MONOHYDRATE: 12500 INJECTION, SOLUTION INTRAVENOUS at 04:08

## 2024-08-18 RX ADMIN — SODIUM CHLORIDE SOLN NEBU 3% 4 ML: 3 NEBU SOLN at 08:08

## 2024-08-18 RX ADMIN — SODIUM CHLORIDE SOLN NEBU 3% 4 ML: 3 NEBU SOLN at 04:08

## 2024-08-18 RX ADMIN — MELATONIN 6 MG: 3 TAB ORAL at 08:08

## 2024-08-18 RX ADMIN — DEXTROSE MONOHYDRATE 30 ML: 12500 INJECTION, SOLUTION INTRAVENOUS at 08:08

## 2024-08-18 NOTE — SUBJECTIVE & OBJECTIVE
Interval History/Significant Events:  Patient without complaints    Review of Systems  Objective:     Vital Signs (Most Recent):  Temp: 98.1 °F (36.7 °C) (08/18/24 0200)  Pulse: (!) 51 (08/18/24 0500)  Resp: 19 (08/18/24 0500)  BP: (!) 115/49 (08/18/24 0500)  SpO2: 96 % (08/18/24 0500) Vital Signs (24h Range):  Temp:  [97.1 °F (36.2 °C)-98.1 °F (36.7 °C)] 98.1 °F (36.7 °C)  Pulse:  [49-64] 51  Resp:  [10-22] 19  SpO2:  [88 %-99 %] 96 %  BP: ()/(30-93) 115/49   Weight: 51.8 kg (114 lb 3.2 oz)  Body mass index is 18.43 kg/m².      Intake/Output Summary (Last 24 hours) at 8/18/2024 0618  Last data filed at 8/18/2024 0450  Gross per 24 hour   Intake 1864.92 ml   Output 1300 ml   Net 564.92 ml          Physical Exam  Vitals reviewed.   Constitutional:       Appearance: Normal appearance.      Interventions: She is not intubated.  HENT:      Head: Normocephalic and atraumatic.      Nose: Nose normal.      Mouth/Throat:      Mouth: Mucous membranes are dry.      Pharynx: Oropharynx is clear.   Eyes:      Extraocular Movements: Extraocular movements intact.      Conjunctiva/sclera: Conjunctivae normal.      Pupils: Pupils are equal, round, and reactive to light.   Cardiovascular:      Rate and Rhythm: Normal rate.      Heart sounds: Normal heart sounds. No murmur heard.  Pulmonary:      Effort: Pulmonary effort is normal. She is not intubated.      Breath sounds: Normal breath sounds.   Abdominal:      General: Abdomen is flat. Bowel sounds are normal.      Palpations: Abdomen is soft.   Musculoskeletal:         General: Normal range of motion.      Cervical back: Normal range of motion and neck supple.      Right lower leg: No edema.      Left lower leg: No edema.   Skin:     General: Skin is warm and dry.      Capillary Refill: Capillary refill takes less than 2 seconds.   Neurological:      General: No focal deficit present.      Mental Status: She is alert and oriented to person, place, and time.   Psychiatric:          Mood and Affect: Mood normal.         Behavior: Behavior normal.            Vents:  Oxygen Concentration (%): 36 (08/17/24 1700)  Lines/Drains/Airways       Drain  Duration             Female External Urinary Catheter w/ Suction 08/15/24 1700 2 days              Peripheral Intravenous Line  Duration                  Peripheral IV - Single Lumen 08/13/24 1026 22 G Anterior;Left Forearm 4 days         Peripheral IV - Single Lumen 08/17/24 1406 20 G Posterior;Right Hand <1 day                  Significant Labs:    CBC/Anemia Profile:  Recent Labs   Lab 08/17/24  0459   WBC 10.01   HGB 12.1   HCT 39.7      MCV 85.4   RDW 17.6*        Chemistries:  Recent Labs   Lab 08/17/24  0459      K 4.2      CO2 34*   BUN 22*   CREATININE 0.72   CALCIUM 8.5       Recent Lab Results       None            Significant Imaging:  I have reviewed all pertinent imaging results/findings within the past 24 hours.

## 2024-08-18 NOTE — PT/OT/SLP PROGRESS
Occupational Therapy   Treatment    Name: Mayra Kelly  MRN: [id number]  Admitting Diagnosis:  Obstructive pneumonia       Recommendations:     Discharge Recommendations: Moderate Intensity Therapy  Discharge Equipment Recommendations:   (to be determined)  Barriers to discharge:  None    Assessment:     Mayra Kelly is a 73 y.o. female with a medical diagnosis of Obstructive pneumonia.  She presents with No complaints.Pt agreed to OT treatment  Performance deficits affecting function are weakness, impaired endurance, impaired self care skills, impaired functional mobility, impaired balance, gait instability, decreased safety awareness.     Rehab Prognosis:  Good; patient would benefit from acute skilled OT services to address these deficits and reach maximum level of function.       Plan:     Patient to be seen 5 x/week to address the above listed problems via self-care/home management, therapeutic activities, therapeutic exercises  Plan of Care Expires: 09/17/24  Plan of Care Reviewed with: patient    Subjective     Chief Complaint: Obstructive Pneumonia  Patient/Family Comments/goals: Plan is to return home  Pain/Comfort:  Pain Rating 1: 0/10  Pain Rating Post-Intervention 1: 0/10    Objective:     Communicated with: RAUL Goodwin prior to session.  Patient found HOB elevated with blood pressure cuff, oxygen, peripheral IV, telemetry, pulse ox (continuous), PureWick upon OT entry to room.    General Precautions: Standard, fall    Orthopedic Precautions:N/A  Braces: N/A  Respiratory Status: Nasal cannula, flow 4 L/min     Occupational Performance:     Bed Mobility:    Patient completed Rolling/Turning to Right with minimum assistance  Patient completed Supine to Sit with minimum assistance and 2 persons     Functional Mobility/Transfers:  Patient completed Sit <> Stand Transfer with minimum assistance and of 2 persons  with  gait belt to stand pt leaning posteriorly   Patient completed Bed <> Chair  Transfer using Step Transfer technique with minimum assistance and of 2 persons with Gait belt  Functional Mobility: Min a x 2 with transfer to chair    Activities of Daily Living:  Upper Body Dressing: maximal assistance nolvia jaffe as a robe due to IV in both arms      AMPA 6 Click ADL:      Treatment & Education:  Pt sat EOB x approx 8 min with Min a due to leaning posteriorly requiring verbal/tactile cues to correct.   Pt performed UE strengthening exercises.  2 lb hand wt 2 x sets of 15 reps With CGA and verbal/tactile cueing  (B) shoulder flexion/extension and adduction/abduction  (B) elbow flexion/extension  (B) wrist flexion/extension  Hand exerciser x 25 reps x 2 bands   Red theraband x 2 sets of 10 reps (B) elbow flexion and extension.    Pt tolerated exercises without complaint, but is easily distracted and requires constant cueing to stay on task. Plan is to continue tx.     Patient left HOB elevated with all lines intact, call button in reach, and nurse notified    GOALS:   Multidisciplinary Problems       Occupational Therapy Goals          Problem: Occupational Therapy    Goal Priority Disciplines Outcome Interventions   Occupational Therapy Goal     OT, PT/OT Progressing    Description: STG:  Pt will perform grooming with setup  Pt will bathe with CGA  Pt will perform UE dressing with CGA  Pt will perform LE dressing with CGA  Pt will sit EOB x 5 min with SBA assistance  Pt will transfer bed/chair/bsc with CGA  Pt will perform standing task x 5 min with CGA assistance  Pt will tolerate 15 minutes of tx without fatigue      LT.Restore to max I with self care and mobility.                          Time Tracking:     OT Date of Treatment: 24  OT Start Time: 1117 (13:56)  OT Stop Time: 1130 (14:19)  OT Total Time (min): 13 min    Billable Minutes:Therapeutic Activity 10  Therapeutic Exercise 20    OT/PETAR: OT          2024

## 2024-08-18 NOTE — ASSESSMENT & PLAN NOTE
Positive culture from 08/14 in lower respiratory tract for MRSA  Covering with vancomycin for MRSA pneumonia

## 2024-08-18 NOTE — SUBJECTIVE & OBJECTIVE
Interval History:       Review of Systems   Constitutional:  Positive for fatigue and unexpected weight change. Negative for appetite change and fever.   HENT:  Negative for congestion, hearing loss and trouble swallowing.    Respiratory:  Positive for shortness of breath. Negative for chest tightness and wheezing.    Cardiovascular:  Negative for chest pain and palpitations.   Gastrointestinal:  Negative for abdominal pain, constipation and nausea.   Genitourinary:  Negative for difficulty urinating and dysuria.   Musculoskeletal:  Positive for gait problem. Negative for back pain and neck stiffness.   Skin:  Negative for pallor and rash.   Neurological:  Negative for dizziness, speech difficulty and headaches.   Psychiatric/Behavioral:  Negative for confusion and suicidal ideas.      Objective:     Vital Signs (Most Recent):  Temp: 98.3 °F (36.8 °C) (08/18/24 1200)  Pulse: 61 (08/18/24 1245)  Resp: 14 (08/18/24 1245)  BP: (!) 104/38 (08/18/24 1200)  SpO2: (!) 93 % (08/18/24 1245) Vital Signs (24h Range):  Temp:  [97.2 °F (36.2 °C)-98.3 °F (36.8 °C)] 98.3 °F (36.8 °C)  Pulse:  [49-69] 61  Resp:  [8-21] 14  SpO2:  [90 %-99 %] 93 %  BP: ()/(30-93) 104/38     Weight: 51.8 kg (114 lb 3.2 oz)  Body mass index is 18.43 kg/m².    Intake/Output Summary (Last 24 hours) at 8/18/2024 1406  Last data filed at 8/18/2024 0450  Gross per 24 hour   Intake 1044.92 ml   Output 1300 ml   Net -255.08 ml         Physical Exam  Vitals reviewed.   Constitutional:       General: She is awake. She is not in acute distress.     Appearance: She is well-developed and underweight. She is not toxic-appearing.      Comments: Actually looks much better than would suggest by xrays and lab   HENT:      Head: Normocephalic.      Nose: Nose normal.      Mouth/Throat:      Pharynx: Oropharynx is clear.   Eyes:      Extraocular Movements: Extraocular movements intact.      Pupils: Pupils are equal, round, and reactive to light.   Neck:       Thyroid: No thyroid mass.      Vascular: No carotid bruit.   Cardiovascular:      Rate and Rhythm: Normal rate and regular rhythm.      Pulses: Normal pulses.      Heart sounds: Normal heart sounds. No murmur heard.  Pulmonary:      Effort: Pulmonary effort is normal.      Breath sounds: Normal air entry. No wheezing.      Comments: Fairly good air exchange bilateral  Abdominal:      General: Bowel sounds are normal. There is no distension.      Palpations: Abdomen is soft.      Tenderness: There is no abdominal tenderness.   Musculoskeletal:         General: Normal range of motion.      Cervical back: Neck supple. No rigidity.   Skin:     General: Skin is warm.      Coloration: Skin is not jaundiced.      Findings: No lesion.   Neurological:      General: No focal deficit present.      Mental Status: She is alert and oriented to person, place, and time.      Cranial Nerves: No cranial nerve deficit.   Psychiatric:         Attention and Perception: Attention normal.         Mood and Affect: Mood normal.         Behavior: Behavior normal. Behavior is cooperative.         Thought Content: Thought content normal.         Cognition and Memory: Cognition normal.             Significant Labs: All pertinent labs within the past 24 hours have been reviewed.  BMP:   Recent Labs   Lab 08/17/24  0459         K 4.2      CO2 34*   BUN 22*   CREATININE 0.72   CALCIUM 8.5     CBC:   Recent Labs   Lab 08/17/24 0459   WBC 10.01   HGB 12.1   HCT 39.7        CMP:   Recent Labs   Lab 08/17/24 0459      K 4.2      CO2 34*      BUN 22*   CREATININE 0.72   CALCIUM 8.5   ANIONGAP 5*       Significant Imaging: I have reviewed all pertinent imaging results/findings within the past 24 hours.        Intake/Output - Last 3 Shifts         08/16 0700 08/17 0659 08/17 0700 08/18 0659 08/18 0700 08/19 0659    P.O. 720 1140     I.V. (mL/kg) 28.3 (0.5) 84.3 (1.6)     IV Piggyback 220.5 640.6     Total  Intake(mL/kg) 968.8 (18.7) 1864.9 (36)     Urine (mL/kg/hr) 2300 (1.9) 1300 (1)     Other 0      Total Output 2300 1300     Net -1331.2 +564.9            Urine Occurrence 2 x 1 x           Microbiology Results (last 7 days)       ** No results found for the last 168 hours. **

## 2024-08-18 NOTE — ASSESSMENT & PLAN NOTE
Patient is status post stent placement without complaints denies much shortness of breath.  Dr. Castellon to see tomorrow will obtain a chest x-ray

## 2024-08-18 NOTE — PLAN OF CARE
Problem: Pneumonia  Goal: Effective Oxygenation and Ventilation  Outcome: Progressing     Problem: Gas Exchange Impaired  Goal: Optimal Gas Exchange  Outcome: Progressing     Problem: Breathing Pattern Ineffective  Goal: Effective Breathing Pattern  Outcome: Progressing

## 2024-08-18 NOTE — PROGRESS NOTES
[facility] Medicine  Progress Note    Patient Name: Mayra Kelly  MRN: [id number]  Patient Class: IP- Inpatient   Admission Date: 8/15/2024  Length of Stay: 3 days  Attending Physician: Mark Barraza MD  Primary Care Provider: Darlene Campoverde NP        Subjective:     Principal Problem:Obstructive pneumonia        HPI:    Mayra Kelly is a 73-year-old female with history of lung cancer, COPD, pulmonary HTN, and a-fib. She initially presented to [facility] with acute-on-chronic respiratory failure secondary to obstructive pneumonia. This was treated with extended course of broad-spectrum IV antibiotics with some improvement. Patient underwent rigid bronchoscopy with bronchial lavage, dilation, and stent by Dr. Castellon 8/14/24. Patient tolerated procedure well with improvement in respiratory status. CXR 8/15 revealed stent has remained patent however will require aggressive respiratory therapy and continued antibiotics. Patient subsequently admitted to [facility] for continued IV antibiotics, scheduled respiratory therapy, and physical rehabilitation.             Overview/Hospital Course:        08/17-  Records reviewed. Presented 08/07 to [facility]  withincrease SOB, cough and fatique. Some weight loss. Noted on evaluation worse CXR and CT chest. Hx treated adenoCa in 2018. Last Nov underwent evaluation by Dr Munguia. Seen then again also by Dr Kraft. Dx new primary squamous cell CA. Treated chemotx. In April saw Dr Kraft and started on Keytruda and given through infusion center. Has continued this through July. Missed her appt with Dr Kraft in May. This information mainly from Dr Kraft in pt was not good historian. Pt continued to smoke 1/2 ppd; encourage to stop. Dr Whitmore and Dr Kraft ask to consult. Requires 50% ventimask to keep O2 sats in low 90s. At home been prior on 2L BNC O2. PHMx: also Eliquis for PAF. Now NSR.  Treated for pneumonia.  Seen during stay by   Abena and pulmonary. Patient underwent rigid bronchoscopy with bronchial lavage, dilation, and stent by Dr. Castellon 8/14/24.  Admitted to [facility] 08/15 for continued treatment and general strengthening by therapy team.  Patient looks better than when last seen by me.  Seems to be in good spirits.   08/18 continues to feel better without new complaints.  To work with therapy.  Treatment includes vancomycin for MRSA with positive respiratory culture from 08/14.         Interval History:       Review of Systems   Constitutional:  Positive for fatigue and unexpected weight change. Negative for appetite change and fever.   HENT:  Negative for congestion, hearing loss and trouble swallowing.    Respiratory:  Positive for shortness of breath. Negative for chest tightness and wheezing.    Cardiovascular:  Negative for chest pain and palpitations.   Gastrointestinal:  Negative for abdominal pain, constipation and nausea.   Genitourinary:  Negative for difficulty urinating and dysuria.   Musculoskeletal:  Positive for gait problem. Negative for back pain and neck stiffness.   Skin:  Negative for pallor and rash.   Neurological:  Negative for dizziness, speech difficulty and headaches.   Psychiatric/Behavioral:  Negative for confusion and suicidal ideas.      Objective:     Vital Signs (Most Recent):  Temp: 98.3 °F (36.8 °C) (08/18/24 1200)  Pulse: 61 (08/18/24 1245)  Resp: 14 (08/18/24 1245)  BP: (!) 104/38 (08/18/24 1200)  SpO2: (!) 93 % (08/18/24 1245) Vital Signs (24h Range):  Temp:  [97.2 °F (36.2 °C)-98.3 °F (36.8 °C)] 98.3 °F (36.8 °C)  Pulse:  [49-69] 61  Resp:  [8-21] 14  SpO2:  [90 %-99 %] 93 %  BP: ()/(30-93) 104/38     Weight: 51.8 kg (114 lb 3.2 oz)  Body mass index is 18.43 kg/m².    Intake/Output Summary (Last 24 hours) at 8/18/2024 1406  Last data filed at 8/18/2024 0450  Gross per 24 hour   Intake 1044.92 ml   Output 1300 ml   Net -255.08 ml         Physical Exam  Vitals reviewed.    Constitutional:       General: She is awake. She is not in acute distress.     Appearance: She is well-developed and underweight. She is not toxic-appearing.      Comments: Actually looks much better than would suggest by xrays and lab   HENT:      Head: Normocephalic.      Nose: Nose normal.      Mouth/Throat:      Pharynx: Oropharynx is clear.   Eyes:      Extraocular Movements: Extraocular movements intact.      Pupils: Pupils are equal, round, and reactive to light.   Neck:      Thyroid: No thyroid mass.      Vascular: No carotid bruit.   Cardiovascular:      Rate and Rhythm: Normal rate and regular rhythm.      Pulses: Normal pulses.      Heart sounds: Normal heart sounds. No murmur heard.  Pulmonary:      Effort: Pulmonary effort is normal.      Breath sounds: Normal air entry. No wheezing.      Comments: Fairly good air exchange bilateral  Abdominal:      General: Bowel sounds are normal. There is no distension.      Palpations: Abdomen is soft.      Tenderness: There is no abdominal tenderness.   Musculoskeletal:         General: Normal range of motion.      Cervical back: Neck supple. No rigidity.   Skin:     General: Skin is warm.      Coloration: Skin is not jaundiced.      Findings: No lesion.   Neurological:      General: No focal deficit present.      Mental Status: She is alert and oriented to person, place, and time.      Cranial Nerves: No cranial nerve deficit.   Psychiatric:         Attention and Perception: Attention normal.         Mood and Affect: Mood normal.         Behavior: Behavior normal. Behavior is cooperative.         Thought Content: Thought content normal.         Cognition and Memory: Cognition normal.             Significant Labs: All pertinent labs within the past 24 hours have been reviewed.  BMP:   Recent Labs   Lab 08/17/24  7081         K 4.2      CO2 34*   BUN 22*   CREATININE 0.72   CALCIUM 8.5     CBC:   Recent Labs   Lab 08/17/24  0459   WBC 10.01    HGB 12.1   HCT 39.7        CMP:   Recent Labs   Lab 08/17/24  0459      K 4.2      CO2 34*      BUN 22*   CREATININE 0.72   CALCIUM 8.5   ANIONGAP 5*       Significant Imaging: I have reviewed all pertinent imaging results/findings within the past 24 hours.        Intake/Output - Last 3 Shifts         08/16 0700 08/17 0659 08/17 0700 08/18 0659 08/18 0700 08/19 0659    P.O. 720 1140     I.V. (mL/kg) 28.3 (0.5) 84.3 (1.6)     IV Piggyback 220.5 640.6     Total Intake(mL/kg) 968.8 (18.7) 1864.9 (36)     Urine (mL/kg/hr) 2300 (1.9) 1300 (1)     Other 0      Total Output 2300 1300     Net -1331.2 +564.9            Urine Occurrence 2 x 1 x           Microbiology Results (last 7 days)       ** No results found for the last 168 hours. **              Assessment/Plan:      * Obstructive pneumonia  Patient has a diagnosis of pneumonia. The cause of the pneumonia is suspected to be bacterial in etiology but organism is not known. The pneumonia is improving. The patient has the following signs/symptoms of pneumonia: persistent hypoxia , cough, and shortness of breath. The patient does have a current oxygen requirement and the patient does have a home oxygen requirement. I have reviewed the pertinent imaging. The following cultures have been collected: Bronchial lavage The culture results are listed below.     Current antimicrobial regimen consists of the antibiotics listed below. Will monitor patient closely and continue current treatment plan unchanged.    Recommend antibiotics until at least 8/22 (total 14 days therapy).    Antibiotics (From admission, onward)      Start     Stop Route Frequency Ordered    08/16/24 1632  vancomycin - pharmacy to dose         -- IV pharmacy to manage frequency 08/16/24 1534    08/16/24 1630  vancomycin (VANCOCIN) 1,000 mg in D5W 250 mL IVPB         -- IV Every 18 hours 08/16/24 1534    08/15/24 1600  piperacillin-tazobactam (ZOSYN) 4.5 g in D5W 100 mL IVPB (MB+)          -- IV Every 8 hours (non-standard times) 08/15/24 1036    08/15/24 1230  mupirocin 2 % ointment         08/20/24 0859 Nasl 2 times daily 08/15/24 1127            Microbiology Results (last 7 days)       ** No results found for the last 168 hours. **          Rigid bronchoscopy 8/14 with stent placed. Scheduled Duo Nebs and hypertonic saline in attempt to keep stent patent. Pulmonology managing.      MRSA infection  Positive culture from 08/14 in lower respiratory tract for MRSA  Covering with vancomycin for MRSA pneumonia      Neoplastic malignant related fatigue  With general debility and failure to thrive. Currently requiring assistance for most ADLs. Will continue physical therapy this admission however her home situation is not ideal. She will likely need to consider long-term placement upon discharge.       Severe protein-calorie malnutrition  Nutrition consulted. Most recent weight and BMI monitored-     Measurements:  Wt Readings from Last 1 Encounters:   08/15/24 51.8 kg (114 lb 3.2 oz)   Body mass index is 18.43 kg/m².    Patient has been screened and assessed by RD.    Malnutrition Type:  Context:    Level:      Malnutrition Characteristic Summary:       Interventions/Recommendations (treatment strategy):         Paroxysmal atrial fibrillation  Patient with Paroxysmal (<7 days) atrial fibrillation which is controlled currently with  no medications . Patient is currently in sinus rhythm.[id number] Score: 2. HASBLED Score: 1. Anticoagulation indicated. Anticoagulation done with Eliquis - held for bronchoscopy w/biopsy and stent, resume as appropriate .    COPD exacerbation  Patient's COPD is controlled currently.  Patient is currently off COPD Pathway. Continue scheduled inhalers Steroids, Antibiotics, and Supplemental oxygen and monitor respiratory status closely.     Malignant neoplasm of right lung  Known to Dr. Kraft.     Per his documentation:  Left sided pulmonary adenocarcinoma 2017 with a new  right side squamous cell primary in late 2023. She received a few cycles of weekly chemotherapy around February or March in anticipation of radiation but radiation was not performed. Sidenote she received radiation chemotherapy as well as immunotherapy 2018.    She was last seen by me in April and the office and had a no-show from May....She has continued to receive immunotherapy with Keytruda IVQ6 weeks beginning in April with last dose in early July.    08/17 can clarify treatment options if any by Dr. Kraft, Patient does look better than when last seen    Acute on chronic respiratory failure with hypoxia and hypercapnia  Patient with Hypercapnic and Hypoxic Respiratory failure which is Acute on chronic. Supplemental oxygen was provided and noted- Oxygen Concentration (%):  [36] 36    Signs/symptoms of respiratory failure include- increased work of breathing. Contributing diagnoses includes - COPD, Pneumonia, and lung cancer  Labs and images were reviewed. Patient Has not had a recent ABG. Will treat underlying causes and adjust management of respiratory failure as follows- continue supplemental O2 to maintain saturation >88%, scheduled nebulizer therapy.    Cigarette nicotine dependence with nicotine-induced disorder  Dangers of cigarette smoking were reviewed with patient in detail. Patient was Counseled for 3-10 minutes. Nicotine replacement options were discussed. Nicotine replacement was discussed- prescribed      VTE Risk Mitigation (From admission, onward)           Ordered     enoxaparin injection 40 mg  Every 24 hours         08/15/24 1036                    Discharge Planning   TERE:      Code Status: Partial Code   Is the patient medically ready for discharge?:     Reason for patient still in hospital (select all that apply): Laboratory test, Treatment, and Imaging  Discharge Plan A: Rehab                  Mark Barraza MD  Department of Hospital Medicine   [facility]  HOW

## 2024-08-18 NOTE — PT/OT/SLP PROGRESS
Physical Therapy Treatment    Patient Name:  Mayra Kelly   MRN:  [id number]    Recommendations:     Discharge Recommendations: Moderate Intensity Therapy  Discharge Equipment Recommendations: none  Barriers to discharge:  ongoing medical care    Assessment:     Mayra Kelly is a 73 y.o. female admitted with a medical diagnosis of Obstructive pneumonia.  She presents with the following impairments/functional limitations: weakness, impaired endurance, impaired functional mobility, gait instability, impaired balance, decreased lower extremity function, decreased safety awareness, impaired cardiopulmonary response to activity. Pt req freq cues for redirection and to remain on task.     Rehab Prognosis: Fair; patient would benefit from acute skilled PT services to address these deficits and reach maximum level of function.    Recent Surgery: * No surgery found *      Plan:     During this hospitalization, patient to be seen   to address the identified rehab impairments via gait training, therapeutic activities, therapeutic exercises, neuromuscular re-education and progress toward the following goals:    Plan of Care Expires:  09/16/24    Subjective     Chief Complaint: pbstructive pneumonia  Patient/Family Comments/goals: agreeable  Pain/Comfort: none          Objective:     Communicated with RN prior to session.  Patient found HOB elevated with oxygen, telemetry, peripheral IV, PureWick, blood pressure cuff, pulse ox (continuous) upon PT entry to room.     General Precautions: Standard, fall, respiratory  Orthopedic Precautions: N/A  Braces: N/A  Respiratory Status: Nasal cannula, flow 4 L/min     Functional Mobility:  Bed Mobility:     Supine to Sit: minimum assistance and of 2 persons  Transfers:     Sit to Stand:  minimum assistance and of 2 persons with manual assist using gait belt  Bed to Chair: minimum assistance and of 2 persons with  manual assist using gait belt  using  Step Transfer  Balance: EOB  sitting Min A at most with cues to reduce posterior trunk lean, able to tolerate EOB for approx 6-9 mins.       AM-PAC 6 CLICK MOBILITY          Treatment & Education:  Mobility as stated above.     Patient left up in chair with all lines intact, call button in reach, and RN present.    [id number]: Pt returned to bed with nursing staff. Pt instructed in supine BLE ex as follows: 10-15 reps for ankle pump, heel slides, straight leg raise, short arc quads, hip abduction/adduction, bridging. Pt left with HOB elevated and all lines intact.    GOALS:   Multidisciplinary Problems       Physical Therapy Goals          Problem: Physical Therapy    Goal Priority Disciplines Outcome Goal Variances Interventions   Physical Therapy Goal     PT, PT/OT Progressing     Description: Short Term Goals to be met by: 24    Patient will increase functional independence with mobility by performin. Supine to sit with Stand by assist  2. Sit to stand transfer with contact guard assist using Rolling walker  3. Bed to chair transfer with contact guard assist using Rolling walker  4. Gait  x 100 feet with contact guard assist using Rolling walker  5. Lower extremity exercise program x30 reps per handout, with assistance as needed  6. Pt to negotiate 3 steps with rails with CGA.     Long Term Goals to be met by: 24    Pt will regain full independent functional mobility with Rolling walker to return to home situation and prior activities of daily living.                        Time Tracking:     PT Received On: 24  PT Start Time: 1108 (0121)     PT Stop Time: 1129 (1716)  PT Total Time (min): 21 min 8 mins    Billable Minutes: Therapeutic Activity 20 and Therapeutic Exercise 8    Treatment Type: Evaluation  PT/PTA: PT           2024

## 2024-08-18 NOTE — ASSESSMENT & PLAN NOTE
Patient has a diagnosis of pneumonia. The cause of the pneumonia is suspected to be bacterial in etiology but organism is not known. The pneumonia is improving. The patient has the following signs/symptoms of pneumonia: persistent hypoxia , cough, and shortness of breath. The patient does have a current oxygen requirement and the patient does have a home oxygen requirement. I have reviewed the pertinent imaging. The following cultures have been collected: Bronchial lavage The culture results are listed below.     Current antimicrobial regimen consists of the antibiotics listed below. Will monitor patient closely and continue current treatment plan unchanged.    Recommend antibiotics until at least 8/22 (total 14 days therapy).    Antibiotics (From admission, onward)      Start     Stop Route Frequency Ordered    08/16/24 1632  vancomycin - pharmacy to dose         -- IV pharmacy to manage frequency 08/16/24 1534    08/16/24 1630  vancomycin (VANCOCIN) 1,000 mg in D5W 250 mL IVPB         -- IV Every 18 hours 08/16/24 1534    08/15/24 1600  piperacillin-tazobactam (ZOSYN) 4.5 g in D5W 100 mL IVPB (MB+)         -- IV Every 8 hours (non-standard times) 08/15/24 1036    08/15/24 1230  mupirocin 2 % ointment         08/20/24 0859 Nasl 2 times daily 08/15/24 1127            Microbiology Results (last 7 days)       ** No results found for the last 168 hours. **          Rigid bronchoscopy 8/14 with stent placed. Scheduled Duo Nebs and hypertonic saline in attempt to keep stent patent. Pulmonology managing.      08/18 covering for MRSA pneumonia with positive lower respiratory culture from 08/14

## 2024-08-18 NOTE — PROGRESS NOTES
[facility]  Critical Care Medicine  Progress Note    Patient Name: Mayra Kelly  MRN: [id number]  Admission Date: 8/15/2024  Hospital Length of Stay: 3 days  Code Status: Partial Code  Attending Provider: Mark Barraza MD  Primary Care Provider: Darlene Campoverde NP   Principal Problem: Obstructive pneumonia    Subjective:     HPI:  No notes on file    Hospital/ICU Course:  8/15/24-no acute events overnight I reviewed her chest x-ray from this morning which showed that the stent continues to remain patent.  Clinically she is doing well today.  Continues to require 3-5 L of oxygen to keep her saturations at above 88%.    Interval History/Significant Events:  Patient without complaints    Review of Systems  Objective:     Vital Signs (Most Recent):  Temp: 98.1 °F (36.7 °C) (08/18/24 0200)  Pulse: (!) 51 (08/18/24 0500)  Resp: 19 (08/18/24 0500)  BP: (!) 115/49 (08/18/24 0500)  SpO2: 96 % (08/18/24 0500) Vital Signs (24h Range):  Temp:  [97.1 °F (36.2 °C)-98.1 °F (36.7 °C)] 98.1 °F (36.7 °C)  Pulse:  [49-64] 51  Resp:  [10-22] 19  SpO2:  [88 %-99 %] 96 %  BP: ()/(30-93) 115/49   Weight: 51.8 kg (114 lb 3.2 oz)  Body mass index is 18.43 kg/m².      Intake/Output Summary (Last 24 hours) at 8/18/2024 0618  Last data filed at 8/18/2024 0450  Gross per 24 hour   Intake 1864.92 ml   Output 1300 ml   Net 564.92 ml          Physical Exam  Vitals reviewed.   Constitutional:       Appearance: Normal appearance.      Interventions: She is not intubated.  HENT:      Head: Normocephalic and atraumatic.      Nose: Nose normal.      Mouth/Throat:      Mouth: Mucous membranes are dry.      Pharynx: Oropharynx is clear.   Eyes:      Extraocular Movements: Extraocular movements intact.      Conjunctiva/sclera: Conjunctivae normal.      Pupils: Pupils are equal, round, and reactive to light.   Cardiovascular:      Rate and Rhythm: Normal rate.      Heart sounds: Normal heart sounds. No murmur  "heard.  Pulmonary:      Effort: Pulmonary effort is normal. She is not intubated.      Breath sounds: Normal breath sounds.   Abdominal:      General: Abdomen is flat. Bowel sounds are normal.      Palpations: Abdomen is soft.   Musculoskeletal:         General: Normal range of motion.      Cervical back: Normal range of motion and neck supple.      Right lower leg: No edema.      Left lower leg: No edema.   Skin:     General: Skin is warm and dry.      Capillary Refill: Capillary refill takes less than 2 seconds.   Neurological:      General: No focal deficit present.      Mental Status: She is alert and oriented to person, place, and time.   Psychiatric:         Mood and Affect: Mood normal.         Behavior: Behavior normal.            Vents:  Oxygen Concentration (%): 36 (08/17/24 1700)  Lines/Drains/Airways       Drain  Duration             Female External Urinary Catheter w/ Suction 08/15/24 1700 2 days              Peripheral Intravenous Line  Duration                  Peripheral IV - Single Lumen 08/13/24 1026 22 G Anterior;Left Forearm 4 days         Peripheral IV - Single Lumen 08/17/24 1406 20 G Posterior;Right Hand <1 day                  Significant Labs:    CBC/Anemia Profile:  Recent Labs   Lab 08/17/24  0459   WBC 10.01   HGB 12.1   HCT 39.7      MCV 85.4   RDW 17.6*        Chemistries:  Recent Labs   Lab 08/17/24  0459      K 4.2      CO2 34*   BUN 22*   CREATININE 0.72   CALCIUM 8.5       Recent Lab Results       None            Significant Imaging:  I have reviewed all pertinent imaging results/findings within the past 24 hours.    ABG  No results for input(s): "PH", "PO2", "PCO2", "HCO3", "BE" in the last 168 hours.  Assessment/Plan:     Pulmonary  * Obstructive pneumonia  Patient is status post stent placement without complaints denies much shortness of breath.  Dr. Castellon to see tomorrow will obtain a chest x-ray    COPD exacerbation  Severe COPD exacerbation when she initially " came in.  Is on oxygen at home at 3 L. maintain her SpO2 at 88% or higher (may require 3-5 L).  Recommend tapering down her systemic corticosteroids use from 60 t.i.d. down to 60 b.i.d. today and then can continue at 50 mg prednisone from the third day.  Would recommend 10 day taper down from 50 mg prednisone, based on her symptoms.          Acute on chronic respiratory failure with hypoxia and hypercapnia  Patient on low-flow O2 doing well    Oncology  Malignant neoplasm of right lung  Status post placement of endobronchial stent in the right mainstem/bronchus intermedius.  Right upper lobe completely stenosed and therefore jailed off with endobronchial stent.      Must get scheduled DuoNebs and scheduled hypertonic saline.  DuoNebs are every 6 hours and hypotonic saline is every 8 hours (to be combined with DuoNebs so that she does not have bronchospasm).  I have also placed orders for chest physical therapy and physical therapy which he should get.      Patient without complaints no new issues           Jose Carlos Whitmore MD  Critical Care Medicine  [facility]   Attending Attestation (For Attendings USE Only)...

## 2024-08-18 NOTE — PROGRESS NOTES
[facility] Medicine  Progress Note    Patient Name: Mayra Kelly  MRN: [id number]  Patient Class: IP- Inpatient   Admission Date: 8/15/2024  Length of Stay: 3 days  Attending Physician: Mark Barraza MD  Primary Care Provider: Darlene Campoverde NP        Subjective:     Principal Problem:Obstructive pneumonia        HPI:    Mayra Kelly is a 73-year-old female with history of lung cancer, COPD, pulmonary HTN, and a-fib. She initially presented to [facility] with acute-on-chronic respiratory failure secondary to obstructive pneumonia. This was treated with extended course of broad-spectrum IV antibiotics with some improvement. Patient underwent rigid bronchoscopy with bronchial lavage, dilation, and stent by Dr. Castellon 8/14/24. Patient tolerated procedure well with improvement in respiratory status. CXR 8/15 revealed stent has remained patent however will require aggressive respiratory therapy and continued antibiotics. Patient subsequently admitted to [facility] for continued IV antibiotics, scheduled respiratory therapy, and physical rehabilitation.             Overview/Hospital Course:        08/17-  Records reviewed. Presented 08/07 to [facility]  withincrease SOB, cough and fatique. Some weight loss. Noted on evaluation worse CXR and CT chest. Hx treated adenoCa in 2018. Last Nov underwent evaluation by Dr Munguia. Seen then again also by Dr Kraft. Dx new primary squamous cell CA. Treated chemotx. In April saw Dr Kraft and started on Keytruda and given through infusion center. Has continued this through July. Missed her appt with Dr Kraft in May. This information mainly from Dr Kraft in pt was not good historian. Pt continued to smoke 1/2 ppd; encourage to stop. Dr Whitmore and Dr Kraft ask to consult. Requires 50% ventimask to keep O2 sats in low 90s. At home been prior on 2L BNC O2. PHMx: also Eliquis for PAF. Now NSR.  Treated for pneumonia.  Seen during stay by   Abena and pulmonary. Patient underwent rigid bronchoscopy with bronchial lavage, dilation, and stent by Dr. Castellon 8/14/24.  Admitted to [facility] 08/15 for continued treatment and general strengthening by therapy team.  Patient looks better than when last seen by me.  Seems to be in good spirits.   08/18 continues to feel better without new complaints.  To work with therapy.  Treatment includes vancomycin for MRSA with positive respiratory culture from 08/14.         No new subjective & objective note has been filed under this hospital service since the last note was generated.      Assessment/Plan:      * Obstructive pneumonia  Patient has a diagnosis of pneumonia. The cause of the pneumonia is suspected to be bacterial in etiology but organism is not known. The pneumonia is improving. The patient has the following signs/symptoms of pneumonia: persistent hypoxia , cough, and shortness of breath. The patient does have a current oxygen requirement and the patient does have a home oxygen requirement. I have reviewed the pertinent imaging. The following cultures have been collected: Bronchial lavage The culture results are listed below.     Current antimicrobial regimen consists of the antibiotics listed below. Will monitor patient closely and continue current treatment plan unchanged.    Recommend antibiotics until at least 8/22 (total 14 days therapy).    Antibiotics (From admission, onward)      Start     Stop Route Frequency Ordered    08/16/24 1632  vancomycin - pharmacy to dose         -- IV pharmacy to manage frequency 08/16/24 1534    08/16/24 1630  vancomycin (VANCOCIN) 1,000 mg in D5W 250 mL IVPB         -- IV Every 18 hours 08/16/24 1534    08/15/24 1600  piperacillin-tazobactam (ZOSYN) 4.5 g in D5W 100 mL IVPB (MB+)         -- IV Every 8 hours (non-standard times) 08/15/24 1036    08/15/24 1230  mupirocin 2 % ointment         08/20/24 0859 Nasl 2 times daily 08/15/24 1127             Microbiology Results (last 7 days)       ** No results found for the last 168 hours. **          Rigid bronchoscopy 8/14 with stent placed. Scheduled Duo Nebs and hypertonic saline in attempt to keep stent patent. Pulmonology managing.      08/18 covering for MRSA pneumonia with positive lower respiratory culture from 08/14    MRSA infection  Positive culture from 08/14 in lower respiratory tract for MRSA  Covering with vancomycin for MRSA pneumonia      Neoplastic malignant related fatigue  With general debility and failure to thrive. Currently requiring assistance for most ADLs. Will continue physical therapy this admission however her home situation is not ideal. She will likely need to consider long-term placement upon discharge.     08/18 much improved from when I had seen patient previous      Severe protein-calorie malnutrition  Nutrition consulted. Most recent weight and BMI monitored-     Measurements:  Wt Readings from Last 1 Encounters:   08/15/24 51.8 kg (114 lb 3.2 oz)   Body mass index is 18.43 kg/m².    Patient has been screened and assessed by RD.    Malnutrition Type:  Context:    Level:      Malnutrition Characteristic Summary:       Interventions/Recommendations (treatment strategy):         Paroxysmal atrial fibrillation  Patient with Paroxysmal (<7 days) atrial fibrillation which is controlled currently with  no medications . Patient is currently in sinus rhythm.[id number] Score: 2. HASBLED Score: 1. Anticoagulation indicated. Anticoagulation done with Eliquis - held for bronchoscopy w/biopsy and stent, resume as appropriate .    COPD exacerbation  Patient's COPD is controlled currently.  Patient is currently off COPD Pathway. Continue scheduled inhalers Steroids, Antibiotics, and Supplemental oxygen and monitor respiratory status closely.     Malignant neoplasm of right lung  Known to Dr. Kraft.     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 She received a few cycles of weekly chemotherapy around February or March in anticipation of radiation but radiation was not performed. Sidenote she received radiation chemotherapy as well as immunotherapy 2018.    She was last seen by me in April and the office and had a no-show from May....She has continued to receive immunotherapy with Keytruda IVQ6 weeks beginning in April with last dose in early July.    08/17 can clarify treatment options if any by Dr. Kraft, Patient does look better than when last seen    Acute on chronic respiratory failure with hypoxia and hypercapnia  Patient with Hypercapnic and Hypoxic Respiratory failure which is Acute on chronic. Supplemental oxygen was provided and noted- Oxygen Concentration (%):  [36] 36    Signs/symptoms of respiratory failure include- increased work of breathing. Contributing diagnoses includes - COPD, Pneumonia, and lung cancer  Labs and images were reviewed. Patient Has not had a recent ABG. Will treat underlying causes and adjust management of respiratory failure as follows- continue supplemental O2 to maintain saturation >88%, scheduled nebulizer therapy.    Cigarette nicotine dependence with nicotine-induced disorder  Dangers of cigarette smoking were reviewed with patient in detail. Patient was Counseled for 3-10 minutes. Nicotine replacement options were discussed. Nicotine replacement was discussed- prescribed      VTE Risk Mitigation (From admission, onward)           Ordered     enoxaparin injection 40 mg  Every 24 hours         08/15/24 1036                    Discharge Planning   TERE:      Code Status: Partial Code   Is the patient medically ready for discharge?:     Reason for patient still in hospital (select all that apply): Laboratory test, Treatment, Imaging, and PT / OT recommendations  Discharge Plan A: Rehab                  Mark Barraza MD  Department of Hospital Medicine   [facility]

## 2024-08-18 NOTE — ASSESSMENT & PLAN NOTE
With general debility and failure to thrive. Currently requiring assistance for most ADLs. Will continue physical therapy this admission however her home situation is not ideal. She will likely need to consider long-term placement upon discharge.     08/18 much improved from when I had seen patient previous

## 2024-08-19 PROBLEM — J43.2 CENTRILOBULAR EMPHYSEMA: Status: ACTIVE | Noted: 2024-08-19

## 2024-08-19 LAB — VANCOMYCIN TROUGH SERPL-MCNC: 8.5 ΜG/ML (ref 10–20)

## 2024-08-19 PROCEDURE — 94640 AIRWAY INHALATION TREATMENT: CPT

## 2024-08-19 PROCEDURE — 25000003 PHARM REV CODE 250: Performed by: FAMILY MEDICINE

## 2024-08-19 PROCEDURE — 99900035 HC TECH TIME PER 15 MIN (STAT)

## 2024-08-19 PROCEDURE — 94761 N-INVAS EAR/PLS OXIMETRY MLT: CPT

## 2024-08-19 PROCEDURE — 99232 SBSQ HOSP IP/OBS MODERATE 35: CPT | Mod: ,,, | Performed by: HOSPITALIST

## 2024-08-19 PROCEDURE — 63600175 PHARM REV CODE 636 W HCPCS: Performed by: FAMILY MEDICINE

## 2024-08-19 PROCEDURE — 11000008

## 2024-08-19 PROCEDURE — S4991 NICOTINE PATCH NONLEGEND: HCPCS | Performed by: FAMILY MEDICINE

## 2024-08-19 PROCEDURE — 27000221 HC OXYGEN, UP TO 24 HOURS

## 2024-08-19 PROCEDURE — 94668 MNPJ CHEST WALL SBSQ: CPT

## 2024-08-19 PROCEDURE — 63600175 PHARM REV CODE 636 W HCPCS: Performed by: HOSPITALIST

## 2024-08-19 PROCEDURE — 80202 ASSAY OF VANCOMYCIN: CPT | Performed by: FAMILY MEDICINE

## 2024-08-19 PROCEDURE — 97530 THERAPEUTIC ACTIVITIES: CPT | Mod: CQ

## 2024-08-19 PROCEDURE — 97110 THERAPEUTIC EXERCISES: CPT | Mod: CQ

## 2024-08-19 PROCEDURE — 25000003 PHARM REV CODE 250: Performed by: HOSPITALIST

## 2024-08-19 PROCEDURE — 25000242 PHARM REV CODE 250 ALT 637 W/ HCPCS: Performed by: FAMILY MEDICINE

## 2024-08-19 RX ADMIN — POTASSIUM CHLORIDE 20 MEQ: 20 TABLET, EXTENDED RELEASE ORAL at 09:08

## 2024-08-19 RX ADMIN — PIPERACILLIN SODIUM AND TAZOBACTAM SODIUM 4.5 G: 4; .5 INJECTION, POWDER, LYOPHILIZED, FOR SOLUTION INTRAVENOUS at 12:08

## 2024-08-19 RX ADMIN — PIPERACILLIN SODIUM AND TAZOBACTAM SODIUM 4.5 G: 4; .5 INJECTION, POWDER, LYOPHILIZED, FOR SOLUTION INTRAVENOUS at 08:08

## 2024-08-19 RX ADMIN — IPRATROPIUM BROMIDE AND ALBUTEROL SULFATE 3 ML: 2.5; .5 SOLUTION RESPIRATORY (INHALATION) at 03:08

## 2024-08-19 RX ADMIN — MUPIROCIN: 20 OINTMENT TOPICAL at 09:08

## 2024-08-19 RX ADMIN — ENOXAPARIN SODIUM 40 MG: 40 INJECTION SUBCUTANEOUS at 05:08

## 2024-08-19 RX ADMIN — BUPROPION HYDROCHLORIDE 100 MG: 100 TABLET, FILM COATED, EXTENDED RELEASE ORAL at 08:08

## 2024-08-19 RX ADMIN — DEXTROSE MONOHYDRATE: 12500 INJECTION, SOLUTION INTRAVENOUS at 05:08

## 2024-08-19 RX ADMIN — IPRATROPIUM BROMIDE AND ALBUTEROL SULFATE 3 ML: 2.5; .5 SOLUTION RESPIRATORY (INHALATION) at 12:08

## 2024-08-19 RX ADMIN — DEXTROSE MONOHYDRATE: 12500 INJECTION, SOLUTION INTRAVENOUS at 12:08

## 2024-08-19 RX ADMIN — PREDNISONE 40 MG: 20 TABLET ORAL at 08:08

## 2024-08-19 RX ADMIN — DEXTROSE MONOHYDRATE: 12500 INJECTION, SOLUTION INTRAVENOUS at 04:08

## 2024-08-19 RX ADMIN — VANCOMYCIN HYDROCHLORIDE 1000 MG: 1 INJECTION, POWDER, LYOPHILIZED, FOR SOLUTION INTRAVENOUS at 05:08

## 2024-08-19 RX ADMIN — SODIUM CHLORIDE SOLN NEBU 3% 4 ML: 3 NEBU SOLN at 12:08

## 2024-08-19 RX ADMIN — VANCOMYCIN HYDROCHLORIDE 1000 MG: 1 INJECTION, POWDER, LYOPHILIZED, FOR SOLUTION INTRAVENOUS at 04:08

## 2024-08-19 RX ADMIN — NICOTINE 1 PATCH: 14 PATCH, EXTENDED RELEASE TRANSDERMAL at 08:08

## 2024-08-19 RX ADMIN — IPRATROPIUM BROMIDE AND ALBUTEROL SULFATE 3 ML: 2.5; .5 SOLUTION RESPIRATORY (INHALATION) at 07:08

## 2024-08-19 RX ADMIN — SODIUM CHLORIDE SOLN NEBU 3% 4 ML: 3 NEBU SOLN at 03:08

## 2024-08-19 RX ADMIN — MELATONIN 6 MG: 3 TAB ORAL at 09:08

## 2024-08-19 RX ADMIN — PIPERACILLIN SODIUM AND TAZOBACTAM SODIUM 4.5 G: 4; .5 INJECTION, POWDER, LYOPHILIZED, FOR SOLUTION INTRAVENOUS at 05:08

## 2024-08-19 RX ADMIN — BUPROPION HYDROCHLORIDE 100 MG: 100 TABLET, FILM COATED, EXTENDED RELEASE ORAL at 09:08

## 2024-08-19 RX ADMIN — POTASSIUM CHLORIDE 20 MEQ: 20 TABLET, EXTENDED RELEASE ORAL at 08:08

## 2024-08-19 RX ADMIN — SODIUM CHLORIDE SOLN NEBU 3% 4 ML: 3 NEBU SOLN at 07:08

## 2024-08-19 RX ADMIN — MUPIROCIN: 20 OINTMENT TOPICAL at 08:08

## 2024-08-19 NOTE — PT/OT/SLP PROGRESS
Physical Therapy Treatment    Patient Name:  Mayra Kelly   MRN:  [id number]    Recommendations:     Discharge Recommendations: Moderate Intensity Therapy  Discharge Equipment Recommendations: other (see comments) (to be determined)  Barriers to discharge:  ongoing medical tratment    Assessment:     Mayra Kelly is a 73 y.o. female admitted with a medical diagnosis of Obstructive pneumonia.  She presents with the following impairments/functional limitations: weakness, impaired endurance, impaired cognition, impaired self care skills, impaired functional mobility, decreased safety awareness, impaired skin, edema.    Pt able to assist with exercise and transfer to recliner chair with RW much better than anticipated.  Pt's cognition is the primary limiting factor    PT POC discussed with Dale Morin,PT     Rehab Prognosis: Fair; patient would benefit from acute skilled PT services to address these deficits and reach maximum level of function.    Recent Surgery: * No surgery found *      Plan:     During this hospitalization, patient to be seen 5 x/week to address the identified rehab impairments via gait training, therapeutic activities, therapeutic exercises, neuromuscular re-education and progress toward the following goals:    Plan of Care Expires:  09/16/24    Subjective     Chief Complaint: obstructive pneumonia  Patient/Family Comments/goals: pt agreeable  Pain/Comfort:         Objective:     Communicated with Sydnee Goodwin RN prior to session.  Patient found HOB elevated with oxygen, blood pressure cuff, telemetry, peripheral IV, pulse ox (continuous), PureWick upon PT entry to room.     General Precautions: Standard, fall  Orthopedic Precautions: N/A  Braces: N/A  Respiratory Status: Nasal cannula, flow 4 L/min     Functional Mobility:  Bed Mobility:     Supine to Sit: minimum assistance and assist with B LE management  Transfers:     Sit to Stand:  minimum assistance and of 1-2 persons with  rolling walker and verbal cues for hands placemnt  Bed to Chair: minimum assistance and of 2 persons with  rolling walker  using  Step Transfer      AM-PAC 6 CLICK MOBILITY  Turning over in bed (including adjusting bedclothes, sheets and blankets)?: 3  Sitting down on and standing up from a chair with arms (e.g., wheelchair, bedside commode, etc.): 3  Moving from lying on back to sitting on the side of the bed?: 3  Moving to and from a bed to a chair (including a wheelchair)?: 3  Need to walk in hospital room?: 3 (steps around to chair only)  Climbing 3-5 steps with a railing?: 1  Basic Mobility Total Score: 16       Treatment & Education:  Pt performed 2 x 15 reps (B) LE exercises: ap, quad set, glut set, straight leg raise, hip ab/adduction, long arc quad, heel slide with active assist and verbal cueing to remain on task    Contact guard assist sitting EOB x 8 minutes    Patient left up in chair with all lines intact, call button in reach, and chair alarm on..    GOALS:   Multidisciplinary Problems       Physical Therapy Goals          Problem: Physical Therapy    Goal Priority Disciplines Outcome Goal Variances Interventions   Physical Therapy Goal     PT, PT/OT Progressing     Description: Short Term Goals to be met by: 24    Patient will increase functional independence with mobility by performin. Supine to sit with Stand by assist  2. Sit to stand transfer with contact guard assist using Rolling walker  3. Bed to chair transfer with contact guard assist using Rolling walker  4. Gait  x 100 feet with contact guard assist using Rolling walker  5. Lower extremity exercise program x30 reps per handout, with assistance as needed  6. Pt to negotiate 3 steps with rails with CGA.     Long Term Goals to be met by: 24    Pt will regain full independent functional mobility with Rolling walker to return to home situation and prior activities of daily living.                        Time Tracking:     PT  Received On: 08/19/24  PT Start Time: 1321     PT Stop Time: 1353  PT Total Time (min): 32 min     Billable Minutes: Therapeutic Activity 15 and Therapeutic Exercise 15    Treatment Type: Treatment  PT/PTA: PTA     Number of PTA visits since last PT visit: 1 08/19/2024

## 2024-08-19 NOTE — ASSESSMENT & PLAN NOTE
Status-post bronchoscopy with bronchoalveolar lavage, balloon dilation and placement of endobronchial stent earlier today and tolerated well.     Pulmonology managing, strict orders for scheduled DuoNebs and hypertonic saline in an attempt to keep the stent patent.Goal 4-5L NC to maintain O2 >88%.    [facility]  ------  Patient has a diagnosis of pneumonia. The cause of the pneumonia is suspected to be bacterial in etiology but organism is not known. The pneumonia is stable. The patient has the following signs/symptoms of pneumonia: persistent hypoxia , cough, and shortness of breath. The patient does have a current oxygen requirement and the patient does have a home oxygen requirement. I have reviewed the pertinent imaging. The following cultures have been collected: Blood cultures The culture results are listed below.     Current antimicrobial regimen consists of the antibiotics listed below. Will monitor patient closely and continue current treatment plan unchanged.    Antibiotics (From admission, onward)      None            Microbiology Results (last 7 days)       Procedure Component Value Units Date/Time    Gram Stain [[id number]] Collected: 08/14/24 1244    Order Status: Completed Specimen: Respiratory from Bronchial Alveolar Lavage (BAL) Updated: 08/14/24 1550     Gram Stain Result Moderate WBC observed      No organisms seen    Blood culture x two cultures. Draw prior to antibiotics. [[id number]] Collected: 08/07/24 2254    Order Status: Completed Specimen: Blood Updated: 08/13/24 0615     Culture, Blood No Growth at 5 days    Blood culture x two cultures. Draw prior to antibiotics. [[id number]] Collected: 08/07/24 2251    Order Status: Completed Specimen: Blood Updated: 08/13/24 0615     Culture, Blood No Growth at 5 days          MRSA PCR negative

## 2024-08-19 NOTE — PLAN OF CARE
Problem: Adult Inpatient Plan of Care  Goal: Plan of Care Review  Outcome: Progressing  Goal: Patient-Specific Goal (Individualized)  Outcome: Progressing  Goal: Absence of Hospital-Acquired Illness or Injury  Outcome: Progressing  Goal: Optimal Comfort and Wellbeing  Outcome: Progressing  Goal: Readiness for Transition of Care  Outcome: Progressing     Problem: Pneumonia  Goal: Fluid Balance  Outcome: Progressing  Goal: Resolution of Infection Signs and Symptoms  Outcome: Progressing  Goal: Effective Oxygenation and Ventilation  Outcome: Progressing     Problem: Wound  Goal: Optimal Coping  Outcome: Progressing  Goal: Optimal Functional Ability  Outcome: Progressing  Goal: Absence of Infection Signs and Symptoms  Outcome: Progressing  Goal: Improved Oral Intake  Outcome: Progressing  Goal: Optimal Pain Control and Function  Outcome: Progressing  Goal: Skin Health and Integrity  Outcome: Progressing  Goal: Optimal Wound Healing  Outcome: Progressing     Problem: Fall Injury Risk  Goal: Absence of Fall and Fall-Related Injury  Outcome: Progressing     Problem: Gas Exchange Impaired  Goal: Optimal Gas Exchange  Outcome: Progressing     Problem: Breathing Pattern Ineffective  Goal: Effective Breathing Pattern  Outcome: Progressing     Problem: Skin Injury Risk Increased  Goal: Skin Health and Integrity  Outcome: Progressing

## 2024-08-19 NOTE — CONSULTS
Pharmacokinetic Assessment Follow Up: IV Vancomycin    Vancomycin serum concentration assessment(s):    The trough level was drawn correctly and can be used to guide therapy at this time. The measurement is below the desired definitive target range of 15 to 20 mcg/mL.    Vancomycin Regimen Plan:    Change regimen to Vancomycin 1000 mg IV every 12 hours with next serum trough concentration measured at 15:30 prior to 4th dose on 8/20    Drug levels (last 3 results):  Recent Labs   Lab Result Units 08/19/24  0207   Vancomycin, Trough µg/mL 8.5*       Pharmacy will continue to follow and monitor vancomycin.    Please contact pharmacy at extension [phone] for questions regarding this assessment.    Thank you for the consult,   Mariana Jones       Patient brief summary:  Mayra Kelly is a 73 y.o. female initiated on antimicrobial therapy with IV Vancomycin for treatment of lower respiratory infection    The patient's current regimen is 1 gm IVPB Q 12 hours    Drug Allergies:   Review of patient's allergies indicates:  No Known Allergies    Actual Body Weight:   52kg    Renal Function:   Estimated Creatinine Clearance: 57.8 mL/min (based on SCr of 0.72 mg/dL).,         CBC (last 72 hours):  Recent Labs   Lab Result Units 08/17/24  0459   WBC K/uL 10.01   Hemoglobin g/dL 12.1   Hematocrit % 39.7   Platelet Count K/uL 390   Lymphocytes % % 10.4*   Monocytes % % 5.3   Eosinophils % % 0.9*   Basophils % % 0.1   Diff Type  Auto       Metabolic Panel (last 72 hours):  Recent Labs   Lab Result Units 08/17/24  0459   Sodium mmol/L 136   Potassium mmol/L 4.2   Chloride mmol/L 101   CO2 mmol/L 34*   Glucose mg/dL 106   BUN mg/dL 22*   Creatinine mg/dL 0.72       Vancomycin Administrations:  vancomycin given in the last 96 hours                     vancomycin (VANCOCIN) 1,000 mg in D5W 250 mL IVPB ()  Restarted 08/19/24 0535     1,000 mg New Bag  0404      Restarted 08/18/24 0908     1,000 mg New Bag  0859     1,000 mg New Bag  08/17/24 1423     1,000 mg New Bag 08/16/24 1638                    Microbiologic Results:  Microbiology Results (last 7 days)       ** No results found for the last 168 hours. **

## 2024-08-19 NOTE — PROGRESS NOTES
Patient seen and examine this morning in room 111. Status post right bronchial stent last week. She has a nicotine patch in place and has gone now for around two weeks without a cigarette. She is still overall weak and frail on examination. She is receiving both Vanc and zosyn for MRSA sputum findings. OK to access right med port. Oncology plan generally unchanged with patient to improve strength and then reevaluate for systemic treatment as outpatient.

## 2024-08-19 NOTE — ASSESSMENT & PLAN NOTE
Patient with Paroxysmal (<7 days) atrial fibrillation which is controlled currently with Beta Blocker. Patient is currently in sinus rhythm.[id number] Score: 2. HASBLED Score: 2. Anticoagulation indicated. Anticoagulation done with eliquis . Eliquis hold if procedures needed

## 2024-08-19 NOTE — DISCHARGE SUMMARY
[facility]  [facility] Medicine  Discharge Summary      Patient Name: Mayra Kelly  MRN: [id number]  HAIDER: [id number]  Patient Class: IP- Inpatient  Admission Date: 8/7/2024  Hospital Length of Stay: 7 days  Discharge Date and Time: 8/15/2024 10:17 AM  Attending Physician: No att. providers found   Discharging Provider: Yeni Jones DO  Primary Care Provider: Darlene Campoverde NP    Primary Care Team: Networked reference to record PCT     HPI:   74 yo F presents to [facility] ED with increasing dyspnea.  She has had a cough productive of whitish sputum but no hemoptysis.  She has been worsening over the past three days but no orthopnea or edema.  No chest pain or palpitations.  She does take eliquis for PAF and is currently in sinus tach.  Initial trop unremarkable and second is pending for the trend.  Her proBNP is now about 37K which is up from her baseline of 650 and with the RBBB could think PTE but is on full dose eliquis.  CT was negative for PTE but she has bilateral hilar masses with right greater than left with multilobar pneumonia.  She does meet sepsis criteria with WBC, tachycardia and JAMI (creat usually 0.9 and now 1.4) but lactic acid is normal.      Patient states that she was treated for lung cancer [id number] with XRT and chemo and though her FOBT with lymph node and endobronchial biopsy not diagnostic for malignancy in 2023, she was eventually diagnosed with lung cancer and follow with Dr. Kraft for chemo every six weeks.  COVID is negative and MRSA pcr nares swab pending.      Remainder of ROS as below.   See assessment and plan below for problem based evaluation      * No surgery found *      Hospital Course:   Hx treated adenoCa in 2018. Last Nov underwent evaluation by Dr Munguia. Seen then again also by Dr Kraft. Dx new primary squamous cell CA. Treated chemotx. In April saw Dr Kraft and started on Keytruda and given through infusion center. Has continued this through July.  Missed her appt with Dr Kraft in May. This information mainly from Dr Kraft in pt was not good historian. Pt continued to smoke 1/2 ppd; encourage to stop. Dr Whitmore and Dr Kraft ask to consult. Requires 50% ventimask to keep O2 sats in low 90s. At home been prior on 2L BNC O2. PHMx: also Eliquis for PAF. Now NSR.     Code status discussed this admission and pt changed her mind about DNR, Discussed with nurse in room and pt now wishes to be DNI.    Plan for EBUS on Wednesday 8/14 with disposition to be determined.          Goals of Care Treatment Preferences:  Code Status: Partial Code          What is most important right now is to focus on spending time at home, remaining as independent as possible, symptom/pain control, quality of life, even if it means sacrificing a little time.  Accordingly, we have decided that the best plan to meet the patient's goals includes continuing with treatment.      SDOH Screening:  The patient was screened for utility difficulties, food insecurity, transport difficulties, housing insecurity, and interpersonal safety and there were no concerns identified this admission.     Consults:   Consults (From admission, onward)          Status Ordering Provider     Inpatient consult to Social Work  Once        Provider:  (Not yet assigned)    Completed NANCY LARKIN            Pulmonary  * Obstructive pneumonia  Status-post bronchoscopy with bronchoalveolar lavage, balloon dilation and placement of endobronchial stent earlier today and tolerated well.     Pulmonology managing, strict orders for scheduled DuoNebs and hypertonic saline in an attempt to keep the stent patent.Goal 4-5L NC to maintain O2 >88%.    [facility]  ------  Patient has a diagnosis of pneumonia. The cause of the pneumonia is suspected to be bacterial in etiology but organism is not known. The pneumonia is stable. The patient has the following signs/symptoms  of pneumonia: persistent hypoxia , cough, and shortness of breath. The patient does have a current oxygen requirement and the patient does have a home oxygen requirement. I have reviewed the pertinent imaging. The following cultures have been collected: Blood cultures The culture results are listed below.     Current antimicrobial regimen consists of the antibiotics listed below. Will monitor patient closely and continue current treatment plan unchanged.    Antibiotics (From admission, onward)      None            Microbiology Results (last 7 days)       Procedure Component Value Units Date/Time    Gram Stain [[id number]] Collected: 08/14/24 1244    Order Status: Completed Specimen: Respiratory from Bronchial Alveolar Lavage (BAL) Updated: 08/14/24 1550     Gram Stain Result Moderate WBC observed      No organisms seen    Blood culture x two cultures. Draw prior to antibiotics. [[id number]] Collected: 08/07/24 2254    Order Status: Completed Specimen: Blood Updated: 08/13/24 0615     Culture, Blood No Growth at 5 days    Blood culture x two cultures. Draw prior to antibiotics. [[id number]] Collected: 08/07/24 2251    Order Status: Completed Specimen: Blood Updated: 08/13/24 0615     Culture, Blood No Growth at 5 days          MRSA PCR negative    COPD exacerbation  Patient's COPD is with exacerbation noted by continued dyspnea and worsening of baseline hypoxia currently.  Patient is currently on COPD Pathway. Continue scheduled inhalers Antibiotics and Supplemental oxygen and monitor respiratory status closely.     If scheduled bronchodilators and prn bronchodilators due not improve respiratory status may need to add steroids but would be cautious in due to pneumonia most likely caused by obstruction from bilateral hilar masses  (R>L)    Cardiac/Vascular  Paroxysmal atrial fibrillation  Patient with Paroxysmal (<7 days) atrial fibrillation which is controlled currently with Beta Blocker. Patient is currently in  sinus rhythm.[id number] Score: 2. HASBLED Score: 2. Anticoagulation indicated. Anticoagulation done with eliquis . Eliquis hold if procedures needed    Oncology  Malignant neoplasm of right lung  Follows with Dr. Kraft.  Has chemo every six weeks.  Mediport looks good without tenderness or erythema.  She says she never had it taken out when she finished chemo the first time in 2019.  It has always worked fine.      08/08 Talked with Dr Kraft. Treated 2018 for adenoCa and earlier this year for squamous cell ca both of lung. Last Nov underwent evaluation by Dr Munguia. Seen then again also by Dr Kraft. Dx new primary squamous cell CA. Treated chemotx. In April saw Dr Kraft and started on Keytruda and given through infusion center. Has continued this through July. Missed her appt with Dr Kraft in May    Endocrine  Severe protein-calorie malnutrition  Nutrition consulted. Most recent weight and BMI monitored-     Measurements:  Wt Readings from Last 1 Encounters:   08/15/24 51.8 kg (114 lb 3.2 oz)   Body mass index is 16.95 kg/m².    Patient has been screened and assessed by RD.    Malnutrition Type:  Context: chronic illness  Level: severe    Malnutrition Characteristic Summary:  Energy Intake (Malnutrition): less than or equal to 50% for greater than or equal to 1 month  Subcutaneous Fat (Malnutrition): severe depletion  Muscle Mass (Malnutrition): severe depletion    Interventions/Recommendations (treatment strategy):  Recommend consider liberalizing diet to a Regular diet as appropriate. Encourage good PO intakes.    Per dietary:    Per ASPEN guidelines, patient meets criteria for severe protein-calorie malnutrition as evidenced by poor PO intakes <50% for greater than one month and severe fat and muscle depletion to orbital, upper arm, temple, and clavicle regions.     Prealbumin 3 and BMI 16       Other  Cigarette nicotine dependence with nicotine-induced disorder  Dangers of cigarette smoking were reviewed with patient  in detail. Patient was Counseled for 3-10 minutes. Nicotine replacement options were discussed. Nicotine replacement was discussed- prescribed      Final Active Diagnoses:    Diagnosis Date Noted POA    PRINCIPAL PROBLEM:  Obstructive pneumonia [J18.9] 08/08/2024 Yes    Severe protein-calorie malnutrition [E43] 08/09/2024 Yes    Acute on chronic respiratory failure with hypoxia and hypercapnia [J96.21, J96.22] 08/08/2024 Yes    Pulmonary hypertension [I27.20] 08/08/2024 Yes    Paroxysmal atrial fibrillation [I48.0] 08/08/2024 Yes    Malignant neoplasm of right lung [C34.91]  Yes    COPD exacerbation [J44.1]  Yes    Right bundle branch block [I45.10] 12/11/2023 Yes    Cigarette nicotine dependence with nicotine-induced disorder [F17.219] 11/09/2023 Yes      Problems Resolved During this Admission:    Diagnosis Date Noted Date Resolved POA    JAMI (acute kidney injury) [N17.9] 08/08/2024 08/12/2024 Yes       Discharged Condition: fair    Disposition: Long Term Acute Care    Follow Up:   Contact information for after-discharge care       Destination       [facility] .    Service: Long Term Acute Care  Contact information:  [address] [place] [id number]  [phone]                                 Patient Instructions:      Reason for not Ordering Smoking Cessation Referral     Order Specific Question Answer Comments   Reason for not ordering: Patient refused      Reason for not Prescribing Nicotine Replacement     Order Specific Question Answer Comments   Reason for not Prescribing: Patient refused        Significant Diagnostic Studies: N/A    Pending Diagnostic Studies:       Procedure Component Value Units Date/Time    EXTRA TUBES [[id number]] Collected: 08/14/24 0545    Order Status: Sent Lab Status: In process Updated: 08/14/24 0608    Specimen: Blood, Venous     Narrative:      The following orders were created for panel order EXTRA TUBES.  Procedure                                Abnormality         Status                     ---------                               -----------         ------                     Lavender Top Hold[[id number]]                               In process                   Please view results for these tests on the individual orders.           Medications:  Reconciled Home Medications:      Medication List        CONTINUE taking these medications      * albuterol 90 mcg/actuation inhaler  Commonly known as: PROVENTIL/VENTOLIN HFA  Inhale 2 puffs into the lungs every 4 (four) hours as needed.     * albuterol sulfate 2.5 mg/0.5 mL Nebu  Take 2.5 mg by nebulization every 6 (six) hours as needed (Shortness of breath). Rescue     amLODIPine 2.5 MG tablet  Commonly known as: NORVASC  Take 1 tablet (2.5 mg total) by mouth once daily.     ELIQUIS 5 mg Tab  Generic drug: apixaban  Take 1 tablet (5 mg total) by mouth 2 (two) times daily.     furosemide 40 MG tablet  Commonly known as: LASIX  Take 1 tablet (40 mg total) by mouth once daily.     potassium chloride SA 20 MEQ tablet  Commonly known as: K-DUR,KLOR-CON  Take 1 tablet (20 mEq total) by mouth once daily.     TRELEGY ELLIPTA 200-62.5-25 mcg inhaler  Generic drug: fluticasone-umeclidin-vilanter  Inhale 1 puff into the lungs once daily.     vitamin D 1000 units Tab  Commonly known as: VITAMIN D3  Take 1,000 Units by mouth once daily.     vitamin E 400 UNIT capsule  Take 400 Units by mouth once daily.           * This list has 2 medication(s) that are the same as other medications prescribed for you. Read the directions carefully, and ask your doctor or other care provider to review them with you.                  Indwelling Lines/Drains at time of discharge:   Lines/Drains/Airways       None                   Time spent on the discharge of patient: 40 minutes         Yeni Jones DO  Department of Hospital Medicine  [facility]

## 2024-08-19 NOTE — ASSESSMENT & PLAN NOTE
Nutrition consulted. Most recent weight and BMI monitored-     Measurements:  Wt Readings from Last 1 Encounters:   08/15/24 51.8 kg (114 lb 3.2 oz)   Body mass index is 16.95 kg/m².    Patient has been screened and assessed by RD.    Malnutrition Type:  Context: chronic illness  Level: severe    Malnutrition Characteristic Summary:  Energy Intake (Malnutrition): less than or equal to 50% for greater than or equal to 1 month  Subcutaneous Fat (Malnutrition): severe depletion  Muscle Mass (Malnutrition): severe depletion    Interventions/Recommendations (treatment strategy):  Recommend consider liberalizing diet to a Regular diet as appropriate. Encourage good PO intakes.    Per dietary:    Per ASPEN guidelines, patient meets criteria for severe protein-calorie malnutrition as evidenced by poor PO intakes <50% for greater than one month and severe fat and muscle depletion to orbital, upper arm, temple, and clavicle regions.     Prealbumin 3 and BMI 16

## 2024-08-20 LAB
ANION GAP SERPL CALCULATED.3IONS-SCNC: 8 MMOL/L (ref 7–16)
BUN SERPL-MCNC: 25 MG/DL (ref 7–18)
BUN/CREAT SERPL: 35 (ref 6–20)
CALCIUM SERPL-MCNC: 8.9 MG/DL (ref 8.5–10.1)
CHLORIDE SERPL-SCNC: 101 MMOL/L (ref 98–107)
CO2 SERPL-SCNC: 33 MMOL/L (ref 21–32)
CREAT SERPL-MCNC: 0.72 MG/DL (ref 0.55–1.02)
EGFR (NO RACE VARIABLE) (RUSH/TITUS): 89 ML/MIN/1.73M2
GLUCOSE SERPL-MCNC: 58 MG/DL (ref 74–106)
POTASSIUM SERPL-SCNC: 4.4 MMOL/L (ref 3.5–5.1)
SODIUM SERPL-SCNC: 138 MMOL/L (ref 136–145)
VANCOMYCIN TROUGH SERPL-MCNC: 32 ΜG/ML (ref 10–20)

## 2024-08-20 PROCEDURE — 94668 MNPJ CHEST WALL SBSQ: CPT

## 2024-08-20 PROCEDURE — 99900035 HC TECH TIME PER 15 MIN (STAT)

## 2024-08-20 PROCEDURE — 94761 N-INVAS EAR/PLS OXIMETRY MLT: CPT

## 2024-08-20 PROCEDURE — 97530 THERAPEUTIC ACTIVITIES: CPT | Mod: CQ

## 2024-08-20 PROCEDURE — 99232 SBSQ HOSP IP/OBS MODERATE 35: CPT | Mod: ,,, | Performed by: HOSPITALIST

## 2024-08-20 PROCEDURE — S4991 NICOTINE PATCH NONLEGEND: HCPCS | Performed by: FAMILY MEDICINE

## 2024-08-20 PROCEDURE — 25000242 PHARM REV CODE 250 ALT 637 W/ HCPCS: Performed by: FAMILY MEDICINE

## 2024-08-20 PROCEDURE — 63600175 PHARM REV CODE 636 W HCPCS: Performed by: FAMILY MEDICINE

## 2024-08-20 PROCEDURE — 36415 COLL VENOUS BLD VENIPUNCTURE: CPT | Performed by: HOSPITALIST

## 2024-08-20 PROCEDURE — 11000008

## 2024-08-20 PROCEDURE — 27000221 HC OXYGEN, UP TO 24 HOURS

## 2024-08-20 PROCEDURE — 25000003 PHARM REV CODE 250: Performed by: FAMILY MEDICINE

## 2024-08-20 PROCEDURE — 80048 BASIC METABOLIC PNL TOTAL CA: CPT | Performed by: HOSPITALIST

## 2024-08-20 PROCEDURE — 80202 ASSAY OF VANCOMYCIN: CPT | Performed by: HOSPITALIST

## 2024-08-20 PROCEDURE — 97110 THERAPEUTIC EXERCISES: CPT | Mod: CO

## 2024-08-20 PROCEDURE — 94640 AIRWAY INHALATION TREATMENT: CPT

## 2024-08-20 PROCEDURE — 63600175 PHARM REV CODE 636 W HCPCS: Performed by: HOSPITALIST

## 2024-08-20 PROCEDURE — 25000003 PHARM REV CODE 250: Performed by: HOSPITALIST

## 2024-08-20 RX ADMIN — IPRATROPIUM BROMIDE AND ALBUTEROL SULFATE 3 ML: 2.5; .5 SOLUTION RESPIRATORY (INHALATION) at 03:08

## 2024-08-20 RX ADMIN — PREDNISONE 40 MG: 20 TABLET ORAL at 09:08

## 2024-08-20 RX ADMIN — BUPROPION HYDROCHLORIDE 100 MG: 100 TABLET, FILM COATED, EXTENDED RELEASE ORAL at 08:08

## 2024-08-20 RX ADMIN — POTASSIUM CHLORIDE 20 MEQ: 20 TABLET, EXTENDED RELEASE ORAL at 08:08

## 2024-08-20 RX ADMIN — IPRATROPIUM BROMIDE AND ALBUTEROL SULFATE 3 ML: 2.5; .5 SOLUTION RESPIRATORY (INHALATION) at 12:08

## 2024-08-20 RX ADMIN — ENOXAPARIN SODIUM 40 MG: 40 INJECTION SUBCUTANEOUS at 05:08

## 2024-08-20 RX ADMIN — IPRATROPIUM BROMIDE AND ALBUTEROL SULFATE 3 ML: 2.5; .5 SOLUTION RESPIRATORY (INHALATION) at 07:08

## 2024-08-20 RX ADMIN — BUPROPION HYDROCHLORIDE 100 MG: 100 TABLET, FILM COATED, EXTENDED RELEASE ORAL at 09:08

## 2024-08-20 RX ADMIN — SODIUM CHLORIDE SOLN NEBU 3% 4 ML: 3 NEBU SOLN at 03:08

## 2024-08-20 RX ADMIN — DEXTROSE MONOHYDRATE: 12500 INJECTION, SOLUTION INTRAVENOUS at 04:08

## 2024-08-20 RX ADMIN — VANCOMYCIN HYDROCHLORIDE 1000 MG: 1 INJECTION, POWDER, LYOPHILIZED, FOR SOLUTION INTRAVENOUS at 03:08

## 2024-08-20 RX ADMIN — SODIUM CHLORIDE SOLN NEBU 3% 4 ML: 3 NEBU SOLN at 07:08

## 2024-08-20 RX ADMIN — NICOTINE 1 PATCH: 14 PATCH, EXTENDED RELEASE TRANSDERMAL at 09:08

## 2024-08-20 RX ADMIN — POTASSIUM CHLORIDE 20 MEQ: 20 TABLET, EXTENDED RELEASE ORAL at 09:08

## 2024-08-20 RX ADMIN — SODIUM CHLORIDE SOLN NEBU 3% 4 ML: 3 NEBU SOLN at 12:08

## 2024-08-20 RX ADMIN — DEXTROSE MONOHYDRATE: 12500 INJECTION, SOLUTION INTRAVENOUS at 03:08

## 2024-08-20 NOTE — PROGRESS NOTES
[facility]  Wound Care    Patient Name:  Mayra Kelly   MRN:  [id number]  Date: 8/20/2024  Diagnosis: Obstructive pneumonia    History:     Past Medical History:   Diagnosis Date    Chronic respiratory failure with hypoxia and hypercapnia     on home oxygen but hasn't been using it    Cigarette nicotine dependence with nicotine-induced disorder 11/09/2023    COPD (chronic obstructive pulmonary disease)     Dyshidrotic eczema 08/10/2021    Right bundle branch block 12/11/2023    Scabies     Squamous cell carcinoma of lung     follows with Dr. Kraft       Social History     Socioeconomic History    Marital status:    Tobacco Use    Smoking status: Every Day     Current packs/day: 1.00     Types: Cigarettes    Smokeless tobacco: Never   Substance and Sexual Activity    Alcohol use: Never    Drug use: Never     Social Determinants of Health     Financial Resource Strain: Low Risk  (8/16/2024)    Overall Financial Resource Strain (CARDIA)     Difficulty of Paying Living Expenses: Not hard at all   Food Insecurity: No Food Insecurity (8/16/2024)    Hunger Vital Sign     Worried About Running Out of Food in the Last Year: Never true     Ran Out of Food in the Last Year: Never true   Transportation Needs: No Transportation Needs (8/16/2024)    TRANSPORTATION NEEDS     Transportation : No   Physical Activity: Inactive (8/16/2024)    Exercise Vital Sign     Days of Exercise per Week: 0 days     Minutes of Exercise per Session: 0 min   Stress: No Stress Concern Present (8/16/2024)    [employer] - Occupational Stress Questionnaire     Feeling of Stress : Not at all   Housing Stability: Low Risk  (8/16/2024)    Housing Stability Vital Sign     Unable to Pay for Housing in the Last Year: No     Homeless in the Last Year: No       Precautions:     Allergies as of 08/15/2024    (No Known Allergies)       WOC Assessment Details/Treatment     Narrative: Seen  patient for initiation of preventative skin care measures    Patient in bed, Alert.   Oxygen  in use, No redness noted over ears. Left buttock wound slowly healing, pink, dry open to air. Has Mepliex Foam border to heels and buttocks.   On low air loss mattress  Incontinent of urine at present.     Consult skin care for any skin issues         08/20/2024

## 2024-08-20 NOTE — PT/OT/SLP PROGRESS
Physical Therapy Treatment    Patient Name:  Mayra Kelly   MRN:  [id number]    Recommendations:     Discharge Recommendations: Moderate Intensity Therapy  Discharge Equipment Recommendations: other (see comments) (to be determined)  Barriers to discharge:  ongoing medical treatment    Assessment:     Mayra Kelly is a 73 y.o. female admitted with a medical diagnosis of Obstructive pneumonia.  She presents with the following impairments/functional limitations: weakness, impaired endurance, impaired cognition, impaired self care skills, impaired functional mobility, decreased safety awareness, impaired skin, edema.    Pt cont to present with increased confusion, however, was able to assist with activities    Rehab Prognosis: Fair; patient would benefit from acute skilled PT services to address these deficits and reach maximum level of function.    Recent Surgery: * No surgery found *      Plan:     During this hospitalization, patient to be seen 5 x/week to address the identified rehab impairments via gait training, therapeutic activities, therapeutic exercises, neuromuscular re-education and progress toward the following goals:    Plan of Care Expires:  09/16/24    Subjective     Chief Complaint: obstructive pneumonia  Patient/Family Comments/goals: pt agreeable  Pain/Comfort:         Objective:     Communicated with Daryl Goodwin RN prior to session.  Patient found HOB elevated with oxygen, blood pressure cuff, telemetry, peripheral IV, pulse ox (continuous), PureWick upon PT entry to room.     General Precautions: Standard, fall  Orthopedic Precautions: N/A  Braces: N/A  Respiratory Status: Room air     Functional Mobility:  Bed Mobility:     Supine to Sit: minimum assistance  Transfers:     Sit to Stand:  minimum assistance and of 1-2 persons with rolling walker; verbal cues for hands placement  Bed to Chair: minimum assistance and of 1-2 persons with  rolling walker  using  Step Transfer      AM-PAC 6  CLICK MOBILITY  Turning over in bed (including adjusting bedclothes, sheets and blankets)?: 3  Sitting down on and standing up from a chair with arms (e.g., wheelchair, bedside commode, etc.): 3  Moving from lying on back to sitting on the side of the bed?: 3  Moving to and from a bed to a chair (including a wheelchair)?: 3  Need to walk in hospital room?: 3 (around to chair only)  Climbing 3-5 steps with a railing?: 1  Basic Mobility Total Score: 16       Treatment & Education:  Pt performed 2 x 15 reps (B) LE exercises: ap, quad set, glut set, straight leg raise, hip ab/adduction, long arc quad, heel slide with active assist and near constant verbal and tactile stimuli to remain on task      Patient left up in chair with all lines intact, call button in reach, and chair alarm on..    GOALS:   Multidisciplinary Problems       Physical Therapy Goals          Problem: Physical Therapy    Goal Priority Disciplines Outcome Goal Variances Interventions   Physical Therapy Goal     PT, PT/OT Progressing     Description: Short Term Goals to be met by: 24    Patient will increase functional independence with mobility by performin. Supine to sit with Stand by assist  2. Sit to stand transfer with contact guard assist using Rolling walker  3. Bed to chair transfer with contact guard assist using Rolling walker  4. Gait  x 100 feet with contact guard assist using Rolling walker  5. Lower extremity exercise program x30 reps per handout, with assistance as needed  6. Pt to negotiate 3 steps with rails with CGA.     Long Term Goals to be met by: 24    Pt will regain full independent functional mobility with Rolling walker to return to home situation and prior activities of daily living.                        Time Tracking:     PT Received On: 24  PT Start Time: 1334     PT Stop Time: 1406  PT Total Time (min): 32 min     Billable Minutes: Therapeutic Activity 12 and Therapeutic Exercise 15    Treatment  Type: Treatment  PT/PTA: PTA     Number of PTA visits since last PT visit: 2     08/20/2024

## 2024-08-20 NOTE — PROGRESS NOTES
[facility] order from dr christianson. Chg disc and sterile dressing.

## 2024-08-20 NOTE — PROGRESS NOTES
[facility] Medicine  Progress Note    Patient Name: Mayra Kelly  MRN: [id number]  Patient Class: IP- Inpatient   Admission Date: 8/15/2024  Length of Stay: 4 days  Attending Physician: Mark Barraza MD  Primary Care Provider: Darlene Campoverde NP        Subjective:     Principal Problem:Obstructive pneumonia        HPI:    Mayra Kelly is a 73-year-old female with history of lung cancer, COPD, pulmonary HTN, and a-fib. She initially presented to [facility] with acute-on-chronic respiratory failure secondary to obstructive pneumonia. This was treated with extended course of broad-spectrum IV antibiotics with some improvement. Patient underwent rigid bronchoscopy with bronchial lavage, dilation, and stent by Dr. Castellon 8/14/24. Patient tolerated procedure well with improvement in respiratory status. CXR 8/15 revealed stent has remained patent however will require aggressive respiratory therapy and continued antibiotics. Patient subsequently admitted to [facility] for continued IV antibiotics, scheduled respiratory therapy, and physical rehabilitation.             Overview/Hospital Course:        08/17-  Records reviewed. Presented 08/07 to [facility]  withincrease SOB, cough and fatique. Some weight loss. Noted on evaluation worse CXR and CT chest. Hx treated adenoCa in 2018. Last Nov underwent evaluation by Dr Munguia. Seen then again also by Dr Kraft. Dx new primary squamous cell CA. Treated chemotx. In April saw Dr Kraft and started on Keytruda and given through infusion center. Has continued this through July. Missed her appt with Dr Kraft in May. This information mainly from Dr Kraft in pt was not good historian. Pt continued to smoke 1/2 ppd; encourage to stop. Dr Whitmore and Dr Kraft ask to consult. Requires 50% ventimask to keep O2 sats in low 90s. At home been prior on 2L BNC O2. PHMx: also Eliquis for PAF. Now NSR.  Treated for pneumonia.  Seen during stay by   Abena and pulmonary. Patient underwent rigid bronchoscopy with bronchial lavage, dilation, and stent by Dr. Castellon 8/14/24.  Admitted to [facility] 08/15 for continued treatment and general strengthening by therapy team.  Patient looks better than when last seen by me.  Seems to be in good spirits.   08/18 continues to feel better without new complaints.  To work with therapy.  Treatment includes vancomycin for MRSA with positive respiratory culture from 08/14.   08/19 Better. CXR better. Reviewed Dr Kraft note. Some confusion. Stop zosyn and consider stop vancomycin soon. Look at DC needs.      Interval History:       Review of Systems   Constitutional:  Positive for fatigue and unexpected weight change. Negative for appetite change and fever.   HENT:  Negative for congestion, hearing loss and trouble swallowing.    Respiratory:  Positive for shortness of breath. Negative for chest tightness and wheezing.    Cardiovascular:  Negative for chest pain and palpitations.   Gastrointestinal:  Negative for abdominal pain, constipation and nausea.   Genitourinary:  Negative for difficulty urinating and dysuria.   Musculoskeletal:  Positive for gait problem. Negative for back pain and neck stiffness.   Skin:  Negative for pallor and rash.   Neurological:  Negative for dizziness, speech difficulty and headaches.   Psychiatric/Behavioral:  Negative for confusion and suicidal ideas.      Objective:     Vital Signs (Most Recent):  Temp: 97.3 °F (36.3 °C) (08/19/24 1630)  Pulse: (!) 58 (08/19/24 1934)  Resp: 16 (08/19/24 1934)  BP: (!) 94/43 (08/19/24 1000)  SpO2: 97 % (08/19/24 2018) Vital Signs (24h Range):  Temp:  [96.7 °F (35.9 °C)-98.1 °F (36.7 °C)] 97.3 °F (36.3 °C)  Pulse:  [51-61] 58  Resp:  [10-21] 16  SpO2:  [92 %-99 %] 97 %  BP: ()/(27-51) 94/43     Weight: 51.8 kg (114 lb 3.2 oz)  Body mass index is 18.43 kg/m².    Intake/Output Summary (Last 24 hours) at 8/19/2024 7255  Last data filed at  "8/19/2024 0809  Gross per 24 hour   Intake 822.6 ml   Output 600 ml   Net 222.6 ml         Physical Exam  Vitals reviewed.   Constitutional:       General: She is awake. She is not in acute distress.     Appearance: She is well-developed and underweight. She is not toxic-appearing.      Comments: Actually looks much better than would suggest by xrays and lab   HENT:      Head: Normocephalic.      Nose: Nose normal.      Mouth/Throat:      Pharynx: Oropharynx is clear.   Eyes:      Extraocular Movements: Extraocular movements intact.      Pupils: Pupils are equal, round, and reactive to light.   Neck:      Thyroid: No thyroid mass.      Vascular: No carotid bruit.   Cardiovascular:      Rate and Rhythm: Normal rate and regular rhythm.      Pulses: Normal pulses.      Heart sounds: Normal heart sounds. No murmur heard.  Pulmonary:      Effort: Pulmonary effort is normal.      Breath sounds: Normal air entry. No wheezing.      Comments: Fairly good air exchange bilateral  Abdominal:      General: Bowel sounds are normal. There is no distension.      Palpations: Abdomen is soft.      Tenderness: There is no abdominal tenderness.   Musculoskeletal:         General: Normal range of motion.      Cervical back: Neck supple. No rigidity.   Skin:     General: Skin is warm.      Coloration: Skin is not jaundiced.      Findings: No lesion.   Neurological:      General: No focal deficit present.      Mental Status: She is alert and oriented to person, place, and time.      Cranial Nerves: No cranial nerve deficit.   Psychiatric:         Attention and Perception: Attention normal.         Mood and Affect: Mood normal.         Behavior: Behavior normal. Behavior is cooperative.         Thought Content: Thought content normal.         Cognition and Memory: Cognition normal.             Significant Labs: All pertinent labs within the past 24 hours have been reviewed.  BMP:   No results for input(s): "GLU", "NA", "K", "CL", "CO2", " ""BUN", "CREATININE", "CALCIUM", "MG" in the last 48 hours.    CBC:   No results for input(s): "WBC", "HGB", "HCT", "PLT" in the last 48 hours.    CMP:   No results for input(s): "NA", "K", "CL", "CO2", "GLU", "BUN", "CREATININE", "CALCIUM", "PROT", "ALBUMIN", "BILITOT", "ALKPHOS", "AST", "ALT", "ANIONGAP", "EGFRNONAA" in the last 48 hours.    Invalid input(s): "ESTGFAFRICA"      Significant Imaging: I have reviewed all pertinent imaging results/findings within the past 24 hours.        Intake/Output - Last 3 Shifts         08/18 0700 08/19 0659 08/19 0700 08/20 0659    P.O. 1870 240    I.V. (mL/kg) 34.8 (0.7) 4.1 (0.1)    IV Piggyback 423.9 578.5    Total Intake(mL/kg) 2328.6 (45) 822.6 (15.9)    Urine (mL/kg/hr) 1800 (1.4)     Stool 0     Total Output 1800     Net +528.6 +822.6          Urine Occurrence  3 x    Stool Occurrence 1 x           Microbiology Results (last 7 days)       ** No results found for the last 168 hours. **              Assessment/Plan:      * Obstructive pneumonia  Patient has a diagnosis of pneumonia. The cause of the pneumonia is suspected to be bacterial in etiology but organism is not known. The pneumonia is improving. The patient has the following signs/symptoms of pneumonia: persistent hypoxia , cough, and shortness of breath. The patient does have a current oxygen requirement and the patient does have a home oxygen requirement. I have reviewed the pertinent imaging. The following cultures have been collected: Bronchial lavage The culture results are listed below.     Current antimicrobial regimen consists of the antibiotics listed below. Will monitor patient closely and continue current treatment plan unchanged.    Recommend antibiotics until at least 8/22 (total 14 days therapy).    Antibiotics (From admission, onward)      Start     Stop Route Frequency Ordered    08/16/24 1632  vancomycin - pharmacy to dose         -- IV pharmacy to manage frequency 08/16/24 1534    08/16/24 1630  " vancomycin (VANCOCIN) 1,000 mg in D5W 250 mL IVPB         -- IV Every 18 hours 08/16/24 1534    08/15/24 1600  piperacillin-tazobactam (ZOSYN) 4.5 g in D5W 100 mL IVPB (MB+)         -- IV Every 8 hours (non-standard times) 08/15/24 1036    08/15/24 1230  mupirocin 2 % ointment         08/20/24 0859 Nasl 2 times daily 08/15/24 1127            Microbiology Results (last 7 days)       ** No results found for the last 168 hours. **          Rigid bronchoscopy 8/14 with stent placed. Scheduled Duo Nebs and hypertonic saline in attempt to keep stent patent. Pulmonology managing.      08/18 covering for MRSA pneumonia with positive lower respiratory culture from 08/14    MRSA infection  Positive culture from 08/14 in lower respiratory tract for MRSA  Covering with vancomycin for MRSA pneumonia      Neoplastic malignant related fatigue  With general debility and failure to thrive. Currently requiring assistance for most ADLs. Will continue physical therapy this admission however her home situation is not ideal. She will likely need to consider long-term placement upon discharge.     08/18 much improved from when I had seen patient previous      Severe protein-calorie malnutrition  Nutrition consulted. Most recent weight and BMI monitored-     Measurements:  Wt Readings from Last 1 Encounters:   08/15/24 51.8 kg (114 lb 3.2 oz)   Body mass index is 18.43 kg/m².    Patient has been screened and assessed by RD.    Malnutrition Type:  Context:    Level:      Malnutrition Characteristic Summary:       Interventions/Recommendations (treatment strategy):         Paroxysmal atrial fibrillation  Patient with Paroxysmal (<7 days) atrial fibrillation which is controlled currently with  no medications . Patient is currently in sinus rhythm.[id number] Score: 2. HASBLED Score: 1. Anticoagulation indicated. Anticoagulation done with Eliquis - held for bronchoscopy w/biopsy and stent, resume as appropriate .    COPD exacerbation  Patient's  COPD is controlled currently.  Patient is currently off COPD Pathway. Continue scheduled inhalers Steroids, Antibiotics, and Supplemental oxygen and monitor respiratory status closely.     Malignant neoplasm of right lung  Known to Dr. Kraft.     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 She received a few cycles of weekly chemotherapy around February or March in anticipation of radiation but radiation was not performed. Sidenote she received radiation chemotherapy as well as immunotherapy 2018.    She was last seen by me in April and the office and had a no-show from May....She has continued to receive immunotherapy with Keytruda IVQ6 weeks beginning in April with last dose in early July.    08/17 can clarify treatment options if any by Dr. Kraft, Patient does look better than when last seen    Acute on chronic respiratory failure with hypoxia and hypercapnia  Patient with Hypercapnic and Hypoxic Respiratory failure which is Acute on chronic. Supplemental oxygen was provided and noted- Oxygen Concentration (%):  [36] 36    Signs/symptoms of respiratory failure include- increased work of breathing. Contributing diagnoses includes - COPD, Pneumonia, and lung cancer  Labs and images were reviewed. Patient Has not had a recent ABG. Will treat underlying causes and adjust management of respiratory failure as follows- continue supplemental O2 to maintain saturation >88%, scheduled nebulizer therapy.    Cigarette nicotine dependence with nicotine-induced disorder  Dangers of cigarette smoking were reviewed with patient in detail. Patient was Counseled for 3-10 minutes. Nicotine replacement options were discussed. Nicotine replacement was discussed- prescribed      VTE Risk Mitigation (From admission, onward)           Ordered     enoxaparin injection 40 mg  Every 24 hours         08/15/24 1036                    Discharge Planning   TERE:       Code Status: Partial Code   Is the patient medically ready for discharge?:     Reason for patient still in hospital (select all that apply): Laboratory test, Treatment, and Imaging  Discharge Plan A: Rehab                  Mark Barraza MD  Department of Hospital Medicine   [facility]

## 2024-08-20 NOTE — PROGRESS NOTES
"Pharmacokinetic Assessment Follow Up: IV Vancomycin    Vancomycin serum concentration assessment(s):    The trough level was drawn correctly and can be used to guide therapy at this time. The measurement is above the desired definitive target range of 15 to 20 mcg/mL.    Vancomycin Regimen Plan:    Re-dose when the random level is less than 20 mcg/mL, next level to be drawn at 0400 on 8/20    Drug levels (last 3 results):  Recent Labs   Lab Result Units 08/19/24  0207 08/20/24  1529   Vancomycin, Trough µg/mL 8.5* 32.0*       Pharmacy will continue to follow and monitor vancomycin.    Please contact pharmacy at extension [phone] for questions regarding this assessment.    Patient brief summary:  Mayra Kelly is a 73 y.o. female initiated on antimicrobial therapy with IV Vancomycin for treatment of  pneumonia    The patient's current regimen is on hold    Drug Allergies:   Review of patient's allergies indicates:  No Known Allergies    Actual Body Weight:   51.8 kg    Renal Function:   Estimated Creatinine Clearance: 57.8 mL/min (based on SCr of 0.72 mg/dL).,     Dialysis Method (if applicable):  N/A    CBC (last 72 hours):  No results for input(s): "WHITE BLOOD CELL COUNT", "HEMOGLOBIN", "HEMATOCRIT", "PLATELETS", "GRAN%", "LYMPH%", "MONO%", "EOSINOPHIL%", "BASOPHIL%", "DIFFERENTIAL METHOD" in the last 72 hours.    Metabolic Panel (last 72 hours):  Recent Labs   Lab Result Units 08/20/24  0847   Sodium mmol/L 138   Potassium mmol/L 4.4   Chloride mmol/L 101   CO2 mmol/L 33*   Glucose mg/dL 58*   BUN mg/dL 25*   Creatinine mg/dL 0.72       Vancomycin Administrations:  vancomycin given in the last 96 hours                     vancomycin (VANCOCIN) 1,000 mg in D5W 250 mL IVPB (mg) 1,000 mg New Bag 08/20/24 0343     1,000 mg New Bag 08/19/24 1726    vancomycin (VANCOCIN) 1,000 mg in D5W 250 mL IVPB ()  Restarted 08/19/24 0535     1,000 mg New Bag  0404      Restarted 08/18/24 0908     1,000 mg New Bag  0859     1,000 " mg New Bag 08/17/24 1423                    Microbiologic Results:  Microbiology Results (last 7 days)       ** No results found for the last 168 hours. **

## 2024-08-20 NOTE — PLAN OF CARE
Problem: Physical Therapy  Goal: Physical Therapy Goal  Description: Short Term Goals to be met by: 24    Patient will increase functional independence with mobility by performin. Supine to sit with Stand by assist  2. Sit to stand transfer with contact guard assist using Rolling walker  3. Bed to chair transfer with contact guard assist using Rolling walker  4. Gait  x 100 feet with contact guard assist using Rolling walker  5. Lower extremity exercise program x30 reps per handout, with assistance as needed  6. Pt to negotiate 3 steps with rails with CGA.     Long Term Goals to be met by: 24    Pt will regain full independent functional mobility with Rolling walker to return to home situation and prior activities of daily living.   Outcome: Progressing     Bed Mobility:     Supine to Sit: minimum assistance  Transfers:     Sit to Stand:  minimum assistance and of 1-2 persons with rolling walker; verbal cues for hands placement  Bed to Chair: minimum assistance and of 1-2 persons with  rolling walker  using  Step Transfer    Pt is a recent eval, slowly improving with bed mobs and transfers.  Pt's cognition is the primary limiting factor, however, feel pt will benefit from cont PT to maximize rehab potential

## 2024-08-20 NOTE — PLAN OF CARE
Problem: Adult Inpatient Plan of Care  Goal: Plan of Care Review  Outcome: Progressing  Goal: Patient-Specific Goal (Individualized)  Outcome: Progressing  Goal: Absence of Hospital-Acquired Illness or Injury  Outcome: Progressing  Goal: Optimal Comfort and Wellbeing  Outcome: Progressing  Goal: Readiness for Transition of Care  Outcome: Progressing     Problem: Pneumonia  Goal: Fluid Balance  Outcome: Progressing  Goal: Resolution of Infection Signs and Symptoms  Outcome: Progressing  Goal: Effective Oxygenation and Ventilation  Outcome: Progressing     Problem: Wound  Goal: Optimal Coping  Outcome: Progressing  Goal: Optimal Functional Ability  Outcome: Progressing  Goal: Absence of Infection Signs and Symptoms  Outcome: Progressing  Goal: Improved Oral Intake  Outcome: Progressing  Goal: Optimal Pain Control and Function  Outcome: Progressing  Goal: Skin Health and Integrity  Outcome: Progressing  Goal: Optimal Wound Healing  Outcome: Progressing     Problem: Fall Injury Risk  Goal: Absence of Fall and Fall-Related Injury  Outcome: Progressing     Problem: Gas Exchange Impaired  Goal: Optimal Gas Exchange  Outcome: Progressing     Problem: Breathing Pattern Ineffective  Goal: Effective Breathing Pattern  Outcome: Progressing     Problem: Skin Injury Risk Increased  Goal: Skin Health and Integrity  Outcome: Progressing      Patient

## 2024-08-20 NOTE — PLAN OF CARE
Pt will perform grooming with setup  Pt will bathe with CGA  Pt will perform UE dressing with CGA  Pt will perform LE dressing with CGA  Pt will sit EOB x 5 min with SBA assistance  Pt will transfer bed/chair/bsc with CGA  Pt will perform standing task x 5 min with CGA assistance  Pt will tolerate 15 minutes of tx without fatigue         Lower Body Dressing: maximal assistance donning socks   Upper Body Dressing: maximal assistance donning gown as a robe due to IV in both arms    Pt recently evaled on 8/17/2024. Plan is to cont with OT poc

## 2024-08-20 NOTE — SUBJECTIVE & OBJECTIVE
Interval History:       Review of Systems   Constitutional:  Positive for fatigue and unexpected weight change. Negative for appetite change and fever.   HENT:  Negative for congestion, hearing loss and trouble swallowing.    Respiratory:  Positive for shortness of breath. Negative for chest tightness and wheezing.    Cardiovascular:  Negative for chest pain and palpitations.   Gastrointestinal:  Negative for abdominal pain, constipation and nausea.   Genitourinary:  Negative for difficulty urinating and dysuria.   Musculoskeletal:  Positive for gait problem. Negative for back pain and neck stiffness.   Skin:  Negative for pallor and rash.   Neurological:  Negative for dizziness, speech difficulty and headaches.   Psychiatric/Behavioral:  Negative for confusion and suicidal ideas.      Objective:     Vital Signs (Most Recent):  Temp: 97.3 °F (36.3 °C) (08/19/24 1630)  Pulse: (!) 58 (08/19/24 1934)  Resp: 16 (08/19/24 1934)  BP: (!) 94/43 (08/19/24 1000)  SpO2: 97 % (08/19/24 2018) Vital Signs (24h Range):  Temp:  [96.7 °F (35.9 °C)-98.1 °F (36.7 °C)] 97.3 °F (36.3 °C)  Pulse:  [51-61] 58  Resp:  [10-21] 16  SpO2:  [92 %-99 %] 97 %  BP: ()/(27-51) 94/43     Weight: 51.8 kg (114 lb 3.2 oz)  Body mass index is 18.43 kg/m².    Intake/Output Summary (Last 24 hours) at 8/19/2024 2217  Last data filed at 8/19/2024 0809  Gross per 24 hour   Intake 822.6 ml   Output 600 ml   Net 222.6 ml         Physical Exam  Vitals reviewed.   Constitutional:       General: She is awake. She is not in acute distress.     Appearance: She is well-developed and underweight. She is not toxic-appearing.      Comments: Actually looks much better than would suggest by xrays and lab   HENT:      Head: Normocephalic.      Nose: Nose normal.      Mouth/Throat:      Pharynx: Oropharynx is clear.   Eyes:      Extraocular Movements: Extraocular movements intact.      Pupils: Pupils are equal, round, and reactive to light.   Neck:      Thyroid: No  "thyroid mass.      Vascular: No carotid bruit.   Cardiovascular:      Rate and Rhythm: Normal rate and regular rhythm.      Pulses: Normal pulses.      Heart sounds: Normal heart sounds. No murmur heard.  Pulmonary:      Effort: Pulmonary effort is normal.      Breath sounds: Normal air entry. No wheezing.      Comments: Fairly good air exchange bilateral  Abdominal:      General: Bowel sounds are normal. There is no distension.      Palpations: Abdomen is soft.      Tenderness: There is no abdominal tenderness.   Musculoskeletal:         General: Normal range of motion.      Cervical back: Neck supple. No rigidity.   Skin:     General: Skin is warm.      Coloration: Skin is not jaundiced.      Findings: No lesion.   Neurological:      General: No focal deficit present.      Mental Status: She is alert and oriented to person, place, and time.      Cranial Nerves: No cranial nerve deficit.   Psychiatric:         Attention and Perception: Attention normal.         Mood and Affect: Mood normal.         Behavior: Behavior normal. Behavior is cooperative.         Thought Content: Thought content normal.         Cognition and Memory: Cognition normal.             Significant Labs: All pertinent labs within the past 24 hours have been reviewed.  BMP:   No results for input(s): "GLU", "NA", "K", "CL", "CO2", "BUN", "CREATININE", "CALCIUM", "MG" in the last 48 hours.    CBC:   No results for input(s): "WBC", "HGB", "HCT", "PLT" in the last 48 hours.    CMP:   No results for input(s): "NA", "K", "CL", "CO2", "GLU", "BUN", "CREATININE", "CALCIUM", "PROT", "ALBUMIN", "BILITOT", "ALKPHOS", "AST", "ALT", "ANIONGAP", "EGFRNONAA" in the last 48 hours.    Invalid input(s): "ESTGFAFRICA"      Significant Imaging: I have reviewed all pertinent imaging results/findings within the past 24 hours.        Intake/Output - Last 3 Shifts         08/18 0700 08/19 0659 08/19 0700 08/20 0659    P.O. 1870 240    I.V. (mL/kg) 34.8 (0.7) 4.1 " (0.1)    IV Piggyback 423.9 578.5    Total Intake(mL/kg) 2328.6 (45) 822.6 (15.9)    Urine (mL/kg/hr) 1800 (1.4)     Stool 0     Total Output 1800     Net +528.6 +822.6          Urine Occurrence  3 x    Stool Occurrence 1 x           Microbiology Results (last 7 days)       ** No results found for the last 168 hours. **

## 2024-08-20 NOTE — PT/OT/SLP PROGRESS
Occupational Therapy   Treatment    Name: Mayra Kelly  MRN: [id number]  Admitting Diagnosis:  Obstructive pneumonia       Recommendations:     Discharge Recommendations: Moderate Intensity Therapy  Discharge Equipment Recommendations:   (to be determined)  Barriers to discharge:       Assessment:     Mayra Kelly is a 73 y.o. female with a medical diagnosis of Obstructive pneumonia. Performance deficits affecting function are weakness, impaired endurance, impaired self care skills.     Rehab Prognosis:  Good; patient would benefit from acute skilled OT services to address these deficits and reach maximum level of function.       Plan:     Patient to be seen 5 x/week to address the above listed problems via self-care/home management, therapeutic activities, therapeutic exercises  Plan of Care Expires: 09/17/24  Plan of Care Reviewed with: patient    Subjective     Chief Complaint:   Patient/Family Comments/goals:   Pain/Comfort:  Pain Rating 1: 0/10    Objective:     Communicated with: Kaur Minor LPN prior to session.  Patient found up in chair with blood pressure cuff, peripheral IV, telemetry, oxygen, PureWick,chair alarm upon OT entry to room.    General Precautions: Standard, fall    Orthopedic Precautions:N/A  Braces: N/A  Respiratory Status: Nasal cannula, flow 4 L/min     Occupational Performance:     Bed Mobility:       Functional Mobility/Transfers:    Functional Mobility:     Activities of Daily Living:  I to neo house shoe on R       [facility] 6 Click ADL:      Treatment & Education:  Pt performed rom ex's with 2  lb wt 15 reps x 2 B shld flex,abd/add,elbow flex/ext,hand helper with medium resistances 20 reps x 2, green t ball 20 reps x 2, red t band 15 reps x 2 B elbow flex/ext,abd/add    Patient left up in chair with all lines intact, call button in reach, and chair alarm on    GOALS:   Multidisciplinary Problems       Occupational Therapy Goals          Problem: Occupational Therapy    Goal  Priority Disciplines Outcome Interventions   Occupational Therapy Goal     OT, PT/OT Progressing    Description: STG:  Pt will perform grooming with setup  Pt will bathe with CGA  Pt will perform UE dressing with CGA  Pt will perform LE dressing with CGA  Pt will sit EOB x 5 min with SBA assistance  Pt will transfer bed/chair/bsc with CGA  Pt will perform standing task x 5 min with CGA assistance  Pt will tolerate 15 minutes of tx without fatigue      LT.Restore to max I with self care and mobility.                          Time Tracking:     OT Date of Treatment: 24  OT Start Time: 1439  OT Stop Time: 1504  OT Total Time (min): 25 min    Billable Minutes:Therapeutic Exercise 24    OT/PETAR: PETAR          2024

## 2024-08-21 LAB
ANION GAP SERPL CALCULATED.3IONS-SCNC: 8 MMOL/L (ref 7–16)
BASOPHILS # BLD AUTO: 0.01 K/UL (ref 0–0.2)
BASOPHILS NFR BLD AUTO: 0.1 % (ref 0–1)
BUN SERPL-MCNC: 24 MG/DL (ref 7–18)
BUN/CREAT SERPL: 31 (ref 6–20)
CALCIUM SERPL-MCNC: 8.7 MG/DL (ref 8.5–10.1)
CHLORIDE SERPL-SCNC: 104 MMOL/L (ref 98–107)
CO2 SERPL-SCNC: 31 MMOL/L (ref 21–32)
CREAT SERPL-MCNC: 0.77 MG/DL (ref 0.55–1.02)
DIFFERENTIAL METHOD BLD: ABNORMAL
EGFR (NO RACE VARIABLE) (RUSH/TITUS): 82 ML/MIN/1.73M2
EOSINOPHIL # BLD AUTO: 0.06 K/UL (ref 0–0.5)
EOSINOPHIL NFR BLD AUTO: 0.6 % (ref 1–4)
ERYTHROCYTE [DISTWIDTH] IN BLOOD BY AUTOMATED COUNT: 17.9 % (ref 11.5–14.5)
GLUCOSE SERPL-MCNC: 54 MG/DL (ref 74–106)
GLUCOSE SERPL-MCNC: 91 MG/DL (ref 70–105)
HCT VFR BLD AUTO: 39.2 % (ref 38–47)
HGB BLD-MCNC: 12 G/DL (ref 12–16)
IMM GRANULOCYTES # BLD AUTO: 0.06 K/UL (ref 0–0.04)
IMM GRANULOCYTES NFR BLD: 0.6 % (ref 0–0.4)
LYMPHOCYTES # BLD AUTO: 1.14 K/UL (ref 1–4.8)
LYMPHOCYTES NFR BLD AUTO: 11.9 % (ref 27–41)
MCH RBC QN AUTO: 26.8 PG (ref 27–31)
MCHC RBC AUTO-ENTMCNC: 30.6 G/DL (ref 32–36)
MCV RBC AUTO: 87.7 FL (ref 80–96)
MONOCYTES # BLD AUTO: 0.88 K/UL (ref 0–0.8)
MONOCYTES NFR BLD AUTO: 9.2 % (ref 2–6)
MPC BLD CALC-MCNC: 10.1 FL (ref 9.4–12.4)
NEUTROPHILS # BLD AUTO: 7.4 K/UL (ref 1.8–7.7)
NEUTROPHILS NFR BLD AUTO: 77.6 % (ref 53–65)
NRBC # BLD AUTO: 0 X10E3/UL
NRBC, AUTO (.00): 0 %
PLATELET # BLD AUTO: 354 K/UL (ref 150–400)
POTASSIUM SERPL-SCNC: 4.7 MMOL/L (ref 3.5–5.1)
RBC # BLD AUTO: 4.47 M/UL (ref 4.2–5.4)
SODIUM SERPL-SCNC: 138 MMOL/L (ref 136–145)
VANCOMYCIN SERPL-MCNC: 16.7 ΜG/ML (ref 0–20)
VANCOMYCIN SERPL-MCNC: 20.1 ΜG/ML (ref 0–20)
WBC # BLD AUTO: 9.55 K/UL (ref 4.5–11)

## 2024-08-21 PROCEDURE — 97530 THERAPEUTIC ACTIVITIES: CPT

## 2024-08-21 PROCEDURE — 63600175 PHARM REV CODE 636 W HCPCS: Performed by: FAMILY MEDICINE

## 2024-08-21 PROCEDURE — 97110 THERAPEUTIC EXERCISES: CPT

## 2024-08-21 PROCEDURE — 25000003 PHARM REV CODE 250: Performed by: FAMILY MEDICINE

## 2024-08-21 PROCEDURE — A6212 FOAM DRG <=16 SQ IN W/BORDER: HCPCS

## 2024-08-21 PROCEDURE — 25000003 PHARM REV CODE 250: Performed by: HOSPITALIST

## 2024-08-21 PROCEDURE — 80202 ASSAY OF VANCOMYCIN: CPT | Performed by: HOSPITALIST

## 2024-08-21 PROCEDURE — 80048 BASIC METABOLIC PNL TOTAL CA: CPT | Performed by: HOSPITALIST

## 2024-08-21 PROCEDURE — 99232 SBSQ HOSP IP/OBS MODERATE 35: CPT | Mod: ,,, | Performed by: HOSPITALIST

## 2024-08-21 PROCEDURE — 99900031 HC PATIENT EDUCATION (STAT)

## 2024-08-21 PROCEDURE — 82962 GLUCOSE BLOOD TEST: CPT

## 2024-08-21 PROCEDURE — 94668 MNPJ CHEST WALL SBSQ: CPT

## 2024-08-21 PROCEDURE — S4991 NICOTINE PATCH NONLEGEND: HCPCS | Performed by: FAMILY MEDICINE

## 2024-08-21 PROCEDURE — 99900035 HC TECH TIME PER 15 MIN (STAT)

## 2024-08-21 PROCEDURE — 27000221 HC OXYGEN, UP TO 24 HOURS

## 2024-08-21 PROCEDURE — 63600175 PHARM REV CODE 636 W HCPCS: Performed by: HOSPITALIST

## 2024-08-21 PROCEDURE — 36415 COLL VENOUS BLD VENIPUNCTURE: CPT | Performed by: HOSPITALIST

## 2024-08-21 PROCEDURE — 85025 COMPLETE CBC W/AUTO DIFF WBC: CPT | Performed by: HOSPITALIST

## 2024-08-21 PROCEDURE — 94640 AIRWAY INHALATION TREATMENT: CPT

## 2024-08-21 PROCEDURE — 11000008

## 2024-08-21 PROCEDURE — 25000242 PHARM REV CODE 250 ALT 637 W/ HCPCS: Performed by: FAMILY MEDICINE

## 2024-08-21 RX ADMIN — SODIUM CHLORIDE SOLN NEBU 3% 4 ML: 3 NEBU SOLN at 03:08

## 2024-08-21 RX ADMIN — DEXTROSE MONOHYDRATE 1000 MG: 50 INJECTION, SOLUTION INTRAVENOUS at 08:08

## 2024-08-21 RX ADMIN — DEXTROSE MONOHYDRATE: 12500 INJECTION, SOLUTION INTRAVENOUS at 08:08

## 2024-08-21 RX ADMIN — IPRATROPIUM BROMIDE AND ALBUTEROL SULFATE 3 ML: 2.5; .5 SOLUTION RESPIRATORY (INHALATION) at 07:08

## 2024-08-21 RX ADMIN — NICOTINE 1 PATCH: 14 PATCH, EXTENDED RELEASE TRANSDERMAL at 09:08

## 2024-08-21 RX ADMIN — POTASSIUM CHLORIDE 20 MEQ: 20 TABLET, EXTENDED RELEASE ORAL at 08:08

## 2024-08-21 RX ADMIN — IPRATROPIUM BROMIDE AND ALBUTEROL SULFATE 3 ML: 2.5; .5 SOLUTION RESPIRATORY (INHALATION) at 03:08

## 2024-08-21 RX ADMIN — SODIUM CHLORIDE SOLN NEBU 3% 4 ML: 3 NEBU SOLN at 12:08

## 2024-08-21 RX ADMIN — BUPROPION HYDROCHLORIDE 100 MG: 100 TABLET, FILM COATED, EXTENDED RELEASE ORAL at 09:08

## 2024-08-21 RX ADMIN — PREDNISONE 40 MG: 20 TABLET ORAL at 09:08

## 2024-08-21 RX ADMIN — SODIUM CHLORIDE SOLN NEBU 3% 4 ML: 3 NEBU SOLN at 07:08

## 2024-08-21 RX ADMIN — ENOXAPARIN SODIUM 40 MG: 40 INJECTION SUBCUTANEOUS at 04:08

## 2024-08-21 RX ADMIN — POTASSIUM CHLORIDE 20 MEQ: 20 TABLET, EXTENDED RELEASE ORAL at 09:08

## 2024-08-21 RX ADMIN — BUPROPION HYDROCHLORIDE 100 MG: 100 TABLET, FILM COATED, EXTENDED RELEASE ORAL at 08:08

## 2024-08-21 RX ADMIN — IPRATROPIUM BROMIDE AND ALBUTEROL SULFATE 3 ML: 2.5; .5 SOLUTION RESPIRATORY (INHALATION) at 12:08

## 2024-08-21 NOTE — PLAN OF CARE
Problem: Adult Inpatient Plan of Care  Goal: Plan of Care Review  Outcome: Progressing  Goal: Patient-Specific Goal (Individualized)  Outcome: Progressing  Goal: Absence of Hospital-Acquired Illness or Injury  Outcome: Progressing  Goal: Optimal Comfort and Wellbeing  Outcome: Progressing  Goal: Readiness for Transition of Care  Outcome: Progressing     Problem: Pneumonia  Goal: Fluid Balance  Outcome: Progressing  Goal: Resolution of Infection Signs and Symptoms  Outcome: Progressing  Goal: Effective Oxygenation and Ventilation  Outcome: Progressing     Problem: Wound  Goal: Optimal Coping  Outcome: Progressing  Goal: Optimal Functional Ability  Outcome: Progressing  Goal: Absence of Infection Signs and Symptoms  Outcome: Progressing  Goal: Improved Oral Intake  Outcome: Progressing  Goal: Optimal Pain Control and Function  Outcome: Progressing  Goal: Skin Health and Integrity  Outcome: Progressing  Goal: Optimal Wound Healing  Outcome: Progressing     Problem: Fall Injury Risk  Goal: Absence of Fall and Fall-Related Injury  Outcome: Progressing     Problem: Gas Exchange Impaired  Goal: Optimal Gas Exchange  Outcome: Progressing     Problem: Breathing Pattern Ineffective  Goal: Effective Breathing Pattern  Outcome: Progressing     Problem: Skin Injury Risk Increased  Goal: Skin Health and Integrity  Outcome: Progressing     Problem: Infection  Goal: Absence of Infection Signs and Symptoms  Outcome: Progressing

## 2024-08-21 NOTE — PROGRESS NOTES
[facility] HOW  Adult Nutrition  Follow up         Reason for Assessment  Reason For Assessment: RD follow-up   Nutrition Risk Screen: no indicators present    Assessment and Plan  8/21/2024 RD Follow up: Patient is ordered a regular diet with Boost Plus all meals. Po intakes on average 50-75% per flowsheets. Current weight 51.8 kg. Last BM noted 8/21.    Recommend to continue diet as tolerated. Encourage po intakes of meals and Boost Plus. RD Following.       8/16/2024 MST Notification sent. Patient admitted to LTAC from [facility]. Patient meets ASPEN criteria for severe protein calorie malnutrition. See Malnutrition assessment. Recommend to liberalize diet to regular. Encourage po intakes. RD Following.     8/14/2024 RD Follow up: Patient currently NPO s/p bronchoscopy. Patient with prior diet order for cardiac diet with 50% intakes per flowsheets. No new weight noted. Last BM noted 8/11.     Recommend regular diet when diet resumed. Consider bowel regimen as constipation can affect po tolerance. RD Following.     Patient is  74yo female admitted 8/7 for obstructive pneumonia. She was identified at risk by screening criteria with MST2.     Patient is 47.6kg with a BMI of 16.95 and is severely underweight per geriatric standards. She has a PMH of lung cancer and advanced COPD. Visited with patient this morning and she stated she has been eating well while in the hospital due to the steroid therapy she is receiving, but has not had an appetite at home prior to admission. She is edentulous without her dentures. RD asked if she needed texture modification, patient declined.     Physical exam reveals severe fat and muscle depletion to orbital, upper arm, temple, and clavicle regions.     Per ASPEN guidelines, patient meets criteria for severe protein-calorie malnutrition as evidenced by poor PO intakes <50% for greater than one month and severe fat and muscle depletion to orbital, upper arm, temple,  and clavicle regions.     Patient is ordered a cardiac diet. Recommend consider liberalizing to a Regular diet as appropriate. Encourage good PO intakes.     Last BM 8/8 per flowsheet.    Medications/labs reviewed. RD following.      Learning Needs/Social Determinants of Health  Learning Assessment       08/08/2024 1319 [facility] (8/7/2024 - Present)   Created by Barb Abarca RN - RN (Nurse) Status: Complete                 PRIMARY LEARNER     Primary Learner Name:  Mayra Kelly BN - 08/08/2024 1319    Relationship:  Patient BN - 08/08/2024 1319    Does the primary learner have any barriers to learning?:  No Barriers BN - 08/08/2024 1319    What is the preferred language of the primary learner?:  English BN - 08/08/2024 1319    Is an  required?:  Yes BN - 08/08/2024 1319    How does the primary learner prefer to learn new concepts?:  Listening BN - 08/08/2024 1319    How often do you need to have someone help you read instructions, pamphlets, or written material from your doctor or pharmacy?:  Never BN - 08/08/2024 1319        CO-LEARNER #1     No question answered        CO-LEARNER #2     No question answered        SPECIAL TOPICS     No question answered        ANSWERED BY:     No question answered        Comments         Edit History       Barb Abarca, RN - RN (Nurse)   08/08/2024 1319                           Social Determinants of Health     Tobacco Use: High Risk (8/8/2024)    Patient History     Smoking Tobacco Use: Every Day     Smokeless Tobacco Use: Never     Passive Exposure: Not on file   Alcohol Use: Not At Risk (8/16/2024)    AUDIT-C     Frequency of Alcohol Consumption: Never     Average Number of Drinks: Patient does not drink     Frequency of Binge Drinking: Never   Financial Resource Strain: Low Risk  (8/16/2024)    Overall Financial Resource Strain (CARDIA)     Difficulty of Paying Living Expenses: Not hard at all   Food Insecurity: No Food  Insecurity (8/16/2024)    Hunger Vital Sign     Worried About Running Out of Food in the Last Year: Never true     Ran Out of Food in the Last Year: Never true   Transportation Needs: No Transportation Needs (8/16/2024)    TRANSPORTATION NEEDS     Transportation : No   Physical Activity: Inactive (8/16/2024)    Exercise Vital Sign     Days of Exercise per Week: 0 days     Minutes of Exercise per Session: 0 min   Stress: No Stress Concern Present (8/16/2024)    [employer] [facility] [employer] - Occupational Stress Questionnaire     Feeling of Stress : Not at all   Housing Stability: Low Risk  (8/16/2024)    Housing Stability Vital Sign     Unable to Pay for Housing in the Last Year: No     Homeless in the Last Year: No   Depression: Low Risk  (12/4/2023)    Depression     Last PHQ-4: Flowsheet Data: 0   Utilities: Not At Risk (8/16/2024)    [facility] Utilities     Threatened with loss of utilities: No   Health Literacy: Adequate Health Literacy (8/16/2024)     Health Literacy     Frequency of need for help with medical instructions: Never   Social Isolation: Socially Integrated (8/16/2024)    Social Isolation     Social Isolation: 1            Malnutrition  Is Patient Malnourished: Yes Malnutrition Assessment  Malnutrition Context: chronic illness  Malnutrition Level: severe          Energy Intake (Malnutrition): less than or equal to 50% for greater than or equal to 1 month  Subcutaneous Fat (Malnutrition): severe depletion  Muscle Mass (Malnutrition): severe depletion   Orbital Region (Subcutaneous Fat Loss): severe depletion  Upper Arm Region (Subcutaneous Fat Loss): severe depletion   [place] Region (Muscle Loss): severe depletion  Clavicle Bone Region (Muscle Loss): severe depletion                 Nutrition Diagnosis  Malnutrition (Severe) related to Appetite loss, Catabolic illness, Chronic illness, and Inadequate Caloric intake as evidenced by poor PO intakes <50% for greater than one month and severe  fat and muscle depletion to orbital, upper arm, temple, and clavicle regions.   Comments: consider liberalizing to Regular diet    Recent Labs   Lab 08/21/24  0401 08/21/24  0621   GLU 54*  --    POCGLU  --  91       Nutrition Prescription / Recommendations  Nutrition Goal Status: progressing towards goal  Current Diet Order: Regular  Nutrition Order Comments: rec diet change to regular  Oral Nutrition Supplement: Boost Plus all meals  Chewing or Swallowing Difficulty?: Edentulous : no dentures present  Recommended Diet: Regular  Recommended Oral Supplement: Boost Plus [360kcals, 14g Protein, 45g Carbs] with meals  Is Nutrition Support Recommended: [facility] Support: No  Is Nutrition Education Recommended: No    Monitor and Evaluation  % current Intake: P.O. intake of 50 - 75 %  % intake to meet estimated needs: 50 - 75 %  Nutrition-Focused Physical Findings: overall appearance  Tolerance: tolerating    Current Medical Diagnosis and Past Medical History     Past Medical History:   Diagnosis Date    Chronic respiratory failure with hypoxia and hypercapnia     on home oxygen but hasn't been using it    Cigarette nicotine dependence with nicotine-induced disorder 11/09/2023    COPD (chronic obstructive pulmonary disease)     Dyshidrotic eczema 08/10/2021    Right bundle branch block 12/11/2023    Scabies     Squamous cell carcinoma of lung     follows with Dr. Kraft       Nutrition/Diet History  Spiritual, Cultural Beliefs, Roman [employer] Practices, Values that Affect Care: no  Factors Affecting Nutritional Intake: None identified at this time    Lab/Procedures/Meds  Recent Labs   Lab 08/21/24  0401      K 4.7   BUN 24*   CREATININE 0.77   CALCIUM 8.7      Note: BUN elevated; encourage po fluids    Last A1c:   Lab Results   Component Value Date    HGBA1C 5.5 08/08/2024     Lab Results   Component Value Date    RBC 4.47 08/21/2024    HGB 12.0 08/21/2024    HCT 39.2 08/21/2024    MCV 87.7 08/21/2024     "[facility] 26.8 (L) 08/21/2024    [facility] 30.6 (L) 08/21/2024   Note: H&H low    Pertinent Labs Reviewed: reviewed  Pertinent Medications Reviewed: reviewed  Scheduled Meds:   albuterol-ipratropium  3 mL Nebulization QID    buPROPion  100 mg Oral BID    enoxparin  40 mg Subcutaneous Q24H (prophylaxis, 1700)    nicotine  1 patch Transdermal Daily    potassium chloride SA  20 mEq Oral BID    [START ON 8/22/2024] predniSONE  30 mg Oral Daily    Followed by    [START ON 8/24/2024] predniSONE  20 mg Oral Daily    Followed by    [START ON 8/26/2024] predniSONE  10 mg Oral Daily    Followed by    [START ON 8/28/2024] predniSONE  5 mg Oral Daily    sodium chloride 3%  4 mL Nebulization Q8H     Continuous Infusions:  PRN Meds:.  Current Facility-Administered Medications:     0.9% NaCl 100 mL flush bag, , Intravenous, PRN    acetaminophen, 1,000 mg, Oral, Q6H PRN    albuterol sulfate, 2.5 mg, Nebulization, Q4H PRN    bisacodyL, 10 mg, Oral, Daily PRN    D5W, , Intravenous, PRN    dextromethorphan-guaiFENesin  mg/5 ml, 10 mL, Oral, Q6H PRN    diphenhydrAMINE, 25 mg, Intravenous, Q6H PRN    glucagon (human recombinant), 1 mg, Intramuscular, PRN    HYDROmorphone, 0.5 mg, Intravenous, Q5 Min PRN    melatonin, 6 mg, Oral, Nightly PRN    morphine, 4 mg, Intravenous, Q5 Min PRN    ondansetron, 4 mg, Intravenous, Daily PRN    ondansetron, 8 mg, Intravenous, Q6H PRN    traZODone, 50 mg, Oral, Nightly PRN    vancomycin - pharmacy to dose, , Intravenous, pharmacy to manage frequency    Anthropometrics  Temp: 97.6 °F (36.4 °C)  Height Method: Stated  Height: 5' 6" (167.6 cm)  Height (inches): 66 in  Weight Method: Bed Scale  Weight: 51.8 kg (114 lb 3.2 oz)  Weight (lb): 114.2 lb  Ideal Body Weight (IBW), Female: 130 lb  % Ideal Body Weight, Female (lb): 87.85 %  BMI (Calculated): 18.4       Estimated/Assessed Needs  RMR (Wheeler-St. Alexander Equation): 1044.75     Temp: 97.6 °F (36.4 °C)Axillary  Weight Used For Calorie Calculations: 51.8 kg " (114 lb 3.2 oz)   Energy Need Method: Kcal/kg Energy Calorie Requirements (kcal): 8031-3484  Weight Used For Protein Calculations: 51.8 kg (114 lb 3.2 oz)  Protein Requirements: 52-62       RDA Method (mL): 1813       Nutrition by Nursing  Diet/Nutrition Received: regular  Intake (%): 75%  Diet/Feeding Assistance: none  Diet/Feeding Tolerance: fair  Last Bowel Movement: 08/21/24                Nutrition Follow-Up  RD Follow-up?: Yes      Nutrition Discharge Planning: new admit to LTAC          Available via Secure Chat

## 2024-08-21 NOTE — PROGRESS NOTES
Pharmacokinetic Assessment Follow Up: IV Vancomycin    Vancomycin serum concentration assessment(s):    The random level was drawn correctly and can be used to guide therapy at this time. The measurement is within the desired definitive target range of 15 to 20 mcg/mL.    Vancomycin Regimen Plan:    Change regimen to Vancomycin 1000 mg IV every 36 hours with next serum trough concentration measured at 1930 prior to 3rd dose on 8/24    Drug levels (last 3 results):  Recent Labs   Lab Result Units 08/19/24  0207 08/20/24  1529 08/21/24  0401 08/21/24  1542   Vancomycin, Random µg/mL  --   --  20.1* 16.7   Vancomycin, Trough µg/mL 8.5* 32.0*  --   --        Pharmacy will continue to follow and monitor vancomycin.    Please contact pharmacy at extension [phone] for questions regarding this assessment.    Patient brief summary:  Mayra Kelly is a 73 y.o. female initiated on antimicrobial therapy with IV Vancomycin for treatment of  pneumonia    The patient's current regimen is vanc 1000 mg IV q36h    Drug Allergies:   Review of patient's allergies indicates:  No Known Allergies    Actual Body Weight:   51.8 kg    Renal Function:   Estimated Creatinine Clearance: 54 mL/min (based on SCr of 0.77 mg/dL).,     Dialysis Method (if applicable):  N/A    CBC (last 72 hours):  Recent Labs   Lab Result Units 08/21/24  0401   WBC K/uL 9.55   Hemoglobin g/dL 12.0   Hematocrit % 39.2   Platelet Count K/uL 354   Lymphocytes % % 11.9*   Monocytes % % 9.2*   Eosinophils % % 0.6*   Basophils % % 0.1   Diff Type  Auto       Metabolic Panel (last 72 hours):  Recent Labs   Lab Result Units 08/20/24  0847 08/21/24  0401   Sodium mmol/L 138 138   Potassium mmol/L 4.4 4.7   Chloride mmol/L 101 104   CO2 mmol/L 33* 31   Glucose mg/dL 58* 54*   BUN mg/dL 25* 24*   Creatinine mg/dL 0.72 0.77       Vancomycin Administrations:  vancomycin given in the last 96 hours                     vancomycin (VANCOCIN) 1,000 mg in D5W 250 mL IVPB (mg) 1,000  mg New Bag 08/20/24 0343     1,000 mg New Bag 08/19/24 1726    vancomycin (VANCOCIN) 1,000 mg in D5W 250 mL IVPB ()  Restarted 08/19/24 0535     1,000 mg New Bag  0404      Restarted 08/18/24 0908     1,000 mg New Bag  0859                    Microbiologic Results:  Microbiology Results (last 7 days)       ** No results found for the last 168 hours. **

## 2024-08-21 NOTE — PLAN OF CARE
Problem: Adult Inpatient Plan of Care  Goal: Plan of Care Review  Outcome: Progressing  Goal: Patient-Specific Goal (Individualized)  Outcome: Progressing  Goal: Absence of Hospital-Acquired Illness or Injury  Outcome: Progressing  Goal: Optimal Comfort and Wellbeing  Outcome: Progressing  Goal: Readiness for Transition of Care  Outcome: Progressing     Problem: Pneumonia  Goal: Fluid Balance  Outcome: Progressing  Goal: Resolution of Infection Signs and Symptoms  Outcome: Progressing  Goal: Effective Oxygenation and Ventilation  Outcome: Progressing     Problem: Fall Injury Risk  Goal: Absence of Fall and Fall-Related Injury  Outcome: Progressing     Problem: Gas Exchange Impaired  Goal: Optimal Gas Exchange  Outcome: Progressing     Problem: Breathing Pattern Ineffective  Goal: Effective Breathing Pattern  Outcome: Progressing

## 2024-08-21 NOTE — PT/OT/SLP PROGRESS
Physical Therapy Treatment    Patient Name:  Mayra Kelly   MRN:  [id number]    Recommendations:     Discharge Recommendations: Moderate Intensity Therapy  Discharge Equipment Recommendations: other (see comments) (to be determined)  Barriers to discharge:  ongoing medical treatment    Assessment:     Mayra Kelly is a 73 y.o. female admitted with a medical diagnosis of Obstructive pneumonia.  She presents with the following impairments/functional limitations: weakness, impaired endurance, impaired functional mobility, impaired self care skills, impaired balance     Patient agreeable to PT treatment. Min assist for supine to sit and sit to stand. Poor balance and with posterior weight shift that causes her to lean backwards. Min assist for step transfer with RW over to chair    Rehab Prognosis: Good; patient would benefit from acute skilled PT services to address these deficits and reach maximum level of function.    Recent Surgery: * No surgery found *      Plan:     During this hospitalization, patient to be seen 5 x/week to address the identified rehab impairments via gait training, therapeutic activities, therapeutic exercises, neuromuscular re-education and progress toward the following goals:    Plan of Care Expires:  09/16/24    Subjective     Chief Complaint: NA  Patient/Family Comments/goals: agreeable to treatment  Pain/Comfort:  Pain Rating 1: 0/10      Objective:     Communicated with nurse prior to session.  Patient found supine with blood pressure cuff, peripheral IV, telemetry, oxygen, PureWick upon PT entry to room.     General Precautions: Standard, fall  Orthopedic Precautions: N/A  Braces: N/A  Respiratory Status: Nasal cannula, flow 4 L/min     Functional Mobility:  Bed Mobility:     Supine to Sit: minimum assistance  Transfers:     Sit to Stand:  minimum assistance with rolling walker  Balance: poor   -Bed to chair transfer: min assist with RW and step transfer over to chair    AM-PAC  6 CLICK MOBILITY  Turning over in bed (including adjusting bedclothes, sheets and blankets)?: 3  Sitting down on and standing up from a chair with arms (e.g., wheelchair, bedside commode, etc.): 3  Moving from lying on back to sitting on the side of the bed?: 3  Moving to and from a bed to a chair (including a wheelchair)?: 3  Need to walk in hospital room?: 3  Climbing 3-5 steps with a railing?: 1  Basic Mobility Total Score: 16       Treatment & Education:  -AP, SLR, Hip add/abduction, LE flexion/ext; all 3x10 repetitions BLE  -mobility as noted      Patient left up in chair with all lines intact, call button in reach, and nurse notified..    GOALS:   Multidisciplinary Problems       Physical Therapy Goals          Problem: Physical Therapy    Goal Priority Disciplines Outcome Goal Variances Interventions   Physical Therapy Goal     PT, PT/OT Progressing     Description: Short Term Goals to be met by: 24    Patient will increase functional independence with mobility by performin. Supine to sit with Stand by assist  2. Sit to stand transfer with contact guard assist using Rolling walker  3. Bed to chair transfer with contact guard assist using Rolling walker  4. Gait  x 100 feet with contact guard assist using Rolling walker  5. Lower extremity exercise program x30 reps per handout, with assistance as needed  6. Pt to negotiate 3 steps with rails with CGA.     Long Term Goals to be met by: 24    Pt will regain full independent functional mobility with Rolling walker to return to home situation and prior activities of daily living.                        Time Tracking:     PT Received On: 24  PT Start Time: 932     PT Stop Time: 1004  PT Total Time (min): 32 min     Billable Minutes: Therapeutic Activity 15 and Therapeutic Exercise 17    Treatment Type: Treatment  PT/PTA: PT     Number of PTA visits since last PT visit: 0     2024

## 2024-08-21 NOTE — PROGRESS NOTES
[facility] Medicine  Progress Note    Patient Name: Mayra Kelly  MRN: [id number]  Patient Class: IP- Inpatient   Admission Date: 8/15/2024  Length of Stay: 5 days  Attending Physician: Mark Barraza MD  Primary Care Provider: Darlene Campoverde NP        Subjective:     Principal Problem:Obstructive pneumonia        HPI:    Mayra Kelly is a 73-year-old female with history of lung cancer, COPD, pulmonary HTN, and a-fib. She initially presented to [facility] with acute-on-chronic respiratory failure secondary to obstructive pneumonia. This was treated with extended course of broad-spectrum IV antibiotics with some improvement. Patient underwent rigid bronchoscopy with bronchial lavage, dilation, and stent by Dr. Castellon 8/14/24. Patient tolerated procedure well with improvement in respiratory status. CXR 8/15 revealed stent has remained patent however will require aggressive respiratory therapy and continued antibiotics. Patient subsequently admitted to [facility] for continued IV antibiotics, scheduled respiratory therapy, and physical rehabilitation.             Overview/Hospital Course:        08/17-  Records reviewed. Presented 08/07 to [facility]  withincrease SOB, cough and fatique. Some weight loss. Noted on evaluation worse CXR and CT chest. Hx treated adenoCa in 2018. Last Nov underwent evaluation by Dr Munguia. Seen then again also by Dr Kraft. Dx new primary squamous cell CA. Treated chemotx. In April saw Dr Kraft and started on Keytruda and given through infusion center. Has continued this through July. Missed her appt with Dr Kraft in May. This information mainly from Dr Kraft in pt was not good historian. Pt continued to smoke 1/2 ppd; encourage to stop. Dr Whitmore and Dr Kraft ask to consult. Requires 50% ventimask to keep O2 sats in low 90s. At home been prior on 2L BNC O2. PHMx: also Eliquis for PAF. Now NSR.  Treated for pneumonia.  Seen during stay by   Abena and pulmonary. Patient underwent rigid bronchoscopy with bronchial lavage, dilation, and stent by Dr. Castellon 8/14/24.  Admitted to [facility] 08/15 for continued treatment and general strengthening by therapy team.  Patient looks better than when last seen by me.  Seems to be in good spirits.   08/18 continues to feel better without new complaints.  To work with therapy.  Treatment includes vancomycin for MRSA with positive respiratory culture from 08/14.   08/19 Better. CXR better. Reviewed Dr Kraft note. Some confusion. Stop zosyn and consider stop vancomycin soon. Look at DC needs.  08/20 Continues feeling / doing better. Look into DC needs.    Interval History:       Review of Systems   Constitutional:  Positive for fatigue and unexpected weight change. Negative for appetite change and fever.   HENT:  Negative for congestion, hearing loss and trouble swallowing.    Respiratory:  Positive for shortness of breath. Negative for chest tightness and wheezing.    Cardiovascular:  Negative for chest pain and palpitations.   Gastrointestinal:  Negative for abdominal pain, constipation and nausea.   Genitourinary:  Negative for difficulty urinating and dysuria.   Musculoskeletal:  Positive for gait problem. Negative for back pain and neck stiffness.   Skin:  Negative for pallor and rash.   Neurological:  Negative for dizziness, speech difficulty and headaches.   Psychiatric/Behavioral:  Negative for confusion and suicidal ideas.      Objective:     Vital Signs (Most Recent):  Temp: 97.4 °F (36.3 °C) (08/20/24 2006)  Pulse: (!) 53 (08/20/24 2006)  Resp: 18 (08/20/24 2006)  BP: (!) 102/34 (08/20/24 2006)  SpO2: 97 % (08/20/24 2006) Vital Signs (24h Range):  Temp:  [97.1 °F (36.2 °C)-98.2 °F (36.8 °C)] 97.4 °F (36.3 °C)  Pulse:  [49-69] 53  Resp:  [6-21] 18  SpO2:  [85 %-99 %] 97 %  BP: ()/(34-50) 102/34     Weight: 51.8 kg (114 lb 3.2 oz)  Body mass index is 18.43 kg/m².    Intake/Output Summary  (Last 24 hours) at 8/20/2024 2157  Last data filed at 8/20/2024 1820  Gross per 24 hour   Intake 3153.15 ml   Output 1100 ml   Net 2053.15 ml         Physical Exam  Vitals reviewed.   Constitutional:       General: She is awake. She is not in acute distress.     Appearance: She is well-developed and underweight. She is not toxic-appearing.      Comments: Actually looks much better than would suggest by xrays and lab   HENT:      Head: Normocephalic.      Nose: Nose normal.      Mouth/Throat:      Pharynx: Oropharynx is clear.   Eyes:      Extraocular Movements: Extraocular movements intact.      Pupils: Pupils are equal, round, and reactive to light.   Neck:      Thyroid: No thyroid mass.      Vascular: No carotid bruit.   Cardiovascular:      Rate and Rhythm: Normal rate and regular rhythm.      Pulses: Normal pulses.      Heart sounds: Normal heart sounds. No murmur heard.  Pulmonary:      Effort: Pulmonary effort is normal.      Breath sounds: Normal air entry. No wheezing.      Comments: Fairly good air exchange bilateral  Abdominal:      General: Bowel sounds are normal. There is no distension.      Palpations: Abdomen is soft.      Tenderness: There is no abdominal tenderness.   Musculoskeletal:         General: Normal range of motion.      Cervical back: Neck supple. No rigidity.   Skin:     General: Skin is warm.      Coloration: Skin is not jaundiced.      Findings: No lesion.   Neurological:      General: No focal deficit present.      Mental Status: She is alert and oriented to person, place, and time.      Cranial Nerves: No cranial nerve deficit.   Psychiatric:         Attention and Perception: Attention normal.         Mood and Affect: Mood normal.         Behavior: Behavior normal. Behavior is cooperative.         Thought Content: Thought content normal.         Cognition and Memory: Cognition normal.             Significant Labs: All pertinent labs within the past 24 hours have been reviewed.  BMP:  "  Recent Labs   Lab 08/20/24  0847   GLU 58*      K 4.4      CO2 33*   BUN 25*   CREATININE 0.72   CALCIUM 8.9       CBC:   No results for input(s): "WBC", "HGB", "HCT", "PLT" in the last 48 hours.    CMP:   Recent Labs   Lab 08/20/24  0847      K 4.4      CO2 33*   GLU 58*   BUN 25*   CREATININE 0.72   CALCIUM 8.9   ANIONGAP 8         Significant Imaging: I have reviewed all pertinent imaging results/findings within the past 24 hours.        Intake/Output - Last 3 Shifts         08/19 0700 08/20 0659 08/20 0700 08/21 0659    P.O. 240 2360    I.V. (mL/kg) 115.1 (2.2)     IV Piggyback 1260.6     Total Intake(mL/kg) 1615.8 (31.2) 2360 (45.6)    Urine (mL/kg/hr) 800 (0.6) 300 (0.4)    Stool      Total Output 800 300    Net +815.8 +2060          Urine Occurrence 3 x 2 x          Microbiology Results (last 7 days)       ** No results found for the last 168 hours. **              Assessment/Plan:      * Obstructive pneumonia  Patient has a diagnosis of pneumonia. The cause of the pneumonia is suspected to be bacterial in etiology but organism is not known. The pneumonia is improving. The patient has the following signs/symptoms of pneumonia: persistent hypoxia , cough, and shortness of breath. The patient does have a current oxygen requirement and the patient does have a home oxygen requirement. I have reviewed the pertinent imaging. The following cultures have been collected: Bronchial lavage The culture results are listed below.     Current antimicrobial regimen consists of the antibiotics listed below. Will monitor patient closely and continue current treatment plan unchanged.    Recommend antibiotics until at least 8/22 (total 14 days therapy).    Antibiotics (From admission, onward)      Start     Stop Route Frequency Ordered    08/16/24 1632  vancomycin - pharmacy to dose         -- IV pharmacy to manage frequency 08/16/24 1534    08/15/24 1230  mupirocin 2 % ointment         08/20/24 0859 " Nasl 2 times daily 08/15/24 1127            Microbiology Results (last 7 days)       ** No results found for the last 168 hours. **          Rigid bronchoscopy 8/14 with stent placed. Scheduled Duo Nebs and hypertonic saline in attempt to keep stent patent. Pulmonology managing.      08/18 covering for MRSA pneumonia with positive lower respiratory culture from 08/14    MRSA infection  Positive culture from 08/14 in lower respiratory tract for MRSA  Covering with vancomycin for MRSA pneumonia      Neoplastic malignant related fatigue  With general debility and failure to thrive. Currently requiring assistance for most ADLs. Will continue physical therapy this admission however her home situation is not ideal. She will likely need to consider long-term placement upon discharge.     08/18 much improved from when I had seen patient previous      Severe protein-calorie malnutrition  Nutrition consulted. Most recent weight and BMI monitored-     Measurements:  Wt Readings from Last 1 Encounters:   08/15/24 51.8 kg (114 lb 3.2 oz)   Body mass index is 18.43 kg/m².    Patient has been screened and assessed by RD.    Malnutrition Type:  Context:    Level:      Malnutrition Characteristic Summary:       Interventions/Recommendations (treatment strategy):         Paroxysmal atrial fibrillation  Patient with Paroxysmal (<7 days) atrial fibrillation which is controlled currently with  no medications . Patient is currently in sinus rhythm.[id number] Score: 2. HASBLED Score: 1. Anticoagulation indicated. Anticoagulation done with Eliquis - held for bronchoscopy w/biopsy and stent, resume as appropriate .    COPD exacerbation  Patient's COPD is controlled currently.  Patient is currently off COPD Pathway. Continue scheduled inhalers Steroids, Antibiotics, and Supplemental oxygen and monitor respiratory status closely.     Malignant neoplasm of right lung  Known to Dr. Kraft.     Per his documentation:  Left sided pulmonary  adenocarcinoma 2017 with a new right side squamous cell primary in late 2023. She received a few cycles of weekly chemotherapy around February or March in anticipation of radiation but radiation was not performed. Sidenote she received radiation chemotherapy as well as immunotherapy 2018.    She was last seen by me in April and the office and had a no-show from May....She has continued to receive immunotherapy with Keytruda IVQ6 weeks beginning in April with last dose in early July.    08/17 can clarify treatment options if any by Dr. Kraft, Patient does look better than when last seen    Acute on chronic respiratory failure with hypoxia and hypercapnia  Patient with Hypercapnic and Hypoxic Respiratory failure which is Acute on chronic. Supplemental oxygen was provided and noted- Oxygen Concentration (%):  [36] 36    Signs/symptoms of respiratory failure include- increased work of breathing. Contributing diagnoses includes - COPD, Pneumonia, and lung cancer  Labs and images were reviewed. Patient Has not had a recent ABG. Will treat underlying causes and adjust management of respiratory failure as follows- continue supplemental O2 to maintain saturation >88%, scheduled nebulizer therapy.    Cigarette nicotine dependence with nicotine-induced disorder  Dangers of cigarette smoking were reviewed with patient in detail. Patient was Counseled for 3-10 minutes. Nicotine replacement options were discussed. Nicotine replacement was discussed- prescribed      VTE Risk Mitigation (From admission, onward)           Ordered     enoxaparin injection 40 mg  Every 24 hours         08/15/24 1036                    Discharge Planning   TERE:      Code Status: Partial Code   Is the patient medically ready for discharge?:     Reason for patient still in hospital (select all that apply): Laboratory test, Treatment, Imaging, PT / OT recommendations, and Pending disposition  Discharge Plan A: Rehab                  Mark Barraza,  MD  Department of Hospital Medicine   [facility]

## 2024-08-21 NOTE — SUBJECTIVE & OBJECTIVE
Interval History:       Review of Systems   Constitutional:  Positive for fatigue and unexpected weight change. Negative for appetite change and fever.   HENT:  Negative for congestion, hearing loss and trouble swallowing.    Respiratory:  Positive for shortness of breath. Negative for chest tightness and wheezing.    Cardiovascular:  Negative for chest pain and palpitations.   Gastrointestinal:  Negative for abdominal pain, constipation and nausea.   Genitourinary:  Negative for difficulty urinating and dysuria.   Musculoskeletal:  Positive for gait problem. Negative for back pain and neck stiffness.   Skin:  Negative for pallor and rash.   Neurological:  Negative for dizziness, speech difficulty and headaches.   Psychiatric/Behavioral:  Negative for confusion and suicidal ideas.      Objective:     Vital Signs (Most Recent):  Temp: 97.4 °F (36.3 °C) (08/20/24 2006)  Pulse: (!) 53 (08/20/24 2006)  Resp: 18 (08/20/24 2006)  BP: (!) 102/34 (08/20/24 2006)  SpO2: 97 % (08/20/24 2006) Vital Signs (24h Range):  Temp:  [97.1 °F (36.2 °C)-98.2 °F (36.8 °C)] 97.4 °F (36.3 °C)  Pulse:  [49-69] 53  Resp:  [6-21] 18  SpO2:  [85 %-99 %] 97 %  BP: ()/(34-50) 102/34     Weight: 51.8 kg (114 lb 3.2 oz)  Body mass index is 18.43 kg/m².    Intake/Output Summary (Last 24 hours) at 8/20/2024 2157  Last data filed at 8/20/2024 1820  Gross per 24 hour   Intake 3153.15 ml   Output 1100 ml   Net 2053.15 ml         Physical Exam  Vitals reviewed.   Constitutional:       General: She is awake. She is not in acute distress.     Appearance: She is well-developed and underweight. She is not toxic-appearing.      Comments: Actually looks much better than would suggest by xrays and lab   HENT:      Head: Normocephalic.      Nose: Nose normal.      Mouth/Throat:      Pharynx: Oropharynx is clear.   Eyes:      Extraocular Movements: Extraocular movements intact.      Pupils: Pupils are equal, round, and reactive to light.   Neck:       "Thyroid: No thyroid mass.      Vascular: No carotid bruit.   Cardiovascular:      Rate and Rhythm: Normal rate and regular rhythm.      Pulses: Normal pulses.      Heart sounds: Normal heart sounds. No murmur heard.  Pulmonary:      Effort: Pulmonary effort is normal.      Breath sounds: Normal air entry. No wheezing.      Comments: Fairly good air exchange bilateral  Abdominal:      General: Bowel sounds are normal. There is no distension.      Palpations: Abdomen is soft.      Tenderness: There is no abdominal tenderness.   Musculoskeletal:         General: Normal range of motion.      Cervical back: Neck supple. No rigidity.   Skin:     General: Skin is warm.      Coloration: Skin is not jaundiced.      Findings: No lesion.   Neurological:      General: No focal deficit present.      Mental Status: She is alert and oriented to person, place, and time.      Cranial Nerves: No cranial nerve deficit.   Psychiatric:         Attention and Perception: Attention normal.         Mood and Affect: Mood normal.         Behavior: Behavior normal. Behavior is cooperative.         Thought Content: Thought content normal.         Cognition and Memory: Cognition normal.             Significant Labs: All pertinent labs within the past 24 hours have been reviewed.  BMP:   Recent Labs   Lab 08/20/24  0847   GLU 58*      K 4.4      CO2 33*   BUN 25*   CREATININE 0.72   CALCIUM 8.9       CBC:   No results for input(s): "WBC", "HGB", "HCT", "PLT" in the last 48 hours.    CMP:   Recent Labs   Lab 08/20/24  0847      K 4.4      CO2 33*   GLU 58*   BUN 25*   CREATININE 0.72   CALCIUM 8.9   ANIONGAP 8         Significant Imaging: I have reviewed all pertinent imaging results/findings within the past 24 hours.        Intake/Output - Last 3 Shifts         08/19 0700 08/20 0659 08/20 0700 08/21 0659    P.O. 240 2360    I.V. (mL/kg) 115.1 (2.2)     IV Piggyback 1260.6     Total Intake(mL/kg) 1615.8 (31.2) 2360 (45.6) "    Urine (mL/kg/hr) 800 (0.6) 300 (0.4)    Stool      Total Output 800 300    Net +815.8 +2060          Urine Occurrence 3 x 2 x          Microbiology Results (last 7 days)       ** No results found for the last 168 hours. **

## 2024-08-21 NOTE — PROGRESS NOTES
Continue to hold Vancomycin.  Random level above 20.  Will recheck today at 16:00 which is approximately 36 hours from last dose.  Will redose at that time.

## 2024-08-21 NOTE — ASSESSMENT & PLAN NOTE
Patient has a diagnosis of pneumonia. The cause of the pneumonia is suspected to be bacterial in etiology but organism is not known. The pneumonia is improving. The patient has the following signs/symptoms of pneumonia: persistent hypoxia , cough, and shortness of breath. The patient does have a current oxygen requirement and the patient does have a home oxygen requirement. I have reviewed the pertinent imaging. The following cultures have been collected: Bronchial lavage The culture results are listed below.     Current antimicrobial regimen consists of the antibiotics listed below. Will monitor patient closely and continue current treatment plan unchanged.    Recommend antibiotics until at least 8/22 (total 14 days therapy).    Antibiotics (From admission, onward)    Start     Stop Route Frequency Ordered    08/16/24 1632  vancomycin - pharmacy to dose         -- IV pharmacy to manage frequency 08/16/24 1534    08/15/24 1230  mupirocin 2 % ointment         08/20/24 0859 Nasl 2 times daily 08/15/24 1127          Microbiology Results (last 7 days)     ** No results found for the last 168 hours. **        Rigid bronchoscopy 8/14 with stent placed. Scheduled Duo Nebs and hypertonic saline in attempt to keep stent patent. Pulmonology managing.      08/18 covering for MRSA pneumonia with positive lower respiratory culture from 08/14   no suicidal ideation/no depression/no anxiety

## 2024-08-21 NOTE — PT/OT/SLP PROGRESS
Occupational Therapy   Treatment    Name: Mayra Kelly  MRN: [id number]  Admitting Diagnosis:  Obstructive pneumonia       Recommendations:     Discharge Recommendations: Moderate Intensity Therapy  Discharge Equipment Recommendations:  to be determined by next level of care  Barriers to discharge:  None    Assessment:     Mayra Kelly is a 73 y.o. female with a medical diagnosis of Obstructive pneumonia.  She presents with alert and agreeable to treatment. Performance deficits affecting function are weakness, impaired endurance, impaired functional mobility, impaired self care skills, impaired balance.     Rehab Prognosis:  Good; patient would benefit from acute skilled OT services to address these deficits and reach maximum level of function.       Plan:     Patient to be seen 5 x/week to address the above listed problems via self-care/home management, therapeutic activities, therapeutic exercises  Plan of Care Expires: 08/22/24  Plan of Care Reviewed with: patient    Subjective     Chief Complaint: Obstructive pneumonia    Patient/Family Comments/goals: Pt c/o being fatigued, but agrees to (B) UE ex  Pain/Comfort:  Pain Rating 1: 0/10  Pain Rating Post-Intervention 1: 0/10    Objective:     Communicated with: RAUL Randhawa prior to session.  Patient found up in chair with blood pressure cuff, peripheral IV, telemetry, oxygen, PureWick upon OT entry to room.    General Precautions: Standard, fall    Orthopedic Precautions:N/A  Braces: N/A  Respiratory Status: Nasal cannula, flow 2 L/min     Occupational Performance:     Bed Mobility:    NT    Functional Mobility/Transfers:  NT    Activities of Daily Living:  NT      [facility] 6 Click ADL:      Treatment & Education:  Pt performed (B) UE ex for 2x15 reps of each:  Elbow flexion with 2# db  Shoulder horizontal abd/ adduction with 1# db  Shoulder flexion with 1# db  Punches with 1# db   Hand helper with 3 bands of resistance  Yellow t-band shoulder extension  Yellow  t-band for elbow extension    Patient left up in chair with all lines intact, call button in reach, and RN notified    GOALS:   Multidisciplinary Problems       Occupational Therapy Goals          Problem: Occupational Therapy    Goal Priority Disciplines Outcome Interventions   Occupational Therapy Goal     OT, PT/OT Progressing    Description: STG:  Pt will perform grooming with setup  Pt will bathe with CGA  Pt will perform UE dressing with CGA  Pt will perform LE dressing with CGA  Pt will sit EOB x 5 min with SBA assistance  Pt will transfer bed/chair/bsc with CGA  Pt will perform standing task x 5 min with CGA assistance  Pt will tolerate 15 minutes of tx without fatigue      LT.Restore to max I with self care and mobility.                          Time Tracking:     OT Date of Treatment: 24  OT Start Time: 1012  OT Stop Time: 1040  OT Total Time (min): 28 min    Billable Minutes:Therapeutic Exercise 28 min    OT/PETAR: OT          2024

## 2024-08-22 PROCEDURE — 27000221 HC OXYGEN, UP TO 24 HOURS

## 2024-08-22 PROCEDURE — 11000008

## 2024-08-22 PROCEDURE — 94761 N-INVAS EAR/PLS OXIMETRY MLT: CPT

## 2024-08-22 PROCEDURE — 27000173 HC ACAPELLA DEVICE DH OR DM

## 2024-08-22 PROCEDURE — 63600175 PHARM REV CODE 636 W HCPCS: Performed by: FAMILY MEDICINE

## 2024-08-22 PROCEDURE — 25000242 PHARM REV CODE 250 ALT 637 W/ HCPCS: Performed by: FAMILY MEDICINE

## 2024-08-22 PROCEDURE — 97530 THERAPEUTIC ACTIVITIES: CPT | Mod: CQ

## 2024-08-22 PROCEDURE — 25000003 PHARM REV CODE 250: Performed by: FAMILY MEDICINE

## 2024-08-22 PROCEDURE — 97110 THERAPEUTIC EXERCISES: CPT | Mod: CO

## 2024-08-22 PROCEDURE — 94640 AIRWAY INHALATION TREATMENT: CPT

## 2024-08-22 PROCEDURE — 94668 MNPJ CHEST WALL SBSQ: CPT

## 2024-08-22 PROCEDURE — 97110 THERAPEUTIC EXERCISES: CPT | Mod: CQ

## 2024-08-22 PROCEDURE — 99900035 HC TECH TIME PER 15 MIN (STAT)

## 2024-08-22 PROCEDURE — 99232 SBSQ HOSP IP/OBS MODERATE 35: CPT | Mod: ,,, | Performed by: HOSPITALIST

## 2024-08-22 PROCEDURE — S4991 NICOTINE PATCH NONLEGEND: HCPCS | Performed by: FAMILY MEDICINE

## 2024-08-22 PROCEDURE — 99900031 HC PATIENT EDUCATION (STAT)

## 2024-08-22 PROCEDURE — 94664 DEMO&/EVAL PT USE INHALER: CPT

## 2024-08-22 RX ADMIN — BUPROPION HYDROCHLORIDE 100 MG: 100 TABLET, FILM COATED, EXTENDED RELEASE ORAL at 09:08

## 2024-08-22 RX ADMIN — POTASSIUM CHLORIDE 20 MEQ: 20 TABLET, EXTENDED RELEASE ORAL at 08:08

## 2024-08-22 RX ADMIN — POTASSIUM CHLORIDE 20 MEQ: 20 TABLET, EXTENDED RELEASE ORAL at 09:08

## 2024-08-22 RX ADMIN — SODIUM CHLORIDE SOLN NEBU 3% 4 ML: 3 NEBU SOLN at 12:08

## 2024-08-22 RX ADMIN — IPRATROPIUM BROMIDE AND ALBUTEROL SULFATE 3 ML: 2.5; .5 SOLUTION RESPIRATORY (INHALATION) at 12:08

## 2024-08-22 RX ADMIN — PREDNISONE 30 MG: 20 TABLET ORAL at 09:08

## 2024-08-22 RX ADMIN — ENOXAPARIN SODIUM 40 MG: 40 INJECTION SUBCUTANEOUS at 05:08

## 2024-08-22 RX ADMIN — IPRATROPIUM BROMIDE AND ALBUTEROL SULFATE 3 ML: 2.5; .5 SOLUTION RESPIRATORY (INHALATION) at 07:08

## 2024-08-22 RX ADMIN — NICOTINE 1 PATCH: 14 PATCH, EXTENDED RELEASE TRANSDERMAL at 09:08

## 2024-08-22 RX ADMIN — IPRATROPIUM BROMIDE AND ALBUTEROL SULFATE 3 ML: 2.5; .5 SOLUTION RESPIRATORY (INHALATION) at 04:08

## 2024-08-22 RX ADMIN — SODIUM CHLORIDE SOLN NEBU 3% 4 ML: 3 NEBU SOLN at 07:08

## 2024-08-22 RX ADMIN — SODIUM CHLORIDE SOLN NEBU 3% 4 ML: 3 NEBU SOLN at 04:08

## 2024-08-22 RX ADMIN — BUPROPION HYDROCHLORIDE 100 MG: 100 TABLET, FILM COATED, EXTENDED RELEASE ORAL at 08:08

## 2024-08-22 NOTE — PLAN OF CARE
Problem: Gas Exchange Impaired  Goal: Optimal Gas Exchange  8/21/2024 1933 by Louisa Wong, CRTT  Outcome: Progressing  8/21/2024 1933 by Louisa Wong, CRTT  Outcome: Progressing     Problem: Breathing Pattern Ineffective  Goal: Effective Breathing Pattern  8/21/2024 1933 by Louisa Wong, CRTT  Outcome: Progressing  8/21/2024 1933 by Louisa Wong, CRTT  Outcome: Progressing     Problem: Skin Injury Risk Increased  Goal: Skin Health and Integrity  8/21/2024 1933 by Louisa Wong, CRTT  Outcome: Progressing  8/21/2024 1933 by Louisa Wong, CRTT  Outcome: Progressing

## 2024-08-22 NOTE — PT/OT/SLP PROGRESS
"Physical Therapy Treatment    Patient Name:  Mayra Kelly   MRN:  [id number]    Recommendations:     Discharge Recommendations: Moderate Intensity Therapy  Discharge Equipment Recommendations: to be determined by next level of care  Barriers to discharge:  ongoing medical treatment    Assessment:     Mayra Kelly is a 73 y.o. female admitted with a medical diagnosis of Obstructive pneumonia.  She presents with the following impairments/functional limitations: impaired endurance, impaired cardiopulmonary response to activity, impaired self care skills.    Pt slowly improving with bed mobs, able to increase step during transfers and overall activity tolerance    Rehab Prognosis: Good and Fair; patient would benefit from acute skilled PT services to address these deficits and reach maximum level of function.    Recent Surgery: * No surgery found *      Plan:     During this hospitalization, patient to be seen 5 x/week to address the identified rehab impairments via gait training, therapeutic activities, therapeutic exercises, neuromuscular re-education and progress toward the following goals:    Plan of Care Expires:  09/16/24    Subjective     Chief Complaint: obstructive pneumonia  Patient/Family Comments/goals: "did they say when I'm going home"  Pain/Comfort:         Objective:     Communicated with Sona Kang RN prior to session.  Patient found HOB elevated with pulse ox (continuous), blood pressure cuff, telemetry, peripheral IV, PureWick upon PT entry to room.     General Precautions: Standard, fall, contact  Orthopedic Precautions: N/A  Braces: N/A  Respiratory Status: Nasal cannula, flow 4 L/min     Functional Mobility:  Bed Mobility:     Supine to Sit: modified independence  Transfers:     Sit to Stand:  contact guard assistance and minimum assistance with rolling walker  Bed to Chair: minimum assistance with  rolling walker   Gait: 5' with RW and minimum assist around to recliner chair; short " steps      AM-PAC 6 CLICK MOBILITY  Turning over in bed (including adjusting bedclothes, sheets and blankets)?: 4  Sitting down on and standing up from a chair with arms (e.g., wheelchair, bedside commode, etc.): 3  Moving from lying on back to sitting on the side of the bed?: 3  Moving to and from a bed to a chair (including a wheelchair)?: 3  Need to walk in hospital room?: 3  Climbing 3-5 steps with a railing?: 1  Basic Mobility Total Score: 17       Treatment & Education:  Pt performed 2 x 15 reps (B) LE exercises: ap, quad set, glut set, straight leg raise, hip ab/adduction, long arc quad, heel slide with active assist and near constant verbal and tactile cueing to assist and remain on task    Contact guard to standby assist sitting EOB x 10 minutes    Patient left up in chair with all lines intact, call button in reach, and chair alarm on..    GOALS:   Multidisciplinary Problems       Physical Therapy Goals          Problem: Physical Therapy    Goal Priority Disciplines Outcome Goal Variances Interventions   Physical Therapy Goal     PT, PT/OT Progressing     Description: Short Term Goals to be met by: 24    Patient will increase functional independence with mobility by performin. Supine to sit with Stand by assist  2. Sit to stand transfer with contact guard assist using Rolling walker  3. Bed to chair transfer with contact guard assist using Rolling walker  4. Gait  x 100 feet with contact guard assist using Rolling walker  5. Lower extremity exercise program x30 reps per handout, with assistance as needed  6. Pt to negotiate 3 steps with rails with CGA.     Long Term Goals to be met by: 24    Pt will regain full independent functional mobility with Rolling walker to return to home situation and prior activities of daily living.                        Time Tracking:     PT Received On: 24  PT Start Time: 1056     PT Stop Time: 1123  PT Total Time (min): 27 min     Billable Minutes:  Therapeutic Activity 12 and Therapeutic Exercise 15    Treatment Type: Treatment  PT/PTA: PTA     Number of PTA visits since last PT visit: 1 08/22/2024

## 2024-08-22 NOTE — PT/OT/SLP PROGRESS
Occupational Therapy   Treatment    Name: Mayra Kelly  MRN: [id number]  Admitting Diagnosis:  Obstructive pneumonia       Recommendations:     Discharge Recommendations: Moderate Intensity Therapy  Discharge Equipment Recommendations:  to be determined by next level of care  Barriers to discharge:       Assessment:     Mayra Kelly is a 73 y.o. female with a medical diagnosis of Obstructive pneumonia  Performance deficits affecting function are weakness, impaired endurance, impaired self care skills.     Rehab Prognosis:  Good; patient would benefit from acute skilled OT services to address these deficits and reach maximum level of function.       Plan:     Patient to be seen 5 x/week to address the above listed problems via self-care/home management, therapeutic activities, therapeutic exercises  Plan of Care Expires: 08/22/24  Plan of Care Reviewed with: patient    Subjective     Chief Complaint:   Patient/Family Comments/goals:   Pain/Comfort:  Pain Rating 1: 0/10    Objective:     Communicated with: Sona Kang RN prior to session.  Patient found up in chair with pulse ox (continuous), blood pressure cuff, telemetry, peripheral IV, PureWick, chair alarm upon OT entry to room.    General Precautions: Standard, fall    Orthopedic Precautions:N/A  Braces: N/A  Respiratory Status: Nasal cannula, flow 4 L/min     Occupational Performance:     Bed Mobility:         Functional Mobility/Transfers:    Functional Mobility:     Activities of Daily Living:        [facility] 6 Click ADL:      Treatment & Education:  Pt performed hand helper with 2 rubber band resistances 20 reps x 2, green t ball ex's 20 reps x 2, rom ex's with 2 lb wt 15 reps x 2 B shld flex,abd/add,elbow flex/ext, red t band 15 reps x 2 B elbow flex    Patient left up in chair with all lines intact, call button in reach, and chair alarm on    GOALS:   Multidisciplinary Problems       Occupational Therapy Goals          Problem: Occupational Therapy     Goal Priority Disciplines Outcome Interventions   Occupational Therapy Goal     OT, PT/OT Progressing    Description: STG:  Pt will perform grooming with setup  Pt will bathe with CGA  Pt will perform UE dressing with CGA  Pt will perform LE dressing with CGA  Pt will sit EOB x 5 min with SBA assistance  Pt will transfer bed/chair/bsc with CGA  Pt will perform standing task x 5 min with CGA assistance  Pt will tolerate 15 minutes of tx without fatigue      LT.Restore to max I with self care and mobility.                          Time Tracking:     OT Date of Treatment: 24  OT Start Time: 1126 (1311)  OT Stop Time: 1134 (1331)  OT Total Time (min): 8 min, 20    Billable Minutes:Therapeutic Exercise 25    OT/PETAR: PETAR          2024

## 2024-08-22 NOTE — PROGRESS NOTES
[facility] Medicine  Progress Note    Patient Name: Mayra Kelly  MRN: [id number]  Patient Class: IP- Inpatient   Admission Date: 8/15/2024  Length of Stay: 6 days  Attending Physician: Mark Barraza MD  Primary Care Provider: Darlene Campoverde NP        Subjective:     Principal Problem:Obstructive pneumonia        HPI:    Mayra Kelly is a 73-year-old female with history of lung cancer, COPD, pulmonary HTN, and a-fib. She initially presented to [facility] with acute-on-chronic respiratory failure secondary to obstructive pneumonia. This was treated with extended course of broad-spectrum IV antibiotics with some improvement. Patient underwent rigid bronchoscopy with bronchial lavage, dilation, and stent by Dr. Castellon 8/14/24. Patient tolerated procedure well with improvement in respiratory status. CXR 8/15 revealed stent has remained patent however will require aggressive respiratory therapy and continued antibiotics. Patient subsequently admitted to [facility] for continued IV antibiotics, scheduled respiratory therapy, and physical rehabilitation.             Overview/Hospital Course:        08/17-  Records reviewed. Presented 08/07 to [facility]  withincrease SOB, cough and fatique. Some weight loss. Noted on evaluation worse CXR and CT chest. Hx treated adenoCa in 2018. Last Nov underwent evaluation by Dr Munguia. Seen then again also by Dr Kraft. Dx new primary squamous cell CA. Treated chemotx. In April saw Dr Kraft and started on Keytruda and given through infusion center. Has continued this through July. Missed her appt with Dr Kraft in May. This information mainly from Dr Kraft in pt was not good historian. Pt continued to smoke 1/2 ppd; encourage to stop. Dr Whitmore and Dr Kraft ask to consult. Requires 50% ventimask to keep O2 sats in low 90s. At home been prior on 2L BNC O2. PHMx: also Eliquis for PAF. Now NSR.  Treated for pneumonia.  Seen during stay by   Abena and pulmonary. Patient underwent rigid bronchoscopy with bronchial lavage, dilation, and stent by Dr. Castellon 8/14/24.  Admitted to [facility] 08/15 for continued treatment and general strengthening by therapy team.  Patient looks better than when last seen by me.  Seems to be in good spirits.   08/18 continues to feel better without new complaints.  To work with therapy.  Treatment includes vancomycin for MRSA with positive respiratory culture from 08/14.   08/19 Better. CXR better. Reviewed Dr Kraft note. Some confusion. Stop zosyn and consider stop vancomycin soon. Look at DC needs.  08/20 Continues feeling / doing better. Look into DC needs.  08/21 Continues to do well and work with therapy    Interval History:       Review of Systems   Constitutional:  Positive for fatigue and unexpected weight change. Negative for appetite change and fever.   HENT:  Negative for congestion, hearing loss and trouble swallowing.    Respiratory:  Positive for shortness of breath. Negative for chest tightness and wheezing.    Cardiovascular:  Negative for chest pain and palpitations.   Gastrointestinal:  Negative for abdominal pain, constipation and nausea.   Genitourinary:  Negative for difficulty urinating and dysuria.   Musculoskeletal:  Positive for gait problem. Negative for back pain and neck stiffness.   Skin:  Negative for pallor and rash.   Neurological:  Negative for dizziness, speech difficulty and headaches.   Psychiatric/Behavioral:  Negative for confusion and suicidal ideas.      Objective:     Vital Signs (Most Recent):  Temp: 97.8 °F (36.6 °C) (08/21/24 2000)  Pulse: 60 (08/21/24 2000)  Resp: 19 (08/21/24 2000)  BP: (!) 113/45 (08/21/24 2000)  SpO2: 98 % (08/21/24 2003) Vital Signs (24h Range):  Temp:  [95.9 °F (35.5 °C)-97.8 °F (36.6 °C)] 97.8 °F (36.6 °C)  Pulse:  [47-69] 60  Resp:  [8-19] 19  SpO2:  [91 %-100 %] 98 %  BP: (105-125)/(41-47) 113/45     Weight: 51.8 kg (114 lb 3.2 oz)  Body mass  index is 18.43 kg/m².    Intake/Output Summary (Last 24 hours) at 8/21/2024 2100  Last data filed at 8/21/2024 1750  Gross per 24 hour   Intake 960 ml   Output 1100 ml   Net -140 ml         Physical Exam  Vitals reviewed.   Constitutional:       General: She is awake. She is not in acute distress.     Appearance: She is well-developed and underweight. She is not toxic-appearing.      Comments: Actually looks much better than would suggest by xrays and lab   HENT:      Head: Normocephalic.      Nose: Nose normal.      Mouth/Throat:      Pharynx: Oropharynx is clear.   Eyes:      Extraocular Movements: Extraocular movements intact.      Pupils: Pupils are equal, round, and reactive to light.   Neck:      Thyroid: No thyroid mass.      Vascular: No carotid bruit.   Cardiovascular:      Rate and Rhythm: Normal rate and regular rhythm.      Pulses: Normal pulses.      Heart sounds: Normal heart sounds. No murmur heard.  Pulmonary:      Effort: Pulmonary effort is normal.      Breath sounds: Normal air entry. No wheezing.      Comments: Fairly good air exchange bilateral  Abdominal:      General: Bowel sounds are normal. There is no distension.      Palpations: Abdomen is soft.      Tenderness: There is no abdominal tenderness.   Musculoskeletal:         General: Normal range of motion.      Cervical back: Neck supple. No rigidity.   Skin:     General: Skin is warm.      Coloration: Skin is not jaundiced.      Findings: No lesion.   Neurological:      General: No focal deficit present.      Mental Status: She is alert and oriented to person, place, and time.      Cranial Nerves: No cranial nerve deficit.   Psychiatric:         Attention and Perception: Attention normal.         Mood and Affect: Mood normal.         Behavior: Behavior normal. Behavior is cooperative.         Thought Content: Thought content normal.         Cognition and Memory: Cognition normal.             Significant Labs: All pertinent labs within the  past 24 hours have been reviewed.  BMP:   Recent Labs   Lab 08/21/24  0401   GLU 54*      K 4.7      CO2 31   BUN 24*   CREATININE 0.77   CALCIUM 8.7       CBC:   Recent Labs   Lab 08/21/24  0401   WBC 9.55   HGB 12.0   HCT 39.2          CMP:   Recent Labs   Lab 08/20/24  0847 08/21/24  0401    138   K 4.4 4.7    104   CO2 33* 31   GLU 58* 54*   BUN 25* 24*   CREATININE 0.72 0.77   CALCIUM 8.9 8.7   ANIONGAP 8 8         Significant Imaging: I have reviewed all pertinent imaging results/findings within the past 24 hours.        Intake/Output - Last 3 Shifts         08/20 0700  08/21 0659 08/21 0700 08/22 0659    P.O. 2360 960    I.V. (mL/kg)      IV Piggyback      Total Intake(mL/kg) 2360 (45.6) 960 (18.5)    Urine (mL/kg/hr) 700 (0.6) 700 (1)    Stool  0    Total Output 700 700    Net +1660 +260          Urine Occurrence 2 x 1 x    Stool Occurrence  1 x          Microbiology Results (last 7 days)       ** No results found for the last 168 hours. **              Assessment/Plan:      * Obstructive pneumonia  Patient has a diagnosis of pneumonia. The cause of the pneumonia is suspected to be bacterial in etiology but organism is not known. The pneumonia is improving. The patient has the following signs/symptoms of pneumonia: persistent hypoxia , cough, and shortness of breath. The patient does have a current oxygen requirement and the patient does have a home oxygen requirement. I have reviewed the pertinent imaging. The following cultures have been collected: Bronchial lavage The culture results are listed below.     Current antimicrobial regimen consists of the antibiotics listed below. Will monitor patient closely and continue current treatment plan unchanged.    Recommend antibiotics until at least 8/22 (total 14 days therapy).    Antibiotics (From admission, onward)      Start     Stop Route Frequency Ordered    08/16/24 1632  vancomycin - pharmacy to dose         -- IV pharmacy to  manage frequency 08/16/24 1534    08/15/24 1230  mupirocin 2 % ointment         08/20/24 0859 Nasl 2 times daily 08/15/24 1127            Microbiology Results (last 7 days)       ** No results found for the last 168 hours. **          Rigid bronchoscopy 8/14 with stent placed. Scheduled Duo Nebs and hypertonic saline in attempt to keep stent patent. Pulmonology managing.      08/18 covering for MRSA pneumonia with positive lower respiratory culture from 08/14    MRSA infection  Positive culture from 08/14 in lower respiratory tract for MRSA  Covering with vancomycin for MRSA pneumonia      Neoplastic malignant related fatigue  With general debility and failure to thrive. Currently requiring assistance for most ADLs. Will continue physical therapy this admission however her home situation is not ideal. She will likely need to consider long-term placement upon discharge.     08/18 much improved from when I had seen patient previous      Severe protein-calorie malnutrition  Nutrition consulted. Most recent weight and BMI monitored-     Measurements:  Wt Readings from Last 1 Encounters:   08/15/24 51.8 kg (114 lb 3.2 oz)   Body mass index is 18.43 kg/m².    Patient has been screened and assessed by RD.    Malnutrition Type:  Context:    Level:      Malnutrition Characteristic Summary:       Interventions/Recommendations (treatment strategy):         Paroxysmal atrial fibrillation  Patient with Paroxysmal (<7 days) atrial fibrillation which is controlled currently with  no medications . Patient is currently in sinus rhythm.[id number] Score: 2. HASBLED Score: 1. Anticoagulation indicated. Anticoagulation done with Eliquis - held for bronchoscopy w/biopsy and stent, resume as appropriate .    COPD exacerbation  Patient's COPD is controlled currently.  Patient is currently off COPD Pathway. Continue scheduled inhalers Steroids, Antibiotics, and Supplemental oxygen and monitor respiratory status closely.     Malignant  neoplasm of right lung  Known to Dr. Kraft.     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 She received a few cycles of weekly chemotherapy around February or March in anticipation of radiation but radiation was not performed. Sidenote she received radiation chemotherapy as well as immunotherapy 2018.    She was last seen by me in April and the office and had a no-show from May....She has continued to receive immunotherapy with Keytruda IVQ6 weeks beginning in April with last dose in early July.    08/17 can clarify treatment options if any by Dr. Kraft, Patient does look better than when last seen    Acute on chronic respiratory failure with hypoxia and hypercapnia  Patient with Hypercapnic and Hypoxic Respiratory failure which is Acute on chronic. Supplemental oxygen was provided and noted- Oxygen Concentration (%):  [36] 36    Signs/symptoms of respiratory failure include- increased work of breathing. Contributing diagnoses includes - COPD, Pneumonia, and lung cancer  Labs and images were reviewed. Patient Has not had a recent ABG. Will treat underlying causes and adjust management of respiratory failure as follows- continue supplemental O2 to maintain saturation >88%, scheduled nebulizer therapy.    Cigarette nicotine dependence with nicotine-induced disorder  Dangers of cigarette smoking were reviewed with patient in detail. Patient was Counseled for 3-10 minutes. Nicotine replacement options were discussed. Nicotine replacement was discussed- prescribed      VTE Risk Mitigation (From admission, onward)           Ordered     enoxaparin injection 40 mg  Every 24 hours         08/15/24 1036                    Discharge Planning   TERE:      Code Status: Partial Code   Is the patient medically ready for discharge?:     Reason for patient still in hospital (select all that apply): Laboratory test, Treatment, and Imaging  Discharge Plan A:  Rehab                  Mark Barraza MD  Department of Hospital Medicine   [facility]

## 2024-08-22 NOTE — SUBJECTIVE & OBJECTIVE
Interval History:       Review of Systems   Constitutional:  Positive for fatigue and unexpected weight change. Negative for appetite change and fever.   HENT:  Negative for congestion, hearing loss and trouble swallowing.    Respiratory:  Positive for shortness of breath. Negative for chest tightness and wheezing.    Cardiovascular:  Negative for chest pain and palpitations.   Gastrointestinal:  Negative for abdominal pain, constipation and nausea.   Genitourinary:  Negative for difficulty urinating and dysuria.   Musculoskeletal:  Positive for gait problem. Negative for back pain and neck stiffness.   Skin:  Negative for pallor and rash.   Neurological:  Negative for dizziness, speech difficulty and headaches.   Psychiatric/Behavioral:  Negative for confusion and suicidal ideas.      Objective:     Vital Signs (Most Recent):  Temp: 97.8 °F (36.6 °C) (08/21/24 2000)  Pulse: 60 (08/21/24 2000)  Resp: 19 (08/21/24 2000)  BP: (!) 113/45 (08/21/24 2000)  SpO2: 98 % (08/21/24 2003) Vital Signs (24h Range):  Temp:  [95.9 °F (35.5 °C)-97.8 °F (36.6 °C)] 97.8 °F (36.6 °C)  Pulse:  [47-69] 60  Resp:  [8-19] 19  SpO2:  [91 %-100 %] 98 %  BP: (105-125)/(41-47) 113/45     Weight: 51.8 kg (114 lb 3.2 oz)  Body mass index is 18.43 kg/m².    Intake/Output Summary (Last 24 hours) at 8/21/2024 2100  Last data filed at 8/21/2024 1750  Gross per 24 hour   Intake 960 ml   Output 1100 ml   Net -140 ml         Physical Exam  Vitals reviewed.   Constitutional:       General: She is awake. She is not in acute distress.     Appearance: She is well-developed and underweight. She is not toxic-appearing.      Comments: Actually looks much better than would suggest by xrays and lab   HENT:      Head: Normocephalic.      Nose: Nose normal.      Mouth/Throat:      Pharynx: Oropharynx is clear.   Eyes:      Extraocular Movements: Extraocular movements intact.      Pupils: Pupils are equal, round, and reactive to light.   Neck:      Thyroid: No  thyroid mass.      Vascular: No carotid bruit.   Cardiovascular:      Rate and Rhythm: Normal rate and regular rhythm.      Pulses: Normal pulses.      Heart sounds: Normal heart sounds. No murmur heard.  Pulmonary:      Effort: Pulmonary effort is normal.      Breath sounds: Normal air entry. No wheezing.      Comments: Fairly good air exchange bilateral  Abdominal:      General: Bowel sounds are normal. There is no distension.      Palpations: Abdomen is soft.      Tenderness: There is no abdominal tenderness.   Musculoskeletal:         General: Normal range of motion.      Cervical back: Neck supple. No rigidity.   Skin:     General: Skin is warm.      Coloration: Skin is not jaundiced.      Findings: No lesion.   Neurological:      General: No focal deficit present.      Mental Status: She is alert and oriented to person, place, and time.      Cranial Nerves: No cranial nerve deficit.   Psychiatric:         Attention and Perception: Attention normal.         Mood and Affect: Mood normal.         Behavior: Behavior normal. Behavior is cooperative.         Thought Content: Thought content normal.         Cognition and Memory: Cognition normal.             Significant Labs: All pertinent labs within the past 24 hours have been reviewed.  BMP:   Recent Labs   Lab 08/21/24  0401   GLU 54*      K 4.7      CO2 31   BUN 24*   CREATININE 0.77   CALCIUM 8.7       CBC:   Recent Labs   Lab 08/21/24  0401   WBC 9.55   HGB 12.0   HCT 39.2          CMP:   Recent Labs   Lab 08/20/24  0847 08/21/24  0401    138   K 4.4 4.7    104   CO2 33* 31   GLU 58* 54*   BUN 25* 24*   CREATININE 0.72 0.77   CALCIUM 8.9 8.7   ANIONGAP 8 8         Significant Imaging: I have reviewed all pertinent imaging results/findings within the past 24 hours.        Intake/Output - Last 3 Shifts         08/20 0700 08/21 0659 08/21 0700 08/22 0659    P.O. 2360 960    I.V. (mL/kg)      IV Piggyback      Total Intake(mL/kg)  2360 (45.6) 960 (18.5)    Urine (mL/kg/hr) 700 (0.6) 700 (1)    Stool  0    Total Output 700 700    Net +1660 +260          Urine Occurrence 2 x 1 x    Stool Occurrence  1 x          Microbiology Results (last 7 days)       ** No results found for the last 168 hours. **

## 2024-08-22 NOTE — PLAN OF CARE
"[facility] - LTAC   Interdisciplinary Team Meeting    Patient: Mayra Kelly   Today's Date: 8/22/2024   Estimated D/C Date:         Physician:  Dr. Barraza Unit Director:  N/A   Pharmacy: Evy Jones PharmD Case Management:  Lynne Ovalle RN   Physical/Occupational Therapy: Lesia Gupta OT Speech Therapy: Lesia Gupta OT   Respiratory: Boby Mock, RT Nursing: Phylicia Davalos RN   Wound Care:  N/A   Utilization Management: Lynne Ovalle RN     Nursing  New Symptoms/Problems: N/A  Bowel: incontinent Urine: incontinent S[place]: No   Constipated: No    Lab Concerns: N/A  Lab Reviewed: Yes  New Lab Orders: Yes    Nutrition: Regular w/Boost   Intake percentage: 50-75%   Weight: Weight: 51.8 kg (114 lb 3.2 oz)   Height: Height: 5' 6" (167.6 cm)  BMI: BMI (Calculated): 18.4    Speech/Swallowing: No current speech or swallowing issues  Aspiration Precautions: No  Cognition: WNL Diarrhea: No      Respiratory Therapy  Isolation: No  Last Bowel Movement: 08/21/24     O2 Device: Nasal Cannula  Vent: No  Comment(s): Albuterol nebs QID   O2 Flow: 4 lpm  SpO2: 97 %       Physical Therapy  Physical Therapy/Gait: 5 ft with rolling walker ELOS: Plan to DC     Transfers: Minimal Assistance  Bed Mobility: Minimal Assistance Range of Motion/Restrictions: N/A  Comment(s): use of rolling walker     Occupational Therapy  Eating/Grooming: Supervision or Set-up Assistance Toileting: Activity did not occur   Bathing: Activity did not occur Dressing (Upper Body): Activity did not occur   Dressing (Lower Body): Activity did not occur      Wound Care: Coccyx-foam border prn      Tx Plan/Recommendations reviewed with family and/or patient on (date) Yes.  Additional family Conference/Training: N/A  D/C Plan/Recommendations: Home with   TERE:     Pharmacy  Medication Changes (see MD orders in chart): No  Pharmacy comments: N/A  IV Medications: Vancomycin q36               "

## 2024-08-23 LAB
ANION GAP SERPL CALCULATED.3IONS-SCNC: 8 MMOL/L (ref 7–16)
BASOPHILS # BLD AUTO: 0.03 K/UL (ref 0–0.2)
BASOPHILS NFR BLD AUTO: 0.2 % (ref 0–1)
BUN SERPL-MCNC: 29 MG/DL (ref 7–18)
BUN/CREAT SERPL: 32 (ref 6–20)
CALCIUM SERPL-MCNC: 9 MG/DL (ref 8.5–10.1)
CHLORIDE SERPL-SCNC: 102 MMOL/L (ref 98–107)
CO2 SERPL-SCNC: 32 MMOL/L (ref 21–32)
CREAT SERPL-MCNC: 0.91 MG/DL (ref 0.55–1.02)
DIFFERENTIAL METHOD BLD: ABNORMAL
EGFR (NO RACE VARIABLE) (RUSH/TITUS): 67 ML/MIN/1.73M2
EOSINOPHIL # BLD AUTO: 0.11 K/UL (ref 0–0.5)
EOSINOPHIL NFR BLD AUTO: 0.9 % (ref 1–4)
ERYTHROCYTE [DISTWIDTH] IN BLOOD BY AUTOMATED COUNT: 18.3 % (ref 11.5–14.5)
GLUCOSE SERPL-MCNC: 47 MG/DL (ref 74–106)
HCT VFR BLD AUTO: 39.1 % (ref 38–47)
HGB BLD-MCNC: 11.8 G/DL (ref 12–16)
IMM GRANULOCYTES # BLD AUTO: 0.06 K/UL (ref 0–0.04)
IMM GRANULOCYTES NFR BLD: 0.5 % (ref 0–0.4)
LYMPHOCYTES # BLD AUTO: 1.3 K/UL (ref 1–4.8)
LYMPHOCYTES NFR BLD AUTO: 10.6 % (ref 27–41)
MCH RBC QN AUTO: 26.3 PG (ref 27–31)
MCHC RBC AUTO-ENTMCNC: 30.2 G/DL (ref 32–36)
MCV RBC AUTO: 87.1 FL (ref 80–96)
MONOCYTES # BLD AUTO: 0.97 K/UL (ref 0–0.8)
MONOCYTES NFR BLD AUTO: 7.9 % (ref 2–6)
MPC BLD CALC-MCNC: 10.1 FL (ref 9.4–12.4)
NEUTROPHILS # BLD AUTO: 9.84 K/UL (ref 1.8–7.7)
NEUTROPHILS NFR BLD AUTO: 79.9 % (ref 53–65)
NRBC # BLD AUTO: 0 X10E3/UL
NRBC, AUTO (.00): 0 %
PLATELET # BLD AUTO: 367 K/UL (ref 150–400)
POTASSIUM SERPL-SCNC: 4.9 MMOL/L (ref 3.5–5.1)
RBC # BLD AUTO: 4.49 M/UL (ref 4.2–5.4)
SODIUM SERPL-SCNC: 137 MMOL/L (ref 136–145)
WBC # BLD AUTO: 12.31 K/UL (ref 4.5–11)

## 2024-08-23 PROCEDURE — 27000173 HC ACAPELLA DEVICE DH OR DM

## 2024-08-23 PROCEDURE — 11000008

## 2024-08-23 PROCEDURE — 36415 COLL VENOUS BLD VENIPUNCTURE: CPT | Performed by: HOSPITALIST

## 2024-08-23 PROCEDURE — 99900035 HC TECH TIME PER 15 MIN (STAT)

## 2024-08-23 PROCEDURE — 97110 THERAPEUTIC EXERCISES: CPT | Mod: CO

## 2024-08-23 PROCEDURE — 85025 COMPLETE CBC W/AUTO DIFF WBC: CPT | Performed by: HOSPITALIST

## 2024-08-23 PROCEDURE — 25000242 PHARM REV CODE 250 ALT 637 W/ HCPCS: Performed by: FAMILY MEDICINE

## 2024-08-23 PROCEDURE — 63600175 PHARM REV CODE 636 W HCPCS: Performed by: FAMILY MEDICINE

## 2024-08-23 PROCEDURE — 99232 SBSQ HOSP IP/OBS MODERATE 35: CPT | Mod: ,,, | Performed by: HOSPITALIST

## 2024-08-23 PROCEDURE — 25000003 PHARM REV CODE 250: Performed by: FAMILY MEDICINE

## 2024-08-23 PROCEDURE — 80048 BASIC METABOLIC PNL TOTAL CA: CPT | Performed by: HOSPITALIST

## 2024-08-23 PROCEDURE — S4991 NICOTINE PATCH NONLEGEND: HCPCS | Performed by: FAMILY MEDICINE

## 2024-08-23 PROCEDURE — 94664 DEMO&/EVAL PT USE INHALER: CPT

## 2024-08-23 PROCEDURE — 94761 N-INVAS EAR/PLS OXIMETRY MLT: CPT

## 2024-08-23 PROCEDURE — 94640 AIRWAY INHALATION TREATMENT: CPT

## 2024-08-23 PROCEDURE — 27000221 HC OXYGEN, UP TO 24 HOURS

## 2024-08-23 PROCEDURE — 97535 SELF CARE MNGMENT TRAINING: CPT | Mod: CO

## 2024-08-23 RX ADMIN — IPRATROPIUM BROMIDE AND ALBUTEROL SULFATE 3 ML: 2.5; .5 SOLUTION RESPIRATORY (INHALATION) at 07:08

## 2024-08-23 RX ADMIN — POTASSIUM CHLORIDE 20 MEQ: 20 TABLET, EXTENDED RELEASE ORAL at 09:08

## 2024-08-23 RX ADMIN — SODIUM CHLORIDE SOLN NEBU 3% 4 ML: 3 NEBU SOLN at 07:08

## 2024-08-23 RX ADMIN — MELATONIN 6 MG: 3 TAB ORAL at 08:08

## 2024-08-23 RX ADMIN — BUPROPION HYDROCHLORIDE 100 MG: 100 TABLET, FILM COATED, EXTENDED RELEASE ORAL at 08:08

## 2024-08-23 RX ADMIN — PREDNISONE 30 MG: 20 TABLET ORAL at 09:08

## 2024-08-23 RX ADMIN — IPRATROPIUM BROMIDE AND ALBUTEROL SULFATE 3 ML: 2.5; .5 SOLUTION RESPIRATORY (INHALATION) at 03:08

## 2024-08-23 RX ADMIN — ENOXAPARIN SODIUM 40 MG: 40 INJECTION SUBCUTANEOUS at 04:08

## 2024-08-23 RX ADMIN — SODIUM CHLORIDE SOLN NEBU 3% 4 ML: 3 NEBU SOLN at 12:08

## 2024-08-23 RX ADMIN — BUPROPION HYDROCHLORIDE 100 MG: 100 TABLET, FILM COATED, EXTENDED RELEASE ORAL at 09:08

## 2024-08-23 RX ADMIN — IPRATROPIUM BROMIDE AND ALBUTEROL SULFATE 3 ML: 2.5; .5 SOLUTION RESPIRATORY (INHALATION) at 11:08

## 2024-08-23 RX ADMIN — SODIUM CHLORIDE SOLN NEBU 3% 4 ML: 3 NEBU SOLN at 03:08

## 2024-08-23 RX ADMIN — NICOTINE 1 PATCH: 14 PATCH, EXTENDED RELEASE TRANSDERMAL at 09:08

## 2024-08-23 NOTE — SUBJECTIVE & OBJECTIVE
Interval History:       Review of Systems   Constitutional:  Positive for fatigue and unexpected weight change. Negative for appetite change and fever.   HENT:  Negative for congestion, hearing loss and trouble swallowing.    Respiratory:  Positive for shortness of breath. Negative for chest tightness and wheezing.    Cardiovascular:  Negative for chest pain and palpitations.   Gastrointestinal:  Negative for abdominal pain, constipation and nausea.   Genitourinary:  Negative for difficulty urinating and dysuria.   Musculoskeletal:  Positive for gait problem. Negative for back pain and neck stiffness.   Skin:  Negative for pallor and rash.   Neurological:  Negative for dizziness, speech difficulty and headaches.   Psychiatric/Behavioral:  Negative for confusion and suicidal ideas.      Objective:     Vital Signs (Most Recent):  Temp: 97.7 °F (36.5 °C) (08/22/24 2000)  Pulse: (!) 57 (08/22/24 1938)  Resp: 14 (08/22/24 1938)  BP: (!) 109/38 (08/22/24 0600)  SpO2: 98 % (08/22/24 1938) Vital Signs (24h Range):  Temp:  [97 °F (36.1 °C)-97.7 °F (36.5 °C)] 97.7 °F (36.5 °C)  Pulse:  [53-64] 57  Resp:  [10-20] 14  SpO2:  [89 %-100 %] 98 %  BP: (104-124)/(38-53) 109/38     Weight: 51.8 kg (114 lb 3.2 oz)  Body mass index is 18.43 kg/m².    Intake/Output Summary (Last 24 hours) at 8/22/2024 4097  Last data filed at 8/22/2024 2041  Gross per 24 hour   Intake 840 ml   Output --   Net 840 ml         Physical Exam  Vitals reviewed.   Constitutional:       General: She is awake. She is not in acute distress.     Appearance: She is well-developed and underweight. She is not toxic-appearing.      Comments: Actually looks much better than would suggest by xrays and lab   HENT:      Head: Normocephalic.      Nose: Nose normal.      Mouth/Throat:      Pharynx: Oropharynx is clear.   Eyes:      Extraocular Movements: Extraocular movements intact.      Pupils: Pupils are equal, round, and reactive to light.   Neck:      Thyroid: No  thyroid mass.      Vascular: No carotid bruit.   Cardiovascular:      Rate and Rhythm: Normal rate and regular rhythm.      Pulses: Normal pulses.      Heart sounds: Normal heart sounds. No murmur heard.  Pulmonary:      Effort: Pulmonary effort is normal.      Breath sounds: Normal air entry. No wheezing.      Comments: Fairly good air exchange bilateral  Abdominal:      General: Bowel sounds are normal. There is no distension.      Palpations: Abdomen is soft.      Tenderness: There is no abdominal tenderness.   Musculoskeletal:         General: Normal range of motion.      Cervical back: Neck supple. No rigidity.   Skin:     General: Skin is warm.      Coloration: Skin is not jaundiced.      Findings: No lesion.   Neurological:      General: No focal deficit present.      Mental Status: She is alert and oriented to person, place, and time.      Cranial Nerves: No cranial nerve deficit.   Psychiatric:         Attention and Perception: Attention normal.         Mood and Affect: Mood normal.         Behavior: Behavior normal. Behavior is cooperative.         Thought Content: Thought content normal.         Cognition and Memory: Cognition normal.             Significant Labs: All pertinent labs within the past 24 hours have been reviewed.  BMP:   Recent Labs   Lab 08/21/24  0401   GLU 54*      K 4.7      CO2 31   BUN 24*   CREATININE 0.77   CALCIUM 8.7       CBC:   Recent Labs   Lab 08/21/24  0401   WBC 9.55   HGB 12.0   HCT 39.2          CMP:   Recent Labs   Lab 08/21/24  0401      K 4.7      CO2 31   GLU 54*   BUN 24*   CREATININE 0.77   CALCIUM 8.7   ANIONGAP 8         Significant Imaging: I have reviewed all pertinent imaging results/findings within the past 24 hours.        Intake/Output - Last 3 Shifts         08/21 0700  08/22 0659 08/22 0700  08/23 0659    P.O. 960 840    Total Intake(mL/kg) 960 (18.5) 840 (16.2)    Urine (mL/kg/hr) 700 (0.6)     Stool 0     Total Output 700      Net +260 +840          Urine Occurrence 2 x     Stool Occurrence 1 x           Microbiology Results (last 7 days)       ** No results found for the last 168 hours. **

## 2024-08-23 NOTE — PLAN OF CARE
1340 SS spoke with pt at bedside related to d/c planning-[facility] VS HH. Pt was indecisive and wanted to discuss with son prior to making a decision. SS will contact son Otilio. SS following.    1153 SS attempted to call son, Otilio, at [phone]. No answer-left VM. SS following.

## 2024-08-23 NOTE — ASSESSMENT & PLAN NOTE
With general debility and failure to thrive. Currently requiring assistance for most ADLs. Will continue physical therapy this admission however her home situation is not ideal. She will likely need to consider long-term placement upon discharge.     08/18 much improved from when I had seen patient previous  08/22 recommendation SNF unit

## 2024-08-23 NOTE — PT/OT/SLP PROGRESS
Occupational Therapy   Treatment    Name: Mayra Kelly  MRN: [id number]  Admitting Diagnosis:  Obstructive pneumonia       Recommendations:     Discharge Recommendations: Moderate Intensity Therapy  Discharge Equipment Recommendations:  to be determined by next level of care  Barriers to discharge:       Assessment:     Mayra Kelly is a 73 y.o. female with a medical diagnosis of Obstructive pneumonia. Performance deficits affecting function are weakness, impaired endurance, impaired self care skills.     Rehab Prognosis:  Good; patient would benefit from acute skilled OT services to address these deficits and reach maximum level of function.       Plan:     Patient to be seen 5 x/week to address the above listed problems via self-care/home management, therapeutic activities, therapeutic exercises  Plan of Care Expires: 08/22/24  Plan of Care Reviewed with: patient    Subjective     Chief Complaint:   Patient/Family Comments/goals:   Pain/Comfort:  Pain Rating 1: 0/10    Objective:     Communicated with: Stormy Olson LPN prior to session.  Patient found HOB elevated with blood pressure cuff, oxygen, PureWick, peripheral IV, pulse ox (continuous), telemetry upon OT entry to room.    General Precautions: Standard, fall    Orthopedic Precautions:N/A  Braces: N/A  Respiratory Status: Nasal cannula, flow 4 L/min     Occupational Performance:     Bed Mobility:         Functional Mobility/Transfers:    Functional Mobility:     Activities of Daily Living:  I with brushing her teeth with access to supplies  I to comb her hair  I to apply lip balm   I to wash her face       AMPAC 6 Click ADL:      Treatment & Education:  Pt performed hand helper with 2 rubber band resistances 20 reps x 2, green t ball ex's 20 reps x  2, rom ex's with 2 lb wt 15 reps x 2 B shld flex,abd/add,elbow flex/ext, red t band 15 reps x 2 B elbow flex    Patient left HOB elevated with all lines intact, call button in reach, and son  present    GOALS:   Multidisciplinary Problems       Occupational Therapy Goals          Problem: Occupational Therapy    Goal Priority Disciplines Outcome Interventions   Occupational Therapy Goal     OT, PT/OT Progressing    Description: STG:  Pt will perform grooming with setup  Pt will bathe with CGA  Pt will perform UE dressing with CGA  Pt will perform LE dressing with CGA  Pt will sit EOB x 5 min with SBA assistance  Pt will transfer bed/chair/bsc with CGA  Pt will perform standing task x 5 min with CGA assistance  Pt will tolerate 15 minutes of tx without fatigue      LT.Restore to max I with self care and mobility.                          Time Tracking:     OT Date of Treatment: 24  OT Start Time: 914  OT Stop Time: 944  OT Total Time (min): 30 min    Billable Minutes:Self Care/Home Management 10  Therapeutic Exercise 15    OT/PETAR: PETAR          2024

## 2024-08-23 NOTE — PLAN OF CARE
Problem: Adult Inpatient Plan of Care  Goal: Plan of Care Review  Outcome: Progressing  Flowsheets (Taken 8/23/2024 1219)  Plan of Care Reviewed With: patient  Goal: Absence of Hospital-Acquired Illness or Injury  Outcome: Progressing  Intervention: Identify and Manage Fall Risk  Flowsheets (Taken 8/23/2024 1219)  Safety Promotion/Fall Prevention:   assistive device/personal item within reach   bed alarm set   lighting adjusted   medications reviewed   instructed to call staff for mobility   pulse ox   nonskid shoes/socks when out of bed   family expresses understanding of fall risk and prevention   Fall Risk reviewed with patient/family     Problem: Pneumonia  Goal: Fluid Balance  Outcome: Progressing  Intervention: Monitor and Manage Fluid Balance  Flowsheets (Taken 8/23/2024 1219)  Fluid/Electrolyte Management: fluids provided  Goal: Effective Oxygenation and Ventilation  Outcome: Progressing  Intervention: Promote Airway Secretion Clearance  Flowsheets (Taken 8/23/2024 1219)  Breathing Techniques/Airway Clearance: deep/controlled cough encouraged  Cough And Deep Breathing: done independently per patient     Problem: Fall Injury Risk  Goal: Absence of Fall and Fall-Related Injury  Outcome: Progressing  Intervention: Identify and Manage Contributors  Flowsheets (Taken 8/23/2024 1219)  Self-Care Promotion: meal set-up provided  Medication Review/Management: medications reviewed     Problem: Breathing Pattern Ineffective  Goal: Effective Breathing Pattern  Outcome: Progressing  Intervention: Promote Improved Breathing Pattern  Flowsheets (Taken 8/23/2024 1219)  Airway/Ventilation Management: pulmonary hygiene promoted  Breathing Techniques/Airway Clearance: deep/controlled cough encouraged  Supportive Measures:   active listening utilized   relaxation techniques promoted   self-care encouraged  Head of Bed (HOB) Positioning: HOB elevated

## 2024-08-23 NOTE — PLAN OF CARE
Problem: Gas Exchange Impaired  Goal: Optimal Gas Exchange  Outcome: Progressing     Problem: Breathing Pattern Ineffective  Goal: Effective Breathing Pattern  Outcome: Progressing     Problem: Skin Injury Risk Increased  Goal: Skin Health and Integrity  Outcome: Progressing

## 2024-08-23 NOTE — ASSESSMENT & PLAN NOTE
Patient has a diagnosis of pneumonia. The cause of the pneumonia is suspected to be bacterial in etiology but organism is not known. The pneumonia is improving. The patient has the following signs/symptoms of pneumonia: persistent hypoxia , cough, and shortness of breath. The patient does have a current oxygen requirement and the patient does have a home oxygen requirement. I have reviewed the pertinent imaging. The following cultures have been collected: Bronchial lavage The culture results are listed below.     Current antimicrobial regimen consists of the antibiotics listed below. Will monitor patient closely and continue current treatment plan unchanged.    Recommend antibiotics until at least 8/22 (total 14 days therapy).    Antibiotics (From admission, onward)      Start     Stop Route Frequency Ordered    08/15/24 1230  mupirocin 2 % ointment         08/20/24 0859 Nasl 2 times daily 08/15/24 1127            Microbiology Results (last 7 days)       ** No results found for the last 168 hours. **          Rigid bronchoscopy 8/14 with stent placed. Scheduled Duo Nebs and hypertonic saline in attempt to keep stent patent. Pulmonology managing.      08/18 covering for MRSA pneumonia with positive lower respiratory culture from 08/14 08/22 completed ATB

## 2024-08-23 NOTE — PROGRESS NOTES
[facility] Medicine  Progress Note    Patient Name: Mayra Kelly  MRN: [id number]  Patient Class: IP- Inpatient   Admission Date: 8/15/2024  Length of Stay: 7 days  Attending Physician: Mark Barraza MD  Primary Care Provider: Darlene Campoverde NP        Subjective:     Principal Problem:Obstructive pneumonia        HPI:    Mayra Kelly is a 73-year-old female with history of lung cancer, COPD, pulmonary HTN, and a-fib. She initially presented to [facility] with acute-on-chronic respiratory failure secondary to obstructive pneumonia. This was treated with extended course of broad-spectrum IV antibiotics with some improvement. Patient underwent rigid bronchoscopy with bronchial lavage, dilation, and stent by Dr. Castellon 8/14/24. Patient tolerated procedure well with improvement in respiratory status. CXR 8/15 revealed stent has remained patent however will require aggressive respiratory therapy and continued antibiotics. Patient subsequently admitted to [facility] for continued IV antibiotics, scheduled respiratory therapy, and physical rehabilitation.             Overview/Hospital Course:        08/17-  Records reviewed. Presented 08/07 to [facility]  withincrease SOB, cough and fatique. Some weight loss. Noted on evaluation worse CXR and CT chest. Hx treated adenoCa in 2018. Last Nov underwent evaluation by Dr Munguia. Seen then again also by Dr Kraft. Dx new primary squamous cell CA. Treated chemotx. In April saw Dr Kraft and started on Keytruda and given through infusion center. Has continued this through July. Missed her appt with Dr Kraft in May. This information mainly from Dr Kraft in pt was not good historian. Pt continued to smoke 1/2 ppd; encourage to stop. Dr Whitmore and Dr Kraft ask to consult. Requires 50% ventimask to keep O2 sats in low 90s. At home been prior on 2L BNC O2. PHMx: also Eliquis for PAF. Now NSR.  Treated for pneumonia.  Seen during stay by   Abena and pulmonary. Patient underwent rigid bronchoscopy with bronchial lavage, dilation, and stent by Dr. Castellon 8/14/24.  Admitted to [facility] 08/15 for continued treatment and general strengthening by therapy team.  Patient looks better than when last seen by me.  Seems to be in good spirits.   08/18 continues to feel better without new complaints.  To work with therapy.  Treatment includes vancomycin for MRSA with positive respiratory culture from 08/14.   08/19 Better. CXR better. Reviewed Dr Kraft note. Some confusion. Stop zosyn and consider stop vancomycin soon. Look at DC needs.  08/20 Continues feeling / doing better. Look into DC needs.  08/21 Continues to do well and work with therapy  08/22 No complaints except wellness and not able to walk far. Recommendation from therapy SNF unit. Completed ATB      Interval History:       Review of Systems   Constitutional:  Positive for fatigue and unexpected weight change. Negative for appetite change and fever.   HENT:  Negative for congestion, hearing loss and trouble swallowing.    Respiratory:  Positive for shortness of breath. Negative for chest tightness and wheezing.    Cardiovascular:  Negative for chest pain and palpitations.   Gastrointestinal:  Negative for abdominal pain, constipation and nausea.   Genitourinary:  Negative for difficulty urinating and dysuria.   Musculoskeletal:  Positive for gait problem. Negative for back pain and neck stiffness.   Skin:  Negative for pallor and rash.   Neurological:  Negative for dizziness, speech difficulty and headaches.   Psychiatric/Behavioral:  Negative for confusion and suicidal ideas.      Objective:     Vital Signs (Most Recent):  Temp: 97.7 °F (36.5 °C) (08/22/24 2000)  Pulse: (!) 57 (08/22/24 1938)  Resp: 14 (08/22/24 1938)  BP: (!) 109/38 (08/22/24 0600)  SpO2: 98 % (08/22/24 1938) Vital Signs (24h Range):  Temp:  [97 °F (36.1 °C)-97.7 °F (36.5 °C)] 97.7 °F (36.5 °C)  Pulse:  [53-64]  57  Resp:  [10-20] 14  SpO2:  [89 %-100 %] 98 %  BP: (104-124)/(38-53) 109/38     Weight: 51.8 kg (114 lb 3.2 oz)  Body mass index is 18.43 kg/m².    Intake/Output Summary (Last 24 hours) at 8/22/2024 2329  Last data filed at 8/22/2024 2041  Gross per 24 hour   Intake 840 ml   Output --   Net 840 ml         Physical Exam  Vitals reviewed.   Constitutional:       General: She is awake. She is not in acute distress.     Appearance: She is well-developed and underweight. She is not toxic-appearing.      Comments: Actually looks much better than would suggest by xrays and lab   HENT:      Head: Normocephalic.      Nose: Nose normal.      Mouth/Throat:      Pharynx: Oropharynx is clear.   Eyes:      Extraocular Movements: Extraocular movements intact.      Pupils: Pupils are equal, round, and reactive to light.   Neck:      Thyroid: No thyroid mass.      Vascular: No carotid bruit.   Cardiovascular:      Rate and Rhythm: Normal rate and regular rhythm.      Pulses: Normal pulses.      Heart sounds: Normal heart sounds. No murmur heard.  Pulmonary:      Effort: Pulmonary effort is normal.      Breath sounds: Normal air entry. No wheezing.      Comments: Fairly good air exchange bilateral  Abdominal:      General: Bowel sounds are normal. There is no distension.      Palpations: Abdomen is soft.      Tenderness: There is no abdominal tenderness.   Musculoskeletal:         General: Normal range of motion.      Cervical back: Neck supple. No rigidity.   Skin:     General: Skin is warm.      Coloration: Skin is not jaundiced.      Findings: No lesion.   Neurological:      General: No focal deficit present.      Mental Status: She is alert and oriented to person, place, and time.      Cranial Nerves: No cranial nerve deficit.   Psychiatric:         Attention and Perception: Attention normal.         Mood and Affect: Mood normal.         Behavior: Behavior normal. Behavior is cooperative.         Thought Content: Thought  content normal.         Cognition and Memory: Cognition normal.             Significant Labs: All pertinent labs within the past 24 hours have been reviewed.  BMP:   Recent Labs   Lab 08/21/24  0401   GLU 54*      K 4.7      CO2 31   BUN 24*   CREATININE 0.77   CALCIUM 8.7       CBC:   Recent Labs   Lab 08/21/24  0401   WBC 9.55   HGB 12.0   HCT 39.2          CMP:   Recent Labs   Lab 08/21/24  0401      K 4.7      CO2 31   GLU 54*   BUN 24*   CREATININE 0.77   CALCIUM 8.7   ANIONGAP 8         Significant Imaging: I have reviewed all pertinent imaging results/findings within the past 24 hours.        Intake/Output - Last 3 Shifts         08/21 0700  08/22 0659 08/22 0700  08/23 0659    P.O. 960 840    Total Intake(mL/kg) 960 (18.5) 840 (16.2)    Urine (mL/kg/hr) 700 (0.6)     Stool 0     Total Output 700     Net +260 +840          Urine Occurrence 2 x     Stool Occurrence 1 x           Microbiology Results (last 7 days)       ** No results found for the last 168 hours. **              Assessment/Plan:      * Obstructive pneumonia  Patient has a diagnosis of pneumonia. The cause of the pneumonia is suspected to be bacterial in etiology but organism is not known. The pneumonia is improving. The patient has the following signs/symptoms of pneumonia: persistent hypoxia , cough, and shortness of breath. The patient does have a current oxygen requirement and the patient does have a home oxygen requirement. I have reviewed the pertinent imaging. The following cultures have been collected: Bronchial lavage The culture results are listed below.     Current antimicrobial regimen consists of the antibiotics listed below. Will monitor patient closely and continue current treatment plan unchanged.    Recommend antibiotics until at least 8/22 (total 14 days therapy).    Antibiotics (From admission, onward)      Start     Stop Route Frequency Ordered    08/15/24 1230  mupirocin 2 % ointment          08/20/24 0859 Nasl 2 times daily 08/15/24 1127            Microbiology Results (last 7 days)       ** No results found for the last 168 hours. **          Rigid bronchoscopy 8/14 with stent placed. Scheduled Duo Nebs and hypertonic saline in attempt to keep stent patent. Pulmonology managing.      08/18 covering for MRSA pneumonia with positive lower respiratory culture from 08/14 08/22 completed ATB    MRSA infection  Positive culture from 08/14 in lower respiratory tract for MRSA  Covering with vancomycin for MRSA pneumonia      Neoplastic malignant related fatigue  With general debility and failure to thrive. Currently requiring assistance for most ADLs. Will continue physical therapy this admission however her home situation is not ideal. She will likely need to consider long-term placement upon discharge.     08/18 much improved from when I had seen patient previous  08/22 recommendation SNF unit    Severe protein-calorie malnutrition  Nutrition consulted. Most recent weight and BMI monitored-     Measurements:  Wt Readings from Last 1 Encounters:   08/15/24 51.8 kg (114 lb 3.2 oz)   Body mass index is 18.43 kg/m².    Patient has been screened and assessed by RD.    Malnutrition Type:  Context:    Level:      Malnutrition Characteristic Summary:       Interventions/Recommendations (treatment strategy):         Paroxysmal atrial fibrillation  Patient with Paroxysmal (<7 days) atrial fibrillation which is controlled currently with  no medications . Patient is currently in sinus rhythm.[id number] Score: 2. HASBLED Score: 1. Anticoagulation indicated. Anticoagulation done with Eliquis - held for bronchoscopy w/biopsy and stent, resume as appropriate .    COPD exacerbation  Patient's COPD is controlled currently.  Patient is currently off COPD Pathway. Continue scheduled inhalers Steroids, Antibiotics, and Supplemental oxygen and monitor respiratory status closely.     Malignant neoplasm of right lung  Known to   Abena.     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 She received a few cycles of weekly chemotherapy around February or March in anticipation of radiation but radiation was not performed. Sidenote she received radiation chemotherapy as well as immunotherapy 2018.    She was last seen by me in April and the office and had a no-show from May....She has continued to receive immunotherapy with Keytruda IVQ6 weeks beginning in April with last dose in early July.    08/17 can clarify treatment options if any by Dr. Kraft, Patient does look better than when last seen    Acute on chronic respiratory failure with hypoxia and hypercapnia  Patient with Hypercapnic and Hypoxic Respiratory failure which is Acute on chronic. Supplemental oxygen was provided and noted- Oxygen Concentration (%):  [36] 36    Signs/symptoms of respiratory failure include- increased work of breathing. Contributing diagnoses includes - COPD, Pneumonia, and lung cancer  Labs and images were reviewed. Patient Has not had a recent ABG. Will treat underlying causes and adjust management of respiratory failure as follows- continue supplemental O2 to maintain saturation >88%, scheduled nebulizer therapy.    Cigarette nicotine dependence with nicotine-induced disorder  Dangers of cigarette smoking were reviewed with patient in detail. Patient was Counseled for 3-10 minutes. Nicotine replacement options were discussed. Nicotine replacement was discussed- prescribed      VTE Risk Mitigation (From admission, onward)           Ordered     enoxaparin injection 40 mg  Every 24 hours         08/15/24 1036                    Discharge Planning   TERE:      Code Status: Partial Code   Is the patient medically ready for discharge?:     Reason for patient still in hospital (select all that apply): Laboratory test, Treatment, and Imaging  Discharge Plan A: Rehab                  Mark Barraza  MD  Department of Hospital Medicine   [facility]

## 2024-08-24 PROCEDURE — 94664 DEMO&/EVAL PT USE INHALER: CPT

## 2024-08-24 PROCEDURE — 27000221 HC OXYGEN, UP TO 24 HOURS

## 2024-08-24 PROCEDURE — 25000242 PHARM REV CODE 250 ALT 637 W/ HCPCS: Performed by: FAMILY MEDICINE

## 2024-08-24 PROCEDURE — 25000003 PHARM REV CODE 250: Performed by: FAMILY MEDICINE

## 2024-08-24 PROCEDURE — 63600175 PHARM REV CODE 636 W HCPCS: Performed by: FAMILY MEDICINE

## 2024-08-24 PROCEDURE — S4991 NICOTINE PATCH NONLEGEND: HCPCS | Performed by: FAMILY MEDICINE

## 2024-08-24 PROCEDURE — 94761 N-INVAS EAR/PLS OXIMETRY MLT: CPT

## 2024-08-24 PROCEDURE — 99232 SBSQ HOSP IP/OBS MODERATE 35: CPT | Mod: GC,,, | Performed by: HOSPITALIST

## 2024-08-24 PROCEDURE — 27000173 HC ACAPELLA DEVICE DH OR DM

## 2024-08-24 PROCEDURE — 99900035 HC TECH TIME PER 15 MIN (STAT)

## 2024-08-24 PROCEDURE — 94640 AIRWAY INHALATION TREATMENT: CPT

## 2024-08-24 PROCEDURE — 11000008

## 2024-08-24 RX ADMIN — POTASSIUM CHLORIDE 20 MEQ: 20 TABLET, EXTENDED RELEASE ORAL at 08:08

## 2024-08-24 RX ADMIN — PREDNISONE 20 MG: 20 TABLET ORAL at 08:08

## 2024-08-24 RX ADMIN — MELATONIN 6 MG: 3 TAB ORAL at 08:08

## 2024-08-24 RX ADMIN — SODIUM CHLORIDE SOLN NEBU 3% 4 ML: 3 NEBU SOLN at 04:08

## 2024-08-24 RX ADMIN — IPRATROPIUM BROMIDE AND ALBUTEROL SULFATE 3 ML: 2.5; .5 SOLUTION RESPIRATORY (INHALATION) at 11:08

## 2024-08-24 RX ADMIN — SODIUM CHLORIDE SOLN NEBU 3% 4 ML: 3 NEBU SOLN at 12:08

## 2024-08-24 RX ADMIN — NICOTINE 1 PATCH: 14 PATCH, EXTENDED RELEASE TRANSDERMAL at 08:08

## 2024-08-24 RX ADMIN — IPRATROPIUM BROMIDE AND ALBUTEROL SULFATE 3 ML: 2.5; .5 SOLUTION RESPIRATORY (INHALATION) at 08:08

## 2024-08-24 RX ADMIN — SODIUM CHLORIDE SOLN NEBU 3% 4 ML: 3 NEBU SOLN at 08:08

## 2024-08-24 RX ADMIN — BUPROPION HYDROCHLORIDE 100 MG: 100 TABLET, FILM COATED, EXTENDED RELEASE ORAL at 08:08

## 2024-08-24 RX ADMIN — ENOXAPARIN SODIUM 40 MG: 40 INJECTION SUBCUTANEOUS at 04:08

## 2024-08-24 RX ADMIN — IPRATROPIUM BROMIDE AND ALBUTEROL SULFATE 3 ML: 2.5; .5 SOLUTION RESPIRATORY (INHALATION) at 07:08

## 2024-08-24 RX ADMIN — IPRATROPIUM BROMIDE AND ALBUTEROL SULFATE 3 ML: 2.5; .5 SOLUTION RESPIRATORY (INHALATION) at 04:08

## 2024-08-24 NOTE — PROGRESS NOTES
[facility] Medicine  Progress Note    Patient Name: Mayra Kelly  MRN: [id number]  Patient Class: IP- Inpatient   Admission Date: 8/15/2024  Length of Stay: 8 days  Attending Physician: Mark Barraza MD  Primary Care Provider: Darlene Campoverde NP        Subjective:     Principal Problem:Obstructive pneumonia        HPI:    Mayra Kelly is a 73-year-old female with history of lung cancer, COPD, pulmonary HTN, and a-fib. She initially presented to [facility] with acute-on-chronic respiratory failure secondary to obstructive pneumonia. This was treated with extended course of broad-spectrum IV antibiotics with some improvement. Patient underwent rigid bronchoscopy with bronchial lavage, dilation, and stent by Dr. Castellon 8/14/24. Patient tolerated procedure well with improvement in respiratory status. CXR 8/15 revealed stent has remained patent however will require aggressive respiratory therapy and continued antibiotics. Patient subsequently admitted to [facility] for continued IV antibiotics, scheduled respiratory therapy, and physical rehabilitation.             Overview/Hospital Course:        08/17-  Records reviewed. Presented 08/07 to [facility]  withincrease SOB, cough and fatique. Some weight loss. Noted on evaluation worse CXR and CT chest. Hx treated adenoCa in 2018. Last Nov underwent evaluation by Dr Munguia. Seen then again also by Dr Kraft. Dx new primary squamous cell CA. Treated chemotx. In April saw Dr Kraft and started on Keytruda and given through infusion center. Has continued this through July. Missed her appt with Dr Kraft in May. This information mainly from Dr Kraft in pt was not good historian. Pt continued to smoke 1/2 ppd; encourage to stop. Dr Whitmore and Dr Kraft ask to consult. Requires 50% ventimask to keep O2 sats in low 90s. At home been prior on 2L BNC O2. PHMx: also Eliquis for PAF. Now NSR.  Treated for pneumonia.  Seen during stay by   Abena and pulmonary. Patient underwent rigid bronchoscopy with bronchial lavage, dilation, and stent by Dr. Castellon 8/14/24.  Admitted to [facility] 08/15 for continued treatment and general strengthening by therapy team.  Patient looks better than when last seen by me.  Seems to be in good spirits.   08/18 continues to feel better without new complaints.  To work with therapy.  Treatment includes vancomycin for MRSA with positive respiratory culture from 08/14.   08/19 Better. CXR better. Reviewed Dr Kraft note. Some confusion. Stop zosyn and consider stop vancomycin soon. Look at DC needs.  08/20 Continues feeling / doing better. Look into DC needs.  08/21 Continues to do well and work with therapy  08/22 No complaints except wellness and not able to walk far. Recommendation from therapy SNF unit. Completed ATB    08/23 SW looking into SWB / SNF placement. Pt no new issues    Interval History:       Review of Systems   Constitutional:  Positive for fatigue and unexpected weight change. Negative for appetite change and fever.   HENT:  Negative for congestion, hearing loss and trouble swallowing.    Respiratory:  Negative for chest tightness and wheezing.    Cardiovascular:  Negative for chest pain and palpitations.   Gastrointestinal:  Negative for abdominal pain, constipation and nausea.   Genitourinary:  Negative for difficulty urinating and dysuria.   Musculoskeletal:  Positive for gait problem. Negative for back pain and neck stiffness.   Skin:  Negative for pallor and rash.   Neurological:  Negative for dizziness, speech difficulty and headaches.   Psychiatric/Behavioral:  Negative for confusion and suicidal ideas.      Objective:     Vital Signs (Most Recent):  Temp: 97.8 °F (36.6 °C) (08/23/24 1615)  Pulse: (!) 56 (08/23/24 1934)  Resp: 18 (08/23/24 1934)  BP: (!) 97/23 (08/23/24 1800)  SpO2: 96 % (08/23/24 2100) Vital Signs (24h Range):  Temp:  [97.6 °F (36.4 °C)-98.4 °F (36.9 °C)] 97.8 °F (36.6  °C)  Pulse:  [49-74] 56  Resp:  [10-21] 18  SpO2:  [82 %-100 %] 96 %  BP: ()/(23-51) 97/23     Weight: 49.3 kg (108 lb 11 oz)  Body mass index is 17.54 kg/m².    Intake/Output Summary (Last 24 hours) at 8/23/2024 2153  Last data filed at 8/23/2024 1154  Gross per 24 hour   Intake 480 ml   Output 550 ml   Net -70 ml         Physical Exam  Vitals reviewed.   Constitutional:       General: She is awake. She is not in acute distress.     Appearance: She is well-developed and underweight. She is not toxic-appearing.      Comments: Actually looks much better than would suggest by xrays and lab   HENT:      Head: Normocephalic.      Nose: Nose normal.      Mouth/Throat:      Pharynx: Oropharynx is clear.   Eyes:      Extraocular Movements: Extraocular movements intact.      Pupils: Pupils are equal, round, and reactive to light.   Neck:      Thyroid: No thyroid mass.      Vascular: No carotid bruit.   Cardiovascular:      Rate and Rhythm: Normal rate and regular rhythm.      Pulses: Normal pulses.      Heart sounds: Normal heart sounds. No murmur heard.  Pulmonary:      Effort: Pulmonary effort is normal.      Breath sounds: Normal air entry. No wheezing.      Comments: Fairly good air exchange bilateral  Abdominal:      General: Bowel sounds are normal. There is no distension.      Palpations: Abdomen is soft.      Tenderness: There is no abdominal tenderness.   Musculoskeletal:         General: Normal range of motion.      Cervical back: Neck supple. No rigidity.   Skin:     General: Skin is warm.      Coloration: Skin is not jaundiced.      Findings: No lesion.   Neurological:      General: No focal deficit present.      Mental Status: She is alert and oriented to person, place, and time.      Cranial Nerves: No cranial nerve deficit.   Psychiatric:         Attention and Perception: Attention normal.         Mood and Affect: Mood normal.         Behavior: Behavior normal. Behavior is cooperative.         Thought  Content: Thought content normal.         Cognition and Memory: Cognition normal.             Significant Labs: All pertinent labs within the past 24 hours have been reviewed.  BMP:   Recent Labs   Lab 08/23/24 0427   GLU 47*      K 4.9      CO2 32   BUN 29*   CREATININE 0.91   CALCIUM 9.0       CBC:   Recent Labs   Lab 08/23/24 0427   WBC 12.31*   HGB 11.8*   HCT 39.1          CMP:   Recent Labs   Lab 08/23/24 0427      K 4.9      CO2 32   GLU 47*   BUN 29*   CREATININE 0.91   CALCIUM 9.0   ANIONGAP 8         Significant Imaging: I have reviewed all pertinent imaging results/findings within the past 24 hours.        Intake/Output - Last 3 Shifts         08/22 0700  08/23 0659 08/23 0700  08/24 0659    P.O. 840 480    Total Intake(mL/kg) 840 (17) 480 (9.7)    Urine (mL/kg/hr) 1050 (0.9)     Stool      Total Output 1050     Net -210 +480          Stool Occurrence  1 x          Microbiology Results (last 7 days)       ** No results found for the last 168 hours. **              Assessment/Plan:      * Obstructive pneumonia  Patient has a diagnosis of pneumonia. The cause of the pneumonia is suspected to be bacterial in etiology but organism is not known. The pneumonia is improving. The patient has the following signs/symptoms of pneumonia: persistent hypoxia , cough, and shortness of breath. The patient does have a current oxygen requirement and the patient does have a home oxygen requirement. I have reviewed the pertinent imaging. The following cultures have been collected: Bronchial lavage The culture results are listed below.     Current antimicrobial regimen consists of the antibiotics listed below. Will monitor patient closely and continue current treatment plan unchanged.    Recommend antibiotics until at least 8/22 (total 14 days therapy).    Antibiotics (From admission, onward)      Start     Stop Route Frequency Ordered    08/15/24 1230  mupirocin 2 % ointment         08/20/24  0859 Nasl 2 times daily 08/15/24 1127            Microbiology Results (last 7 days)       ** No results found for the last 168 hours. **          Rigid bronchoscopy 8/14 with stent placed. Scheduled Duo Nebs and hypertonic saline in attempt to keep stent patent. Pulmonology managing.      08/18 covering for MRSA pneumonia with positive lower respiratory culture from 08/14 08/22 completed ATB    MRSA infection  Positive culture from 08/14 in lower respiratory tract for MRSA  Covering with vancomycin for MRSA pneumonia      Neoplastic malignant related fatigue  With general debility and failure to thrive. Currently requiring assistance for most ADLs. Will continue physical therapy this admission however her home situation is not ideal. She will likely need to consider long-term placement upon discharge.     08/18 much improved from when I had seen patient previous  08/22 recommendation SNF unit    Severe protein-calorie malnutrition  Nutrition consulted. Most recent weight and BMI monitored-     Measurements:  Wt Readings from Last 1 Encounters:   08/15/24 51.8 kg (114 lb 3.2 oz)   Body mass index is 18.43 kg/m².    Patient has been screened and assessed by RD.    Malnutrition Type:  Context:    Level:      Malnutrition Characteristic Summary:       Interventions/Recommendations (treatment strategy):         Paroxysmal atrial fibrillation  Patient with Paroxysmal (<7 days) atrial fibrillation which is controlled currently with  no medications . Patient is currently in sinus rhythm.[id number] Score: 2. HASBLED Score: 1. Anticoagulation indicated. Anticoagulation done with Eliquis - held for bronchoscopy w/biopsy and stent, resume as appropriate .    COPD exacerbation  Patient's COPD is controlled currently.  Patient is currently off COPD Pathway. Continue scheduled inhalers Steroids, Antibiotics, and Supplemental oxygen and monitor respiratory status closely.     Malignant neoplasm of right lung  Known to Dr. Kraft.      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 She received a few cycles of weekly chemotherapy around February or March in anticipation of radiation but radiation was not performed. Sidenote she received radiation chemotherapy as well as immunotherapy 2018.    She was last seen by me in April and the office and had a no-show from May....She has continued to receive immunotherapy with Keytruda IVQ6 weeks beginning in April with last dose in early July.    08/17 can clarify treatment options if any by Dr. Kraft, Patient does look better than when last seen  08/23 Plan to f/u with Dr Kraft as outpt    Acute on chronic respiratory failure with hypoxia and hypercapnia  Patient with Hypercapnic and Hypoxic Respiratory failure which is Acute on chronic. Supplemental oxygen was provided and noted- Oxygen Concentration (%):  [36] 36    Signs/symptoms of respiratory failure include- increased work of breathing. Contributing diagnoses includes - COPD, Pneumonia, and lung cancer  Labs and images were reviewed. Patient Has not had a recent ABG. Will treat underlying causes and adjust management of respiratory failure as follows- continue supplemental O2 to maintain saturation >88%, scheduled nebulizer therapy.    Cigarette nicotine dependence with nicotine-induced disorder  Dangers of cigarette smoking were reviewed with patient in detail. Patient was Counseled for 3-10 minutes. Nicotine replacement options were discussed. Nicotine replacement was discussed- prescribed      VTE Risk Mitigation (From admission, onward)           Ordered     enoxaparin injection 40 mg  Every 24 hours         08/15/24 1036                    Discharge Planning   TERE:      Code Status: Partial Code   Is the patient medically ready for discharge?:     Reason for patient still in hospital (select all that apply): Laboratory test, Treatment, Imaging, PT / OT recommendations,  and Pending disposition  Discharge Plan A: Rehab                  Mark Barraza MD  Department of Hospital Medicine   [facility]

## 2024-08-24 NOTE — SUBJECTIVE & OBJECTIVE
Interval History:       Review of Systems   Constitutional:  Positive for fatigue and unexpected weight change. Negative for appetite change and fever.   HENT:  Negative for congestion, hearing loss and trouble swallowing.    Respiratory:  Negative for chest tightness and wheezing.    Cardiovascular:  Negative for chest pain and palpitations.   Gastrointestinal:  Negative for abdominal pain, constipation and nausea.   Genitourinary:  Negative for difficulty urinating and dysuria.   Musculoskeletal:  Positive for gait problem. Negative for back pain and neck stiffness.   Skin:  Negative for pallor and rash.   Neurological:  Negative for dizziness, speech difficulty and headaches.   Psychiatric/Behavioral:  Negative for confusion and suicidal ideas.      Objective:     Vital Signs (Most Recent):  Temp: 97.8 °F (36.6 °C) (08/23/24 1615)  Pulse: (!) 56 (08/23/24 1934)  Resp: 18 (08/23/24 1934)  BP: (!) 97/23 (08/23/24 1800)  SpO2: 96 % (08/23/24 2100) Vital Signs (24h Range):  Temp:  [97.6 °F (36.4 °C)-98.4 °F (36.9 °C)] 97.8 °F (36.6 °C)  Pulse:  [49-74] 56  Resp:  [10-21] 18  SpO2:  [82 %-100 %] 96 %  BP: ()/(23-51) 97/23     Weight: 49.3 kg (108 lb 11 oz)  Body mass index is 17.54 kg/m².    Intake/Output Summary (Last 24 hours) at 8/23/2024 2153  Last data filed at 8/23/2024 1154  Gross per 24 hour   Intake 480 ml   Output 550 ml   Net -70 ml         Physical Exam  Vitals reviewed.   Constitutional:       General: She is awake. She is not in acute distress.     Appearance: She is well-developed and underweight. She is not toxic-appearing.      Comments: Actually looks much better than would suggest by xrays and lab   HENT:      Head: Normocephalic.      Nose: Nose normal.      Mouth/Throat:      Pharynx: Oropharynx is clear.   Eyes:      Extraocular Movements: Extraocular movements intact.      Pupils: Pupils are equal, round, and reactive to light.   Neck:      Thyroid: No thyroid mass.      Vascular: No  carotid bruit.   Cardiovascular:      Rate and Rhythm: Normal rate and regular rhythm.      Pulses: Normal pulses.      Heart sounds: Normal heart sounds. No murmur heard.  Pulmonary:      Effort: Pulmonary effort is normal.      Breath sounds: Normal air entry. No wheezing.      Comments: Fairly good air exchange bilateral  Abdominal:      General: Bowel sounds are normal. There is no distension.      Palpations: Abdomen is soft.      Tenderness: There is no abdominal tenderness.   Musculoskeletal:         General: Normal range of motion.      Cervical back: Neck supple. No rigidity.   Skin:     General: Skin is warm.      Coloration: Skin is not jaundiced.      Findings: No lesion.   Neurological:      General: No focal deficit present.      Mental Status: She is alert and oriented to person, place, and time.      Cranial Nerves: No cranial nerve deficit.   Psychiatric:         Attention and Perception: Attention normal.         Mood and Affect: Mood normal.         Behavior: Behavior normal. Behavior is cooperative.         Thought Content: Thought content normal.         Cognition and Memory: Cognition normal.             Significant Labs: All pertinent labs within the past 24 hours have been reviewed.  BMP:   Recent Labs   Lab 08/23/24 0427   GLU 47*      K 4.9      CO2 32   BUN 29*   CREATININE 0.91   CALCIUM 9.0       CBC:   Recent Labs   Lab 08/23/24 0427   WBC 12.31*   HGB 11.8*   HCT 39.1          CMP:   Recent Labs   Lab 08/23/24 0427      K 4.9      CO2 32   GLU 47*   BUN 29*   CREATININE 0.91   CALCIUM 9.0   ANIONGAP 8         Significant Imaging: I have reviewed all pertinent imaging results/findings within the past 24 hours.        Intake/Output - Last 3 Shifts         08/22 0700  08/23 0659 08/23 0700  08/24 0659    P.O. 840 480    Total Intake(mL/kg) 840 (17) 480 (9.7)    Urine (mL/kg/hr) 1050 (0.9)     Stool      Total Output 1050     Net -210 +480          Stool  Occurrence  1 x          Microbiology Results (last 7 days)       ** No results found for the last 168 hours. **

## 2024-08-24 NOTE — PROGRESS NOTES
[facility] Medicine  Progress Note    Patient Name: Mayra Kelly  MRN: [id number]  Patient Class: IP- Inpatient   Admission Date: 8/15/2024  Length of Stay: 9 days  Attending Physician: Mark Barraza MD  Primary Care Provider: Darlene Campoverde NP        Subjective:     Principal Problem:Obstructive pneumonia        HPI:    Mayra Kelly is a 73-year-old female with history of lung cancer, COPD, pulmonary HTN, and a-fib. She initially presented to [facility] with acute-on-chronic respiratory failure secondary to obstructive pneumonia. This was treated with extended course of broad-spectrum IV antibiotics with some improvement. Patient underwent rigid bronchoscopy with bronchial lavage, dilation, and stent by Dr. Castellon 8/14/24. Patient tolerated procedure well with improvement in respiratory status. CXR 8/15 revealed stent has remained patent however will require aggressive respiratory therapy and continued antibiotics. Patient subsequently admitted to [facility] for continued IV antibiotics, scheduled respiratory therapy, and physical rehabilitation.             Overview/Hospital Course:        08/17-  Records reviewed. Presented 08/07 to [facility]  withincrease SOB, cough and fatique. Some weight loss. Noted on evaluation worse CXR and CT chest. Hx treated adenoCa in 2018. Last Nov underwent evaluation by Dr Munguia. Seen then again also by Dr Kraft. Dx new primary squamous cell CA. Treated chemotx. In April saw Dr Kraft and started on Keytruda and given through infusion center. Has continued this through July. Missed her appt with Dr Kraft in May. This information mainly from Dr Kraft in pt was not good historian. Pt continued to smoke 1/2 ppd; encourage to stop. Dr Whitmore and Dr Kraft ask to consult. Requires 50% ventimask to keep O2 sats in low 90s. At home been prior on 2L BNC O2. PHMx: also Eliquis for PAF. Now NSR.  Treated for pneumonia.  Seen during stay by   Abena and pulmonary. Patient underwent rigid bronchoscopy with bronchial lavage, dilation, and stent by Dr. Castellon 8/14/24.  Admitted to [facility] 08/15 for continued treatment and general strengthening by therapy team.  Patient looks better than when last seen by me.  Seems to be in good spirits.   08/18 continues to feel better without new complaints.  To work with therapy.  Treatment includes vancomycin for MRSA with positive respiratory culture from 08/14.   08/19 Better. CXR better. Reviewed Dr Kraft note. Some confusion. Stop zosyn and consider stop vancomycin soon. Look at DC needs.  08/20 Continues feeling / doing better. Look into DC needs.  08/21 Continues to do well and work with therapy  08/22 No complaints except wellness and not able to walk far. Recommendation from therapy SNF unit. Completed ATB    08/23 SW looking into SWB / SNF placement. Pt no new issues  08/24 continue to monitor    Interval History:       Review of Systems   Constitutional:  Positive for fatigue and unexpected weight change. Negative for appetite change and fever.   HENT:  Negative for congestion, hearing loss and trouble swallowing.    Respiratory:  Negative for chest tightness and wheezing.    Cardiovascular:  Negative for chest pain and palpitations.   Gastrointestinal:  Negative for abdominal pain, constipation and nausea.   Genitourinary:  Negative for difficulty urinating and dysuria.   Musculoskeletal:  Positive for gait problem. Negative for back pain and neck stiffness.   Skin:  Negative for pallor and rash.   Neurological:  Negative for dizziness, speech difficulty and headaches.   Psychiatric/Behavioral:  Negative for confusion and suicidal ideas.      Objective:     Vital Signs (Most Recent):  Temp: 97.6 °F (36.4 °C) (08/24/24 1100)  Pulse: (!) 56 (08/24/24 1300)  Resp: 17 (08/24/24 1300)  BP: (!) 98/37 (08/24/24 1300)  SpO2: 96 % (08/24/24 1300) Vital Signs (24h Range):  Temp:  [97.6 °F (36.4 °C)-98 °F  (36.7 °C)] 97.6 °F (36.4 °C)  Pulse:  [49-69] 56  Resp:  [10-20] 17  SpO2:  [93 %-100 %] 96 %  BP: ()/(23-51) 98/37     Weight: 49.3 kg (108 lb 11 oz)  Body mass index is 17.54 kg/m².    Intake/Output Summary (Last 24 hours) at 8/24/2024 1354  Last data filed at 8/24/2024 0824  Gross per 24 hour   Intake 240 ml   Output --   Net 240 ml         Physical Exam  Vitals reviewed.   Constitutional:       General: She is awake. She is not in acute distress.     Appearance: She is well-developed and underweight. She is not toxic-appearing.      Comments: Actually looks much better than would suggest by xrays and lab   HENT:      Head: Normocephalic.      Nose: Nose normal.      Mouth/Throat:      Pharynx: Oropharynx is clear.   Eyes:      Extraocular Movements: Extraocular movements intact.      Pupils: Pupils are equal, round, and reactive to light.   Neck:      Thyroid: No thyroid mass.      Vascular: No carotid bruit.   Cardiovascular:      Rate and Rhythm: Normal rate and regular rhythm.      Pulses: Normal pulses.      Heart sounds: Normal heart sounds. No murmur heard.  Pulmonary:      Effort: Pulmonary effort is normal.      Breath sounds: Normal air entry. No wheezing.      Comments: Fairly good air exchange bilateral  Abdominal:      General: Bowel sounds are normal. There is no distension.      Palpations: Abdomen is soft.      Tenderness: There is no abdominal tenderness.   Musculoskeletal:         General: Normal range of motion.      Cervical back: Neck supple. No rigidity.   Skin:     General: Skin is warm.      Coloration: Skin is not jaundiced.      Findings: No lesion.   Neurological:      General: No focal deficit present.      Mental Status: She is alert and oriented to person, place, and time.      Cranial Nerves: No cranial nerve deficit.   Psychiatric:         Attention and Perception: Attention normal.         Mood and Affect: Mood normal.         Behavior: Behavior normal. Behavior is  cooperative.         Thought Content: Thought content normal.         Cognition and Memory: Cognition normal.             Significant Labs: All pertinent labs within the past 24 hours have been reviewed.  BMP:   Recent Labs   Lab 08/23/24 0427   GLU 47*      K 4.9      CO2 32   BUN 29*   CREATININE 0.91   CALCIUM 9.0       CBC:   Recent Labs   Lab 08/23/24 0427   WBC 12.31*   HGB 11.8*   HCT 39.1          CMP:   Recent Labs   Lab 08/23/24 0427      K 4.9      CO2 32   GLU 47*   BUN 29*   CREATININE 0.91   CALCIUM 9.0   ANIONGAP 8         Significant Imaging: I have reviewed all pertinent imaging results/findings within the past 24 hours.        Intake/Output - Last 3 Shifts         08/22 0700  08/23 0659 08/23 0700 08/24 0659 08/24 0700 08/25 0659    P.O. 840 480 240    Total Intake(mL/kg) 840 (17) 480 (9.7) 240 (4.9)    Urine (mL/kg/hr) 1050 (0.9)      Stool       Total Output 1050      Net -210 +480 +240           Stool Occurrence  1 x           Microbiology Results (last 7 days)       ** No results found for the last 168 hours. **              Assessment/Plan:      * Obstructive pneumonia  Patient has a diagnosis of pneumonia. The cause of the pneumonia is suspected to be bacterial in etiology but organism is not known. The pneumonia is improving. The patient has the following signs/symptoms of pneumonia: persistent hypoxia , cough, and shortness of breath. The patient does have a current oxygen requirement and the patient does have a home oxygen requirement. I have reviewed the pertinent imaging. The following cultures have been collected: Bronchial lavage The culture results are listed below.     Current antimicrobial regimen consists of the antibiotics listed below. Will monitor patient closely and continue current treatment plan unchanged.    Recommend antibiotics until at least 8/22 (total 14 days therapy).    Antibiotics (From admission, onward)      Start     Stop Route  Frequency Ordered    08/15/24 1230  mupirocin 2 % ointment         08/20/24 0859 Nasl 2 times daily 08/15/24 1127            Microbiology Results (last 7 days)       ** No results found for the last 168 hours. **          Rigid bronchoscopy 8/14 with stent placed. Scheduled Duo Nebs and hypertonic saline in attempt to keep stent patent. Pulmonology managing.      08/18 covering for MRSA pneumonia with positive lower respiratory culture from 08/14 08/22 completed ATB    MRSA infection  Positive culture from 08/14 in lower respiratory tract for MRSA  Covering with vancomycin for MRSA pneumonia      Neoplastic malignant related fatigue  With general debility and failure to thrive. Currently requiring assistance for most ADLs. Will continue physical therapy this admission however her home situation is not ideal. She will likely need to consider long-term placement upon discharge.     08/18 much improved from when I had seen patient previous  08/22 recommendation SNF unit    Severe protein-calorie malnutrition  Nutrition consulted. Most recent weight and BMI monitored-     Measurements:  Wt Readings from Last 1 Encounters:   08/23/24 49.3 kg (108 lb 11 oz)   Body mass index is 17.54 kg/m².    Patient has been screened and assessed by RD.    Malnutrition Type:  Context: chronic illness  Level: severe    Malnutrition Characteristic Summary:  Energy Intake (Malnutrition): less than or equal to 50% for greater than or equal to 1 month  Subcutaneous Fat (Malnutrition): severe depletion  Muscle Mass (Malnutrition): severe depletion    Interventions/Recommendations (treatment strategy):         Paroxysmal atrial fibrillation  Patient with Paroxysmal (<7 days) atrial fibrillation which is controlled currently with  no medications . Patient is currently in sinus rhythm.[id number] Score: 2. HASBLED Score: 1. Anticoagulation indicated. Anticoagulation done with Eliquis - held for bronchoscopy w/biopsy and stent, resume as  appropriate .    COPD exacerbation  Patient's COPD is controlled currently.  Patient is currently off COPD Pathway. Continue scheduled inhalers Steroids, Antibiotics, and Supplemental oxygen and monitor respiratory status closely.     Malignant neoplasm of right lung  Known to Dr. Kraft.     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 She received a few cycles of weekly chemotherapy around February or March in anticipation of radiation but radiation was not performed. Sidenote she received radiation chemotherapy as well as immunotherapy 2018.    She was last seen by me in April and the office and had a no-show from May....She has continued to receive immunotherapy with Keytruda IVQ6 weeks beginning in April with last dose in early July.    08/17 can clarify treatment options if any by Dr. Kraft, Patient does look better than when last seen  08/23 Plan to f/u with Dr Kraft as outpt    Acute on chronic respiratory failure with hypoxia and hypercapnia  Patient with Hypercapnic and Hypoxic Respiratory failure which is Acute on chronic. Supplemental oxygen was provided and noted-      Signs/symptoms of respiratory failure include- increased work of breathing. Contributing diagnoses includes - COPD, Pneumonia, and lung cancer  Labs and images were reviewed. Patient Has not had a recent ABG. Will treat underlying causes and adjust management of respiratory failure as follows- continue supplemental O2 to maintain saturation >88%, scheduled nebulizer therapy.    08/24 breathing seems very comfortable and stable    Cigarette nicotine dependence with nicotine-induced disorder  Dangers of cigarette smoking were reviewed with patient in detail. Patient was Counseled for 3-10 minutes. Nicotine replacement options were discussed. Nicotine replacement was discussed- prescribed      VTE Risk Mitigation (From admission, onward)           Ordered      enoxaparin injection 40 mg  Every 24 hours         08/15/24 1036                    Discharge Planning   TERE:      Code Status: Partial Code   Is the patient medically ready for discharge?:     Reason for patient still in hospital (select all that apply): Laboratory test, Treatment, and Imaging  Discharge Plan A: Rehab                  Mark Barraza MD  Department of Hospital Medicine   [facility]

## 2024-08-24 NOTE — PLAN OF CARE
Problem: Adult Inpatient Plan of Care  Goal: Plan of Care Review  Outcome: Progressing  Flowsheets (Taken 8/24/2024 1014)  Plan of Care Reviewed With: patient  Goal: Absence of Hospital-Acquired Illness or Injury  Outcome: Progressing  Intervention: Identify and Manage Fall Risk  Flowsheets (Taken 8/24/2024 1014)  Safety Promotion/Fall Prevention:   assistive device/personal item within reach   bed alarm set   lighting adjusted   medications reviewed   pulse ox   instructed to call staff for mobility   nonskid shoes/socks when out of bed     Problem: Pneumonia  Goal: Fluid Balance  Outcome: Progressing  Intervention: Monitor and Manage Fluid Balance  Flowsheets (Taken 8/24/2024 1014)  Fluid/Electrolyte Management: fluids provided     Problem: Wound  Goal: Optimal Coping  Outcome: Progressing  Intervention: Support Patient and Family Response  Flowsheets (Taken 8/24/2024 1014)  Supportive Measures:   active listening utilized   relaxation techniques promoted   self-care encouraged   verbalization of feelings encouraged  Family/Support System Care: involvement promoted     Problem: Fall Injury Risk  Goal: Absence of Fall and Fall-Related Injury  Outcome: Progressing  Intervention: Identify and Manage Contributors  Flowsheets (Taken 8/24/2024 1014)  Self-Care Promotion:   independence encouraged   meal set-up provided  Medication Review/Management: medications reviewed     Problem: Breathing Pattern Ineffective  Goal: Effective Breathing Pattern  Outcome: Progressing  Intervention: Promote Improved Breathing Pattern  Flowsheets (Taken 8/24/2024 1014)  Airway/Ventilation Management: pulmonary hygiene promoted  Breathing Techniques/Airway Clearance: deep/controlled cough encouraged  Supportive Measures:   active listening utilized   relaxation techniques promoted   self-care encouraged   verbalization of feelings encouraged

## 2024-08-24 NOTE — ASSESSMENT & PLAN NOTE
Known to Dr. Kraft.     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 She received a few cycles of weekly chemotherapy around February or March in anticipation of radiation but radiation was not performed. Sidenote she received radiation chemotherapy as well as immunotherapy 2018.    She was last seen by me in April and the office and had a no-show from May....She has continued to receive immunotherapy with Keytruda IVQ6 weeks beginning in April with last dose in early July.    08/17 can clarify treatment options if any by Dr. Kraft, Patient does look better than when last seen  08/23 Plan to f/u with Dr Kraft as outpt

## 2024-08-24 NOTE — SUBJECTIVE & OBJECTIVE
Interval History:       Review of Systems   Constitutional:  Positive for fatigue and unexpected weight change. Negative for appetite change and fever.   HENT:  Negative for congestion, hearing loss and trouble swallowing.    Respiratory:  Negative for chest tightness and wheezing.    Cardiovascular:  Negative for chest pain and palpitations.   Gastrointestinal:  Negative for abdominal pain, constipation and nausea.   Genitourinary:  Negative for difficulty urinating and dysuria.   Musculoskeletal:  Positive for gait problem. Negative for back pain and neck stiffness.   Skin:  Negative for pallor and rash.   Neurological:  Negative for dizziness, speech difficulty and headaches.   Psychiatric/Behavioral:  Negative for confusion and suicidal ideas.      Objective:     Vital Signs (Most Recent):  Temp: 97.6 °F (36.4 °C) (08/24/24 1100)  Pulse: (!) 56 (08/24/24 1300)  Resp: 17 (08/24/24 1300)  BP: (!) 98/37 (08/24/24 1300)  SpO2: 96 % (08/24/24 1300) Vital Signs (24h Range):  Temp:  [97.6 °F (36.4 °C)-98 °F (36.7 °C)] 97.6 °F (36.4 °C)  Pulse:  [49-69] 56  Resp:  [10-20] 17  SpO2:  [93 %-100 %] 96 %  BP: ()/(23-51) 98/37     Weight: 49.3 kg (108 lb 11 oz)  Body mass index is 17.54 kg/m².    Intake/Output Summary (Last 24 hours) at 8/24/2024 1354  Last data filed at 8/24/2024 0824  Gross per 24 hour   Intake 240 ml   Output --   Net 240 ml         Physical Exam  Vitals reviewed.   Constitutional:       General: She is awake. She is not in acute distress.     Appearance: She is well-developed and underweight. She is not toxic-appearing.      Comments: Actually looks much better than would suggest by xrays and lab   HENT:      Head: Normocephalic.      Nose: Nose normal.      Mouth/Throat:      Pharynx: Oropharynx is clear.   Eyes:      Extraocular Movements: Extraocular movements intact.      Pupils: Pupils are equal, round, and reactive to light.   Neck:      Thyroid: No thyroid mass.      Vascular: No carotid  bruit.   Cardiovascular:      Rate and Rhythm: Normal rate and regular rhythm.      Pulses: Normal pulses.      Heart sounds: Normal heart sounds. No murmur heard.  Pulmonary:      Effort: Pulmonary effort is normal.      Breath sounds: Normal air entry. No wheezing.      Comments: Fairly good air exchange bilateral  Abdominal:      General: Bowel sounds are normal. There is no distension.      Palpations: Abdomen is soft.      Tenderness: There is no abdominal tenderness.   Musculoskeletal:         General: Normal range of motion.      Cervical back: Neck supple. No rigidity.   Skin:     General: Skin is warm.      Coloration: Skin is not jaundiced.      Findings: No lesion.   Neurological:      General: No focal deficit present.      Mental Status: She is alert and oriented to person, place, and time.      Cranial Nerves: No cranial nerve deficit.   Psychiatric:         Attention and Perception: Attention normal.         Mood and Affect: Mood normal.         Behavior: Behavior normal. Behavior is cooperative.         Thought Content: Thought content normal.         Cognition and Memory: Cognition normal.             Significant Labs: All pertinent labs within the past 24 hours have been reviewed.  BMP:   Recent Labs   Lab 08/23/24 0427   GLU 47*      K 4.9      CO2 32   BUN 29*   CREATININE 0.91   CALCIUM 9.0       CBC:   Recent Labs   Lab 08/23/24 0427   WBC 12.31*   HGB 11.8*   HCT 39.1          CMP:   Recent Labs   Lab 08/23/24 0427      K 4.9      CO2 32   GLU 47*   BUN 29*   CREATININE 0.91   CALCIUM 9.0   ANIONGAP 8         Significant Imaging: I have reviewed all pertinent imaging results/findings within the past 24 hours.        Intake/Output - Last 3 Shifts         08/22 0700 08/23 0659 08/23 0700 08/24 0659 08/24 0700 08/25 0659    P.O. 840 480 240    Total Intake(mL/kg) 840 (17) 480 (9.7) 240 (4.9)    Urine (mL/kg/hr) 1050 (0.9)      Stool       Total Output 1050       Net -210 +480 +240           Stool Occurrence  1 x           Microbiology Results (last 7 days)       ** No results found for the last 168 hours. **

## 2024-08-24 NOTE — ASSESSMENT & PLAN NOTE
Nutrition consulted. Most recent weight and BMI monitored-     Measurements:  Wt Readings from Last 1 Encounters:   08/23/24 49.3 kg (108 lb 11 oz)   Body mass index is 17.54 kg/m².    Patient has been screened and assessed by RD.    Malnutrition Type:  Context: chronic illness  Level: severe    Malnutrition Characteristic Summary:  Energy Intake (Malnutrition): less than or equal to 50% for greater than or equal to 1 month  Subcutaneous Fat (Malnutrition): severe depletion  Muscle Mass (Malnutrition): severe depletion    Interventions/Recommendations (treatment strategy):

## 2024-08-24 NOTE — ASSESSMENT & PLAN NOTE
Patient with Hypercapnic and Hypoxic Respiratory failure which is Acute on chronic. Supplemental oxygen was provided and noted-      Signs/symptoms of respiratory failure include- increased work of breathing. Contributing diagnoses includes - COPD, Pneumonia, and lung cancer  Labs and images were reviewed. Patient Has not had a recent ABG. Will treat underlying causes and adjust management of respiratory failure as follows- continue supplemental O2 to maintain saturation >88%, scheduled nebulizer therapy.    08/24 breathing seems very comfortable and stable

## 2024-08-25 PROCEDURE — 99232 SBSQ HOSP IP/OBS MODERATE 35: CPT | Mod: ,,, | Performed by: HOSPITALIST

## 2024-08-25 PROCEDURE — 11000008

## 2024-08-25 PROCEDURE — 27000221 HC OXYGEN, UP TO 24 HOURS

## 2024-08-25 PROCEDURE — 94640 AIRWAY INHALATION TREATMENT: CPT

## 2024-08-25 PROCEDURE — 25000003 PHARM REV CODE 250: Performed by: FAMILY MEDICINE

## 2024-08-25 PROCEDURE — 25000242 PHARM REV CODE 250 ALT 637 W/ HCPCS: Performed by: FAMILY MEDICINE

## 2024-08-25 PROCEDURE — S4991 NICOTINE PATCH NONLEGEND: HCPCS | Performed by: FAMILY MEDICINE

## 2024-08-25 PROCEDURE — 94664 DEMO&/EVAL PT USE INHALER: CPT

## 2024-08-25 PROCEDURE — 99900035 HC TECH TIME PER 15 MIN (STAT)

## 2024-08-25 PROCEDURE — 97530 THERAPEUTIC ACTIVITIES: CPT

## 2024-08-25 PROCEDURE — 97110 THERAPEUTIC EXERCISES: CPT

## 2024-08-25 PROCEDURE — 27000173 HC ACAPELLA DEVICE DH OR DM

## 2024-08-25 PROCEDURE — 94761 N-INVAS EAR/PLS OXIMETRY MLT: CPT

## 2024-08-25 PROCEDURE — 63600175 PHARM REV CODE 636 W HCPCS: Performed by: FAMILY MEDICINE

## 2024-08-25 RX ADMIN — BUPROPION HYDROCHLORIDE 100 MG: 100 TABLET, FILM COATED, EXTENDED RELEASE ORAL at 08:08

## 2024-08-25 RX ADMIN — PREDNISONE 20 MG: 20 TABLET ORAL at 09:08

## 2024-08-25 RX ADMIN — NICOTINE 1 PATCH: 14 PATCH, EXTENDED RELEASE TRANSDERMAL at 09:08

## 2024-08-25 RX ADMIN — ENOXAPARIN SODIUM 40 MG: 40 INJECTION SUBCUTANEOUS at 05:08

## 2024-08-25 RX ADMIN — IPRATROPIUM BROMIDE AND ALBUTEROL SULFATE 3 ML: 2.5; .5 SOLUTION RESPIRATORY (INHALATION) at 03:08

## 2024-08-25 RX ADMIN — IPRATROPIUM BROMIDE AND ALBUTEROL SULFATE 3 ML: 2.5; .5 SOLUTION RESPIRATORY (INHALATION) at 12:08

## 2024-08-25 RX ADMIN — POTASSIUM CHLORIDE 20 MEQ: 20 TABLET, EXTENDED RELEASE ORAL at 08:08

## 2024-08-25 RX ADMIN — IPRATROPIUM BROMIDE AND ALBUTEROL SULFATE 3 ML: 2.5; .5 SOLUTION RESPIRATORY (INHALATION) at 07:08

## 2024-08-25 RX ADMIN — SODIUM CHLORIDE SOLN NEBU 3% 4 ML: 3 NEBU SOLN at 03:08

## 2024-08-25 RX ADMIN — POTASSIUM CHLORIDE 20 MEQ: 20 TABLET, EXTENDED RELEASE ORAL at 09:08

## 2024-08-25 RX ADMIN — MELATONIN 6 MG: 3 TAB ORAL at 08:08

## 2024-08-25 RX ADMIN — BUPROPION HYDROCHLORIDE 100 MG: 100 TABLET, FILM COATED, EXTENDED RELEASE ORAL at 09:08

## 2024-08-25 RX ADMIN — SODIUM CHLORIDE SOLN NEBU 3% 4 ML: 3 NEBU SOLN at 12:08

## 2024-08-25 RX ADMIN — SODIUM CHLORIDE SOLN NEBU 3% 4 ML: 3 NEBU SOLN at 07:08

## 2024-08-25 NOTE — PT/OT/SLP PROGRESS
"Occupational Therapy   Treatment    Name: Mayra Kelly  MRN: [id number]  Admitting Diagnosis:  Obstructive pneumonia       Recommendations:     Discharge Recommendations: Moderate Intensity Therapy  Discharge Equipment Recommendations:  to be determined by next level of care  Barriers to discharge:  None    Assessment:     Mayra Kelly is a 73 y.o. female with a medical diagnosis of Obstructive pneumonia.  She presents with alert and agreeable to treatment. Performance deficits affecting function are weakness, impaired endurance, impaired self care skills.     Rehab Prognosis:  Good; patient would benefit from acute skilled OT services to address these deficits and reach maximum level of function.       Plan:     Patient to be seen 5 x/week to address the above listed problems via self-care/home management, therapeutic activities, therapeutic exercises  Plan of Care Expires: 09/17/24  Plan of Care Reviewed with: patient    Subjective     Chief Complaint: Obstructive pneumonia    Patient/Family Comments/goals: "I don't want to do any standing balance."  Pain/Comfort:  Pain Rating 1: 0/10 (Simultaneous filing. User may not have seen previous data.)  Pain Rating Post-Intervention 1: 0/10    Objective:     Communicated with: RAUL Pennington prior to session.  Patient found up in chair with blood pressure cuff, oxygen, PureWick, peripheral IV, pulse ox (continuous), telemetry (Simultaneous filing. User may not have seen previous data.) upon OT entry to room.    General Precautions: Standard, fall, contact    Orthopedic Precautions:N/A  Braces: N/A  Respiratory Status: Nasal cannula, flow 2 L/min     Occupational Performance:     Bed Mobility:    NT    Functional Mobility/Transfers:  NT- pt declined    Activities of Daily Living:  Nt      [facility] 6 Click ADL:      Treatment & Education:  Pt performed (B) UE ex for each:  Elbow flexion with 2# db 2 x 20 reps  Shoulder flexion with 2# db x 20 reps  Punches with 2# " db for 2x 20 reps  Trunk twists with 2# db for 20 reps  Shoulder extension with green t-band x 30 reps  Elbow extension with green t-band x 20 reps  Shoulder ER with green t-band x 20 reps  Handhelper with 3 bands for 2x 25 reps      Patient left up in chair with all lines intact, call button in reach, chair alarm on, and RN Jose notified    GOALS:   Multidisciplinary Problems       Occupational Therapy Goals          Problem: Occupational Therapy    Goal Priority Disciplines Outcome Interventions   Occupational Therapy Goal     OT, PT/OT Progressing    Description: STG:  Pt will perform grooming with setup  Pt will bathe with CGA  Pt will perform UE dressing with CGA  Pt will perform LE dressing with CGA  Pt will sit EOB x 5 min with SBA assistance  Pt will transfer bed/chair/bsc with CGA  Pt will perform standing task x 5 min with CGA assistance  Pt will tolerate 15 minutes of tx without fatigue      LT.Restore to max I with self care and mobility.                          Time Tracking:     OT Date of Treatment: 24  OT Start Time: 1427  OT Stop Time: 1451  OT Total Time (min): 24 min    Billable Minutes:Therapeutic Exercise 24 min    OT/PETAR: OT          2024

## 2024-08-25 NOTE — SUBJECTIVE & OBJECTIVE
Interval History:       Review of Systems   Constitutional:  Positive for fatigue and unexpected weight change. Negative for appetite change and fever.   HENT:  Negative for congestion, hearing loss and trouble swallowing.    Respiratory:  Negative for chest tightness and wheezing.    Cardiovascular:  Negative for chest pain and palpitations.   Gastrointestinal:  Negative for abdominal pain, constipation and nausea.   Genitourinary:  Negative for difficulty urinating and dysuria.   Musculoskeletal:  Positive for gait problem. Negative for back pain and neck stiffness.   Skin:  Negative for pallor and rash.   Neurological:  Negative for dizziness, speech difficulty and headaches.   Psychiatric/Behavioral:  Negative for confusion and suicidal ideas.      Objective:     Vital Signs (Most Recent):  Temp: (P) 98 °F (36.7 °C) (08/25/24 1100)  Pulse: 61 (08/25/24 1207)  Resp: 20 (08/25/24 1207)  BP: (!) 107/59 (08/25/24 1100)  SpO2: 98 % (08/25/24 1201) Vital Signs (24h Range):  Temp:  [97.3 °F (36.3 °C)-98 °F (36.7 °C)] (P) 98 °F (36.7 °C)  Pulse:  [51-72] 61  Resp:  [12-21] 20  SpO2:  [91 %-99 %] 98 %  BP: ()/(37-91) 107/59     Weight: 49.3 kg (108 lb 11 oz)  Body mass index is 17.54 kg/m².    Intake/Output Summary (Last 24 hours) at 8/25/2024 1242  Last data filed at 8/25/2024 0855  Gross per 24 hour   Intake 480 ml   Output 200 ml   Net 280 ml         Physical Exam  Vitals reviewed.   Constitutional:       General: She is awake. She is not in acute distress.     Appearance: She is well-developed and underweight. She is not toxic-appearing.      Comments: Actually looks much better than would suggest by xrays and lab   HENT:      Head: Normocephalic.      Nose: Nose normal.      Mouth/Throat:      Pharynx: Oropharynx is clear.   Eyes:      Extraocular Movements: Extraocular movements intact.      Pupils: Pupils are equal, round, and reactive to light.   Neck:      Thyroid: No thyroid mass.      Vascular: No  "carotid bruit.   Cardiovascular:      Rate and Rhythm: Normal rate and regular rhythm.      Pulses: Normal pulses.      Heart sounds: Normal heart sounds. No murmur heard.  Pulmonary:      Effort: Pulmonary effort is normal.      Breath sounds: Normal air entry. No wheezing.      Comments: Fairly good air exchange bilateral  Abdominal:      General: Bowel sounds are normal. There is no distension.      Palpations: Abdomen is soft.      Tenderness: There is no abdominal tenderness.   Musculoskeletal:         General: Normal range of motion.      Cervical back: Neck supple. No rigidity.   Skin:     General: Skin is warm.      Coloration: Skin is not jaundiced.      Findings: No lesion.   Neurological:      General: No focal deficit present.      Mental Status: She is alert and oriented to person, place, and time.      Cranial Nerves: No cranial nerve deficit.   Psychiatric:         Attention and Perception: Attention normal.         Mood and Affect: Mood normal.         Behavior: Behavior normal. Behavior is cooperative.         Thought Content: Thought content normal.         Cognition and Memory: Cognition normal.             Significant Labs: All pertinent labs within the past 24 hours have been reviewed.  BMP:   No results for input(s): "GLU", "NA", "K", "CL", "CO2", "BUN", "CREATININE", "CALCIUM", "MG" in the last 48 hours.      CBC:   No results for input(s): "WBC", "HGB", "HCT", "PLT" in the last 48 hours.      CMP:   No results for input(s): "NA", "K", "CL", "CO2", "GLU", "BUN", "CREATININE", "CALCIUM", "PROT", "ALBUMIN", "BILITOT", "ALKPHOS", "AST", "ALT", "ANIONGAP", "EGFRNONAA" in the last 48 hours.    Invalid input(s): "ESTGFAFRICA"        Significant Imaging: I have reviewed all pertinent imaging results/findings within the past 24 hours.        Intake/Output - Last 3 Shifts         08/23 0700 08/24 0659 08/24 0700 08/25 0659 08/25 0700 08/26 0659    P.O. 480 720 240    Total Intake(mL/kg) 480 (9.7) " 720 (14.6) 240 (4.9)    Urine (mL/kg/hr)  200 (0.2)     Other  0     Total Output  200     Net +480 +520 +240           Urine Occurrence  1 x     Stool Occurrence 1 x            Microbiology Results (last 7 days)       ** No results found for the last 168 hours. **

## 2024-08-25 NOTE — PROGRESS NOTES
[facility] Medicine  Progress Note    Patient Name: Mayra Kelly  MRN: [id number]  Patient Class: IP- Inpatient   Admission Date: 8/15/2024  Length of Stay: 10 days  Attending Physician: Mark Barraza MD  Primary Care Provider: Darlene Campoverde NP        Subjective:     Principal Problem:Obstructive pneumonia        HPI:    Mayra Kelly is a 73-year-old female with history of lung cancer, COPD, pulmonary HTN, and a-fib. She initially presented to [facility] with acute-on-chronic respiratory failure secondary to obstructive pneumonia. This was treated with extended course of broad-spectrum IV antibiotics with some improvement. Patient underwent rigid bronchoscopy with bronchial lavage, dilation, and stent by Dr. Castellon 8/14/24. Patient tolerated procedure well with improvement in respiratory status. CXR 8/15 revealed stent has remained patent however will require aggressive respiratory therapy and continued antibiotics. Patient subsequently admitted to [facility] for continued IV antibiotics, scheduled respiratory therapy, and physical rehabilitation.             Overview/Hospital Course:        08/17-  Records reviewed. Presented 08/07 to [facility]  withincrease SOB, cough and fatique. Some weight loss. Noted on evaluation worse CXR and CT chest. Hx treated adenoCa in 2018. Last Nov underwent evaluation by Dr Munguia. Seen then again also by Dr Kraft. Dx new primary squamous cell CA. Treated chemotx. In April saw Dr Kraft and started on Keytruda and given through infusion center. Has continued this through July. Missed her appt with Dr Kraft in May. This information mainly from Dr Kraft in pt was not good historian. Pt continued to smoke 1/2 ppd; encourage to stop. Dr Whitmore and Dr Kraft ask to consult. Requires 50% ventimask to keep O2 sats in low 90s. At home been prior on 2L BNC O2. PHMx: also Eliquis for PAF. Now NSR.  Treated for pneumonia.  Seen during stay by  Dr. Kraft and pulmonary. Patient underwent rigid bronchoscopy with bronchial lavage, dilation, and stent by Dr. Castellon 8/14/24.  Admitted to [facility] 08/15 for continued treatment and general strengthening by therapy team.  Patient looks better than when last seen by me.  Seems to be in good spirits.   08/18 continues to feel better without new complaints.  To work with therapy.  Treatment includes vancomycin for MRSA with positive respiratory culture from 08/14.   08/19 Better. CXR better. Reviewed Dr Kraft note. Some confusion. Stop zosyn and consider stop vancomycin soon. Look at DC needs.  08/20 Continues feeling / doing better. Look into DC needs.  08/21 Continues to do well and work with therapy  08/22 No complaints except wellness and not able to walk far. Recommendation from therapy SNF unit. Completed ATB    08/23 SW looking into SWB / SNF placement. Pt no new issues  08/24 continue to monitor  08/25 seen with no complaints watching TV, good appetite, weak but still issue with ambulating and caring for herself, look into swing bed , SNF unit placement.  As outpatient she would need to follow up with Dr. Kraft concerning treatment options for cancer    Interval History:       Review of Systems   Constitutional:  Positive for fatigue and unexpected weight change. Negative for appetite change and fever.   HENT:  Negative for congestion, hearing loss and trouble swallowing.    Respiratory:  Negative for chest tightness and wheezing.    Cardiovascular:  Negative for chest pain and palpitations.   Gastrointestinal:  Negative for abdominal pain, constipation and nausea.   Genitourinary:  Negative for difficulty urinating and dysuria.   Musculoskeletal:  Positive for gait problem. Negative for back pain and neck stiffness.   Skin:  Negative for pallor and rash.   Neurological:  Negative for dizziness, speech difficulty and headaches.   Psychiatric/Behavioral:  Negative for confusion and suicidal  ideas.      Objective:     Vital Signs (Most Recent):  Temp: (P) 98 °F (36.7 °C) (08/25/24 1100)  Pulse: 61 (08/25/24 1207)  Resp: 20 (08/25/24 1207)  BP: (!) 107/59 (08/25/24 1100)  SpO2: 98 % (08/25/24 1201) Vital Signs (24h Range):  Temp:  [97.3 °F (36.3 °C)-98 °F (36.7 °C)] (P) 98 °F (36.7 °C)  Pulse:  [51-72] 61  Resp:  [12-21] 20  SpO2:  [91 %-99 %] 98 %  BP: ()/(37-91) 107/59     Weight: 49.3 kg (108 lb 11 oz)  Body mass index is 17.54 kg/m².    Intake/Output Summary (Last 24 hours) at 8/25/2024 1242  Last data filed at 8/25/2024 0855  Gross per 24 hour   Intake 480 ml   Output 200 ml   Net 280 ml         Physical Exam  Vitals reviewed.   Constitutional:       General: She is awake. She is not in acute distress.     Appearance: She is well-developed and underweight. She is not toxic-appearing.      Comments: Actually looks much better than would suggest by xrays and lab   HENT:      Head: Normocephalic.      Nose: Nose normal.      Mouth/Throat:      Pharynx: Oropharynx is clear.   Eyes:      Extraocular Movements: Extraocular movements intact.      Pupils: Pupils are equal, round, and reactive to light.   Neck:      Thyroid: No thyroid mass.      Vascular: No carotid bruit.   Cardiovascular:      Rate and Rhythm: Normal rate and regular rhythm.      Pulses: Normal pulses.      Heart sounds: Normal heart sounds. No murmur heard.  Pulmonary:      Effort: Pulmonary effort is normal.      Breath sounds: Normal air entry. No wheezing.      Comments: Fairly good air exchange bilateral  Abdominal:      General: Bowel sounds are normal. There is no distension.      Palpations: Abdomen is soft.      Tenderness: There is no abdominal tenderness.   Musculoskeletal:         General: Normal range of motion.      Cervical back: Neck supple. No rigidity.   Skin:     General: Skin is warm.      Coloration: Skin is not jaundiced.      Findings: No lesion.   Neurological:      General: No focal deficit present.       "Mental Status: She is alert and oriented to person, place, and time.      Cranial Nerves: No cranial nerve deficit.   Psychiatric:         Attention and Perception: Attention normal.         Mood and Affect: Mood normal.         Behavior: Behavior normal. Behavior is cooperative.         Thought Content: Thought content normal.         Cognition and Memory: Cognition normal.             Significant Labs: All pertinent labs within the past 24 hours have been reviewed.  BMP:   No results for input(s): "GLU", "NA", "K", "CL", "CO2", "BUN", "CREATININE", "CALCIUM", "MG" in the last 48 hours.      CBC:   No results for input(s): "WBC", "HGB", "HCT", "PLT" in the last 48 hours.      CMP:   No results for input(s): "NA", "K", "CL", "CO2", "GLU", "BUN", "CREATININE", "CALCIUM", "PROT", "ALBUMIN", "BILITOT", "ALKPHOS", "AST", "ALT", "ANIONGAP", "EGFRNONAA" in the last 48 hours.    Invalid input(s): "ESTGFAFRICA"        Significant Imaging: I have reviewed all pertinent imaging results/findings within the past 24 hours.        Intake/Output - Last 3 Shifts         08/23 0700 08/24 0659 08/24 0700 08/25 0659 08/25 0700 08/26 0659    P.O. 480 720 240    Total Intake(mL/kg) 480 (9.7) 720 (14.6) 240 (4.9)    Urine (mL/kg/hr)  200 (0.2)     Other  0     Total Output  200     Net +480 +520 +240           Urine Occurrence  1 x     Stool Occurrence 1 x            Microbiology Results (last 7 days)       ** No results found for the last 168 hours. **              Assessment/Plan:      * Obstructive pneumonia  Patient has a diagnosis of pneumonia. The cause of the pneumonia is suspected to be bacterial in etiology but organism is not known. The pneumonia is improving. The patient has the following signs/symptoms of pneumonia: persistent hypoxia , cough, and shortness of breath. The patient does have a current oxygen requirement and the patient does have a home oxygen requirement. I have reviewed the pertinent imaging. The following " cultures have been collected: Bronchial lavage The culture results are listed below.     Current antimicrobial regimen consists of the antibiotics listed below. Will monitor patient closely and continue current treatment plan unchanged.    Recommend antibiotics until at least 8/22 (total 14 days therapy).    Antibiotics (From admission, onward)      Start     Stop Route Frequency Ordered    08/15/24 1230  mupirocin 2 % ointment         08/20/24 0859 Nasl 2 times daily 08/15/24 1127            Microbiology Results (last 7 days)       ** No results found for the last 168 hours. **          Rigid bronchoscopy 8/14 with stent placed. Scheduled Duo Nebs and hypertonic saline in attempt to keep stent patent. Pulmonology managing.      08/18 covering for MRSA pneumonia with positive lower respiratory culture from 08/14 08/22 completed ATB    MRSA infection  Positive culture from 08/14 in lower respiratory tract for MRSA  Covering with vancomycin for MRSA pneumonia      Neoplastic malignant related fatigue  With general debility and failure to thrive. Currently requiring assistance for most ADLs. Will continue physical therapy this admission however her home situation is not ideal. She will likely need to consider long-term placement upon discharge.     08/18 much improved from when I had seen patient previous  08/22 recommendation SNF unit    Severe protein-calorie malnutrition  Nutrition consulted. Most recent weight and BMI monitored-     Measurements:  Wt Readings from Last 1 Encounters:   08/23/24 49.3 kg (108 lb 11 oz)   Body mass index is 17.54 kg/m².    Patient has been screened and assessed by RD.    Malnutrition Type:  Context: chronic illness  Level: severe    Malnutrition Characteristic Summary:  Energy Intake (Malnutrition): less than or equal to 50% for greater than or equal to 1 month  Subcutaneous Fat (Malnutrition): severe depletion  Muscle Mass (Malnutrition): severe  depletion    Interventions/Recommendations (treatment strategy):         Paroxysmal atrial fibrillation  Patient with Paroxysmal (<7 days) atrial fibrillation which is controlled currently with  no medications . Patient is currently in sinus rhythm.[id number] Score: 2. HASBLED Score: 1. Anticoagulation indicated. Anticoagulation done with Eliquis - held for bronchoscopy w/biopsy and stent, resume as appropriate .    COPD exacerbation  Patient's COPD is controlled currently.  Patient is currently off COPD Pathway. Continue scheduled inhalers Steroids, Antibiotics, and Supplemental oxygen and monitor respiratory status closely.     Malignant neoplasm of right lung  Known to Dr. Kraft.     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 She received a few cycles of weekly chemotherapy around February or March in anticipation of radiation but radiation was not performed. Sidenote she received radiation chemotherapy as well as immunotherapy 2018.    She was last seen by me in April and the office and had a no-show from May....She has continued to receive immunotherapy with Keytruda IVQ6 weeks beginning in April with last dose in early July.    08/17 can clarify treatment options if any by Dr. Kraft, Patient does look better than when last seen  08/23 Plan to f/u with Dr Kraft as outpt    Acute on chronic respiratory failure with hypoxia and hypercapnia  Patient with Hypercapnic and Hypoxic Respiratory failure which is Acute on chronic. Supplemental oxygen was provided and noted-      Signs/symptoms of respiratory failure include- increased work of breathing. Contributing diagnoses includes - COPD, Pneumonia, and lung cancer  Labs and images were reviewed. Patient Has not had a recent ABG. Will treat underlying causes and adjust management of respiratory failure as follows- continue supplemental O2 to maintain saturation >88%, scheduled nebulizer  therapy.    08/24 breathing seems very comfortable and stable    Cigarette nicotine dependence with nicotine-induced disorder  Dangers of cigarette smoking were reviewed with patient in detail. Patient was Counseled for 3-10 minutes. Nicotine replacement options were discussed. Nicotine replacement was discussed- prescribed      VTE Risk Mitigation (From admission, onward)           Ordered     enoxaparin injection 40 mg  Every 24 hours         08/15/24 1036                    Discharge Planning   TERE:      Code Status: Partial Code   Is the patient medically ready for discharge?:     Reason for patient still in hospital (select all that apply): Laboratory test, Treatment, Imaging, PT / OT recommendations, and Pending disposition  Discharge Plan A: Rehab                  Mark Barraza MD  Department of Hospital Medicine   [facility]

## 2024-08-25 NOTE — PLAN OF CARE
Problem: Adult Inpatient Plan of Care  Goal: Plan of Care Review  Outcome: Progressing  Flowsheets (Taken 8/25/2024 1048)  Plan of Care Reviewed With: patient  Goal: Absence of Hospital-Acquired Illness or Injury  Outcome: Progressing  Intervention: Identify and Manage Fall Risk  Flowsheets (Taken 8/25/2024 1048)  Safety Promotion/Fall Prevention:   assistive device/personal item within reach   bed alarm set   instructed to call staff for mobility   lighting adjusted   medications reviewed   pulse ox   Fall Risk reviewed with patient/family     Problem: Pneumonia  Goal: Fluid Balance  Outcome: Progressing  Intervention: Monitor and Manage Fluid Balance  Flowsheets (Taken 8/25/2024 1048)  Fluid/Electrolyte Management: fluids provided     Problem: Wound  Goal: Optimal Coping  Outcome: Progressing  Intervention: Support Patient and Family Response  Flowsheets (Taken 8/25/2024 1048)  Supportive Measures:   active listening utilized   relaxation techniques promoted   positive reinforcement provided  Family/Support System Care:   support provided   involvement promoted     Problem: Fall Injury Risk  Goal: Absence of Fall and Fall-Related Injury  Outcome: Progressing  Intervention: Identify and Manage Contributors  Flowsheets (Taken 8/25/2024 1048)  Self-Care Promotion: meal set-up provided  Medication Review/Management: medications reviewed     Problem: Gas Exchange Impaired  Goal: Optimal Gas Exchange  Outcome: Progressing  Intervention: Optimize Oxygenation and Ventilation  Flowsheets (Taken 8/25/2024 1048)  Airway/Ventilation Management: pulmonary hygiene promoted  Head of Bed (HOB) Positioning: HOB elevated

## 2024-08-25 NOTE — PT/OT/SLP PROGRESS
Physical Therapy Treatment    Patient Name:  Mayra Kelly   MRN:  [id number]    Recommendations:     Discharge Recommendations: Moderate Intensity Therapy  Discharge Equipment Recommendations: to be determined by next level of care  Barriers to discharge:  ongoing medical treatment    Assessment:     Mayra Kelly is a 73 y.o. female admitted with a medical diagnosis of Obstructive pneumonia.  She presents with the following impairments/functional limitations: weakness, impaired endurance, impaired functional mobility, impaired balance     Doing well with PT. Improved balance shown with step transfer from bed to recliner with RW.     Rehab Prognosis: Good; patient would benefit from acute skilled PT services to address these deficits and reach maximum level of function.    Recent Surgery: * No surgery found *      Plan:     During this hospitalization, patient to be seen 5 x/week to address the identified rehab impairments via gait training, therapeutic activities, therapeutic exercises, neuromuscular re-education and progress toward the following goals:    Plan of Care Expires:  09/16/24    Subjective     Chief Complaint: NA  Patient/Family Comments/goals: agreeable to treatment  Pain/Comfort:  Pain Rating 1: 0/10      Objective:     Communicated with nurse prior to session.  Patient found supine with blood pressure cuff, oxygen, PureWick, peripheral IV, pulse ox (continuous), telemetry upon PT entry to room.     General Precautions: Standard, fall, contact  Orthopedic Precautions: N/A  Braces: N/A  Respiratory Status: Nasal cannula, flow 2 L/min     Functional Mobility:  Bed Mobility:     Supine to Sit: minimum assistance  Transfers:     Sit to Stand:  contact guard assistance with rolling walker  Bed to Chair: contact guard assistance with  rolling walker  using  Step Transfer  Balance: good      AM-PAC 6 CLICK MOBILITY  Turning over in bed (including adjusting bedclothes, sheets and blankets)?:  4  Sitting down on and standing up from a chair with arms (e.g., wheelchair, bedside commode, etc.): 3  Moving from lying on back to sitting on the side of the bed?: 3  Moving to and from a bed to a chair (including a wheelchair)?: 3  Need to walk in hospital room?: 3  Climbing 3-5 steps with a railing?: 1  Basic Mobility Total Score: 17       Treatment & Education:  -AP, SLR, Hip add/abduction, LE flexion/ext; all 3x10 repetitions BLE  -mobility as noted      Patient left up in chair with all lines intact, call button in reach, chair alarm on, and nurse notified..    GOALS:   Multidisciplinary Problems       Physical Therapy Goals          Problem: Physical Therapy    Goal Priority Disciplines Outcome Goal Variances Interventions   Physical Therapy Goal     PT, PT/OT Progressing     Description: Short Term Goals to be met by: 24    Patient will increase functional independence with mobility by performin. Supine to sit with Stand by assist  2. Sit to stand transfer with contact guard assist using Rolling walker  3. Bed to chair transfer with contact guard assist using Rolling walker  4. Gait  x 100 feet with contact guard assist using Rolling walker  5. Lower extremity exercise program x30 reps per handout, with assistance as needed  6. Pt to negotiate 3 steps with rails with CGA.     Long Term Goals to be met by: 24    Pt will regain full independent functional mobility with Rolling walker to return to home situation and prior activities of daily living.                        Time Tracking:     PT Received On: 24  PT Start Time: 1357     PT Stop Time: 1421  PT Total Time (min): 24 min     Billable Minutes: Therapeutic Activity 10 and Therapeutic Exercise 14    Treatment Type: Treatment  PT/PTA: PT     Number of PTA visits since last PT visit: 0     2024

## 2024-08-26 LAB
ANION GAP SERPL CALCULATED.3IONS-SCNC: 7 MMOL/L (ref 7–16)
BASOPHILS # BLD AUTO: 0.04 K/UL (ref 0–0.2)
BASOPHILS NFR BLD AUTO: 0.4 % (ref 0–1)
BUN SERPL-MCNC: 33 MG/DL (ref 7–18)
BUN/CREAT SERPL: 38 (ref 6–20)
CALCIUM SERPL-MCNC: 9.3 MG/DL (ref 8.5–10.1)
CHLORIDE SERPL-SCNC: 102 MMOL/L (ref 98–107)
CO2 SERPL-SCNC: 33 MMOL/L (ref 21–32)
CREAT SERPL-MCNC: 0.87 MG/DL (ref 0.55–1.02)
DIFFERENTIAL METHOD BLD: ABNORMAL
EGFR (NO RACE VARIABLE) (RUSH/TITUS): 71 ML/MIN/1.73M2
EOSINOPHIL # BLD AUTO: 0.15 K/UL (ref 0–0.5)
EOSINOPHIL NFR BLD AUTO: 1.6 % (ref 1–4)
ERYTHROCYTE [DISTWIDTH] IN BLOOD BY AUTOMATED COUNT: 18.1 % (ref 11.5–14.5)
GLUCOSE SERPL-MCNC: 57 MG/DL (ref 74–106)
HCT VFR BLD AUTO: 35.3 % (ref 38–47)
HGB BLD-MCNC: 10.9 G/DL (ref 12–16)
IMM GRANULOCYTES # BLD AUTO: 0.05 K/UL (ref 0–0.04)
IMM GRANULOCYTES NFR BLD: 0.5 % (ref 0–0.4)
LYMPHOCYTES # BLD AUTO: 1.18 K/UL (ref 1–4.8)
LYMPHOCYTES NFR BLD AUTO: 12.9 % (ref 27–41)
MCH RBC QN AUTO: 26.7 PG (ref 27–31)
MCHC RBC AUTO-ENTMCNC: 30.9 G/DL (ref 32–36)
MCV RBC AUTO: 86.5 FL (ref 80–96)
MONOCYTES # BLD AUTO: 0.65 K/UL (ref 0–0.8)
MONOCYTES NFR BLD AUTO: 7.1 % (ref 2–6)
MPC BLD CALC-MCNC: 10.3 FL (ref 9.4–12.4)
NEUTROPHILS # BLD AUTO: 7.06 K/UL (ref 1.8–7.7)
NEUTROPHILS NFR BLD AUTO: 77.5 % (ref 53–65)
NRBC # BLD AUTO: 0 X10E3/UL
NRBC, AUTO (.00): 0 %
PLATELET # BLD AUTO: 304 K/UL (ref 150–400)
POTASSIUM SERPL-SCNC: 4.1 MMOL/L (ref 3.5–5.1)
RBC # BLD AUTO: 4.08 M/UL (ref 4.2–5.4)
SODIUM SERPL-SCNC: 138 MMOL/L (ref 136–145)
WBC # BLD AUTO: 9.13 K/UL (ref 4.5–11)

## 2024-08-26 PROCEDURE — 97530 THERAPEUTIC ACTIVITIES: CPT | Mod: CQ

## 2024-08-26 PROCEDURE — 80048 BASIC METABOLIC PNL TOTAL CA: CPT | Performed by: HOSPITALIST

## 2024-08-26 PROCEDURE — 85025 COMPLETE CBC W/AUTO DIFF WBC: CPT | Performed by: HOSPITALIST

## 2024-08-26 PROCEDURE — 99232 SBSQ HOSP IP/OBS MODERATE 35: CPT | Mod: ,,, | Performed by: FAMILY MEDICINE

## 2024-08-26 PROCEDURE — 99900035 HC TECH TIME PER 15 MIN (STAT)

## 2024-08-26 PROCEDURE — 94664 DEMO&/EVAL PT USE INHALER: CPT

## 2024-08-26 PROCEDURE — 25000242 PHARM REV CODE 250 ALT 637 W/ HCPCS: Performed by: FAMILY MEDICINE

## 2024-08-26 PROCEDURE — 97110 THERAPEUTIC EXERCISES: CPT

## 2024-08-26 PROCEDURE — 97110 THERAPEUTIC EXERCISES: CPT | Mod: CQ

## 2024-08-26 PROCEDURE — S4991 NICOTINE PATCH NONLEGEND: HCPCS | Performed by: FAMILY MEDICINE

## 2024-08-26 PROCEDURE — 25000003 PHARM REV CODE 250: Performed by: FAMILY MEDICINE

## 2024-08-26 PROCEDURE — 11000008

## 2024-08-26 PROCEDURE — 94640 AIRWAY INHALATION TREATMENT: CPT

## 2024-08-26 PROCEDURE — 63600175 PHARM REV CODE 636 W HCPCS: Performed by: FAMILY MEDICINE

## 2024-08-26 PROCEDURE — 27000221 HC OXYGEN, UP TO 24 HOURS

## 2024-08-26 PROCEDURE — 36415 COLL VENOUS BLD VENIPUNCTURE: CPT | Performed by: HOSPITALIST

## 2024-08-26 RX ADMIN — POTASSIUM CHLORIDE 20 MEQ: 20 TABLET, EXTENDED RELEASE ORAL at 08:08

## 2024-08-26 RX ADMIN — IPRATROPIUM BROMIDE AND ALBUTEROL SULFATE 3 ML: 2.5; .5 SOLUTION RESPIRATORY (INHALATION) at 08:08

## 2024-08-26 RX ADMIN — IPRATROPIUM BROMIDE AND ALBUTEROL SULFATE 3 ML: 2.5; .5 SOLUTION RESPIRATORY (INHALATION) at 12:08

## 2024-08-26 RX ADMIN — NICOTINE 1 PATCH: 14 PATCH, EXTENDED RELEASE TRANSDERMAL at 08:08

## 2024-08-26 RX ADMIN — SODIUM CHLORIDE SOLN NEBU 3% 4 ML: 3 NEBU SOLN at 08:08

## 2024-08-26 RX ADMIN — BUPROPION HYDROCHLORIDE 100 MG: 100 TABLET, FILM COATED, EXTENDED RELEASE ORAL at 08:08

## 2024-08-26 RX ADMIN — PREDNISONE 10 MG: 10 TABLET ORAL at 08:08

## 2024-08-26 RX ADMIN — IPRATROPIUM BROMIDE AND ALBUTEROL SULFATE 3 ML: 2.5; .5 SOLUTION RESPIRATORY (INHALATION) at 04:08

## 2024-08-26 RX ADMIN — SODIUM CHLORIDE SOLN NEBU 3% 4 ML: 3 NEBU SOLN at 04:08

## 2024-08-26 RX ADMIN — SODIUM CHLORIDE SOLN NEBU 3% 4 ML: 3 NEBU SOLN at 12:08

## 2024-08-26 RX ADMIN — ENOXAPARIN SODIUM 40 MG: 40 INJECTION SUBCUTANEOUS at 05:08

## 2024-08-26 NOTE — PT/OT/SLP PROGRESS
"Occupational Therapy   Treatment    Name: Mayra Kelly  MRN: [id number]  Admitting Diagnosis:  Obstructive pneumonia       Recommendations:     Discharge Recommendations: Moderate Intensity Therapy  Discharge Equipment Recommendations:   (to be determined)  Barriers to discharge:  None    Assessment:     Mayra Kelly is a 73 y.o. female with a medical diagnosis of Obstructive pneumonia.  She presents with complaint of feeling tired. Pt stated "I'll do a little" referring to UE exercises. Performance deficits affecting function are impaired endurance, impaired cardiopulmonary response to activity.     Rehab Prognosis:  Good; patient would benefit from acute skilled OT services to address these deficits and reach maximum level of function.       Plan:     Patient to be seen 5 x/week to address the above listed problems via self-care/home management, therapeutic activities, therapeutic exercises  Plan of Care Expires: 09/17/24  Plan of Care Reviewed with: patient    Subjective     Chief Complaint: Obstructive Pneumonia  Patient/Family Comments/goals: Swing bed  Pain/Comfort:  Pain Rating 1: 0/10  Pain Rating Post-Intervention 1: 0/10    Objective:     Communicated with: RAUL Kim prior to session.  Patient found HOB elevated with blood pressure cuff, peripheral IV, pulse ox (continuous), telemetry, oxygen upon OT entry to room.    General Precautions: Standard, contact, fall    Orthopedic Precautions:N/A  Braces: N/A  Respiratory Status: Nasal cannula, flow 2 L/min     Occupational Performance:     Bed Mobility:         Functional Mobility/Transfers:    Functional Mobility:     Activities of Daily Living:  Grooming: independence washing hands prior to eating lunch      [facility] 6 Click ADL:      Treatment & Education:  Pt performed UE strengthening exercises.  2 lb hand wt 2 x sets of 10 reps  (B) shoulder flexion/extension and adduction/abduction  (B) elbow flexion/extension  (B) wrist " flexion/extension  Hand exerciser x 30 reps (B)  Green theraband x 2 sets of 15 reps shoulder adduction/abduction and (B) elbow extension.    Pt participated well with exercises with rest breaks provided and verbal/tactile cueing to perform exercises correctly.      Patient left HOB elevated with all lines intact, call button in reach, and pt eating dinner.    GOALS:   Multidisciplinary Problems       Occupational Therapy Goals          Problem: Occupational Therapy    Goal Priority Disciplines Outcome Interventions   Occupational Therapy Goal     OT, PT/OT Progressing    Description: STG:  Pt will perform grooming with setup  Pt will bathe with CGA  Pt will perform UE dressing with CGA  Pt will perform LE dressing with CGA  Pt will sit EOB x 5 min with SBA assistance  Pt will transfer bed/chair/bsc with CGA  Pt will perform standing task x 5 min with CGA assistance  Pt will tolerate 15 minutes of tx without fatigue      LT.Restore to max I with self care and mobility.                          Time Tracking:     OT Date of Treatment: 24  OT Start Time: 1620  OT Stop Time: 1642  OT Total Time (min): 22 min    Billable Minutes:Therapeutic Exercise 20    OT/PETAR: OT          2024

## 2024-08-26 NOTE — ASSESSMENT & PLAN NOTE
With general debility and failure to thrive. Currently requiring assistance for most ADLs. Will continue physical therapy this admission however her home situation is not ideal. She will likely need to consider long-term placement upon discharge.     Awaiting placement - recommendations for SNF however patient currently undecided.

## 2024-08-26 NOTE — PROGRESS NOTES
[facility] Medicine  Progress Note    Patient Name: Mayra Kelly  MRN: [id number]  Patient Class: IP- Inpatient   Admission Date: 8/15/2024  Length of Stay: 11 days  Attending Physician: Yeni Jones DO  Primary Care Provider: Darlene Campoverde NP        Subjective:     Principal Problem:Obstructive pneumonia        HPI:    Mayra Kelly is a 73-year-old female with history of lung cancer, COPD, pulmonary HTN, and a-fib. She initially presented to [facility] with acute-on-chronic respiratory failure secondary to obstructive pneumonia. This was treated with extended course of broad-spectrum IV antibiotics with some improvement. Patient underwent rigid bronchoscopy with bronchial lavage, dilation, and stent by Dr. Castellon 8/14/24. Patient tolerated procedure well with improvement in respiratory status. CXR 8/15 revealed stent has remained patent however will require aggressive respiratory therapy and continued antibiotics. Patient subsequently admitted to [facility] for continued IV antibiotics, scheduled respiratory therapy, and physical rehabilitation.             Overview/Hospital Course:        08/17-  Records reviewed. Presented 08/07 to [facility]  withincrease SOB, cough and fatique. Some weight loss. Noted on evaluation worse CXR and CT chest. Hx treated adenoCa in 2018. Last Nov underwent evaluation by Dr Munguia. Seen then again also by Dr Kraft. Dx new primary squamous cell CA. Treated chemotx. In April saw Dr Kraft and started on Keytruda and given through infusion center. Has continued this through July. Missed her appt with Dr Kraft in May. This information mainly from Dr Kraft in pt was not good historian. Pt continued to smoke 1/2 ppd; encourage to stop. Dr Whitmore and Dr Kraft ask to consult. Requires 50% ventimask to keep O2 sats in low 90s. At home been prior on 2L BNC O2. PHMx: also Eliquis for PAF. Now NSR.  Treated for pneumonia.  Seen during stay  by Dr. Kraft and pulmonary. Patient underwent rigid bronchoscopy with bronchial lavage, dilation, and stent by Dr. Castellon 8/14/24.  Admitted to [facility] 08/15 for continued treatment and general strengthening by therapy team.  Patient looks better than when last seen by me.  Seems to be in good spirits.   08/18 continues to feel better without new complaints.  To work with therapy.  Treatment includes vancomycin for MRSA with positive respiratory culture from 08/14.   08/19 Better. CXR better. Reviewed Dr Kraft note. Some confusion. Stop zosyn and consider stop vancomycin soon. Look at DC needs.  08/20 Continues feeling / doing better. Look into DC needs.  08/21 Continues to do well and work with therapy  08/22 No complaints except wellness and not able to walk far. Recommendation from therapy SNF unit. Completed ATB    08/23 SW looking into SWB / SNF placement. Pt no new issues  08/24 continue to monitor  08/25 seen with no complaints watching TV, good appetite, weak but still issue with ambulating and caring for herself, look into swing bed , SNF unit placement.  As outpatient she would need to follow up with Dr. Kraft concerning treatment options for cancer    Interval History:     No significant events overnight, patient breathing well with no new complaints or concerns. Major barrier to discharge at this point is profound deconditioning and difficulty with ADLs. Case management assisting with disposition.     Review of Systems   Constitutional:  Positive for fatigue and unexpected weight change. Negative for appetite change and fever.   HENT:  Negative for congestion, hearing loss and trouble swallowing.    Respiratory:  Negative for chest tightness and wheezing.    Cardiovascular:  Negative for chest pain and palpitations.   Gastrointestinal:  Negative for abdominal pain, constipation and nausea.   Genitourinary:  Negative for difficulty urinating and dysuria.   Musculoskeletal:  Positive for  gait problem. Negative for back pain and neck stiffness.   Skin:  Negative for pallor and rash.   Neurological:  Negative for dizziness, speech difficulty and headaches.   Psychiatric/Behavioral:  Negative for confusion and suicidal ideas.      Objective:     Vital Signs (Most Recent):  Temp: (!) 94.6 °F (34.8 °C) (08/26/24 1212)  Pulse: 67 (08/26/24 1246)  Resp: 16 (08/26/24 1246)  BP: (!) 95/47 (08/26/24 1100)  SpO2: 98 % (08/26/24 1246) Vital Signs (24h Range):  Temp:  [94.6 °F (34.8 °C)-98.6 °F (37 °C)] 94.6 °F (34.8 °C)  Pulse:  [51-95] 67  Resp:  [10-24] 16  SpO2:  [90 %-100 %] 98 %  BP: ()/(40-54) 95/47     Weight: 49.3 kg (108 lb 11 oz)  Body mass index is 17.54 kg/m².    Intake/Output Summary (Last 24 hours) at 8/26/2024 1347  Last data filed at 8/26/2024 0607  Gross per 24 hour   Intake 240 ml   Output 1400 ml   Net -1160 ml         Physical Exam  Constitutional:       General: She is not in acute distress.     Appearance: She is underweight.      Comments: Frail, chronically ill-appearing   HENT:      Head: Normocephalic and atraumatic.      Nose: Nose normal.   Eyes:      Conjunctiva/sclera: Conjunctivae normal.      Pupils: Pupils are equal, round, and reactive to light.   Pulmonary:      Effort: Pulmonary effort is normal. No respiratory distress.   Musculoskeletal:      Cervical back: No rigidity.   Skin:     Coloration: Skin is not jaundiced or pale.      Findings: No rash.   Neurological:      Mental Status: She is alert.   Psychiatric:         Behavior: Behavior normal.         Thought Content: Thought content normal.             Significant Labs: All pertinent labs within the past 24 hours have been reviewed.    Significant Imaging: I have reviewed all pertinent imaging results/findings within the past 24 hours.    Assessment/Plan:      * Obstructive pneumonia  Antibiotics complete. Continue respiratory therapy and monitor closely.  ---  Patient has a diagnosis of pneumonia. The cause of the  pneumonia is suspected to be bacterial in etiology but organism is not known. The pneumonia is improving. The patient has the following signs/symptoms of pneumonia: persistent hypoxia , cough, and shortness of breath. The patient does have a current oxygen requirement and the patient does have a home oxygen requirement. I have reviewed the pertinent imaging. The following cultures have been collected: Bronchial lavage The culture results are listed below.     Current antimicrobial regimen consists of the antibiotics listed below. Will monitor patient closely and continue current treatment plan unchanged.    Recommend antibiotics until at least 8/22 (total 14 days therapy).    Antibiotics (From admission, onward)      Start     Stop Route Frequency Ordered    08/15/24 1230  mupirocin 2 % ointment         08/20/24 0859 Nasl 2 times daily 08/15/24 1127            Microbiology Results (last 7 days)       ** No results found for the last 168 hours. **          Rigid bronchoscopy 8/14 with stent placed. Scheduled Duo Nebs and hypertonic saline in attempt to keep stent patent. Pulmonology managing.      08/18 covering for MRSA pneumonia with positive lower respiratory culture from 08/14 08/22 completed ATB    MRSA infection  Positive culture from 08/14 in lower respiratory tract for MRSA  Covering with vancomycin for MRSA pneumonia      Neoplastic malignant related fatigue  With general debility and failure to thrive. Currently requiring assistance for most ADLs. Will continue physical therapy this admission however her home situation is not ideal. She will likely need to consider long-term placement upon discharge.     Awaiting placement - recommendations for SNF however patient currently undecided.     Severe protein-calorie malnutrition  Nutrition consulted. Most recent weight and BMI monitored-     Measurements:  Wt Readings from Last 1 Encounters:   08/23/24 49.3 kg (108 lb 11 oz)   Body mass index is 17.54  kg/m².    Patient has been screened and assessed by RD.    Malnutrition Type:  Context: chronic illness  Level: severe    Malnutrition Characteristic Summary:  Energy Intake (Malnutrition): less than or equal to 50% for greater than or equal to 1 month  Subcutaneous Fat (Malnutrition): severe depletion  Muscle Mass (Malnutrition): severe depletion    Interventions/Recommendations (treatment strategy):         Paroxysmal atrial fibrillation  Patient with Paroxysmal (<7 days) atrial fibrillation which is controlled currently with  no medications . Patient is currently in sinus rhythm.[id number] Score: 2. HASBLED Score: 1. Anticoagulation indicated. Anticoagulation done with Eliquis - held for bronchoscopy w/biopsy and stent, resume as appropriate .    COPD exacerbation  Patient's COPD is controlled currently.  Patient is currently off COPD Pathway. Continue scheduled inhalers Steroids, Antibiotics, and Supplemental oxygen and monitor respiratory status closely.     Malignant neoplasm of right lung  Known to Dr. Kraft.     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 She received a few cycles of weekly chemotherapy around February or March in anticipation of radiation but radiation was not performed. Sidenote she received radiation chemotherapy as well as immunotherapy 2018.    She was last seen by me in April and the office and had a no-show from May....She has continued to receive immunotherapy with Keytruda IVQ6 weeks beginning in April with last dose in early July.        Acute on chronic respiratory failure with hypoxia and hypercapnia  Patient with Hypercapnic and Hypoxic Respiratory failure which is Acute on chronic. Supplemental oxygen was provided and noted-      Signs/symptoms of respiratory failure include- increased work of breathing. Contributing diagnoses includes - COPD, Pneumonia, and lung cancer  Labs and images were reviewed.  Patient Has not had a recent ABG. Will treat underlying causes and adjust management of respiratory failure as follows- continue supplemental O2 to maintain saturation >88%, scheduled nebulizer therapy.    08/24 breathing seems very comfortable and stable    Cigarette nicotine dependence with nicotine-induced disorder  Dangers of cigarette smoking were reviewed with patient in detail. Patient was Counseled for 3-10 minutes. Nicotine replacement options were discussed. Nicotine replacement was discussed- prescribed      VTE Risk Mitigation (From admission, onward)           Ordered     enoxaparin injection 40 mg  Every 24 hours         08/15/24 1036                    Discharge Planning   TERE:      Code Status: Partial Code   Is the patient medically ready for discharge?:     Reason for patient still in hospital (select all that apply): Treatment  Discharge Plan A: Rehab                  Yeni Jones DO  Department of Hospital Medicine   [facility]

## 2024-08-26 NOTE — PHYSICIAN QUERY
"To respond, click "New Note"    Please provide the integumentary diagnosis related to the documentation of Left Buttock.    Provider Query Response:  Pressure Injury/Decubitus Ulcer, Stage 2    "
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
## 2024-08-26 NOTE — PLAN OF CARE
Problem: Adult Inpatient Plan of Care  Goal: Plan of Care Review  Outcome: Progressing  Flowsheets (Taken 8/26/2024 1356)  Plan of Care Reviewed With: patient  Goal: Absence of Hospital-Acquired Illness or Injury  Outcome: Progressing  Intervention: Identify and Manage Fall Risk  Flowsheets (Taken 8/26/2024 1356)  Safety Promotion/Fall Prevention:   assistive device/personal item within reach   bed alarm set   medications reviewed   nonskid shoes/socks when out of bed   side rails raised x 3  Intervention: Prevent Skin Injury  Flowsheets (Taken 8/26/2024 1356)  Body Position:   position changed independently   heels elevated  Skin Protection: incontinence pads utilized  Device Skin Pressure Protection: absorbent pad utilized/changed  Intervention: Prevent and Manage VTE (Venous Thromboembolism) Risk  Flowsheets (Taken 8/26/2024 1356)  VTE Prevention/Management: ambulation promoted  Goal: Optimal Comfort and Wellbeing  Outcome: Progressing  Intervention: Monitor Pain and Promote Comfort  Flowsheets (Taken 8/26/2024 1356)  Pain Management Interventions:   care clustered   pillow support provided   quiet environment facilitated  Intervention: Provide Person-Centered Care  Flowsheets (Taken 8/26/2024 1356)  Trust Relationship/Rapport:   care explained   choices provided   thoughts/feelings acknowledged     Problem: Pneumonia  Goal: Fluid Balance  Outcome: Progressing  Intervention: Monitor and Manage Fluid Balance  Flowsheets (Taken 8/26/2024 1356)  Fluid/Electrolyte Management: fluids provided  Goal: Effective Oxygenation and Ventilation  Outcome: Progressing  Intervention: Promote Airway Secretion Clearance  Flowsheets (Taken 8/26/2024 1356)  Breathing Techniques/Airway Clearance: deep/controlled cough encouraged  Cough And Deep Breathing: done with encouragement  Intervention: Optimize Oxygenation and Ventilation  Flowsheets (Taken 8/26/2024 1356)  Airway/Ventilation Management:   airway patency maintained    calming measures promoted   pulmonary hygiene promoted  Head of Bed (HOB) Positioning: HOB at 30-45 degrees     Problem: Wound  Goal: Optimal Coping  Outcome: Progressing  Intervention: Support Patient and Family Response  Flowsheets (Taken 8/26/2024 1356)  Supportive Measures: active listening utilized  Family/Support System Care: involvement promoted  Goal: Optimal Functional Ability  Outcome: Progressing  Intervention: Optimize Functional Ability  Flowsheets (Taken 8/26/2024 1356)  Activity Management: Up in chair - L3  Activity Assistance Provided: assistance, 1 person  Goal: Optimal Pain Control and Function  Outcome: Progressing  Intervention: Prevent or Manage Pain  Flowsheets (Taken 8/26/2024 1356)  Pain Management Interventions:   care clustered   pillow support provided   quiet environment facilitated

## 2024-08-26 NOTE — PT/OT/SLP PROGRESS
Physical Therapy Treatment    Patient Name:  Mayra Kelly   MRN:  [id number]    Recommendations:     Discharge Recommendations: Moderate Intensity Therapy  Discharge Equipment Recommendations: to be determined by next level of care  Barriers to discharge:  ongoing medical treatment    Assessment:     Mayra Kelly is a 73 y.o. female admitted with a medical diagnosis of Obstructive pneumonia.  She presents with the following impairments/functional limitations: impaired endurance, impaired cardiopulmonary response to activity.    Pt participated well with PT, however, has not increased gait distance beyond steps around to recliner chair    Rehab Prognosis: Fair; patient would benefit from acute skilled PT services to address these deficits and reach maximum level of function.    Recent Surgery: * No surgery found *      Plan:     During this hospitalization, patient to be seen 5 x/week to address the identified rehab impairments via gait training, therapeutic activities, neuromuscular re-education, therapeutic exercises and progress toward the following goals:    Plan of Care Expires:  09/16/24    Subjective     Chief Complaint: obstructive pneumonia  Patient/Family Comments/goals: pt agreeable  Pain/Comfort:         Objective:     Communicated with Jose Pennington RN prior to session.  Patient found HOB elevated with blood pressure cuff, pulse ox (continuous), telemetry, oxygen, peripheral IV, PureWick upon PT entry to room.     General Precautions: Standard, fall, contact  Orthopedic Precautions: N/A  Braces: N/A  Respiratory Status: Nasal cannula, flow 2 L/min     Functional Mobility:  Bed Mobility:     Supine to Sit: contact guard assistance and verbal cueing  Transfers:     Sit to Stand:  contact guard assistance with rolling walker  Bed to Chair: minimum assistance with  rolling walker    Gait: 4-5 steps around to recliner chair with RW and minimum assist      AM-PAC 6 CLICK MOBILITY  Turning over in bed  (including adjusting bedclothes, sheets and blankets)?: 4  Sitting down on and standing up from a chair with arms (e.g., wheelchair, bedside commode, etc.): 3  Moving from lying on back to sitting on the side of the bed?: 3  Moving to and from a bed to a chair (including a wheelchair)?: 3  Need to walk in hospital room?: 3  Climbing 3-5 steps with a railing?: 1  Basic Mobility Total Score: 17       Treatment & Education:  Pt performed 2 x 15 reps (B) LE exercises: ap, quad set, glut set, straight leg raise, hip ab/adduction, long arc quad, heel slide with  active assist and rest as needed    Standby assist sitting EOB x 5 minutes    Patient left up in chair with all lines intact, call button in reach, and wound care nurse present..    GOALS:   Multidisciplinary Problems       Physical Therapy Goals          Problem: Physical Therapy    Goal Priority Disciplines Outcome Goal Variances Interventions   Physical Therapy Goal     PT, PT/OT Progressing     Description: Short Term Goals to be met by: 24    Patient will increase functional independence with mobility by performin. Supine to sit with Stand by assist  2. Sit to stand transfer with contact guard assist using Rolling walker  3. Bed to chair transfer with contact guard assist using Rolling walker  4. Gait  x 100 feet with contact guard assist using Rolling walker  5. Lower extremity exercise program x30 reps per handout, with assistance as needed  6. Pt to negotiate 3 steps with rails with CGA.     Long Term Goals to be met by: 24    Pt will regain full independent functional mobility with Rolling walker to return to home situation and prior activities of daily living.                        Time Tracking:     PT Received On: 24  PT Start Time: 1115     PT Stop Time: 1140  PT Total Time (min): 25 min     Billable Minutes: Therapeutic Activity 10 and Therapeutic Exercise 15    Treatment Type: Treatment  PT/PTA: PTA     Number of PTA visits  since last PT visit: 1 08/26/2024

## 2024-08-26 NOTE — ASSESSMENT & PLAN NOTE
Antibiotics complete. Continue respiratory therapy and monitor closely.  ---  Patient has a diagnosis of pneumonia. The cause of the pneumonia is suspected to be bacterial in etiology but organism is not known. The pneumonia is improving. The patient has the following signs/symptoms of pneumonia: persistent hypoxia , cough, and shortness of breath. The patient does have a current oxygen requirement and the patient does have a home oxygen requirement. I have reviewed the pertinent imaging. The following cultures have been collected: Bronchial lavage The culture results are listed below.     Current antimicrobial regimen consists of the antibiotics listed below. Will monitor patient closely and continue current treatment plan unchanged.    Recommend antibiotics until at least 8/22 (total 14 days therapy).    Antibiotics (From admission, onward)      Start     Stop Route Frequency Ordered    08/15/24 1230  mupirocin 2 % ointment         08/20/24 0859 Nasl 2 times daily 08/15/24 1127            Microbiology Results (last 7 days)       ** No results found for the last 168 hours. **          Rigid bronchoscopy 8/14 with stent placed. Scheduled Duo Nebs and hypertonic saline in attempt to keep stent patent. Pulmonology managing.      08/18 covering for MRSA pneumonia with positive lower respiratory culture from 08/14 08/22 completed ATB

## 2024-08-26 NOTE — SUBJECTIVE & OBJECTIVE
Interval History:     No significant events overnight, patient breathing well with no new complaints or concerns. Major barrier to discharge at this point is profound deconditioning and difficulty with ADLs. Case management assisting with disposition.     Review of Systems   Constitutional:  Positive for fatigue and unexpected weight change. Negative for appetite change and fever.   HENT:  Negative for congestion, hearing loss and trouble swallowing.    Respiratory:  Negative for chest tightness and wheezing.    Cardiovascular:  Negative for chest pain and palpitations.   Gastrointestinal:  Negative for abdominal pain, constipation and nausea.   Genitourinary:  Negative for difficulty urinating and dysuria.   Musculoskeletal:  Positive for gait problem. Negative for back pain and neck stiffness.   Skin:  Negative for pallor and rash.   Neurological:  Negative for dizziness, speech difficulty and headaches.   Psychiatric/Behavioral:  Negative for confusion and suicidal ideas.      Objective:     Vital Signs (Most Recent):  Temp: (!) 94.6 °F (34.8 °C) (08/26/24 1212)  Pulse: 67 (08/26/24 1246)  Resp: 16 (08/26/24 1246)  BP: (!) 95/47 (08/26/24 1100)  SpO2: 98 % (08/26/24 1246) Vital Signs (24h Range):  Temp:  [94.6 °F (34.8 °C)-98.6 °F (37 °C)] 94.6 °F (34.8 °C)  Pulse:  [51-95] 67  Resp:  [10-24] 16  SpO2:  [90 %-100 %] 98 %  BP: ()/(40-54) 95/47     Weight: 49.3 kg (108 lb 11 oz)  Body mass index is 17.54 kg/m².    Intake/Output Summary (Last 24 hours) at 8/26/2024 1347  Last data filed at 8/26/2024 0607  Gross per 24 hour   Intake 240 ml   Output 1400 ml   Net -1160 ml         Physical Exam  Constitutional:       General: She is not in acute distress.     Appearance: She is underweight.      Comments: Frail, chronically ill-appearing   HENT:      Head: Normocephalic and atraumatic.      Nose: Nose normal.   Eyes:      Conjunctiva/sclera: Conjunctivae normal.      Pupils: Pupils are equal, round, and reactive  to light.   Pulmonary:      Effort: Pulmonary effort is normal. No respiratory distress.   Musculoskeletal:      Cervical back: No rigidity.   Skin:     Coloration: Skin is not jaundiced or pale.      Findings: No rash.   Neurological:      Mental Status: She is alert.   Psychiatric:         Behavior: Behavior normal.         Thought Content: Thought content normal.             Significant Labs: All pertinent labs within the past 24 hours have been reviewed.    Significant Imaging: I have reviewed all pertinent imaging results/findings within the past 24 hours.

## 2024-08-26 NOTE — PLAN OF CARE
Problem: Pneumonia  Goal: Fluid Balance  Outcome: Progressing  Goal: Effective Oxygenation and Ventilation  Outcome: Progressing     Problem: Gas Exchange Impaired  Goal: Optimal Gas Exchange  8/26/2024 1106 by Boby Mock, RRT  Outcome: Progressing  8/26/2024 1105 by Boby Mock, RRT  Outcome: Progressing     Problem: Breathing Pattern Ineffective  Goal: Effective Breathing Pattern  Outcome: Progressing

## 2024-08-27 PROBLEM — J96.12 CHRONIC RESPIRATORY FAILURE WITH HYPOXIA AND HYPERCAPNIA: Status: ACTIVE | Noted: 2024-08-08

## 2024-08-27 PROBLEM — J96.21 ACUTE ON CHRONIC RESPIRATORY FAILURE WITH HYPOXIA AND HYPERCAPNIA: Status: ACTIVE | Noted: 2024-08-27

## 2024-08-27 PROBLEM — J96.22 ACUTE ON CHRONIC RESPIRATORY FAILURE WITH HYPOXIA AND HYPERCAPNIA: Status: ACTIVE | Noted: 2024-08-27

## 2024-08-27 PROBLEM — J96.11 CHRONIC RESPIRATORY FAILURE WITH HYPOXIA AND HYPERCAPNIA: Status: ACTIVE | Noted: 2024-08-08

## 2024-08-27 PROCEDURE — 97535 SELF CARE MNGMENT TRAINING: CPT | Mod: CO

## 2024-08-27 PROCEDURE — 11000008

## 2024-08-27 PROCEDURE — 99232 SBSQ HOSP IP/OBS MODERATE 35: CPT | Mod: ,,, | Performed by: FAMILY MEDICINE

## 2024-08-27 PROCEDURE — 63600175 PHARM REV CODE 636 W HCPCS: Performed by: FAMILY MEDICINE

## 2024-08-27 PROCEDURE — 94664 DEMO&/EVAL PT USE INHALER: CPT

## 2024-08-27 PROCEDURE — 97110 THERAPEUTIC EXERCISES: CPT | Mod: CQ

## 2024-08-27 PROCEDURE — 94640 AIRWAY INHALATION TREATMENT: CPT

## 2024-08-27 PROCEDURE — S4991 NICOTINE PATCH NONLEGEND: HCPCS | Performed by: FAMILY MEDICINE

## 2024-08-27 PROCEDURE — 99900035 HC TECH TIME PER 15 MIN (STAT)

## 2024-08-27 PROCEDURE — 27000221 HC OXYGEN, UP TO 24 HOURS

## 2024-08-27 PROCEDURE — 25000003 PHARM REV CODE 250: Performed by: FAMILY MEDICINE

## 2024-08-27 PROCEDURE — 97110 THERAPEUTIC EXERCISES: CPT | Mod: CO

## 2024-08-27 PROCEDURE — 25000242 PHARM REV CODE 250 ALT 637 W/ HCPCS: Performed by: FAMILY MEDICINE

## 2024-08-27 RX ADMIN — IPRATROPIUM BROMIDE AND ALBUTEROL SULFATE 3 ML: 2.5; .5 SOLUTION RESPIRATORY (INHALATION) at 03:08

## 2024-08-27 RX ADMIN — BUPROPION HYDROCHLORIDE 100 MG: 100 TABLET, FILM COATED, EXTENDED RELEASE ORAL at 08:08

## 2024-08-27 RX ADMIN — NICOTINE 1 PATCH: 14 PATCH, EXTENDED RELEASE TRANSDERMAL at 09:08

## 2024-08-27 RX ADMIN — ENOXAPARIN SODIUM 40 MG: 40 INJECTION SUBCUTANEOUS at 04:08

## 2024-08-27 RX ADMIN — POTASSIUM CHLORIDE 20 MEQ: 20 TABLET, EXTENDED RELEASE ORAL at 08:08

## 2024-08-27 RX ADMIN — IPRATROPIUM BROMIDE AND ALBUTEROL SULFATE 3 ML: 2.5; .5 SOLUTION RESPIRATORY (INHALATION) at 11:08

## 2024-08-27 RX ADMIN — PREDNISONE 10 MG: 10 TABLET ORAL at 09:08

## 2024-08-27 RX ADMIN — BUPROPION HYDROCHLORIDE 100 MG: 100 TABLET, FILM COATED, EXTENDED RELEASE ORAL at 09:08

## 2024-08-27 RX ADMIN — SODIUM CHLORIDE SOLN NEBU 3% 4 ML: 3 NEBU SOLN at 07:08

## 2024-08-27 RX ADMIN — SODIUM CHLORIDE SOLN NEBU 3% 4 ML: 3 NEBU SOLN at 03:08

## 2024-08-27 RX ADMIN — POTASSIUM CHLORIDE 20 MEQ: 20 TABLET, EXTENDED RELEASE ORAL at 09:08

## 2024-08-27 RX ADMIN — IPRATROPIUM BROMIDE AND ALBUTEROL SULFATE 3 ML: 2.5; .5 SOLUTION RESPIRATORY (INHALATION) at 07:08

## 2024-08-27 RX ADMIN — SODIUM CHLORIDE SOLN NEBU 3% 4 ML: 3 NEBU SOLN at 11:08

## 2024-08-27 NOTE — NURSING
0100-Lying in bed with HOB^. Resp even et unlabored. Awake @ present. Voiced no complaints of pain or SOB @ present. All tubes/lines intact. Safety measures on-going.

## 2024-08-27 NOTE — PLAN OF CARE
Problem: Pneumonia  Goal: Effective Oxygenation and Ventilation  Outcome: Progressing     Problem: Gas Exchange Impaired  Goal: Optimal Gas Exchange  Outcome: Progressing     Problem: Breathing Pattern Ineffective  Goal: Effective Breathing Pattern  Outcome: Progressing     Problem: Skin Injury Risk Increased  Goal: Skin Health and Integrity  Outcome: Progressing

## 2024-08-27 NOTE — PT/OT/SLP PROGRESS
Occupational Therapy   Treatment    Name: Mayra Kelly  MRN: [id number]  Admitting Diagnosis:  Obstructive pneumonia       Recommendations:     Discharge Recommendations: Moderate Intensity Therapy  Discharge Equipment Recommendations:   (to be determined)  Barriers to discharge:       Assessment:     Mayra Kelly is a 73 y.o. female with a medical diagnosis of Obstructive pneumonia. Performance deficits affecting function are weakness, impaired endurance, impaired self care skills, impaired cognition.     Rehab Prognosis:  Good; patient would benefit from acute skilled OT services to address these deficits and reach maximum level of function.       Plan:     Patient to be seen 5 x/week to address the above listed problems via self-care/home management, therapeutic activities, therapeutic exercises  Plan of Care Expires: 09/17/24  Plan of Care Reviewed with: patient    Subjective     Chief Complaint:   Patient/Family Comments/goals:   Pain/Comfort:  Pain Rating 1: 0/10    Objective:     Communicated with: Jose Pennington RN prior to session.  Patient found HOB elevated with blood pressure cuff, PureWick, peripheral IV, telemetry, pulse ox (continuous), oxygen upon OT entry to room.    General Precautions: Standard, contact    Orthopedic Precautions:N/A  Braces: N/A  Respiratory Status: Nasal cannula, flow 2 L/min     Occupational Performance:     Bed Mobility:    Supine to sit s  Scooting to eob s     Functional Mobility/Transfers:  Sit to stand cga/min a  Bed to chair cga/min a  Functional Mobility:     Activities of Daily Living:  I to brush her teeth incuding set up with access to supplies  I to comb her hair  I to wash her face  I to doff/neo socks  I to apply lip balm  I to doff/neo glasses      AMPAC 6 Click ADL:      Treatment & Education:  Pt performed hand helper with 2 rubber band resistances 20 reps x 2, green t ball ex's 20 reps x 2,rom ex's with 2 lb wt 15 reps x 2 B elbow flex/ext,abd/add,shld  flex    Patient left up in chair with all lines intact, call button in reach, and chair alarm on    GOALS:   Multidisciplinary Problems       Occupational Therapy Goals          Problem: Occupational Therapy    Goal Priority Disciplines Outcome Interventions   Occupational Therapy Goal     OT, PT/OT Progressing    Description: STG:  Pt will perform grooming with setup  Pt will bathe with CGA  Pt will perform UE dressing with CGA  Pt will perform LE dressing with CGA  Pt will sit EOB x 5 min with SBA assistance  Pt will transfer bed/chair/bsc with CGA  Pt will perform standing task x 5 min with CGA assistance  Pt will tolerate 15 minutes of tx without fatigue      LT.Restore to max I with self care and mobility.                          Time Tracking:     OT Date of Treatment: 24  OT Start Time: 906  OT Stop Time: 955  OT Total Time (min): 49 min    Billable Minutes:Self Care/Home Management 25  Therapeutic Exercise 20    OT/PETAR: PETAR          2024

## 2024-08-27 NOTE — PLAN OF CARE
Problem: Physical Therapy  Goal: Physical Therapy Goal  Description: Short Term Goals to be met by: 24    Patient will increase functional independence with mobility by performin. Supine to sit with Stand by assist  2. Sit to stand transfer with contact guard assist using Rolling walker  3. Bed to chair transfer with contact guard assist using Rolling walker  4. Gait  x 100 feet with contact guard assist using Rolling walker  5. Lower extremity exercise program x30 reps per handout, with assistance as needed  6. Pt to negotiate 3 steps with rails with CGA.     Long Term Goals to be met by: 24    Pt will regain full independent functional mobility with Rolling walker to return to home situation and prior activities of daily living.   Outcome: Progressing       Bed Mobility:     Supine to Sit: contact guard assistance and verbal cueing  Transfers:     Sit to Stand:  contact guard assistance with rolling walker  Bed to Chair: minimum assistance with  rolling walker    Gait: 4-5 steps around to recliner chair with RW and minimum assist     Pt making progress with bed mobs and transfers, however, she has been unable to increased gait distance beyond steps to recliner chair.  Feel pt will benefit from additional rehab to achieve prior level of function

## 2024-08-27 NOTE — PLAN OF CARE
Problem: Pneumonia  Goal: Fluid Balance  Outcome: Progressing  Goal: Effective Oxygenation and Ventilation  Outcome: Progressing     Problem: Gas Exchange Impaired  Goal: Optimal Gas Exchange  Outcome: Progressing     Problem: Breathing Pattern Ineffective  Goal: Effective Breathing Pattern  Outcome: Progressing

## 2024-08-27 NOTE — PROGRESS NOTES
[facility] Medicine  Progress Note    Patient Name: Mayra Kelly  MRN: [id number]  Patient Class: IP- Inpatient   Admission Date: 8/15/2024  Length of Stay: 12 days  Attending Physician: Yeni Jones DO  Primary Care Provider: Darlene Campoverde NP        Subjective:     Principal Problem:Obstructive pneumonia        HPI:    Mayra Kelly is a 73-year-old female with history of lung cancer, COPD, pulmonary HTN, and a-fib. She initially presented to [facility] with acute-on-chronic respiratory failure secondary to obstructive pneumonia. This was treated with extended course of broad-spectrum IV antibiotics with some improvement. Patient underwent rigid bronchoscopy with bronchial lavage, dilation, and stent by Dr. Castellon 8/14/24. Patient tolerated procedure well with improvement in respiratory status. CXR 8/15 revealed stent has remained patent however will require aggressive respiratory therapy and continued antibiotics. Patient subsequently admitted to [facility] for continued IV antibiotics, scheduled respiratory therapy, and physical rehabilitation.             Overview/Hospital Course:        08/17-  Records reviewed. Presented 08/07 to [facility]  withincrease SOB, cough and fatique. Some weight loss. Noted on evaluation worse CXR and CT chest. Hx treated adenoCa in 2018. Last Nov underwent evaluation by Dr Munguia. Seen then again also by Dr Kraft. Dx new primary squamous cell CA. Treated chemotx. In April saw Dr Kraft and started on Keytruda and given through infusion center. Has continued this through July. Missed her appt with Dr Kraft in May. This information mainly from Dr Kraft in pt was not good historian. Pt continued to smoke 1/2 ppd; encourage to stop. Dr Whitmore and Dr Kraft ask to consult. Requires 50% ventimask to keep O2 sats in low 90s. At home been prior on 2L BNC O2. PHMx: also Eliquis for PAF. Now NSR.  Treated for pneumonia.  Seen during stay  by Dr. Kraft and pulmonary. Patient underwent rigid bronchoscopy with bronchial lavage, dilation, and stent by Dr. Castellon 8/14/24.  Admitted to [facility] 08/15 for continued treatment and general strengthening by therapy team.  Patient looks better than when last seen by me.  Seems to be in good spirits.   08/18 continues to feel better without new complaints.  To work with therapy.  Treatment includes vancomycin for MRSA with positive respiratory culture from 08/14.   08/19 Better. CXR better. Reviewed Dr Kraft note. Some confusion. Stop zosyn and consider stop vancomycin soon. Look at DC needs.  08/20 Continues feeling / doing better. Look into DC needs.  08/21 Continues to do well and work with therapy  08/22 No complaints except wellness and not able to walk far. Recommendation from therapy SNF unit. Completed ATB    08/23 SW looking into SWB / SNF placement. Pt no new issues  08/24 continue to monitor  08/25 seen with no complaints watching TV, good appetite, weak but still issue with ambulating and caring for herself, look into swing bed , SNF unit placement.  As outpatient she would need to follow up with Dr. Kraft concerning treatment options for cancer    Interval History:     No significant events overnight, no new complaints or concerns. Patient agreeable to SNF/SWB for rehab and placement pending.     Review of Systems   Constitutional:  Positive for fatigue and unexpected weight change. Negative for appetite change and fever.   HENT:  Negative for congestion, hearing loss and trouble swallowing.    Respiratory:  Negative for chest tightness and wheezing.    Cardiovascular:  Negative for chest pain and palpitations.   Gastrointestinal:  Negative for abdominal pain, constipation and nausea.   Genitourinary:  Negative for difficulty urinating and dysuria.   Musculoskeletal:  Positive for gait problem. Negative for back pain and neck stiffness.   Skin:  Negative for pallor and rash.    Neurological:  Negative for dizziness, speech difficulty and headaches.   Psychiatric/Behavioral:  Negative for confusion and suicidal ideas.      Objective:     Vital Signs (Most Recent):  Temp: 97.8 °F (36.6 °C) (08/27/24 1150)  Pulse: 63 (08/27/24 1156)  Resp: 19 (08/27/24 1150)  BP: (!) 116/49 (08/27/24 1100)  SpO2: 97 % (08/27/24 1156) Vital Signs (24h Range):  Temp:  [97.3 °F (36.3 °C)-98.2 °F (36.8 °C)] 97.8 °F (36.6 °C)  Pulse:  [59-95] 63  Resp:  [10-22] 19  SpO2:  [92 %-99 %] 97 %  BP: ()/(43-56) 116/49     Weight: 49.5 kg (109 lb 2 oz)  Body mass index is 17.61 kg/m².    Intake/Output Summary (Last 24 hours) at 8/27/2024 1219  Last data filed at 8/27/2024 0441  Gross per 24 hour   Intake --   Output 650 ml   Net -650 ml         Physical Exam  Constitutional:       General: She is not in acute distress.     Appearance: She is underweight.      Comments: Frail, chronically ill-appearing   HENT:      Head: Normocephalic and atraumatic.      Nose: Nose normal.   Eyes:      Conjunctiva/sclera: Conjunctivae normal.      Pupils: Pupils are equal, round, and reactive to light.   Pulmonary:      Effort: Pulmonary effort is normal. No respiratory distress.   Musculoskeletal:      Cervical back: No rigidity.   Skin:     Coloration: Skin is not jaundiced or pale.      Findings: No rash.   Neurological:      Mental Status: She is alert.   Psychiatric:         Behavior: Behavior normal.         Thought Content: Thought content normal.             Significant Labs: All pertinent labs within the past 24 hours have been reviewed.    Significant Imaging: I have reviewed all pertinent imaging results/findings within the past 24 hours.    Assessment/Plan:      * Obstructive pneumonia  Antibiotics complete. Continue respiratory therapy and monitor closely.  ---  Patient has a diagnosis of pneumonia. The cause of the pneumonia is suspected to be bacterial in etiology but organism is not known. The pneumonia is  improving. The patient has the following signs/symptoms of pneumonia: persistent hypoxia , cough, and shortness of breath. The patient does have a current oxygen requirement and the patient does have a home oxygen requirement. I have reviewed the pertinent imaging. The following cultures have been collected: Bronchial lavage The culture results are listed below.     Current antimicrobial regimen consists of the antibiotics listed below. Will monitor patient closely and continue current treatment plan unchanged.    Recommend antibiotics until at least 8/22 (total 14 days therapy).    Antibiotics (From admission, onward)      Start     Stop Route Frequency Ordered    08/15/24 1230  mupirocin 2 % ointment         08/20/24 0859 Nasl 2 times daily 08/15/24 1127            Microbiology Results (last 7 days)       ** No results found for the last 168 hours. **          Rigid bronchoscopy 8/14 with stent placed. Scheduled Duo Nebs and hypertonic saline in attempt to keep stent patent. Pulmonology managing.      08/18 covering for MRSA pneumonia with positive lower respiratory culture from 08/14 08/22 completed ATB    MRSA infection  Positive culture from 08/14 in lower respiratory tract for MRSA  Covering with vancomycin for MRSA pneumonia      Neoplastic malignant related fatigue  With general debility and failure to thrive. Currently requiring assistance for most ADLs. Will continue physical therapy this admission however her home situation is not ideal. She will likely need to consider long-term placement upon discharge.     Awaiting placement. Continue PT/OT.    Severe protein-calorie malnutrition  Nutrition consulted. Most recent weight and BMI monitored-     Measurements:  Wt Readings from Last 1 Encounters:   08/23/24 49.3 kg (108 lb 11 oz)   Body mass index is 17.54 kg/m².    Patient has been screened and assessed by RD.    Malnutrition Type:  Context: chronic illness  Level: severe    Malnutrition Characteristic  Summary:  Energy Intake (Malnutrition): less than or equal to 50% for greater than or equal to 1 month  Subcutaneous Fat (Malnutrition): severe depletion  Muscle Mass (Malnutrition): severe depletion    Interventions/Recommendations (treatment strategy):         Paroxysmal atrial fibrillation  Patient with Paroxysmal (<7 days) atrial fibrillation which is controlled currently with  no medications . Patient is currently in sinus rhythm.[id number] Score: 2. HASBLED Score: 1. Anticoagulation indicated. Anticoagulation done with Eliquis - held for bronchoscopy w/biopsy and stent, resume as appropriate .    Malignant neoplasm of right lung  Known to Dr. Kraft.     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 She received a few cycles of weekly chemotherapy around February or March in anticipation of radiation but radiation was not performed. Sidenote she received radiation chemotherapy as well as immunotherapy 2018.    She was last seen by me in April and the office and had a no-show from May....She has continued to receive immunotherapy with Keytruda IVQ6 weeks beginning in April with last dose in early July.        Chronic respiratory failure with hypoxia and hypercapnia  Patient with Hypercapnic and Hypoxic Respiratory failure which is Chronic. Supplemental oxygen was provided and noted- Oxygen Concentration (%):  [28] 28    Signs/symptoms of respiratory failure include- increased work of breathing. Contributing diagnoses includes - COPD, Pneumonia, and lung cancer  Labs and images were reviewed. Patient Has not had a recent ABG. Will treat underlying causes and adjust management of respiratory failure as follows- continue supplemental O2 to maintain saturation >88%, scheduled nebulizer therapy    Cigarette nicotine dependence with nicotine-induced disorder  Dangers of cigarette smoking were reviewed with patient in detail. Patient was  Counseled for 3-10 minutes. Nicotine replacement options were discussed. Nicotine replacement was discussed- prescribed      VTE Risk Mitigation (From admission, onward)           Ordered     enoxaparin injection 40 mg  Every 24 hours         08/15/24 1036                    Discharge Planning   TERE:      Code Status: Partial Code   Is the patient medically ready for discharge?:     Reason for patient still in hospital (select all that apply): Treatment  Discharge Plan A: Rehab                  Yeni Jones DO  Department of Hospital Medicine   [facility]

## 2024-08-27 NOTE — PLAN OF CARE
Problem: Occupational Therapy  Goal: Occupational Therapy Goal  Description: STG:  Pt will perform grooming with setup  Pt will bathe with CGA  Pt will perform UE dressing with CGA  Pt will perform LE dressing with CGA  Pt will sit EOB x 5 min with SBA assistance  Pt will transfer bed/chair/bsc with CGA  Pt will perform standing task x 5 min with CGA assistance  Pt will tolerate 15 minutes of tx without fatigue      LT.Restore to max I with self care and mobility.     Outcome: Progressing    to brush her teeth incuding set up with access to supplies  I to comb her hair  I to wash her face  I to doff/neo socks  I to apply lip balm  I to doff/neo glasses    Pt making progress with OT. Plan is to cont with OT poc

## 2024-08-27 NOTE — SUBJECTIVE & OBJECTIVE
Interval History:     No significant events overnight, no new complaints or concerns. Patient agreeable to SNF/SWB for rehab and placement pending.     Review of Systems   Constitutional:  Positive for fatigue and unexpected weight change. Negative for appetite change and fever.   HENT:  Negative for congestion, hearing loss and trouble swallowing.    Respiratory:  Negative for chest tightness and wheezing.    Cardiovascular:  Negative for chest pain and palpitations.   Gastrointestinal:  Negative for abdominal pain, constipation and nausea.   Genitourinary:  Negative for difficulty urinating and dysuria.   Musculoskeletal:  Positive for gait problem. Negative for back pain and neck stiffness.   Skin:  Negative for pallor and rash.   Neurological:  Negative for dizziness, speech difficulty and headaches.   Psychiatric/Behavioral:  Negative for confusion and suicidal ideas.      Objective:     Vital Signs (Most Recent):  Temp: 97.8 °F (36.6 °C) (08/27/24 1150)  Pulse: 63 (08/27/24 1156)  Resp: 19 (08/27/24 1150)  BP: (!) 116/49 (08/27/24 1100)  SpO2: 97 % (08/27/24 1156) Vital Signs (24h Range):  Temp:  [97.3 °F (36.3 °C)-98.2 °F (36.8 °C)] 97.8 °F (36.6 °C)  Pulse:  [59-95] 63  Resp:  [10-22] 19  SpO2:  [92 %-99 %] 97 %  BP: ()/(43-56) 116/49     Weight: 49.5 kg (109 lb 2 oz)  Body mass index is 17.61 kg/m².    Intake/Output Summary (Last 24 hours) at 8/27/2024 1219  Last data filed at 8/27/2024 0441  Gross per 24 hour   Intake --   Output 650 ml   Net -650 ml         Physical Exam  Constitutional:       General: She is not in acute distress.     Appearance: She is underweight.      Comments: Frail, chronically ill-appearing   HENT:      Head: Normocephalic and atraumatic.      Nose: Nose normal.   Eyes:      Conjunctiva/sclera: Conjunctivae normal.      Pupils: Pupils are equal, round, and reactive to light.   Pulmonary:      Effort: Pulmonary effort is normal. No respiratory distress.   Musculoskeletal:       Cervical back: No rigidity.   Skin:     Coloration: Skin is not jaundiced or pale.      Findings: No rash.   Neurological:      Mental Status: She is alert.   Psychiatric:         Behavior: Behavior normal.         Thought Content: Thought content normal.             Significant Labs: All pertinent labs within the past 24 hours have been reviewed.    Significant Imaging: I have reviewed all pertinent imaging results/findings within the past 24 hours.

## 2024-08-27 NOTE — PLAN OF CARE
Patient remains on isolation for MRSA.  Maintaining aseptic technique when accessing IV port.  Isolation posted on patient door for all staff/family .

## 2024-08-27 NOTE — ASSESSMENT & PLAN NOTE
With general debility and failure to thrive. Currently requiring assistance for most ADLs. Will continue physical therapy this admission however her home situation is not ideal. She will likely need to consider long-term placement upon discharge.     Awaiting placement. Continue PT/OT.

## 2024-08-27 NOTE — PROGRESS NOTES
[facility] HOW  Wound Care    Patient Name:  Mayra Kelly   MRN:  [id number]  Date: 8/27/2024  Diagnosis: Obstructive pneumonia    History:     Past Medical History:   Diagnosis Date    Chronic respiratory failure with hypoxia and hypercapnia     on home oxygen but hasn't been using it    Cigarette nicotine dependence with nicotine-induced disorder 11/09/2023    COPD (chronic obstructive pulmonary disease)     Dyshidrotic eczema 08/10/2021    Right bundle branch block 12/11/2023    Scabies     Squamous cell carcinoma of lung     follows with Dr. Kraft       Social History     Socioeconomic History    Marital status:    Tobacco Use    Smoking status: Every Day     Current packs/day: 1.00     Types: Cigarettes    Smokeless tobacco: Never   Substance and Sexual Activity    Alcohol use: Never    Drug use: Never     Social Determinants of Health     Financial Resource Strain: Low Risk  (8/16/2024)    Overall Financial Resource Strain (CARDIA)     Difficulty of Paying Living Expenses: Not hard at all   Food Insecurity: No Food Insecurity (8/16/2024)    Hunger Vital Sign     Worried About Running Out of Food in the Last Year: Never true     Ran Out of Food in the Last Year: Never true   Transportation Needs: No Transportation Needs (8/16/2024)    TRANSPORTATION NEEDS     Transportation : No   Physical Activity: Inactive (8/16/2024)    Exercise Vital Sign     Days of Exercise per Week: 0 days     Minutes of Exercise per Session: 0 min   Stress: No Stress Concern Present (8/16/2024)    [employer] [place] [employer] - Occupational Stress Questionnaire     Feeling of Stress : Not at all   Housing Stability: Low Risk  (8/16/2024)    Housing Stability Vital Sign     Unable to Pay for Housing in the Last Year: No     Homeless in the Last Year: No       Precautions:     Allergies as of 08/15/2024    (No Known Allergies)       WOC Assessment Details/Treatment     Narrative: Seen  patient for preventive skin care measures     Patient up in chair working with PT .  Oxygen in use, No redness noted over ears.   Unable to evaluate sacral, see nursing skin assessment.   Bilateral heels with out pressure injury noted.   Krunal score 18.    Skin care will continue to evaluate prn          08/27/2024

## 2024-08-27 NOTE — PLAN OF CARE
Problem: Adult Inpatient Plan of Care  Goal: Plan of Care Review  Outcome: Progressing  Flowsheets (Taken 8/27/2024 1637)  Plan of Care Reviewed With: patient  Goal: Absence of Hospital-Acquired Illness or Injury  Outcome: Progressing  Intervention: Identify and Manage Fall Risk  Flowsheets (Taken 8/27/2024 1637)  Safety Promotion/Fall Prevention:   assistive device/personal item within reach   bed alarm set   nonskid shoes/socks when out of bed   medications reviewed   side rails raised x 3   room near unit station  Intervention: Prevent Skin Injury  Flowsheets (Taken 8/27/2024 1637)  Body Position: position changed independently  Skin Protection: incontinence pads utilized  Device Skin Pressure Protection: absorbent pad utilized/changed  Intervention: Prevent and Manage VTE (Venous Thromboembolism) Risk  Flowsheets (Taken 8/27/2024 1637)  VTE Prevention/Management: bleeding precations maintained  Intervention: Prevent Infection  Flowsheets (Taken 8/27/2024 1637)  Infection Prevention: environmental surveillance performed  Goal: Optimal Comfort and Wellbeing  Outcome: Progressing  Intervention: Monitor Pain and Promote Comfort  Flowsheets (Taken 8/27/2024 1637)  Pain Management Interventions:   quiet environment facilitated   pillow support provided   care clustered  Intervention: Provide Person-Centered Care  Flowsheets (Taken 8/27/2024 1637)  Trust Relationship/Rapport:   care explained   choices provided   thoughts/feelings acknowledged     Problem: Pneumonia  Goal: Fluid Balance  Outcome: Progressing  Intervention: Monitor and Manage Fluid Balance  Flowsheets (Taken 8/27/2024 1637)  Fluid/Electrolyte Management: fluids provided     Problem: Wound  Goal: Optimal Pain Control and Function  Outcome: Progressing  Intervention: Prevent or Manage Pain  Flowsheets (Taken 8/27/2024 1637)  Pain Management Interventions:   quiet environment facilitated   pillow support provided   care clustered     Problem: Fall Injury  Risk  Goal: Absence of Fall and Fall-Related Injury  Outcome: Progressing  Intervention: Identify and Manage Contributors  Flowsheets (Taken 8/27/2024 1637)  Self-Care Promotion:   independence encouraged   BADL personal objects within reach  Medication Review/Management: medications reviewed  Intervention: Promote Injury-Free Environment  Flowsheets (Taken 8/27/2024 1637)  Safety Promotion/Fall Prevention:   assistive device/personal item within reach   bed alarm set   nonskid shoes/socks when out of bed   medications reviewed   side rails raised x 3   room near unit station

## 2024-08-27 NOTE — ASSESSMENT & PLAN NOTE
Patient with Hypercapnic and Hypoxic Respiratory failure which is Chronic. Supplemental oxygen was provided and noted- Oxygen Concentration (%):  [28] 28    Signs/symptoms of respiratory failure include- increased work of breathing. Contributing diagnoses includes - COPD, Pneumonia, and lung cancer  Labs and images were reviewed. Patient Has not had a recent ABG. Will treat underlying causes and adjust management of respiratory failure as follows- continue supplemental O2 to maintain saturation >88%, scheduled nebulizer therapy

## 2024-08-27 NOTE — PT/OT/SLP PROGRESS
Physical Therapy Treatment    Patient Name:  Mayra Kelly   MRN:  [id number]    Recommendations:     Discharge Recommendations: Moderate Intensity Therapy  Discharge Equipment Recommendations: to be determined by next level of care  Barriers to discharge:  ongoing medical treatment    Assessment:     Mayra Kelly is a 73 y.o. female admitted with a medical diagnosis of Obstructive pneumonia.  She presents with the following impairments/functional limitations: impaired endurance, impaired cardiopulmonary response to activity.      Rehab Prognosis: Good and Fair; patient would benefit from acute skilled PT services to address these deficits and reach maximum level of function.    Recent Surgery: * No surgery found *      Plan:     During this hospitalization, patient to be seen 5 x/week to address the identified rehab impairments via gait training, therapeutic activities, neuromuscular re-education, therapeutic exercises and progress toward the following goals:    Plan of Care Expires:  09/16/24    Subjective     Chief Complaint: obstructive pneumonia  Patient/Family Comments/goals: pt agreeable  Pain/Comfort:         Objective:     Communicated with Jose Pennington RN prior to session.  Patient found up in chair with PureWick, peripheral IV, blood pressure cuff, pulse ox (continuous), telemetry (chair alarm) upon PT entry to room.     General Precautions: Standard, fall, contact  Orthopedic Precautions: N/A  Braces: N/A  Respiratory Status: Nasal cannula, flow 2 L/min     Functional Mobility:  Transfers:     Sit to Stand:  minimum assistance with rolling walker and verbal cues for hands placement x 2 trials      AM-PAC 6 CLICK MOBILITY  Turning over in bed (including adjusting bedclothes, sheets and blankets)?: 4  Sitting down on and standing up from a chair with arms (e.g., wheelchair, bedside commode, etc.): 3  Moving from lying on back to sitting on the side of the bed?: 3  Moving to and from a bed to a  chair (including a wheelchair)?: 3  Need to walk in hospital room?: 3  Climbing 3-5 steps with a railing?: 1  Basic Mobility Total Score: 17       Treatment & Education:  Pt performed 2 x 15 reps (B) LE exercises: ap, quad set, glut set, straight leg raise, hip ab/adduction, long arc quad, heel slide with active assist       Patient left up in chair with all lines intact, call button in reach, and chair alarm on..    GOALS:   Multidisciplinary Problems       Physical Therapy Goals          Problem: Physical Therapy    Goal Priority Disciplines Outcome Goal Variances Interventions   Physical Therapy Goal     PT, PT/OT Progressing     Description: Short Term Goals to be met by: 24    Patient will increase functional independence with mobility by performin. Supine to sit with Stand by assist  2. Sit to stand transfer with contact guard assist using Rolling walker  3. Bed to chair transfer with contact guard assist using Rolling walker  4. Gait  x 100 feet with contact guard assist using Rolling walker  5. Lower extremity exercise program x30 reps per handout, with assistance as needed  6. Pt to negotiate 3 steps with rails with CGA.     Long Term Goals to be met by: 24    Pt will regain full independent functional mobility with Rolling walker to return to home situation and prior activities of daily living.                        Time Tracking:     PT Received On: 24  PT Start Time: 1310     PT Stop Time: 1327  PT Total Time (min): 17 min     Billable Minutes: Therapeutic Exercise 15    Treatment Type: Treatment  PT/PTA: PTA     Number of PTA visits since last PT visit: 2     2024

## 2024-08-28 LAB
ANION GAP SERPL CALCULATED.3IONS-SCNC: 8 MMOL/L (ref 7–16)
BASOPHILS # BLD AUTO: 0.02 K/UL (ref 0–0.2)
BASOPHILS NFR BLD AUTO: 0.2 % (ref 0–1)
BUN SERPL-MCNC: 24 MG/DL (ref 7–18)
BUN/CREAT SERPL: 30 (ref 6–20)
CALCIUM SERPL-MCNC: 8.7 MG/DL (ref 8.5–10.1)
CHLORIDE SERPL-SCNC: 105 MMOL/L (ref 98–107)
CO2 SERPL-SCNC: 29 MMOL/L (ref 21–32)
CREAT SERPL-MCNC: 0.79 MG/DL (ref 0.55–1.02)
DIFFERENTIAL METHOD BLD: ABNORMAL
EGFR (NO RACE VARIABLE) (RUSH/TITUS): 80 ML/MIN/1.73M2
EOSINOPHIL # BLD AUTO: 0.17 K/UL (ref 0–0.5)
EOSINOPHIL NFR BLD AUTO: 1.6 % (ref 1–4)
ERYTHROCYTE [DISTWIDTH] IN BLOOD BY AUTOMATED COUNT: 18.1 % (ref 11.5–14.5)
GLUCOSE SERPL-MCNC: 72 MG/DL (ref 74–106)
HCT VFR BLD AUTO: 36.3 % (ref 38–47)
HGB BLD-MCNC: 11.2 G/DL (ref 12–16)
IMM GRANULOCYTES # BLD AUTO: 0.04 K/UL (ref 0–0.04)
IMM GRANULOCYTES NFR BLD: 0.4 % (ref 0–0.4)
LYMPHOCYTES # BLD AUTO: 0.86 K/UL (ref 1–4.8)
LYMPHOCYTES NFR BLD AUTO: 8.3 % (ref 27–41)
MCH RBC QN AUTO: 26.9 PG (ref 27–31)
MCHC RBC AUTO-ENTMCNC: 30.9 G/DL (ref 32–36)
MCV RBC AUTO: 87.1 FL (ref 80–96)
MONOCYTES # BLD AUTO: 0.52 K/UL (ref 0–0.8)
MONOCYTES NFR BLD AUTO: 5 % (ref 2–6)
MPC BLD CALC-MCNC: 9.9 FL (ref 9.4–12.4)
NEUTROPHILS # BLD AUTO: 8.75 K/UL (ref 1.8–7.7)
NEUTROPHILS NFR BLD AUTO: 84.5 % (ref 53–65)
NRBC # BLD AUTO: 0 X10E3/UL
NRBC, AUTO (.00): 0 %
PLATELET # BLD AUTO: 262 K/UL (ref 150–400)
POTASSIUM SERPL-SCNC: 4.2 MMOL/L (ref 3.5–5.1)
RBC # BLD AUTO: 4.17 M/UL (ref 4.2–5.4)
SODIUM SERPL-SCNC: 138 MMOL/L (ref 136–145)
WBC # BLD AUTO: 10.36 K/UL (ref 4.5–11)

## 2024-08-28 PROCEDURE — 94640 AIRWAY INHALATION TREATMENT: CPT

## 2024-08-28 PROCEDURE — 80048 BASIC METABOLIC PNL TOTAL CA: CPT | Performed by: HOSPITALIST

## 2024-08-28 PROCEDURE — 25000003 PHARM REV CODE 250: Performed by: FAMILY MEDICINE

## 2024-08-28 PROCEDURE — 85025 COMPLETE CBC W/AUTO DIFF WBC: CPT | Performed by: HOSPITALIST

## 2024-08-28 PROCEDURE — 27000221 HC OXYGEN, UP TO 24 HOURS

## 2024-08-28 PROCEDURE — 63600175 PHARM REV CODE 636 W HCPCS: Performed by: FAMILY MEDICINE

## 2024-08-28 PROCEDURE — 27000173 HC ACAPELLA DEVICE DH OR DM

## 2024-08-28 PROCEDURE — 36415 COLL VENOUS BLD VENIPUNCTURE: CPT | Performed by: HOSPITALIST

## 2024-08-28 PROCEDURE — 99232 SBSQ HOSP IP/OBS MODERATE 35: CPT | Mod: ,,, | Performed by: FAMILY MEDICINE

## 2024-08-28 PROCEDURE — 25000242 PHARM REV CODE 250 ALT 637 W/ HCPCS: Performed by: FAMILY MEDICINE

## 2024-08-28 PROCEDURE — 94664 DEMO&/EVAL PT USE INHALER: CPT

## 2024-08-28 PROCEDURE — 97110 THERAPEUTIC EXERCISES: CPT

## 2024-08-28 PROCEDURE — 97530 THERAPEUTIC ACTIVITIES: CPT

## 2024-08-28 PROCEDURE — 94761 N-INVAS EAR/PLS OXIMETRY MLT: CPT

## 2024-08-28 PROCEDURE — 11000008

## 2024-08-28 PROCEDURE — S4991 NICOTINE PATCH NONLEGEND: HCPCS | Performed by: FAMILY MEDICINE

## 2024-08-28 PROCEDURE — 99900035 HC TECH TIME PER 15 MIN (STAT)

## 2024-08-28 RX ADMIN — IPRATROPIUM BROMIDE AND ALBUTEROL SULFATE 3 ML: 2.5; .5 SOLUTION RESPIRATORY (INHALATION) at 07:08

## 2024-08-28 RX ADMIN — SODIUM CHLORIDE SOLN NEBU 3% 4 ML: 3 NEBU SOLN at 03:08

## 2024-08-28 RX ADMIN — ENOXAPARIN SODIUM 40 MG: 40 INJECTION SUBCUTANEOUS at 04:08

## 2024-08-28 RX ADMIN — BUPROPION HYDROCHLORIDE 100 MG: 100 TABLET, FILM COATED, EXTENDED RELEASE ORAL at 08:08

## 2024-08-28 RX ADMIN — IPRATROPIUM BROMIDE AND ALBUTEROL SULFATE 3 ML: 2.5; .5 SOLUTION RESPIRATORY (INHALATION) at 03:08

## 2024-08-28 RX ADMIN — SODIUM CHLORIDE SOLN NEBU 3% 4 ML: 3 NEBU SOLN at 11:08

## 2024-08-28 RX ADMIN — PREDNISONE 5 MG: 5 TABLET ORAL at 08:08

## 2024-08-28 RX ADMIN — SODIUM CHLORIDE SOLN NEBU 3% 4 ML: 3 NEBU SOLN at 07:08

## 2024-08-28 RX ADMIN — NICOTINE 1 PATCH: 14 PATCH, EXTENDED RELEASE TRANSDERMAL at 08:08

## 2024-08-28 RX ADMIN — POTASSIUM CHLORIDE 20 MEQ: 20 TABLET, EXTENDED RELEASE ORAL at 09:08

## 2024-08-28 RX ADMIN — BUPROPION HYDROCHLORIDE 100 MG: 100 TABLET, FILM COATED, EXTENDED RELEASE ORAL at 09:08

## 2024-08-28 RX ADMIN — IPRATROPIUM BROMIDE AND ALBUTEROL SULFATE 3 ML: 2.5; .5 SOLUTION RESPIRATORY (INHALATION) at 11:08

## 2024-08-28 RX ADMIN — POTASSIUM CHLORIDE 20 MEQ: 20 TABLET, EXTENDED RELEASE ORAL at 08:08

## 2024-08-28 NOTE — PLAN OF CARE
Pt was denied at [facility], no beds available at [facility]. SS received choice for : 1. [facility] 2. [facility]. Referrals  faxed. SS following    1310 SS spoke with Mercedes at [facility]  and pt has been accepted. SS notified attending physician via secure chat. D/C plan is for 8/29/24. SS following.    1315. SS notified brother, Solo North, of plan for d/c to SNF on 8/29/24. Mr. North requested the facility's address and phone number, this information was given. SS following.

## 2024-08-28 NOTE — PROGRESS NOTES
[facility] HOW  Adult Nutrition  Follow up         Reason for Assessment  Reason For Assessment: RD follow-up   Nutrition Risk Screen: no indicators present    Assessment and Plan  8/28/2024 RD Follow up: Patient continues on a regular diet with Boost Plus all meals. Po intakes improved to % per flowsheets over the last several days. Current weight 49.5 kg. Last BM noted 8/25. RD Following.     8/21/2024 RD Follow up: Patient is ordered a regular diet with Boost Plus all meals. Po intakes on average 50-75% per flowsheets. Current weight 51.8 kg. Last BM noted 8/21.    Recommend to continue diet as tolerated. Encourage po intakes of meals and Boost Plus. RD Following.       8/16/2024 MST Notification sent. Patient admitted to LTAC from [facility]. Patient meets ASPEN criteria for severe protein calorie malnutrition. See Malnutrition assessment. Recommend to liberalize diet to regular. Encourage po intakes. RD Following.     8/14/2024 RD Follow up: Patient currently NPO s/p bronchoscopy. Patient with prior diet order for cardiac diet with 50% intakes per flowsheets. No new weight noted. Last BM noted 8/11.     Recommend regular diet when diet resumed. Consider bowel regimen as constipation can affect po tolerance. RD Following.     Patient is  72yo female admitted 8/7 for obstructive pneumonia. She was identified at risk by screening criteria with MST2.     Patient is 47.6kg with a BMI of 16.95 and is severely underweight per geriatric standards. She has a PMH of lung cancer and advanced COPD. Visited with patient this morning and she stated she has been eating well while in the hospital due to the steroid therapy she is receiving, but has not had an appetite at home prior to admission. She is edentulous without her dentures. RD asked if she needed texture modification, patient declined.     Physical exam reveals severe fat and muscle depletion to orbital, upper arm, temple, and clavicle regions.      Per ASPEN guidelines, patient meets criteria for severe protein-calorie malnutrition as evidenced by poor PO intakes <50% for greater than one month and severe fat and muscle depletion to orbital, upper arm, temple, and clavicle regions.     Patient is ordered a cardiac diet. Recommend consider liberalizing to a Regular diet as appropriate. Encourage good PO intakes.     Last BM 8/8 per flowsheet.    Medications/labs reviewed. RD following.      Learning Needs/Social Determinants of Health  Learning Assessment       08/08/2024 1319 [facility] (8/7/2024 - Present)   Created by Barb Abarca, RN - RN (Nurse) Status: Complete                 PRIMARY LEARNER     Primary Learner Name:  Mayra Kelly BN - 08/08/2024 1319    Relationship:  Patient BN - 08/08/2024 1319    Does the primary learner have any barriers to learning?:  No Barriers  - 08/08/2024 1319    What is the preferred language of the primary learner?:  English  - 08/08/2024 1319    Is an  required?:  Yes  - 08/08/2024 1319    How does the primary learner prefer to learn new concepts?:  Listening  - 08/08/2024 1319    How often do you need to have someone help you read instructions, pamphlets, or written material from your doctor or pharmacy?:  Never  - 08/08/2024 1319        CO-LEARNER #1     No question answered        CO-LEARNER #2     No question answered        SPECIAL TOPICS     No question answered        ANSWERED BY:     No question answered        Comments         Edit History       Barb Abarca, RN - RN (Nurse)   08/08/2024 1319                           Social Determinants of Health     Tobacco Use: High Risk (8/8/2024)    Patient History     Smoking Tobacco Use: Every Day     Smokeless Tobacco Use: Never     Passive Exposure: Not on file   Alcohol Use: Not At Risk (8/16/2024)    AUDIT-C     Frequency of Alcohol Consumption: Never     Average Number of Drinks: Patient does not drink      Frequency of Binge Drinking: Never   Financial Resource Strain: Low Risk  (8/16/2024)    Overall Financial Resource Strain (CARDIA)     Difficulty of Paying Living Expenses: Not hard at all   Food Insecurity: No Food Insecurity (8/16/2024)    Hunger Vital Sign     Worried About Running Out of Food in the Last Year: Never true     Ran Out of Food in the Last Year: Never true   Transportation Needs: No Transportation Needs (8/16/2024)    TRANSPORTATION NEEDS     Transportation : No   Physical Activity: Inactive (8/16/2024)    Exercise Vital Sign     Days of Exercise per Week: 0 days     Minutes of Exercise per Session: 0 min   Stress: No Stress Concern Present (8/16/2024)    [employer] [place] [employer] - Occupational Stress Questionnaire     Feeling of Stress : Not at all   Housing Stability: Low Risk  (8/16/2024)    Housing Stability Vital Sign     Unable to Pay for Housing in the Last Year: No     Homeless in the Last Year: No   Depression: Low Risk  (12/4/2023)    Depression     Last PHQ-4: Flowsheet Data: 0   Utilities: Not At Risk (8/16/2024)    [facility] Utilities     Threatened with loss of utilities: No   Health Literacy: Adequate Health Literacy (8/16/2024)     Health Literacy     Frequency of need for help with medical instructions: Never   Social Isolation: Socially Integrated (8/16/2024)    Social Isolation     Social Isolation: 1            Malnutrition  Is Patient Malnourished: Yes Malnutrition Assessment  Malnutrition Context: chronic illness  Malnutrition Level: severe          Energy Intake (Malnutrition): less than or equal to 50% for greater than or equal to 1 month  Subcutaneous Fat (Malnutrition): severe depletion  Muscle Mass (Malnutrition): severe depletion   Orbital Region (Subcutaneous Fat Loss): severe depletion  Upper Arm Region (Subcutaneous Fat Loss): severe depletion   Orthodoxy Region (Muscle Loss): severe depletion  Clavicle Bone Region (Muscle Loss): severe depletion                  Nutrition Diagnosis  Malnutrition (Severe) related to Appetite loss, Catabolic illness, Chronic illness, and Inadequate Caloric intake as evidenced by poor PO intakes <50% for greater than one month and severe fat and muscle depletion to orbital, upper arm, temple, and clavicle regions.   Comments: consider liberalizing to Regular diet    Recent Labs   Lab 08/28/24  0559   GLU 72*       Nutrition Prescription / Recommendations  Nutrition Goal Status: progressing towards goal  Current Diet Order: Regular  Nutrition Order Comments: rec diet change to regular  Oral Nutrition Supplement: Boost Plus all meals  Chewing or Swallowing Difficulty?: Edentulous : no dentures present  Recommended Diet: Regular  Recommended Oral Supplement: Boost Plus [360kcals, 14g Protein, 45g Carbs] with meals  Is Nutrition Support Recommended: [facility] Support: No  Is Nutrition Education Recommended: No    Monitor and Evaluation  % current Intake: P.O. intake of 50 - 75 %  % intake to meet estimated needs: 50 - 75 %  Nutrition-Focused Physical Findings: overall appearance  Energy Calories Required: meeting needs  Protein Required: meeting needs  Fluid Required: meeting needs  Tolerance: tolerating    Current Medical Diagnosis and Past Medical History     Past Medical History:   Diagnosis Date    Chronic respiratory failure with hypoxia and hypercapnia     on home oxygen but hasn't been using it    Cigarette nicotine dependence with nicotine-induced disorder 11/09/2023    COPD (chronic obstructive pulmonary disease)     Dyshidrotic eczema 08/10/2021    Right bundle branch block 12/11/2023    Scabies     Squamous cell carcinoma of lung     follows with Dr. Kraft       Nutrition/Diet History  Spiritual, Cultural Beliefs, Religion Practices, Values that Affect Care: no  Factors Affecting Nutritional Intake: None identified at this time    Lab/Procedures/Meds  Recent Labs   Lab 08/28/24  0559      K 4.2   BUN 24*  "  CREATININE 0.79   CALCIUM 8.7      Note: BUN elevated; encourage po fluids    Last A1c:   Lab Results   Component Value Date    HGBA1C 5.5 08/08/2024     Lab Results   Component Value Date    RBC 4.17 (L) 08/28/2024    HGB 11.2 (L) 08/28/2024    HCT 36.3 (L) 08/28/2024    MCV 87.1 08/28/2024    MCH 26.9 (L) 08/28/2024    MCHC 30.9 (L) 08/28/2024   Note: H&H low    Pertinent Labs Reviewed: reviewed  Pertinent Medications Reviewed: reviewed  Scheduled Meds:   albuterol-ipratropium  3 mL Nebulization QID    buPROPion  100 mg Oral BID    enoxparin  40 mg Subcutaneous Q24H (prophylaxis, 1700)    nicotine  1 patch Transdermal Daily    potassium chloride SA  20 mEq Oral BID    predniSONE  5 mg Oral Daily    sodium chloride 3%  4 mL Nebulization Q8H     Continuous Infusions:  PRN Meds:.  Current Facility-Administered Medications:     0.9% NaCl 100 mL flush bag, , Intravenous, PRN    acetaminophen, 1,000 mg, Oral, Q6H PRN    albuterol sulfate, 2.5 mg, Nebulization, Q4H PRN    bisacodyL, 10 mg, Oral, Daily PRN    D5W, , Intravenous, PRN    dextromethorphan-guaiFENesin  mg/5 ml, 10 mL, Oral, Q6H PRN    diphenhydrAMINE, 25 mg, Intravenous, Q6H PRN    glucagon (human recombinant), 1 mg, Intramuscular, PRN    HYDROmorphone, 0.5 mg, Intravenous, Q5 Min PRN    melatonin, 6 mg, Oral, Nightly PRN    morphine, 4 mg, Intravenous, Q5 Min PRN    ondansetron, 4 mg, Intravenous, Daily PRN    ondansetron, 8 mg, Intravenous, Q6H PRN    traZODone, 50 mg, Oral, Nightly PRN    Anthropometrics  Temp: 98.1 °F (36.7 °C)  Height Method: Stated  Height: 5' 6" (167.6 cm)  Height (inches): 66 in  Weight Method: Bed Scale  Weight: 49.5 kg (109 lb 2 oz)  Weight (lb): 109.13 lb  Ideal Body Weight (IBW), Female: 130 lb  % Ideal Body Weight, Female (lb): 87.85 %  BMI (Calculated): 17.6       Estimated/Assessed Needs  RMR ([place] Equation): 1021.75     Temp: 98.1 °F (36.7 °C)Oral  Weight Used For Calorie Calculations: 51.8 kg (114 lb " 3.2 oz)   Energy Need Method: Kcal/kg Energy Calorie Requirements (kcal): 8007-1932  Weight Used For Protein Calculations: 51.8 kg (114 lb 3.2 oz)  Protein Requirements: 52-62       RDA Method (mL): 1813       Nutrition by Nursing  Diet/Nutrition Received: regular  Intake (%): 100%  Diet/Feeding Assistance: none  Diet/Feeding Tolerance: fair  Last Bowel Movement: 08/25/24                Nutrition Follow-Up  RD Follow-up?: Yes      Nutrition Discharge Planning: RD following for d/c needs          Available via Secure Chat

## 2024-08-28 NOTE — PT/OT/SLP PROGRESS
"Physical Therapy Treatment    Patient Name:  Mayra Kelly   MRN:  [id number]    Recommendations:     Discharge Recommendations: Moderate Intensity Therapy  Discharge Equipment Recommendations: to be determined by next level of care  Barriers to discharge:  ongoing medical care    Assessment:     Mayra Kelly is a 73 y.o. female admitted with a medical diagnosis of Obstructive pneumonia.  She presents with the following impairments/functional limitations: weakness, impaired endurance, impaired functional mobility, gait instability, impaired balance, decreased lower extremity function, decreased safety awareness, decreased coordination.     Rehab Prognosis: Fair; patient would benefit from acute skilled PT services to address these deficits and reach maximum level of function.    Recent Surgery: * No surgery found *      Plan:     During this hospitalization, patient to be seen 5 x/week to address the identified rehab impairments via gait training, therapeutic activities, therapeutic exercises, neuromuscular re-education and progress toward the following goals:    Plan of Care Expires:  09/16/24    Subjective     Chief Complaint: obstructive pneumonia  Patient/Family Comments/goals: agreeable. "I'm ready to go from here"  Pain/Comfort:  Pain Rating 1: 0/10      Objective:     Communicated with MELI Kang RN prior to session.  Patient found up in chair with blood pressure cuff, telemetry, PureWick, peripheral IV, pulse ox (continuous) upon PT entry to room.     General Precautions: Standard, fall, contact  Orthopedic Precautions: N/A  Braces: N/A  Respiratory Status: Nasal cannula, flow 2 L/min     Functional Mobility:  Bed Mobility:     Rolling Left:  contact guard assistance  Rolling Right: contact guard assistance  Scooting: moderate assistance and of 2 persons  Sit to Supine: minimum assistance and of 2 persons  Transfers:     Sit to Stand:  minimum assistance and of 1-2 persons with hand-held assist  Chair " to mat: minimum assistance and of 1-2 persons with  hand-held assist  using  Step Transfer  Balance: standing poor+ to fair-  Bridging: Min A     AM-PAC 6 CLICK MOBILITY  Turning over in bed (including adjusting bedclothes, sheets and blankets)?: 3  Sitting down on and standing up from a chair with arms (e.g., wheelchair, bedside commode, etc.): 3  Moving from lying on back to sitting on the side of the bed?: 3  Moving to and from a bed to a chair (including a wheelchair)?: 3  Need to walk in hospital room?: 2  Climbing 3-5 steps with a railing?: 2  Basic Mobility Total Score: 16       Treatment & Education:  Mobility as stated above.     Patient left HOB elevated with all lines intact, call button in reach, and RN notified..    GOALS:   Multidisciplinary Problems       Physical Therapy Goals          Problem: Physical Therapy    Goal Priority Disciplines Outcome Goal Variances Interventions   Physical Therapy Goal     PT, PT/OT Progressing     Description: Short Term Goals to be met by: 24    Patient will increase functional independence with mobility by performin. Supine to sit with Stand by assist  2. Sit to stand transfer with contact guard assist using Rolling walker  3. Bed to chair transfer with contact guard assist using Rolling walker  4. Gait  x 100 feet with contact guard assist using Rolling walker  5. Lower extremity exercise program x30 reps per handout, with assistance as needed  6. Pt to negotiate 3 steps with rails with CGA.     Long Term Goals to be met by: 24    Pt will regain full independent functional mobility with Rolling walker to return to home situation and prior activities of daily living.                        Time Tracking:     PT Received On: 24  PT Start Time:      PT Stop Time: 1726  PT Total Time (min): 16 min     Billable Minutes: Therapeutic Activity 15    Treatment Type: Treatment  PT/PTA: PT     Number of PTA visits since last PT visit: 0      08/28/2024

## 2024-08-29 VITALS
OXYGEN SATURATION: 95 % | TEMPERATURE: 98 F | BODY MASS INDEX: 17.54 KG/M2 | SYSTOLIC BLOOD PRESSURE: 100 MMHG | DIASTOLIC BLOOD PRESSURE: 52 MMHG | HEART RATE: 73 BPM | RESPIRATION RATE: 25 BRPM | HEIGHT: 66 IN | WEIGHT: 109.13 LBS

## 2024-08-29 PROBLEM — J96.21 ACUTE ON CHRONIC RESPIRATORY FAILURE WITH HYPOXIA AND HYPERCAPNIA: Status: RESOLVED | Noted: 2024-08-27 | Resolved: 2024-08-29

## 2024-08-29 PROBLEM — J96.22 ACUTE ON CHRONIC RESPIRATORY FAILURE WITH HYPOXIA AND HYPERCAPNIA: Status: RESOLVED | Noted: 2024-08-27 | Resolved: 2024-08-29

## 2024-08-29 PROBLEM — J18.9 OBSTRUCTIVE PNEUMONIA: Status: RESOLVED | Noted: 2024-08-08 | Resolved: 2024-08-29

## 2024-08-29 PROBLEM — A49.02 MRSA INFECTION: Status: RESOLVED | Noted: 2024-08-17 | Resolved: 2024-08-29

## 2024-08-29 PROCEDURE — 99900035 HC TECH TIME PER 15 MIN (STAT)

## 2024-08-29 PROCEDURE — 27000221 HC OXYGEN, UP TO 24 HOURS

## 2024-08-29 PROCEDURE — 99239 HOSP IP/OBS DSCHRG MGMT >30: CPT | Mod: ,,, | Performed by: FAMILY MEDICINE

## 2024-08-29 PROCEDURE — 25000003 PHARM REV CODE 250: Performed by: FAMILY MEDICINE

## 2024-08-29 PROCEDURE — 63600175 PHARM REV CODE 636 W HCPCS: Performed by: FAMILY MEDICINE

## 2024-08-29 PROCEDURE — 86580 TB INTRADERMAL TEST: CPT | Performed by: FAMILY MEDICINE

## 2024-08-29 PROCEDURE — S4991 NICOTINE PATCH NONLEGEND: HCPCS | Performed by: FAMILY MEDICINE

## 2024-08-29 PROCEDURE — 94761 N-INVAS EAR/PLS OXIMETRY MLT: CPT

## 2024-08-29 PROCEDURE — 30200315 PPD INTRADERMAL TEST REV CODE 302: Performed by: FAMILY MEDICINE

## 2024-08-29 PROCEDURE — 94640 AIRWAY INHALATION TREATMENT: CPT

## 2024-08-29 PROCEDURE — 94664 DEMO&/EVAL PT USE INHALER: CPT

## 2024-08-29 PROCEDURE — 25000242 PHARM REV CODE 250 ALT 637 W/ HCPCS: Performed by: FAMILY MEDICINE

## 2024-08-29 RX ORDER — SODIUM CHLORIDE FOR INHALATION 3 %
4 VIAL, NEBULIZER (ML) INHALATION EVERY 8 HOURS
Qty: 360 ML | Refills: 1 | Status: SHIPPED | OUTPATIENT
Start: 2024-08-29

## 2024-08-29 RX ORDER — GUAIFENESIN AND DEXTROMETHORPHAN HYDROBROMIDE 10; 100 MG/5ML; MG/5ML
10 SYRUP ORAL EVERY 6 HOURS PRN
Start: 2024-08-29 | End: 2024-09-08

## 2024-08-29 RX ORDER — BUPROPION HYDROCHLORIDE 100 MG/1
100 TABLET, EXTENDED RELEASE ORAL 2 TIMES DAILY
Qty: 60 TABLET | Refills: 0 | Status: SHIPPED | OUTPATIENT
Start: 2024-08-29 | End: 2025-08-29

## 2024-08-29 RX ORDER — POTASSIUM CHLORIDE 20 MEQ/1
20 TABLET, EXTENDED RELEASE ORAL 2 TIMES DAILY
Qty: 60 TABLET | Refills: 1 | Status: SHIPPED | OUTPATIENT
Start: 2024-08-29

## 2024-08-29 RX ORDER — ALBUTEROL SULFATE 0.83 MG/ML
2.5 SOLUTION RESPIRATORY (INHALATION) EVERY 4 HOURS PRN
Qty: 540 ML | Refills: 1 | Status: SHIPPED | OUTPATIENT
Start: 2024-08-29 | End: 2025-08-29

## 2024-08-29 RX ORDER — NICOTINE 7MG/24HR
1 PATCH, TRANSDERMAL 24 HOURS TRANSDERMAL DAILY
Start: 2024-08-29 | End: 2024-09-12

## 2024-08-29 RX ORDER — IPRATROPIUM BROMIDE AND ALBUTEROL SULFATE 2.5; .5 MG/3ML; MG/3ML
3 SOLUTION RESPIRATORY (INHALATION) 4 TIMES DAILY
Qty: 360 ML | Refills: 1
Start: 2024-08-29

## 2024-08-29 RX ORDER — TALC
6 POWDER (GRAM) TOPICAL NIGHTLY PRN
COMMUNITY
Start: 2024-08-29

## 2024-08-29 RX ADMIN — BUPROPION HYDROCHLORIDE 100 MG: 100 TABLET, FILM COATED, EXTENDED RELEASE ORAL at 09:08

## 2024-08-29 RX ADMIN — TUBERCULIN PURIFIED PROTEIN DERIVATIVE 5 UNITS: 5 INJECTION, SOLUTION INTRADERMAL at 11:08

## 2024-08-29 RX ADMIN — SODIUM CHLORIDE SOLN NEBU 3% 4 ML: 3 NEBU SOLN at 07:08

## 2024-08-29 RX ADMIN — PREDNISONE 5 MG: 5 TABLET ORAL at 09:08

## 2024-08-29 RX ADMIN — IPRATROPIUM BROMIDE AND ALBUTEROL SULFATE 3 ML: 2.5; .5 SOLUTION RESPIRATORY (INHALATION) at 07:08

## 2024-08-29 RX ADMIN — NICOTINE 1 PATCH: 14 PATCH, EXTENDED RELEASE TRANSDERMAL at 09:08

## 2024-08-29 RX ADMIN — POTASSIUM CHLORIDE 20 MEQ: 20 TABLET, EXTENDED RELEASE ORAL at 09:08

## 2024-08-29 NOTE — ASSESSMENT & PLAN NOTE
With general debility and failure to thrive. Currently requiring assistance for most ADLs. Will continue physical therapy this admission however her home situation is not ideal. Continue aggressive physical therapy with final discharge plan to be determined.

## 2024-08-29 NOTE — DISCHARGE SUMMARY
[facility] Medicine  Discharge Summary      Patient Name: Mayra Kelly  MRN: [id number]  HAIDER: [id number]  Patient Class: IP- Inpatient  Admission Date: 8/15/2024  Hospital Length of Stay: 14 days  Discharge Date and Time:  08/29/2024 10:37 AM  Attending Physician: Yeni Jones DO   Discharging Provider: Yeni Jones DO  Primary Care Provider: Darlene Campoverde NP    Primary Care Team: Networked reference to record PCT     HPI:     Mayra Kelly is a 73-year-old female with history of lung cancer, COPD, pulmonary HTN, and a-fib. She initially presented to [facility] with acute-on-chronic respiratory failure secondary to obstructive pneumonia. This was treated with extended course of broad-spectrum IV antibiotics with some improvement. Patient underwent rigid bronchoscopy with bronchial lavage, dilation, and stent by Dr. Castellon 8/14/24. Patient tolerated procedure well with improvement in respiratory status. CXR 8/15 revealed stent has remained patent however will require aggressive respiratory therapy and continued antibiotics. Patient subsequently admitted to [facility] for continued IV antibiotics, scheduled respiratory therapy, and physical rehabilitation.             * No surgery found *      Hospital Course:         08/17-  Records reviewed. Presented 08/07 to [facility]  withincrease SOB, cough and fatique. Some weight loss. Noted on evaluation worse CXR and CT chest. Hx treated adenoCa in 2018. Last Nov underwent evaluation by Dr Munguia. Seen then again also by Dr Kraft. Dx new primary squamous cell CA. Treated chemotx. In April saw Dr Kraft and started on Keytruda and given through infusion center. Has continued this through July. Missed her appt with Dr Kraft in May. This information mainly from Dr Kraft in pt was not good historian. Pt continued to smoke 1/2 ppd; encourage to stop. Dr Whitmore and Dr Kraft ask to consult. Requires 50% ventimask to keep  O2 sats in low 90s. At home been prior on 2L BNC O2. PHMx: also Eliquis for PAF. Now NSR.  Treated for pneumonia.  Seen during stay by Dr. Kraft and pulmonary. Patient underwent rigid bronchoscopy with bronchial lavage, dilation, and stent by Dr. Castellon 8/14/24.  Admitted to [facility] 08/15 for continued treatment and general strengthening by therapy team.  Patient looks better than when last seen by me.  Seems to be in good spirits.   08/18 continues to feel better without new complaints.  To work with therapy.  Treatment includes vancomycin for MRSA with positive respiratory culture from 08/14.   08/19 Better. CXR better. Reviewed Dr Kraft note. Some confusion. Stop zosyn and consider stop vancomycin soon. Look at DC needs.  08/20 Continues feeling / doing better. Look into DC needs.  08/21 Continues to do well and work with therapy  08/22 No complaints except wellness and not able to walk far. Recommendation from therapy SNF unit. Completed ATB    08/23 SW looking into SWB / SNF placement. Pt no new issues  08/24 continue to monitor  08/25 seen with no complaints watching TV, good appetite, weak but still issue with ambulating and caring for herself, look into swing bed , SNF unit placement.  As outpatient she would need to follow up with Dr. Kraft concerning treatment options for cancer     Goals of Care Treatment Preferences:  Code Status: Partial Code          What is most important right now is to focus on spending time at home, remaining as independent as possible, symptom/pain control, quality of life, even if it means sacrificing a little time.  Accordingly, we have decided that the best plan to meet the patient's goals includes continuing with treatment.         Consults:   Consults (From admission, onward)          Status Ordering Provider     Inpatient consult to Registered Dietitian/Nutritionist  Once        Provider:  (Not yet assigned)    Completed ROBBIN RICHTER     Pharmacy to dose  Vancomycin consult  Once        Provider:  (Not yet assigned)    Completed ROBBIN RICHTER            Pulmonary  * Obstructive pneumonia-resolved as of 8/29/2024  Bronchial lavage with MRSA. Treated with Zosyn and vancomycin. Antibiotics complete. Continue respiratory therapy including scheduled nebulizer treatments. Follow up with pulmonary.     Rigid bronchoscopy 8/14 with stent placed. Scheduled Duo Nebs and hypertonic saline in attempt to keep stent patent.        Acute on chronic respiratory failure with hypoxia and hypercapnia-resolved as of 8/29/2024  Secondary to COPD exacerbation and obstructive pneumonia. Status-post rigid bronchoscopy, antibiotics, and steroids with good response.    COPD exacerbation-resolved as of 8/27/2024  Patient's COPD is controlled currently.  Patient is currently off COPD Pathway. Continue scheduled inhalers and supplemental oxygen.     Treated with course of high-dose steroids followed by extended taper to be completed day of discharge.     Cardiac/Vascular  Paroxysmal atrial fibrillation  Patient with Paroxysmal (<7 days) atrial fibrillation which is controlled currently with  no medications . Patient is currently in sinus rhythm.[id number] Score: 2. HASBLED Score: 1. Anticoagulation indicated. Anticoagulation done with Eliquis .    Status-post right bronchoscopy with stent placement. No hemoptysis noted however monitor closely and may consider discontinuation of Eliquis if risk outweighs benefit.     Oncology  Malignant neoplasm of right lung  Known to Dr. Kraft. Follow up with him in clinic.     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 She received a few cycles of weekly chemotherapy around February or March in anticipation of radiation but radiation was not performed. Sidenote she received radiation chemotherapy as well as immunotherapy 2018.    She was last seen by me in April and the office  and had a no-show from May....She has continued to receive immunotherapy with Keytruda IVQ6 weeks beginning in April with last dose in early July.        Endocrine  Severe protein-calorie malnutrition  Nutrition consulted. Most recent weight and BMI monitored-     Measurements:  Wt Readings from Last 1 Encounters:   08/28/24 49.5 kg (109 lb 2 oz)   Body mass index is 17.61 kg/m².    Patient has been screened and assessed by RD.    Malnutrition Type:  Context: chronic illness  Level: severe    Malnutrition Characteristic Summary:  Energy Intake (Malnutrition): less than or equal to 50% for greater than or equal to 1 month  Subcutaneous Fat (Malnutrition): severe depletion  Muscle Mass (Malnutrition): severe depletion    Interventions/Recommendations (treatment strategy):         Other  Neoplastic malignant related fatigue  With general debility and failure to thrive. Currently requiring assistance for most ADLs. Will continue physical therapy this admission however her home situation is not ideal. Continue aggressive physical therapy with final discharge plan to be determined.     Cigarette nicotine dependence with nicotine-induced disorder  Dangers of cigarette smoking were reviewed with patient in detail. Patient was Counseled for 3-10 minutes. Nicotine replacement options were discussed. Nicotine replacement was discussed- prescribed    Has been on nicotine patch while inpatient, step down to lowest dose tomorrow for 10-14 days to complete treatment.       Final Active Diagnoses:    Diagnosis Date Noted POA    Neoplastic malignant related fatigue [R53.0] 08/16/2024 Yes    Severe protein-calorie malnutrition [E43] 08/09/2024 Yes    Chronic respiratory failure with hypoxia and hypercapnia [J96.11, J96.12] 08/08/2024 Yes    Paroxysmal atrial fibrillation [I48.0] 08/08/2024 Yes    Malignant neoplasm of right lung [C34.91]  Yes    Cigarette nicotine dependence with nicotine-induced disorder [F17.219] 11/09/2023 Yes       Problems Resolved During this Admission:    Diagnosis Date Noted Date Resolved POA    PRINCIPAL PROBLEM:  Obstructive pneumonia [J18.9] 08/08/2024 08/29/2024 Yes    Acute on chronic respiratory failure with hypoxia and hypercapnia [J96.21, J96.22] 08/27/2024 08/29/2024 Yes    MRSA infection [A49.02] 08/17/2024 08/29/2024 Yes    COPD exacerbation [J44.1]  08/27/2024 Yes       Discharged Condition: fair    Disposition: Another Health Care Inst*    Follow Up:   Follow-up Information       [facility] Follow up.    Specialty: Skilled Nursing Facility  Contact information:  [address] [facility] [address]  [phone]             Varghese Castellon MD .    Specialties: Pulmonary Disease, Critical Care Medicine  Contact information:  [address] [facility] [address]  [phone]               Mina Kraft MD .    Specialty: Hematology and Oncology  Contact information:  [address]  [facility] [address]  [phone]                           Patient Instructions:      Notify your health care provider if you experience any of the following:  temperature >100.4     Notify your health care provider if you experience any of the following:  persistent nausea and vomiting or diarrhea     Notify your health care provider if you experience any of the following:  severe uncontrolled pain     Notify your health care provider if you experience any of the following:  redness, tenderness, or signs of infection (pain, swelling, redness, odor or green/yellow discharge around incision site)     Notify your health care provider if you experience any of the following:  difficulty breathing or increased cough     Notify your health care provider if you experience any of the following:  severe persistent headache     Notify your health care provider if you experience any of the following:  worsening rash     Notify your health care provider if you experience any of the following:  persistent  dizziness, light-headedness, or visual disturbances     Notify your health care provider if you experience any of the following:  increased confusion or weakness     Activity as tolerated       Significant Diagnostic Studies: N/A    Pending Diagnostic Studies:       None           Medications:  Reconciled Home Medications:      Medication List        START taking these medications      albuterol 2.5 mg /3 mL (0.083 %) nebulizer solution  Commonly known as: PROVENTIL  Take 3 mLs (2.5 mg total) by nebulization every 4 (four) hours as needed (shortness of breath). Rescue  Replaces: albuterol 90 mcg/actuation inhaler     albuterol-ipratropium 2.5 mg-0.5 mg/3 mL nebulizer solution  Commonly known as: DUO-NEB  Take 3 mLs by nebulization 4 (four) times daily.     buPROPion 100 MG TBSR 12 hr tablet  Commonly known as: WELLBUTRIN SR  Take 1 tablet (100 mg total) by mouth 2 (two) times daily.     dextromethorphan-guaiFENesin  mg/5 ml  mg/5 mL liquid  Commonly known as: ROBITUSSIN-DM  Take 10 mLs by mouth every 6 (six) hours as needed.     melatonin 3 mg tablet  Commonly known as: MELATIN  Take 2 tablets (6 mg total) by mouth nightly as needed for Insomnia.     nicotine 7 mg/24 hr  Commonly known as: NICODERM CQ  Place 1 patch onto the skin once daily. for 14 days     sodium chloride 3% 3 % nebulizer solution  Take 4 mLs by nebulization every 8 (eight) hours. Take with DuoNebs            CHANGE how you take these medications      potassium chloride SA 20 MEQ tablet  Commonly known as: K-DUR,KLOR-CON  Take 1 tablet (20 mEq total) by mouth 2 (two) times daily.  What changed: when to take this            CONTINUE taking these medications      ELIQUIS 5 mg Tab  Generic drug: apixaban  Take 1 tablet (5 mg total) by mouth 2 (two) times daily.            STOP taking these medications      albuterol 90 mcg/actuation inhaler  Commonly known as: PROVENTIL/VENTOLIN HFA  Replaced by: albuterol 2.5 mg /3 mL (0.083 %) nebulizer  solution     albuterol sulfate 2.5 mg/0.5 mL Nebu     amLODIPine 2.5 MG tablet  Commonly known as: NORVASC     furosemide 40 MG tablet  Commonly known as: LASIX     TRELEGY ELLIPTA 200-62.5-25 mcg inhaler  Generic drug: fluticasone-umeclidin-vilanter     vitamin D 1000 units Tab  Commonly known as: VITAMIN D3     vitamin E 400 UNIT capsule              Indwelling Lines/Drains at time of discharge:   Lines/Drains/Airways       None                   Time spent on the discharge of patient: 40 minutes         Yeni Jones DO  Department of Hospital Medicine  [facility]

## 2024-08-29 NOTE — PROGRESS NOTES
[facility] Medicine  Progress Note    Patient Name: Mayra Kelly  MRN: [id number]  Patient Class: IP- Inpatient   Admission Date: 8/15/2024  Length of Stay: 13 days  Attending Physician: Yeni Jones DO  Primary Care Provider: Darlene Campoverde NP        Subjective:     Principal Problem:Obstructive pneumonia        HPI:    Mayra Kelly is a 73-year-old female with history of lung cancer, COPD, pulmonary HTN, and a-fib. She initially presented to [facility] with acute-on-chronic respiratory failure secondary to obstructive pneumonia. This was treated with extended course of broad-spectrum IV antibiotics with some improvement. Patient underwent rigid bronchoscopy with bronchial lavage, dilation, and stent by Dr. Castellon 8/14/24. Patient tolerated procedure well with improvement in respiratory status. CXR 8/15 revealed stent has remained patent however will require aggressive respiratory therapy and continued antibiotics. Patient subsequently admitted to [facility] for continued IV antibiotics, scheduled respiratory therapy, and physical rehabilitation.             Overview/Hospital Course:        08/17-  Records reviewed. Presented 08/07 to [facility]  withincrease SOB, cough and fatique. Some weight loss. Noted on evaluation worse CXR and CT chest. Hx treated adenoCa in 2018. Last Nov underwent evaluation by Dr Munguia. Seen then again also by Dr Kraft. Dx new primary squamous cell CA. Treated chemotx. In April saw Dr Kraft and started on Keytruda and given through infusion center. Has continued this through July. Missed her appt with Dr Kraft in May. This information mainly from Dr Kraft in pt was not good historian. Pt continued to smoke 1/2 ppd; encourage to stop. Dr Whitmore and Dr Kraft ask to consult. Requires 50% ventimask to keep O2 sats in low 90s. At home been prior on 2L BNC O2. PHMx: also Eliquis for PAF. Now NSR.  Treated for pneumonia.  Seen during stay  by Dr. Kraft and pulmonary. Patient underwent rigid bronchoscopy with bronchial lavage, dilation, and stent by Dr. Castellon 8/14/24.  Admitted to [facility] 08/15 for continued treatment and general strengthening by therapy team.  Patient looks better than when last seen by me.  Seems to be in good spirits.   08/18 continues to feel better without new complaints.  To work with therapy.  Treatment includes vancomycin for MRSA with positive respiratory culture from 08/14.   08/19 Better. CXR better. Reviewed Dr Kraft note. Some confusion. Stop zosyn and consider stop vancomycin soon. Look at DC needs.  08/20 Continues feeling / doing better. Look into DC needs.  08/21 Continues to do well and work with therapy  08/22 No complaints except wellness and not able to walk far. Recommendation from therapy SNF unit. Completed ATB    08/23 SW looking into SWB / SNF placement. Pt no new issues  08/24 continue to monitor  08/25 seen with no complaints watching TV, good appetite, weak but still issue with ambulating and caring for herself, look into swing bed , SNF unit placement.  As outpatient she would need to follow up with Dr. Kraft concerning treatment options for cancer    Interval History:     No significant events overnight, no new complaints or concerns. Has been accepted for SWB with anticipated discharge tomorrow.    Review of Systems   Constitutional:  Positive for fatigue and unexpected weight change. Negative for appetite change and fever.   HENT:  Negative for congestion, hearing loss and trouble swallowing.    Respiratory:  Negative for chest tightness and wheezing.    Cardiovascular:  Negative for chest pain and palpitations.   Gastrointestinal:  Negative for abdominal pain, constipation and nausea.   Genitourinary:  Negative for difficulty urinating and dysuria.   Musculoskeletal:  Positive for gait problem. Negative for back pain and neck stiffness.   Skin:  Negative for pallor and rash.    Neurological:  Negative for dizziness, speech difficulty and headaches.   Psychiatric/Behavioral:  Negative for confusion and suicidal ideas.      Objective:     Vital Signs (Most Recent):  Temp: 98.2 °F (36.8 °C) (08/28/24 2000)  Pulse: 71 (08/28/24 2000)  Resp: 17 (08/28/24 2000)  BP: (!) 101/48 (08/28/24 2000)  SpO2: 95 % (08/28/24 2000) Vital Signs (24h Range):  Temp:  [97.5 °F (36.4 °C)-98.9 °F (37.2 °C)] 98.2 °F (36.8 °C)  Pulse:  [63-77] 71  Resp:  [15-25] 17  SpO2:  [88 %-100 %] 95 %  BP: (100-108)/(48-53) 101/48     Weight: 49.5 kg (109 lb 2 oz)  Body mass index is 17.61 kg/m².    Intake/Output Summary (Last 24 hours) at 8/28/2024 2204  Last data filed at 8/28/2024 1715  Gross per 24 hour   Intake 600 ml   Output 800 ml   Net -200 ml         Physical Exam  Constitutional:       General: She is not in acute distress.     Appearance: She is underweight.      Comments: Frail, chronically ill-appearing   HENT:      Head: Normocephalic and atraumatic.      Nose: Nose normal.   Eyes:      Conjunctiva/sclera: Conjunctivae normal.      Pupils: Pupils are equal, round, and reactive to light.   Pulmonary:      Effort: Pulmonary effort is normal. No respiratory distress.   Musculoskeletal:      Cervical back: No rigidity.   Skin:     Coloration: Skin is not jaundiced or pale.      Findings: No rash.   Neurological:      Mental Status: She is alert.   Psychiatric:         Behavior: Behavior normal.         Thought Content: Thought content normal.             Significant Labs: All pertinent labs within the past 24 hours have been reviewed.    Significant Imaging: I have reviewed all pertinent imaging results/findings within the past 24 hours.    Assessment/Plan:      * Obstructive pneumonia  Antibiotics complete. Continue respiratory therapy and monitor closely.  ---  Patient has a diagnosis of pneumonia. The cause of the pneumonia is suspected to be bacterial in etiology but organism is not known. The pneumonia is  improving. The patient has the following signs/symptoms of pneumonia: persistent hypoxia , cough, and shortness of breath. The patient does have a current oxygen requirement and the patient does have a home oxygen requirement. I have reviewed the pertinent imaging. The following cultures have been collected: Bronchial lavage The culture results are listed below.     Current antimicrobial regimen consists of the antibiotics listed below. Will monitor patient closely and continue current treatment plan unchanged.    Recommend antibiotics until at least 8/22 (total 14 days therapy).    Antibiotics (From admission, onward)      Start     Stop Route Frequency Ordered    08/15/24 1230  mupirocin 2 % ointment         08/20/24 0859 Nasl 2 times daily 08/15/24 1127            Microbiology Results (last 7 days)       ** No results found for the last 168 hours. **          Rigid bronchoscopy 8/14 with stent placed. Scheduled Duo Nebs and hypertonic saline in attempt to keep stent patent. Pulmonology managing.      08/18 covering for MRSA pneumonia with positive lower respiratory culture from 08/14 08/22 completed ATB    MRSA infection  Positive culture from 08/14 in lower respiratory tract for MRSA  Covering with vancomycin for MRSA pneumonia      Neoplastic malignant related fatigue  With general debility and failure to thrive. Currently requiring assistance for most ADLs. Will continue physical therapy this admission however her home situation is not ideal. She will likely need to consider long-term placement upon discharge.     Awaiting placement. Continue PT/OT.    Severe protein-calorie malnutrition  Nutrition consulted. Most recent weight and BMI monitored-     Measurements:  Wt Readings from Last 1 Encounters:   08/23/24 49.3 kg (108 lb 11 oz)   Body mass index is 17.54 kg/m².    Patient has been screened and assessed by RD.    Malnutrition Type:  Context: chronic illness  Level: severe    Malnutrition Characteristic  Summary:  Energy Intake (Malnutrition): less than or equal to 50% for greater than or equal to 1 month  Subcutaneous Fat (Malnutrition): severe depletion  Muscle Mass (Malnutrition): severe depletion    Interventions/Recommendations (treatment strategy):         Paroxysmal atrial fibrillation  Patient with Paroxysmal (<7 days) atrial fibrillation which is controlled currently with  no medications . Patient is currently in sinus rhythm.[id number] Score: 2. HASBLED Score: 1. Anticoagulation indicated. Anticoagulation done with Eliquis - held for bronchoscopy w/biopsy and stent, resume as appropriate .    Malignant neoplasm of right lung  Known to Dr. Kraft.     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 She received a few cycles of weekly chemotherapy around February or March in anticipation of radiation but radiation was not performed. Sidenote she received radiation chemotherapy as well as immunotherapy 2018.    She was last seen by me in April and the office and had a no-show from May....She has continued to receive immunotherapy with Keytruda IVQ6 weeks beginning in April with last dose in early July.        Chronic respiratory failure with hypoxia and hypercapnia  Patient with Hypercapnic and Hypoxic Respiratory failure which is Chronic. Supplemental oxygen was provided and noted- Oxygen Concentration (%):  [28] 28    Signs/symptoms of respiratory failure include- increased work of breathing. Contributing diagnoses includes - COPD, Pneumonia, and lung cancer  Labs and images were reviewed. Patient Has not had a recent ABG. Will treat underlying causes and adjust management of respiratory failure as follows- continue supplemental O2 to maintain saturation >88%, scheduled nebulizer therapy    Cigarette nicotine dependence with nicotine-induced disorder  Dangers of cigarette smoking were reviewed with patient in detail. Patient was  Counseled for 3-10 minutes. Nicotine replacement options were discussed. Nicotine replacement was discussed- prescribed      VTE Risk Mitigation (From admission, onward)           Ordered     enoxaparin injection 40 mg  Every 24 hours         08/15/24 1036                    Discharge Planning   TERE:      Code Status: Partial Code   Is the patient medically ready for discharge?:     Reason for patient still in hospital (select all that apply): Treatment  Discharge Plan A: Rehab                  Yeni Jones DO  Department of Hospital Medicine   [facility]

## 2024-08-29 NOTE — PLAN OF CARE
"[facility] - LTAC   Interdisciplinary Team Meeting    Patient: Mayra Kelly   Today's Date: 8/29/2024   Estimated D/C Date: 8/29/2024        Physician:  Dr. Jones Unit Director:  N/A   Pharmacy:  Lina Lockhart Case Management:  Lynne Ovalle RN   Physical/Occupational Therapy: Lesia Gupta OT Speech Therapy: Lesia Gupta OT   Respiratory: RT Nara Nursing:  N/A   Wound Care:  N/A   Utilization Management: Lynne Ovalle RN     Nursing  New Symptoms/Problems: N/A  Bowel: incontinent Urine: incontinent Soria: No   Constipated: No    Lab Concerns: N/A  Lab Reviewed: Yes  New Lab Orders: No    Nutrition: Regular  Intake percentage: 50%   Weight: Weight: 49.5 kg (109 lb 2 oz)   Height: Height: 5' 6" (167.6 cm)  BMI: BMI (Calculated): 17.6    Speech/Swallowing: No current speech or swallowing issues  Aspiration Precautions: No  Cognition: WNL Diarrhea: No      Respiratory Therapy  Isolation: No  Last Bowel Movement: 08/25/24     O2 Device: Nasal Cannula  Vent: No  Comment(s): N/A   O2 Flow: 2 lpm   SpO2: 95 %       Physical Therapy  Physical Therapy/Gait: N/A ELOS: Plan to DC 8/29/2024    Transfers: Minimal Assistance  Bed Mobility: Minimal Assistance Range of Motion/Restrictions: N/A  Comment(s): assist of 1-2     Occupational Therapy  Eating/Grooming: Independent Toileting: Activity did not occur   Bathing: Activity did not occur Dressing (Upper Body): Minimal Assistance   Dressing (Lower Body): Activity did not occur      Wound Care: N/A      Tx Plan/Recommendations reviewed with family and/or patient on (date) Yes.  Additional family Conference/Training: Yes  D/C Plan/Recommendations: Discharge to SNF  TERE: 8/29/2024    Pharmacy  Medication Changes (see MD orders in chart): No  Pharmacy comments: N/A  IV Medications: N/A               "

## 2024-08-29 NOTE — ASSESSMENT & PLAN NOTE
Known to Dr. Kraft. Follow up with him in clinic.     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 She received a few cycles of weekly chemotherapy around February or March in anticipation of radiation but radiation was not performed. Sidenote she received radiation chemotherapy as well as immunotherapy 2018.    She was last seen by me in April and the office and had a no-show from May....She has continued to receive immunotherapy with Keytruda IVQ6 weeks beginning in April with last dose in early July.

## 2024-08-29 NOTE — ASSESSMENT & PLAN NOTE
Secondary to COPD exacerbation and obstructive pneumonia. Status-post rigid bronchoscopy, antibiotics, and steroids with good response.

## 2024-08-29 NOTE — PLAN OF CARE
D/C packet taken to nurse's station. D/C summary/AVS faxed to [facility][note 3]. . SS spoke with Mercedes and informed her of pt's d/c. SS following    1123  TB S&S form placed in d/c packet. CXR faxed to [facility][note 3]. SS following.

## 2024-08-29 NOTE — NURSING
10:00  Attempted to call [facility] for transfer report. I spoke with Christine and she stated Ms Kelly was going to ROOM # South 7-B, I was then transferred to give report-answering machine picked up. A message was left.

## 2024-08-29 NOTE — DISCHARGE INSTRUCTIONS
Report called to [facility] - discharge packet will be sent with medical transport to [facility].      TB Skin test

## 2024-08-29 NOTE — ASSESSMENT & PLAN NOTE
Patient with Paroxysmal (<7 days) atrial fibrillation which is controlled currently with  no medications . Patient is currently in sinus rhythm.[id number] Score: 2. HASBLED Score: 1. Anticoagulation indicated. Anticoagulation done with Eliquis .    Status-post right bronchoscopy with stent placement. No hemoptysis noted however monitor closely and may consider discontinuation of Eliquis if risk outweighs benefit.

## 2024-08-29 NOTE — NURSING
AVS completed and Discharge summary received. Transportation request has been placed with [facility] [place] [place].

## 2024-08-29 NOTE — SUBJECTIVE & OBJECTIVE
Interval History:     No significant events overnight, no new complaints or concerns. Has been accepted for SWB with anticipated discharge tomorrow.    Review of Systems   Constitutional:  Positive for fatigue and unexpected weight change. Negative for appetite change and fever.   HENT:  Negative for congestion, hearing loss and trouble swallowing.    Respiratory:  Negative for chest tightness and wheezing.    Cardiovascular:  Negative for chest pain and palpitations.   Gastrointestinal:  Negative for abdominal pain, constipation and nausea.   Genitourinary:  Negative for difficulty urinating and dysuria.   Musculoskeletal:  Positive for gait problem. Negative for back pain and neck stiffness.   Skin:  Negative for pallor and rash.   Neurological:  Negative for dizziness, speech difficulty and headaches.   Psychiatric/Behavioral:  Negative for confusion and suicidal ideas.      Objective:     Vital Signs (Most Recent):  Temp: 98.2 °F (36.8 °C) (08/28/24 2000)  Pulse: 71 (08/28/24 2000)  Resp: 17 (08/28/24 2000)  BP: (!) 101/48 (08/28/24 2000)  SpO2: 95 % (08/28/24 2000) Vital Signs (24h Range):  Temp:  [97.5 °F (36.4 °C)-98.9 °F (37.2 °C)] 98.2 °F (36.8 °C)  Pulse:  [63-77] 71  Resp:  [15-25] 17  SpO2:  [88 %-100 %] 95 %  BP: (100-108)/(48-53) 101/48     Weight: 49.5 kg (109 lb 2 oz)  Body mass index is 17.61 kg/m².    Intake/Output Summary (Last 24 hours) at 8/28/2024 2204  Last data filed at 8/28/2024 1715  Gross per 24 hour   Intake 600 ml   Output 800 ml   Net -200 ml         Physical Exam  Constitutional:       General: She is not in acute distress.     Appearance: She is underweight.      Comments: Frail, chronically ill-appearing   HENT:      Head: Normocephalic and atraumatic.      Nose: Nose normal.   Eyes:      Conjunctiva/sclera: Conjunctivae normal.      Pupils: Pupils are equal, round, and reactive to light.   Pulmonary:      Effort: Pulmonary effort is normal. No respiratory distress.    Musculoskeletal:      Cervical back: No rigidity.   Skin:     Coloration: Skin is not jaundiced or pale.      Findings: No rash.   Neurological:      Mental Status: She is alert.   Psychiatric:         Behavior: Behavior normal.         Thought Content: Thought content normal.             Significant Labs: All pertinent labs within the past 24 hours have been reviewed.    Significant Imaging: I have reviewed all pertinent imaging results/findings within the past 24 hours.

## 2024-08-29 NOTE — PROGRESS NOTES
Line flushed with NS and ring needle removed intact. DSG applied to medi-port site. No bleeding noted.

## 2024-08-29 NOTE — ASSESSMENT & PLAN NOTE
Nutrition consulted. Most recent weight and BMI monitored-     Measurements:  Wt Readings from Last 1 Encounters:   08/28/24 49.5 kg (109 lb 2 oz)   Body mass index is 17.61 kg/m².    Patient has been screened and assessed by RD.    Malnutrition Type:  Context: chronic illness  Level: severe    Malnutrition Characteristic Summary:  Energy Intake (Malnutrition): less than or equal to 50% for greater than or equal to 1 month  Subcutaneous Fat (Malnutrition): severe depletion  Muscle Mass (Malnutrition): severe depletion    Interventions/Recommendations (treatment strategy):

## 2024-08-29 NOTE — ASSESSMENT & PLAN NOTE
Patient's COPD is controlled currently.  Patient is currently off COPD Pathway. Continue scheduled inhalers and supplemental oxygen.     Treated with course of high-dose steroids followed by extended taper to be completed day of discharge.

## 2024-08-29 NOTE — ASSESSMENT & PLAN NOTE
Dangers of cigarette smoking were reviewed with patient in detail. Patient was Counseled for 3-10 minutes. Nicotine replacement options were discussed. Nicotine replacement was discussed- prescribed    Has been on nicotine patch while inpatient, step down to lowest dose tomorrow for 10-14 days to complete treatment.

## 2024-08-29 NOTE — ASSESSMENT & PLAN NOTE
Bronchial lavage with MRSA. Treated with Zosyn and vancomycin. Antibiotics complete. Continue respiratory therapy including scheduled nebulizer treatments. Follow up with pulmonary.     Rigid bronchoscopy 8/14 with stent placed. Scheduled Duo Nebs and hypertonic saline in attempt to keep stent patent.

## 2024-11-04 ENCOUNTER — TELEPHONE (OUTPATIENT)
Dept: PULMONOLOGY | Facility: CLINIC | Age: 74
End: 2024-11-04
Payer: MEDICARE

## 2024-11-04 ENCOUNTER — OFFICE VISIT (OUTPATIENT)
Dept: PULMONOLOGY | Facility: CLINIC | Age: 74
End: 2024-11-04
Payer: MEDICARE

## 2024-11-04 VITALS
RESPIRATION RATE: 16 BRPM | HEIGHT: 66 IN | WEIGHT: 109 LBS | SYSTOLIC BLOOD PRESSURE: 102 MMHG | DIASTOLIC BLOOD PRESSURE: 62 MMHG | BODY MASS INDEX: 17.52 KG/M2 | OXYGEN SATURATION: 96 % | HEART RATE: 72 BPM

## 2024-11-04 DIAGNOSIS — R91.1 SOLITARY PULMONARY NODULE: ICD-10-CM

## 2024-11-04 DIAGNOSIS — J96.11 CHRONIC RESPIRATORY FAILURE WITH HYPOXIA AND HYPERCAPNIA: Primary | ICD-10-CM

## 2024-11-04 DIAGNOSIS — J96.12 CHRONIC RESPIRATORY FAILURE WITH HYPOXIA AND HYPERCAPNIA: Primary | ICD-10-CM

## 2024-11-04 PROCEDURE — 99215 OFFICE O/P EST HI 40 MIN: CPT | Mod: S$PBB,,, | Performed by: STUDENT IN AN ORGANIZED HEALTH CARE EDUCATION/TRAINING PROGRAM

## 2024-11-04 PROCEDURE — 99214 OFFICE O/P EST MOD 30 MIN: CPT | Mod: PBBFAC | Performed by: STUDENT IN AN ORGANIZED HEALTH CARE EDUCATION/TRAINING PROGRAM

## 2024-11-04 PROCEDURE — 99999 PR PBB SHADOW E&M-EST. PATIENT-LVL IV: CPT | Mod: PBBFAC,,, | Performed by: STUDENT IN AN ORGANIZED HEALTH CARE EDUCATION/TRAINING PROGRAM

## 2024-11-04 RX ORDER — IPRATROPIUM BROMIDE AND ALBUTEROL SULFATE 2.5; .5 MG/3ML; MG/3ML
3 SOLUTION RESPIRATORY (INHALATION) EVERY 6 HOURS PRN
Qty: 75 ML | Refills: 0 | Status: SHIPPED | OUTPATIENT
Start: 2024-11-04 | End: 2025-11-04

## 2024-11-04 RX ORDER — IPRATROPIUM BROMIDE AND ALBUTEROL SULFATE 2.5; .5 MG/3ML; MG/3ML
3 SOLUTION RESPIRATORY (INHALATION)
Qty: 810 ML | Refills: 3 | Status: SHIPPED | OUTPATIENT
Start: 2024-11-04 | End: 2024-11-04

## 2024-11-04 RX ORDER — SODIUM CHLORIDE FOR INHALATION 3 %
4 VIAL, NEBULIZER (ML) INHALATION
Status: DISCONTINUED | OUTPATIENT
Start: 2024-11-04 | End: 2024-11-04

## 2024-11-04 RX ORDER — SODIUM CHLORIDE FOR INHALATION 0.9 %
3 VIAL, NEBULIZER (ML) INHALATION EVERY 8 HOURS
Qty: 150 ML | Refills: 12 | Status: SHIPPED | OUTPATIENT
Start: 2024-11-04 | End: 2025-11-04

## 2024-11-04 NOTE — H&P (VIEW-ONLY)
Patient Name: Mayra Kelly   Primary Care Provider: Darlene Campoverde NP   Date of Service: 11/04/2024   Reason for Referral:  Follow up stent placement    Chief Complaint: Hospital Follow Up (Overall, she is well but states that she has a lot of chest congestion. Currently on 2L of o2. )      Subjective:      Mayra Kelly is 74 y.o. 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      Initial clinic visit 11/4/24  In the last month, the patient was admitted approximately 5 weeks ago to outside hospital where she had a basic bronchoscopy performed with removal of mucus plugging.  She has only been using albuterol nebulizer at her outpatient facility per medical records but no hypotonic saline.  Recurrent non-small-cell cancer diagnosed in 2023.  She is also anticoagulated with Eliquis.  I reviewed her chest x-ray from 9/27/24 which showed patent right stent.          Assessment and Plan      Right bronchus intermedius stent for endobronchial obstruction   Hypoxia, on home oxygen      Assessment:  She now requires repeat bronchoscopy for evaluation of endobronchial stent, clearance of mucus plugging and to evaluate to see if her stent needs to be removed.    Plan  I will coordinate and communicate upcoming bronchoscopy procedure to Pulmonary team at [facility] so they are aware   DuoNebs every 6 hours (while the patient is way) as well as hypotonic saline 3 times a day (to be paired with DuoNebs)   CT chest to evaluate parenchyma   Rigid bronchoscopy +/-stent removal with therapeutic aspiration to be performed next week    Counseled to call the clinic or go to the emergency department/call 911 in the event of worsening symptoms or any other red flag signs/symptoms as explained to the patient in detail      Follow-up    Follow-up  after procedure as above    Varghese Castellon MD  Interventional Pulmonary and Critical Care  [facility]    I spent 45 minutes in total patient care. This time was spent on history, physical exam, ordering workup and counseling and coordination of clinical care. We discussed the diagnostic possibilities, their implications with regard to prognosis and course. I answered all questions and made plans for future care. In addition, I spent time doing chart review within our hospital but also outside hospital information to obtain accurate medical information if and where this was applicable.      Problem List Items Addressed This Visit          Pulmonary    Chronic respiratory failure with hypoxia and hypercapnia - Primary    Relevant Orders    Bronchoscopy w Stent Placement, w Rigid     Other Visit Diagnoses       Solitary pulmonary nodule        Relevant Orders    CT Chest Without Contrast                Past Medical History:   Diagnosis Date    Chronic respiratory failure with hypoxia and hypercapnia     on home oxygen but hasn't been using it    Cigarette nicotine dependence with nicotine-induced disorder 2023    COPD (chronic obstructive pulmonary disease)     Dyshidrotic eczema 08/10/2021    Right bundle branch block 2023    Scabies     Squamous cell carcinoma of lung     follows with Dr. Kraft      Past Surgical History:   Procedure Laterality Date    APPENDECTOMY       SECTION  1980    2x    INSERTION OF VENOUS ACCESS PORT       Family History   Problem Relation Name Age of Onset    Cancer Father      Cancer Maternal Grandmother      Heart disease Paternal Grandmother      Cancer Maternal Aunt       Review of patient's allergies indicates:  No Known Allergies   Social History     Tobacco Use    Smoking status: Every Day     Current packs/day: 1.00     Types: Cigarettes    Smokeless tobacco: Never   Substance Use Topics    Alcohol use: Never    Drug use: Never     "  Review of Systems       Objective:      Physical Exam  Constitutional:       General: She is not in acute distress.     Appearance: Normal appearance. She is normal weight. She is not ill-appearing or diaphoretic.   HENT:      Head: Normocephalic and atraumatic.      Nose: No congestion or rhinorrhea.      Mouth/Throat:      Mouth: Mucous membranes are moist.   Cardiovascular:      Rate and Rhythm: Normal rate.      Pulses: Normal pulses.   Pulmonary:      Effort: Pulmonary effort is normal. No respiratory distress.      Breath sounds: No stridor. Wheezing and rhonchi present. No rales.      Comments: Inspiratory and expiratory wheezes present right lung  Chest:      Chest wall: No tenderness.   Abdominal:      General: Abdomen is flat.   Musculoskeletal:      Cervical back: Normal range of motion. No rigidity.      Right lower leg: No edema.      Left lower leg: No edema.   Skin:     General: Skin is warm.      Findings: No erythema.   Neurological:      General: No focal deficit present.      Mental Status: She is alert and oriented to person, place, and time. Mental status is at baseline.   Psychiatric:         Mood and Affect: Mood normal.         Behavior: Behavior normal.         Thought Content: Thought content normal.                11/4/2024    12:57 PM 8/29/2024     9:00 AM 8/29/2024     7:36 AM 8/29/2024     7:00 AM 8/29/2024     5:00 AM 8/29/2024     4:00 AM 8/29/2024     3:00 AM   Pulmonary Function Tests   SpO2 96 % 95 % 94 % 94 % 97 % 95 % 96 %   Height 5' 6" (1.676 m)         Weight 49.4 kg (109 lb)         BMI (Calculated) 17.6               Outpatient Encounter Medications as of 11/4/2024   Medication Sig Dispense Refill    albuterol (PROVENTIL) 2.5 mg /3 mL (0.083 %) nebulizer solution Take 3 mLs (2.5 mg total) by nebulization every 4 (four) hours as needed (shortness of breath). Rescue 540 mL 1    albuterol-ipratropium (DUO-NEB) 2.5 mg-0.5 mg/3 mL nebulizer solution Take 3 mLs by nebulization 4 " (four) times daily. 360 mL 1    buPROPion (WELLBUTRIN SR) 100 MG TBSR 12 hr tablet Take 1 tablet (100 mg total) by mouth 2 (two) times daily. 60 tablet 0    ELIQUIS 5 mg Tab Take 1 tablet (5 mg total) by mouth 2 (two) times daily. 180 tablet 1    melatonin (MELATIN) 3 mg tablet Take 2 tablets (6 mg total) by mouth nightly as needed for Insomnia.      potassium chloride SA (K-DUR,KLOR-CON) 20 MEQ tablet Take 1 tablet (20 mEq total) by mouth 2 (two) times daily. 60 tablet 1    sodium chloride 3% 3 % nebulizer solution Take 4 mLs by nebulization every 8 (eight) hours. Take with DuoNebs 360 mL 1    nicotine (NICODERM CQ) 7 mg/24 hr Place 1 patch onto the skin. (Patient not taking: Reported on 11/4/2024)       No facility-administered encounter medications on file as of 11/4/2024.       Assessment & Plan    As above                                          Orders Placed This Encounter   Procedures    CT Chest Without Contrast     Standing Status:   Future     Standing Expiration Date:   11/4/2025     Order Specific Question:   May the Radiologist modify the order per protocol to meet the clinical needs of the patient?     Answer:   Yes

## 2024-11-04 NOTE — PROGRESS NOTES
Patient Name: Mayra Kelly   Primary Care Provider: Darlene Campoverde NP   Date of Service: 11/04/2024   Reason for Referral:  Follow up stent placement    Chief Complaint: Hospital Follow Up (Overall, she is well but states that she has a lot of chest congestion. Currently on 2L of o2. )      Subjective:      Mayra Kelly is 74 y.o. 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      Initial clinic visit 11/4/24  In the last month, the patient was admitted approximately 5 weeks ago to outside hospital where she had a basic bronchoscopy performed with removal of mucus plugging.  She has only been using albuterol nebulizer at her outpatient facility per medical records but no hypotonic saline.  Recurrent non-small-cell cancer diagnosed in 2023.  She is also anticoagulated with Eliquis.  I reviewed her chest x-ray from 9/27/24 which showed patent right stent.          Assessment and Plan      Right bronchus intermedius stent for endobronchial obstruction   Hypoxia, on home oxygen      Assessment:  She now requires repeat bronchoscopy for evaluation of endobronchial stent, clearance of mucus plugging and to evaluate to see if her stent needs to be removed.    Plan  I will coordinate and communicate upcoming bronchoscopy procedure to Pulmonary team at [facility] so they are aware   DuoNebs every 6 hours (while the patient is way) as well as hypotonic saline 3 times a day (to be paired with DuoNebs)   CT chest to evaluate parenchyma   Rigid bronchoscopy +/-stent removal with therapeutic aspiration to be performed next week    Counseled to call the clinic or go to the emergency department/call 911 in the event of worsening symptoms or any other red flag signs/symptoms as explained to the patient in detail      Follow-up    Follow-up  after procedure as above    Varghese Castellon MD  Interventional Pulmonary and Critical Care  [facility]    I spent 45 minutes in total patient care. This time was spent on history, physical exam, ordering workup and counseling and coordination of clinical care. We discussed the diagnostic possibilities, their implications with regard to prognosis and course. I answered all questions and made plans for future care. In addition, I spent time doing chart review within our hospital but also outside hospital information to obtain accurate medical information if and where this was applicable.      Problem List Items Addressed This Visit          Pulmonary    Chronic respiratory failure with hypoxia and hypercapnia - Primary    Relevant Orders    Bronchoscopy w Stent Placement, w Rigid     Other Visit Diagnoses       Solitary pulmonary nodule        Relevant Orders    CT Chest Without Contrast                Past Medical History:   Diagnosis Date    Chronic respiratory failure with hypoxia and hypercapnia     on home oxygen but hasn't been using it    Cigarette nicotine dependence with nicotine-induced disorder 2023    COPD (chronic obstructive pulmonary disease)     Dyshidrotic eczema 08/10/2021    Right bundle branch block 2023    Scabies     Squamous cell carcinoma of lung     follows with Dr. Kraft      Past Surgical History:   Procedure Laterality Date    APPENDECTOMY       SECTION  1980    2x    INSERTION OF VENOUS ACCESS PORT       Family History   Problem Relation Name Age of Onset    Cancer Father      Cancer Maternal Grandmother      Heart disease Paternal Grandmother      Cancer Maternal Aunt       Review of patient's allergies indicates:  No Known Allergies   Social History     Tobacco Use    Smoking status: Every Day     Current packs/day: 1.00     Types: Cigarettes    Smokeless tobacco: Never   Substance Use Topics    Alcohol use: Never    Drug use: Never     "  Review of Systems       Objective:      Physical Exam  Constitutional:       General: She is not in acute distress.     Appearance: Normal appearance. She is normal weight. She is not ill-appearing or diaphoretic.   HENT:      Head: Normocephalic and atraumatic.      Nose: No congestion or rhinorrhea.      Mouth/Throat:      Mouth: Mucous membranes are moist.   Cardiovascular:      Rate and Rhythm: Normal rate.      Pulses: Normal pulses.   Pulmonary:      Effort: Pulmonary effort is normal. No respiratory distress.      Breath sounds: No stridor. Wheezing and rhonchi present. No rales.      Comments: Inspiratory and expiratory wheezes present right lung  Chest:      Chest wall: No tenderness.   Abdominal:      General: Abdomen is flat.   Musculoskeletal:      Cervical back: Normal range of motion. No rigidity.      Right lower leg: No edema.      Left lower leg: No edema.   Skin:     General: Skin is warm.      Findings: No erythema.   Neurological:      General: No focal deficit present.      Mental Status: She is alert and oriented to person, place, and time. Mental status is at baseline.   Psychiatric:         Mood and Affect: Mood normal.         Behavior: Behavior normal.         Thought Content: Thought content normal.                11/4/2024    12:57 PM 8/29/2024     9:00 AM 8/29/2024     7:36 AM 8/29/2024     7:00 AM 8/29/2024     5:00 AM 8/29/2024     4:00 AM 8/29/2024     3:00 AM   Pulmonary Function Tests   SpO2 96 % 95 % 94 % 94 % 97 % 95 % 96 %   Height 5' 6" (1.676 m)         Weight 49.4 kg (109 lb)         BMI (Calculated) 17.6               Outpatient Encounter Medications as of 11/4/2024   Medication Sig Dispense Refill    albuterol (PROVENTIL) 2.5 mg /3 mL (0.083 %) nebulizer solution Take 3 mLs (2.5 mg total) by nebulization every 4 (four) hours as needed (shortness of breath). Rescue 540 mL 1    albuterol-ipratropium (DUO-NEB) 2.5 mg-0.5 mg/3 mL nebulizer solution Take 3 mLs by nebulization 4 " (four) times daily. 360 mL 1    buPROPion (WELLBUTRIN SR) 100 MG TBSR 12 hr tablet Take 1 tablet (100 mg total) by mouth 2 (two) times daily. 60 tablet 0    ELIQUIS 5 mg Tab Take 1 tablet (5 mg total) by mouth 2 (two) times daily. 180 tablet 1    melatonin (MELATIN) 3 mg tablet Take 2 tablets (6 mg total) by mouth nightly as needed for Insomnia.      potassium chloride SA (K-DUR,KLOR-CON) 20 MEQ tablet Take 1 tablet (20 mEq total) by mouth 2 (two) times daily. 60 tablet 1    sodium chloride 3% 3 % nebulizer solution Take 4 mLs by nebulization every 8 (eight) hours. Take with DuoNebs 360 mL 1    nicotine (NICODERM CQ) 7 mg/24 hr Place 1 patch onto the skin. (Patient not taking: Reported on 11/4/2024)       No facility-administered encounter medications on file as of 11/4/2024.       Assessment & Plan    As above                                          Orders Placed This Encounter   Procedures    CT Chest Without Contrast     Standing Status:   Future     Standing Expiration Date:   11/4/2025     Order Specific Question:   May the Radiologist modify the order per protocol to meet the clinical needs of the patient?     Answer:   Yes

## 2024-11-04 NOTE — H&P (VIEW-ONLY)
Patient Name: Mayra Kelly   Primary Care Provider: Darlene Campoverde NP   Date of Service: 11/04/2024   Reason for Referral:  Follow up stent placement    Chief Complaint: Hospital Follow Up (Overall, she is well but states that she has a lot of chest congestion. Currently on 2L of o2. )      Subjective:      Mayra Kelly is 74 y.o. 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      Initial clinic visit 11/4/24  In the last month, the patient was admitted approximately 5 weeks ago to outside hospital where she had a basic bronchoscopy performed with removal of mucus plugging.  She has only been using albuterol nebulizer at her outpatient facility per medical records but no hypotonic saline.  Recurrent non-small-cell cancer diagnosed in 2023.  She is also anticoagulated with Eliquis.  I reviewed her chest x-ray from 9/27/24 which showed patent right stent.          Assessment and Plan      Right bronchus intermedius stent for endobronchial obstruction   Hypoxia, on home oxygen      Assessment:  She now requires repeat bronchoscopy for evaluation of endobronchial stent, clearance of mucus plugging and to evaluate to see if her stent needs to be removed.    Plan  I will coordinate and communicate upcoming bronchoscopy procedure to Pulmonary team at [facility] so they are aware   DuoNebs every 6 hours (while the patient is way) as well as hypotonic saline 3 times a day (to be paired with DuoNebs)   CT chest to evaluate parenchyma   Rigid bronchoscopy +/-stent removal with therapeutic aspiration to be performed next week    Counseled to call the clinic or go to the emergency department/call 911 in the event of worsening symptoms or any other red flag signs/symptoms as explained to the patient in detail      Follow-up    Follow-up  after procedure as above    Varghese Castellon MD  Interventional Pulmonary and Critical Care  [facility]    I spent 45 minutes in total patient care. This time was spent on history, physical exam, ordering workup and counseling and coordination of clinical care. We discussed the diagnostic possibilities, their implications with regard to prognosis and course. I answered all questions and made plans for future care. In addition, I spent time doing chart review within our hospital but also outside hospital information to obtain accurate medical information if and where this was applicable.      Problem List Items Addressed This Visit          Pulmonary    Chronic respiratory failure with hypoxia and hypercapnia - Primary    Relevant Orders    Bronchoscopy w Stent Placement, w Rigid     Other Visit Diagnoses       Solitary pulmonary nodule        Relevant Orders    CT Chest Without Contrast                Past Medical History:   Diagnosis Date    Chronic respiratory failure with hypoxia and hypercapnia     on home oxygen but hasn't been using it    Cigarette nicotine dependence with nicotine-induced disorder 2023    COPD (chronic obstructive pulmonary disease)     Dyshidrotic eczema 08/10/2021    Right bundle branch block 2023    Scabies     Squamous cell carcinoma of lung     follows with Dr. Kraft      Past Surgical History:   Procedure Laterality Date    APPENDECTOMY       SECTION  1980    2x    INSERTION OF VENOUS ACCESS PORT       Family History   Problem Relation Name Age of Onset    Cancer Father      Cancer Maternal Grandmother      Heart disease Paternal Grandmother      Cancer Maternal Aunt       Review of patient's allergies indicates:  No Known Allergies   Social History     Tobacco Use    Smoking status: Every Day     Current packs/day: 1.00     Types: Cigarettes    Smokeless tobacco: Never   Substance Use Topics    Alcohol use: Never    Drug use: Never     "  Review of Systems       Objective:      Physical Exam  Constitutional:       General: She is not in acute distress.     Appearance: Normal appearance. She is normal weight. She is not ill-appearing or diaphoretic.   HENT:      Head: Normocephalic and atraumatic.      Nose: No congestion or rhinorrhea.      Mouth/Throat:      Mouth: Mucous membranes are moist.   Cardiovascular:      Rate and Rhythm: Normal rate.      Pulses: Normal pulses.   Pulmonary:      Effort: Pulmonary effort is normal. No respiratory distress.      Breath sounds: No stridor. Wheezing and rhonchi present. No rales.      Comments: Inspiratory and expiratory wheezes present right lung  Chest:      Chest wall: No tenderness.   Abdominal:      General: Abdomen is flat.   Musculoskeletal:      Cervical back: Normal range of motion. No rigidity.      Right lower leg: No edema.      Left lower leg: No edema.   Skin:     General: Skin is warm.      Findings: No erythema.   Neurological:      General: No focal deficit present.      Mental Status: She is alert and oriented to person, place, and time. Mental status is at baseline.   Psychiatric:         Mood and Affect: Mood normal.         Behavior: Behavior normal.         Thought Content: Thought content normal.                11/4/2024    12:57 PM 8/29/2024     9:00 AM 8/29/2024     7:36 AM 8/29/2024     7:00 AM 8/29/2024     5:00 AM 8/29/2024     4:00 AM 8/29/2024     3:00 AM   Pulmonary Function Tests   SpO2 96 % 95 % 94 % 94 % 97 % 95 % 96 %   Height 5' 6" (1.676 m)         Weight 49.4 kg (109 lb)         BMI (Calculated) 17.6               Outpatient Encounter Medications as of 11/4/2024   Medication Sig Dispense Refill    albuterol (PROVENTIL) 2.5 mg /3 mL (0.083 %) nebulizer solution Take 3 mLs (2.5 mg total) by nebulization every 4 (four) hours as needed (shortness of breath). Rescue 540 mL 1    albuterol-ipratropium (DUO-NEB) 2.5 mg-0.5 mg/3 mL nebulizer solution Take 3 mLs by nebulization 4 " (four) times daily. 360 mL 1    buPROPion (WELLBUTRIN SR) 100 MG TBSR 12 hr tablet Take 1 tablet (100 mg total) by mouth 2 (two) times daily. 60 tablet 0    ELIQUIS 5 mg Tab Take 1 tablet (5 mg total) by mouth 2 (two) times daily. 180 tablet 1    melatonin (MELATIN) 3 mg tablet Take 2 tablets (6 mg total) by mouth nightly as needed for Insomnia.      potassium chloride SA (K-DUR,KLOR-CON) 20 MEQ tablet Take 1 tablet (20 mEq total) by mouth 2 (two) times daily. 60 tablet 1    sodium chloride 3% 3 % nebulizer solution Take 4 mLs by nebulization every 8 (eight) hours. Take with DuoNebs 360 mL 1    nicotine (NICODERM CQ) 7 mg/24 hr Place 1 patch onto the skin. (Patient not taking: Reported on 11/4/2024)       No facility-administered encounter medications on file as of 11/4/2024.       Assessment & Plan    As above                                          Orders Placed This Encounter   Procedures    CT Chest Without Contrast     Standing Status:   Future     Standing Expiration Date:   11/4/2025     Order Specific Question:   May the Radiologist modify the order per protocol to meet the clinical needs of the patient?     Answer:   Yes

## 2024-11-04 NOTE — PATIENT INSTRUCTIONS
Please hold Apixaban on 11/9/24 and 11/10/24  Plan for bronchoscopy on 11/11/24 (you will receive a phone call later this week)   Please remain NPO past midnight 11/10/24 into 11/11/24   He will also get lab work done today   A CT chest will be scheduled for you later this week   You will come back and see Dr. Castellon and follow up on 11/15/24 with a chest x-ray prior    Additionally, your nebulizers have been changed   Please use DuoNebs every 6 hours while awake   Please use hypertonic saline 3 times a day (immediately before using DuoNebs)-this is to keep the stent patent

## 2024-11-04 NOTE — TELEPHONE ENCOUNTER
----- Message from Violette sent at 11/4/2024  4:09 PM CST -----  Who Called:Torri [facility] [phone]    Caller is requesting assistance/information from provider's office.    Torri is wanting to verify appt time for pt appt on 11/11     Preferred Method of Contact: Phone Call  Patient's Preferred Phone Number on File: [phone]   Best Call Back Number, if different:  Additional Information:

## 2024-11-05 ENCOUNTER — TELEPHONE (OUTPATIENT)
Dept: GASTROENTEROLOGY | Facility: HOSPITAL | Age: 74
End: 2024-11-05
Payer: MEDICARE

## 2024-11-05 NOTE — TELEPHONE ENCOUNTER
Spoke with Katy, Director of Nursing, at [facility] concerning patient's upcoming bronchoscopy procedure scheduled for 11.11.24. Katy asked me to send her an email with all pre-procedure instructions and medications holds. Email sent on 11.05.24 at 1210 pm. Eliquis to be held on 11.09 - 11.10 and resumed after the procedure on 11.11 per Dr. Castellon verbal instructions.

## 2024-11-08 ENCOUNTER — HOSPITAL ENCOUNTER (OUTPATIENT)
Dept: RADIOLOGY | Facility: HOSPITAL | Age: 74
Discharge: HOME | End: 2024-11-08
Attending: STUDENT IN AN ORGANIZED HEALTH CARE EDUCATION/TRAINING PROGRAM
Payer: MEDICARE

## 2024-11-08 DIAGNOSIS — R91.1 SOLITARY PULMONARY NODULE: ICD-10-CM

## 2024-11-08 PROCEDURE — 71250 CT THORAX DX C-: CPT | Mod: 26,,, | Performed by: RADIOLOGY

## 2024-11-08 PROCEDURE — 71250 CT THORAX DX C-: CPT | Mod: TC

## 2024-11-11 ENCOUNTER — HOSPITAL ENCOUNTER (OUTPATIENT)
Dept: GASTROENTEROLOGY | Facility: HOSPITAL | Age: 74
Discharge: HOME | End: 2024-11-11
Attending: STUDENT IN AN ORGANIZED HEALTH CARE EDUCATION/TRAINING PROGRAM
Payer: MEDICARE

## 2024-11-11 DIAGNOSIS — J96.12 CHRONIC RESPIRATORY FAILURE WITH HYPOXIA AND HYPERCAPNIA: ICD-10-CM

## 2024-11-11 DIAGNOSIS — J96.11 CHRONIC RESPIRATORY FAILURE WITH HYPOXIA AND HYPERCAPNIA: ICD-10-CM

## 2024-11-11 NOTE — CARE UPDATE
Pt was given her Eliquis yesterday by RN @ facility. It was held Saturday but given yesterday. Dr. Mendoza Anesthesia informed. Dr. Castellon informed & cancelled the case for today. Dr. Castellon will come talk to pt after his current surgery case.

## 2024-11-12 DIAGNOSIS — R91.1 SOLITARY PULMONARY NODULE: Primary | ICD-10-CM

## 2024-11-12 NOTE — INTERVAL H&P NOTE
The patient presented for her procedure today.  Her nursing home staff has been informed to hold her anticoagulation for 48 hours prior to the procedure, with confirmation.  However, her Eliquis was given a day prior resulting in her being anticoagulated and therefore the procedure could not be done.  We will aim to reschedule the patient for another date and communicate with the nursing home staff.  I spoke with the nursing home director myself to ensure that they would hold the patient's anticoagulation appropriately this time.

## 2024-11-14 ENCOUNTER — TELEPHONE (OUTPATIENT)
Dept: PULMONOLOGY | Facility: CLINIC | Age: 74
End: 2024-11-14
Payer: MEDICARE

## 2024-11-14 ENCOUNTER — TELEPHONE (OUTPATIENT)
Dept: GASTROENTEROLOGY | Facility: HOSPITAL | Age: 74
End: 2024-11-14
Payer: MEDICARE

## 2024-11-14 NOTE — TELEPHONE ENCOUNTER
----- Message from Med Assistant Nava sent at 11/14/2024  9:30 AM CST -----  Who Called: Solo (brother)    Patient is returning phone call    Who Left Message for Patient:Monet  Does the patient know what this is regarding?:phone encounter      Preferred Method of Contact: Phone Call  Patient's Preferred Phone Number on File: [phone]   Best Call Back Number, if different:[phone]  Additional Information: returning call, stated pt is in nursing home

## 2024-11-18 ENCOUNTER — TELEPHONE (OUTPATIENT)
Dept: PULMONOLOGY | Facility: CLINIC | Age: 74
End: 2024-11-18
Payer: MEDICARE

## 2024-11-18 ENCOUNTER — ANESTHESIA EVENT (OUTPATIENT)
Dept: GASTROENTEROLOGY | Facility: HOSPITAL | Age: 74
End: 2024-11-18
Payer: MEDICARE

## 2024-11-18 ENCOUNTER — HOSPITAL ENCOUNTER (OUTPATIENT)
Dept: GASTROENTEROLOGY | Facility: HOSPITAL | Age: 74
Discharge: HOME | End: 2024-11-18
Attending: STUDENT IN AN ORGANIZED HEALTH CARE EDUCATION/TRAINING PROGRAM
Payer: MEDICARE

## 2024-11-18 ENCOUNTER — ANESTHESIA (OUTPATIENT)
Dept: GASTROENTEROLOGY | Facility: HOSPITAL | Age: 74
End: 2024-11-18
Payer: MEDICARE

## 2024-11-18 ENCOUNTER — HOSPITAL ENCOUNTER (OUTPATIENT)
Dept: RADIOLOGY | Facility: HOSPITAL | Age: 74
Discharge: HOME | End: 2024-11-18
Attending: STUDENT IN AN ORGANIZED HEALTH CARE EDUCATION/TRAINING PROGRAM
Payer: MEDICARE

## 2024-11-18 VITALS
SYSTOLIC BLOOD PRESSURE: 113 MMHG | RESPIRATION RATE: 18 BRPM | BODY MASS INDEX: 17.68 KG/M2 | WEIGHT: 110 LBS | TEMPERATURE: 98 F | HEART RATE: 78 BPM | DIASTOLIC BLOOD PRESSURE: 52 MMHG | HEIGHT: 66 IN | OXYGEN SATURATION: 94 %

## 2024-11-18 DIAGNOSIS — R91.1 SOLITARY PULMONARY NODULE: ICD-10-CM

## 2024-11-18 LAB
ANION GAP SERPL CALCULATED.3IONS-SCNC: 11 MMOL/L (ref 7–16)
BASOPHILS # BLD AUTO: 0.08 K/UL (ref 0–0.2)
BASOPHILS NFR BLD AUTO: 0.8 % (ref 0–1)
BUN SERPL-MCNC: 18 MG/DL (ref 10–20)
BUN/CREAT SERPL: 29 (ref 6–20)
CALCIUM SERPL-MCNC: 9.1 MG/DL (ref 8.4–10.2)
CHLORIDE SERPL-SCNC: 102 MMOL/L (ref 98–107)
CLARITY FLD: ABNORMAL
CO2 SERPL-SCNC: 29 MMOL/L (ref 23–31)
COLOR FLD: ABNORMAL
CREAT SERPL-MCNC: 0.63 MG/DL (ref 0.55–1.02)
DIFFERENTIAL METHOD BLD: ABNORMAL
EGFR (NO RACE VARIABLE) (RUSH/TITUS): 94 ML/MIN/1.73M2
EOSINOPHIL # BLD AUTO: 0.31 K/UL (ref 0–0.5)
EOSINOPHIL NFR BLD AUTO: 2.9 % (ref 1–4)
ERYTHROCYTE [DISTWIDTH] IN BLOOD BY AUTOMATED COUNT: 17 % (ref 11.5–14.5)
GLUCOSE SERPL-MCNC: 77 MG/DL (ref 82–115)
GRANULOCYTES NFR FLD MANUAL: 97 % (ref 0–25)
HCT VFR BLD AUTO: 31.9 % (ref 38–47)
HGB BLD-MCNC: 9.6 G/DL (ref 12–16)
IMM GRANULOCYTES # BLD AUTO: 0.05 K/UL (ref 0–0.04)
IMM GRANULOCYTES NFR BLD: 0.5 % (ref 0–0.4)
INDIRECT COOMBS: NORMAL
LYMPHOCYTES # BLD AUTO: 1.67 K/UL (ref 1–4.8)
LYMPHOCYTES NFR BLD AUTO: 15.8 % (ref 27–41)
LYMPHOCYTES NFR FLD MANUAL: 2 %
MACROPHAGES NFR FLD MANUAL: 1 %
MCH RBC QN AUTO: 25.6 PG (ref 27–31)
MCHC RBC AUTO-ENTMCNC: 30.1 G/DL (ref 32–36)
MCV RBC AUTO: 85.1 FL (ref 80–96)
MONOCYTES # BLD AUTO: 0.71 K/UL (ref 0–0.8)
MONOCYTES NFR BLD AUTO: 6.7 % (ref 2–6)
MPC BLD CALC-MCNC: 9.4 FL (ref 9.4–12.4)
NEUTROPHILS # BLD AUTO: 7.75 K/UL (ref 1.8–7.7)
NEUTROPHILS NFR BLD AUTO: 73.3 % (ref 53–65)
NRBC # BLD AUTO: 0 X10E3/UL
NRBC, AUTO (.00): 0 %
PLATELET # BLD AUTO: 437 K/UL (ref 150–400)
POTASSIUM SERPL-SCNC: 4 MMOL/L (ref 3.5–5.1)
RBC # BLD AUTO: 3.75 M/UL (ref 4.2–5.4)
RBC # FLD MANUAL: <3000 /CUMM
RH BLD: NORMAL
SODIUM SERPL-SCNC: 138 MMOL/L (ref 136–145)
SPECIMEN OUTDATE: NORMAL
WBC # BLD AUTO: 10.57 K/UL (ref 4.5–11)
WBC # FLD MANUAL: ABNORMAL /CUMM

## 2024-11-18 PROCEDURE — 63600175 PHARM REV CODE 636 W HCPCS: Performed by: STUDENT IN AN ORGANIZED HEALTH CARE EDUCATION/TRAINING PROGRAM

## 2024-11-18 PROCEDURE — D9220A PRA ANESTHESIA: Mod: ANES,,, | Performed by: ANESTHESIOLOGY

## 2024-11-18 PROCEDURE — 63600175 PHARM REV CODE 636 W HCPCS: Performed by: ANESTHESIOLOGY

## 2024-11-18 PROCEDURE — 31624 DX BRONCHOSCOPE/LAVAGE: CPT | Performed by: STUDENT IN AN ORGANIZED HEALTH CARE EDUCATION/TRAINING PROGRAM

## 2024-11-18 PROCEDURE — 25000003 PHARM REV CODE 250: Performed by: ANESTHESIOLOGY

## 2024-11-18 PROCEDURE — 85025 COMPLETE CBC W/AUTO DIFF WBC: CPT | Performed by: STUDENT IN AN ORGANIZED HEALTH CARE EDUCATION/TRAINING PROGRAM

## 2024-11-18 PROCEDURE — 31645 BRNCHSC W/THER ASPIR 1ST: CPT | Performed by: STUDENT IN AN ORGANIZED HEALTH CARE EDUCATION/TRAINING PROGRAM

## 2024-11-18 PROCEDURE — 89050 BODY FLUID CELL COUNT: CPT | Performed by: STUDENT IN AN ORGANIZED HEALTH CARE EDUCATION/TRAINING PROGRAM

## 2024-11-18 PROCEDURE — 86901 BLOOD TYPING SEROLOGIC RH(D): CPT | Performed by: STUDENT IN AN ORGANIZED HEALTH CARE EDUCATION/TRAINING PROGRAM

## 2024-11-18 PROCEDURE — 71045 X-RAY EXAM CHEST 1 VIEW: CPT | Mod: TC

## 2024-11-18 PROCEDURE — 88305 TISSUE EXAM BY PATHOLOGIST: CPT | Mod: 26,,, | Performed by: PATHOLOGY

## 2024-11-18 PROCEDURE — 25000003 PHARM REV CODE 250: Performed by: STUDENT IN AN ORGANIZED HEALTH CARE EDUCATION/TRAINING PROGRAM

## 2024-11-18 PROCEDURE — 36415 COLL VENOUS BLD VENIPUNCTURE: CPT | Mod: 91 | Performed by: STUDENT IN AN ORGANIZED HEALTH CARE EDUCATION/TRAINING PROGRAM

## 2024-11-18 PROCEDURE — D9220A PRA ANESTHESIA: Mod: CRNA,,, | Performed by: ANESTHESIOLOGY

## 2024-11-18 PROCEDURE — 88305 TISSUE EXAM BY PATHOLOGIST: CPT | Mod: TC,MCY | Performed by: STUDENT IN AN ORGANIZED HEALTH CARE EDUCATION/TRAINING PROGRAM

## 2024-11-18 PROCEDURE — 27201423 OPTIME MED/SURG SUP & DEVICES STERILE SUPPLY

## 2024-11-18 PROCEDURE — 31624 DX BRONCHOSCOPE/LAVAGE: CPT | Mod: 51,,, | Performed by: STUDENT IN AN ORGANIZED HEALTH CARE EDUCATION/TRAINING PROGRAM

## 2024-11-18 PROCEDURE — 87102 FUNGUS ISOLATION CULTURE: CPT | Performed by: STUDENT IN AN ORGANIZED HEALTH CARE EDUCATION/TRAINING PROGRAM

## 2024-11-18 PROCEDURE — 31645 BRNCHSC W/THER ASPIR 1ST: CPT | Mod: ,,, | Performed by: STUDENT IN AN ORGANIZED HEALTH CARE EDUCATION/TRAINING PROGRAM

## 2024-11-18 PROCEDURE — 37000009 HC ANESTHESIA EA ADD 15 MINS

## 2024-11-18 PROCEDURE — 37000008 HC ANESTHESIA 1ST 15 MINUTES

## 2024-11-18 PROCEDURE — 87070 CULTURE OTHR SPECIMN AEROBIC: CPT | Performed by: STUDENT IN AN ORGANIZED HEALTH CARE EDUCATION/TRAINING PROGRAM

## 2024-11-18 PROCEDURE — 80048 BASIC METABOLIC PNL TOTAL CA: CPT | Performed by: STUDENT IN AN ORGANIZED HEALTH CARE EDUCATION/TRAINING PROGRAM

## 2024-11-18 PROCEDURE — 25000242 PHARM REV CODE 250 ALT 637 W/ HCPCS: Performed by: ANESTHESIOLOGY

## 2024-11-18 PROCEDURE — 88112 CYTOPATH CELL ENHANCE TECH: CPT | Mod: 26,,, | Performed by: PATHOLOGY

## 2024-11-18 RX ORDER — MEPERIDINE HYDROCHLORIDE 25 MG/ML
25 INJECTION INTRAMUSCULAR; INTRAVENOUS; SUBCUTANEOUS EVERY 10 MIN PRN
Status: DISCONTINUED | OUTPATIENT
Start: 2024-11-18 | End: 2024-11-18 | Stop reason: HOSPADM

## 2024-11-18 RX ORDER — MORPHINE SULFATE 10 MG/ML
4 INJECTION INTRAMUSCULAR; INTRAVENOUS; SUBCUTANEOUS EVERY 5 MIN PRN
Status: DISCONTINUED | OUTPATIENT
Start: 2024-11-18 | End: 2024-11-19 | Stop reason: HOSPADM

## 2024-11-18 RX ORDER — HYDROMORPHONE HYDROCHLORIDE 2 MG/ML
0.5 INJECTION, SOLUTION INTRAMUSCULAR; INTRAVENOUS; SUBCUTANEOUS EVERY 5 MIN PRN
Status: DISCONTINUED | OUTPATIENT
Start: 2024-11-18 | End: 2024-11-19 | Stop reason: HOSPADM

## 2024-11-18 RX ORDER — FENTANYL CITRATE 50 UG/ML
INJECTION, SOLUTION INTRAMUSCULAR; INTRAVENOUS
Status: DISCONTINUED | OUTPATIENT
Start: 2024-11-18 | End: 2024-11-18

## 2024-11-18 RX ORDER — SODIUM CHLORIDE, SODIUM LACTATE, POTASSIUM CHLORIDE, CALCIUM CHLORIDE 600; 310; 30; 20 MG/100ML; MG/100ML; MG/100ML; MG/100ML
125 INJECTION, SOLUTION INTRAVENOUS CONTINUOUS
Status: DISCONTINUED | OUTPATIENT
Start: 2024-11-18 | End: 2024-11-19 | Stop reason: HOSPADM

## 2024-11-18 RX ORDER — LIDOCAINE HYDROCHLORIDE AND EPINEPHRINE 10; 10 UG/ML; MG/ML
INJECTION, SOLUTION INFILTRATION; PERINEURAL
Status: COMPLETED | OUTPATIENT
Start: 2024-11-18 | End: 2024-11-18

## 2024-11-18 RX ORDER — MIDAZOLAM HYDROCHLORIDE 1 MG/ML
INJECTION INTRAMUSCULAR; INTRAVENOUS
Status: DISCONTINUED | OUTPATIENT
Start: 2024-11-18 | End: 2024-11-18

## 2024-11-18 RX ORDER — LIDOCAINE HYDROCHLORIDE 20 MG/ML
INJECTION, SOLUTION EPIDURAL; INFILTRATION; INTRACAUDAL; PERINEURAL
Status: DISCONTINUED | OUTPATIENT
Start: 2024-11-18 | End: 2024-11-18

## 2024-11-18 RX ORDER — ONDANSETRON HYDROCHLORIDE 2 MG/ML
4 INJECTION, SOLUTION INTRAVENOUS DAILY PRN
Status: DISCONTINUED | OUTPATIENT
Start: 2024-11-18 | End: 2024-11-19 | Stop reason: HOSPADM

## 2024-11-18 RX ORDER — IPRATROPIUM BROMIDE AND ALBUTEROL SULFATE 2.5; .5 MG/3ML; MG/3ML
3 SOLUTION RESPIRATORY (INHALATION)
Status: COMPLETED | OUTPATIENT
Start: 2024-11-18 | End: 2024-11-18

## 2024-11-18 RX ORDER — OXYCODONE HYDROCHLORIDE 5 MG/1
5 TABLET ORAL
Status: DISCONTINUED | OUTPATIENT
Start: 2024-11-18 | End: 2024-11-19 | Stop reason: HOSPADM

## 2024-11-18 RX ORDER — PROPOFOL 10 MG/ML
VIAL (ML) INTRAVENOUS
Status: DISCONTINUED | OUTPATIENT
Start: 2024-11-18 | End: 2024-11-18

## 2024-11-18 RX ORDER — DIPHENHYDRAMINE HYDROCHLORIDE 50 MG/ML
25 INJECTION INTRAMUSCULAR; INTRAVENOUS EVERY 6 HOURS PRN
Status: DISCONTINUED | OUTPATIENT
Start: 2024-11-18 | End: 2024-11-19 | Stop reason: HOSPADM

## 2024-11-18 RX ADMIN — SUGAMMADEX 200 MG: 100 INJECTION, SOLUTION INTRAVENOUS at 12:11

## 2024-11-18 RX ADMIN — PROPOFOL 50 MG: 10 INJECTION, EMULSION INTRAVENOUS at 12:11

## 2024-11-18 RX ADMIN — LIDOCAINE HYDROCHLORIDE 50 MG: 20 INJECTION, SOLUTION INTRAVENOUS at 12:11

## 2024-11-18 RX ADMIN — MIDAZOLAM HYDROCHLORIDE 1 MG: 1 INJECTION, SOLUTION INTRAMUSCULAR; INTRAVENOUS at 12:11

## 2024-11-18 RX ADMIN — SODIUM CHLORIDE 500 ML: 9 INJECTION, SOLUTION INTRAVENOUS at 08:11

## 2024-11-18 RX ADMIN — PROPOFOL 50 MCG/KG/MIN: 10 INJECTION, EMULSION INTRAVENOUS at 12:11

## 2024-11-18 RX ADMIN — IPRATROPIUM BROMIDE AND ALBUTEROL SULFATE 3 ML: .5; 3 SOLUTION RESPIRATORY (INHALATION) at 01:11

## 2024-11-18 RX ADMIN — FENTANYL CITRATE 25 MCG: 50 INJECTION, SOLUTION INTRAMUSCULAR; INTRAVENOUS at 12:11

## 2024-11-18 RX ADMIN — LIDOCAINE HYDROCHLORIDE,EPINEPHRINE BITARTRATE 4 ML: 10; .01 INJECTION, SOLUTION INFILTRATION; PERINEURAL at 12:11

## 2024-11-18 NOTE — INTERVAL H&P NOTE
I have reviewed the History and Physical on file for Mayra Kelly and there is no significant change noted.  Procedure, including risks, discussed with the patient in detail and all questions were answered when I met the patient in the preprocedure area.  Consent was signed as per institutional protocol.  At this time, patient okay to proceed with planned bronchoscopy procedure.    The risks of bronchoscopy, and endobronchial ultrasound-guided biopsies +/- endobronchial and transbronchial biopsies    +/-  rigid bronchoscopy +/-ablative therapies  +/- placement of endobronchial stent were discussed with the patient in detail, including the alternatives to bronchoscopy.  Risks discussed included, but not limited to, bleeding, infection, injury to the teeth and vocal cords, pneumothorax, worsening shortness of breath and respiratory failure.  The patient/ their family verbalized understanding of this risk and agreed to proceed with the procedure as above.      Rapid on-site evaluation (FELIPE)  may be used during the case above, which we will provide a preliminary diagnosis.  I have offered to share this preliminary diagnosis with the patient/their family with their understanding that these results are preliminary and the final results me change.  The patient's/their family have opted to be made aware of these preliminary results.       Final pathology results will be subjected to a delayed release in the electronic medical record to the patient as per their request, so that the results can be interpreted by their physicians and explained to them in a satisfactory manner.  The patient is aware and has agreed to this.        Varghese Castellon MD  Interventional Pulmonary and Critical Care  [facility]

## 2024-11-18 NOTE — ANESTHESIA PROCEDURE NOTES
Intubation    Date/Time: 11/18/2024 12:29 PM    Performed by: Nicole Varela CRNA  Authorized by: Nicole Varela CRNA    Intubation:     Induction:  Intravenous    Intubated:  Postinduction    Mask Ventilation:  Easy mask    Attempts:  1    Attempted By:  CRNA    Difficult Airway Encountered?: No      Complications:  None    Airway Device:  Supraglottic airway/LMA    Airway Device Size:  3.0    Style/Cuff Inflation:  Cuffed (inflated to minimal occlusive pressure)    Placement Verified By:  Capnometry    Complicating Factors:  None    Findings Post-Intubation:  BS equal bilateral and atraumatic/condition of teeth unchanged

## 2024-11-18 NOTE — DISCHARGE INSTRUCTIONS
POST BRONCHOSCOPY DISCHARGE INSTRUCTIONS:  Today you have undergone a procedure called a bronchoscopy with biopsy (also referred to as Bronchoscopy with EBUS and biopsy). You underwent general anesthesia and the medication will be in your body for the following hours up to 24 hours. It is essential that you are accompanied home by a responsible adult and I recommend that they stay with you during this period. You should NOT drive a vehicle, operate any form of machinery, consume alcohol or sign legal documentation during this time. After 24 hours, the effect of sedation should have worn off and you will be able to start normal activities.      DIET: You may begin a normal diet and fluids 2 HOURS following leaving the hospital. This will make sure your swallowing muscles have recovered properly.      MEDICATIONS: You may resume your usual prescriptions.  You may resume taking your blood thinner starting tomorrow.     BIOPSIES AND SPECIMENS: The samples that were taken following your procedure have been taken for processing and testing.        SYMPTOMS:  You may have a sore throat after the procedure; this typically resolves in a day.  Because of the biopsies taken, you may experience a low grade fever for <24 hrs and coughing with some blood coming up.  You may cough after surgery. This is normal to clear secretions in your lung that collected during the time  you were asleep in surgery and could not cough on your own. You may also see some blood in your sputum.  This is normal and as long as it is only a small amount there is no need for alarm.      Awareness of the following unusual symptoms should prompt you to call our office at [phone] to discuss a plan for management. If the symptoms come on suddenly go to the Emergency Department for evaluation.  These unusual symptoms include:  Sudden breathing distress, such as being more breathless than you normally are  Chest pain not responding to  medication  Persistently high temperature or fever of 100.4°F or higher  Coughing up large amount of blood or blood clots (more than a teaspoon)   Sudden swelling of your previous IV sites           Varghese Castellon MD  Interventional Pulmonary and Critical Care  [facility]

## 2024-11-18 NOTE — PROGRESS NOTES
1425 RELEASED TO [facility] RN AWAKE, ALERT. V/S 108/54-82-20-95% ON FM O2. SITTER AT BEDSIDE.

## 2024-11-18 NOTE — ANESTHESIA PREPROCEDURE EVALUATION
11/18/2024  Mayra Kelly is a 74 y.o., female.      Pre-op Assessment    I have reviewed the Patient Summary Reports.     I have reviewed the Nursing Notes. I have reviewed the NPO Status.   I have reviewed the Medications.     Review of Systems  Anesthesia Hx:  No problems with previous Anesthesia                Social:  No Alcohol Use, Smoker       Hematology/Oncology:  Hematology Normal                     Current/Recent Cancer.                EENT/Dental:  EENT/Dental Normal           Cardiovascular:         Dysrhythmias atrial fibrillation                                     Pulmonary:   COPD      Lung cancer  Pulmonary HTN               Renal/:  Renal/ Normal                 Hepatic/GI:  Hepatic/GI Normal       malnutrition             Musculoskeletal:  Musculoskeletal Normal                Neurological:  Neurology Normal                                      Endocrine:  Endocrine Normal            Dermatological:  Skin Normal    Psych:  Psychiatric Normal                    Physical Exam  General: Well nourished    Airway:  Mallampati: II / II  Mouth Opening: Normal  TM Distance: > 6 cm  Tongue: Normal  Neck ROM: Normal ROM    Chest/Lungs:  Clear to auscultation, Normal Respiratory Rate    Heart:  Rate: Normal  Rhythm: Regular Rhythm        Anesthesia Plan  Type of Anesthesia, risks & benefits discussed:    Anesthesia Type: Gen ETT  Intra-op Monitoring Plan: Standard ASA Monitors  Post Op Pain Control Plan: multimodal analgesia  Induction:  IV  Informed Consent: Informed consent signed with the Patient and all parties understand the risks and agree with anesthesia plan.  All questions answered. Patient consented to blood products? Yes  ASA Score: 4  Day of Surgery Review of History & Physical: H&P Update referred to the surgeon/provider.I have interviewed and examined the patient. I have  reviewed the patient's H&P dated:     Ready For Surgery From Anesthesia Perspective.     .

## 2024-11-18 NOTE — BRIEF OP NOTE
Mayra Kelly  presents 11/18/2024 in outpatient setting for planned flexible bronchoscopy with evaluation of right bronchus intermedius stent.    PROCEDURES: Bronchoscopy, Bronchoscopy with Endobronchial ultrasound (EBUS), Bronchoalveolar lavage, and Therapeutic aspiration  See procedure note for further details.    OUTCOME: Patient tolerated the procedure well without complication and is now ready for discharge    DISPOSITION: Home or self care    FINAL DIAGNOSIS:  Successful flexible bronchoscopy with cryo extraction of thick mucus plugs, patent right bronchus intermedius stent.      FOLLOW UP: Will coordinate follow up vs referral thereafter.     DISCHARGE INSTRUCTIONS: Post-bronchoscopy instructions discussed with patient. Entered into AVS.     TIME SPENT ON DISCHARGE: <30 minutes      Discussed with patient and accompanying family/friend management plans, all questions were answered and they verbalized understanding.       Varghese Castellon MD  Interventional Pulmonary and Critical Care  [facility]

## 2024-11-18 NOTE — PROGRESS NOTES
1303 RECEIVED TO RR ASLEEP,EASILY AROUSED. ORIENTATION GIVEN. NO C/O PAIN OR DISCOMFORT. O2 VIA FM. NO RESP. DISTRESS NOTED. MOIST COUGH NOTED. IV INFUSING WELL RIGHT FOREARM 20G. CATH. OBSERVING CLOSELY. SEE FLOW SHEET.    1330 CHEST X-RAY DONE PER TECHS AS ORDERED. O2 SATS LOW ON NASAL CANNULA 5 L. PATIENT IS A MOUTH BREATHER. O2 ON FM 94-97%. ENCOURAGED COUGH DEEP BREATHS. LUNGS WITH COARSE RHONCHI.    1345 DR. GARZA MADE AWARE OF LOW O2 SATS AT BEDSIDE. ONK TO GIVE A DUONEB TX.    1350 DUONEBT X IN PROGRESS. ENCOURAGED COUGH DEEP BREATHS.AWAKE WITHOUT COMPLAINTS.    1416 O2 SATS REMAIN MID 80'S ON NC O2 5 LITERS. PATIENT AWAKE ALERT, MOUTH BREATHER. NO SOB NOTED. NO COMPLAINTS VOICED. LEFT ON FM O2 6 LITERS. O2 SAT 94-95%. DR. GARZA MADE AWARE. TRANSFERRED TO ROOM WITHOUT DISTRESS NOTED.

## 2024-11-18 NOTE — TELEPHONE ENCOUNTER
----- Message from Varghese Castellon MD sent at 11/18/2024  1:12 PM CST -----  Please reschedule her follow up visit with me to 6 weeks from now with a chest x-ray prior.  You may have to call the facility to let them know as well.    Thank you,     [place]

## 2024-11-18 NOTE — ANESTHESIA POSTPROCEDURE EVALUATION
Anesthesia Post Evaluation    Patient: Mayra Kelly    Procedure(s) Performed: * No procedures listed *    Final Anesthesia Type: general      Patient location during evaluation: PACU  Patient participation: Yes- Able to Participate  Level of consciousness: awake and sedated  Post-procedure vital signs: reviewed and stable  Pain management: adequate  Airway patency: patent    PONV status at discharge: No PONV  Anesthetic complications: no      Cardiovascular status: blood pressure returned to baseline  Respiratory status: unassisted  Hydration status: euvolemic  Follow-up not needed.              Vitals Value Taken Time   /54 11/18/24 1426   Temp 36.7 °C (98.1 °F) 11/18/24 1314   Pulse 82 11/18/24 1429   Resp 18 11/18/24 1415   SpO2 96 % 11/18/24 1429   Vitals shown include unfiled device data.      Event Time   Out of Recovery 14:16:33         Pain/Orlando Score: Orlando Score: 9 (11/18/2024  2:00 PM)

## 2024-11-18 NOTE — TRANSFER OF CARE
"Anesthesia Transfer of Care Note    Patient: Mayra Kelly    Procedure(s) Performed: * bronchoscopy *    Patient location: PACU    Anesthesia Type: general    Transport from OR: Transported from OR on 6-10 L/min O2 by face mask with adequate spontaneous ventilation    Post pain: adequate analgesia    Post assessment: no apparent anesthetic complications and tolerated procedure well    Post vital signs: stable    Level of consciousness: responds to stimulation    Nausea/Vomiting: no nausea/vomiting    Complications: none    Transfer of care protocol was followedComments: Report Given to PACU rn VSS      Last vitals: Visit Vitals  BP (!) 104/50   Pulse 85   Temp 36.7 °C (98.1 °F) (Oral)   Resp (!) 22   Ht 5' 6" (1.676 m)   Wt 49.9 kg (110 lb)   LMP  (LMP Unknown)   SpO2 (!) 94%   Breastfeeding No   BMI 17.75 kg/m²     "

## 2024-11-19 LAB
ESTROGEN SERPL-MCNC: NORMAL PG/ML
INSULIN SERPL-ACNC: NORMAL U[IU]/ML
LAB AP GROSS DESCRIPTION: NORMAL
LAB AP LABORATORY NOTES: NORMAL
LAB AP SPECIMEN A NON-GYN GENERAL CATEGORIZATION: NEGATIVE
LAB AP SPECIMEN A NON-GYN INTERPETATION: NORMAL

## 2024-11-20 LAB
CULTURE, LOWER RESPIRATORY: NORMAL
GRAM STN SPEC: NORMAL

## 2024-11-21 NOTE — PROGRESS NOTES
Growth of diphtheroids on recent bronchoalveolar lavage likely in the setting of being a contaminant which does not require further treatment at this time.

## 2024-12-02 ENCOUNTER — HOSPITAL ENCOUNTER (INPATIENT)
Facility: HOSPITAL | Age: 74
LOS: 6 days | Discharge: NURSING, REHAB OR LONG-TERM CARE | DRG: 189 | End: 2024-12-09
Attending: EMERGENCY MEDICINE | Admitting: HOSPITALIST
Payer: MEDICARE

## 2024-12-02 DIAGNOSIS — C34.90 SQUAMOUS CELL CARCINOMA OF LUNG, UNSPECIFIED LATERALITY: ICD-10-CM

## 2024-12-02 DIAGNOSIS — B59: ICD-10-CM

## 2024-12-02 DIAGNOSIS — I50.810 RIGHT HEART FAILURE DUE TO PULMONARY HYPERTENSION: ICD-10-CM

## 2024-12-02 DIAGNOSIS — J44.1 COPD EXACERBATION: ICD-10-CM

## 2024-12-02 DIAGNOSIS — J96.22 ACUTE ON CHRONIC RESPIRATORY FAILURE WITH HYPOXIA AND HYPERCAPNIA: ICD-10-CM

## 2024-12-02 DIAGNOSIS — R53.1 GENERAL WEAKNESS: ICD-10-CM

## 2024-12-02 DIAGNOSIS — I48.0 PAROXYSMAL ATRIAL FIBRILLATION: ICD-10-CM

## 2024-12-02 DIAGNOSIS — J18.9 POSTOBSTRUCTIVE PNEUMONIA: ICD-10-CM

## 2024-12-02 DIAGNOSIS — J96.21 ACUTE ON CHRONIC RESPIRATORY FAILURE WITH HYPOXIA AND HYPERCAPNIA: Primary | ICD-10-CM

## 2024-12-02 DIAGNOSIS — J18.9 PNEUMONIA: ICD-10-CM

## 2024-12-02 DIAGNOSIS — I27.29 RIGHT HEART FAILURE DUE TO PULMONARY HYPERTENSION: ICD-10-CM

## 2024-12-02 DIAGNOSIS — I50.9 CHF (CONGESTIVE HEART FAILURE): ICD-10-CM

## 2024-12-02 DIAGNOSIS — J96.22 ACUTE ON CHRONIC RESPIRATORY FAILURE WITH HYPOXIA AND HYPERCAPNIA: Primary | ICD-10-CM

## 2024-12-02 DIAGNOSIS — J96.21 ACUTE ON CHRONIC RESPIRATORY FAILURE WITH HYPOXIA AND HYPERCAPNIA: ICD-10-CM

## 2024-12-02 DIAGNOSIS — G93.41 ACUTE METABOLIC ENCEPHALOPATHY: ICD-10-CM

## 2024-12-02 DIAGNOSIS — E43 SEVERE PROTEIN-CALORIE MALNUTRITION: ICD-10-CM

## 2024-12-02 PROCEDURE — 93005 ELECTROCARDIOGRAM TRACING: CPT

## 2024-12-02 PROCEDURE — 93010 ELECTROCARDIOGRAM REPORT: CPT | Mod: ,,, | Performed by: STUDENT IN AN ORGANIZED HEALTH CARE EDUCATION/TRAINING PROGRAM

## 2024-12-02 NOTE — Clinical Note
Diagnosis: Pneumonia [970908]   Reason for IP Medical Treatment  (Clinical interventions that can only be accomplished in the IP setting? ) :: life threatening illness   Plans for Post-Acute care--if anticipated (pick the single best option):: A. No post acute care anticipated at this time

## 2024-12-03 PROBLEM — C34.90 LUNG CANCER: Status: ACTIVE | Noted: 2024-12-03

## 2024-12-03 PROBLEM — J18.9 POSTOBSTRUCTIVE PNEUMONIA: Status: ACTIVE | Noted: 2024-12-03

## 2024-12-03 PROBLEM — G93.41 ACUTE METABOLIC ENCEPHALOPATHY: Status: ACTIVE | Noted: 2024-12-03

## 2024-12-03 PROBLEM — J96.22 ACUTE ON CHRONIC RESPIRATORY FAILURE WITH HYPOXIA AND HYPERCAPNIA: Status: ACTIVE | Noted: 2024-12-03

## 2024-12-03 PROBLEM — J44.1 COPD EXACERBATION: Status: ACTIVE | Noted: 2024-12-03

## 2024-12-03 PROBLEM — I48.0 PAROXYSMAL ATRIAL FIBRILLATION: Status: ACTIVE | Noted: 2024-08-08

## 2024-12-03 PROBLEM — J96.21 ACUTE ON CHRONIC RESPIRATORY FAILURE WITH HYPOXIA AND HYPERCAPNIA: Status: ACTIVE | Noted: 2024-12-03

## 2024-12-03 LAB
ALBUMIN SERPL BCP-MCNC: 2.5 G/DL (ref 3.4–4.8)
ALBUMIN/GLOB SERPL: 0.6 {RATIO}
ALP SERPL-CCNC: 83 U/L (ref 40–150)
ALT SERPL W P-5'-P-CCNC: 9 U/L
ANION GAP SERPL CALCULATED.3IONS-SCNC: 14 MMOL/L (ref 7–16)
APTT PPP: 39.7 SECONDS (ref 25.2–37.3)
AST SERPL W P-5'-P-CCNC: 18 U/L (ref 5–34)
BACTERIA #/AREA URNS HPF: ABNORMAL /HPF
BASOPHILS # BLD AUTO: 0.04 K/UL (ref 0–0.2)
BASOPHILS NFR BLD AUTO: 0.2 % (ref 0–1)
BILIRUB SERPL-MCNC: 0.2 MG/DL
BILIRUB UR QL STRIP: NEGATIVE
BUN SERPL-MCNC: 32 MG/DL (ref 10–20)
BUN/CREAT SERPL: 31 (ref 6–20)
CALCIUM SERPL-MCNC: 8.6 MG/DL (ref 8.4–10.2)
CHLORIDE SERPL-SCNC: 100 MMOL/L (ref 98–107)
CLARITY UR: ABNORMAL
CO2 SERPL-SCNC: 27 MMOL/L (ref 23–31)
COLOR UR: YELLOW
CREAT SERPL-MCNC: 1.02 MG/DL (ref 0.55–1.02)
D DIMER PPP FEU-MCNC: 0.93 ΜG/ML (ref 0–0.47)
DIFFERENTIAL METHOD BLD: ABNORMAL
EGFR (NO RACE VARIABLE) (RUSH/TITUS): 58 ML/MIN/1.73M2
EOSINOPHIL # BLD AUTO: 0.01 K/UL (ref 0–0.5)
EOSINOPHIL NFR BLD AUTO: 0.1 % (ref 1–4)
ERYTHROCYTE [DISTWIDTH] IN BLOOD BY AUTOMATED COUNT: 16.9 % (ref 11.5–14.5)
EST. AVERAGE GLUCOSE BLD GHB EST-MCNC: 105 MG/DL
GLOBULIN SER-MCNC: 4.4 G/DL (ref 2–4)
GLUCOSE SERPL-MCNC: 159 MG/DL (ref 82–115)
GLUCOSE UR STRIP-MCNC: 30 MG/DL
HBA1C MFR BLD HPLC: 5.3 %
HCO3 UR-SCNC: 28.2 MMOL/L (ref 21–28)
HCO3 UR-SCNC: 31.1 MMOL/L (ref 21–28)
HCT VFR BLD AUTO: 31.6 % (ref 38–47)
HCT VFR BLD CALC: 33 % (ref 35–51)
HCT VFR BLD CALC: 51 % (ref 35–51)
HGB BLD-MCNC: 9.1 G/DL (ref 12–16)
HYALINE CASTS #/AREA URNS LPF: ABNORMAL /LPF
IMM GRANULOCYTES # BLD AUTO: 0.06 K/UL (ref 0–0.04)
IMM GRANULOCYTES NFR BLD: 0.3 % (ref 0–0.4)
INFLUENZA A MOLECULAR (OHS): NEGATIVE
INFLUENZA B MOLECULAR (OHS): NEGATIVE
INR BLD: 1.78
KETONES UR STRIP-SCNC: NEGATIVE MG/DL
LACTATE SERPL-SCNC: 1.8 MMOL/L (ref 0.5–2.2)
LACTATE SERPL-SCNC: 2.1 MMOL/L (ref 0.5–2.2)
LDH SERPL L TO P-CCNC: 0.6 MMOL/L (ref 0.3–1.2)
LDH SERPL L TO P-CCNC: 1.7 MMOL/L (ref 0.3–1.2)
LEUKOCYTE ESTERASE UR QL STRIP: NEGATIVE
LYMPHOCYTES # BLD AUTO: 0.62 K/UL (ref 1–4.8)
LYMPHOCYTES NFR BLD AUTO: 3.6 % (ref 27–41)
MAGNESIUM SERPL-MCNC: 2.1 MG/DL (ref 1.6–2.6)
MCH RBC QN AUTO: 25.2 PG (ref 27–31)
MCHC RBC AUTO-ENTMCNC: 28.8 G/DL (ref 32–36)
MCV RBC AUTO: 87.5 FL (ref 80–96)
MONOCYTES # BLD AUTO: 0.67 K/UL (ref 0–0.8)
MONOCYTES NFR BLD AUTO: 3.8 % (ref 2–6)
MPC BLD CALC-MCNC: 9.4 FL (ref 9.4–12.4)
MUCOUS, UA: ABNORMAL /LPF
NEUTROPHILS # BLD AUTO: 16.04 K/UL (ref 1.8–7.7)
NEUTROPHILS NFR BLD AUTO: 92 % (ref 53–65)
NITRITE UR QL STRIP: NEGATIVE
NRBC # BLD AUTO: 0 X10E3/UL
NRBC, AUTO (.00): 0 %
NT-PROBNP SERPL-MCNC: ABNORMAL PG/ML (ref 1–125)
PCO2 BLDA: 50 MMHG (ref 35–48)
PCO2 BLDA: 55 MMHG (ref 35–48)
PH SMN: 7.36 [PH] (ref 7.35–7.45)
PH SMN: 7.36 [PH] (ref 7.35–7.45)
PH UR STRIP: 5.5 PH UNITS
PLATELET # BLD AUTO: 491 K/UL (ref 150–400)
PO2 BLDA: 57 MMHG (ref 83–108)
PO2 BLDA: 68 MMHG (ref 83–108)
POC BASE EXCESS: 1.7 MMOL/L (ref -2–3)
POC BASE EXCESS: 4.6 MMOL/L (ref -2–3)
POC CO2: 29.7 MMOL/L
POC CO2: 32.8 MMOL/L
POC IONIZED CALCIUM: 1.17 MMOL/L (ref 1.15–1.35)
POC IONIZED CALCIUM: 1.19 MMOL/L (ref 1.15–1.35)
POC SATURATED O2: 88 % (ref 95–98)
POC SATURATED O2: 92 % (ref 95–98)
POCT GLUCOSE: 195 MG/DL (ref 60–95)
POCT GLUCOSE: 81 MG/DL (ref 60–95)
POTASSIUM BLD-SCNC: 4.2 MMOL/L (ref 3.4–4.5)
POTASSIUM BLD-SCNC: 4.8 MMOL/L (ref 3.4–4.5)
POTASSIUM SERPL-SCNC: 4.4 MMOL/L (ref 3.5–5.1)
PREALB SERPL NEPH-MCNC: <3 MG/DL (ref 14–37)
PROT SERPL-MCNC: 6.9 G/DL (ref 5.8–7.6)
PROT UR QL STRIP: 30
PROTHROMBIN TIME: 20.5 SECONDS (ref 11.7–14.7)
RBC # BLD AUTO: 3.61 M/UL (ref 4.2–5.4)
RBC # UR STRIP: NEGATIVE /UL
RBC #/AREA URNS HPF: 1 /HPF
SARS-COV-2 RDRP RESP QL NAA+PROBE: NEGATIVE
SODIUM BLD-SCNC: 135 MMOL/L (ref 136–145)
SODIUM BLD-SCNC: 135 MMOL/L (ref 136–145)
SODIUM SERPL-SCNC: 137 MMOL/L (ref 136–145)
SP GR UR STRIP: 1.02
SQUAMOUS #/AREA URNS LPF: ABNORMAL /HPF
TROPONIN I SERPL HS-MCNC: 31.5 NG/L
TROPONIN I SERPL HS-MCNC: 53.9 NG/L
UROBILINOGEN UR STRIP-ACNC: 2 MG/DL
WBC # BLD AUTO: 17.44 K/UL (ref 4.5–11)
WBC #/AREA URNS HPF: 2 /HPF

## 2024-12-03 PROCEDURE — 84132 ASSAY OF SERUM POTASSIUM: CPT

## 2024-12-03 PROCEDURE — 83605 ASSAY OF LACTIC ACID: CPT | Performed by: HOSPITALIST

## 2024-12-03 PROCEDURE — 80053 COMPREHEN METABOLIC PANEL: CPT | Performed by: EMERGENCY MEDICINE

## 2024-12-03 PROCEDURE — 36600 WITHDRAWAL OF ARTERIAL BLOOD: CPT

## 2024-12-03 PROCEDURE — 83735 ASSAY OF MAGNESIUM: CPT | Performed by: EMERGENCY MEDICINE

## 2024-12-03 PROCEDURE — 25000242 PHARM REV CODE 250 ALT 637 W/ HCPCS: Performed by: EMERGENCY MEDICINE

## 2024-12-03 PROCEDURE — 5A0955A ASSISTANCE WITH RESPIRATORY VENTILATION, GREATER THAN 96 CONSECUTIVE HOURS, HIGH NASAL FLOW/VELOCITY: ICD-10-PCS | Performed by: STUDENT IN AN ORGANIZED HEALTH CARE EDUCATION/TRAINING PROGRAM

## 2024-12-03 PROCEDURE — 25000003 PHARM REV CODE 250: Performed by: HOSPITALIST

## 2024-12-03 PROCEDURE — 36415 COLL VENOUS BLD VENIPUNCTURE: CPT | Performed by: HOSPITALIST

## 2024-12-03 PROCEDURE — 27000200 HC HIGH FLOW DEL DISP CIRCUIT

## 2024-12-03 PROCEDURE — 27000221 HC OXYGEN, UP TO 24 HOURS

## 2024-12-03 PROCEDURE — 87040 BLOOD CULTURE FOR BACTERIA: CPT | Performed by: EMERGENCY MEDICINE

## 2024-12-03 PROCEDURE — 99233 SBSQ HOSP IP/OBS HIGH 50: CPT | Mod: ,,, | Performed by: HOSPITALIST

## 2024-12-03 PROCEDURE — 99900035 HC TECH TIME PER 15 MIN (STAT)

## 2024-12-03 PROCEDURE — 96366 THER/PROPH/DIAG IV INF ADDON: CPT

## 2024-12-03 PROCEDURE — 36415 COLL VENOUS BLD VENIPUNCTURE: CPT | Performed by: EMERGENCY MEDICINE

## 2024-12-03 PROCEDURE — 81003 URINALYSIS AUTO W/O SCOPE: CPT | Performed by: EMERGENCY MEDICINE

## 2024-12-03 PROCEDURE — 94799 UNLISTED PULMONARY SVC/PX: CPT

## 2024-12-03 PROCEDURE — 85014 HEMATOCRIT: CPT

## 2024-12-03 PROCEDURE — 94640 AIRWAY INHALATION TREATMENT: CPT

## 2024-12-03 PROCEDURE — 94761 N-INVAS EAR/PLS OXIMETRY MLT: CPT

## 2024-12-03 PROCEDURE — 20000000 HC ICU ROOM

## 2024-12-03 PROCEDURE — 85610 PROTHROMBIN TIME: CPT | Performed by: HOSPITALIST

## 2024-12-03 PROCEDURE — 83605 ASSAY OF LACTIC ACID: CPT | Performed by: EMERGENCY MEDICINE

## 2024-12-03 PROCEDURE — 87635 SARS-COV-2 COVID-19 AMP PRB: CPT | Performed by: EMERGENCY MEDICINE

## 2024-12-03 PROCEDURE — 84134 ASSAY OF PREALBUMIN: CPT | Performed by: HOSPITALIST

## 2024-12-03 PROCEDURE — 85730 THROMBOPLASTIN TIME PARTIAL: CPT | Performed by: HOSPITALIST

## 2024-12-03 PROCEDURE — 25000003 PHARM REV CODE 250: Performed by: EMERGENCY MEDICINE

## 2024-12-03 PROCEDURE — 82330 ASSAY OF CALCIUM: CPT

## 2024-12-03 PROCEDURE — 82947 ASSAY GLUCOSE BLOOD QUANT: CPT

## 2024-12-03 PROCEDURE — 25000242 PHARM REV CODE 250 ALT 637 W/ HCPCS: Performed by: HOSPITALIST

## 2024-12-03 PROCEDURE — 84484 ASSAY OF TROPONIN QUANT: CPT | Performed by: EMERGENCY MEDICINE

## 2024-12-03 PROCEDURE — 85379 FIBRIN DEGRADATION QUANT: CPT | Performed by: HOSPITALIST

## 2024-12-03 PROCEDURE — 82803 BLOOD GASES ANY COMBINATION: CPT

## 2024-12-03 PROCEDURE — 83880 ASSAY OF NATRIURETIC PEPTIDE: CPT | Performed by: HOSPITALIST

## 2024-12-03 PROCEDURE — 96367 TX/PROPH/DG ADDL SEQ IV INF: CPT

## 2024-12-03 PROCEDURE — 63600175 PHARM REV CODE 636 W HCPCS: Performed by: HOSPITALIST

## 2024-12-03 PROCEDURE — 96365 THER/PROPH/DIAG IV INF INIT: CPT

## 2024-12-03 PROCEDURE — 85025 COMPLETE CBC W/AUTO DIFF WBC: CPT | Performed by: EMERGENCY MEDICINE

## 2024-12-03 PROCEDURE — 84295 ASSAY OF SERUM SODIUM: CPT

## 2024-12-03 PROCEDURE — 99291 CRITICAL CARE FIRST HOUR: CPT

## 2024-12-03 PROCEDURE — 84484 ASSAY OF TROPONIN QUANT: CPT | Performed by: HOSPITALIST

## 2024-12-03 PROCEDURE — 63600175 PHARM REV CODE 636 W HCPCS: Performed by: EMERGENCY MEDICINE

## 2024-12-03 PROCEDURE — 83605 ASSAY OF LACTIC ACID: CPT

## 2024-12-03 PROCEDURE — 83036 HEMOGLOBIN GLYCOSYLATED A1C: CPT | Performed by: HOSPITALIST

## 2024-12-03 PROCEDURE — 87502 INFLUENZA DNA AMP PROBE: CPT | Performed by: EMERGENCY MEDICINE

## 2024-12-03 RX ORDER — DOCUSATE SODIUM 100 MG/1
100 CAPSULE, LIQUID FILLED ORAL 2 TIMES DAILY PRN
Status: DISCONTINUED | OUTPATIENT
Start: 2024-12-03 | End: 2024-12-09 | Stop reason: HOSPADM

## 2024-12-03 RX ORDER — BUDESONIDE 0.5 MG/2ML
0.5 INHALANT ORAL EVERY 12 HOURS
Status: DISCONTINUED | OUTPATIENT
Start: 2024-12-03 | End: 2024-12-09 | Stop reason: HOSPADM

## 2024-12-03 RX ORDER — ALBUTEROL SULFATE 0.83 MG/ML
2.5 SOLUTION RESPIRATORY (INHALATION) EVERY 4 HOURS PRN
Status: DISCONTINUED | OUTPATIENT
Start: 2024-12-03 | End: 2024-12-09 | Stop reason: HOSPADM

## 2024-12-03 RX ORDER — MUPIROCIN 20 MG/G
OINTMENT TOPICAL 2 TIMES DAILY
Status: COMPLETED | OUTPATIENT
Start: 2024-12-03 | End: 2024-12-07

## 2024-12-03 RX ORDER — GUAIFENESIN AND DEXTROMETHORPHAN HYDROBROMIDE 10; 100 MG/5ML; MG/5ML
10 SYRUP ORAL EVERY 6 HOURS PRN
Status: DISCONTINUED | OUTPATIENT
Start: 2024-12-03 | End: 2024-12-09 | Stop reason: HOSPADM

## 2024-12-03 RX ORDER — TALC
6 POWDER (GRAM) TOPICAL NIGHTLY PRN
Status: DISCONTINUED | OUTPATIENT
Start: 2024-12-03 | End: 2024-12-09 | Stop reason: HOSPADM

## 2024-12-03 RX ORDER — TRAZODONE HYDROCHLORIDE 50 MG/1
50 TABLET ORAL NIGHTLY PRN
Status: DISCONTINUED | OUTPATIENT
Start: 2024-12-03 | End: 2024-12-09 | Stop reason: HOSPADM

## 2024-12-03 RX ORDER — ACETYLCYSTEINE 200 MG/ML
4 SOLUTION ORAL; RESPIRATORY (INHALATION) 2 TIMES DAILY
Status: DISCONTINUED | OUTPATIENT
Start: 2024-12-03 | End: 2024-12-05

## 2024-12-03 RX ORDER — BISACODYL 5 MG
10 TABLET, DELAYED RELEASE (ENTERIC COATED) ORAL DAILY PRN
Status: DISCONTINUED | OUTPATIENT
Start: 2024-12-03 | End: 2024-12-09 | Stop reason: HOSPADM

## 2024-12-03 RX ORDER — BUPROPION HYDROCHLORIDE 100 MG/1
100 TABLET, EXTENDED RELEASE ORAL 2 TIMES DAILY
Status: DISCONTINUED | OUTPATIENT
Start: 2024-12-03 | End: 2024-12-09 | Stop reason: HOSPADM

## 2024-12-03 RX ORDER — ONDANSETRON HYDROCHLORIDE 2 MG/ML
8 INJECTION, SOLUTION INTRAVENOUS EVERY 6 HOURS PRN
Status: DISCONTINUED | OUTPATIENT
Start: 2024-12-03 | End: 2024-12-09 | Stop reason: HOSPADM

## 2024-12-03 RX ORDER — GUAIFENESIN AND DEXTROMETHORPHAN HYDROBROMIDE 10; 100 MG/5ML; MG/5ML
10 SYRUP ORAL EVERY 6 HOURS PRN
Status: ON HOLD | COMMUNITY

## 2024-12-03 RX ORDER — IPRATROPIUM BROMIDE AND ALBUTEROL SULFATE 2.5; .5 MG/3ML; MG/3ML
3 SOLUTION RESPIRATORY (INHALATION)
Status: COMPLETED | OUTPATIENT
Start: 2024-12-03 | End: 2024-12-03

## 2024-12-03 RX ORDER — DIPHENHYDRAMINE HCL 25 MG
50 CAPSULE ORAL EVERY 6 HOURS PRN
Status: DISCONTINUED | OUTPATIENT
Start: 2024-12-03 | End: 2024-12-09 | Stop reason: HOSPADM

## 2024-12-03 RX ORDER — ACETAMINOPHEN 650 MG/1
650 SUPPOSITORY RECTAL EVERY 6 HOURS PRN
Status: DISCONTINUED | OUTPATIENT
Start: 2024-12-03 | End: 2024-12-09 | Stop reason: HOSPADM

## 2024-12-03 RX ORDER — ACETAMINOPHEN 325 MG/1
650 TABLET ORAL EVERY 6 HOURS PRN
Status: ON HOLD | COMMUNITY

## 2024-12-03 RX ORDER — ALUMINUM HYDROXIDE, MAGNESIUM HYDROXIDE, AND SIMETHICONE 2400; 240; 2400 MG/30ML; MG/30ML; MG/30ML
30 SUSPENSION ORAL EVERY 6 HOURS PRN
Status: DISCONTINUED | OUTPATIENT
Start: 2024-12-03 | End: 2024-12-09 | Stop reason: HOSPADM

## 2024-12-03 RX ORDER — IPRATROPIUM BROMIDE AND ALBUTEROL SULFATE 2.5; .5 MG/3ML; MG/3ML
3 SOLUTION RESPIRATORY (INHALATION) EVERY 6 HOURS
Status: DISCONTINUED | OUTPATIENT
Start: 2024-12-03 | End: 2024-12-05

## 2024-12-03 RX ORDER — SODIUM CHLORIDE 0.9 % (FLUSH) 0.9 %
3 SYRINGE (ML) INJECTION
Status: DISCONTINUED | OUTPATIENT
Start: 2024-12-03 | End: 2024-12-09 | Stop reason: HOSPADM

## 2024-12-03 RX ORDER — PREDNISONE 20 MG/1
40 TABLET ORAL DAILY
Status: COMPLETED | OUTPATIENT
Start: 2024-12-03 | End: 2024-12-07

## 2024-12-03 RX ORDER — TRAZODONE HYDROCHLORIDE 50 MG/1
50 TABLET ORAL NIGHTLY
Status: ON HOLD | COMMUNITY
Start: 2024-11-22

## 2024-12-03 RX ORDER — ACETAMINOPHEN 500 MG
1000 TABLET ORAL EVERY 6 HOURS PRN
Status: DISCONTINUED | OUTPATIENT
Start: 2024-12-03 | End: 2024-12-09 | Stop reason: HOSPADM

## 2024-12-03 RX ADMIN — BUPROPION HYDROCHLORIDE 100 MG: 100 TABLET, FILM COATED, EXTENDED RELEASE ORAL at 08:12

## 2024-12-03 RX ADMIN — PREDNISONE 40 MG: 10 TABLET ORAL at 09:12

## 2024-12-03 RX ADMIN — PIPERACILLIN SODIUM AND TAZOBACTAM SODIUM 4.5 G: 4; .5 INJECTION, POWDER, LYOPHILIZED, FOR SOLUTION INTRAVENOUS at 12:12

## 2024-12-03 RX ADMIN — APIXABAN 5 MG: 5 TABLET, FILM COATED ORAL at 09:12

## 2024-12-03 RX ADMIN — IPRATROPIUM BROMIDE AND ALBUTEROL SULFATE 3 ML: .5; 3 SOLUTION RESPIRATORY (INHALATION) at 12:12

## 2024-12-03 RX ADMIN — BUDESONIDE 0.5 MG: 0.5 INHALANT RESPIRATORY (INHALATION) at 08:12

## 2024-12-03 RX ADMIN — BUPROPION HYDROCHLORIDE 100 MG: 100 TABLET, FILM COATED, EXTENDED RELEASE ORAL at 09:12

## 2024-12-03 RX ADMIN — MUPIROCIN: 20 OINTMENT TOPICAL at 09:12

## 2024-12-03 RX ADMIN — SODIUM CHLORIDE 1000 MG: 9 INJECTION, SOLUTION INTRAVENOUS at 09:12

## 2024-12-03 RX ADMIN — SODIUM CHLORIDE 1000 MG: 9 INJECTION, SOLUTION INTRAVENOUS at 01:12

## 2024-12-03 RX ADMIN — APIXABAN 5 MG: 5 TABLET, FILM COATED ORAL at 08:12

## 2024-12-03 RX ADMIN — MUPIROCIN: 20 OINTMENT TOPICAL at 08:12

## 2024-12-03 RX ADMIN — SODIUM CHLORIDE 500 ML: 9 INJECTION, SOLUTION INTRAVENOUS at 12:12

## 2024-12-03 RX ADMIN — PIPERACILLIN SODIUM AND TAZOBACTAM SODIUM 4.5 G: 4; .5 INJECTION, POWDER, LYOPHILIZED, FOR SOLUTION INTRAVENOUS at 03:12

## 2024-12-03 RX ADMIN — IPRATROPIUM BROMIDE AND ALBUTEROL SULFATE 3 ML: .5; 3 SOLUTION RESPIRATORY (INHALATION) at 07:12

## 2024-12-03 RX ADMIN — ACETYLCYSTEINE 4 ML: 200 SOLUTION ORAL; RESPIRATORY (INHALATION) at 07:12

## 2024-12-03 RX ADMIN — PIPERACILLIN SODIUM AND TAZOBACTAM SODIUM 4.5 G: 4; .5 INJECTION, POWDER, LYOPHILIZED, FOR SOLUTION INTRAVENOUS at 08:12

## 2024-12-03 RX ADMIN — BUDESONIDE 0.5 MG: 0.5 INHALANT RESPIRATORY (INHALATION) at 06:12

## 2024-12-03 RX ADMIN — IPRATROPIUM BROMIDE AND ALBUTEROL SULFATE 3 ML: .5; 3 SOLUTION RESPIRATORY (INHALATION) at 06:12

## 2024-12-03 NOTE — PLAN OF CARE
[facility] - Emergency Department  Initial Discharge Assessment       Primary Care Provider: Darlene Campoverde NP    Admission Diagnosis: Pneumonia [J18.9]    Admission Date: 12/2/2024  Expected Discharge Date:     Transition of Care Barriers: None    Payor: MEDICARE / Plan: MEDICARE PART A & B / Product Type: Government /     Extended Emergency Contact Information  Primary Emergency Contact: Rainer North  Mobile Phone: [phone]  Relation: Brother  Secondary Emergency Contact: Solo North  Mobile Phone: [phone]  Relation: Brother    Discharge Plan A: Return to nursing home, Skilled Nursing Facility  Discharge Plan B: Home with family, Home Health, Long-term acute care facility (LTAC), Return to Nursing Home, Skilled Nursing Facility      [facility] [address] [place] [id number]  Phone: [phone] Fax: [phone]    [facility] [place] [facility] [address]  [address]  [place] [address]  Phone: [phone] Fax: [phone]      Initial Assessment (most recent)       Adult Discharge Assessment - 12/03/24 1401          Discharge Assessment    Assessment Type Discharge Planning Assessment     Confirmed/corrected address, phone number and insurance Yes     Confirmed Demographics Correct on Facesheet     Source of Information patient     People in Home facility resident     Facility Arrived From: [facility]     Do you expect to return to your current living situation? Yes     Do you have help at home or someone to help you manage your care at home? Yes     Who are your caregiver(s) and their phone number(s)? facility     Prior to hospitilization cognitive status: Unable to Assess     Current cognitive status: Alert/Oriented     Walking or Climbing Stairs Difficulty yes     Walking or Climbing Stairs ambulation difficulty, requires equipment     Mobility Management wc     Dressing/Bathing  Difficulty no     Home Accessibility wheelchair accessible     Equipment Currently Used at Home wheelchair     Readmission within 30 days? No     Patient currently being followed by outpatient case management? No     Do you currently have service(s) that help you manage your care at home? No     Do you take prescription medications? Yes     Do you have prescription coverage? Yes     Coverage Medicare     Do you have any problems affording any of your prescribed medications? No     Is the patient taking medications as prescribed? yes     Who is going to help you get home at discharge? agency/[facility]     How do you get to doctors appointments? agency     Are you on dialysis? No     Do you take coumadin? No     Discharge Plan A Return to nursing home;Skilled Nursing Facility     Discharge Plan B Home with family;Home Health;Long-term acute care facility (LTAC);Return to Nursing Home;Skilled Nursing Facility     DME Needed Upon Discharge  none     Discharge Plan discussed with: Patient     Transition of Care Barriers None                   SS spoke with pt at bedside. Pt is a resident at [facility] and plans to return when medically ready for dc. Choice obtained for [facility], initial info faxed, packet started. Pt has required dme at facility. SDOH deferred due to pt being a nh resident. IM obtained. SS following for anticipated dc needs.

## 2024-12-03 NOTE — ASSESSMENT & PLAN NOTE
Patient with Hypercapnic and Hypoxic Respiratory failure which is Acute on chronic.  she is on home oxygen at 2 LPM. Supplemental oxygen was provided and noted- Oxygen Concentration (%):  [50] 50    .   Signs/symptoms of respiratory failure include- tachypnea, wheezing, and lethargy. Contributing diagnoses includes - COPD, Pneumonia, and lung cancer  Labs and images were reviewed. Patient Has recent ABG, which has been reviewed. Will treat underlying causes and adjust management of respiratory failure as follows-     Patient is chronically CO2 retainer and since pH is normal most likely not hypercapnea but more hypoxia from PNA and COPD.      Patient is full code on her NH paperwork and cannot make decisions at this time.  Well known to Dr. Mccauley

## 2024-12-03 NOTE — PHARMACY MED REC
"Admission Medication History     The home medication history was taken by Miri Santana.    You may go to "Admission" then "Reconcile Home Medications" tabs to review and/or act upon these items.     The home medication list has been updated by the Pharmacy department.   Please read ALL comments highlighted in yellow.   Please address this information as you see fit.    Feel free to contact us if you have any questions or require assistance.  Medications Added:  Acetaminophen 325 mg  Multivitamin w/minerals  Dextromethorphan guaifenesin  mg/5 ml      Patient reports no longer taking the following medication(s). The medication(s) listed below were removed from the home medication list. Please reorder if appropriate:  Melatonin 3 mg  Nicotine 7 mg/24 hr  Sodium chloride 0.9%    Medications listed below were obtained from: [email or web address] and Nursing home  (Not in a hospital admission)        Current Outpatient Medications on File Prior to Encounter   Medication Sig Dispense Refill Last Dose/Taking    acetaminophen (TYLENOL) 325 MG tablet Take 650 mg by mouth every 6 (six) hours as needed for Pain.   12/2/2024    albuterol (PROVENTIL) 2.5 mg /3 mL (0.083 %) nebulizer solution Take 3 mLs (2.5 mg total) by nebulization every 4 (four) hours as needed (shortness of breath). Rescue 540 mL 1 12/2/2024    albuterol-ipratropium (DUO-NEB) 2.5 mg-0.5 mg/3 mL nebulizer solution Take 3 mLs by nebulization every 6 (six) hours as needed for Wheezing. Rescue 75 mL 0 12/2/2024    buPROPion (WELLBUTRIN SR) 100 MG TBSR 12 hr tablet Take 1 tablet (100 mg total) by mouth 2 (two) times daily. 60 tablet 0 12/2/2024    dextromethorphan-guaiFENesin  mg/5 ml (ROBITUSSIN-DM)  mg/5 mL liquid Take 10 mLs by mouth every 6 (six) hours as needed.   12/2/2024    ELIQUIS 5 mg Tab Take 1 tablet (5 mg total) by mouth 2 (two) times daily. 180 tablet 1 12/2/2024    multivitamin with minerals tablet Take 1 tablet by mouth once " daily.   12/2/2024    potassium chloride SA (K-DUR,KLOR-CON) 20 MEQ tablet Take 1 tablet (20 mEq total) by mouth 2 (two) times daily. 60 tablet 1 12/2/2024    traZODone (DESYREL) 50 MG tablet Take 50 mg by mouth every evening.   12/2/2024    [DISCONTINUED] melatonin (MELATIN) 3 mg tablet Take 2 tablets (6 mg total) by mouth nightly as needed for Insomnia.       [DISCONTINUED] nicotine (NICODERM CQ) 7 mg/24 hr Place 1 patch onto the skin. (Patient not taking: Reported on 11/4/2024)       [DISCONTINUED] sodium chloride for inhalation (SODIUM CHLORIDE 0.9%) 0.9 % nebulizer solution Take 3 mLs by nebulization every 8 (eight) hours. 150 mL 12          Potential issues to be addressed PRIOR TO DISCHARGE  No issues identified.    Miri Santana  Medication Access Specialist  EXT. [phone]    .

## 2024-12-03 NOTE — HPI
75 yo F presents to [facility] ED with decreased LOC and increased oxygen demand from [facility].  She is listed as a full code on the paperwork but she is a frail (17 BMI) elderly lady with lung cancer followed by Dr. Kraft and Dr. Mccauley.  She is on high flow NRB and oxygenating at 94% upon arrival.  Switched over to venti mask at 50% and ABG normal pH 7.36 pCO2 55 pO2 57 with a sat of 88%.  She is normally on 2L at NH but she doesn't wear it all the time.      Patient is unable to give a history . She responds to commands and tries to answer questions but mostly mumbles.  I have reviewed Dr. Mccauley's office note from 11/18 and the hospital dc summary from [facility] two months ago when she had LLL pneumonia and MRSA.  She now has prominent infiltrate RML and RLL and could be aspiration or postobstruction but  ED has covered with vanco and zosyn prior to blood cultures.  I will obtain sputum cultures.  She has been admitted with COPD pathway.  Her lactic acid is 1.2 and is hemodynamically stable.  She has wheezing and coarse breath sounds.      See assessment and plan below for problem based evaluation

## 2024-12-03 NOTE — ASSESSMENT & PLAN NOTE
Will provide supportive environment and hopefully mental status will return to baseline once underlying condition improves.

## 2024-12-03 NOTE — PROGRESS NOTES
[facility] - Emergency Department  Adult Nutrition  Progress Note         Reason for Assessment  Reason For Assessment: identified at risk by screening criteria [facility] 2  Nutrition Risk Screen: no indicators present    Assessment and Plan  Patient admitted 12/2 from [facility] with a dx of acute on chronic respiratory failure, COPD exacerbation, pneumonia, and squamous cell carcinoma of lung. Patient is ordered a clear liquid diet.     Patient is 48.1 kg with a BMI of 17.11 and underweight. Patient with hx of severe PCM related to weight loss, physical signs of wasting and poor intakes. Weight has stabilized over the past 3 months, but patient remains severely underweight. Patient continues to meet ASPEN criteria for severe protein calorie malnutrition related to energy intakes less than 75% for greater than or equal to 1 month, and physical findings of muscle and fat loss to orbital, clavicle and temple regions related to chronic and catabolic illness.     Recommend to advance diet to regular as medically appropriate. Add Boost Breeze all meals while on clear liquids. Encourage po intakes as tolerated. RD Following.       Learning Needs/Social Determinants of Health  Learning Assessment       08/15/2024 1217 [facility] HOW (8/15/2024 - 8/29/2024)   Created by Sydnee Goodwin, RN - RN (Nurse) Status: Complete                 PRIMARY LEARNER     Primary Learner Name:  tasha edwards  - 08/15/2024 1217    Relationship:  Patient  - 08/15/2024 1217    Does the primary learner have any barriers to learning?:  No Barriers  - 08/15/2024 1217    What is the preferred language of the primary learner?:  English  - 08/15/2024 1217    Is an  required?:  No  - 08/15/2024 1217    How does the primary learner prefer to learn new concepts?:  Listening, Reading, Demonstration  - 08/15/2024 1217    How often do you need to have someone help you read instructions,  pamphlets, or written material from your doctor or pharmacy?:  Never  - 08/15/2024 1217        CO-LEARNER #1     Co-Learner Name (if applicable):  brian edwards  - 08/15/2024 1217    Relationship:  Family  - 08/15/2024 1217    Does the co-learner have any barriers to learning?:  No Barriers  - 08/15/2024 1217    What is the preferred language of the co-learner?:  English  - 08/15/2024 1217    Is an  required?:  No  - 08/15/2024 1217    How does the co-learner prefer to learn new concepts?:  Listening, Reading, Demonstration  - 08/15/2024 1217        CO-LEARNER #2     No question answered        SPECIAL TOPICS     No question answered        ANSWERED BY:     No question answered        Comments         Edit History       Sydnee Goodwin, RN - RN (Nurse)   08/15/2024 1217                           Social Drivers of Health     Tobacco Use: Medium Risk (12/3/2024)    Patient History     Smoking Tobacco Use: Former     Smokeless Tobacco Use: Never     Passive Exposure: Not on file   Alcohol Use: Not At Risk (8/16/2024)    AUDIT-C     Frequency of Alcohol Consumption: Never     Average Number of Drinks: Patient does not drink     Frequency of Binge Drinking: Never   Financial Resource Strain: Low Risk  (8/16/2024)    Overall Financial Resource Strain (CARDIA)     Difficulty of Paying Living Expenses: Not hard at all   Food Insecurity: No Food Insecurity (8/16/2024)    Hunger Vital Sign     Worried About Running Out of Food in the Last Year: Never true     Ran Out of Food in the Last Year: Never true   Transportation Needs: Unmet Transportation Needs (9/3/2024)    Received from [facility]    PRAPARE - Transportation     In the past 12 months, has lack of transportation kept you from medical appointments or from getting medications?: Yes     In the past 12 months, has lack of transportation kept you from meetings, work, or from getting things needed for daily  living?: Yes   Physical Activity: Inactive (8/16/2024)    Exercise Vital Sign     Days of Exercise per Week: 0 days     Minutes of Exercise per Session: 0 min   Stress: No Stress Concern Present (8/16/2024)    [employer] [place] [employer] - Occupational Stress Questionnaire     Feeling of Stress : Not at all   Housing Stability: Low Risk  (8/16/2024)    Housing Stability Vital Sign     Unable to Pay for Housing in the Last Year: No     Homeless in the Last Year: No   Depression: Low Risk  (11/4/2024)    Depression     Last PHQ-4: Flowsheet Data: 0   Utilities: Not At Risk (8/16/2024)    [facility] Utilities     Threatened with loss of utilities: No   Health Literacy: Adequate Health Literacy (8/16/2024)     Health Literacy     Frequency of need for help with medical instructions: Never   Social Isolation: Socially Integrated (8/16/2024)    Social Isolation     Social Isolation: 1            Malnutrition  Is Patient Malnourished: Yes Malnutrition Assessment  Malnutrition Context: chronic illness  Malnutrition Level: severe          Energy Intake (Malnutrition): less than 75% for greater than or equal to 1 month  Subcutaneous Fat (Malnutrition): severe depletion  Muscle Mass (Malnutrition): severe depletion   Orbital Region (Subcutaneous Fat Loss): severe depletion   [place] Region (Muscle Loss): severe depletion  Clavicle Bone Region (Muscle Loss): severe depletion                 Nutrition Diagnosis  Malnutrition (Severe) related to Catabolic illness, Chronic illness, Decreased ability to consume sufficient energy, Inadequate Caloric intake, and Inadequate Protein intake as evidenced by energy intake less than 75% for greater than or equal to 1 month and physical findings of severe muscle depletion to temple and clavicle area and severe fat depletion to orbital area  Comments: add Boost breeze while on clear liquids; advance diet to regular as medically appropriate     Recent Labs   Lab 12/03/24  0033   GLU  159*     Comments on Glucose: elevated; likely related to steroids due to respiratory failure/COPD    Nutrition Prescription / Recommendations  Recommendation/Intervention: Advance diet to regular as medically appropriate. Add Boost Breeze all meals.  Goals: po intakes 50% during admission  Nutrition Goal Status: new  Current Diet Order: clear liquids  Oral Nutrition Supplement: add boost breeze all meals while on clear liquids  Chewing or Swallowing Difficulty?: No Chewing or swallowing difficulty  Recommended Diet: Regular  Recommended Oral Supplement: Boost Breeze [250kcals, 9g Protein, 54g Carbs] with meals  Is Nutrition Support Recommended: [facility] Support: No  Is Nutrition Education Recommended: No    Monitor and Evaluation  % current Intake: Unknown/ No P.O. intake documented  % intake to meet estimated needs: P.O. + Supplements  Food and Nutrient Intake: energy intake, food and beverage intake  Food and Nutrient Adminstration: diet order  Anthropometric Measurements: height/length, weight, weight change  Biochemical Data, Medical Tests and Procedures: electrolyte and renal panel, gastrointestinal profile, glucose/endocrine profile, inflammatory profile, lipid profile  Nutrition-Focused Physical Findings: overall appearance       Current Medical Diagnosis and Past Medical History  Diagnosis: pulmonary disease, cancer diagnosis/related complications  Past Medical History:   Diagnosis Date    Centrilobular emphysema 08/19/2024    follows with Dr. Varghese Mccauley    Chronic respiratory failure with hypoxia and hypercapnia     on home oxygen but hasn't been using it    Cigarette nicotine dependence with nicotine-induced disorder 11/09/2023    Dyshidrotic eczema 08/10/2021    Neoplastic malignant related fatigue 08/16/2024    Paroxysmal atrial fibrillation 08/08/2024    Right bundle branch block 12/11/2023    Right heart failure due to pulmonary hypertension     RVSP  65 mmHg   EF 55%    Scabies      Squamous cell carcinoma of lung     follows with Dr. Kraft       Nutrition/Diet History  Food Allergies: NKFA  Factors Affecting Nutritional Intake: decreased appetite, clear liquid diet, other (see comments) (SOB)    Lab/Procedures/Meds  Recent Labs   Lab 12/03/24  0033      K 4.4   BUN 32*   CREATININE 1.02   CALCIUM 8.6   ALBUMIN 2.5*      ALT 9   AST 18   Alb low; patient with hx PCM and poor intakes; chronic illness COPD/cancer  Last A1c:   Lab Results   Component Value Date    HGBA1C 5.3 12/03/2024     Lab Results   Component Value Date    RBC 3.61 (L) 12/03/2024    HGB 9.1 (L) 12/03/2024    HCT 31.6 (L) 12/03/2024    MCV 87.5 12/03/2024    MCH 25.2 (L) 12/03/2024    MCHC 28.8 (L) 12/03/2024     Pertinent Labs Reviewed: reviewed  Pertinent Medications Reviewed: reviewed  Scheduled Meds:   albuterol-ipratropium  3 mL Nebulization Q6H    apixaban  5 mg Oral BID    budesonide  0.5 mg Nebulization Q12H    buPROPion  100 mg Oral BID    mupirocin   Nasal BID    piperacillin-tazobactam (Zosyn) IV (PEDS and ADULTS) (extended infusion is not appropriate)  4.5 g Intravenous Q8H    predniSONE  40 mg Oral Daily    [START ON 12/4/2024] vancomycin (VANCOCIN) IV (PEDS and ADULTS)  1,000 mg Intravenous Q24H     Continuous Infusions:  PRN Meds:.  Current Facility-Administered Medications:     acetaminophen, 650 mg, Rectal, Q6H PRN    acetaminophen, 1,000 mg, Oral, Q6H PRN    albuterol sulfate, 2.5 mg, Nebulization, Q4H PRN    aluminum & magnesium hydroxide-simethicone, 30 mL, Oral, Q6H PRN    bisacodyL, 10 mg, Oral, Daily PRN    dextromethorphan-guaiFENesin  mg/5 ml, 10 mL, Oral, Q6H PRN    diphenhydrAMINE, 50 mg, Oral, Q6H PRN    docusate sodium, 100 mg, Oral, BID PRN    melatonin, 6 mg, Oral, Nightly PRN    ondansetron, 8 mg, Intravenous, Q6H PRN    sodium chloride 0.9%, 3 mL, Intravenous, PRN    traZODone, 50 mg, Oral, Nightly PRN    vancomycin - pharmacy to dose, , Intravenous, pharmacy to manage  "frequency    Anthropometrics  Temp: 97.7 °F (36.5 °C)  Height: 5' 6" (167.6 cm)  Height (inches): 66 in  Weight Method: Stated  Weight: 48.1 kg (106 lb)  Weight (lb): 106 lb  Ideal Body Weight (IBW), Female: 130 lb  % Ideal Body Weight, Female (lb): 81.54 %  BMI (Calculated): 17.1       Estimated/Assessed Needs      Temp: 97.7 °F (36.5 °C)   Weight Used For Calorie Calculations: 48.1 kg (106 lb 0.7 oz)   Energy Need Method: Kcal/kg Energy Calorie Requirements (kcal): 1015-4983  Weight Used For Protein Calculations: 59 kg (130 lb)  Protein Requirements: 59-71  Estimated Fluid Requirement Method: RDA Method    RDA Method (mL): 1443       Nutrition by Nursing              Last Bowel Movement: 12/01/24                Nutrition Follow-Up  RD Follow-up?: Yes      Nutrition Discharge Planning: rd following for dc needs          Available via Secure Chat  "

## 2024-12-03 NOTE — ED PROVIDER NOTES
Encounter Date: 2024    SCRIBE #1 NOTE: I, Brenda Greer, am scribing for, and in the presence of,  Allen Lockhart MD.       History     Chief Complaint   Patient presents with    Fall    Hypoxia     The 74 y.o. Female pt presents to the ED via EMS from [facility] with c/o Fall and Hypoxia. The EMS reports she said she fell from her bed and was found by a CNA. The EMS also reports the pt  said she fell and hurt her right wrist and right thigh. Pt is orientated to place and is very pale. Pt's Mhx consists of  Right bundle branch block, COPD (chronic obstructive pulmonary disease), and  Squamous cell carcinoma of lung. When examined pt is pale and has poor skin turgor.     The history is provided by the patient and the EMS personnel. No  was used.     Review of patient's allergies indicates:  No Known Allergies  Past Medical History:   Diagnosis Date    Centrilobular emphysema 2024    follows with Dr. Varghese Mccauley    Chronic respiratory failure with hypoxia and hypercapnia     on home oxygen but hasn't been using it    Cigarette nicotine dependence with nicotine-induced disorder 2023    Dyshidrotic eczema 08/10/2021    Neoplastic malignant related fatigue 2024    Paroxysmal atrial fibrillation 2024    Right bundle branch block 2023    Right heart failure due to pulmonary hypertension     RVSP  65 mmHg   EF 55%    Scabies     Squamous cell carcinoma of lung     follows with Dr. Kraft     Past Surgical History:   Procedure Laterality Date    APPENDECTOMY       SECTION  1980    2x    INSERTION OF VENOUS ACCESS PORT       Family History   Problem Relation Name Age of Onset    Cancer Father      Cancer Maternal Grandmother      Heart disease Paternal Grandmother      Cancer Maternal Aunt       Social History     Tobacco Use    Smoking status: Former     Current packs/day: 1.00     Types: Cigarettes    Smokeless tobacco: Never   Substance Use Topics     Alcohol use: Never    Drug use: Never     Review of Systems   Constitutional:  Negative for chills, fatigue and fever.   Eyes:  Negative for pain.   Respiratory:  Negative for shortness of breath.    Cardiovascular:  Negative for chest pain.   Gastrointestinal:  Negative for nausea.   Endocrine: Negative for polyuria.   Genitourinary:  Negative for difficulty urinating.   Musculoskeletal:  Negative for gait problem and joint swelling.   Allergic/Immunologic: Negative.    All other systems reviewed and are negative.      Physical Exam     Initial Vitals   BP Pulse Resp Temp SpO2   12/03/24 0001 12/02/24 2357 12/03/24 0001 12/03/24 0001 12/02/24 2357   (!) 83/50 102 18 97.7 °F (36.5 °C) (!) 77 %      MAP       --                Physical Exam    Nursing note and vitals reviewed.  Constitutional: She appears well-developed and well-nourished.   HENT:   Head: Normocephalic and atraumatic.   Eyes: EOM are normal. Pupils are equal, round, and reactive to light.   Neck: Neck supple. No thyromegaly present.   Normal range of motion.  Cardiovascular:  Normal rate, regular rhythm, normal heart sounds and intact distal pulses.           No murmur heard.  Pulmonary/Chest: Breath sounds normal. No respiratory distress. She has no wheezes.   Abdominal: Abdomen is soft. Bowel sounds are normal. She exhibits no distension. There is no abdominal tenderness.   Musculoskeletal:         General: No tenderness or edema. Normal range of motion.      Cervical back: Normal range of motion and neck supple.     Lymphadenopathy:     She has no cervical adenopathy.   Neurological: She is alert and oriented to person, place, and time. She has normal strength. No cranial nerve deficit or sensory deficit.   Skin: Skin is warm and dry. No rash noted. There is pallor.   Pt has poor skin turgor.    Psychiatric: She has a normal mood and affect.         ED Course   Procedures  Labs Reviewed   COMPREHENSIVE METABOLIC PANEL - Abnormal       Result Value     Sodium 137      Potassium 4.4      Chloride 100      CO2 27      Anion Gap 14      Glucose 159 (*)     BUN 32 (*)     Creatinine 1.02      BUN/Creatinine Ratio 31 (*)     Calcium 8.6      Total Protein 6.9      Albumin 2.5 (*)     Globulin 4.4 (*)     A/G Ratio 0.6      Bilirubin, Total 0.2      Alk Phos 83      ALT 9      AST 18      eGFR 58 (*)    URINALYSIS, REFLEX TO URINE CULTURE - Abnormal    Color, UA Yellow      Clarity, UA Turbid      pH, UA 5.5      Leukocytes, UA Negative      Nitrites, UA Negative      Protein, UA 30 (*)     Glucose, UA 30 (*)     Ketones, UA Negative      Urobilinogen, UA 2 (*)     Bilirubin, UA Negative      Blood, UA Negative      Specific Gravity, UA 1.019     CBC WITH DIFFERENTIAL - Abnormal    WBC 17.44 (*)     RBC 3.61 (*)     Hemoglobin 9.1 (*)     Hematocrit 31.6 (*)     MCV 87.5      MCH 25.2 (*)     MCHC 28.8 (*)     RDW 16.9 (*)     Platelet Count 491 (*)     MPV 9.4      Neutrophils % 92.0 (*)     Lymphocytes % 3.6 (*)     Monocytes % 3.8      Eosinophils % 0.1 (*)     Basophils % 0.2      Immature Granulocytes % 0.3      nRBC, Auto 0.0      Neutrophils, Abs 16.04 (*)     Lymphocytes, Absolute 0.62 (*)     Monocytes, Absolute 0.67      Eosinophils, Absolute 0.01      Basophils, Absolute 0.04      Immature Granulocytes, Absolute 0.06 (*)     nRBC, Absolute 0.00      Diff Type Auto     TROPONIN I - Abnormal    Troponin I High Sensitivity 31.5 (*)    URINALYSIS, MICROSCOPIC - Abnormal    WBC, UA 2      RBC, UA 1      Bacteria, UA Few (*)     Squamous Epithelial Cells, UA Occasional (*)     Hyaline Casts, UA 0-2 (*)     Mucous Occasional (*)    APTT - Abnormal    PTT 39.7 (*)    TROPONIN I - Abnormal    Troponin I High Sensitivity 53.9 (*)    PROTIME-INR - Abnormal    PT 20.5 (*)     INR 1.78     NT-PRO NATRIURETIC PEPTIDE - Abnormal    ProBNP 22,803 (*)    PREALBUMIN - Abnormal    Prealbumin <3 (*)    D DIMER, QUANTITATIVE - Abnormal    D-Dimer 0.93 (*)    INFLUENZA A & B BY  MOLECULAR - Normal    INFLUENZA A MOLECULAR Negative      INFLUENZA B MOLECULAR  Negative     LACTIC ACID, PLASMA - Normal    Lactic Acid 2.1     SARS-COV-2 RNA AMPLIFICATION, QUAL - Normal    SARS COV-2 Molecular Negative      Narrative:     Negative SARS-CoV results should not be used as the sole basis for treatment or patient management decisions; negative results should be considered in the context of a patient's recent exposures, history and the presene of clinical signs and symptoms consistent with COVID-19.  Negative results should be treated as presumptive and confirmed by molecular assay, if necessary for patient management.   MAGNESIUM - Normal    Magnesium 2.1     LACTIC ACID, PLASMA - Normal    Lactic Acid 1.8     CBC W/ AUTO DIFFERENTIAL    Narrative:     The following orders were created for panel order CBC auto differential.  Procedure                               Abnormality         Status                     ---------                               -----------         ------                     CBC with Differential[[id number]]       Abnormal            Final result                 Please view results for these tests on the individual orders.   HEMOGLOBIN A1C    Hemoglobin A1C 5.3      Estimated Average Glucose 105       EKG Readings: (Independently Interpreted)   Initial Reading: No STEMI. Heart Rate: 98 bpm.   Sinus arhythmia  Rightward axis  Right bundle branch block  Abnormal ECG       Imaging Results               X-Ray Chest AP Portable (Final result)  Result time 12/03/24 00:26:22      Final result by Denis Solomon MD (12/03/24 00:26:22)                   Impression:      Increasing airspace disease most prominent at the right hilum and right upper lobe.  See above comments.  Follow-up recommended.    This report was flagged in Epic as abnormal.      Electronically signed by: Denis Solomon  Date:    12/03/2024  Time:    00:26               Narrative:    EXAMINATION:  XR CHEST AP  PORTABLE    CLINICAL HISTORY:  Sepsis;    TECHNIQUE:  Single frontal view of the chest was performed.    COMPARISON:  11/18/2024    FINDINGS:  Right subclavian central venous port catheter is unchanged.    Increased right upper lobe airspace disease could represent consolidation and infiltrate associated with infection/pneumonia.  Underlying neoplasm or hilar adenopathy are considerations also.  Follow-up recommended.    Mild diffuse interstitial infiltrates elsewhere bilaterally.  No large effusion.  No evidence of pneumothorax.    Right mainstem bronchus stent is unchanged.    Bilateral paratracheal probable airspace disease is increased from the prior study.    The cardiac silhouette is normal in size. The hilar and mediastinal contours are unremarkable.    Bones are intact.                                    X-Rays:   Independently Interpreted Readings:   Other Readings:  Details      Reading Physician Reading Date Result Priority  Denis Solomon MD  [phone] 12/3/2024 STAT    Narrative & Impression  EXAMINATION:  XR CHEST AP PORTABLE     CLINICAL HISTORY:  Sepsis;     TECHNIQUE:  Single frontal view of the chest was performed.     COMPARISON:  11/18/2024     FINDINGS:  Right subclavian central venous port catheter is unchanged.     Increased right upper lobe airspace disease could represent consolidation and infiltrate associated with infection/pneumonia.  Underlying neoplasm or hilar adenopathy are considerations also.  Follow-up recommended.     Mild diffuse interstitial infiltrates elsewhere bilaterally.  No large effusion.  No evidence of pneumothorax.     Right mainstem bronchus stent is unchanged.     Bilateral paratracheal probable airspace disease is increased from the prior study.     The cardiac silhouette is normal in size. The hilar and mediastinal contours are unremarkable.     Bones are intact.     Impression:     Increasing airspace disease most prominent at the right hilum and right upper  lobe.  See above comments.  Follow-up recommended.     This report was flagged in Epic as abnormal.        Electronically signed by:Denis Damian  Date:                                            12/03/2024  Time:                                           00:26      Exam Ended: 12/03/24 00:13 CST Last Resulted: 12/03/24 00:26 CST      Order Details        View Encounter        Lab and Collection Details        Routing        Result History    View All Conversations on this Encounter            Medications   piperacillin-tazobactam (ZOSYN) 4.5 g in D5W 100 mL IVPB (MB+) (0 g Intravenous Stopped 12/3/24 0121)   sodium chloride 0.9% bolus 500 mL 500 mL (0 mLs Intravenous Stopped 12/3/24 0121)   albuterol-ipratropium 2.5 mg-0.5 mg/3 mL nebulizer solution 3 mL (3 mLs Nebulization Given 12/3/24 0014)   vancomycin (VANCOCIN) 1,000 mg in 0.9% NaCl 250 mL IVPB (admixture device) (0 mg Intravenous Stopped 12/3/24 0257)   mupirocin 2 % ointment ( Nasal Given 12/7/24 2114)   predniSONE tablet 40 mg (40 mg Oral Given 12/7/24 0911)   furosemide injection 40 mg (40 mg Intravenous Given 12/5/24 1059)   potassium bicarbonate disintegrating tablet 40 mEq (40 mEq Oral Given 12/5/24 1529)   potassium chloride SA CR tablet 40 mEq (40 mEq Oral Given 12/7/24 1230)   potassium chloride 10 mEq in 100 mL IVPB (10 mEq Intravenous New Bag 12/8/24 1515)     Medical Decision Making  Amount and/or Complexity of Data Reviewed  Labs: ordered.  Radiology: ordered.    Risk  Prescription drug management.  Decision regarding hospitalization.  Risk Details: Hypoxic respiratory failure    Critical Care  Total time providing critical care: 30 minutes              Attending Attestation:           Physician Attestation for Scribe:  Physician Attestation Statement for Scribe #1: I, Allen Lockhart MD, reviewed documentation, as scribed by Brenda Greer in my presence, and it is both accurate and complete.             ED Course as of 12/11/24 0934   Mon  Dec 02, 2024   2357 Patient given 150 mL of normal saline prior to arrival out of a L bag.  The rest of the us back it was being put on a pressure bag to continue [PK]   Tue Dec 03, 2024   0001 Reviewed external records showing this notation from pulmonologist note from November 4th: adenocarcinoma, status post EBUS bronchoscopy in November of 2023, history of right mainstem obstruction with right upper lobe infiltrate, status post stent placement August of 2024 [PK]   0049 12/03/24 0028  X-Ray Chest AP Portable  Performed: 12/03/24 0013  Final         Impression: Increasing airspace disease most prominent at the right hilum and right upper lobe. See above comments. Follow-up recommended. This report was flagged in Epic as abnormal. Electronically signed b...       [CM]   0258 Discussed with the hospitalist about need to hospitalize for sepsis pneumonia and increased oxygen requirement with chronic hypoxic respiratory failure.  Well-known to Dr. Castellon.  Has bronchial obstruction chronic infiltrate.  Presents from the nursing home with hypotension and we have had a put her on a non-rebreather which has been titrated down to a 50% Venturi mask.  Patient also was toxic appearing upon presentation with blood pressure very low but improved after IV fluids.  lactic acid was less than 2.  Patient is full code [PK]      ED Course User Index  [CM] Brenda Greer  [PK] Allen Lockhart MD                             Clinical Impression:  Final diagnoses:  [R53.1] General weakness  [J18.9] Pneumonia  [J96.21, J96.22] Acute on chronic respiratory failure with hypoxia and hypercapnia [J96.21, J96.22] (Primary)  [J44.1] COPD exacerbation [J44.1]  [G93.41] Acute metabolic encephalopathy [G93.41]  [J18.9] Postobstructive pneumonia [J18.9]  [I27.29, I50.810] Right heart failure due to pulmonary hypertension [I27.29, I50.810]  [I48.0] Paroxysmal atrial fibrillation [I48.0]  [E43] Severe protein-calorie malnutrition [E43]  [C34.90]  Squamous cell carcinoma of lung, unspecified laterality [C34.90]          ED Disposition Condition    Admit                 Allen Lockhart MD  12/11/24 0929

## 2024-12-03 NOTE — ASSESSMENT & PLAN NOTE
Nutrition consulted. Most recent weight and BMI monitored-     Measurements:  Wt Readings from Last 1 Encounters:   12/03/24 48.1 kg (106 lb)   Body mass index is 17.11 kg/m².    Patient has been screened and assessed by RD.    Malnutrition Type:  Context:    Level:      Malnutrition Characteristic Summary:       Interventions/Recommendations (treatment strategy):

## 2024-12-03 NOTE — H&P
[facility] - Emergency Department  [facility] Medicine  History & Physical    Patient Name: Mayra Kelly  MRN: [id number]  Patient Class: IP- Inpatient  Admission Date: 12/2/2024  Attending Physician: Adolfo Woodson MD   Primary Care Provider: Darlene Campoverde NP         Patient information was obtained from EMS personnel, nursing home, past medical records, and ER records.     Subjective:     Principal Problem:Acute on chronic respiratory failure with hypoxia and hypercapnia    Chief Complaint:   Chief Complaint   Patient presents with    Fall    Hypoxia        HPI: 75 yo F presents to [facility] ED with decreased LOC and increased oxygen demand from [facility].  She is listed as a full code on the paperwork but she is a frail (17 BMI) elderly lady with lung cancer followed by Dr. Kraft and Dr. Mccauley.  She is on high flow NRB and oxygenating at 94% upon arrival.  Switched over to venti mask at 50% and ABG normal pH 7.36 pCO2 55 pO2 57 with a sat of 88%.  She is normally on 2L at NH but she doesn't wear it all the time.      Patient is unable to give a history . She responds to commands and tries to answer questions but mostly mumbles.  I have reviewed Dr. Mccauley's office note from 11/18 and the hospital dc summary from [facility] two months ago when she had LLL pneumonia and MRSA.  She now has prominent infiltrate RML and RLL and could be aspiration or postobstruction but  ED has covered with vanco and zosyn prior to blood cultures.  I will obtain sputum cultures.  She has been admitted with COPD pathway.  Her lactic acid is 1.2 and is hemodynamically stable.  She has wheezing and coarse breath sounds.      See assessment and plan below for problem based evaluation        Past Medical History:   Diagnosis Date    Centrilobular emphysema 08/19/2024    follows with Dr. Varghese Mccauley    Chronic respiratory failure with hypoxia and hypercapnia     on home oxygen but hasn't been using it    Cigarette  nicotine dependence with nicotine-induced disorder 2023    Dyshidrotic eczema 08/10/2021    Neoplastic malignant related fatigue 2024    Paroxysmal atrial fibrillation 2024    Right bundle branch block 2023    Right heart failure due to pulmonary hypertension     RVSP  65 mmHg   EF 55%    Scabies     Squamous cell carcinoma of lung     follows with Dr. Kraft       Past Surgical History:   Procedure Laterality Date    APPENDECTOMY       SECTION  1980    2x    INSERTION OF VENOUS ACCESS PORT         Review of patient's allergies indicates:  No Known Allergies    No current facility-administered medications on file prior to encounter.     Current Outpatient Medications on File Prior to Encounter   Medication Sig    traZODone (DESYREL) 50 MG tablet Take 50 mg by mouth every evening.    albuterol (PROVENTIL) 2.5 mg /3 mL (0.083 %) nebulizer solution Take 3 mLs (2.5 mg total) by nebulization every 4 (four) hours as needed (shortness of breath). Rescue    albuterol-ipratropium (DUO-NEB) 2.5 mg-0.5 mg/3 mL nebulizer solution Take 3 mLs by nebulization every 6 (six) hours as needed for Wheezing. Rescue    buPROPion (WELLBUTRIN SR) 100 MG TBSR 12 hr tablet Take 1 tablet (100 mg total) by mouth 2 (two) times daily.    ELIQUIS 5 mg Tab Take 1 tablet (5 mg total) by mouth 2 (two) times daily.    melatonin (MELATIN) 3 mg tablet Take 2 tablets (6 mg total) by mouth nightly as needed for Insomnia.    nicotine (NICODERM CQ) 7 mg/24 hr Place 1 patch onto the skin. (Patient not taking: Reported on 2024)    potassium chloride SA (K-DUR,KLOR-CON) 20 MEQ tablet Take 1 tablet (20 mEq total) by mouth 2 (two) times daily.    sodium chloride for inhalation (SODIUM CHLORIDE 0.9%) 0.9 % nebulizer solution Take 3 mLs by nebulization every 8 (eight) hours.     Family History       Problem Relation (Age of Onset)    Cancer Father, Maternal Grandmother, Maternal Aunt    Heart disease Paternal Grandmother           Tobacco Use    Smoking status: Former     Current packs/day: 1.00     Types: Cigarettes    Smokeless tobacco: Never   Substance and Sexual Activity    Alcohol use: Never    Drug use: Never    Sexual activity: Not on file     Review of Systems   Unable to perform ROS: Acuity of condition     Objective:     Vital Signs (Most Recent):  Temp: 97.7 °F (36.5 °C) (12/03/24 0001)  Pulse: 78 (12/03/24 0416)  Resp: 15 (12/03/24 0416)  BP: 114/71 (12/03/24 0416)  SpO2: 95 % (12/03/24 0416) Vital Signs (24h Range):  Temp:  [97.7 °F (36.5 °C)] 97.7 °F (36.5 °C)  Pulse:  [] 78  Resp:  [14-20] 15  SpO2:  [77 %-100 %] 95 %  BP: ()/(50-76) 114/71     Weight: 48.1 kg (106 lb)  Body mass index is 17.11 kg/m².     Physical Exam  Vitals and nursing note reviewed. Exam conducted with a chaperone present.   Constitutional:       General: She is not in acute distress.     Appearance: She is cachectic. She is ill-appearing. She is not toxic-appearing or diaphoretic.      Interventions: Face mask in place.   HENT:      Head: Atraumatic.      Mouth/Throat:      Mouth: Mucous membranes are moist.      Pharynx: Oropharynx is clear.   Eyes:      Conjunctiva/sclera: Conjunctivae normal.      Pupils: Pupils are equal, round, and reactive to light.   Neck:      Vascular: No carotid bruit.   Cardiovascular:      Rate and Rhythm: Regular rhythm. Tachycardia present.      Pulses: Normal pulses.      Heart sounds: Normal heart sounds.   Pulmonary:      Effort: Respiratory distress present.      Breath sounds: Wheezing and rhonchi present. No rales.   Abdominal:      General: Abdomen is flat. Bowel sounds are normal. There is no distension.      Palpations: Abdomen is soft.      Tenderness: There is no abdominal tenderness. There is no guarding.   Musculoskeletal:         General: No deformity or signs of injury. Normal range of motion.      Cervical back: Neck supple.      Right lower leg: No edema.      Left lower leg: No edema.    Skin:     General: Skin is warm and dry.      Coloration: Skin is not jaundiced or pale.      Findings: No bruising, lesion or rash.   Neurological:      General: No focal deficit present.      Mental Status: She is easily aroused. She is lethargic and disoriented.              CRANIAL NERVES     CN III, IV, VI   Pupils are equal, round, and reactive to light.       Significant Labs: All pertinent labs within the past 24 hours have been reviewed.    Significant Imaging: I have reviewed all pertinent imaging results/findings within the past 24 hours.  Assessment/Plan:     * Acute on chronic respiratory failure with hypoxia and hypercapnia  Patient with Hypercapnic and Hypoxic Respiratory failure which is Acute on chronic.  she is on home oxygen at 2 LPM. Supplemental oxygen was provided and noted- Oxygen Concentration (%):  [50] 50    .   Signs/symptoms of respiratory failure include- tachypnea, wheezing, and lethargy. Contributing diagnoses includes - COPD, Pneumonia, and lung cancer  Labs and images were reviewed. Patient Has recent ABG, which has been reviewed. Will treat underlying causes and adjust management of respiratory failure as follows-     Patient is chronically CO2 retainer and since pH is normal most likely not hypercapnea but more hypoxia from PNA and COPD.      Patient is full code on her NH paperwork and cannot make decisions at this time.  Well known to Dr. Mccauley    COPD exacerbation  Patient's COPD is with exacerbation noted by continued dyspnea and worsening of baseline hypoxia currently.  Patient is currently on COPD Pathway. Continue scheduled inhalers Steroids, Antibiotics, and Supplemental oxygen and monitor respiratory status closely.     Acute metabolic encephalopathy  Will provide supportive environment and hopefully mental status will return to baseline once underlying condition improves.          Postobstructive pneumonia  Patient has a diagnosis of pneumonia. The cause of the pneumonia  is suspected to be bacterial in etiology but organism is not known. The pneumonia is stable. The patient has the following signs/symptoms of pneumonia: persistent hypoxia , cough, sputum production, and shortness of breath. The patient does have a current oxygen requirement and the patient does have a home oxygen requirement. I have reviewed the pertinent imaging. The following cultures have been collected: Blood cultures and Sputum culture The culture results are listed below.     Current antimicrobial regimen consists of the antibiotics listed below. Will monitor patient closely and continue current treatment plan unchanged.    Antibiotics (From admission, onward)      Start     Stop Route Frequency Ordered    12/04/24 0100  vancomycin (VANCOCIN) 1,000 mg in 0.9% NaCl 250 mL IVPB (admixture device)         -- IV Every 24 hours (non-standard times) 12/03/24 0348    12/03/24 0900  mupirocin 2 % ointment         12/08/24 0859 Nasl 2 times daily 12/03/24 0307    12/03/24 0447  vancomycin - pharmacy to dose         -- IV pharmacy to manage frequency 12/03/24 0348    12/03/24 0400  piperacillin-tazobactam (ZOSYN) 4.5 g in D5W 100 mL IVPB (MB+)         -- IV Every 8 hours (non-standard times) 12/03/24 0327            Microbiology Results (last 7 days)       Procedure Component Value Units Date/Time    Influenza A & B by Molecular [[id number]]  (Normal) Collected: 12/03/24 0053    Order Status: Completed Specimen: Nasopharyngeal Swab Updated: 12/03/24 0148     INFLUENZA A MOLECULAR Negative     INFLUENZA B MOLECULAR  Negative    Blood culture x two cultures. Draw prior to antibiotics. [[id number]] Collected: 12/03/24 0043    Order Status: Sent Specimen: Blood Updated: 12/03/24 0108    Blood culture x two cultures. Draw prior to antibiotics. [[id number]] Collected: 12/03/24 0033    Order Status: Sent Specimen: Blood Updated: 12/03/24 0107            May need pulmonary consult for bronch to clear airway if does not improve  with conservative medical management.      Right heart failure due to pulmonary hypertension      Left Ventricle: The left ventricle is normal in size. Increased wall thickness. There is concentric remodeling. There is normal systolic function. Biplane (2D) method of discs ejection fraction is 65%.    Right Ventricle: Severe right ventricular enlargement. Systolic function could not be assesed.    Right Atrium: Right atrium is severely dilated.    Aortic Valve: The aortic valve is a trileaflet valve. Moderately calcified cusps. Mildly restricted motion. There is mild stenosis. Aortic valve area by VTI is 1.60 cm². Aortic valve peak velocity is 2.90 m/s. Mean gradient is 16 mmHg. The dimensionless index is 0.46.    Mitral Valve: There is bileaflet sclerosis. Moderately thickened anterior leaflet. There is no stenosis. The mean pressure gradient across the mitral valve is 1 mmHg at a heart rate of  bpm.    Tricuspid Valve: There is moderate regurgitation.    Pulmonary Artery: There is pulmonary hypertension. The estimated pulmonary artery systolic pressure is 65 mmHg.    IVC/SVC: Elevated venous pressure at 15 mmHg.      8/8/24   Monitor I/Os closely and volume status.      May have demand ischemia vs chronic elevation in trop.  Second trop pending for trend.  EKG without acute ischemia.     Paroxysmal atrial fibrillation  Patient has long standing persistent (>12 months) atrial fibrillation. Patient is currently in sinus rhythm. [id number] Score: 2. The patients heart rate in the last 24 hours is as follows:  Pulse  Min: 77  Max: 102     Antiarrhythmics       Anticoagulants  apixaban tablet 5 mg, 2 times daily, Oral    Plan  - Replete lytes with a goal of K>4, Mg >2  - Patient is anticoagulated, see medications listed above.  - Patient's afib is currently controlled  - Continue eliquis and if tolerated a low dose cardizem      Severe protein-calorie malnutrition  Nutrition consulted. Most recent weight and BMI  monitored-     Measurements:  Wt Readings from Last 1 Encounters:   12/03/24 48.1 kg (106 lb)   Body mass index is 17.11 kg/m².    Patient has been screened and assessed by RD.    Malnutrition Type:  Context:    Level:      Malnutrition Characteristic Summary:       Interventions/Recommendations (treatment strategy):           VTE Risk Mitigation (From admission, onward)           Ordered     apixaban tablet 5 mg  2 times daily         12/03/24 0308                               Pharmacy consulted to assist with the management of vancomycin in this patient to treat: pneumonia    Patient weight: 48.1 kg  Patient CrCl: ~55 ml/min    Doses given prior to consult: 1000mg x1 in ER    Will begin vancomycin: 1000mg every 24 hours    Vancomycin level ordered before 4th dose.     Pharmacy will continue to monitor and make adjustments as needed.      Thank you for the consult,    Judy Esparza, PharmD  [phone]    Jina Jay MD  Department of Hospital Medicine  [facility] - Emergency Department

## 2024-12-03 NOTE — PROGRESS NOTES
Pharmacy consulted to assist with the management of vancomycin in this patient to treat: pneumonia    Patient weight: 48.1 kg  Patient CrCl: ~55 ml/min    Doses given prior to consult: 1000mg x1 in ER    Will begin vancomycin: 1000mg every 24 hours    Vancomycin level ordered before 4th dose.     Pharmacy will continue to monitor and make adjustments as needed.      Thank you for the consult,    Judy Esparza, PharmD  [phone]

## 2024-12-03 NOTE — ASSESSMENT & PLAN NOTE
Patient has long standing persistent (>12 months) atrial fibrillation. Patient is currently in sinus rhythm. [id number] Score: 2. The patients heart rate in the last 24 hours is as follows:  Pulse  Min: 77  Max: 102     Antiarrhythmics       Anticoagulants  apixaban tablet 5 mg, 2 times daily, Oral    Plan  - Replete lytes with a goal of K>4, Mg >2  - Patient is anticoagulated, see medications listed above.  - Patient's afib is currently controlled  - Continue eliquis and if tolerated a low dose cardizem

## 2024-12-03 NOTE — SUBJECTIVE & OBJECTIVE
Past Medical History:   Diagnosis Date    Centrilobular emphysema 2024    follows with Dr. Varghese Mccauley    Chronic respiratory failure with hypoxia and hypercapnia     on home oxygen but hasn't been using it    Cigarette nicotine dependence with nicotine-induced disorder 2023    Dyshidrotic eczema 08/10/2021    Neoplastic malignant related fatigue 2024    Paroxysmal atrial fibrillation 2024    Right bundle branch block 2023    Right heart failure due to pulmonary hypertension     RVSP  65 mmHg   EF 55%    Scabies     Squamous cell carcinoma of lung     follows with Dr. Kraft       Past Surgical History:   Procedure Laterality Date    APPENDECTOMY       SECTION  1980    2x    INSERTION OF VENOUS ACCESS PORT         Review of patient's allergies indicates:  No Known Allergies    No current facility-administered medications on file prior to encounter.     Current Outpatient Medications on File Prior to Encounter   Medication Sig    traZODone (DESYREL) 50 MG tablet Take 50 mg by mouth every evening.    albuterol (PROVENTIL) 2.5 mg /3 mL (0.083 %) nebulizer solution Take 3 mLs (2.5 mg total) by nebulization every 4 (four) hours as needed (shortness of breath). Rescue    albuterol-ipratropium (DUO-NEB) 2.5 mg-0.5 mg/3 mL nebulizer solution Take 3 mLs by nebulization every 6 (six) hours as needed for Wheezing. Rescue    buPROPion (WELLBUTRIN SR) 100 MG TBSR 12 hr tablet Take 1 tablet (100 mg total) by mouth 2 (two) times daily.    ELIQUIS 5 mg Tab Take 1 tablet (5 mg total) by mouth 2 (two) times daily.    melatonin (MELATIN) 3 mg tablet Take 2 tablets (6 mg total) by mouth nightly as needed for Insomnia.    nicotine (NICODERM CQ) 7 mg/24 hr Place 1 patch onto the skin. (Patient not taking: Reported on 2024)    potassium chloride SA (K-DUR,KLOR-CON) 20 MEQ tablet Take 1 tablet (20 mEq total) by mouth 2 (two) times daily.    sodium chloride for inhalation (SODIUM CHLORIDE 0.9%)  0.9 % nebulizer solution Take 3 mLs by nebulization every 8 (eight) hours.     Family History       Problem Relation (Age of Onset)    Cancer Father, Maternal Grandmother, Maternal Aunt    Heart disease Paternal Grandmother          Tobacco Use    Smoking status: Former     Current packs/day: 1.00     Types: Cigarettes    Smokeless tobacco: Never   Substance and Sexual Activity    Alcohol use: Never    Drug use: Never    Sexual activity: Not on file     Review of Systems   Unable to perform ROS: Acuity of condition     Objective:     Vital Signs (Most Recent):  Temp: 97.7 °F (36.5 °C) (12/03/24 0001)  Pulse: 78 (12/03/24 0416)  Resp: 15 (12/03/24 0416)  BP: 114/71 (12/03/24 0416)  SpO2: 95 % (12/03/24 0416) Vital Signs (24h Range):  Temp:  [97.7 °F (36.5 °C)] 97.7 °F (36.5 °C)  Pulse:  [] 78  Resp:  [14-20] 15  SpO2:  [77 %-100 %] 95 %  BP: ()/(50-76) 114/71     Weight: 48.1 kg (106 lb)  Body mass index is 17.11 kg/m².     Physical Exam  Vitals and nursing note reviewed. Exam conducted with a chaperone present.   Constitutional:       General: She is not in acute distress.     Appearance: She is cachectic. She is ill-appearing. She is not toxic-appearing or diaphoretic.      Interventions: Face mask in place.   HENT:      Head: Atraumatic.      Mouth/Throat:      Mouth: Mucous membranes are moist.      Pharynx: Oropharynx is clear.   Eyes:      Conjunctiva/sclera: Conjunctivae normal.      Pupils: Pupils are equal, round, and reactive to light.   Neck:      Vascular: No carotid bruit.   Cardiovascular:      Rate and Rhythm: Regular rhythm. Tachycardia present.      Pulses: Normal pulses.      Heart sounds: Normal heart sounds.   Pulmonary:      Effort: Respiratory distress present.      Breath sounds: Wheezing and rhonchi present. No rales.   Abdominal:      General: Abdomen is flat. Bowel sounds are normal. There is no distension.      Palpations: Abdomen is soft.      Tenderness: There is no abdominal  tenderness. There is no guarding.   Musculoskeletal:         General: No deformity or signs of injury. Normal range of motion.      Cervical back: Neck supple.      Right lower leg: No edema.      Left lower leg: No edema.   Skin:     General: Skin is warm and dry.      Coloration: Skin is not jaundiced or pale.      Findings: No bruising, lesion or rash.   Neurological:      General: No focal deficit present.      Mental Status: She is easily aroused. She is lethargic and disoriented.              CRANIAL NERVES     CN III, IV, VI   Pupils are equal, round, and reactive to light.       Significant Labs: All pertinent labs within the past 24 hours have been reviewed.    Significant Imaging: I have reviewed all pertinent imaging results/findings within the past 24 hours.

## 2024-12-03 NOTE — ASSESSMENT & PLAN NOTE
Left Ventricle: The left ventricle is normal in size. Increased wall thickness. There is concentric remodeling. There is normal systolic function. Biplane (2D) method of discs ejection fraction is 65%.    Right Ventricle: Severe right ventricular enlargement. Systolic function could not be assesed.    Right Atrium: Right atrium is severely dilated.    Aortic Valve: The aortic valve is a trileaflet valve. Moderately calcified cusps. Mildly restricted motion. There is mild stenosis. Aortic valve area by VTI is 1.60 cm². Aortic valve peak velocity is 2.90 m/s. Mean gradient is 16 mmHg. The dimensionless index is 0.46.    Mitral Valve: There is bileaflet sclerosis. Moderately thickened anterior leaflet. There is no stenosis. The mean pressure gradient across the mitral valve is 1 mmHg at a heart rate of  bpm.    Tricuspid Valve: There is moderate regurgitation.    Pulmonary Artery: There is pulmonary hypertension. The estimated pulmonary artery systolic pressure is 65 mmHg.    IVC/SVC: Elevated venous pressure at 15 mmHg.      8/8/24   Monitor I/Os closely and volume status.      May have demand ischemia vs chronic elevation in trop.  Second trop pending for trend.  EKG without acute ischemia.

## 2024-12-03 NOTE — ASSESSMENT & PLAN NOTE
Patient has a diagnosis of pneumonia. The cause of the pneumonia is suspected to be bacterial in etiology but organism is not known. The pneumonia is stable. The patient has the following signs/symptoms of pneumonia: persistent hypoxia , cough, sputum production, and shortness of breath. The patient does have a current oxygen requirement and the patient does have a home oxygen requirement. I have reviewed the pertinent imaging. The following cultures have been collected: Blood cultures and Sputum culture The culture results are listed below.     Current antimicrobial regimen consists of the antibiotics listed below. Will monitor patient closely and continue current treatment plan unchanged.    Antibiotics (From admission, onward)      Start     Stop Route Frequency Ordered    12/04/24 0100  vancomycin (VANCOCIN) 1,000 mg in 0.9% NaCl 250 mL IVPB (admixture device)         -- IV Every 24 hours (non-standard times) 12/03/24 0348    12/03/24 0900  mupirocin 2 % ointment         12/08/24 0859 Nasl 2 times daily 12/03/24 0307    12/03/24 0447  vancomycin - pharmacy to dose         -- IV pharmacy to manage frequency 12/03/24 0348    12/03/24 0400  piperacillin-tazobactam (ZOSYN) 4.5 g in D5W 100 mL IVPB (MB+)         -- IV Every 8 hours (non-standard times) 12/03/24 0327            Microbiology Results (last 7 days)       Procedure Component Value Units Date/Time    Influenza A & B by Molecular [[id number]]  (Normal) Collected: 12/03/24 0053    Order Status: Completed Specimen: Nasopharyngeal Swab Updated: 12/03/24 0148     INFLUENZA A MOLECULAR Negative     INFLUENZA B MOLECULAR  Negative    Blood culture x two cultures. Draw prior to antibiotics. [[id number]] Collected: 12/03/24 0043    Order Status: Sent Specimen: Blood Updated: 12/03/24 0108    Blood culture x two cultures. Draw prior to antibiotics. [[id number]] Collected: 12/03/24 0033    Order Status: Sent Specimen: Blood Updated: 12/03/24 0107            May need  pulmonary consult for bronch to clear airway if does not improve with conservative medical management.

## 2024-12-03 NOTE — ED NOTES
"1239  Pt pulling venti mask off and desatting in the 50's. Pt states " I didn't do it" when asked. Notified Dr Woodson. Rec'd orders for ABG and HFNC    1350 Pt pulled PIV out.     1630 Notified DR Woodson of BP trend  "

## 2024-12-03 NOTE — PLAN OF CARE
Patient continues to meet ASPEN criteria for severe protein calorie malnutrition related to energy intakes less than 75% for greater than or equal to 1 month, and physical findings of muscle and fat loss to orbital, clavicle and temple regions related to chronic and catabolic illness.      Recommend to advance diet to regular as medically appropriate. Add Boost Breeze all meals while on clear liquids. Encourage po intakes as tolerated. RD Following.

## 2024-12-04 PROBLEM — I27.20 PULMONARY HYPERTENSION: Status: ACTIVE | Noted: 2024-12-04

## 2024-12-04 LAB
ANION GAP SERPL CALCULATED.3IONS-SCNC: 12 MMOL/L (ref 7–16)
BASOPHILS # BLD AUTO: 0.02 K/UL (ref 0–0.2)
BASOPHILS NFR BLD AUTO: 0.1 % (ref 0–1)
BUN SERPL-MCNC: 20 MG/DL (ref 10–20)
BUN SERPL-MCNC: 20 MG/DL (ref 10–20)
BUN/CREAT SERPL: 27 (ref 6–20)
BUN/CREAT SERPL: 29 (ref 6–20)
CALCIUM SERPL-MCNC: 9.2 MG/DL (ref 8.4–10.2)
CHLORIDE SERPL-SCNC: 95 MMOL/L (ref 98–107)
CO2 SERPL-SCNC: 32 MMOL/L (ref 23–31)
CREAT SERPL-MCNC: 0.7 MG/DL (ref 0.55–1.02)
CREAT SERPL-MCNC: 0.73 MG/DL (ref 0.55–1.02)
DIFFERENTIAL METHOD BLD: ABNORMAL
EGFR (NO RACE VARIABLE) (RUSH/TITUS): 87 ML/MIN/1.73M2
EGFR (NO RACE VARIABLE) (RUSH/TITUS): 91 ML/MIN/1.73M2
EOSINOPHIL # BLD AUTO: 0 K/UL (ref 0–0.5)
EOSINOPHIL NFR BLD AUTO: 0 % (ref 1–4)
ERYTHROCYTE [DISTWIDTH] IN BLOOD BY AUTOMATED COUNT: 16.7 % (ref 11.5–14.5)
GLUCOSE SERPL-MCNC: 110 MG/DL (ref 82–115)
HCO3 UR-SCNC: 35.6 MMOL/L (ref 21–28)
HCT VFR BLD AUTO: 33.5 % (ref 38–47)
HGB BLD-MCNC: 9.6 G/DL (ref 12–16)
IMM GRANULOCYTES # BLD AUTO: 0.09 K/UL (ref 0–0.04)
IMM GRANULOCYTES NFR BLD: 0.5 % (ref 0–0.4)
LYMPHOCYTES # BLD AUTO: 1.13 K/UL (ref 1–4.8)
LYMPHOCYTES NFR BLD AUTO: 6.7 % (ref 27–41)
MCH RBC QN AUTO: 24.2 PG (ref 27–31)
MCHC RBC AUTO-ENTMCNC: 28.7 G/DL (ref 32–36)
MCV RBC AUTO: 84.6 FL (ref 80–96)
MONOCYTES # BLD AUTO: 0.34 K/UL (ref 0–0.8)
MONOCYTES NFR BLD AUTO: 2 % (ref 2–6)
MPC BLD CALC-MCNC: 9.8 FL (ref 9.4–12.4)
NEUTROPHILS # BLD AUTO: 15.26 K/UL (ref 1.8–7.7)
NEUTROPHILS NFR BLD AUTO: 90.7 % (ref 53–65)
NRBC # BLD AUTO: 0 X10E3/UL
NRBC, AUTO (.00): 0 %
PCO2 BLDA: 50 MMHG (ref 35–48)
PH SMN: 7.46 [PH] (ref 7.35–7.45)
PLATELET # BLD AUTO: 568 K/UL (ref 150–400)
PO2 BLDA: 78 MMHG (ref 83–108)
POC BASE EXCESS: 10.2 MMOL/L (ref -2–3)
POC SATURATED O2: 96 % (ref 95–98)
POTASSIUM SERPL-SCNC: 3.4 MMOL/L (ref 3.5–5.1)
RBC # BLD AUTO: 3.96 M/UL (ref 4.2–5.4)
SODIUM SERPL-SCNC: 136 MMOL/L (ref 136–145)
WBC # BLD AUTO: 16.84 K/UL (ref 4.5–11)

## 2024-12-04 PROCEDURE — 51702 INSERT TEMP BLADDER CATH: CPT

## 2024-12-04 PROCEDURE — 20000000 HC ICU ROOM

## 2024-12-04 PROCEDURE — 82803 BLOOD GASES ANY COMBINATION: CPT

## 2024-12-04 PROCEDURE — 27000221 HC OXYGEN, UP TO 24 HOURS

## 2024-12-04 PROCEDURE — 25000003 PHARM REV CODE 250: Performed by: STUDENT IN AN ORGANIZED HEALTH CARE EDUCATION/TRAINING PROGRAM

## 2024-12-04 PROCEDURE — 99900035 HC TECH TIME PER 15 MIN (STAT)

## 2024-12-04 PROCEDURE — 25000003 PHARM REV CODE 250

## 2024-12-04 PROCEDURE — 36415 COLL VENOUS BLD VENIPUNCTURE: CPT | Performed by: STUDENT IN AN ORGANIZED HEALTH CARE EDUCATION/TRAINING PROGRAM

## 2024-12-04 PROCEDURE — 99291 CRITICAL CARE FIRST HOUR: CPT | Mod: ,,, | Performed by: STUDENT IN AN ORGANIZED HEALTH CARE EDUCATION/TRAINING PROGRAM

## 2024-12-04 PROCEDURE — 84520 ASSAY OF UREA NITROGEN: CPT | Performed by: STUDENT IN AN ORGANIZED HEALTH CARE EDUCATION/TRAINING PROGRAM

## 2024-12-04 PROCEDURE — 25000003 PHARM REV CODE 250: Performed by: HOSPITALIST

## 2024-12-04 PROCEDURE — 85025 COMPLETE CBC W/AUTO DIFF WBC: CPT | Performed by: STUDENT IN AN ORGANIZED HEALTH CARE EDUCATION/TRAINING PROGRAM

## 2024-12-04 PROCEDURE — 94640 AIRWAY INHALATION TREATMENT: CPT

## 2024-12-04 PROCEDURE — 80048 BASIC METABOLIC PNL TOTAL CA: CPT | Performed by: STUDENT IN AN ORGANIZED HEALTH CARE EDUCATION/TRAINING PROGRAM

## 2024-12-04 PROCEDURE — 25000242 PHARM REV CODE 250 ALT 637 W/ HCPCS: Performed by: HOSPITALIST

## 2024-12-04 PROCEDURE — 87641 MR-STAPH DNA AMP PROBE: CPT | Performed by: STUDENT IN AN ORGANIZED HEALTH CARE EDUCATION/TRAINING PROGRAM

## 2024-12-04 PROCEDURE — 36600 WITHDRAWAL OF ARTERIAL BLOOD: CPT

## 2024-12-04 PROCEDURE — 63600175 PHARM REV CODE 636 W HCPCS: Performed by: STUDENT IN AN ORGANIZED HEALTH CARE EDUCATION/TRAINING PROGRAM

## 2024-12-04 PROCEDURE — 94761 N-INVAS EAR/PLS OXIMETRY MLT: CPT

## 2024-12-04 PROCEDURE — 63600175 PHARM REV CODE 636 W HCPCS: Performed by: HOSPITALIST

## 2024-12-04 RX ORDER — DEXMEDETOMIDINE HYDROCHLORIDE 4 UG/ML
0-1.4 INJECTION, SOLUTION INTRAVENOUS CONTINUOUS
Status: DISCONTINUED | OUTPATIENT
Start: 2024-12-04 | End: 2024-12-09 | Stop reason: HOSPADM

## 2024-12-04 RX ORDER — NOREPINEPHRINE BITARTRATE/D5W 4MG/250ML
0-3 PLASTIC BAG, INJECTION (ML) INTRAVENOUS CONTINUOUS
Status: DISCONTINUED | OUTPATIENT
Start: 2024-12-04 | End: 2024-12-09

## 2024-12-04 RX ORDER — FUROSEMIDE 10 MG/ML
40 INJECTION INTRAMUSCULAR; INTRAVENOUS EVERY 12 HOURS
Status: DISCONTINUED | OUTPATIENT
Start: 2024-12-04 | End: 2024-12-04

## 2024-12-04 RX ADMIN — BUPROPION HYDROCHLORIDE 100 MG: 100 TABLET, FILM COATED, EXTENDED RELEASE ORAL at 09:12

## 2024-12-04 RX ADMIN — APIXABAN 5 MG: 5 TABLET, FILM COATED ORAL at 08:12

## 2024-12-04 RX ADMIN — ACETYLCYSTEINE 4 ML: 200 SOLUTION ORAL; RESPIRATORY (INHALATION) at 07:12

## 2024-12-04 RX ADMIN — APIXABAN 5 MG: 5 TABLET, FILM COATED ORAL at 09:12

## 2024-12-04 RX ADMIN — IPRATROPIUM BROMIDE AND ALBUTEROL SULFATE 3 ML: .5; 3 SOLUTION RESPIRATORY (INHALATION) at 06:12

## 2024-12-04 RX ADMIN — IPRATROPIUM BROMIDE AND ALBUTEROL SULFATE 3 ML: .5; 3 SOLUTION RESPIRATORY (INHALATION) at 12:12

## 2024-12-04 RX ADMIN — FUROSEMIDE 40 MG: 10 INJECTION, SOLUTION INTRAMUSCULAR; INTRAVENOUS at 04:12

## 2024-12-04 RX ADMIN — DEXMEDETOMIDINE HYDROCHLORIDE 0.2 MCG/KG/HR: 4 INJECTION, SOLUTION INTRAVENOUS at 09:12

## 2024-12-04 RX ADMIN — PIPERACILLIN SODIUM AND TAZOBACTAM SODIUM 4.5 G: 4; .5 INJECTION, POWDER, LYOPHILIZED, FOR SOLUTION INTRAVENOUS at 05:12

## 2024-12-04 RX ADMIN — BUPROPION HYDROCHLORIDE 100 MG: 100 TABLET, FILM COATED, EXTENDED RELEASE ORAL at 08:12

## 2024-12-04 RX ADMIN — BUDESONIDE 0.5 MG: 0.5 INHALANT RESPIRATORY (INHALATION) at 07:12

## 2024-12-04 RX ADMIN — MUPIROCIN: 20 OINTMENT TOPICAL at 09:12

## 2024-12-04 RX ADMIN — IPRATROPIUM BROMIDE AND ALBUTEROL SULFATE 3 ML: .5; 3 SOLUTION RESPIRATORY (INHALATION) at 07:12

## 2024-12-04 RX ADMIN — IPRATROPIUM BROMIDE AND ALBUTEROL SULFATE 3 ML: .5; 3 SOLUTION RESPIRATORY (INHALATION) at 01:12

## 2024-12-04 RX ADMIN — NOREPINEPHRINE BITARTRATE 0.02 MCG/KG/MIN: 4 INJECTION, SOLUTION INTRAVENOUS at 06:12

## 2024-12-04 RX ADMIN — PIPERACILLIN SODIUM AND TAZOBACTAM SODIUM 4.5 G: 4; .5 INJECTION, POWDER, LYOPHILIZED, FOR SOLUTION INTRAVENOUS at 12:12

## 2024-12-04 RX ADMIN — PIPERACILLIN SODIUM AND TAZOBACTAM SODIUM 4.5 G: 4; .5 INJECTION, POWDER, LYOPHILIZED, FOR SOLUTION INTRAVENOUS at 08:12

## 2024-12-04 RX ADMIN — MUPIROCIN: 20 OINTMENT TOPICAL at 08:12

## 2024-12-04 RX ADMIN — PREDNISONE 40 MG: 10 TABLET ORAL at 08:12

## 2024-12-04 NOTE — ASSESSMENT & PLAN NOTE
Patient with Hypercapnic and Hypoxic Respiratory failure which is Acute on chronic.  she is on home oxygen at 2 LPM. Supplemental oxygen was provided and noted- Oxygen Concentration (%):  [] 46    .   Signs/symptoms of respiratory failure include- tachypnea, wheezing, and lethargy. Contributing diagnoses includes - COPD, Pneumonia, and lung cancer  Labs and images were reviewed. Patient Has recent ABG, which has been reviewed. Will treat underlying causes and adjust management of respiratory failure as follows-     Patient is chronically CO2 retainer and since pH is normal most likely not hypercapnea but more hypoxia from PNA and COPD.      Patient is full code on her NH paperwork and cannot make decisions at this time.  Well known to Dr. Mccauley    12/3:  Likely related to lung cancer, COPD, postobstructive pneumonia.  Will aggressively treat all of these.  Patient has respiratory failure and severe hypoxia may patient unsafe for the floor per nursing.  She was removing her oxygen but desats to the 40s and 50s.

## 2024-12-04 NOTE — PLAN OF CARE
Problem: COPD (Chronic Obstructive Pulmonary Disease)  Goal: Optimal Chronic Illness Coping  Outcome: Progressing  Intervention: Support and Optimize Psychosocial Response  Flowsheets (Taken 12/3/2024 2038)  Supportive Measures: active listening utilized  Family/Support System Care: caregiver stress acknowledged  Life Transition/Adjustment: decision-making facilitated  Goal: Optimal Level of Functional [place]  Outcome: Progressing  Intervention: Optimize Functional Ability  Flowsheets (Taken 12/3/2024 2038)  Self-Care Promotion: independence encouraged  Activity Management:   Heel slide - L1   Leg kicks - L2  Environmental Support: calm environment promoted  Goal: Absence of Infection Signs and Symptoms  Outcome: Progressing  Goal: Improved Oral Intake  Outcome: Progressing  Intervention: Promote and Optimize Oral Intake  Flowsheets (Taken 12/3/2024 2038)  Oral Nutrition Promotion: adaptive equipment use encouraged  Nutrition Interventions: diet adjusted  Goal: Effective Oxygenation and Ventilation  Outcome: Progressing  Intervention: Promote Airway Secretion Clearance  Flowsheets (Taken 12/3/2024 2038)  Breathing Techniques/Airway Clearance: deep/controlled cough encouraged  Cough And Deep Breathing: done independently per patient  Activity Management:   Heel slide - L1   Leg kicks - L2  Intervention: Optimize Oxygenation and Ventilation  Flowsheets (Taken 12/3/2024 2038)  Airway/Ventilation Management: airway patency maintained  Fluid/Electrolyte Management: intravenous fluids adjusted  Head of Bed (HOB) Positioning: HOB at 30 degrees     Problem: Infection  Goal: Absence of Infection Signs and Symptoms  Outcome: Progressing  Intervention: Prevent or Manage Infection  Flowsheets (Taken 12/3/2024 2038)  Fever Reduction/Comfort Measures: aerosol temperature decreased  Infection Management: aseptic technique maintained  Isolation Precautions: precautions maintained      Render In Strict Bullet Format?: No Detail Level: Generalized Initiate Treatment: Apply ketoconazole shampoo to the scalp, leave on for 5-10 mins then rinse\\nApply fluocinonide solution to the scalp qd

## 2024-12-04 NOTE — PLAN OF CARE
Problem: Adult Inpatient Plan of Care  Goal: Plan of Care Review  Outcome: Progressing  Goal: Patient-Specific Goal (Individualized)  Outcome: Progressing  Goal: Absence of Hospital-Acquired Illness or Injury  Outcome: Progressing  Goal: Optimal Comfort and Wellbeing  Outcome: Progressing  Goal: Readiness for Transition of Care  Outcome: Progressing     Problem: COPD (Chronic Obstructive Pulmonary Disease)  Goal: Optimal Chronic Illness Coping  Outcome: Progressing  Goal: Optimal Level of Functional [place]  Outcome: Progressing  Goal: Absence of Infection Signs and Symptoms  Outcome: Progressing  Goal: Improved Oral Intake  Outcome: Progressing  Goal: Effective Oxygenation and Ventilation  Outcome: Progressing     Problem: Skin Injury Risk Increased  Goal: Skin Health and Integrity  Outcome: Progressing     Problem: Gas Exchange Impaired  Goal: Optimal Gas Exchange  Outcome: Progressing     Problem: Pneumonia  Goal: Fluid Balance  Outcome: Progressing  Goal: Resolution of Infection Signs and Symptoms  Outcome: Progressing  Goal: Effective Oxygenation and Ventilation  Outcome: Progressing  Intervention: Promote Airway Secretion Clearance  Flowsheets (Taken 12/4/2024 1024)  Breathing Techniques/Airway Clearance: deep/controlled cough encouraged     Problem: Infection  Goal: Absence of Infection Signs and Symptoms  Outcome: Progressing     Problem: Noninvasive Ventilation Acute  Goal: Effective Unassisted Ventilation and Oxygenation  Outcome: Progressing  Intervention: Monitor and Manage Noninvasive Ventilation  Flowsheets (Taken 12/4/2024 1024)  Airway/Ventilation Management:   airway patency maintained   pulmonary hygiene promoted  NPPV/CPAP Maintenance:   skin (device) pressure points assessed   skin-to-device protection utilized     Problem: Mechanical Ventilation Invasive  Goal: Absence of Ventilator-Induced Lung Injury  Outcome: Progressing

## 2024-12-04 NOTE — ASSESSMENT & PLAN NOTE
Patient diagnosed with lung cancer in 2023.    She follows up with Dr. Kraft as well as Dr. Castellon.    She is status post stent placement enter right main bronchus in August of 2024.

## 2024-12-04 NOTE — CARE UPDATE
Case discussed with nursing in the ED and 5N.  Patient is removing her oxygen (face mask and [place]).  When she does this, her oxygen level decreases below 70.  Patient is intermittently confused.  Transfer to ICU for respiratory failure and requiring closer nursing monitoring.    She has extensive pulmonary history with recurrent lung cancer diagnosed in 2023 and stent placement to R mainstem.     Transfer orders placed for ICU.  Requested that an ICU downgrade patient be placed in the patient's bed - [phone].

## 2024-12-04 NOTE — PROGRESS NOTES
[facility]  [facility] Care    Patient Name:  Mayra Kelly   MRN:  [id number]  Date: 12/4/2024  Diagnosis: Acute on chronic respiratory failure with hypoxia and hypercapnia    History:     Past Medical History:   Diagnosis Date    Centrilobular emphysema 08/19/2024    follows with Dr. 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 Kraft       Social History     Socioeconomic History    Marital status:    Tobacco Use    Smoking status: Former     Current packs/day: 1.00     Types: Cigarettes    Smokeless tobacco: Never   Substance and Sexual Activity    Alcohol use: Never    Drug use: Never     Social Drivers of Health     Financial Resource Strain: Patient Unable To Answer (12/3/2024)    Overall Financial Resource Strain (CARDIA)     Difficulty of Paying Living Expenses: Patient unable to answer   Food Insecurity: Patient Unable To Answer (12/3/2024)    Hunger Vital Sign     Worried About Running Out of Food in the Last Year: Patient unable to answer     Ran Out of Food in the Last Year: Patient unable to answer   Transportation Needs: Patient Unable To Answer (12/3/2024)    TRANSPORTATION NEEDS     Transportation : Patient unable to answer   Physical Activity: Inactive (8/16/2024)    Exercise Vital Sign     Days of Exercise per Week: 0 days     Minutes of Exercise per Session: 0 min   Stress: Patient Unable To Answer (12/3/2024)    [employer] [place] - Occupational Stress Questionnaire     Feeling of Stress : Patient  unable to answer   Housing Stability: Patient Unable To Answer (12/3/2024)    Housing Stability Vital Sign     Unable to Pay for Housing in the Last Year: Patient unable to answer     Homeless in the Last Year: Patient unable to answer       Precautions:     Allergies as of 12/02/2024    (No Known Allergies)       [facility] Assessment Details/Treatment        Narrative: Seen patient for initial preventative skin care measures.  Foam borders noted to bilateral heels and sacral. No redness or open wounds noted.  Consult wound care of any findings.    12/04/2024

## 2024-12-04 NOTE — SUBJECTIVE & OBJECTIVE
Interval History:     Review of Systems   Unable to perform ROS: Acuity of condition     Objective:     Vital Signs (Most Recent):  Temp: 98.1 °F (36.7 °C) (12/03/24 1952)  Pulse: 76 (12/03/24 2027)  Resp: 13 (12/03/24 2027)  BP: 111/65 (12/03/24 1952)  SpO2: (!) 94 % (12/03/24 1955) Vital Signs (24h Range):  Temp:  [97 °F (36.1 °C)-98.1 °F (36.7 °C)] 98.1 °F (36.7 °C)  Pulse:  [] 76  Resp:  [12-24] 13  SpO2:  [76 %-100 %] 94 %  BP: ()/(41-76) 111/65     Weight: 40.1 kg (88 lb 6.5 oz)  Body mass index is 14.27 kg/m².    Intake/Output Summary (Last 24 hours) at 12/3/2024 2122  Last data filed at 12/3/2024 1200  Gross per 24 hour   Intake 1000 ml   Output --   Net 1000 ml         Physical Exam  Vitals and nursing note reviewed. Exam conducted with a chaperone present.   Constitutional:       General: She is not in acute distress.     Appearance: She is cachectic. She is ill-appearing. She is not toxic-appearing or diaphoretic.      Interventions: Face mask in place.   HENT:      Head: Atraumatic.      Mouth/Throat:      Mouth: Mucous membranes are moist.      Pharynx: Oropharynx is clear.   Eyes:      Conjunctiva/sclera: Conjunctivae normal.      Pupils: Pupils are equal, round, and reactive to light.   Neck:      Vascular: No carotid bruit.   Cardiovascular:      Rate and Rhythm: Regular rhythm. Tachycardia present.      Pulses: Normal pulses.      Heart sounds: Normal heart sounds.   Pulmonary:      Effort: Respiratory distress present.      Breath sounds: Wheezing and rhonchi present. No rales.   Abdominal:      General: Abdomen is flat. Bowel sounds are normal. There is no distension.      Palpations: Abdomen is soft.      Tenderness: There is no abdominal tenderness. There is no guarding.   Musculoskeletal:         General: No deformity or signs of injury. Normal range of motion.      Cervical back: Neck supple.      Right lower leg: No edema.      Left lower leg: No edema.   Skin:     General: Skin  is warm and dry.      Coloration: Skin is not jaundiced or pale.      Findings: No bruising, lesion or rash.   Neurological:      General: No focal deficit present.      Mental Status: She is easily aroused. She is lethargic and disoriented.             Significant Labs: All pertinent labs within the past 24 hours have been reviewed.    Significant Imaging: I have reviewed all pertinent imaging results/findings within the past 24 hours.

## 2024-12-04 NOTE — ASSESSMENT & PLAN NOTE
Patient has a diagnosis of pneumonia. The cause of the pneumonia is suspected to be bacterial in etiology but organism is not known. The pneumonia is stable. The patient has the following signs/symptoms of pneumonia: persistent hypoxia , cough, sputum production, and shortness of breath. The patient does have a current oxygen requirement and the patient does have a home oxygen requirement. I have reviewed the pertinent imaging. The following cultures have been collected: Blood cultures and Sputum culture The culture results are listed below.     Current antimicrobial regimen consists of the antibiotics listed below. Will monitor patient closely and continue current treatment plan unchanged.    Antibiotics (From admission, onward)      Start     Stop Route Frequency Ordered    12/04/24 0100  vancomycin (VANCOCIN) 1,000 mg in 0.9% NaCl 250 mL IVPB (admixture device)         -- IV Every 24 hours (non-standard times) 12/03/24 0348    12/03/24 0900  mupirocin 2 % ointment         12/08/24 0859 Nasl 2 times daily 12/03/24 0307    12/03/24 0447  vancomycin - pharmacy to dose         -- IV pharmacy to manage frequency 12/03/24 0348    12/03/24 0400  piperacillin-tazobactam (ZOSYN) 4.5 g in D5W 100 mL IVPB (MB+)         -- IV Every 8 hours (non-standard times) 12/03/24 0327            Microbiology Results (last 7 days)       Procedure Component Value Units Date/Time    Culture, Lower Respiratory [[id number]]     Order Status: Sent Specimen: Respiratory     Influenza A & B by Molecular [[id number]]  (Normal) Collected: 12/03/24 0053    Order Status: Completed Specimen: Nasopharyngeal Swab Updated: 12/03/24 0148     INFLUENZA A MOLECULAR Negative     INFLUENZA B MOLECULAR  Negative    Blood culture x two cultures. Draw prior to antibiotics. [[id number]] Collected: 12/03/24 0043    Order Status: Sent Specimen: Blood Updated: 12/03/24 0108    Blood culture x two cultures. Draw prior to antibiotics. [[id number]] Collected:  12/03/24 0033    Order Status: Sent Specimen: Blood Updated: 12/03/24 0107            May need pulmonary consult for bronch to clear airway if does not improve with conservative medical management.      Mucomyst ordered.  Patient transferred to ICU and may require a bronch to help clear airways.

## 2024-12-04 NOTE — PROGRESS NOTES
[facility]  [facility] Note    Patient Name: Mayra Kelly  MRN: [id number]  Patient Class: IP- Inpatient   Admission Date: 12/2/2024  Length of Stay: 0 days  Attending Physician: Adolfo Woodson MD  Primary Care Provider: Darlene Campoverde NP        Subjective     Principal Problem:Acute on chronic respiratory failure with hypoxia and hypercapnia    HPI:  75 yo F presents to [facility] ED with decreased LOC and increased oxygen demand from [facility].  She is listed as a full code on the paperwork but she is a frail (17 BMI) elderly lady with lung cancer followed by Dr. Kraft and Dr. Mccauley.  She is on high flow NRB and oxygenating at 94% upon arrival.  Switched over to venti mask at 50% and ABG normal pH 7.36 pCO2 55 pO2 57 with a sat of 88%.  She is normally on 2L at NH but she doesn't wear it all the time.      Patient is unable to give a history . She responds to commands and tries to answer questions but mostly mumbles.  I have reviewed Dr. Mccauley's office note from 11/18 and the hospital dc summary from [facility] two months ago when she had LLL pneumonia and MRSA.  She now has prominent infiltrate RML and RLL and could be aspiration or postobstruction but  ED has covered with vanco and zosyn prior to blood cultures.  I will obtain sputum cultures.  She has been admitted with COPD pathway.  Her lactic acid is 1.2 and is hemodynamically stable.  She has wheezing and coarse breath sounds.      See assessment and plan below for problem based evaluation        Overview/Hospital Course:  No notes on file    Interval History:     Review of Systems   Unable to perform ROS: Acuity of condition     Objective:     Vital Signs (Most Recent):  Temp: 98.1 °F (36.7 °C) (12/03/24 1952)  Pulse: 76 (12/03/24 2027)  Resp: 13 (12/03/24 2027)  BP: 111/65 (12/03/24 1952)  SpO2: (!) 94 % (12/03/24 1955) Vital Signs (24h Range):  Temp:  [97 °F (36.1 °C)-98.1 °F (36.7 °C)] 98.1 °F (36.7  °C)  Pulse:  [] 76  Resp:  [12-24] 13  SpO2:  [76 %-100 %] 94 %  BP: ()/(41-76) 111/65     Weight: 40.1 kg (88 lb 6.5 oz)  Body mass index is 14.27 kg/m².    Intake/Output Summary (Last 24 hours) at 12/3/2024 2122  Last data filed at 12/3/2024 1200  Gross per 24 hour   Intake 1000 ml   Output --   Net 1000 ml         Physical Exam  Vitals and nursing note reviewed. Exam conducted with a chaperone present.   Constitutional:       General: She is not in acute distress.     Appearance: She is cachectic. She is ill-appearing. She is not toxic-appearing or diaphoretic.      Interventions: Face mask in place.   HENT:      Head: Atraumatic.      Mouth/Throat:      Mouth: Mucous membranes are moist.      Pharynx: Oropharynx is clear.   Eyes:      Conjunctiva/sclera: Conjunctivae normal.      Pupils: Pupils are equal, round, and reactive to light.   Neck:      Vascular: No carotid bruit.   Cardiovascular:      Rate and Rhythm: Regular rhythm. Tachycardia present.      Pulses: Normal pulses.      Heart sounds: Normal heart sounds.   Pulmonary:      Effort: Respiratory distress present.      Breath sounds: Wheezing and rhonchi present. No rales.   Abdominal:      General: Abdomen is flat. Bowel sounds are normal. There is no distension.      Palpations: Abdomen is soft.      Tenderness: There is no abdominal tenderness. There is no guarding.   Musculoskeletal:         General: No deformity or signs of injury. Normal range of motion.      Cervical back: Neck supple.      Right lower leg: No edema.      Left lower leg: No edema.   Skin:     General: Skin is warm and dry.      Coloration: Skin is not jaundiced or pale.      Findings: No bruising, lesion or rash.   Neurological:      General: No focal deficit present.      Mental Status: She is easily aroused. She is lethargic and disoriented.             Significant Labs: All pertinent labs within the past 24 hours have been reviewed.    Significant Imaging: I have  reviewed all pertinent imaging results/findings within the past 24 hours.    Assessment and Plan     * Acute on chronic respiratory failure with hypoxia and hypercapnia  Patient with Hypercapnic and Hypoxic Respiratory failure which is Acute on chronic.  she is on home oxygen at 2 LPM. Supplemental oxygen was provided and noted- Oxygen Concentration (%):  [] 46    .   Signs/symptoms of respiratory failure include- tachypnea, wheezing, and lethargy. Contributing diagnoses includes - COPD, Pneumonia, and lung cancer  Labs and images were reviewed. Patient Has recent ABG, which has been reviewed. Will treat underlying causes and adjust management of respiratory failure as follows-     Patient is chronically CO2 retainer and since pH is normal most likely not hypercapnea but more hypoxia from PNA and COPD.      Patient is full code on her NH paperwork and cannot make decisions at this time.  Well known to Dr. Mccauley    12/3:  Likely related to lung cancer, COPD, postobstructive pneumonia.  Will aggressively treat all of these.  Patient has respiratory failure and severe hypoxia may patient unsafe for the floor per nursing.  She was removing her oxygen but desats to the 40s and 50s.    Postobstructive pneumonia  Patient has a diagnosis of pneumonia. The cause of the pneumonia is suspected to be bacterial in etiology but organism is not known. The pneumonia is stable. The patient has the following signs/symptoms of pneumonia: persistent hypoxia , cough, sputum production, and shortness of breath. The patient does have a current oxygen requirement and the patient does have a home oxygen requirement. I have reviewed the pertinent imaging. The following cultures have been collected: Blood cultures and Sputum culture The culture results are listed below.     Current antimicrobial regimen consists of the antibiotics listed below. Will monitor patient closely and continue current treatment plan unchanged.    Antibiotics  (From admission, onward)      Start     Stop Route Frequency Ordered    12/04/24 0100  vancomycin (VANCOCIN) 1,000 mg in 0.9% NaCl 250 mL IVPB (admixture device)         -- IV Every 24 hours (non-standard times) 12/03/24 0348    12/03/24 0900  mupirocin 2 % ointment         12/08/24 0859 Nasl 2 times daily 12/03/24 0307    12/03/24 0447  vancomycin - pharmacy to dose         -- IV pharmacy to manage frequency 12/03/24 0348    12/03/24 0400  piperacillin-tazobactam (ZOSYN) 4.5 g in D5W 100 mL IVPB (MB+)         -- IV Every 8 hours (non-standard times) 12/03/24 0327            Microbiology Results (last 7 days)       Procedure Component Value Units Date/Time    Culture, Lower Respiratory [[id number]]     Order Status: Sent Specimen: Respiratory     Influenza A & B by Molecular [[id number]]  (Normal) Collected: 12/03/24 0053    Order Status: Completed Specimen: Nasopharyngeal Swab Updated: 12/03/24 0148     INFLUENZA A MOLECULAR Negative     INFLUENZA B MOLECULAR  Negative    Blood culture x two cultures. Draw prior to antibiotics. [[id number]] Collected: 12/03/24 0043    Order Status: Sent Specimen: Blood Updated: 12/03/24 0108    Blood culture x two cultures. Draw prior to antibiotics. [[id number]] Collected: 12/03/24 0033    Order Status: Sent Specimen: Blood Updated: 12/03/24 0107            May need pulmonary consult for bronch to clear airway if does not improve with conservative medical management.      Mucomyst ordered.  Patient transferred to ICU and may require a bronch to help clear airways.    COPD exacerbation  Patient's COPD is with exacerbation noted by continued dyspnea and worsening of baseline hypoxia currently.  Patient is currently on COPD Pathway. Continue scheduled inhalers Steroids, Antibiotics, and Supplemental oxygen and monitor respiratory status closely.     Lung cancer  Patient diagnosed with lung cancer in 2023.    She follows up with Dr. Kraft as well as Dr. Castellon.    She is status post  stent placement enter right main bronchus in August of 2024.      Acute metabolic encephalopathy  Will provide supportive environment and hopefully mental status will return to baseline once underlying condition improves.          Paroxysmal atrial fibrillation  Patient has long standing persistent (>12 months) atrial fibrillation. Patient is currently in sinus rhythm. [id number] Score: 2. The patients heart rate in the last 24 hours is as follows:  Pulse  Min: 77  Max: 102     Antiarrhythmics       Anticoagulants  apixaban tablet 5 mg, 2 times daily, Oral    Plan  - Replete lytes with a goal of K>4, Mg >2  - Patient is anticoagulated, see medications listed above.  - Patient's afib is currently controlled  - Continue eliquis and if tolerated a low dose cardizem      Right heart failure due to pulmonary hypertension      Left Ventricle: The left ventricle is normal in size. Increased wall thickness. There is concentric remodeling. There is normal systolic function. Biplane (2D) method of discs ejection fraction is 65%.    Right Ventricle: Severe right ventricular enlargement. Systolic function could not be assesed.    Right Atrium: Right atrium is severely dilated.    Aortic Valve: The aortic valve is a trileaflet valve. Moderately calcified cusps. Mildly restricted motion. There is mild stenosis. Aortic valve area by VTI is 1.60 cm². Aortic valve peak velocity is 2.90 m/s. Mean gradient is 16 mmHg. The dimensionless index is 0.46.    Mitral Valve: There is bileaflet sclerosis. Moderately thickened anterior leaflet. There is no stenosis. The mean pressure gradient across the mitral valve is 1 mmHg at a heart rate of  bpm.    Tricuspid Valve: There is moderate regurgitation.    Pulmonary Artery: There is pulmonary hypertension. The estimated pulmonary artery systolic pressure is 65 mmHg.    IVC/SVC: Elevated venous pressure at 15 mmHg.      8/8/24   Monitor I/Os closely and volume status.      May have demand  ischemia vs chronic elevation in trop.  Second trop pending for trend.  EKG without acute ischemia.     Centrilobular emphysema  Patient's COPD is with exacerbation noted by continued dyspnea, use of accessory muscles for breathing, and worsening of baseline hypoxia currently.  Patient is currently on COPD Pathway. Continue scheduled inhalers Steroids, Antibiotics, and Supplemental oxygen and monitor respiratory status closely.     Neoplastic malignant related fatigue  Patient with fatigue and failure to thrive.  She has a BMI of 14.    Severe protein-calorie malnutrition  Nutrition consulted. Most recent weight and BMI monitored-     Measurements:  Wt Readings from Last 1 Encounters:   12/03/24 48.1 kg (106 lb)   Body mass index is 17.11 kg/m².    Patient has been screened and assessed by RD.    Malnutrition Type:  Context:    Level:      Malnutrition Characteristic Summary:       Interventions/Recommendations (treatment strategy):           VTE Risk Mitigation (From admission, onward)           Ordered     apixaban tablet 5 mg  2 times daily         12/03/24 0308                    Discharge Planning   TERE:      Code Status: Full Code   Medical Readiness for Discharge Date:   Discharge Plan A: Return to nursing home, Skilled Nursing Facility                        Adolfo Woodson MD  Department of Hospital Medicine   [facility]

## 2024-12-04 NOTE — NURSING
Spoke to Dr. Woodson.  Patient is pulling at her high flow oxygen and  de-sats when off the high flow.  Patient would benefit from a bit closer monitoring.

## 2024-12-04 NOTE — PLAN OF CARE
Problem: Adult Inpatient Plan of Care  Goal: Plan of Care Review  12/4/2024 1024 by Niranjan Guzman, RRT  Outcome: Progressing  12/4/2024 1023 by Niranjan Guzman, RRT  Outcome: Progressing     Problem: COPD (Chronic Obstructive Pulmonary Disease)  Goal: Optimal Chronic Illness Coping  12/4/2024 1024 by Niranjan Guzman, RRT  Outcome: Progressing  12/4/2024 1023 by Niranjan Guzman, RRT  Outcome: Progressing     Problem: Gas Exchange Impaired  Goal: Optimal Gas Exchange  12/4/2024 1024 by Niranjan Guzman, RRT  Outcome: Progressing  12/4/2024 1023 by Niranjan Guzman, RRT  Outcome: Progressing     Problem: Pneumonia  Goal: Fluid Balance  12/4/2024 1024 by Niranjan Guzman, RRT  Outcome: Progressing  12/4/2024 1023 by Niranjan Guzman, RRT  Outcome: Progressing     Problem: Mechanical Ventilation Invasive  Goal: Absence of Ventilator-Induced Lung Injury  Outcome: Progressing

## 2024-12-04 NOTE — PLAN OF CARE
Problem: Gas Exchange Impaired  Goal: Optimal Gas Exchange  Outcome: Progressing     Problem: Noninvasive Ventilation Acute  Goal: Effective Unassisted Ventilation and Oxygenation  Outcome: Progressing

## 2024-12-05 LAB
AORTIC ROOT ANNULUS: 2.46 CM
AORTIC VALVE CUSP SEPERATION: 1.55 CM
APICAL FOUR CHAMBER EJECTION FRACTION: 54 %
AV INDEX (PROSTH): 0.43
AV MEAN GRADIENT: 14.9 MMHG
AV PEAK GRADIENT: 29.2 MMHG
AV VALVE AREA BY VELOCITY RATIO: 1.3 CM²
AV VALVE AREA: 1.4 CM²
AV VELOCITY RATIO: 0.41
BSA FOR ECHO PROCEDURE: 1.37 M2
BUN SERPL-MCNC: 20 MG/DL (ref 10–20)
BUN/CREAT SERPL: 26 (ref 6–20)
CREAT SERPL-MCNC: 0.77 MG/DL (ref 0.55–1.02)
CV ECHO LV RWT: 0.55 CM
DOP CALC AO PEAK VEL: 2.7 M/S
DOP CALC AO VTI: 55 CM
DOP CALC LVOT AREA: 3.1 CM2
DOP CALC LVOT DIAMETER: 2 CM
DOP CALC LVOT PEAK VEL: 1.1 M/S
DOP CALC LVOT STROKE VOLUME: 75 CM3
DOP CALC MV VTI: 36.5 CM
DOP CALCLVOT PEAK VEL VTI: 23.9 CM
E WAVE DECELERATION TIME: 289.45 MSEC
E/A RATIO: 0.75
E/E' RATIO: 10.13 M/S
ECHO LV POSTERIOR WALL: 1.1 CM (ref 0.6–1.1)
EGFR (NO RACE VARIABLE) (RUSH/TITUS): 82 ML/MIN/1.73M2
EJECTION FRACTION: 60 %
FRACTIONAL SHORTENING: 37.5 % (ref 28–44)
INTERVENTRICULAR SEPTUM: 1.1 CM (ref 0.6–1.1)
IVC DIAMETER: 2.18 CM
LEFT ATRIUM AREA SYSTOLIC (APICAL 4 CHAMBER): 11.89 CM2
LEFT ATRIUM SIZE: 2.85 CM
LEFT INTERNAL DIMENSION IN SYSTOLE: 2.5 CM (ref 2.1–4)
LEFT VENTRICLE DIASTOLIC VOLUME INDEX: 49.12 ML/M2
LEFT VENTRICLE DIASTOLIC VOLUME: 70.73 ML
LEFT VENTRICLE END DIASTOLIC VOLUME APICAL 4 CHAMBER: 44.69 ML
LEFT VENTRICLE END SYSTOLIC VOLUME APICAL 4 CHAMBER: 21.33 ML
LEFT VENTRICLE MASS INDEX: 101.1 G/M2
LEFT VENTRICLE SYSTOLIC VOLUME INDEX: 16 ML/M2
LEFT VENTRICLE SYSTOLIC VOLUME: 22.97 ML
LEFT VENTRICULAR INTERNAL DIMENSION IN DIASTOLE: 4 CM (ref 3.5–6)
LEFT VENTRICULAR MASS: 145.6 G
LV LATERAL E/E' RATIO: 8.44 M/S
LV SEPTAL E/E' RATIO: 12.67 M/S
LVED V (TEICH): 70.73 ML
LVES V (TEICH): 22.97 ML
LVOT MG: 2.74 MMHG
LVOT MV: 0.79 CM/S
METHICILLIN RESISTANT STAPHYLOCOCCUS AUREUS: NEGATIVE
MV MEAN GRADIENT: 2 MMHG
MV PEAK A VEL: 1.01 M/S
MV PEAK E VEL: 0.76 M/S
MV PEAK GRADIENT: 5 MMHG
MV STENOSIS PRESSURE HALF TIME: 90.41 MS
MV VALVE AREA BY CONTINUITY EQUATION: 2.06 CM2
MV VALVE AREA P 1/2 METHOD: 2.43 CM2
OHS CV RV/LV RATIO: 1.05 CM
PISA TR MAX VEL: 2.78 M/S
PV PEAK GRADIENT: 5 MMHG
PV PEAK VELOCITY: 1.17 M/S
RA MAJOR: 4.01 CM
RA PRESSURE ESTIMATED: 15 MMHG
RIGHT VENTRICLE DIASTOLIC BASEL DIMENSION: 4.2 CM
RIGHT VENTRICLE DIASTOLIC LENGTH: 4.2 CM
RIGHT VENTRICLE DIASTOLIC MID DIMENSION: 3.5 CM
RIGHT VENTRICULAR LENGTH IN DIASTOLE (APICAL 4-CHAMBER VIEW): 4.16 CM
RV MID DIAMA: 3.46 CM
RV TB RVSP: 18 MMHG
TDI LATERAL: 0.09 M/S
TDI SEPTAL: 0.06 M/S
TDI: 0.08 M/S
TR MAX PG: 31 MMHG
TRICUSPID ANNULAR PLANE SYSTOLIC EXCURSION: 1.61 CM
TV REST PULMONARY ARTERY PRESSURE: 46 MMHG
Z-SCORE OF LEFT VENTRICULAR DIMENSION IN END DIASTOLE: -0.9
Z-SCORE OF LEFT VENTRICULAR DIMENSION IN END SYSTOLE: -0.66

## 2024-12-05 PROCEDURE — 36415 COLL VENOUS BLD VENIPUNCTURE: CPT | Performed by: STUDENT IN AN ORGANIZED HEALTH CARE EDUCATION/TRAINING PROGRAM

## 2024-12-05 PROCEDURE — 63600175 PHARM REV CODE 636 W HCPCS: Performed by: HOSPITALIST

## 2024-12-05 PROCEDURE — 25000242 PHARM REV CODE 250 ALT 637 W/ HCPCS: Performed by: HOSPITALIST

## 2024-12-05 PROCEDURE — 25000003 PHARM REV CODE 250

## 2024-12-05 PROCEDURE — 25000242 PHARM REV CODE 250 ALT 637 W/ HCPCS

## 2024-12-05 PROCEDURE — 94640 AIRWAY INHALATION TREATMENT: CPT

## 2024-12-05 PROCEDURE — 27000221 HC OXYGEN, UP TO 24 HOURS

## 2024-12-05 PROCEDURE — 25000003 PHARM REV CODE 250: Performed by: HOSPITALIST

## 2024-12-05 PROCEDURE — 20000000 HC ICU ROOM

## 2024-12-05 PROCEDURE — 63600175 PHARM REV CODE 636 W HCPCS

## 2024-12-05 PROCEDURE — 84520 ASSAY OF UREA NITROGEN: CPT | Performed by: STUDENT IN AN ORGANIZED HEALTH CARE EDUCATION/TRAINING PROGRAM

## 2024-12-05 PROCEDURE — 99291 CRITICAL CARE FIRST HOUR: CPT | Mod: ,,, | Performed by: STUDENT IN AN ORGANIZED HEALTH CARE EDUCATION/TRAINING PROGRAM

## 2024-12-05 PROCEDURE — 94761 N-INVAS EAR/PLS OXIMETRY MLT: CPT

## 2024-12-05 PROCEDURE — 99900035 HC TECH TIME PER 15 MIN (STAT)

## 2024-12-05 RX ORDER — FUROSEMIDE 10 MG/ML
40 INJECTION INTRAMUSCULAR; INTRAVENOUS ONCE
Status: COMPLETED | OUTPATIENT
Start: 2024-12-05 | End: 2024-12-05

## 2024-12-05 RX ORDER — SODIUM CHLORIDE FOR INHALATION 3 %
4 VIAL, NEBULIZER (ML) INHALATION EVERY 8 HOURS
Status: DISCONTINUED | OUTPATIENT
Start: 2024-12-05 | End: 2024-12-09 | Stop reason: HOSPADM

## 2024-12-05 RX ORDER — IPRATROPIUM BROMIDE AND ALBUTEROL SULFATE 2.5; .5 MG/3ML; MG/3ML
3 SOLUTION RESPIRATORY (INHALATION) EVERY 8 HOURS
Status: DISCONTINUED | OUTPATIENT
Start: 2024-12-05 | End: 2024-12-09 | Stop reason: HOSPADM

## 2024-12-05 RX ADMIN — BUPROPION HYDROCHLORIDE 100 MG: 100 TABLET, FILM COATED, EXTENDED RELEASE ORAL at 08:12

## 2024-12-05 RX ADMIN — PIPERACILLIN SODIUM AND TAZOBACTAM SODIUM 4.5 G: 4; .5 INJECTION, POWDER, LYOPHILIZED, FOR SOLUTION INTRAVENOUS at 08:12

## 2024-12-05 RX ADMIN — SODIUM CHLORIDE SOLN NEBU 3% 4 ML: 3 NEBU SOLN at 03:12

## 2024-12-05 RX ADMIN — PIPERACILLIN SODIUM AND TAZOBACTAM SODIUM 4.5 G: 4; .5 INJECTION, POWDER, LYOPHILIZED, FOR SOLUTION INTRAVENOUS at 05:12

## 2024-12-05 RX ADMIN — PIPERACILLIN SODIUM AND TAZOBACTAM SODIUM 4.5 G: 4; .5 INJECTION, POWDER, LYOPHILIZED, FOR SOLUTION INTRAVENOUS at 11:12

## 2024-12-05 RX ADMIN — DEXMEDETOMIDINE HYDROCHLORIDE 0.3 MCG/KG/HR: 4 INJECTION, SOLUTION INTRAVENOUS at 06:12

## 2024-12-05 RX ADMIN — MUPIROCIN: 20 OINTMENT TOPICAL at 08:12

## 2024-12-05 RX ADMIN — PREDNISONE 40 MG: 10 TABLET ORAL at 08:12

## 2024-12-05 RX ADMIN — SODIUM CHLORIDE SOLN NEBU 3% 4 ML: 3 NEBU SOLN at 11:12

## 2024-12-05 RX ADMIN — SODIUM CHLORIDE 1000 MG: 9 INJECTION, SOLUTION INTRAVENOUS at 02:12

## 2024-12-05 RX ADMIN — BUDESONIDE 0.5 MG: 0.5 INHALANT RESPIRATORY (INHALATION) at 07:12

## 2024-12-05 RX ADMIN — FUROSEMIDE 40 MG: 10 INJECTION, SOLUTION INTRAMUSCULAR; INTRAVENOUS at 10:12

## 2024-12-05 RX ADMIN — IPRATROPIUM BROMIDE AND ALBUTEROL SULFATE 3 ML: .5; 3 SOLUTION RESPIRATORY (INHALATION) at 11:12

## 2024-12-05 RX ADMIN — ACETYLCYSTEINE 4 ML: 200 SOLUTION ORAL; RESPIRATORY (INHALATION) at 07:12

## 2024-12-05 RX ADMIN — IPRATROPIUM BROMIDE AND ALBUTEROL SULFATE 3 ML: .5; 3 SOLUTION RESPIRATORY (INHALATION) at 12:12

## 2024-12-05 RX ADMIN — APIXABAN 5 MG: 5 TABLET, FILM COATED ORAL at 08:12

## 2024-12-05 RX ADMIN — IPRATROPIUM BROMIDE AND ALBUTEROL SULFATE 3 ML: .5; 3 SOLUTION RESPIRATORY (INHALATION) at 03:12

## 2024-12-05 RX ADMIN — POTASSIUM BICARBONATE 40 MEQ: 782 TABLET, EFFERVESCENT ORAL at 03:12

## 2024-12-05 RX ADMIN — IPRATROPIUM BROMIDE AND ALBUTEROL SULFATE 3 ML: .5; 3 SOLUTION RESPIRATORY (INHALATION) at 07:12

## 2024-12-05 NOTE — PROGRESS NOTES
[facility] - Emergency Department  Adult Nutrition  Progress Note         Reason for Assessment  Reason For Assessment: RD follow-up   Nutrition Risk Screen: no indicators present    Assessment and Plan    12/5/2024: RD follow up. Patient advanced to a dysphagia mechanical soft diet. New diet order with no intake documented at this time. Recommend continue current diet order with Boost Breeze with meals. Encourage good PO intakes. Last BM 12/2 per flowsheet. RD following.     12/03/2024: Patient admitted 12/2 from [facility] with a dx of acute on chronic respiratory failure, COPD exacerbation, pneumonia, and squamous cell carcinoma of lung. Patient is ordered a clear liquid diet.     Patient is 48.1 kg with a BMI of 17.11 and underweight. Patient with hx of severe PCM related to weight loss, physical signs of wasting and poor intakes. Weight has stabilized over the past 3 months, but patient remains severely underweight. Patient continues to meet ASPEN criteria for severe protein calorie malnutrition related to energy intakes less than 75% for greater than or equal to 1 month, and physical findings of muscle and fat loss to orbital, clavicle and temple regions related to chronic and catabolic illness.     Recommend to advance diet to regular as medically appropriate. Add Boost Breeze all meals while on clear liquids. Encourage po intakes as tolerated. RD Following.       Learning Needs/Social Determinants of Health  Learning Assessment       08/15/2024 1217 [facility] HOW (8/15/2024 - 8/29/2024)   Created by Sydnee Goodwin, RN - RN (Nurse) Status: Complete                 PRIMARY LEARNER     Primary Learner Name:  tasha edwards  - 08/15/2024 1217    Relationship:  Patient  - 08/15/2024 1217    Does the primary learner have any barriers to learning?:  No Barriers  - 08/15/2024 1217    What is the preferred language of the primary learner?:  English  - 08/15/2024  1217    Is an  required?:  No [facility] 08/15/2024 1217    How does the primary learner prefer to learn new concepts?:  Listening, Reading, Demonstration [facility] 08/15/2024 1217    How often do you need to have someone help you read instructions, pamphlets, or written material from your doctor or pharmacy?:  Never [facility] 08/15/2024 1217        CO-LEARNER #1     Co-Learner Name (if applicable):  brian edwards [facility] 08/15/2024 1217    Relationship:  Family  - 08/15/2024 1217    Does the co-learner have any barriers to learning?:  No Barriers  - 08/15/2024 1217    What is the preferred language of the co-learner?:  English  - 08/15/2024 1217    Is an  required?:  No  - 08/15/2024 1217    How does the co-learner prefer to learn new concepts?:  Listening, Reading, Demonstration  - 08/15/2024 1217        CO-LEARNER #2     No question answered        SPECIAL TOPICS     No question answered        ANSWERED BY:     No question answered        Comments         Edit History       Sydnee Goodwin, RN - RN (Nurse)   08/15/2024 1217                           Social Drivers of Health     Tobacco Use: Medium Risk (12/3/2024)    Patient History     Smoking Tobacco Use: Former     Smokeless Tobacco Use: Never     Passive Exposure: Not on file   Alcohol Use: Not At Risk (8/16/2024)    AUDIT-C     Frequency of Alcohol Consumption: Never     Average Number of Drinks: Patient does not drink     Frequency of Binge Drinking: Never   Financial Resource Strain: Patient Unable To Answer (12/3/2024)    Overall Financial Resource Strain (CARDIA)     Difficulty of Paying Living Expenses: Patient unable to answer   Food Insecurity: Patient Unable To Answer (12/3/2024)    Hunger Vital Sign     Worried About Running Out of Food in the Last Year: Patient unable to answer     Ran Out of Food in the Last Year: Patient unable to answer   Transportation Needs: Patient Unable To Answer (12/3/2024)    TRANSPORTATION NEEDS      Transportation : Patient unable to answer   Physical Activity: Inactive (8/16/2024)    Exercise Vital Sign     Days of Exercise per Week: 0 days     Minutes of Exercise per Session: 0 min   Stress: Patient Unable To Answer (12/3/2024)    [employer] - Occupational Stress Questionnaire     Feeling of Stress : Patient unable to answer   Housing Stability: Patient Unable To Answer (12/3/2024)    Housing Stability Vital Sign     Unable to Pay for Housing in the Last Year: Patient unable to answer     Homeless in the Last Year: Patient unable to answer   Depression: Low Risk  (11/4/2024)    Depression     Last PHQ-4: Flowsheet Data: 0   Utilities: Patient Unable To Answer (12/3/2024)    [facility] Utilities     Threatened with loss of utilities: Patient unable to answer   Health Literacy: Patient Declined (12/3/2024)     Health Literacy     Frequency of need for help with medical instructions: Patient declines to respond   Social Isolation: Socially Integrated (8/16/2024)    Social Isolation     Social Isolation: 1            Malnutrition  Is Patient Malnourished: Yes Malnutrition Assessment  Malnutrition Context: chronic illness  Malnutrition Level: severe          Energy Intake (Malnutrition): less than 75% for greater than or equal to 1 month  Subcutaneous Fat (Malnutrition): severe depletion  Muscle Mass (Malnutrition): severe depletion   Orbital Region (Subcutaneous Fat Loss): severe depletion   [place] Region (Muscle Loss): severe depletion  Clavicle Bone Region (Muscle Loss): severe depletion                 Nutrition Diagnosis  Malnutrition (Severe) related to Catabolic illness, Chronic illness, Decreased ability to consume sufficient energy, Inadequate Caloric intake, and Inadequate Protein intake as evidenced by energy intake less than 75% for greater than or equal to 1 month and physical findings of severe muscle depletion to temple and clavicle area and severe fat depletion to  orbital area  Comments: add Boost breeze while on clear liquids; advance diet to regular as medically appropriate     Recent Labs   Lab 12/04/24 2003          Nutrition Prescription / Recommendations  Recommendation/Intervention: Recommend continue current diet as tolerated. Encourage good PO intakes.  Goals: po intakes 50% during admission  Nutrition Goal Status: progressing towards goal  Current Diet Order: dysphagia mechanical soft  Oral Nutrition Supplement: add boost breeze all meals while on clear liquids  Chewing or Swallowing Difficulty?: No Chewing or swallowing difficulty  Recommended Diet: Regular  Recommended Oral Supplement: Boost Breeze [250kcals, 9g Protein, 54g Carbs] with meals  Is Nutrition Support Recommended: [facility] Support: No  Is Nutrition Education Recommended: No    Monitor and Evaluation  % current Intake: Unknown/ No P.O. intake documented  % intake to meet estimated needs: P.O. + Supplements  Food and Nutrient Intake: food and beverage intake  Food and Nutrient Adminstration: diet order  Anthropometric Measurements: weight, weight change  Biochemical Data, Medical Tests and Procedures: electrolyte and renal panel, gastrointestinal profile, glucose/endocrine profile, inflammatory profile, lipid profile  Nutrition-Focused Physical Findings: overall appearance       Current Medical Diagnosis and Past Medical History  Diagnosis: pulmonary disease, cancer diagnosis/related complications  Past Medical History:   Diagnosis Date    Centrilobular emphysema 08/19/2024    follows with Dr. Varghese Mccauley    Chronic respiratory failure with hypoxia and hypercapnia     on home oxygen but hasn't been using it    Cigarette nicotine dependence with nicotine-induced disorder 11/09/2023    Dyshidrotic eczema 08/10/2021    Neoplastic malignant related fatigue 08/16/2024    Paroxysmal atrial fibrillation 08/08/2024    Right bundle branch block 12/11/2023    Right heart failure due to  pulmonary hypertension     RVSP  65 mmHg   EF 55%    Scabies     Squamous cell carcinoma of lung     follows with Dr. Kraft       Nutrition/Diet History  Food Allergies: NKFA  Factors Affecting Nutritional Intake: decreased appetite, clear liquid diet, other (see comments) (SOB)    Lab/Procedures/Meds  Recent Labs   Lab 12/03/24  0033 12/04/24  0923 12/04/24 2003 12/05/24  0436     --  136  --    K 4.4  --  3.4*  --    BUN 32*   < > 20 20   CREATININE 1.02   < > 0.73 0.77   CALCIUM 8.6  --  9.2  --    ALBUMIN 2.5*  --   --   --      --  95*  --    ALT 9  --   --   --    AST 18  --   --   --     < > = values in this interval not displayed.   Note: K+, Cl- low    Last A1c:   Lab Results   Component Value Date    HGBA1C 5.3 12/03/2024     Lab Results   Component Value Date    RBC 3.96 (L) 12/04/2024    HGB 9.6 (L) 12/04/2024    HCT 33.5 (L) 12/04/2024    MCV 84.6 12/04/2024    MCH 24.2 (L) 12/04/2024    MCHC 28.7 (L) 12/04/2024   Note: H&H low    Pertinent Labs Reviewed: reviewed  Pertinent Medications Reviewed: reviewed  Scheduled Meds:   albuterol-ipratropium  3 mL Nebulization Q8H    apixaban  5 mg Oral BID    budesonide  0.5 mg Nebulization Q12H    buPROPion  100 mg Oral BID    furosemide (LASIX) injection  40 mg Intravenous Once    mupirocin   Nasal BID    piperacillin-tazobactam (Zosyn) IV (PEDS and ADULTS) (extended infusion is not appropriate)  4.5 g Intravenous Q8H    predniSONE  40 mg Oral Daily    sodium chloride 3%  4 mL Nebulization Q8H    vancomycin (VANCOCIN) IV (PEDS and ADULTS)  1,000 mg Intravenous Q24H     Continuous Infusions:   dexmedeTOMIDine (Precedex) infusion (titrating)  0-1.4 mcg/kg/hr Intravenous Continuous 3.01 mL/hr at 12/05/24 0733 0.3 mcg/kg/hr at 12/05/24 0733    NORepinephrine bitartrate-D5W  0-3 mcg/kg/min Intravenous Continuous 3 mL/hr at 12/05/24 0733 0.02 mcg/kg/min at 12/05/24 0733     PRN Meds:.  Current Facility-Administered Medications:     acetaminophen, 650 mg,  "Rectal, Q6H PRN    acetaminophen, 1,000 mg, Oral, Q6H PRN    albuterol sulfate, 2.5 mg, Nebulization, Q4H PRN    aluminum & magnesium hydroxide-simethicone, 30 mL, Oral, Q6H PRN    bisacodyL, 10 mg, Oral, Daily PRN    dextromethorphan-guaiFENesin  mg/5 ml, 10 mL, Oral, Q6H PRN    diphenhydrAMINE, 50 mg, Oral, Q6H PRN    docusate sodium, 100 mg, Oral, BID PRN    melatonin, 6 mg, Oral, Nightly PRN    ondansetron, 8 mg, Intravenous, Q6H PRN    sodium chloride 0.9%, 3 mL, Intravenous, PRN    traZODone, 50 mg, Oral, Nightly PRN    vancomycin - pharmacy to dose, , Intravenous, pharmacy to manage frequency    Anthropometrics  Temp: 98.2 °F (36.8 °C)  Height Method: Stated  Height: 5' 6" (167.6 cm)  Height (inches): 66 in  Weight Method: Bed Scale  Weight: 41.8 kg (92 lb 2.4 oz)  Weight (lb): 92.15 lb  Ideal Body Weight (IBW), Female: 130 lb  % Ideal Body Weight, Female (lb): 68.01 %  BMI (Calculated): 14.9       Estimated/Assessed Needs      Temp: 98.2 °F (36.8 °C)Oral  Weight Used For Calorie Calculations: 48.1 kg (106 lb 0.7 oz)   Energy Need Method: Kcal/kg Energy Calorie Requirements (kcal): 7950-6071  Weight Used For Protein Calculations: 59 kg (130 lb)  Protein Requirements: 59-71  Estimated Fluid Requirement Method: RDA Method    RDA Method (mL): 1443       Nutrition by Nursing  Diet/Nutrition Received: clear liquid           Last Bowel Movement: 12/02/24                Nutrition Follow-Up  RD Follow-up?: Yes      Nutrition Discharge Planning: rd following for dc needs          Available via Secure Chat  "

## 2024-12-05 NOTE — SUBJECTIVE & OBJECTIVE
Interval History/Significant Events:  Refer to hospital course    Review of Systems  Objective:     Vital Signs (Most Recent):  Temp: 98.5 °F (36.9 °C) (12/05/24 1115)  Pulse: (!) 59 (12/05/24 1345)  Resp: 11 (12/05/24 1345)  BP: (!) 117/49 (12/05/24 1345)  SpO2: 100 % (12/05/24 1345) Vital Signs (24h Range):  Temp:  [97.8 °F (36.6 °C)-98.5 °F (36.9 °C)] 98.5 °F (36.9 °C)  Pulse:  [54-86] 59  Resp:  [10-24] 11  SpO2:  [91 %-100 %] 100 %  BP: ()/(36-62) 117/49   Weight: 41.8 kg (92 lb 2.4 oz)  Body mass index is 14.87 kg/m².      Intake/Output Summary (Last 24 hours) at 12/5/2024 1436  Last data filed at 12/5/2024 1300  Gross per 24 hour   Intake 750.9 ml   Output 3050 ml   Net -2299.1 ml          Physical Exam  Constitutional:       General: She is in acute distress.      Appearance: Normal appearance. She is normal weight. She is ill-appearing. She is not diaphoretic.   HENT:      Head: Normocephalic and atraumatic.      Nose: No congestion or rhinorrhea.      Mouth/Throat:      Mouth: Mucous membranes are moist.   Cardiovascular:      Rate and Rhythm: Normal rate and regular rhythm.      Pulses: Normal pulses.      Heart sounds: Normal heart sounds.   Pulmonary:      Effort: Pulmonary effort is normal. No respiratory distress.      Breath sounds: No stridor. Rhonchi and rales present. No wheezing.   Chest:      Chest wall: No tenderness.   Abdominal:      General: Abdomen is flat.   Musculoskeletal:      Cervical back: Normal range of motion. No rigidity.      Right lower leg: No edema.      Left lower leg: No edema.   Skin:     General: Skin is warm.      Findings: No erythema.   Neurological:      General: No focal deficit present.      Mental Status: She is alert. She is disoriented.      Comments: Today, she was more disoriented with more delirium and periods of lucidity.  No focal deficits.            Vents:  Oxygen Concentration (%): 54 (12/05/24 1127)  Lines/Drains/Airways       Central Venous  Catheter Line  Duration             Port A Cath Single Lumen 12/03/24 0013 Atrial Right 2 days              Drain  Duration                  Urethral Catheter 12/04/24 1647 16 Fr. <1 day              Peripheral Intravenous Line  Duration                  Peripheral IV - Single Lumen 12/05/24 0734 22 G Anterior;Left Forearm <1 day                  Significant Labs:    CBC/Anemia Profile:  Recent Labs   Lab 12/04/24 2003   WBC 16.84*   HGB 9.6*   HCT 33.5*   *   MCV 84.6   RDW 16.7*        Chemistries:  Recent Labs   Lab 12/04/24  0923 12/04/24 2003 12/05/24  0436   NA  --  136  --    K  --  3.4*  --    CL  --  95*  --    CO2  --  32*  --    BUN 20 20 20   CREATININE 0.70 0.73 0.77   CALCIUM  --  9.2  --        All pertinent labs within the past 24 hours have been reviewed.    Significant Imaging:  I have reviewed all pertinent imaging results/findings within the past 24 hours.

## 2024-12-05 NOTE — ASSESSMENT & PLAN NOTE
Patient has multifactorial encephalopathy with evidence of clinical delirium.  She has waxing and waning mental status, when she is more disoriented, she will take off her nasal cannula causing her to desaturate.  We will maximize nonpharmacological measures.

## 2024-12-05 NOTE — ASSESSMENT & PLAN NOTE
Based on patient's leukocytosis, she likely has bilateral pneumonia although there is no focused area of consolidation.  The patient has a right bronchus intermedius stent (right upper lobe is completely occluded from malignancy and therefore this has been jailed off using the self expanding stent).  Based on the patient's chest x-ray, it appears that her stent is patent at this time.  Bilateral findings are consistent with either multifocal pneumonia or interstitial edema.  We will continue to treat with broad-spectrum antibiotics, obtain MRSA swab and obtain respiratory culture.    Once clinically stable, we will obtain a CT chest and may perform a diagnostic/therapeutic flexible bronchoscopy as clinically indicated.

## 2024-12-05 NOTE — ASSESSMENT & PLAN NOTE
Now on high-flow nasal cannula, likely in the setting of underlying interstitial edema versus multifocal pneumonia.  On the differential is also lymphangitic spread of carcinomatosis.  We will continue with high-flow nasal cannula this time.  Patient has been isolating appropriately based on recent arterial blood gas and we will continue her high-flow nasal cannula to maintain her oxygen saturation between 88-92%.  High-risk for intubation requiring invasive mechanical ventilation and therefore we will continue to monitor.  Has been initiated on Precedex drip to help with compliance of high-flow nasal cannula to avoid intubation.

## 2024-12-05 NOTE — ASSESSMENT & PLAN NOTE
Patient with known history of pulmonary hypertension.  Bilateral opacifications consistent with possible multifocal versus interstitial edema.  We will initiate her on IV diuretics at this time and monitor her urine output.  We will avoid large IV fluid boluses at this time.  Have also ordered a repeat echocardiogram to monitor her right heart function and RVSP.

## 2024-12-05 NOTE — PROGRESS NOTES
[facility]  Critical Care Medicine  Progress Note    Patient Name: Mayra Kelly  MRN: [id number]  Admission Date: 12/2/2024  Hospital Length of Stay: 2 days  Code Status: Full Code  Attending Provider: Varghese Castellon MD  Primary Care Provider: Darlene Campoverde NP   Principal Problem: Acute on chronic respiratory failure with hypoxia and hypercapnia    Subjective:     HPI:  No notes on file    Hospital/ICU Course:  12/4/24-requiring high-flow nasal cannula, admitted to the intensive care unit overnight as she continued to pull off her oxygen.  She was oriented this morning but later in the day continued to pull off her high-flow nasal cannula causing desaturation.  I reviewed her chest x-ray which was consistent with interstitial edema.  She was not on any diuresis this a.m., which we initiated.  She was mildly hypotensive, requiring norepinephrine later today after being given a bolus of IV Lasix.    12/5/24-overnight her oxygen saturations were stable on 35 L and 55% FiO2.  I reviewed her CT chest which we performed today, showed mild migration of her stent, though not occluding any segment (right upper lobe stenosed by malignancy).  Vasopressor support low-dose at this time.  Tolerated diuresis well.    Interval History/Significant Events:  Refer to hospital course    Review of Systems  Objective:     Vital Signs (Most Recent):  Temp: 98.5 °F (36.9 °C) (12/05/24 1115)  Pulse: (!) 59 (12/05/24 1345)  Resp: 11 (12/05/24 1345)  BP: (!) 117/49 (12/05/24 1345)  SpO2: 100 % (12/05/24 1345) Vital Signs (24h Range):  Temp:  [97.8 °F (36.6 °C)-98.5 °F (36.9 °C)] 98.5 °F (36.9 °C)  Pulse:  [54-86] 59  Resp:  [10-24] 11  SpO2:  [91 %-100 %] 100 %  BP: ()/(36-62) 117/49   Weight: 41.8 kg (92 lb 2.4 oz)  Body mass index is 14.87 kg/m².      Intake/Output Summary (Last 24 hours) at 12/5/2024 1436  Last data filed at 12/5/2024 1300  Gross per 24 hour   Intake 750.9 ml   Output 3050 ml   Net -2299.1  ml          Physical Exam  Constitutional:       General: She is in acute distress.      Appearance: Normal appearance. She is normal weight. She is ill-appearing. She is not diaphoretic.   HENT:      Head: Normocephalic and atraumatic.      Nose: No congestion or rhinorrhea.      Mouth/Throat:      Mouth: Mucous membranes are moist.   Cardiovascular:      Rate and Rhythm: Normal rate and regular rhythm.      Pulses: Normal pulses.      Heart sounds: Normal heart sounds.   Pulmonary:      Effort: Pulmonary effort is normal. No respiratory distress.      Breath sounds: No stridor. Rhonchi and rales present. No wheezing.   Chest:      Chest wall: No tenderness.   Abdominal:      General: Abdomen is flat.   Musculoskeletal:      Cervical back: Normal range of motion. No rigidity.      Right lower leg: No edema.      Left lower leg: No edema.   Skin:     General: Skin is warm.      Findings: No erythema.   Neurological:      General: No focal deficit present.      Mental Status: She is alert. She is disoriented.      Comments: Today, she was more disoriented with more delirium and periods of lucidity.  No focal deficits.            Vents:  Oxygen Concentration (%): 54 (12/05/24 1127)  Lines/Drains/Airways       Central Venous Catheter Line  Duration             Port A Cath Single Lumen 12/03/24 0013 Atrial Right 2 days              Drain  Duration                  Urethral Catheter 12/04/24 1647 16 Fr. <1 day              Peripheral Intravenous Line  Duration                  Peripheral IV - Single Lumen 12/05/24 0734 22 G Anterior;Left Forearm <1 day                  Significant Labs:    CBC/Anemia Profile:  Recent Labs   Lab 12/04/24 2003   WBC 16.84*   HGB 9.6*   HCT 33.5*   *   MCV 84.6   RDW 16.7*        Chemistries:  Recent Labs   Lab 12/04/24  0923 12/04/24 2003 12/05/24  0436   NA  --  136  --    K  --  3.4*  --    CL  --  95*  --    CO2  --  32*  --    BUN 20 20 20   CREATININE 0.70 0.73 0.77   CALCIUM   --  9.2  --        All pertinent labs within the past 24 hours have been reviewed.    Significant Imaging:  I have reviewed all pertinent imaging results/findings within the past 24 hours.    ABG  Recent Labs   Lab 12/04/24  1428   PH 7.46*   PO2 78*   PCO2 50*   HCO3 35.6*     Assessment/Plan:     Neuro  Acute metabolic encephalopathy  Patient has multifactorial encephalopathy with evidence of clinical delirium.  She has waxing and waning mental status, when she is more disoriented, she will take off her nasal cannula causing her to desaturate.  We will maximize nonpharmacological measures.  Using dexmedetomidine to help with anxiety and agitation.    Pulmonary  * Acute on chronic respiratory failure with hypoxia and hypercapnia  Now on high-flow nasal cannula, likely in the setting of underlying interstitial edema versus multifocal pneumonia.  On the differential is also lymphangitic spread of carcinomatosis.  We will continue with high-flow nasal cannula this time.  Patient has been isolating appropriately based on recent arterial blood gas and we will continue her high-flow nasal cannula to maintain her oxygen saturation between 88-92%.  High-risk for intubation requiring invasive mechanical ventilation and therefore we will continue to monitor.  Has been initiated on Precedex drip to help with compliance of high-flow nasal cannula to avoid intubation.    Please refer to above        Pneumonia of both lungs due to infectious organism  Based on patient's leukocytosis, she likely has bilateral pneumonia although there is no focused area of consolidation.  The patient has a right bronchus intermedius stent (right upper lobe is completely occluded from malignancy and therefore this has been jailed off using the self expanding stent).  Based on the patient's chest x-ray, it appears that her stent is patent at this time.  Bilateral findings are consistent with either multifocal pneumonia or interstitial edema.  We  will continue to treat with broad-spectrum antibiotics, obtain MRSA swab and obtain respiratory culture.    Right upper lobe collapse secondary to known malignancy versus postobstructive pneumonia, CT chest reviewed.  Mild migration of stent, not occluding any segment.  Not stable at this time to perform bronchoscopy due to high FiO2 requirements (with the patient is intubated, it may be difficult to extubate her at this time).      Have initiated her on hypotonic saline and continuing DuoNebs with discontinuation of Mucomyst in an effort to keep the stent patent.    COPD exacerbation  History of COPD, likely with COPD exacerbation we will continue with systemic corticosteroids and triple inhaled therapy using nebulizers.    Cardiac/Vascular  Pulmonary hypertension  Tolerated diuresis well, with pending interpretation of her repeat echocardiogram to monitor her right heart function and RVSP.  We will dose Lasix on a daily basis.    Paroxysmal atrial fibrillation  Rate controlled at this time, anticoagulated with Eliquis.    Oncology  Lung cancer  Known history of lung cancer, lost to follow up with the oncologist recently, I spoke with the patient about this today.  Status post bronchial stent placement previously.  We will continue to monitor at this time.  Repeat CT chest as above.  Right bronchial stent appears to be patent with mild migration.    Endocrine  Severe protein-calorie malnutrition  Nutrition consulted. Most recent weight and BMI monitored-     Measurements:  Wt Readings from Last 1 Encounters:   12/05/24 41.8 kg (92 lb 2.4 oz)   Body mass index is 14.87 kg/m².    Patient has been screened and assessed by RD.    Malnutrition Type:  Context: chronic illness  Level: severe    Malnutrition Characteristic Summary:  Energy Intake (Malnutrition): less than 75% for greater than or equal to 1 month  Subcutaneous Fat (Malnutrition): severe depletion  Muscle Mass (Malnutrition): severe  depletion    Interventions/Recommendations (treatment strategy):  Recommend continue current diet as tolerated. Encourage good PO intakes.      Critical Care Checklist     Refer to chart for details regarding spontaneous awakening trial (SAT) and spontaneous breathing trial (SBT), CAM-ICU assessment, early mobility and feeding.       Analgesia and sedation- Precedex  Thromboembolic prophylaxis- fully anticoagulated with Eliquis at this time  Height of bed greater than 30°- Yes  Stress ulcer prophylaxis- not indicated  Indwelling catheter (vascular access lines/Soria catheter)-Reviewed  Deescalation of antimicrobials/pharmacotherapies-Reviewed and addressed appropriately  Code status- Full Code           Critical Care Time:  45 minutes  Critical secondary to Patient has a condition that poses threat to life and bodily function:  Acute hypoxic respiratory failure requiring high-flow nasal cannula, shock requiring vasopressor support.       Critical care was time spent personally by me on the following activities: development of treatment plan with patient or surrogate and bedside caregivers, discussions with consultants, evaluation of patient's response to treatment, examination of patient, ordering and performing treatments and interventions, ordering and review of laboratory studies, ordering and review of radiographic studies, pulse oximetry, re-evaluation of patient's condition. This critical care time did not overlap with that of any other provider or involve time for any procedures.     Varghese Castellon MD  Critical Care Medicine  [facility] ICU

## 2024-12-05 NOTE — ASSESSMENT & PLAN NOTE
Known history of lung cancer, lost to follow up with the oncologist recently, I spoke with the patient about this today.  Status post bronchial stent placement previously.  We will continue to monitor at this time.  May consider repeat CT chest pending hemodynamic stability to further evaluate parenchymal space and compression from known right hilar mass.

## 2024-12-05 NOTE — SUBJECTIVE & OBJECTIVE
Interval History/Significant Events:  Refer to hospital course    Review of Systems  Objective:     Vital Signs (Most Recent):  Temp: 98 °F (36.7 °C) (12/04/24 1906)  Pulse: (!) 58 (12/04/24 1845)  Resp: 16 (12/04/24 1845)  BP: (!) 77/41 (12/04/24 1845)  SpO2: 100 % (12/04/24 1845) Vital Signs (24h Range):  Temp:  [97.9 °F (36.6 °C)-98.5 °F (36.9 °C)] 98 °F (36.7 °C)  Pulse:  [] 58  Resp:  [10-24] 16  SpO2:  [80 %-100 %] 100 %  BP: ()/(35-74) 77/41   Weight: 40.1 kg (88 lb 6.5 oz)  Body mass index is 14.27 kg/m².      Intake/Output Summary (Last 24 hours) at 12/4/2024 1951  Last data filed at 12/4/2024 1900  Gross per 24 hour   Intake 586.77 ml   Output 2050 ml   Net -1463.23 ml          Physical Exam  Constitutional:       General: She is in acute distress.      Appearance: Normal appearance. She is normal weight. She is ill-appearing. She is not diaphoretic.   HENT:      Head: Normocephalic and atraumatic.      Nose: No congestion or rhinorrhea.      Mouth/Throat:      Mouth: Mucous membranes are moist.   Cardiovascular:      Rate and Rhythm: Normal rate and regular rhythm.      Pulses: Normal pulses.      Heart sounds: Normal heart sounds.   Pulmonary:      Effort: Pulmonary effort is normal. No respiratory distress.      Breath sounds: No stridor. Rhonchi and rales present. No wheezing.   Chest:      Chest wall: No tenderness.   Abdominal:      General: Abdomen is flat.   Musculoskeletal:      Cervical back: Normal range of motion. No rigidity.      Right lower leg: No edema.      Left lower leg: No edema.   Skin:     General: Skin is warm.      Findings: No erythema.   Neurological:      General: No focal deficit present.      Mental Status: She is alert and oriented to person, place, and time. Mental status is at baseline.            Vents:  Oxygen Concentration (%): 56 (12/04/24 1743)  Lines/Drains/Airways       Central Venous Catheter Line  Duration             Port A Cath Single Lumen 12/03/24  0013 Atrial Right 1 day              Drain  Duration                  Urethral Catheter 12/04/24 1647 16 Fr. <1 day              Peripheral Intravenous Line  Duration                  Peripheral IV - Single Lumen 12/04/24 1330 20 G Anterior;Right Forearm <1 day                  Significant Labs:    CBC/Anemia Profile:  Recent Labs   Lab 12/03/24  0001 12/03/24  0033 12/03/24  1319   WBC  --  17.44*  --    HGB  --  9.1*  --    HCT 33* 31.6* 51   PLT  --  491*  --    MCV  --  87.5  --    RDW  --  16.9*  --         Chemistries:  Recent Labs   Lab 12/03/24  0033 12/04/24  0923     --    K 4.4  --      --    CO2 27  --    BUN 32* 20   CREATININE 1.02 0.70   CALCIUM 8.6  --    ALBUMIN 2.5*  --    PROT 6.9  --    BILITOT 0.2  --    ALKPHOS 83  --    ALT 9  --    AST 18  --    MG 2.1  --        All pertinent labs within the past 24 hours have been reviewed.    Significant Imaging:  I have reviewed all pertinent imaging results/findings within the past 24 hours.

## 2024-12-05 NOTE — ASSESSMENT & PLAN NOTE
Nutrition consulted. Most recent weight and BMI monitored-     Measurements:  Wt Readings from Last 1 Encounters:   12/05/24 41.8 kg (92 lb 2.4 oz)   Body mass index is 14.87 kg/m².    Patient has been screened and assessed by RD.    Malnutrition Type:  Context: chronic illness  Level: severe    Malnutrition Characteristic Summary:  Energy Intake (Malnutrition): less than 75% for greater than or equal to 1 month  Subcutaneous Fat (Malnutrition): severe depletion  Muscle Mass (Malnutrition): severe depletion    Interventions/Recommendations (treatment strategy):  Recommend continue current diet as tolerated. Encourage good PO intakes.

## 2024-12-05 NOTE — ASSESSMENT & PLAN NOTE
History of COPD, likely with COPD exacerbation we will continue with systemic corticosteroids and triple inhaled therapy using nebulizers.

## 2024-12-05 NOTE — ASSESSMENT & PLAN NOTE
Based on patient's leukocytosis, she likely has bilateral pneumonia although there is no focused area of consolidation.  The patient has a right bronchus intermedius stent (right upper lobe is completely occluded from malignancy and therefore this has been jailed off using the self expanding stent).  Based on the patient's chest x-ray, it appears that her stent is patent at this time.  Bilateral findings are consistent with either multifocal pneumonia or interstitial edema.  We will continue to treat with broad-spectrum antibiotics, obtain MRSA swab and obtain respiratory culture.    Right upper lobe collapse secondary to known malignancy versus postobstructive pneumonia, CT chest reviewed.  Mild migration of stent, not occluding any segment.  Not stable at this time to perform bronchoscopy due to high FiO2 requirements (with the patient is intubated, it may be difficult to extubate her at this time).      Have initiated her on hypotonic saline and continuing DuoNebs with discontinuation of Mucomyst in an effort to keep the stent patent.

## 2024-12-05 NOTE — HOSPITAL COURSE
73 yo F presented with acute on chronic respiratory failure with hypoxia and hypercapnia requiring HFNC. Treated with broad spectrum Abx with de-escalation to Zosyn. Now s/p bronchoscopy with therapeutic aspiration of RLL with patent endobronchial valve. Eliquis held for bronchoscopic procedure to be resumed on discharge to [facility]. Remains on HFNC with ongoing wean. Plan for pulmonary hygiene with scheduled nebulizer treatments w/ vibratory PEP therapy. Waxing and waning delirium with patient re-oriented and ongoing therapy with Precedex gtt to avoid pulling on lines including HFNC.   Discharged to [facility] for management of acute on chronic respiratory failure and post-obstructive pneumonia.

## 2024-12-05 NOTE — ASSESSMENT & PLAN NOTE
Patient has multifactorial encephalopathy with evidence of clinical delirium.  She has waxing and waning mental status, when she is more disoriented, she will take off her nasal cannula causing her to desaturate.  We will maximize nonpharmacological measures.  Using dexmedetomidine to help with anxiety and agitation.

## 2024-12-05 NOTE — PROGRESS NOTES
[facility]  Critical Care Medicine  Progress Note    Patient Name: Mayra Kelly  MRN: [id number]  Admission Date: 12/2/2024  Hospital Length of Stay: 1 days  Code Status: Full Code  Attending Provider: Varghese Castellon MD  Primary Care Provider: Darlene Campoverde NP   Principal Problem: Acute on chronic respiratory failure with hypoxia and hypercapnia    Subjective:     HPI:  No notes on file    Hospital/ICU Course:  12/4/24-requiring high-flow nasal cannula, admitted to the intensive care unit overnight as she continued to pull off her oxygen.  She was oriented this morning but later in the day continued to pull off her high-flow nasal cannula causing desaturation.  I reviewed her chest x-ray which was consistent with interstitial edema.  She was not on any diuresis this a.m., which we initiated.  She was mildly hypotensive, requiring norepinephrine later today after being given a bolus of IV Lasix.    Interval History/Significant Events:  Refer to hospital course    Review of Systems  Objective:     Vital Signs (Most Recent):  Temp: 98 °F (36.7 °C) (12/04/24 1906)  Pulse: (!) 58 (12/04/24 1845)  Resp: 16 (12/04/24 1845)  BP: (!) 77/41 (12/04/24 1845)  SpO2: 100 % (12/04/24 1845) Vital Signs (24h Range):  Temp:  [97.9 °F (36.6 °C)-98.5 °F (36.9 °C)] 98 °F (36.7 °C)  Pulse:  [] 58  Resp:  [10-24] 16  SpO2:  [80 %-100 %] 100 %  BP: ()/(35-74) 77/41   Weight: 40.1 kg (88 lb 6.5 oz)  Body mass index is 14.27 kg/m².      Intake/Output Summary (Last 24 hours) at 12/4/2024 1951  Last data filed at 12/4/2024 1900  Gross per 24 hour   Intake 586.77 ml   Output 2050 ml   Net -1463.23 ml          Physical Exam  Constitutional:       General: She is in acute distress.      Appearance: Normal appearance. She is normal weight. She is ill-appearing. She is not diaphoretic.   HENT:      Head: Normocephalic and atraumatic.      Nose: No congestion or rhinorrhea.      Mouth/Throat:      Mouth:  Mucous membranes are moist.   Cardiovascular:      Rate and Rhythm: Normal rate and regular rhythm.      Pulses: Normal pulses.      Heart sounds: Normal heart sounds.   Pulmonary:      Effort: Pulmonary effort is normal. No respiratory distress.      Breath sounds: No stridor. Rhonchi and rales present. No wheezing.   Chest:      Chest wall: No tenderness.   Abdominal:      General: Abdomen is flat.   Musculoskeletal:      Cervical back: Normal range of motion. No rigidity.      Right lower leg: No edema.      Left lower leg: No edema.   Skin:     General: Skin is warm.      Findings: No erythema.   Neurological:      General: No focal deficit present.      Mental Status: She is alert and oriented to person, place, and time. Mental status is at baseline.            Vents:  Oxygen Concentration (%): 56 (12/04/24 1743)  Lines/Drains/Airways       Central Venous Catheter Line  Duration             Port A Cath Single Lumen 12/03/24 0013 Atrial Right 1 day              Drain  Duration                  Urethral Catheter 12/04/24 1647 16 Fr. <1 day              Peripheral Intravenous Line  Duration                  Peripheral IV - Single Lumen 12/04/24 1330 20 G Anterior;Right Forearm <1 day                  Significant Labs:    CBC/Anemia Profile:  Recent Labs   Lab 12/03/24  0001 12/03/24  0033 12/03/24  1319   WBC  --  17.44*  --    HGB  --  9.1*  --    HCT 33* 31.6* 51   PLT  --  491*  --    MCV  --  87.5  --    RDW  --  16.9*  --         Chemistries:  Recent Labs   Lab 12/03/24  0033 12/04/24  0923     --    K 4.4  --      --    CO2 27  --    BUN 32* 20   CREATININE 1.02 0.70   CALCIUM 8.6  --    ALBUMIN 2.5*  --    PROT 6.9  --    BILITOT 0.2  --    ALKPHOS 83  --    ALT 9  --    AST 18  --    MG 2.1  --        All pertinent labs within the past 24 hours have been reviewed.    Significant Imaging:  I have reviewed all pertinent imaging results/findings within the past 24 hours.    ABG  Recent Labs   Lab  12/04/24  1428   PH 7.46*   PO2 78*   PCO2 50*   HCO3 35.6*     Assessment/Plan:     Neuro  Acute metabolic encephalopathy  Patient has multifactorial encephalopathy with evidence of clinical delirium.  She has waxing and waning mental status, when she is more disoriented, she will take off her nasal cannula causing her to desaturate.  We will maximize nonpharmacological measures.    Pulmonary  * Acute on chronic respiratory failure with hypoxia and hypercapnia  Now on high-flow nasal cannula, likely in the setting of underlying interstitial edema versus multifocal pneumonia.  On the differential is also lymphangitic spread of carcinomatosis.  We will continue with high-flow nasal cannula this time.  Patient has been isolating appropriately based on recent arterial blood gas and we will continue her high-flow nasal cannula to maintain her oxygen saturation between 88-92%.  High-risk for intubation requiring invasive mechanical ventilation and therefore we will continue to monitor.  Has been initiated on Precedex drip to help with compliance of high-flow nasal cannula to avoid intubation.          Pneumonia of both lungs due to infectious organism  Based on patient's leukocytosis, she likely has bilateral pneumonia although there is no focused area of consolidation.  The patient has a right bronchus intermedius stent (right upper lobe is completely occluded from malignancy and therefore this has been jailed off using the self expanding stent).  Based on the patient's chest x-ray, it appears that her stent is patent at this time.  Bilateral findings are consistent with either multifocal pneumonia or interstitial edema.  We will continue to treat with broad-spectrum antibiotics, obtain MRSA swab and obtain respiratory culture.    Once clinically stable, we will obtain a CT chest and may perform a diagnostic/therapeutic flexible bronchoscopy as clinically indicated.    COPD exacerbation  History of COPD, likely with  COPD exacerbation we will continue with systemic corticosteroids and triple inhaled therapy using nebulizers.    Cardiac/Vascular  Pulmonary hypertension  Patient with known history of pulmonary hypertension.  Bilateral opacifications consistent with possible multifocal versus interstitial edema.  We will initiate her on IV diuretics at this time and monitor her urine output.  We will avoid large IV fluid boluses at this time.  Have also ordered a repeat echocardiogram to monitor her right heart function and RVSP.    Paroxysmal atrial fibrillation  Rate controlled at this time, anticoagulated with Eliquis.    Oncology  Lung cancer  Known history of lung cancer, lost to follow up with the oncologist recently, I spoke with the patient about this today.  Status post bronchial stent placement previously.  We will continue to monitor at this time.  May consider repeat CT chest pending hemodynamic stability to further evaluate parenchymal space and compression from known right hilar mass.    Endocrine  Severe protein-calorie malnutrition  Nutrition consulted. Most recent weight and BMI monitored-     Measurements:  Wt Readings from Last 1 Encounters:   12/03/24 40.1 kg (88 lb 6.5 oz)   Body mass index is 14.27 kg/m².    Patient has been screened and assessed by RD.    Malnutrition Type:  Context: chronic illness  Level: severe    Malnutrition Characteristic Summary:  Energy Intake (Malnutrition): less than 75% for greater than or equal to 1 month  Subcutaneous Fat (Malnutrition): severe depletion  Muscle Mass (Malnutrition): severe depletion    Interventions/Recommendations (treatment strategy):  Advance diet to regular as medically appropriate. Add Boost Breeze all meals.    Shock   Undifferentiated shock at this time, possibly in the setting of vasodilatory shock from underlying infectious etiology.  We will initiate norepinephrine and be judicious with fluid administration given her underlying pulmonary hypertension.   Repeat echocardiogram pending at this time.        Critical Care Checklist    Refer to chart for details regarding spontaneous awakening trial (SAT) and spontaneous breathing trial (SBT), CAM-ICU assessment, early mobility and feeding.      Analgesia and sedation- Precedex  Thromboembolic prophylaxis- fully anticoagulated with Eliquis at this time  Height of bed greater than 30°- Yes  Stress ulcer prophylaxis- not indicated  Indwelling catheter (vascular access lines/Soria catheter)-Reviewed  Deescalation of antimicrobials/pharmacotherapies-Reviewed and addressed appropriately  Code status- Full Code        Critical Care Time:  45 minutes  Critical secondary to Patient has a condition that poses threat to life and bodily function:  Acute hypoxic respiratory failure requiring high-flow nasal cannula, shock requiring vasopressor support.       Critical care was time spent personally by me on the following activities: development of treatment plan with patient or surrogate and bedside caregivers, discussions with consultants, evaluation of patient's response to treatment, examination of patient, ordering and performing treatments and interventions, ordering and review of laboratory studies, ordering and review of radiographic studies, pulse oximetry, re-evaluation of patient's condition. This critical care time did not overlap with that of any other provider or involve time for any procedures.     Varghese Castellon MD  Critical Care Medicine  [facility]

## 2024-12-05 NOTE — PLAN OF CARE
Problem: Adult Inpatient Plan of Care  Goal: Plan of Care Review  Outcome: Progressing  Goal: Patient-Specific Goal (Individualized)  Outcome: Progressing  Goal: Absence of Hospital-Acquired Illness or Injury  Outcome: Progressing  Goal: Optimal Comfort and Wellbeing  Outcome: Progressing  Goal: Readiness for Transition of Care  Outcome: Progressing     Problem: COPD (Chronic Obstructive Pulmonary Disease)  Goal: Optimal Chronic Illness Coping  Outcome: Progressing  Goal: Optimal Level of Functional [place]  Outcome: Progressing  Goal: Absence of Infection Signs and Symptoms  Outcome: Progressing  Goal: Improved Oral Intake  Outcome: Progressing  Goal: Effective Oxygenation and Ventilation  Outcome: Progressing  Intervention: Optimize Oxygenation and Ventilation  Flowsheets (Taken 12/5/2024 1051)  Airway/Ventilation Management:   airway patency maintained   pulmonary hygiene promoted     Problem: Skin Injury Risk Increased  Goal: Skin Health and Integrity  Outcome: Progressing     Problem: Gas Exchange Impaired  Goal: Optimal Gas Exchange  Outcome: Progressing     Problem: Pneumonia  Goal: Fluid Balance  Outcome: Progressing  Goal: Resolution of Infection Signs and Symptoms  Outcome: Progressing  Goal: Effective Oxygenation and Ventilation  Outcome: Progressing     Problem: Infection  Goal: Absence of Infection Signs and Symptoms  Outcome: Progressing     Problem: Noninvasive Ventilation Acute  Goal: Effective Unassisted Ventilation and Oxygenation  Outcome: Progressing     Problem: Fall Injury Risk  Goal: Absence of Fall and Fall-Related Injury  Outcome: Progressing     Problem: Restraint, Nonviolent  Goal: Absence of Harm or Injury  Outcome: Progressing

## 2024-12-05 NOTE — PLAN OF CARE
Problem: Adult Inpatient Plan of Care  Goal: Plan of Care Review  Outcome: Progressing  Goal: Patient-Specific Goal (Individualized)  Outcome: Progressing  Goal: Absence of Hospital-Acquired Illness or Injury  Outcome: Progressing  Goal: Optimal Comfort and Wellbeing  Outcome: Progressing  Goal: Readiness for Transition of Care  Outcome: Progressing     Problem: COPD (Chronic Obstructive Pulmonary Disease)  Goal: Optimal Chronic Illness Coping  Outcome: Progressing  Goal: Optimal Level of Functional [place]  Outcome: Progressing  Goal: Absence of Infection Signs and Symptoms  Outcome: Progressing  Goal: Improved Oral Intake  Outcome: Progressing  Goal: Effective Oxygenation and Ventilation  Outcome: Progressing     Problem: Gas Exchange Impaired  Goal: Optimal Gas Exchange  Outcome: Progressing     Problem: Pneumonia  Goal: Fluid Balance  Outcome: Progressing  Goal: Resolution of Infection Signs and Symptoms  Outcome: Progressing  Goal: Effective Oxygenation and Ventilation  Outcome: Progressing     Problem: Noninvasive Ventilation Acute  Goal: Effective Unassisted Ventilation and Oxygenation  Outcome: Progressing     Problem: Mechanical Ventilation Invasive  Goal: Absence of Ventilator-Induced Lung Injury  Outcome: Met

## 2024-12-05 NOTE — ASSESSMENT & PLAN NOTE
Now on high-flow nasal cannula, likely in the setting of underlying interstitial edema versus multifocal pneumonia.  On the differential is also lymphangitic spread of carcinomatosis.  We will continue with high-flow nasal cannula this time.  Patient has been isolating appropriately based on recent arterial blood gas and we will continue her high-flow nasal cannula to maintain her oxygen saturation between 88-92%.  High-risk for intubation requiring invasive mechanical ventilation and therefore we will continue to monitor.  Has been initiated on Precedex drip to help with compliance of high-flow nasal cannula to avoid intubation.    Please refer to above

## 2024-12-05 NOTE — ASSESSMENT & PLAN NOTE
Tolerated diuresis well, with pending interpretation of her repeat echocardiogram to monitor her right heart function and RVSP.  We will dose Lasix on a daily basis.

## 2024-12-05 NOTE — PLAN OF CARE
Problem: Adult Inpatient Plan of Care  Goal: Plan of Care Review  Outcome: Progressing  Goal: Patient-Specific Goal (Individualized)  Outcome: Progressing  Goal: Absence of Hospital-Acquired Illness or Injury  Outcome: Progressing  Goal: Optimal Comfort and Wellbeing  Outcome: Progressing  Goal: Readiness for Transition of Care  Outcome: Progressing     Problem: COPD (Chronic Obstructive Pulmonary Disease)  Goal: Optimal Chronic Illness Coping  Outcome: Progressing  Intervention: Support and Optimize Psychosocial Response  Flowsheets (Taken 12/5/2024 0652)  Supportive Measures: self-care encouraged  Family/Support System Care: support provided  Goal: Optimal Level of Functional [place]  Outcome: Progressing  Goal: Absence of Infection Signs and Symptoms  Outcome: Progressing  Goal: Improved Oral Intake  Outcome: Progressing  Goal: Effective Oxygenation and Ventilation  Outcome: Progressing     Problem: Skin Injury Risk Increased  Goal: Skin Health and Integrity  Outcome: Progressing  Intervention: Optimize Skin Protection  Flowsheets (Taken 12/5/2024 0652)  Pressure Reduction Techniques: frequent weight shift encouraged  Activity Management:   Arm raise - L1   Ankle pumps - L1     Problem: Gas Exchange Impaired  Goal: Optimal Gas Exchange  Outcome: Progressing     Problem: Pneumonia  Goal: Fluid Balance  Outcome: Progressing  Goal: Resolution of Infection Signs and Symptoms  Outcome: Progressing  Goal: Effective Oxygenation and Ventilation  Outcome: Progressing     Problem: Infection  Goal: Absence of Infection Signs and Symptoms  Outcome: Progressing     Problem: Noninvasive Ventilation Acute  Goal: Effective Unassisted Ventilation and Oxygenation  Outcome: Progressing     Problem: Fall Injury Risk  Goal: Absence of Fall and Fall-Related Injury  Outcome: Progressing     Problem: Restraint, Nonviolent  Goal: Absence of Harm or Injury  Outcome: Progressing

## 2024-12-05 NOTE — ASSESSMENT & PLAN NOTE
Nutrition consulted. Most recent weight and BMI monitored-     Measurements:  Wt Readings from Last 1 Encounters:   12/03/24 40.1 kg (88 lb 6.5 oz)   Body mass index is 14.27 kg/m².    Patient has been screened and assessed by RD.    Malnutrition Type:  Context: chronic illness  Level: severe    Malnutrition Characteristic Summary:  Energy Intake (Malnutrition): less than 75% for greater than or equal to 1 month  Subcutaneous Fat (Malnutrition): severe depletion  Muscle Mass (Malnutrition): severe depletion    Interventions/Recommendations (treatment strategy):  Advance diet to regular as medically appropriate. Add Boost Breeze all meals.

## 2024-12-05 NOTE — ASSESSMENT & PLAN NOTE
Known history of lung cancer, lost to follow up with the oncologist recently, I spoke with the patient about this today.  Status post bronchial stent placement previously.  We will continue to monitor at this time.  Repeat CT chest as above.  Right bronchial stent appears to be patent with mild migration.

## 2024-12-05 NOTE — PLAN OF CARE
Problem: Adult Inpatient Plan of Care  Goal: Plan of Care Review  Outcome: Progressing     Problem: COPD (Chronic Obstructive Pulmonary Disease)  Goal: Optimal Chronic Illness Coping  Outcome: Progressing     Problem: Gas Exchange Impaired  Goal: Optimal Gas Exchange  Outcome: Progressing     Problem: Pneumonia  Goal: Fluid Balance  Outcome: Progressing     Problem: Noninvasive Ventilation Acute  Goal: Effective Unassisted Ventilation and Oxygenation  Outcome: Progressing

## 2024-12-06 LAB
ANION GAP SERPL CALCULATED.3IONS-SCNC: 15 MMOL/L (ref 7–16)
BASOPHILS # BLD AUTO: 0.01 K/UL (ref 0–0.2)
BASOPHILS NFR BLD AUTO: 0.1 % (ref 0–1)
BUN SERPL-MCNC: 18 MG/DL (ref 10–20)
BUN SERPL-MCNC: 18 MG/DL (ref 10–20)
BUN/CREAT SERPL: 28 (ref 6–20)
BUN/CREAT SERPL: 28 (ref 6–20)
CALCIUM SERPL-MCNC: 8.6 MG/DL (ref 8.4–10.2)
CHLORIDE SERPL-SCNC: 91 MMOL/L (ref 98–107)
CO2 SERPL-SCNC: 36 MMOL/L (ref 23–31)
CREAT SERPL-MCNC: 0.64 MG/DL (ref 0.55–1.02)
CREAT SERPL-MCNC: 0.64 MG/DL (ref 0.55–1.02)
DIFFERENTIAL METHOD BLD: ABNORMAL
EGFR (NO RACE VARIABLE) (RUSH/TITUS): 93 ML/MIN/1.73M2
EGFR (NO RACE VARIABLE) (RUSH/TITUS): 93 ML/MIN/1.73M2
EOSINOPHIL # BLD AUTO: 0.06 K/UL (ref 0–0.5)
EOSINOPHIL NFR BLD AUTO: 0.5 % (ref 1–4)
ERYTHROCYTE [DISTWIDTH] IN BLOOD BY AUTOMATED COUNT: 16.2 % (ref 11.5–14.5)
GLUCOSE SERPL-MCNC: 84 MG/DL (ref 82–115)
HCT VFR BLD AUTO: 33.6 % (ref 38–47)
HGB BLD-MCNC: 9.9 G/DL (ref 12–16)
IMM GRANULOCYTES # BLD AUTO: 0.07 K/UL (ref 0–0.04)
IMM GRANULOCYTES NFR BLD: 0.5 % (ref 0–0.4)
LYMPHOCYTES # BLD AUTO: 1.78 K/UL (ref 1–4.8)
LYMPHOCYTES NFR BLD AUTO: 13.5 % (ref 27–41)
MCH RBC QN AUTO: 24.6 PG (ref 27–31)
MCHC RBC AUTO-ENTMCNC: 29.5 G/DL (ref 32–36)
MCV RBC AUTO: 83.6 FL (ref 80–96)
MONOCYTES # BLD AUTO: 0.91 K/UL (ref 0–0.8)
MONOCYTES NFR BLD AUTO: 6.9 % (ref 2–6)
MPC BLD CALC-MCNC: 9.9 FL (ref 9.4–12.4)
NEUTROPHILS # BLD AUTO: 10.31 K/UL (ref 1.8–7.7)
NEUTROPHILS NFR BLD AUTO: 78.5 % (ref 53–65)
NRBC # BLD AUTO: 0 X10E3/UL
NRBC, AUTO (.00): 0 %
PLATELET # BLD AUTO: 526 K/UL (ref 150–400)
POTASSIUM SERPL-SCNC: 3.5 MMOL/L (ref 3.5–5.1)
RBC # BLD AUTO: 4.02 M/UL (ref 4.2–5.4)
SODIUM SERPL-SCNC: 138 MMOL/L (ref 136–145)
WBC # BLD AUTO: 13.14 K/UL (ref 4.5–11)

## 2024-12-06 PROCEDURE — 27000221 HC OXYGEN, UP TO 24 HOURS

## 2024-12-06 PROCEDURE — 25000003 PHARM REV CODE 250: Performed by: EMERGENCY MEDICINE

## 2024-12-06 PROCEDURE — 25000003 PHARM REV CODE 250: Performed by: HOSPITALIST

## 2024-12-06 PROCEDURE — 25000242 PHARM REV CODE 250 ALT 637 W/ HCPCS

## 2024-12-06 PROCEDURE — 85025 COMPLETE CBC W/AUTO DIFF WBC: CPT | Performed by: STUDENT IN AN ORGANIZED HEALTH CARE EDUCATION/TRAINING PROGRAM

## 2024-12-06 PROCEDURE — 25000003 PHARM REV CODE 250

## 2024-12-06 PROCEDURE — 80048 BASIC METABOLIC PNL TOTAL CA: CPT | Performed by: STUDENT IN AN ORGANIZED HEALTH CARE EDUCATION/TRAINING PROGRAM

## 2024-12-06 PROCEDURE — 94761 N-INVAS EAR/PLS OXIMETRY MLT: CPT

## 2024-12-06 PROCEDURE — 25000003 PHARM REV CODE 250: Performed by: STUDENT IN AN ORGANIZED HEALTH CARE EDUCATION/TRAINING PROGRAM

## 2024-12-06 PROCEDURE — 20000000 HC ICU ROOM

## 2024-12-06 PROCEDURE — 63600175 PHARM REV CODE 636 W HCPCS: Performed by: STUDENT IN AN ORGANIZED HEALTH CARE EDUCATION/TRAINING PROGRAM

## 2024-12-06 PROCEDURE — 51702 INSERT TEMP BLADDER CATH: CPT

## 2024-12-06 PROCEDURE — 99291 CRITICAL CARE FIRST HOUR: CPT | Mod: ,,, | Performed by: STUDENT IN AN ORGANIZED HEALTH CARE EDUCATION/TRAINING PROGRAM

## 2024-12-06 PROCEDURE — 63600175 PHARM REV CODE 636 W HCPCS: Performed by: HOSPITALIST

## 2024-12-06 PROCEDURE — 36415 COLL VENOUS BLD VENIPUNCTURE: CPT | Performed by: STUDENT IN AN ORGANIZED HEALTH CARE EDUCATION/TRAINING PROGRAM

## 2024-12-06 PROCEDURE — 84520 ASSAY OF UREA NITROGEN: CPT | Performed by: STUDENT IN AN ORGANIZED HEALTH CARE EDUCATION/TRAINING PROGRAM

## 2024-12-06 PROCEDURE — 94640 AIRWAY INHALATION TREATMENT: CPT

## 2024-12-06 PROCEDURE — 25000242 PHARM REV CODE 250 ALT 637 W/ HCPCS: Performed by: HOSPITALIST

## 2024-12-06 PROCEDURE — 99900035 HC TECH TIME PER 15 MIN (STAT)

## 2024-12-06 RX ORDER — OLANZAPINE 5 MG/1
5 TABLET ORAL EVERY 8 HOURS
Status: DISCONTINUED | OUTPATIENT
Start: 2024-12-06 | End: 2024-12-09 | Stop reason: HOSPADM

## 2024-12-06 RX ORDER — OLANZAPINE 5 MG/1
5 TABLET ORAL EVERY 8 HOURS
Status: DISCONTINUED | OUTPATIENT
Start: 2024-12-06 | End: 2024-12-06

## 2024-12-06 RX ADMIN — APIXABAN 5 MG: 5 TABLET, FILM COATED ORAL at 08:12

## 2024-12-06 RX ADMIN — PREDNISONE 40 MG: 10 TABLET ORAL at 08:12

## 2024-12-06 RX ADMIN — PIPERACILLIN SODIUM AND TAZOBACTAM SODIUM 4.5 G: 4; .5 INJECTION, POWDER, LYOPHILIZED, FOR SOLUTION INTRAVENOUS at 08:12

## 2024-12-06 RX ADMIN — IPRATROPIUM BROMIDE AND ALBUTEROL SULFATE 3 ML: .5; 3 SOLUTION RESPIRATORY (INHALATION) at 03:12

## 2024-12-06 RX ADMIN — BUPROPION HYDROCHLORIDE 100 MG: 100 TABLET, FILM COATED, EXTENDED RELEASE ORAL at 09:12

## 2024-12-06 RX ADMIN — DEXMEDETOMIDINE HYDROCHLORIDE 0.5 MCG/KG/HR: 4 INJECTION, SOLUTION INTRAVENOUS at 03:12

## 2024-12-06 RX ADMIN — BUDESONIDE 0.5 MG: 0.5 INHALANT RESPIRATORY (INHALATION) at 07:12

## 2024-12-06 RX ADMIN — OLANZAPINE 5 MG: 5 TABLET, FILM COATED ORAL at 09:12

## 2024-12-06 RX ADMIN — OLANZAPINE 5 MG: 5 TABLET, FILM COATED ORAL at 10:12

## 2024-12-06 RX ADMIN — SODIUM CHLORIDE SOLN NEBU 3% 4 ML: 3 NEBU SOLN at 03:12

## 2024-12-06 RX ADMIN — PIPERACILLIN SODIUM AND TAZOBACTAM SODIUM 4.5 G: 4; .5 INJECTION, POWDER, LYOPHILIZED, FOR SOLUTION INTRAVENOUS at 03:12

## 2024-12-06 RX ADMIN — PIPERACILLIN SODIUM AND TAZOBACTAM SODIUM 4.5 G: 4; .5 INJECTION, POWDER, LYOPHILIZED, FOR SOLUTION INTRAVENOUS at 12:12

## 2024-12-06 RX ADMIN — BUPROPION HYDROCHLORIDE 100 MG: 100 TABLET, FILM COATED, EXTENDED RELEASE ORAL at 08:12

## 2024-12-06 RX ADMIN — IPRATROPIUM BROMIDE AND ALBUTEROL SULFATE 3 ML: .5; 3 SOLUTION RESPIRATORY (INHALATION) at 07:12

## 2024-12-06 RX ADMIN — MUPIROCIN: 20 OINTMENT TOPICAL at 08:12

## 2024-12-06 RX ADMIN — SODIUM CHLORIDE SOLN NEBU 3% 4 ML: 3 NEBU SOLN at 07:12

## 2024-12-06 RX ADMIN — BUDESONIDE 0.5 MG: 0.5 INHALANT RESPIRATORY (INHALATION) at 08:12

## 2024-12-06 RX ADMIN — NOREPINEPHRINE BITARTRATE 0.04 MCG/KG/MIN: 4 INJECTION, SOLUTION INTRAVENOUS at 03:12

## 2024-12-06 RX ADMIN — MUPIROCIN: 20 OINTMENT TOPICAL at 09:12

## 2024-12-06 NOTE — PLAN OF CARE
Problem: Adult Inpatient Plan of Care  Goal: Plan of Care Review  Outcome: Progressing  Goal: Patient-Specific Goal (Individualized)  Outcome: Progressing  Goal: Absence of Hospital-Acquired Illness or Injury  Outcome: Progressing  Goal: Optimal Comfort and Wellbeing  Outcome: Progressing  Goal: Readiness for Transition of Care  Outcome: Progressing     Problem: COPD (Chronic Obstructive Pulmonary Disease)  Goal: Optimal Chronic Illness Coping  Outcome: Progressing  Goal: Optimal Level of Functional [place]  Outcome: Progressing  Goal: Absence of Infection Signs and Symptoms  Outcome: Progressing  Goal: Improved Oral Intake  Outcome: Progressing  Goal: Effective Oxygenation and Ventilation  Outcome: Progressing     Problem: Skin Injury Risk Increased  Goal: Skin Health and Integrity  Outcome: Progressing     Problem: Gas Exchange Impaired  Goal: Optimal Gas Exchange  Outcome: Progressing     Problem: Pneumonia  Goal: Fluid Balance  Outcome: Progressing  Goal: Resolution of Infection Signs and Symptoms  Outcome: Progressing  Goal: Effective Oxygenation and Ventilation  Outcome: Progressing     Problem: Infection  Goal: Absence of Infection Signs and Symptoms  Outcome: Progressing     Problem: Noninvasive Ventilation Acute  Goal: Effective Unassisted Ventilation and Oxygenation  Outcome: Progressing     Problem: Fall Injury Risk  Goal: Absence of Fall and Fall-Related Injury  Outcome: Progressing     Problem: Restraint, Nonviolent  Goal: Absence of Harm or Injury  Outcome: Progressing

## 2024-12-06 NOTE — PROGRESS NOTES
[facility]  Critical Care Medicine  Progress Note    Patient Name: Mayra Kelly  MRN: [id number]  Admission Date: 12/2/2024  Hospital Length of Stay: 3 days  Code Status: Full Code  Attending Provider: Varghese Castellon MD  Primary Care Provider: Darlene Campoverde NP   Principal Problem: Acute on chronic respiratory failure with hypoxia and hypercapnia    Subjective:     HPI:  No notes on file    Hospital/ICU Course:  12/4/24-requiring high-flow nasal cannula, admitted to the intensive care unit overnight as she continued to pull off her oxygen.  She was oriented this morning but later in the day continued to pull off her high-flow nasal cannula causing desaturation.  I reviewed her chest x-ray which was consistent with interstitial edema.  She was not on any diuresis this a.m., which we initiated.  She was mildly hypotensive, requiring norepinephrine later today after being given a bolus of IV Lasix.    12/5/24-overnight her oxygen saturations were stable on 35 L and 55% FiO2.  I reviewed her CT chest which we performed today, showed mild migration of her stent, though not occluding any segment (right upper lobe stenosed by malignancy).  Vasopressor support low-dose at this time.  Tolerated diuresis well.    12/6/24-significant improvement in patient's mental status, more calm and cooperative early this morning.  Improvement in the patient's leukocytosis as well as hemoglobin this morning.  Renal function also within normal limits.  Improvement in patient's oxygenation.  Continues on nebulizers and hypotonic saline.  Pending electrolytes this morning.    Interval History/Significant Events:  Refer to hospital course    Review of Systems  Objective:     Vital Signs (Most Recent):  Temp: 98.3 °F (36.8 °C) (12/06/24 0724)  Pulse: (!) 53 (12/06/24 0735)  Resp: 20 (12/06/24 0735)  BP: (!) 98/50 (12/06/24 0630)  SpO2: 97 % (12/06/24 0735) Vital Signs (24h Range):  Temp:  [97.6 °F (36.4 °C)-98.5 °F  (36.9 °C)] 98.3 °F (36.8 °C)  Pulse:  [51-83] 53  Resp:  [11-23] 20  SpO2:  [81 %-100 %] 97 %  BP: ()/(36-65) 98/50   Weight: 41.8 kg (92 lb 2.4 oz)  Body mass index is 14.87 kg/m².      Intake/Output Summary (Last 24 hours) at 12/6/2024 0922  Last data filed at 12/6/2024 0633  Gross per 24 hour   Intake 518.35 ml   Output 1855 ml   Net -1336.65 ml          Physical Exam  Constitutional:       General: She is in acute distress.      Appearance: Normal appearance. She is normal weight. She is ill-appearing. She is not diaphoretic.   HENT:      Head: Normocephalic and atraumatic.      Nose: No congestion or rhinorrhea.      Mouth/Throat:      Mouth: Mucous membranes are moist.   Cardiovascular:      Rate and Rhythm: Normal rate and regular rhythm.      Pulses: Normal pulses.      Heart sounds: Normal heart sounds.   Pulmonary:      Effort: Pulmonary effort is normal. No respiratory distress.      Breath sounds: No stridor. Rhonchi and rales present. No wheezing.   Chest:      Chest wall: No tenderness.   Abdominal:      General: Abdomen is flat.   Musculoskeletal:      Cervical back: Normal range of motion. No rigidity.      Right lower leg: No edema.      Left lower leg: No edema.   Skin:     General: Skin is warm.      Findings: No erythema.   Neurological:      General: No focal deficit present.      Mental Status: She is alert and oriented to person, place, and time. Mental status is at baseline.            Vents:  Oxygen Concentration (%): 55 (12/06/24 0735)  Lines/Drains/Airways       Central Venous Catheter Line  Duration             Port A Cath Single Lumen 12/03/24 0013 Atrial Right 3 days              Drain  Duration                  Urethral Catheter 12/04/24 1647 16 Fr. 1 day              Peripheral Intravenous Line  Duration                  Peripheral IV - Single Lumen 12/05/24 0734 22 G Anterior;Left Forearm 1 day                  Significant Labs:    CBC/Anemia Profile:  Recent Labs   Lab  12/04/24 2003 12/06/24 0524   WBC 16.84* 13.14*   HGB 9.6* 9.9*   HCT 33.5* 33.6*   * 526*   MCV 84.6 83.6   RDW 16.7* 16.2*        Chemistries:  Recent Labs   Lab 12/04/24 2003 12/05/24  0436 12/06/24 0524     --   --    K 3.4*  --   --    CL 95*  --   --    CO2 32*  --   --    BUN 20 20 18   CREATININE 0.73 0.77 0.64   CALCIUM 9.2  --   --        All pertinent labs within the past 24 hours have been reviewed.    Significant Imaging:  I have reviewed all pertinent imaging results/findings within the past 24 hours.    ABG  Recent Labs   Lab 12/04/24  1428   PH 7.46*   PO2 78*   PCO2 50*   HCO3 35.6*     Assessment/Plan:     Neuro  Acute metabolic encephalopathy  Significant improvement overnight and her encephalopathy and clinical delirium.  We will continue to maximize nonpharmacological measures to mitigate any additional delirium.  She has responded well to dexmedetomidine and we will use this on a p.r.n. basis to help with her anxiety and hyperactive delirium.    Pulmonary  * Acute on chronic respiratory failure with hypoxia and hypercapnia  Improvement on high-flow nasal cannula.  Based on CT chest differential continues to remain interstitial edema (although less likely now with effective diuresis) and some concern for lymphangitic spread of carcinomatosis.  We will continue to keep her on high-flow nasal cannula and maintain her oxygen saturation in the 88% or higher.  Dexmedetomidine can be used to help with compliance with the high-flow nasal cannula for her anxiety and underlying delirium.        Pneumonia of both lungs due to infectious organism  Based on patient's leukocytosis, she likely has bilateral pneumonia although there is no focused area of consolidation.  The patient has a right bronchus intermedius stent (right upper lobe is completely occluded from malignancy and therefore this has been jailed off using the self expanding stent).  Based on the patient's chest x-ray, it appears  that her stent is patent at this time.  Bilateral findings are consistent with either multifocal pneumonia or interstitial edema.  We will continue to treat with broad-spectrum antibiotics, obtain MRSA swab and obtain respiratory culture.    Right upper lobe collapse secondary to known malignancy versus postobstructive pneumonia, CT chest reviewed.      Have initiated her on hypertonic saline and continuing DuoNebs with discontinuation of Mucomyst in an effort to keep the stent patent.    12/6/24-has had some clinical improvement.  At this time I will go ahead and place her on the list for tentative bronchoscopy on Monday 12/9/24.  We will plan for rigid bronchoscopy with stent evaluation/removal for stent migration or perform flexible bronchoscopy if she is unable to tolerate jet ventilation.  NPO past midnight on 12/8/24 into 12/9/24.    COPD exacerbation  History of COPD, likely with COPD exacerbation we will continue with systemic corticosteroids and triple inhaled therapy using nebulizers.    Cardiac/Vascular  Pulmonary hypertension  Tolerated diuresis well, with pending interpretation of her repeat echocardiogram to monitor her right heart function and RVSP.  We will dose Lasix on a daily basis.    Paroxysmal atrial fibrillation  Rate controlled at this time, anticoagulated with Eliquis.    Oncology  Lung cancer  Known history of lung cancer, lost to follow up with the oncologist recently, I spoke with the patient about this today.  Status post bronchial stent placement previously.  We will continue to monitor at this time.  Repeat CT chest as above.  Right bronchial stent appears to be patent with mild migration.  Refer to plan as above.    Endocrine  Severe protein-calorie malnutrition  Nutrition consulted. Most recent weight and BMI monitored-     Measurements:  Wt Readings from Last 1 Encounters:   12/05/24 41.8 kg (92 lb 2.4 oz)   Body mass index is 14.87 kg/m².    Patient has been screened and assessed  by RD.    Malnutrition Type:  Context: chronic illness  Level: severe    Malnutrition Characteristic Summary:  Energy Intake (Malnutrition): less than 75% for greater than or equal to 1 month  Subcutaneous Fat (Malnutrition): severe depletion  Muscle Mass (Malnutrition): severe depletion    Interventions/Recommendations (treatment strategy):  Recommend continue current diet as tolerated. Encourage good PO intakes.      Critical Care Checklist     Refer to chart for details regarding spontaneous awakening trial (SAT) and spontaneous breathing trial (SBT), CAM-ICU assessment, early mobility and feeding.       Analgesia and sedation- Precedex as needed  Thromboembolic prophylaxis- fully anticoagulated with Eliquis at this time.  We will hold 12/7/24 and 12/8/24 for planned procedure on 12/9/24.  Height of bed greater than 30°- Yes  Stress ulcer prophylaxis- not indicated  Indwelling catheter (vascular access lines/Soria catheter)-Reviewed  Deescalation of antimicrobials/pharmacotherapies-Reviewed and addressed appropriately  Code status- Full Code           Critical Care Time:  45 minutes  Critical secondary to Patient has a condition that poses threat to life and bodily function:  Acute hypoxic respiratory failure requiring high-flow nasal cannula, shock requiring vasopressor support.       Critical care was time spent personally by me on the following activities: development of treatment plan with patient or surrogate and bedside caregivers, discussions with consultants, evaluation of patient's response to treatment, examination of patient, ordering and performing treatments and interventions, ordering and review of laboratory studies, ordering and review of radiographic studies, pulse oximetry, re-evaluation of patient's condition. This critical care time did not overlap with that of any other provider or involve time for any procedures.     Varghese Castellon MD  Critical Care Medicine  [facility]

## 2024-12-06 NOTE — ASSESSMENT & PLAN NOTE
Improvement on high-flow nasal cannula.  Based on CT chest differential continues to remain interstitial edema (although less likely now with effective diuresis) and some concern for lymphangitic spread of carcinomatosis.  We will continue to keep her on high-flow nasal cannula and maintain her oxygen saturation in the 88% or higher.  Dexmedetomidine can be used to help with compliance with the high-flow nasal cannula for her anxiety and underlying delirium.

## 2024-12-06 NOTE — ASSESSMENT & PLAN NOTE
Based on patient's leukocytosis, she likely has bilateral pneumonia although there is no focused area of consolidation.  The patient has a right bronchus intermedius stent (right upper lobe is completely occluded from malignancy and therefore this has been jailed off using the self expanding stent).  Based on the patient's chest x-ray, it appears that her stent is patent at this time.  Bilateral findings are consistent with either multifocal pneumonia or interstitial edema.  We will continue to treat with broad-spectrum antibiotics, obtain MRSA swab and obtain respiratory culture.    Right upper lobe collapse secondary to known malignancy versus postobstructive pneumonia, CT chest reviewed.      Have initiated her on hypertonic saline and continuing DuoNebs with discontinuation of Mucomyst in an effort to keep the stent patent.    12/6/24-has had some clinical improvement.  At this time I will go ahead and place her on the list for tentative bronchoscopy on Monday 12/9/24.  We will plan for rigid bronchoscopy with stent evaluation/removal for stent migration or perform flexible bronchoscopy if she is unable to tolerate jet ventilation.  NPO past midnight on 12/8/24 into 12/9/24.

## 2024-12-06 NOTE — PLAN OF CARE
Problem: Adult Inpatient Plan of Care  Goal: Plan of Care Review  Outcome: Progressing  Goal: Patient-Specific Goal (Individualized)  Outcome: Progressing  Goal: Absence of Hospital-Acquired Illness or Injury  Outcome: Progressing  Goal: Optimal Comfort and Wellbeing  Outcome: Progressing  Goal: Readiness for Transition of Care  Outcome: Progressing     Problem: COPD (Chronic Obstructive Pulmonary Disease)  Goal: Optimal Chronic Illness Coping  Outcome: Progressing  Goal: Optimal Level of Functional [place]  Outcome: Progressing  Goal: Absence of Infection Signs and Symptoms  Outcome: Progressing  Goal: Improved Oral Intake  Outcome: Progressing  Goal: Effective Oxygenation and Ventilation  Outcome: Progressing     Problem: Gas Exchange Impaired  Goal: Optimal Gas Exchange  Outcome: Progressing     Problem: Pneumonia  Goal: Fluid Balance  Outcome: Progressing  Goal: Resolution of Infection Signs and Symptoms  Outcome: Progressing  Goal: Effective Oxygenation and Ventilation  Outcome: Progressing     Problem: Noninvasive Ventilation Acute  Goal: Effective Unassisted Ventilation and Oxygenation  Outcome: Progressing

## 2024-12-06 NOTE — ASSESSMENT & PLAN NOTE
Significant improvement overnight and her encephalopathy and clinical delirium.  We will continue to maximize nonpharmacological measures to mitigate any additional delirium.  She has responded well to dexmedetomidine and we will use this on a p.r.n. basis to help with her anxiety and hyperactive delirium.

## 2024-12-06 NOTE — ASSESSMENT & PLAN NOTE
Known history of lung cancer, lost to follow up with the oncologist recently, I spoke with the patient about this today.  Status post bronchial stent placement previously.  We will continue to monitor at this time.  Repeat CT chest as above.  Right bronchial stent appears to be patent with mild migration.  Refer to plan as above.

## 2024-12-06 NOTE — SUBJECTIVE & OBJECTIVE
Interval History/Significant Events:  Refer to hospital course    Review of Systems  Objective:     Vital Signs (Most Recent):  Temp: 98.3 °F (36.8 °C) (12/06/24 0724)  Pulse: (!) 53 (12/06/24 0735)  Resp: 20 (12/06/24 0735)  BP: (!) 98/50 (12/06/24 0630)  SpO2: 97 % (12/06/24 0735) Vital Signs (24h Range):  Temp:  [97.6 °F (36.4 °C)-98.5 °F (36.9 °C)] 98.3 °F (36.8 °C)  Pulse:  [51-83] 53  Resp:  [11-23] 20  SpO2:  [81 %-100 %] 97 %  BP: ()/(36-65) 98/50   Weight: 41.8 kg (92 lb 2.4 oz)  Body mass index is 14.87 kg/m².      Intake/Output Summary (Last 24 hours) at 12/6/2024 0922  Last data filed at 12/6/2024 0633  Gross per 24 hour   Intake 518.35 ml   Output 1855 ml   Net -1336.65 ml          Physical Exam  Constitutional:       General: She is in acute distress.      Appearance: Normal appearance. She is normal weight. She is ill-appearing. She is not diaphoretic.   HENT:      Head: Normocephalic and atraumatic.      Nose: No congestion or rhinorrhea.      Mouth/Throat:      Mouth: Mucous membranes are moist.   Cardiovascular:      Rate and Rhythm: Normal rate and regular rhythm.      Pulses: Normal pulses.      Heart sounds: Normal heart sounds.   Pulmonary:      Effort: Pulmonary effort is normal. No respiratory distress.      Breath sounds: No stridor. Rhonchi and rales present. No wheezing.   Chest:      Chest wall: No tenderness.   Abdominal:      General: Abdomen is flat.   Musculoskeletal:      Cervical back: Normal range of motion. No rigidity.      Right lower leg: No edema.      Left lower leg: No edema.   Skin:     General: Skin is warm.      Findings: No erythema.   Neurological:      General: No focal deficit present.      Mental Status: She is alert and oriented to person, place, and time. Mental status is at baseline.            Vents:  Oxygen Concentration (%): 55 (12/06/24 0735)  Lines/Drains/Airways       Central Venous Catheter Line  Duration             Port A Cath Single Lumen 12/03/24  0013 Atrial Right 3 days              Drain  Duration                  Urethral Catheter 12/04/24 1647 16 Fr. 1 day              Peripheral Intravenous Line  Duration                  Peripheral IV - Single Lumen 12/05/24 0734 22 G Anterior;Left Forearm 1 day                  Significant Labs:    CBC/Anemia Profile:  Recent Labs   Lab 12/04/24 2003 12/06/24 0524   WBC 16.84* 13.14*   HGB 9.6* 9.9*   HCT 33.5* 33.6*   * 526*   MCV 84.6 83.6   RDW 16.7* 16.2*        Chemistries:  Recent Labs   Lab 12/04/24 2003 12/05/24  0436 12/06/24 0524     --   --    K 3.4*  --   --    CL 95*  --   --    CO2 32*  --   --    BUN 20 20 18   CREATININE 0.73 0.77 0.64   CALCIUM 9.2  --   --        All pertinent labs within the past 24 hours have been reviewed.    Significant Imaging:  I have reviewed all pertinent imaging results/findings within the past 24 hours.

## 2024-12-06 NOTE — PLAN OF CARE
Problem: COPD (Chronic Obstructive Pulmonary Disease)  Goal: Optimal Chronic Illness Coping  Outcome: Progressing  Goal: Optimal Level of Functional [place]  Outcome: Progressing  Goal: Absence of Infection Signs and Symptoms  Outcome: Progressing  Goal: Improved Oral Intake  Outcome: Progressing  Goal: Effective Oxygenation and Ventilation  Outcome: Progressing     Problem: Gas Exchange Impaired  Goal: Optimal Gas Exchange  Outcome: Progressing     Problem: Pneumonia  Goal: Fluid Balance  Outcome: Progressing  Goal: Resolution of Infection Signs and Symptoms  Outcome: Progressing  Goal: Effective Oxygenation and Ventilation  Outcome: Progressing

## 2024-12-06 NOTE — PLAN OF CARE
Chart review. Iv meds noted. Patient is on high flow oxygen. Patient may be a candidate for ltac if needed. Cm will follow. [facility]

## 2024-12-07 LAB
ANION GAP SERPL CALCULATED.3IONS-SCNC: 13 MMOL/L (ref 7–16)
BUN SERPL-MCNC: 19 MG/DL (ref 10–20)
BUN/CREAT SERPL: 26 (ref 6–20)
CALCIUM SERPL-MCNC: 9.1 MG/DL (ref 8.4–10.2)
CHLORIDE SERPL-SCNC: 93 MMOL/L (ref 98–107)
CO2 SERPL-SCNC: 36 MMOL/L (ref 23–31)
CREAT SERPL-MCNC: 0.72 MG/DL (ref 0.55–1.02)
EGFR (NO RACE VARIABLE) (RUSH/TITUS): 88 ML/MIN/1.73M2
GLUCOSE SERPL-MCNC: 62 MG/DL (ref 82–115)
POTASSIUM SERPL-SCNC: 3.1 MMOL/L (ref 3.5–5.1)
SODIUM SERPL-SCNC: 139 MMOL/L (ref 136–145)

## 2024-12-07 PROCEDURE — 27000221 HC OXYGEN, UP TO 24 HOURS

## 2024-12-07 PROCEDURE — 36415 COLL VENOUS BLD VENIPUNCTURE: CPT | Performed by: STUDENT IN AN ORGANIZED HEALTH CARE EDUCATION/TRAINING PROGRAM

## 2024-12-07 PROCEDURE — 63600175 PHARM REV CODE 636 W HCPCS: Performed by: STUDENT IN AN ORGANIZED HEALTH CARE EDUCATION/TRAINING PROGRAM

## 2024-12-07 PROCEDURE — 25000003 PHARM REV CODE 250: Performed by: STUDENT IN AN ORGANIZED HEALTH CARE EDUCATION/TRAINING PROGRAM

## 2024-12-07 PROCEDURE — 25000003 PHARM REV CODE 250

## 2024-12-07 PROCEDURE — 99900035 HC TECH TIME PER 15 MIN (STAT)

## 2024-12-07 PROCEDURE — 80048 BASIC METABOLIC PNL TOTAL CA: CPT | Performed by: STUDENT IN AN ORGANIZED HEALTH CARE EDUCATION/TRAINING PROGRAM

## 2024-12-07 PROCEDURE — 99291 CRITICAL CARE FIRST HOUR: CPT | Mod: ,,, | Performed by: STUDENT IN AN ORGANIZED HEALTH CARE EDUCATION/TRAINING PROGRAM

## 2024-12-07 PROCEDURE — 25000003 PHARM REV CODE 250: Performed by: HOSPITALIST

## 2024-12-07 PROCEDURE — 63600175 PHARM REV CODE 636 W HCPCS: Performed by: HOSPITALIST

## 2024-12-07 PROCEDURE — 25000242 PHARM REV CODE 250 ALT 637 W/ HCPCS: Performed by: HOSPITALIST

## 2024-12-07 PROCEDURE — 25000003 PHARM REV CODE 250: Performed by: EMERGENCY MEDICINE

## 2024-12-07 PROCEDURE — 25000242 PHARM REV CODE 250 ALT 637 W/ HCPCS

## 2024-12-07 PROCEDURE — 20000000 HC ICU ROOM

## 2024-12-07 PROCEDURE — 94640 AIRWAY INHALATION TREATMENT: CPT

## 2024-12-07 RX ORDER — POTASSIUM CHLORIDE 20 MEQ/1
40 TABLET, EXTENDED RELEASE ORAL ONCE
Status: COMPLETED | OUTPATIENT
Start: 2024-12-07 | End: 2024-12-07

## 2024-12-07 RX ORDER — HEPARIN SODIUM 5000 [USP'U]/ML
5000 INJECTION, SOLUTION INTRAVENOUS; SUBCUTANEOUS EVERY 8 HOURS
Status: DISCONTINUED | OUTPATIENT
Start: 2024-12-07 | End: 2024-12-09

## 2024-12-07 RX ADMIN — OLANZAPINE 5 MG: 5 TABLET, FILM COATED ORAL at 02:12

## 2024-12-07 RX ADMIN — BUDESONIDE 0.5 MG: 0.5 INHALANT RESPIRATORY (INHALATION) at 07:12

## 2024-12-07 RX ADMIN — SODIUM CHLORIDE SOLN NEBU 3% 4 ML: 3 NEBU SOLN at 02:12

## 2024-12-07 RX ADMIN — MUPIROCIN: 20 OINTMENT TOPICAL at 09:12

## 2024-12-07 RX ADMIN — DOCUSATE SODIUM 100 MG: 100 CAPSULE, LIQUID FILLED ORAL at 10:12

## 2024-12-07 RX ADMIN — BUPROPION HYDROCHLORIDE 100 MG: 100 TABLET, FILM COATED, EXTENDED RELEASE ORAL at 09:12

## 2024-12-07 RX ADMIN — IPRATROPIUM BROMIDE AND ALBUTEROL SULFATE 3 ML: .5; 3 SOLUTION RESPIRATORY (INHALATION) at 02:12

## 2024-12-07 RX ADMIN — PIPERACILLIN SODIUM AND TAZOBACTAM SODIUM 4.5 G: 4; .5 INJECTION, POWDER, LYOPHILIZED, FOR SOLUTION INTRAVENOUS at 12:12

## 2024-12-07 RX ADMIN — PIPERACILLIN SODIUM AND TAZOBACTAM SODIUM 4.5 G: 4; .5 INJECTION, POWDER, LYOPHILIZED, FOR SOLUTION INTRAVENOUS at 04:12

## 2024-12-07 RX ADMIN — BUDESONIDE 0.5 MG: 0.5 INHALANT RESPIRATORY (INHALATION) at 08:12

## 2024-12-07 RX ADMIN — HEPARIN SODIUM 5000 UNITS: 5000 INJECTION INTRAVENOUS; SUBCUTANEOUS at 02:12

## 2024-12-07 RX ADMIN — HEPARIN SODIUM 5000 UNITS: 5000 INJECTION INTRAVENOUS; SUBCUTANEOUS at 09:12

## 2024-12-07 RX ADMIN — DEXMEDETOMIDINE HYDROCHLORIDE 0.1 MCG/KG/HR: 4 INJECTION, SOLUTION INTRAVENOUS at 01:12

## 2024-12-07 RX ADMIN — OLANZAPINE 5 MG: 5 TABLET, FILM COATED ORAL at 09:12

## 2024-12-07 RX ADMIN — PREDNISONE 40 MG: 10 TABLET ORAL at 09:12

## 2024-12-07 RX ADMIN — SODIUM CHLORIDE SOLN NEBU 3% 4 ML: 3 NEBU SOLN at 12:12

## 2024-12-07 RX ADMIN — IPRATROPIUM BROMIDE AND ALBUTEROL SULFATE 3 ML: .5; 3 SOLUTION RESPIRATORY (INHALATION) at 12:12

## 2024-12-07 RX ADMIN — SODIUM CHLORIDE SOLN NEBU 3% 4 ML: 3 NEBU SOLN at 07:12

## 2024-12-07 RX ADMIN — PIPERACILLIN SODIUM AND TAZOBACTAM SODIUM 4.5 G: 4; .5 INJECTION, POWDER, LYOPHILIZED, FOR SOLUTION INTRAVENOUS at 09:12

## 2024-12-07 RX ADMIN — POTASSIUM CHLORIDE 40 MEQ: 1500 TABLET, EXTENDED RELEASE ORAL at 12:12

## 2024-12-07 RX ADMIN — IPRATROPIUM BROMIDE AND ALBUTEROL SULFATE 3 ML: .5; 3 SOLUTION RESPIRATORY (INHALATION) at 07:12

## 2024-12-07 NOTE — PLAN OF CARE
Problem: COPD (Chronic Obstructive Pulmonary Disease)  Goal: Optimal Chronic Illness Coping  Outcome: Progressing  Goal: Optimal Level of Functional [place]  Outcome: Progressing  Goal: Absence of Infection Signs and Symptoms  Outcome: Progressing  Goal: Improved Oral Intake  Outcome: Progressing  Goal: Effective Oxygenation and Ventilation  Outcome: Progressing     Problem: Gas Exchange Impaired  Goal: Optimal Gas Exchange  Outcome: Progressing     Problem: Pneumonia  Goal: Fluid Balance  Outcome: Progressing  Goal: Resolution of Infection Signs and Symptoms  Outcome: Progressing  Goal: Effective Oxygenation and Ventilation  Outcome: Progressing

## 2024-12-07 NOTE — PLAN OF CARE
Problem: COPD (Chronic Obstructive Pulmonary Disease)  Goal: Effective Oxygenation and Ventilation  Outcome: Progressing     Problem: Skin Injury Risk Increased  Goal: Skin Health and Integrity  Outcome: Progressing     Problem: Gas Exchange Impaired  Goal: Optimal Gas Exchange  Outcome: Progressing     Problem: Pneumonia  Goal: Effective Oxygenation and Ventilation  Outcome: Progressing

## 2024-12-07 NOTE — PLAN OF CARE
Problem: Adult Inpatient Plan of Care  Goal: Plan of Care Review  Outcome: Progressing  Goal: Patient-Specific Goal (Individualized)  Outcome: Progressing  Goal: Absence of Hospital-Acquired Illness or Injury  Outcome: Progressing  Goal: Optimal Comfort and Wellbeing  Outcome: Progressing  Goal: Readiness for Transition of Care  Outcome: Progressing     Problem: COPD (Chronic Obstructive Pulmonary Disease)  Goal: Optimal Chronic Illness Coping  Outcome: Progressing  Goal: Optimal Level of Functional [place]  Outcome: Progressing  Goal: Absence of Infection Signs and Symptoms  Outcome: Progressing  Goal: Improved Oral Intake  Outcome: Progressing  Goal: Effective Oxygenation and Ventilation  Outcome: Progressing     Problem: Skin Injury Risk Increased  Goal: Skin Health and Integrity  Outcome: Progressing     Problem: Gas Exchange Impaired  Goal: Optimal Gas Exchange  Outcome: Progressing     Problem: Pneumonia  Goal: Fluid Balance  Outcome: Progressing  Goal: Resolution of Infection Signs and Symptoms  Outcome: Progressing  Goal: Effective Oxygenation and Ventilation  Outcome: Progressing     Problem: Infection  Goal: Absence of Infection Signs and Symptoms  Outcome: Progressing     Problem: Noninvasive Ventilation Acute  Goal: Effective Unassisted Ventilation and Oxygenation  Outcome: Progressing     Problem: Fall Injury Risk  Goal: Absence of Fall and Fall-Related Injury  Outcome: Progressing     Problem: Restraint, Nonviolent  Goal: Absence of Harm or Injury  Outcome: Progressing

## 2024-12-08 LAB
ANION GAP SERPL CALCULATED.3IONS-SCNC: 11 MMOL/L (ref 7–16)
ANION GAP SERPL CALCULATED.3IONS-SCNC: 13 MMOL/L (ref 7–16)
BACTERIA BLD CULT: NORMAL
BACTERIA BLD CULT: NORMAL
BASOPHILS # BLD AUTO: 0.03 K/UL (ref 0–0.2)
BASOPHILS NFR BLD AUTO: 0.3 % (ref 0–1)
BUN SERPL-MCNC: 19 MG/DL (ref 10–20)
BUN SERPL-MCNC: 20 MG/DL (ref 10–20)
BUN/CREAT SERPL: 28 (ref 6–20)
BUN/CREAT SERPL: 32 (ref 6–20)
CALCIUM SERPL-MCNC: 9 MG/DL (ref 8.4–10.2)
CALCIUM SERPL-MCNC: 9.1 MG/DL (ref 8.4–10.2)
CHLORIDE SERPL-SCNC: 94 MMOL/L (ref 98–107)
CHLORIDE SERPL-SCNC: 95 MMOL/L (ref 98–107)
CO2 SERPL-SCNC: 34 MMOL/L (ref 23–31)
CO2 SERPL-SCNC: 36 MMOL/L (ref 23–31)
CREAT SERPL-MCNC: 0.63 MG/DL (ref 0.55–1.02)
CREAT SERPL-MCNC: 0.67 MG/DL (ref 0.55–1.02)
DIFFERENTIAL METHOD BLD: ABNORMAL
EGFR (NO RACE VARIABLE) (RUSH/TITUS): 92 ML/MIN/1.73M2
EGFR (NO RACE VARIABLE) (RUSH/TITUS): 94 ML/MIN/1.73M2
EOSINOPHIL # BLD AUTO: 0.18 K/UL (ref 0–0.5)
EOSINOPHIL NFR BLD AUTO: 1.7 % (ref 1–4)
ERYTHROCYTE [DISTWIDTH] IN BLOOD BY AUTOMATED COUNT: 16.7 % (ref 11.5–14.5)
GLUCOSE SERPL-MCNC: 102 MG/DL (ref 82–115)
GLUCOSE SERPL-MCNC: 81 MG/DL (ref 82–115)
HCT VFR BLD AUTO: 33 % (ref 38–47)
HGB BLD-MCNC: 9.5 G/DL (ref 12–16)
IMM GRANULOCYTES # BLD AUTO: 0.04 K/UL (ref 0–0.04)
IMM GRANULOCYTES NFR BLD: 0.4 % (ref 0–0.4)
LYMPHOCYTES # BLD AUTO: 1.61 K/UL (ref 1–4.8)
LYMPHOCYTES NFR BLD AUTO: 14.8 % (ref 27–41)
MCH RBC QN AUTO: 24.5 PG (ref 27–31)
MCHC RBC AUTO-ENTMCNC: 28.8 G/DL (ref 32–36)
MCV RBC AUTO: 85.3 FL (ref 80–96)
MONOCYTES # BLD AUTO: 0.67 K/UL (ref 0–0.8)
MONOCYTES NFR BLD AUTO: 6.2 % (ref 2–6)
MPC BLD CALC-MCNC: 9.2 FL (ref 9.4–12.4)
NEUTROPHILS # BLD AUTO: 8.36 K/UL (ref 1.8–7.7)
NEUTROPHILS NFR BLD AUTO: 76.6 % (ref 53–65)
NRBC # BLD AUTO: 0 X10E3/UL
NRBC, AUTO (.00): 0 %
PLATELET # BLD AUTO: 449 K/UL (ref 150–400)
POTASSIUM SERPL-SCNC: 3.3 MMOL/L (ref 3.5–5.1)
POTASSIUM SERPL-SCNC: 3.6 MMOL/L (ref 3.5–5.1)
RBC # BLD AUTO: 3.87 M/UL (ref 4.2–5.4)
SODIUM SERPL-SCNC: 138 MMOL/L (ref 136–145)
SODIUM SERPL-SCNC: 138 MMOL/L (ref 136–145)
WBC # BLD AUTO: 10.89 K/UL (ref 4.5–11)

## 2024-12-08 PROCEDURE — 63600175 PHARM REV CODE 636 W HCPCS: Performed by: STUDENT IN AN ORGANIZED HEALTH CARE EDUCATION/TRAINING PROGRAM

## 2024-12-08 PROCEDURE — 27000221 HC OXYGEN, UP TO 24 HOURS

## 2024-12-08 PROCEDURE — 85025 COMPLETE CBC W/AUTO DIFF WBC: CPT | Performed by: STUDENT IN AN ORGANIZED HEALTH CARE EDUCATION/TRAINING PROGRAM

## 2024-12-08 PROCEDURE — 25000242 PHARM REV CODE 250 ALT 637 W/ HCPCS: Performed by: HOSPITALIST

## 2024-12-08 PROCEDURE — 20000000 HC ICU ROOM

## 2024-12-08 PROCEDURE — 51701 INSERT BLADDER CATHETER: CPT

## 2024-12-08 PROCEDURE — 25000242 PHARM REV CODE 250 ALT 637 W/ HCPCS

## 2024-12-08 PROCEDURE — 25000003 PHARM REV CODE 250: Performed by: STUDENT IN AN ORGANIZED HEALTH CARE EDUCATION/TRAINING PROGRAM

## 2024-12-08 PROCEDURE — 94640 AIRWAY INHALATION TREATMENT: CPT

## 2024-12-08 PROCEDURE — 36415 COLL VENOUS BLD VENIPUNCTURE: CPT | Performed by: STUDENT IN AN ORGANIZED HEALTH CARE EDUCATION/TRAINING PROGRAM

## 2024-12-08 PROCEDURE — 25000003 PHARM REV CODE 250: Performed by: HOSPITALIST

## 2024-12-08 PROCEDURE — 51798 US URINE CAPACITY MEASURE: CPT

## 2024-12-08 PROCEDURE — 99291 CRITICAL CARE FIRST HOUR: CPT | Mod: ,,, | Performed by: STUDENT IN AN ORGANIZED HEALTH CARE EDUCATION/TRAINING PROGRAM

## 2024-12-08 PROCEDURE — 99900035 HC TECH TIME PER 15 MIN (STAT)

## 2024-12-08 PROCEDURE — 80048 BASIC METABOLIC PNL TOTAL CA: CPT | Performed by: STUDENT IN AN ORGANIZED HEALTH CARE EDUCATION/TRAINING PROGRAM

## 2024-12-08 PROCEDURE — 63600175 PHARM REV CODE 636 W HCPCS

## 2024-12-08 RX ORDER — POTASSIUM CHLORIDE 7.45 MG/ML
10 INJECTION INTRAVENOUS
Status: COMPLETED | OUTPATIENT
Start: 2024-12-08 | End: 2024-12-08

## 2024-12-08 RX ADMIN — IPRATROPIUM BROMIDE AND ALBUTEROL SULFATE 3 ML: .5; 3 SOLUTION RESPIRATORY (INHALATION) at 07:12

## 2024-12-08 RX ADMIN — SODIUM CHLORIDE SOLN NEBU 3% 4 ML: 3 NEBU SOLN at 12:12

## 2024-12-08 RX ADMIN — BUDESONIDE 0.5 MG: 0.5 INHALANT RESPIRATORY (INHALATION) at 08:12

## 2024-12-08 RX ADMIN — HEPARIN SODIUM 5000 UNITS: 5000 INJECTION INTRAVENOUS; SUBCUTANEOUS at 02:12

## 2024-12-08 RX ADMIN — IPRATROPIUM BROMIDE AND ALBUTEROL SULFATE 3 ML: .5; 3 SOLUTION RESPIRATORY (INHALATION) at 12:12

## 2024-12-08 RX ADMIN — BUPROPION HYDROCHLORIDE 100 MG: 100 TABLET, FILM COATED, EXTENDED RELEASE ORAL at 09:12

## 2024-12-08 RX ADMIN — POTASSIUM CHLORIDE 10 MEQ: 7.46 INJECTION, SOLUTION INTRAVENOUS at 12:12

## 2024-12-08 RX ADMIN — POTASSIUM CHLORIDE 10 MEQ: 7.46 INJECTION, SOLUTION INTRAVENOUS at 02:12

## 2024-12-08 RX ADMIN — SODIUM CHLORIDE SOLN NEBU 3% 4 ML: 3 NEBU SOLN at 07:12

## 2024-12-08 RX ADMIN — OLANZAPINE 5 MG: 5 TABLET, FILM COATED ORAL at 02:12

## 2024-12-08 RX ADMIN — POTASSIUM CHLORIDE 10 MEQ: 7.46 INJECTION, SOLUTION INTRAVENOUS at 03:12

## 2024-12-08 RX ADMIN — PIPERACILLIN SODIUM AND TAZOBACTAM SODIUM 4.5 G: 4; .5 INJECTION, POWDER, LYOPHILIZED, FOR SOLUTION INTRAVENOUS at 11:12

## 2024-12-08 RX ADMIN — OLANZAPINE 5 MG: 5 TABLET, FILM COATED ORAL at 09:12

## 2024-12-08 RX ADMIN — SODIUM CHLORIDE SOLN NEBU 3% 4 ML: 3 NEBU SOLN at 11:12

## 2024-12-08 RX ADMIN — IPRATROPIUM BROMIDE AND ALBUTEROL SULFATE 3 ML: .5; 3 SOLUTION RESPIRATORY (INHALATION) at 11:12

## 2024-12-08 RX ADMIN — PIPERACILLIN SODIUM AND TAZOBACTAM SODIUM 4.5 G: 4; .5 INJECTION, POWDER, LYOPHILIZED, FOR SOLUTION INTRAVENOUS at 04:12

## 2024-12-08 RX ADMIN — OLANZAPINE 5 MG: 5 TABLET, FILM COATED ORAL at 05:12

## 2024-12-08 RX ADMIN — HEPARIN SODIUM 5000 UNITS: 5000 INJECTION INTRAVENOUS; SUBCUTANEOUS at 05:12

## 2024-12-08 RX ADMIN — BUDESONIDE 0.5 MG: 0.5 INHALANT RESPIRATORY (INHALATION) at 07:12

## 2024-12-08 RX ADMIN — SODIUM CHLORIDE SOLN NEBU 3% 4 ML: 3 NEBU SOLN at 03:12

## 2024-12-08 RX ADMIN — IPRATROPIUM BROMIDE AND ALBUTEROL SULFATE 3 ML: .5; 3 SOLUTION RESPIRATORY (INHALATION) at 03:12

## 2024-12-08 RX ADMIN — POTASSIUM CHLORIDE 10 MEQ: 7.46 INJECTION, SOLUTION INTRAVENOUS at 11:12

## 2024-12-08 RX ADMIN — TRAZODONE HYDROCHLORIDE 50 MG: 50 TABLET ORAL at 09:12

## 2024-12-08 RX ADMIN — DOCUSATE SODIUM 100 MG: 100 CAPSULE, LIQUID FILLED ORAL at 06:12

## 2024-12-08 RX ADMIN — PIPERACILLIN SODIUM AND TAZOBACTAM SODIUM 4.5 G: 4; .5 INJECTION, POWDER, LYOPHILIZED, FOR SOLUTION INTRAVENOUS at 08:12

## 2024-12-08 NOTE — ASSESSMENT & PLAN NOTE
12/8/24-stable on high-flow nasal cannula.  Based on CT chest differential continues to remain interstitial edema (although less likely now with effective diuresis) and some concern for lymphangitic spread of carcinomatosis.  We will continue to keep her on high-flow nasal cannula and maintain her oxygen saturation in the 88% or higher.  Dexmedetomidine can be used to help with compliance with the high-flow nasal cannula for her anxiety and underlying delirium.

## 2024-12-08 NOTE — ASSESSMENT & PLAN NOTE
12/7/24-stable on high-flow nasal cannula.  Based on CT chest differential continues to remain interstitial edema (although less likely now with effective diuresis) and some concern for lymphangitic spread of carcinomatosis.  We will continue to keep her on high-flow nasal cannula and maintain her oxygen saturation in the 88% or higher.  Dexmedetomidine can be used to help with compliance with the high-flow nasal cannula for her anxiety and underlying delirium.

## 2024-12-08 NOTE — PLAN OF CARE
Problem: COPD (Chronic Obstructive Pulmonary Disease)  Goal: Optimal Chronic Illness Coping  Outcome: Progressing  Goal: Optimal Level of Functional [place]  Outcome: Progressing  Goal: Absence of Infection Signs and Symptoms  Outcome: Progressing  Goal: Improved Oral Intake  Outcome: Progressing  Goal: Effective Oxygenation and Ventilation  Outcome: Progressing     Problem: Gas Exchange Impaired  Goal: Optimal Gas Exchange  Outcome: Progressing     Problem: Pneumonia  Goal: Fluid Balance  Outcome: Progressing  Goal: Resolution of Infection Signs and Symptoms  Outcome: Progressing  Goal: Effective Oxygenation and Ventilation  Outcome: Progressing

## 2024-12-08 NOTE — ASSESSMENT & PLAN NOTE
Significant improvement overnight and her encephalopathy and clinical delirium.  We will continue to maximize nonpharmacological measures to mitigate any additional delirium.  She has responded well to dexmedetomidine previously with some transient bradycardia that improved- and we will use this on a p.r.n. basis (unless she develops any persistent bradycardia from Precedex) to help with her anxiety and hyperactive delirium.

## 2024-12-08 NOTE — SUBJECTIVE & OBJECTIVE
Interval History/Significant Events:  Refer to hospital course    Review of Systems  Objective:     Vital Signs (Most Recent):  Temp: 97.7 °F (36.5 °C) (12/08/24 0315)  Pulse: 63 (12/08/24 0737)  Resp: 16 (12/08/24 0737)  BP: (!) 111/51 (12/08/24 0600)  SpO2: (!) 94 % (12/08/24 0737) Vital Signs (24h Range):  Temp:  [97.7 °F (36.5 °C)-98.8 °F (37.1 °C)] 97.7 °F (36.5 °C)  Pulse:  [55-77] 63  Resp:  [12-27] 16  SpO2:  [77 %-100 %] 94 %  BP: ()/(47-74) 111/51   Weight: 41.8 kg (92 lb 2.4 oz)  Body mass index is 14.87 kg/m².      Intake/Output Summary (Last 24 hours) at 12/8/2024 0854  Last data filed at 12/8/2024 0600  Gross per 24 hour   Intake 271.2 ml   Output 350 ml   Net -78.8 ml          Physical Exam  Constitutional:       General: She is not in acute distress.     Appearance: Normal appearance. She is normal weight. She is ill-appearing. She is not diaphoretic.   HENT:      Head: Normocephalic and atraumatic.      Nose: No congestion or rhinorrhea.      Mouth/Throat:      Mouth: Mucous membranes are moist.   Cardiovascular:      Rate and Rhythm: Normal rate and regular rhythm.      Pulses: Normal pulses.      Heart sounds: Normal heart sounds.   Pulmonary:      Effort: Pulmonary effort is normal. No respiratory distress.      Breath sounds: No stridor. Rhonchi and rales present. No wheezing.   Chest:      Chest wall: No tenderness.   Abdominal:      General: Abdomen is flat.   Musculoskeletal:      Cervical back: Normal range of motion. No rigidity.      Right lower leg: No edema.      Left lower leg: No edema.   Skin:     General: Skin is warm.      Findings: No erythema.   Neurological:      General: No focal deficit present.      Mental Status: She is alert and oriented to person, place, and time. Mental status is at baseline.      Comments: Oriented x3 for me this morning            Vents:  Oxygen Concentration (%): 52 (12/08/24 0737)  Lines/Drains/Airways       Central Venous Catheter Line  Duration  "            Port A Cath Single Lumen 12/03/24 0013 Atrial Right 5 days              Drain  Duration             Female External Urinary Catheter w/ Suction 12/07/24 1705 <1 day              Peripheral Intravenous Line  Duration                  Peripheral IV - Single Lumen 12/05/24 0734 22 G Anterior;Left Forearm 3 days                  Significant Labs:    CBC/Anemia Profile:  No results for input(s): "WBC", "HGB", "HCT", "PLT", "MCV", "RDW", "IRON", "FERRITIN", "RETIC", "FOLATE", "[id number]", "OCCULTBLOOD" in the last 48 hours.       Chemistries:  Recent Labs   Lab 12/06/24  0958 12/07/24  0445 12/08/24  0330    139 138   K 3.5 3.1* 3.6   CL 91* 93* 95*   CO2 36* 36* 34*   BUN 18 19 19   CREATININE 0.64 0.72 0.67   CALCIUM 8.6 9.1 9.1       All pertinent labs within the past 24 hours have been reviewed.    Significant Imaging:  I have reviewed all pertinent imaging results/findings within the past 24 hours.  "

## 2024-12-08 NOTE — ASSESSMENT & PLAN NOTE
Continued improvement for me today.  Oriented x3.  Although yesterday later in the day she did have some waxing and waning consistent with delirium.

## 2024-12-08 NOTE — PROGRESS NOTES
[facility]  Critical Care Medicine  Progress Note    Patient Name: Mayra Kelly  MRN: [id number]  Admission Date: 12/2/2024  Hospital Length of Stay: 4 days  Code Status: Full Code  Attending Provider: Varghese Castellon MD  Primary Care Provider: Darlene Campoverde NP   Principal Problem: Acute on chronic respiratory failure with hypoxia and hypercapnia    Subjective:     HPI:  No notes on file    Hospital/ICU Course:  12/4/24-requiring high-flow nasal cannula, admitted to the intensive care unit overnight as she continued to pull off her oxygen.  She was oriented this morning but later in the day continued to pull off her high-flow nasal cannula causing desaturation.  I reviewed her chest x-ray which was consistent with interstitial edema.  She was not on any diuresis this a.m., which we initiated.  She was mildly hypotensive, requiring norepinephrine later today after being given a bolus of IV Lasix.    12/5/24-overnight her oxygen saturations were stable on 35 L and 55% FiO2.  I reviewed her CT chest which we performed today, showed mild migration of her stent, though not occluding any segment (right upper lobe stenosed by malignancy).  Vasopressor support low-dose at this time.  Tolerated diuresis well.    12/6/24-significant improvement in patient's mental status, more calm and cooperative early this morning.  Improvement in the patient's leukocytosis as well as hemoglobin this morning.  Renal function also within normal limits.  Improvement in patient's oxygenation.  Continues on nebulizers and hypotonic saline.  Pending electrolytes this morning.    12/7/24-continues to remain disoriented.  Stable on current FiO2 requirements.  Of her leukocytosis continues to improve.  Hemoglobin is also stable at 9.9.  We are continuing to hold her Eliquis today and tomorrow in anticipation for procedure on 12/9/24 (bronchoscopy).    Interval History/Significant Events:  Refer to hospital  course    Review of Systems  Objective:     Vital Signs (Most Recent):  Temp: 97.8 °F (36.6 °C) (12/07/24 1502)  Pulse: 75 (12/07/24 1409)  Resp: (!) 27 (12/07/24 1409)  BP: (!) 100/41 (12/07/24 0645)  SpO2: 100 % (12/07/24 1409) Vital Signs (24h Range):  Temp:  [97.8 °F (36.6 °C)-99.1 °F (37.3 °C)] 97.8 °F (36.6 °C)  Pulse:  [51-85] 75  Resp:  [13-27] 27  SpO2:  [83 %-100 %] 100 %  BP: ()/(37-64) 100/41   Weight: 41.8 kg (92 lb 2.4 oz)  Body mass index is 14.87 kg/m².      Intake/Output Summary (Last 24 hours) at 12/7/2024 1821  Last data filed at 12/7/2024 0648  Gross per 24 hour   Intake 389.98 ml   Output 715 ml   Net -325.02 ml          Physical Exam  Constitutional:       General: She is not in acute distress.     Appearance: Normal appearance. She is normal weight. She is ill-appearing. She is not diaphoretic.   HENT:      Head: Normocephalic and atraumatic.      Nose: No congestion or rhinorrhea.      Mouth/Throat:      Mouth: Mucous membranes are moist.   Cardiovascular:      Rate and Rhythm: Normal rate and regular rhythm.      Pulses: Normal pulses.      Heart sounds: Normal heart sounds.   Pulmonary:      Effort: Pulmonary effort is normal. No respiratory distress.      Breath sounds: No stridor. Rhonchi and rales present. No wheezing.   Chest:      Chest wall: No tenderness.   Abdominal:      General: Abdomen is flat.   Musculoskeletal:      Cervical back: Normal range of motion. No rigidity.      Right lower leg: No edema.      Left lower leg: No edema.   Skin:     General: Skin is warm.      Findings: No erythema.   Neurological:      General: No focal deficit present.      Mental Status: She is alert and oriented to person, place, and time. Mental status is at baseline.            Vents:  Oxygen Concentration (%): 50 (12/07/24 1409)  Lines/Drains/Airways       Central Venous Catheter Line  Duration             Port A Cath Single Lumen 12/03/24 0013 Atrial Right 4 days              Drain   Duration                  Urethral Catheter 12/04/24 1647 16 Fr. 3 days              Peripheral Intravenous Line  Duration                  Peripheral IV - Single Lumen 12/05/24 0734 22 G Anterior;Left Forearm 2 days                  Significant Labs:    CBC/Anemia Profile:  Recent Labs   Lab 12/06/24 0524   WBC 13.14*   HGB 9.9*   HCT 33.6*   *   MCV 83.6   RDW 16.2*        Chemistries:  Recent Labs   Lab 12/06/24  0524 12/06/24  0958 12/07/24  0445   NA  --  138 139   K  --  3.5 3.1*   CL  --  91* 93*   CO2  --  36* 36*   BUN 18 18 19   CREATININE 0.64 0.64 0.72   CALCIUM  --  8.6 9.1       All pertinent labs within the past 24 hours have been reviewed.    Significant Imaging:  I have reviewed all pertinent imaging results/findings within the past 24 hours.    ABG  Recent Labs   Lab 12/04/24  1428   PH 7.46*   PO2 78*   PCO2 50*   HCO3 35.6*     Assessment/Plan:     Neuro  Acute metabolic encephalopathy  Significant improvement overnight and her encephalopathy and clinical delirium.  We will continue to maximize nonpharmacological measures to mitigate any additional delirium.  She has responded well to dexmedetomidine previously with some transient bradycardia that improved- and we will use this on a p.r.n. basis (unless she develops any persistent bradycardia from Precedex) to help with her anxiety and hyperactive delirium.    Pulmonary  * Acute on chronic respiratory failure with hypoxia and hypercapnia  12/7/24-stable on high-flow nasal cannula.  Based on CT chest differential continues to remain interstitial edema (although less likely now with effective diuresis) and some concern for lymphangitic spread of carcinomatosis.  We will continue to keep her on high-flow nasal cannula and maintain her oxygen saturation in the 88% or higher.  Dexmedetomidine can be used to help with compliance with the high-flow nasal cannula for her anxiety and underlying delirium.        Pneumonia of both lungs due to  infectious organism  Based on patient's leukocytosis, she likely has bilateral pneumonia although there is no focused area of consolidation.  The patient has a right bronchus intermedius stent (right upper lobe is completely occluded from malignancy and therefore this has been jailed off using the self expanding stent).  Based on the patient's chest x-ray, it appears that her stent is patent at this time.  Bilateral findings are consistent with either multifocal pneumonia or interstitial edema.  We will continue to treat with broad-spectrum antibiotics, obtain MRSA swab and obtain respiratory culture.    Right upper lobe collapse secondary to known malignancy versus postobstructive pneumonia, CT chest reviewed.      Have initiated her on hypertonic saline and continuing DuoNebs with discontinuation of Mucomyst in an effort to keep the stent patent.    12/7/24-stable in terms of her oxygen requirements.  At this time I will go ahead and place her on the list for tentative bronchoscopy on Monday 12/9/24.  We will plan for rigid bronchoscopy with stent evaluation/removal for stent migration or perform flexible bronchoscopy if she is unable to tolerate jet ventilation.  NPO past midnight on 12/8/24 into 12/9/24.    COPD exacerbation  History of COPD, likely with COPD exacerbation we will continue with systemic corticosteroids and triple inhaled therapy using nebulizers.    Cardiac/Vascular  Pulmonary hypertension  Tolerated diuresis well, with pending interpretation of her repeat echocardiogram to monitor her right heart function and RVSP.  We will dose Lasix on a daily basis.    Paroxysmal atrial fibrillation  Rate controlled at this time, anticoagulated with Eliquis.    Oncology  Lung cancer  Known history of lung cancer, lost to follow up with the oncologist recently, I spoke with the patient about this today.  Status post bronchial stent placement previously.  We will continue to monitor at this time.  Repeat CT  chest as above.  Right bronchial stent appears to be patent with mild migration.  Refer to plan as above.    Endocrine  Severe protein-calorie malnutrition  Nutrition consulted. Most recent weight and BMI monitored-     Measurements:  Wt Readings from Last 1 Encounters:   12/05/24 41.8 kg (92 lb 2.4 oz)   Body mass index is 14.87 kg/m².    Patient has been screened and assessed by RD.    Malnutrition Type:  Context: chronic illness  Level: severe    Malnutrition Characteristic Summary:  Energy Intake (Malnutrition): less than 75% for greater than or equal to 1 month  Subcutaneous Fat (Malnutrition): severe depletion  Muscle Mass (Malnutrition): severe depletion    Interventions/Recommendations (treatment strategy):  Recommend continue current diet as tolerated. Encourage good PO intakes.        Critical Care Checklist    Refer to chart for details regarding spontaneous awakening trial (SAT) and spontaneous breathing trial (SBT), CAM-ICU assessment, early mobility and feeding.      Analgesia and sedation- p.r.n. dexmedetomidine  Thromboembolic prophylaxis- heparin for DVT prophylaxis initiated now that Eliquis has been discontinued  Height of bed greater than 30°- Yes  Stress ulcer prophylaxis- not indicated  Indwelling catheter (vascular access lines/Soria catheter)-Reviewed  Deescalation of antimicrobials/pharmacotherapies-Reviewed and addressed appropriately  Code status- Full Code        Critical Care Time:  30 minutes  Critical secondary to Patient has a condition that poses threat to life and bodily function:  Acute hypoxic respiratory failure requiring high-flow nasal cannula      Critical care was time spent personally by me on the following activities: development of treatment plan with patient or surrogate and bedside caregivers, discussions with consultants, evaluation of patient's response to treatment, examination of patient, ordering and performing treatments and interventions, ordering and review of  laboratory studies, ordering and review of radiographic studies, pulse oximetry, re-evaluation of patient's condition. This critical care time did not overlap with that of any other provider or involve time for any procedures.     Varghese Castellon MD  Critical Care Medicine  [facility]

## 2024-12-08 NOTE — SUBJECTIVE & OBJECTIVE
Interval History/Significant Events:  Refer to hospital course    Review of Systems  Objective:     Vital Signs (Most Recent):  Temp: 97.8 °F (36.6 °C) (12/07/24 1502)  Pulse: 75 (12/07/24 1409)  Resp: (!) 27 (12/07/24 1409)  BP: (!) 100/41 (12/07/24 0645)  SpO2: 100 % (12/07/24 1409) Vital Signs (24h Range):  Temp:  [97.8 °F (36.6 °C)-99.1 °F (37.3 °C)] 97.8 °F (36.6 °C)  Pulse:  [51-85] 75  Resp:  [13-27] 27  SpO2:  [83 %-100 %] 100 %  BP: ()/(37-64) 100/41   Weight: 41.8 kg (92 lb 2.4 oz)  Body mass index is 14.87 kg/m².      Intake/Output Summary (Last 24 hours) at 12/7/2024 1821  Last data filed at 12/7/2024 0648  Gross per 24 hour   Intake 389.98 ml   Output 715 ml   Net -325.02 ml          Physical Exam  Constitutional:       General: She is not in acute distress.     Appearance: Normal appearance. She is normal weight. She is ill-appearing. She is not diaphoretic.   HENT:      Head: Normocephalic and atraumatic.      Nose: No congestion or rhinorrhea.      Mouth/Throat:      Mouth: Mucous membranes are moist.   Cardiovascular:      Rate and Rhythm: Normal rate and regular rhythm.      Pulses: Normal pulses.      Heart sounds: Normal heart sounds.   Pulmonary:      Effort: Pulmonary effort is normal. No respiratory distress.      Breath sounds: No stridor. Rhonchi and rales present. No wheezing.   Chest:      Chest wall: No tenderness.   Abdominal:      General: Abdomen is flat.   Musculoskeletal:      Cervical back: Normal range of motion. No rigidity.      Right lower leg: No edema.      Left lower leg: No edema.   Skin:     General: Skin is warm.      Findings: No erythema.   Neurological:      General: No focal deficit present.      Mental Status: She is alert and oriented to person, place, and time. Mental status is at baseline.            Vents:  Oxygen Concentration (%): 50 (12/07/24 1409)  Lines/Drains/Airways       Central Venous Catheter Line  Duration             Port A Cath Single Lumen  12/03/24 0013 Atrial Right 4 days              Drain  Duration                  Urethral Catheter 12/04/24 1647 16 Fr. 3 days              Peripheral Intravenous Line  Duration                  Peripheral IV - Single Lumen 12/05/24 0734 22 G Anterior;Left Forearm 2 days                  Significant Labs:    CBC/Anemia Profile:  Recent Labs   Lab 12/06/24 0524   WBC 13.14*   HGB 9.9*   HCT 33.6*   *   MCV 83.6   RDW 16.2*        Chemistries:  Recent Labs   Lab 12/06/24 0524 12/06/24  0958 12/07/24  0445   NA  --  138 139   K  --  3.5 3.1*   CL  --  91* 93*   CO2  --  36* 36*   BUN 18 18 19   CREATININE 0.64 0.64 0.72   CALCIUM  --  8.6 9.1       All pertinent labs within the past 24 hours have been reviewed.    Significant Imaging:  I have reviewed all pertinent imaging results/findings within the past 24 hours.

## 2024-12-08 NOTE — PLAN OF CARE
Problem: COPD (Chronic Obstructive Pulmonary Disease)  Goal: Effective Oxygenation and Ventilation  Outcome: Progressing     Problem: Skin Injury Risk Increased  Goal: Skin Health and Integrity  Outcome: Progressing     Problem: Gas Exchange Impaired  Goal: Optimal Gas Exchange  Outcome: Progressing     Problem: Pneumonia  Goal: Effective Oxygenation and Ventilation  Outcome: Progressing     Problem: Noninvasive Ventilation Acute  Goal: Effective Unassisted Ventilation and Oxygenation  Outcome: Met

## 2024-12-08 NOTE — ASSESSMENT & PLAN NOTE
Based on patient's leukocytosis, she likely has bilateral pneumonia although there is no focused area of consolidation.  The patient has a right bronchus intermedius stent (right upper lobe is completely occluded from malignancy and therefore this has been jailed off using the self expanding stent).  Based on the patient's chest x-ray, it appears that her stent is patent at this time.  Bilateral findings are consistent with either multifocal pneumonia or interstitial edema.  We will continue to treat with broad-spectrum antibiotics, obtain MRSA swab and obtain respiratory culture.    Right upper lobe collapse secondary to known malignancy versus postobstructive pneumonia, CT chest reviewed.      Have initiated her on hypertonic saline and continuing DuoNebs with discontinuation of Mucomyst in an effort to keep the stent patent.    12/8/24-stable in terms of her oxygen requirements.  At this time I will go ahead and place her on the list for tentative bronchoscopy on Monday 12/9/24.  We will plan for rigid bronchoscopy with stent evaluation/removal for stent migration or perform flexible bronchoscopy if she is unable to tolerate jet ventilation.  NPO past midnight on 12/8/24 into 12/9/24.

## 2024-12-08 NOTE — ASSESSMENT & PLAN NOTE
Based on patient's leukocytosis, she likely has bilateral pneumonia although there is no focused area of consolidation.  The patient has a right bronchus intermedius stent (right upper lobe is completely occluded from malignancy and therefore this has been jailed off using the self expanding stent).  Based on the patient's chest x-ray, it appears that her stent is patent at this time.  Bilateral findings are consistent with either multifocal pneumonia or interstitial edema.  We will continue to treat with broad-spectrum antibiotics, obtain MRSA swab and obtain respiratory culture.    Right upper lobe collapse secondary to known malignancy versus postobstructive pneumonia, CT chest reviewed.      Have initiated her on hypertonic saline and continuing DuoNebs with discontinuation of Mucomyst in an effort to keep the stent patent.    12/7/24-stable in terms of her oxygen requirements.  At this time I will go ahead and place her on the list for tentative bronchoscopy on Monday 12/9/24.  We will plan for rigid bronchoscopy with stent evaluation/removal for stent migration or perform flexible bronchoscopy if she is unable to tolerate jet ventilation.  NPO past midnight on 12/8/24 into 12/9/24.

## 2024-12-08 NOTE — PLAN OF CARE
Problem: Adult Inpatient Plan of Care  Goal: Plan of Care Review  Outcome: Progressing  Goal: Patient-Specific Goal (Individualized)  Outcome: Progressing  Goal: Absence of Hospital-Acquired Illness or Injury  Outcome: Progressing  Goal: Optimal Comfort and Wellbeing  Outcome: Progressing  Goal: Readiness for Transition of Care  Outcome: Progressing     Problem: COPD (Chronic Obstructive Pulmonary Disease)  Goal: Optimal Chronic Illness Coping  Outcome: Progressing  Goal: Optimal Level of Functional [place]  Outcome: Progressing  Goal: Absence of Infection Signs and Symptoms  Outcome: Progressing  Goal: Improved Oral Intake  Outcome: Progressing  Goal: Effective Oxygenation and Ventilation  Outcome: Progressing     Problem: Skin Injury Risk Increased  Goal: Skin Health and Integrity  Outcome: Progressing     Problem: Gas Exchange Impaired  Goal: Optimal Gas Exchange  Outcome: Progressing     Problem: Pneumonia  Goal: Fluid Balance  Outcome: Progressing  Goal: Resolution of Infection Signs and Symptoms  Outcome: Progressing  Goal: Effective Oxygenation and Ventilation  Outcome: Progressing     Problem: Infection  Goal: Absence of Infection Signs and Symptoms  Outcome: Progressing     Problem: Fall Injury Risk  Goal: Absence of Fall and Fall-Related Injury  Outcome: Progressing     Problem: Restraint, Nonviolent  Goal: Absence of Harm or Injury  Outcome: Progressing

## 2024-12-08 NOTE — PROGRESS NOTES
[facility]  Critical Care Medicine  Progress Note    Patient Name: Mayra Kelly  MRN: [id number]  Admission Date: 12/2/2024  Hospital Length of Stay: 5 days  Code Status: Full Code  Attending Provider: Varghese Castellon MD  Primary Care Provider: Darlene Campoverde NP   Principal Problem: Acute on chronic respiratory failure with hypoxia and hypercapnia    Subjective:     HPI:  No notes on file    Hospital/ICU Course:  12/4/24-requiring high-flow nasal cannula, admitted to the intensive care unit overnight as she continued to pull off her oxygen.  She was oriented this morning but later in the day continued to pull off her high-flow nasal cannula causing desaturation.  I reviewed her chest x-ray which was consistent with interstitial edema.  She was not on any diuresis this a.m., which we initiated.  She was mildly hypotensive, requiring norepinephrine later today after being given a bolus of IV Lasix.    12/5/24-overnight her oxygen saturations were stable on 35 L and 55% FiO2.  I reviewed her CT chest which we performed today, showed mild migration of her stent, though not occluding any segment (right upper lobe stenosed by malignancy).  Vasopressor support low-dose at this time.  Tolerated diuresis well.    12/6/24-significant improvement in patient's mental status, more calm and cooperative early this morning.  Improvement in the patient's leukocytosis as well as hemoglobin this morning.  Renal function also within normal limits.  Improvement in patient's oxygenation.  Continues on nebulizers and hypotonic saline.  Pending electrolytes this morning.    12/7/24-continues to remain disoriented.  Stable on current FiO2 requirements.  Of her leukocytosis continues to improve.  Hemoglobin is also stable at 9.9.  We are continuing to hold her Eliquis today and tomorrow in anticipation for procedure on 12/9/24 (bronchoscopy).    12/8/24-oriented x3 for me this morning.  Pending lab work.  Stable on  her current high-flow nasal cannula.    Interval History/Significant Events:  Refer to hospital course    Review of Systems  Objective:     Vital Signs (Most Recent):  Temp: 97.7 °F (36.5 °C) (12/08/24 0315)  Pulse: 63 (12/08/24 0737)  Resp: 16 (12/08/24 0737)  BP: (!) 111/51 (12/08/24 0600)  SpO2: (!) 94 % (12/08/24 0737) Vital Signs (24h Range):  Temp:  [97.7 °F (36.5 °C)-98.8 °F (37.1 °C)] 97.7 °F (36.5 °C)  Pulse:  [55-77] 63  Resp:  [12-27] 16  SpO2:  [77 %-100 %] 94 %  BP: ()/(47-74) 111/51   Weight: 41.8 kg (92 lb 2.4 oz)  Body mass index is 14.87 kg/m².      Intake/Output Summary (Last 24 hours) at 12/8/2024 0854  Last data filed at 12/8/2024 0600  Gross per 24 hour   Intake 271.2 ml   Output 350 ml   Net -78.8 ml          Physical Exam  Constitutional:       General: She is not in acute distress.     Appearance: Normal appearance. She is normal weight. She is ill-appearing. She is not diaphoretic.   HENT:      Head: Normocephalic and atraumatic.      Nose: No congestion or rhinorrhea.      Mouth/Throat:      Mouth: Mucous membranes are moist.   Cardiovascular:      Rate and Rhythm: Normal rate and regular rhythm.      Pulses: Normal pulses.      Heart sounds: Normal heart sounds.   Pulmonary:      Effort: Pulmonary effort is normal. No respiratory distress.      Breath sounds: No stridor. Rhonchi and rales present. No wheezing.   Chest:      Chest wall: No tenderness.   Abdominal:      General: Abdomen is flat.   Musculoskeletal:      Cervical back: Normal range of motion. No rigidity.      Right lower leg: No edema.      Left lower leg: No edema.   Skin:     General: Skin is warm.      Findings: No erythema.   Neurological:      General: No focal deficit present.      Mental Status: She is alert and oriented to person, place, and time. Mental status is at baseline.      Comments: Oriented x3 for me this morning            Vents:  Oxygen Concentration (%): 52 (12/08/24 9574)  Lines/Drains/Airways   "     Central Venous Catheter Line  Duration             Port A Cath Single Lumen 12/03/24 0013 Atrial Right 5 days              Drain  Duration             Female External Urinary Catheter w/ Suction 12/07/24 1705 <1 day              Peripheral Intravenous Line  Duration                  Peripheral IV - Single Lumen 12/05/24 0734 22 G Anterior;Left Forearm 3 days                  Significant Labs:    CBC/Anemia Profile:  No results for input(s): "WBC", "HGB", "HCT", "PLT", "MCV", "RDW", "IRON", "FERRITIN", "RETIC", "FOLATE", "XRNOBBYL17", "OCCULTBLOOD" in the last 48 hours.       Chemistries:  Recent Labs   Lab 12/06/24  0958 12/07/24  0445 12/08/24  0330    139 138   K 3.5 3.1* 3.6   CL 91* 93* 95*   CO2 36* 36* 34*   BUN 18 19 19   CREATININE 0.64 0.72 0.67   CALCIUM 8.6 9.1 9.1       All pertinent labs within the past 24 hours have been reviewed.    Significant Imaging:  I have reviewed all pertinent imaging results/findings within the past 24 hours.    ABG  Recent Labs   Lab 12/04/24  1428   PH 7.46*   PO2 78*   PCO2 50*   HCO3 35.6*     Assessment/Plan:     Neuro  Acute metabolic encephalopathy  Continued improvement for me today.  Oriented x3.  Although yesterday later in the day she did have some waxing and waning consistent with delirium.    Pulmonary  * Acute on chronic respiratory failure with hypoxia and hypercapnia  12/8/24-stable on high-flow nasal cannula.  Based on CT chest differential continues to remain interstitial edema (although less likely now with effective diuresis) and some concern for lymphangitic spread of carcinomatosis.  We will continue to keep her on high-flow nasal cannula and maintain her oxygen saturation in the 88% or higher.  Dexmedetomidine can be used to help with compliance with the high-flow nasal cannula for her anxiety and underlying delirium.        Pneumonia of both lungs due to infectious organism  Based on patient's leukocytosis, she likely has bilateral pneumonia " although there is no focused area of consolidation.  The patient has a right bronchus intermedius stent (right upper lobe is completely occluded from malignancy and therefore this has been jailed off using the self expanding stent).  Based on the patient's chest x-ray, it appears that her stent is patent at this time.  Bilateral findings are consistent with either multifocal pneumonia or interstitial edema.  We will continue to treat with broad-spectrum antibiotics, obtain MRSA swab and obtain respiratory culture.    Right upper lobe collapse secondary to known malignancy versus postobstructive pneumonia, CT chest reviewed.      Have initiated her on hypertonic saline and continuing DuoNebs with discontinuation of Mucomyst in an effort to keep the stent patent.    12/8/24-stable in terms of her oxygen requirements.  At this time I will go ahead and place her on the list for tentative bronchoscopy on Monday 12/9/24.  We will plan for rigid bronchoscopy with stent evaluation/removal for stent migration or perform flexible bronchoscopy if she is unable to tolerate jet ventilation.  NPO past midnight on 12/8/24 into 12/9/24.    COPD exacerbation  History of COPD, likely with COPD exacerbation we will continue with systemic corticosteroids and triple inhaled therapy using nebulizers.    Cardiac/Vascular  Pulmonary hypertension  Tolerated diuresis well, with pending interpretation of her repeat echocardiogram to monitor her right heart function and RVSP.  We will dose Lasix on a daily basis.    Paroxysmal atrial fibrillation  Rate controlled at this time, anticoagulated with Eliquis.    Oncology  Lung cancer  Known history of lung cancer, lost to follow up with the oncologist recently, I spoke with the patient about this today.  Status post bronchial stent placement previously.  We will continue to monitor at this time.  Repeat CT chest as above.  Right bronchial stent appears to be patent with mild migration.  Refer to  plan as above.    Endocrine  Severe protein-calorie malnutrition  Nutrition consulted. Most recent weight and BMI monitored-     Measurements:  Wt Readings from Last 1 Encounters:   12/05/24 41.8 kg (92 lb 2.4 oz)   Body mass index is 14.87 kg/m².    Patient has been screened and assessed by RD.    Malnutrition Type:  Context: chronic illness  Level: severe    Malnutrition Characteristic Summary:  Energy Intake (Malnutrition): less than 75% for greater than or equal to 1 month  Subcutaneous Fat (Malnutrition): severe depletion  Muscle Mass (Malnutrition): severe depletion    Interventions/Recommendations (treatment strategy):  Recommend continue current diet as tolerated. Encourage good PO intakes.      Critical Care Checklist     Refer to chart for details regarding spontaneous awakening trial (SAT) and spontaneous breathing trial (SBT), CAM-ICU assessment, early mobility and feeding.       Analgesia and sedation- p.r.n. dexmedetomidine  Thromboembolic prophylaxis- heparin for DVT prophylaxis initiated now that Eliquis has been discontinued  Height of bed greater than 30°- Yes  Stress ulcer prophylaxis- not indicated  Indwelling catheter (vascular access lines/Soria catheter)-Reviewed  Deescalation of antimicrobials/pharmacotherapies-Reviewed and addressed appropriately  Code status- Full Code           Critical Care Time:  30 minutes  Critical secondary to Patient has a condition that poses threat to life and bodily function:  Acute hypoxic respiratory failure requiring high-flow nasal cannula      Critical care was time spent personally by me on the following activities: development of treatment plan with patient or surrogate and bedside caregivers, discussions with consultants, evaluation of patient's response to treatment, examination of patient, ordering and performing treatments and interventions, ordering and review of laboratory studies, ordering and review of radiographic studies, pulse oximetry,  re-evaluation of patient's condition. This critical care time did not overlap with that of any other provider or involve time for any procedures.     Varghese Castellon MD  Critical Care Medicine  [facility]

## 2024-12-09 ENCOUNTER — ANESTHESIA (OUTPATIENT)
Dept: GASTROENTEROLOGY | Facility: HOSPITAL | Age: 74
DRG: 189 | End: 2024-12-09
Payer: MEDICARE

## 2024-12-09 ENCOUNTER — ANESTHESIA EVENT (OUTPATIENT)
Dept: GASTROENTEROLOGY | Facility: HOSPITAL | Age: 74
DRG: 189 | End: 2024-12-09
Payer: MEDICARE

## 2024-12-09 ENCOUNTER — HOSPITAL ENCOUNTER (INPATIENT)
Facility: HOSPITAL | Age: 74
LOS: 22 days | Discharge: HOSPICE | DRG: 189 | End: 2024-12-31
Attending: HOSPITALIST | Admitting: HOSPITALIST
Payer: MEDICARE

## 2024-12-09 VITALS
WEIGHT: 92.13 LBS | TEMPERATURE: 97 F | HEIGHT: 66 IN | DIASTOLIC BLOOD PRESSURE: 57 MMHG | RESPIRATION RATE: 17 BRPM | OXYGEN SATURATION: 93 % | BODY MASS INDEX: 14.81 KG/M2 | HEART RATE: 79 BPM | SYSTOLIC BLOOD PRESSURE: 106 MMHG

## 2024-12-09 DIAGNOSIS — I47.20 V TACH: ICD-10-CM

## 2024-12-09 DIAGNOSIS — R64 CACHEXIA: ICD-10-CM

## 2024-12-09 DIAGNOSIS — E87.6 HYPOKALEMIA: ICD-10-CM

## 2024-12-09 DIAGNOSIS — C34.90 MALIGNANT NEOPLASM OF LUNG, UNSPECIFIED LATERALITY, UNSPECIFIED PART OF LUNG: ICD-10-CM

## 2024-12-09 DIAGNOSIS — R41.0 DELIRIUM: ICD-10-CM

## 2024-12-09 DIAGNOSIS — C34.90 LUNG CANCER: ICD-10-CM

## 2024-12-09 DIAGNOSIS — R07.9 CHEST PAIN: ICD-10-CM

## 2024-12-09 DIAGNOSIS — E43 SEVERE PROTEIN-CALORIE MALNUTRITION: ICD-10-CM

## 2024-12-09 DIAGNOSIS — R62.7 FAILURE TO THRIVE IN ADULT: ICD-10-CM

## 2024-12-09 DIAGNOSIS — J96.01 ACUTE HYPOXEMIC RESPIRATORY FAILURE: Primary | ICD-10-CM

## 2024-12-09 PROBLEM — J44.1 COPD EXACERBATION: Status: RESOLVED | Noted: 2024-12-03 | Resolved: 2024-12-09

## 2024-12-09 PROBLEM — B59: Status: RESOLVED | Noted: 2024-12-09 | Resolved: 2024-12-09

## 2024-12-09 PROBLEM — G93.41 ACUTE METABOLIC ENCEPHALOPATHY: Status: RESOLVED | Noted: 2024-12-03 | Resolved: 2024-12-09

## 2024-12-09 PROBLEM — J43.2 CENTRILOBULAR EMPHYSEMA: Status: RESOLVED | Noted: 2024-08-19 | Resolved: 2024-12-09

## 2024-12-09 PROBLEM — J18.9 POSTOBSTRUCTIVE PNEUMONIA: Status: RESOLVED | Noted: 2024-12-03 | Resolved: 2024-12-09

## 2024-12-09 PROBLEM — I27.20 PULMONARY HYPERTENSION: Status: RESOLVED | Noted: 2024-12-04 | Resolved: 2024-12-09

## 2024-12-09 PROBLEM — J96.22 ACUTE ON CHRONIC RESPIRATORY FAILURE WITH HYPOXIA AND HYPERCAPNIA: Status: RESOLVED | Noted: 2024-12-03 | Resolved: 2024-12-09

## 2024-12-09 PROBLEM — B59: Status: ACTIVE | Noted: 2024-12-09

## 2024-12-09 PROBLEM — J96.21 ACUTE ON CHRONIC RESPIRATORY FAILURE WITH HYPOXIA AND HYPERCAPNIA: Status: RESOLVED | Noted: 2024-12-03 | Resolved: 2024-12-09

## 2024-12-09 PROBLEM — R53.0 NEOPLASTIC MALIGNANT RELATED FATIGUE: Status: RESOLVED | Noted: 2024-08-16 | Resolved: 2024-12-09

## 2024-12-09 LAB
ANION GAP SERPL CALCULATED.3IONS-SCNC: 12 MMOL/L (ref 7–16)
BASOPHILS # BLD AUTO: 0.06 K/UL (ref 0–0.2)
BASOPHILS NFR BLD AUTO: 0.5 % (ref 0–1)
BUN SERPL-MCNC: 19 MG/DL (ref 10–20)
BUN/CREAT SERPL: 27 (ref 6–20)
CALCIUM SERPL-MCNC: 9.1 MG/DL (ref 8.4–10.2)
CHLORIDE SERPL-SCNC: 95 MMOL/L (ref 98–107)
CLARITY FLD: ABNORMAL
CLARITY FLD: ABNORMAL
CO2 SERPL-SCNC: 31 MMOL/L (ref 23–31)
COLOR FLD: ABNORMAL
COLOR FLD: ABNORMAL
CREAT SERPL-MCNC: 0.7 MG/DL (ref 0.55–1.02)
DIFFERENTIAL METHOD BLD: ABNORMAL
EGFR (NO RACE VARIABLE) (RUSH/TITUS): 91 ML/MIN/1.73M2
EOSINOPHIL # BLD AUTO: 0.28 K/UL (ref 0–0.5)
EOSINOPHIL NFR BLD AUTO: 2.4 % (ref 1–4)
ERYTHROCYTE [DISTWIDTH] IN BLOOD BY AUTOMATED COUNT: 16.6 % (ref 11.5–14.5)
GLUCOSE SERPL-MCNC: 76 MG/DL (ref 82–115)
GRAM STN SPEC: NORMAL
GRANULOCYTES NFR FLD MANUAL: 94 % (ref 0–25)
GRANULOCYTES NFR FLD MANUAL: 97 % (ref 0–25)
HCT VFR BLD AUTO: 35.7 % (ref 38–47)
HGB BLD-MCNC: 9.9 G/DL (ref 12–16)
IMM GRANULOCYTES # BLD AUTO: 0.04 K/UL (ref 0–0.04)
IMM GRANULOCYTES NFR BLD: 0.3 % (ref 0–0.4)
LYMPHOCYTES # BLD AUTO: 1.65 K/UL (ref 1–4.8)
LYMPHOCYTES NFR BLD AUTO: 14.4 % (ref 27–41)
LYMPHOCYTES NFR FLD MANUAL: 1 %
MACROPHAGES NFR FLD MANUAL: 3 %
MACROPHAGES NFR FLD MANUAL: 5 %
MCH RBC QN AUTO: 23.9 PG (ref 27–31)
MCHC RBC AUTO-ENTMCNC: 27.7 G/DL (ref 32–36)
MCV RBC AUTO: 86 FL (ref 80–96)
MONOCYTES # BLD AUTO: 0.63 K/UL (ref 0–0.8)
MONOCYTES NFR BLD AUTO: 5.5 % (ref 2–6)
MPC BLD CALC-MCNC: 9.3 FL (ref 9.4–12.4)
NEUTROPHILS # BLD AUTO: 8.77 K/UL (ref 1.8–7.7)
NEUTROPHILS NFR BLD AUTO: 76.9 % (ref 53–65)
NRBC # BLD AUTO: 0 X10E3/UL
NRBC, AUTO (.00): 0 %
PLATELET # BLD AUTO: 492 K/UL (ref 150–400)
POTASSIUM SERPL-SCNC: 4.1 MMOL/L (ref 3.5–5.1)
RBC # BLD AUTO: 4.15 M/UL (ref 4.2–5.4)
RBC # FLD MANUAL: <3000 /CUMM
RBC # FLD MANUAL: ABNORMAL /UL
SODIUM SERPL-SCNC: 134 MMOL/L (ref 136–145)
WBC # BLD AUTO: 11.43 K/UL (ref 4.5–11)
WBC # FLD MANUAL: 81 /CUMM
WBC # FLD MANUAL: ABNORMAL /UL

## 2024-12-09 PROCEDURE — 25000003 PHARM REV CODE 250: Performed by: HOSPITALIST

## 2024-12-09 PROCEDURE — 99900035 HC TECH TIME PER 15 MIN (STAT)

## 2024-12-09 PROCEDURE — 87205 SMEAR GRAM STAIN: CPT | Performed by: STUDENT IN AN ORGANIZED HEALTH CARE EDUCATION/TRAINING PROGRAM

## 2024-12-09 PROCEDURE — 87102 FUNGUS ISOLATION CULTURE: CPT | Performed by: STUDENT IN AN ORGANIZED HEALTH CARE EDUCATION/TRAINING PROGRAM

## 2024-12-09 PROCEDURE — 27000510 HC BLANKET BAIR HUGGER ANY SIZE: Performed by: ANESTHESIOLOGY

## 2024-12-09 PROCEDURE — 85025 COMPLETE CBC W/AUTO DIFF WBC: CPT | Performed by: STUDENT IN AN ORGANIZED HEALTH CARE EDUCATION/TRAINING PROGRAM

## 2024-12-09 PROCEDURE — 88305 TISSUE EXAM BY PATHOLOGIST: CPT | Mod: 26,,, | Performed by: PATHOLOGY

## 2024-12-09 PROCEDURE — 94640 AIRWAY INHALATION TREATMENT: CPT

## 2024-12-09 PROCEDURE — 0BC38ZZ EXTIRPATION OF MATTER FROM RIGHT MAIN BRONCHUS, VIA NATURAL OR ARTIFICIAL OPENING ENDOSCOPIC: ICD-10-PCS | Performed by: STUDENT IN AN ORGANIZED HEALTH CARE EDUCATION/TRAINING PROGRAM

## 2024-12-09 PROCEDURE — 25000003 PHARM REV CODE 250: Performed by: NURSE ANESTHETIST, CERTIFIED REGISTERED

## 2024-12-09 PROCEDURE — 94761 N-INVAS EAR/PLS OXIMETRY MLT: CPT

## 2024-12-09 PROCEDURE — 31624 DX BRONCHOSCOPE/LAVAGE: CPT | Performed by: STUDENT IN AN ORGANIZED HEALTH CARE EDUCATION/TRAINING PROGRAM

## 2024-12-09 PROCEDURE — 94799 UNLISTED PULMONARY SVC/PX: CPT

## 2024-12-09 PROCEDURE — D9220A PRA ANESTHESIA: Mod: CRNA,,, | Performed by: NURSE ANESTHETIST, CERTIFIED REGISTERED

## 2024-12-09 PROCEDURE — 89050 BODY FLUID CELL COUNT: CPT | Performed by: STUDENT IN AN ORGANIZED HEALTH CARE EDUCATION/TRAINING PROGRAM

## 2024-12-09 PROCEDURE — 63600175 PHARM REV CODE 636 W HCPCS: Performed by: NURSE ANESTHETIST, CERTIFIED REGISTERED

## 2024-12-09 PROCEDURE — 88112 CYTOPATH CELL ENHANCE TECH: CPT | Mod: 26,,, | Performed by: PATHOLOGY

## 2024-12-09 PROCEDURE — 27000177 HC AIRWAY, LARYNGEAL MASK: Performed by: ANESTHESIOLOGY

## 2024-12-09 PROCEDURE — 87070 CULTURE OTHR SPECIMN AEROBIC: CPT | Performed by: STUDENT IN AN ORGANIZED HEALTH CARE EDUCATION/TRAINING PROGRAM

## 2024-12-09 PROCEDURE — 80048 BASIC METABOLIC PNL TOTAL CA: CPT | Performed by: STUDENT IN AN ORGANIZED HEALTH CARE EDUCATION/TRAINING PROGRAM

## 2024-12-09 PROCEDURE — 25000003 PHARM REV CODE 250: Performed by: STUDENT IN AN ORGANIZED HEALTH CARE EDUCATION/TRAINING PROGRAM

## 2024-12-09 PROCEDURE — 99239 HOSP IP/OBS DSCHRG MGMT >30: CPT | Mod: ,,, | Performed by: STUDENT IN AN ORGANIZED HEALTH CARE EDUCATION/TRAINING PROGRAM

## 2024-12-09 PROCEDURE — 11000008

## 2024-12-09 PROCEDURE — 31645 BRNCHSC W/THER ASPIR 1ST: CPT | Performed by: STUDENT IN AN ORGANIZED HEALTH CARE EDUCATION/TRAINING PROGRAM

## 2024-12-09 PROCEDURE — 31635 BRONCHOSCOPY W/FB REMOVAL: CPT | Mod: 22,,, | Performed by: STUDENT IN AN ORGANIZED HEALTH CARE EDUCATION/TRAINING PROGRAM

## 2024-12-09 PROCEDURE — 88112 CYTOPATH CELL ENHANCE TECH: CPT | Mod: TC,MCY | Performed by: STUDENT IN AN ORGANIZED HEALTH CARE EDUCATION/TRAINING PROGRAM

## 2024-12-09 PROCEDURE — 99223 1ST HOSP IP/OBS HIGH 75: CPT | Mod: AI,,, | Performed by: HOSPITALIST

## 2024-12-09 PROCEDURE — 63600175 PHARM REV CODE 636 W HCPCS: Performed by: STUDENT IN AN ORGANIZED HEALTH CARE EDUCATION/TRAINING PROGRAM

## 2024-12-09 PROCEDURE — 31624 DX BRONCHOSCOPE/LAVAGE: CPT | Mod: 51,,, | Performed by: STUDENT IN AN ORGANIZED HEALTH CARE EDUCATION/TRAINING PROGRAM

## 2024-12-09 PROCEDURE — 27202344 HC EYESHIELD: Performed by: ANESTHESIOLOGY

## 2024-12-09 PROCEDURE — 31645 BRNCHSC W/THER ASPIR 1ST: CPT | Mod: ,,, | Performed by: STUDENT IN AN ORGANIZED HEALTH CARE EDUCATION/TRAINING PROGRAM

## 2024-12-09 PROCEDURE — 27000221 HC OXYGEN, UP TO 24 HOURS

## 2024-12-09 PROCEDURE — 31635 BRONCHOSCOPY W/FB REMOVAL: CPT | Mod: 22 | Performed by: STUDENT IN AN ORGANIZED HEALTH CARE EDUCATION/TRAINING PROGRAM

## 2024-12-09 PROCEDURE — 0B9F8ZX DRAINAGE OF RIGHT LOWER LUNG LOBE, VIA NATURAL OR ARTIFICIAL OPENING ENDOSCOPIC, DIAGNOSTIC: ICD-10-PCS | Performed by: STUDENT IN AN ORGANIZED HEALTH CARE EDUCATION/TRAINING PROGRAM

## 2024-12-09 PROCEDURE — D9220A PRA ANESTHESIA: Mod: ANES,,, | Performed by: ANESTHESIOLOGY

## 2024-12-09 PROCEDURE — 27000655: Performed by: ANESTHESIOLOGY

## 2024-12-09 PROCEDURE — 36415 COLL VENOUS BLD VENIPUNCTURE: CPT | Performed by: STUDENT IN AN ORGANIZED HEALTH CARE EDUCATION/TRAINING PROGRAM

## 2024-12-09 PROCEDURE — 25000242 PHARM REV CODE 250 ALT 637 W/ HCPCS

## 2024-12-09 PROCEDURE — 5A0945A ASSISTANCE WITH RESPIRATORY VENTILATION, 24-96 CONSECUTIVE HOURS, HIGH NASAL FLOW/VELOCITY: ICD-10-PCS | Performed by: HOSPITALIST

## 2024-12-09 PROCEDURE — 27000716 HC OXISENSOR PROBE, ANY SIZE: Performed by: ANESTHESIOLOGY

## 2024-12-09 PROCEDURE — 25000242 PHARM REV CODE 250 ALT 637 W/ HCPCS: Performed by: HOSPITALIST

## 2024-12-09 RX ORDER — POTASSIUM CHLORIDE 20 MEQ/1
20 TABLET, EXTENDED RELEASE ORAL 2 TIMES DAILY
Status: DISCONTINUED | OUTPATIENT
Start: 2024-12-09 | End: 2024-12-11

## 2024-12-09 RX ORDER — ROCURONIUM BROMIDE 10 MG/ML
INJECTION, SOLUTION INTRAVENOUS
Status: DISCONTINUED | OUTPATIENT
Start: 2024-12-09 | End: 2024-12-09

## 2024-12-09 RX ORDER — IBUPROFEN 200 MG
16 TABLET ORAL
Status: DISCONTINUED | OUTPATIENT
Start: 2024-12-09 | End: 2024-12-31 | Stop reason: HOSPADM

## 2024-12-09 RX ORDER — ALBUTEROL SULFATE 0.83 MG/ML
2.5 SOLUTION RESPIRATORY (INHALATION) EVERY 4 HOURS PRN
Status: DISCONTINUED | OUTPATIENT
Start: 2024-12-09 | End: 2024-12-31 | Stop reason: HOSPADM

## 2024-12-09 RX ORDER — ACETAMINOPHEN 325 MG/1
650 TABLET ORAL EVERY 6 HOURS PRN
Status: DISCONTINUED | OUTPATIENT
Start: 2024-12-09 | End: 2024-12-27

## 2024-12-09 RX ORDER — TRAZODONE HYDROCHLORIDE 50 MG/1
50 TABLET ORAL NIGHTLY
Status: DISCONTINUED | OUTPATIENT
Start: 2024-12-09 | End: 2024-12-27

## 2024-12-09 RX ORDER — PHENYLEPHRINE HYDROCHLORIDE 10 MG/ML
INJECTION INTRAVENOUS
Status: DISCONTINUED | OUTPATIENT
Start: 2024-12-09 | End: 2024-12-09

## 2024-12-09 RX ORDER — GLUCAGON 1 MG
1 KIT INJECTION
Status: DISCONTINUED | OUTPATIENT
Start: 2024-12-09 | End: 2024-12-31 | Stop reason: HOSPADM

## 2024-12-09 RX ORDER — LIDOCAINE HYDROCHLORIDE 20 MG/ML
INJECTION, SOLUTION EPIDURAL; INFILTRATION; INTRACAUDAL; PERINEURAL
Status: DISCONTINUED | OUTPATIENT
Start: 2024-12-09 | End: 2024-12-09

## 2024-12-09 RX ORDER — MUPIROCIN 20 MG/G
OINTMENT TOPICAL 2 TIMES DAILY
Status: COMPLETED | OUTPATIENT
Start: 2024-12-09 | End: 2024-12-14

## 2024-12-09 RX ORDER — PROPOFOL 10 MG/ML
VIAL (ML) INTRAVENOUS CONTINUOUS PRN
Status: DISCONTINUED | OUTPATIENT
Start: 2024-12-09 | End: 2024-12-09

## 2024-12-09 RX ORDER — IPRATROPIUM BROMIDE AND ALBUTEROL SULFATE 2.5; .5 MG/3ML; MG/3ML
3 SOLUTION RESPIRATORY (INHALATION) EVERY 6 HOURS PRN
Status: DISCONTINUED | OUTPATIENT
Start: 2024-12-09 | End: 2024-12-31 | Stop reason: HOSPADM

## 2024-12-09 RX ORDER — SODIUM CHLORIDE 0.9 % (FLUSH) 0.9 %
10 SYRINGE (ML) INJECTION EVERY 12 HOURS PRN
Status: DISCONTINUED | OUTPATIENT
Start: 2024-12-09 | End: 2024-12-31 | Stop reason: HOSPADM

## 2024-12-09 RX ORDER — BUPROPION HYDROCHLORIDE 100 MG/1
100 TABLET, EXTENDED RELEASE ORAL 2 TIMES DAILY
Status: DISCONTINUED | OUTPATIENT
Start: 2024-12-09 | End: 2024-12-31 | Stop reason: HOSPADM

## 2024-12-09 RX ORDER — NALOXONE HCL 0.4 MG/ML
0.02 VIAL (ML) INJECTION
Status: DISCONTINUED | OUTPATIENT
Start: 2024-12-09 | End: 2024-12-31 | Stop reason: HOSPADM

## 2024-12-09 RX ORDER — GUAIFENESIN AND DEXTROMETHORPHAN HYDROBROMIDE 10; 100 MG/5ML; MG/5ML
10 SYRUP ORAL EVERY 6 HOURS PRN
Status: DISCONTINUED | OUTPATIENT
Start: 2024-12-09 | End: 2024-12-27

## 2024-12-09 RX ORDER — LIDOCAINE HYDROCHLORIDE 10 MG/ML
INJECTION, SOLUTION INFILTRATION; PERINEURAL
Status: DISCONTINUED | OUTPATIENT
Start: 2024-12-09 | End: 2024-12-09 | Stop reason: HOSPADM

## 2024-12-09 RX ORDER — IBUPROFEN 200 MG
24 TABLET ORAL
Status: DISCONTINUED | OUTPATIENT
Start: 2024-12-09 | End: 2024-12-31 | Stop reason: HOSPADM

## 2024-12-09 RX ADMIN — ROCURONIUM BROMIDE 30 MG: 10 INJECTION, SOLUTION INTRAVENOUS at 11:12

## 2024-12-09 RX ADMIN — LIDOCAINE HYDROCHLORIDE 2 ML: 10 INJECTION, SOLUTION INFILTRATION; PERINEURAL at 12:12

## 2024-12-09 RX ADMIN — ROCURONIUM BROMIDE 10 MG: 10 INJECTION, SOLUTION INTRAVENOUS at 11:12

## 2024-12-09 RX ADMIN — POTASSIUM CHLORIDE 20 MEQ: 1500 TABLET, EXTENDED RELEASE ORAL at 09:12

## 2024-12-09 RX ADMIN — HEPARIN SODIUM 5000 UNITS: 5000 INJECTION INTRAVENOUS; SUBCUTANEOUS at 03:12

## 2024-12-09 RX ADMIN — SODIUM CHLORIDE SOLN NEBU 3% 4 ML: 3 NEBU SOLN at 07:12

## 2024-12-09 RX ADMIN — MUPIROCIN: 20 OINTMENT TOPICAL at 09:12

## 2024-12-09 RX ADMIN — PROPOFOL 75 MCG/KG/MIN: 10 INJECTION, EMULSION INTRAVENOUS at 11:12

## 2024-12-09 RX ADMIN — APIXABAN 5 MG: 5 TABLET, FILM COATED ORAL at 09:12

## 2024-12-09 RX ADMIN — IPRATROPIUM BROMIDE AND ALBUTEROL SULFATE 3 ML: .5; 3 SOLUTION RESPIRATORY (INHALATION) at 03:12

## 2024-12-09 RX ADMIN — OLANZAPINE 5 MG: 5 TABLET, FILM COATED ORAL at 03:12

## 2024-12-09 RX ADMIN — SODIUM CHLORIDE SOLN NEBU 3% 4 ML: 3 NEBU SOLN at 03:12

## 2024-12-09 RX ADMIN — SUGAMMADEX 200 MG: 100 INJECTION, SOLUTION INTRAVENOUS at 12:12

## 2024-12-09 RX ADMIN — BUDESONIDE 0.5 MG: 0.5 INHALANT RESPIRATORY (INHALATION) at 07:12

## 2024-12-09 RX ADMIN — TRAZODONE HYDROCHLORIDE 50 MG: 50 TABLET ORAL at 09:12

## 2024-12-09 RX ADMIN — PHENYLEPHRINE HYDROCHLORIDE 200 MCG: 10 INJECTION INTRAVENOUS at 11:12

## 2024-12-09 RX ADMIN — SODIUM CHLORIDE, POTASSIUM CHLORIDE, SODIUM LACTATE AND CALCIUM CHLORIDE: 600; 310; 30; 20 INJECTION, SOLUTION INTRAVENOUS at 11:12

## 2024-12-09 RX ADMIN — PIPERACILLIN SODIUM AND TAZOBACTAM SODIUM 4.5 G: 4; .5 INJECTION, POWDER, LYOPHILIZED, FOR SOLUTION INTRAVENOUS at 03:12

## 2024-12-09 RX ADMIN — PIPERACILLIN SODIUM AND TAZOBACTAM SODIUM 4.5 G: 4; .5 INJECTION, POWDER, LYOPHILIZED, FOR SOLUTION INTRAVENOUS at 12:12

## 2024-12-09 RX ADMIN — PHENYLEPHRINE HYDROCHLORIDE 200 MCG: 10 INJECTION INTRAVENOUS at 12:12

## 2024-12-09 RX ADMIN — PROPOFOL 60 MG: 10 INJECTION, EMULSION INTRAVENOUS at 11:12

## 2024-12-09 RX ADMIN — IPRATROPIUM BROMIDE AND ALBUTEROL SULFATE 3 ML: .5; 3 SOLUTION RESPIRATORY (INHALATION) at 07:12

## 2024-12-09 RX ADMIN — BUPROPION HYDROCHLORIDE 100 MG: 100 TABLET, FILM COATED, EXTENDED RELEASE ORAL at 09:12

## 2024-12-09 RX ADMIN — LIDOCAINE HYDROCHLORIDE 50 MG: 20 INJECTION, SOLUTION INTRAVENOUS at 11:12

## 2024-12-09 NOTE — DISCHARGE SUMMARY
[facility] ICU  Critical Care Medicine  Discharge Summary      Patient Name: Mayra Kelly  MRN: [id number]  Admission Date: 12/2/2024  Hospital Length of Stay: 6 days  Discharge Date and Time:  12/09/2024 4:30 PM  Attending Physician: Milagros Munguia MD   Discharging Provider: Milagros Munguia MD  Primary Care Provider: Darlene Campoverde NP  Reason for Admission: ACUTE ON CHRONIC RESPIRATORY FAILURE WITH HYPOXIA AND HYPERCAPNIA    HPI:   As per Admission HPI:    HPI: 73 yo F presents to [facility] ED with decreased LOC and increased oxygen demand from [facility].  She is listed as a full code on the paperwork but she is a frail (17 BMI) elderly lady with lung cancer followed by Dr. Kraft and Dr. Mccauley.  She is on high flow NRB and oxygenating at 94% upon arrival.  Switched over to venti mask at 50% and ABG normal pH 7.36 pCO2 55 pO2 57 with a sat of 88%.  She is normally on 2L at NH but she doesn't wear it all the time.       Patient is unable to give a history . She responds to commands and tries to answer questions but mostly mumbles.  I have reviewed Dr. Mccauley's office note from 11/18 and the hospital dc summary from [facility] two months ago when she had LLL pneumonia and MRSA.  She now has prominent infiltrate RML and RLL and could be aspiration or postobstruction but  ED has covered with vanco and zosyn prior to blood cultures.  I will obtain sputum cultures.  She has been admitted with COPD pathway.  Her lactic acid is 1.2 and is hemodynamically stable.  She has wheezing and coarse breath sounds.       See assessment and plan below for problem based evaluation    * No surgery found *    Indwelling Lines/Drains at Time of Discharge:   Lines/Drains/Airways       Central Venous Catheter Line  Duration             Port A Cath Single Lumen 12/03/24 0013 Atrial Right 6 days              Drain  Duration             Female External Urinary Catheter w/ Suction 12/07/24 1705 1 day              Airway  Duration                   Airway - Non-Surgical 12/09/24 1136 LMA <1 day                  Hospital Course:   73 yo F presented with acute on chronic respiratory failure with hypoxia and hypercapnia requiring HFNC. Treated with broad spectrum Abx with de-escalation to Zosyn. Now s/p bronchoscopy with therapeutic aspiration of RLL with patent endobronchial valve. Eliquis held for bronchoscopic procedure to be resumed on discharge to LTACH. Remains on HFNC with ongoing wean. Plan for pulmonary hygiene with scheduled nebulizer treatments w/ vibratory PEP therapy. Waxing and waning delirium with patient re-oriented and ongoing therapy with Precedex gtt to avoid pulling on lines including HFNC.   Discharged to LTACH for management of acute on chronic respiratory failure and post-obstructive pneumonia.     Consults (From admission, onward)          Status Ordering Provider     Inpatient consult to Social Work  Once        Provider:  (Not yet assigned)    Completed MBAE, JUNE          Significant Labs:  All pertinent labs within the past 24 hours have been reviewed.    Significant Imaging:  I have reviewed all pertinent imaging results/findings within the past 24 hours.    Pending Diagnostic Studies:       Procedure Component Value Units Date/Time    Cell Count w/ Diff, Fluid [[id number]]  (Abnormal) Collected: 12/09/24 1202    Order Status: Sent Lab Status: In process Updated: 12/09/24 1628    Specimen: Respiratory from Bronchial Alveolar Lavage (BAL)     Narrative:      The following orders were created for panel order Cell Count w/ Diff, Fluid.  Procedure                               Abnormality         Status                     ---------                               -----------         ------                     Cell Count, Body Fluid[[id number]]      Abnormal            Final result               Differential, Body Fluid[[id number]]                        In process                   Please view results for these tests on  the individual orders.    Cell Count w/ Diff, Fluid [[id number]]  (Abnormal) Collected: 12/09/24 1157    Order Status: Sent Lab Status: In process Updated: 12/09/24 1622    Specimen: Respiratory from Bronchial Wash     Narrative:      The following orders were created for panel order Cell Count w/ Diff, Fluid.  Procedure                               Abnormality         Status                     ---------                               -----------         ------                     Cell Count, Body Fluid[[id number]]      Abnormal            Final result               Differential, Body Fluid[[id number]]                        In process                   Please view results for these tests on the individual orders.    Differential, Body Fluid [[id number]] Collected: 12/09/24 1202    Order Status: Sent Lab Status: In process Updated: 12/09/24 1628    Specimen: Respiratory from Bronchial Alveolar Lavage (BAL)     Differential, Body Fluid [[id number]] Collected: 12/09/24 1157    Order Status: Sent Lab Status: In process Updated: 12/09/24 1622    Specimen: Respiratory from Bronchial Wash     Non-Gyn Cytology [[id number]] Collected: 12/09/24 1202    Order Status: Sent Lab Status: In process Updated: 12/09/24 1628    Specimen: Respiratory from Bronchial Alveolar Lavage (BAL)     Non-Gyn Cytology [[id number]] Collected: 12/09/24 1157    Order Status: Sent Lab Status: In process Updated: 12/09/24 1622    Specimen: Respiratory from Bronchial Wash           Final Active Diagnoses:    Diagnosis Date Noted POA    PRINCIPAL PROBLEM:  Acute on chronic respiratory failure with hypoxia and hypercapnia [J96.21, J96.22] 12/03/2024 Yes    Pulmonary hypertension [I27.20] 12/04/2024 Yes    COPD exacerbation [J44.1] 12/03/2024 Yes    Acute metabolic encephalopathy [G93.41] 12/03/2024 Yes    Pneumonia of both lungs due to infectious organism [J18.9] 12/03/2024 Yes    Lung cancer [C34.90] 12/03/2024 Yes    Right heart failure due to  pulmonary hypertension [I27.29, I50.810]  Yes    Centrilobular emphysema [J43.2] 08/19/2024 Yes    Neoplastic malignant related fatigue [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  Oriented x3.  Although yesterday later in the day she did have some waxing and waning consistent with delirium.  - cont Precedex     Pulmonary  * Acute on chronic respiratory failure with hypoxia and hypercapnia  12/8/24-stable on high-flow nasal cannula.  Based on CT chest differential continues to remain interstitial edema (although less likely now with effective diuresis) and some concern for lymphangitic spread of carcinomatosis.  We will continue to keep her on high-flow nasal cannula and maintain her oxygen saturation in the 88% or higher.  Dexmedetomidine can be used to help with compliance with the high-flow nasal cannula for her anxiety and underlying delirium.  - cont HFNC for goal SPO2 >90%      Pneumonia of both lungs due to infectious organism  Based on patient's leukocytosis, she likely has bilateral pneumonia although there is no focused area of consolidation.  The patient has a right bronchus intermedius stent (right upper lobe is completely occluded from malignancy and therefore this has been jailed off using the self expanding stent).  Based on the patient's chest x-ray, it appears that her stent is patent at this time.  Bilateral findings are consistent with either multifocal pneumonia or interstitial edema.  We will continue to treat with broad-spectrum antibiotics, obtain MRSA swab and obtain respiratory culture.  - complete Abx with Zosyn IV; end date ordered   -- f/u bronch Cxs  - cont hypertonic  saline and continuing DuoNebs   - vibratory PEP Q8H  - s/p therapeutic aspiration with bronchoscopy 12/09     COPD exacerbation  History of COPD, likely with COPD exacerbation we will continue with systemic corticosteroids and triple inhaled therapy using nebulizers.     Cardiac/Vascular  Pulmonary hypertension  Tolerated diuresis well, with pending interpretation of her repeat echocardiogram to monitor her right heart function and RVSP.  We will dose Lasix on a daily basis.     Paroxysmal atrial fibrillation  Rate controlled at this time, anticoagulated with Eliquis. Resumed at reduced dosing 2.5 mg BID considering her weight and age.     Oncology  Lung cancer  Known history of lung cancer, lost to follow up with the oncologist recently, I spoke with the patient about this today.  Status post bronchial stent placement previously.  We will continue to monitor at this time.  Repeat CT chest as above.  Right bronchial stent appears to be patent with mild migration.  Refer to plan as above.     Endocrine  Severe protein-calorie malnutrition  Nutrition consulted. Most recent weight and BMI monitored-      Measurements:      Wt Readings from Last 1 Encounters:   12/05/24 41.8 kg (92 lb 2.4 oz)   Body mass index is 14.87 kg/m².     Patient has been screened and assessed by RD.     Malnutrition Type:  Context: chronic illness  Level: severe     Malnutrition Characteristic Summary:  Energy Intake (Malnutrition): less than 75% for greater than or equal to 1 month  Subcutaneous Fat (Malnutrition): severe depletion  Muscle Mass (Malnutrition): severe depletion     Interventions/Recommendations (treatment strategy):  Recommend continue current diet as tolerated. Encourage good PO intakes.    Discharged Condition: stable    Disposition: Long Term Acute Care      Patient Instructions:   No discharge procedures on file.  Medications:  Transfer Medications (for Discharge Readmit only):   Current Facility-Administered Medications    Medication Dose Route Frequency Provider Last Rate Last Admin    acetaminophen suppository 650 mg  650 mg Rectal Q6H PRN Jina Jay MD        acetaminophen tablet 1,000 mg  1,000 mg Oral Q6H PRN Jina Jay MD        albuterol nebulizer solution 2.5 mg  2.5 mg Nebulization Q4H PRN Jina Jay MD        albuterol-ipratropium 2.5 mg-0.5 mg/3 mL nebulizer solution 3 mL  3 mL Nebulization Q8H Sara Cheng NP   3 mL at 12/09/24 1537    aluminum & magnesium hydroxide-simethicone 400-400-40 mg/5 mL suspension 30 mL  30 mL Oral Q6H PRN Jina Jay MD        apixaban tablet 2.5 mg  2.5 mg Oral BID Milagros Munguia MD        bisacodyL EC tablet 10 mg  10 mg Oral Daily PRN Jina Jay MD        budesonide nebulizer solution 0.5 mg  0.5 mg Nebulization Q12H Jina Jay MD   0.5 mg at 12/09/24 0732    buPROPion TBSR 12 hr tablet 100 mg  100 mg Oral BID Jina Jay MD   100 mg at 12/08/24 2108    dexmedetomidine (PRECEDEX) 400mcg/100mL 0.9% NaCL infusion  0-1.4 mcg/kg/hr Intravenous Continuous Sara Cheng NP 1 mL/hr at 12/09/24 0600 0.1 mcg/kg/hr at 12/09/24 0600    dextromethorphan-guaiFENesin  mg/5 ml liquid 10 mL  10 mL Oral Q6H PRN Jina Jay MD        diphenhydrAMINE capsule 50 mg  50 mg Oral Q6H PRN Jina Jay MD        docusate sodium capsule 100 mg  100 mg Oral BID PRN Jina Jay MD   100 mg at 12/08/24 1811    melatonin tablet 6 mg  6 mg Oral Nightly PRN Jina Jay MD        OLANZapine tablet 5 mg  5 mg Oral Q8H Varghese Castellon MD   5 mg at 12/09/24 1537    ondansetron injection 8 mg  8 mg Intravenous Q6H PRN Jina Jay MD        piperacillin-tazobactam (ZOSYN) 4.5 g in D5W 100 mL IVPB (MB+)  4.5 g Intravenous Q8H Varghese Castellon MD 25 mL/hr at 12/09/24 1255 4.5 g at 12/09/24 1255    sodium chloride 0.9% flush 3 mL  3 mL Intravenous PRN Jina Jay MD        sodium chloride 3% nebulizer  solution 4 mL  4 mL Nebulization Q8H Sara Cheng NP   4 mL at 12/09/24 1532    traZODone tablet 50 mg  50 mg Oral Nightly PRN Jina Jay MD   50 mg at 12/08/24 7832     Time spent on discharge includes 35 minutes of critical care time. Assessed post procedurally and stable.      Milagros Munguia MD  Critical Care Medicine  [facility]

## 2024-12-09 NOTE — H&P
[facility]  History & Physical    Subjective:      Chief Complaint/Reason for Admission:Hospital/ICU Course:    75 yo F presents to [facility] ED with decreased LOC and increased oxygen demand from [facility].  She is listed as a full code on the paperwork but she is a frail (17 BMI) elderly lady with lung cancer followed by Dr. Kraft and Dr. Mccauley.  She is on high flow NRB and oxygenating at 94% upon arrival.  Switched over to venti mask at 50% and ABG normal pH 7.36 pCO2 55 pO2 57 with a sat of 88%.  She is normally on 2L at NH but she doesn't wear it all the time.         12/4/24-requiring high-flow nasal cannula, admitted to the intensive care unit overnight as she continued to pull off her oxygen.  She was oriented this morning but later in the day continued to pull off her high-flow nasal cannula causing desaturation.  I reviewed her chest x-ray which was consistent with interstitial edema.  She was not on any diuresis this a.m., which we initiated.  She was mildly hypotensive, requiring norepinephrine later today after being given a bolus of IV Lasix.     12/5/24-overnight her oxygen saturations were stable on 35 L and 55% FiO2.  I reviewed her CT chest which we performed today, showed mild migration of her stent, though not occluding any segment (right upper lobe stenosed by malignancy).  Vasopressor support low-dose at this time.  Tolerated diuresis well.     12/6/24-significant improvement in patient's mental status, more calm and cooperative early this morning.  Improvement in the patient's leukocytosis as well as hemoglobin this morning.  Renal function also within normal limits.  Improvement in patient's oxygenation.  Continues on nebulizers and hypotonic saline.  Pending electrolytes this morning.     12/7/24-continues to remain disoriented.  Stable on current FiO2 requirements.  Of her leukocytosis continues to improve.  Hemoglobin is also stable at 9.9.  We are continuing to hold her  Eliquis today and tomorrow in anticipation for procedure on 12/9/24 (bronchoscopy).     12/8/24-oriented x3 for me this morning.  Pending lab work.  Stable on her current high-flow nasal cannula.     12/9/24- Pending procedure today        Physical Exam  Constitutional:       General: She is not in acute distress.     Appearance: Normal appearance. She is normal weight. She is ill-appearing. She is not diaphoretic.   HENT:      Head: Normocephalic and atraumatic.      Nose: No congestion or rhinorrhea.      Mouth/Throat:      Mouth: Mucous membranes are moist.   Cardiovascular:      Rate and Rhythm: Normal rate and regular rhythm.      Pulses: Normal pulses.      Heart sounds: Normal heart sounds.   Pulmonary:      Effort: Pulmonary effort is normal. No respiratory distress.      Breath sounds: No stridor. Rhonchi and rales present. No wheezing.   Chest:      Chest wall: No tenderness.   Abdominal:      General: Abdomen is flat.   Musculoskeletal:      Cervical back: Normal range of motion. No rigidity.      Right lower leg: No edema.      Left lower leg: No edema.   Skin:     General: Skin is warm.      Findings: No erythema.   Neurological:      General: No focal deficit present.      Mental Status: She is alert but disoriented         All pertinent labs within the past 24 hours have been reviewed.     Significant Imaging:  I have reviewed all pertinent imaging results/findings within the past 24 hours.          Assessment/Plan:      Neuro  Acute metabolic encephalopathy  Continued improvement for me today.  Oriented x3.  Although yesterday later in the day she did have some waxing and waning consistent with delirium.     Pulmonary  * Acute on chronic respiratory failure with hypoxia and hypercapnia  12/8/24-stable on high-flow nasal cannula.  Based on CT chest differential continues to remain interstitial edema (although less likely now with effective diuresis) and some concern for lymphangitic spread of  carcinomatosis.  We will continue to keep her on high-flow nasal cannula and maintain her oxygen saturation in the 88% or higher.  Dexmedetomidine can be used to help with compliance with the high-flow nasal cannula for her anxiety and underlying delirium.           Pneumonia of both lungs due to infectious organism  Based on patient's leukocytosis, she likely has bilateral pneumonia although there is no focused area of consolidation.  The patient has a right bronchus intermedius stent (right upper lobe is completely occluded from malignancy and therefore this has been jailed off using the self expanding stent).  Based on the patient's chest x-ray, it appears that her stent is patent at this time.  Bilateral findings are consistent with either multifocal pneumonia or interstitial edema.  We will continue to treat with broad-spectrum antibiotics, obtain MRSA swab and obtain respiratory culture.     Right upper lobe collapse secondary to known malignancy versus postobstructive pneumonia, CT chest reviewed.       Have initiated her on hypertonic saline and continuing DuoNebs with discontinuation of Mucomyst in an effort to keep the stent patent.     12/8/24-stable in terms of her oxygen requirements.  At this time I will go ahead and place her on the list for tentative bronchoscopy on Monday 12/9/24.  We will plan for rigid bronchoscopy with stent evaluation/removal for stent migration or perform flexible bronchoscopy if she is unable to tolerate jet ventilation.  NPO past midnight on 12/8/24 into 12/9/24.    12/9/24- Pending procedure today     COPD exacerbation  History of COPD, likely with COPD exacerbation we will continue with systemic corticosteroids and triple inhaled therapy using nebulizers.     Cardiac/Vascular  Pulmonary hypertension  Tolerated diuresis well, with pending interpretation of her repeat echocardiogram to monitor her right heart function and RVSP.  We will dose Lasix on a daily basis.      Paroxysmal atrial fibrillation  Rate controlled at this time, anticoagulated with Eliquis.     Oncology  Lung cancer  Known history of lung cancer, lost to follow up with the oncologist recently, I spoke with the patient about this today.  Status post bronchial stent placement previously.  We will continue to monitor at this time.  Repeat CT chest as above.  Right bronchial stent appears to be patent with mild migration.  Refer to plan as above.     Endocrine  Severe protein-calorie malnutrition  Nutrition consulted. Most recent weight and BMI monitored-      Measurements:      Wt Readings from Last 1 Encounters:   12/05/24 41.8 kg (92 lb 2.4 oz)   Body mass index is 14.87 kg/m².     Patient has been screened and assessed by RD.     Malnutrition Type:  Context: chronic illness  Level: severe     Malnutrition Characteristic Summary:  Energy Intake (Malnutrition): less than 75% for greater than or equal to 1 month  Subcutaneous Fat (Malnutrition): severe depletion  Muscle Mass (Malnutrition): severe depletion     Interventions/Recommendations (treatment strategy):  Recommend continue current diet as tolerated. Encourage good PO intakes.             Varghese Castellon MD  Critical Care Medicine  [facility]          Mayra Kelly is a 74 y.o. female.    Past Medical History:   Diagnosis Date    Centrilobular emphysema 08/19/2024    follows with Dr. 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 Kraft     Past Surgical History:   Procedure  Laterality Date    APPENDECTOMY       SECTION  1980    2x    INSERTION OF VENOUS ACCESS PORT       Social History     Tobacco Use    Smoking status: Former     Current packs/day: 1.00     Types: Cigarettes    Smokeless tobacco: Never   Substance Use Topics    Alcohol use: Never    Drug use: Never       PTA Medications   Medication Sig    acetaminophen (TYLENOL) 325 MG tablet Take 650 mg by mouth every 6 (six) hours as needed for Pain.    albuterol (PROVENTIL) 2.5 mg /3 mL (0.083 %) nebulizer solution Take 3 mLs (2.5 mg total) by nebulization every 4 (four) hours as needed (shortness of breath). Rescue    albuterol-ipratropium (DUO-NEB) 2.5 mg-0.5 mg/3 mL nebulizer solution Take 3 mLs by nebulization every 6 (six) hours as needed for Wheezing. Rescue    buPROPion (WELLBUTRIN SR) 100 MG TBSR 12 hr tablet Take 1 tablet (100 mg total) by mouth 2 (two) times daily.    dextromethorphan-guaiFENesin  mg/5 ml (ROBITUSSIN-DM)  mg/5 mL liquid Take 10 mLs by mouth every 6 (six) hours as needed.    ELIQUIS 5 mg Tab Take 1 tablet (5 mg total) by mouth 2 (two) times daily.    multivitamin with minerals tablet Take 1 tablet by mouth once daily.    potassium chloride SA (K-DUR,KLOR-CON) 20 MEQ tablet Take 1 tablet (20 mEq total) by mouth 2 (two) times daily.    traZODone (DESYREL) 50 MG tablet Take 50 mg by mouth every evening.     Review of patient's allergies indicates:  No Known Allergies     ROS    Objective:      Vital Signs (Most Recent)  Temp: 98.4 °F (36.9 °C) (24 0715)  Pulse: 71 (24 1015)  Resp: 14 (24 1015)  BP: (!) 127/55 (24 1015)  SpO2: 95 % (24 1015)    Vital Signs Range (Last 24H):  Temp:  [98.3 °F (36.8 °C)-98.6 °F (37 °C)]   Pulse:  [60-88]   Resp:  [12-25]   BP: ()/(19-91)   SpO2:  [76 %-96 %]       Assessment:      Active Hospital Problems    Diagnosis  POA    *Acute on chronic respiratory failure with hypoxia and hypercapnia [J96.21, J96.22]  Yes     Pulmonary hypertension [I27.20]  Yes    COPD exacerbation [J44.1]  Yes    Acute metabolic encephalopathy [G93.41]  Yes    Pneumonia of both lungs due to infectious organism [J18.9]  Yes    Lung cancer [C34.90]  Yes    Right heart failure due to pulmonary hypertension [I27.29, I50.810]  Yes     RVSP  65 mmHg   EF 55%      Centrilobular emphysema [J43.2]  Yes    Neoplastic malignant related fatigue [R53.0]  Yes    Severe protein-calorie malnutrition [E43]  Yes    Paroxysmal atrial fibrillation [I48.0]  Yes      Resolved Hospital Problems    Diagnosis Date Resolved POA    Squamous cell carcinoma of lung [C34.90] 12/03/2024 Yes     follows with Dr. Kraft         Plan:

## 2024-12-09 NOTE — PLAN OF CARE
Problem: Adult Inpatient Plan of Care  Goal: Plan of Care Review  Outcome: Progressing  Goal: Patient-Specific Goal (Individualized)  Outcome: Progressing  Goal: Absence of Hospital-Acquired Illness or Injury  Outcome: Progressing  Goal: Optimal Comfort and Wellbeing  Outcome: Progressing  Goal: Readiness for Transition of Care  Outcome: Progressing     Problem: COPD (Chronic Obstructive Pulmonary Disease)  Goal: Optimal Chronic Illness Coping  Outcome: Progressing  Goal: Optimal Level of Functional [place]  Outcome: Progressing  Goal: Absence of Infection Signs and Symptoms  Outcome: Progressing  Goal: Improved Oral Intake  Outcome: Progressing  Goal: Effective Oxygenation and Ventilation  Outcome: Progressing     Problem: Skin Injury Risk Increased  Goal: Skin Health and Integrity  Outcome: Progressing     Problem: Gas Exchange Impaired  Goal: Optimal Gas Exchange  Outcome: Progressing     Problem: Pneumonia  Goal: Fluid Balance  Outcome: Progressing  Goal: Resolution of Infection Signs and Symptoms  Outcome: Progressing  Goal: Effective Oxygenation and Ventilation  Outcome: Progressing     Problem: Infection  Goal: Absence of Infection Signs and Symptoms  Outcome: Progressing     Problem: Fall Injury Risk  Goal: Absence of Fall and Fall-Related Injury  Outcome: Progressing     Problem: Restraint, Nonviolent  Goal: Absence of Harm or Injury  Outcome: Progressing

## 2024-12-09 NOTE — ANESTHESIA POSTPROCEDURE EVALUATION
Anesthesia Post Evaluation    Patient: Mayra Kelly    Procedure(s) Performed: * No procedures listed *    Final Anesthesia Type: general      Patient location during evaluation: ICU  Patient participation: Yes- Able to Participate  Level of consciousness: awake and alert  Post-procedure vital signs: reviewed and stable  Pain management: adequate  Airway patency: patent  NAY mitigation strategies: Multimodal analgesia  PONV status at discharge: No PONV  Anesthetic complications: no      Cardiovascular status: hemodynamically stable  Respiratory status: unassisted, spontaneous ventilation and nasal cannula  Hydration status: euvolemic  Follow-up not needed.              Vitals Value Taken Time   /46 12/09/24 1316   Temp 36.3 °C (97.4 °F) 12/09/24 1248   Pulse 67 12/09/24 1316   Resp 18 12/09/24 1316   SpO2 89 % 12/09/24 1316   Vitals shown include unfiled device data.      No case tracking events are documented in the log.      Pain/Orlando Score: Orlando Score: 9 (12/9/2024 12:52 PM)

## 2024-12-09 NOTE — ASSESSMENT & PLAN NOTE
Due to COPD, lung cancer, and pneumonia; will continue nebs and Zosyn; s/p bronch--results pending; cultures pending; she has opacification of her right lung per CT chest; on high-flow O2;  s/p therapeutic aspiration with bronchoscopy

## 2024-12-09 NOTE — ANESTHESIA PROCEDURE NOTES
Intubation    Date/Time: 12/9/2024 11:36 AM    Performed by: Marisol Duncan CRNA  Authorized by: Lito Mendoza DO    Intubation:     Induction:  Intravenous    Intubated:  Postinduction    Mask Ventilation:  N/a    Attempts:  1    Attempted By:  CRNA    Difficult Airway Encountered?: No      Complications:  None    Airway Device:  Supraglottic airway/LMA    Airway Device Size:  4.0    Style/Cuff Inflation:  Cuffed    Secured at:  The lips    Placement Verified By:  Capnometry    Complicating Factors:  None    Findings Post-Intubation:  BS equal bilateral and atraumatic/condition of teeth unchanged

## 2024-12-09 NOTE — PLAN OF CARE
Problem: Adult Inpatient Plan of Care  Goal: Plan of Care Review  Outcome: Progressing     Problem: COPD (Chronic Obstructive Pulmonary Disease)  Goal: Optimal Chronic Illness Coping  Outcome: Progressing     Problem: Gas Exchange Impaired  Goal: Optimal Gas Exchange  Outcome: Progressing     Problem: Pneumonia  Goal: Fluid Balance  Outcome: Progressing

## 2024-12-09 NOTE — NURSING
Patient back from procedure. VSS. No signs of acute distress noted. No complaints voiced at this time.

## 2024-12-09 NOTE — PLAN OF CARE
Problem: Infection  Goal: Absence of Infection Signs and Symptoms  Intervention: Prevent or Manage Infection  Flowsheets (Taken 12/8/2024 1834)  Infection Management: aseptic technique maintained  Isolation Precautions: precautions maintained     Problem: Restraint, Nonviolent  Goal: Absence of Harm or Injury  Intervention: Education  Flowsheets (Taken 12/8/2024 1834)  Discontinuation Criteria: Absence of behavior that required restraint  Patient's Response: NL  Patient / Family Notification: Patient  Patient / Family Teaching: Need for restraint

## 2024-12-09 NOTE — PLAN OF CARE
Consult for ltac. Spoke with patient in her room. Patient signed choice for specialty. Also spoke with her brothers chadwick and jose who also agree with ltac for patient. Bed available this day. Nurse michael made aware.

## 2024-12-09 NOTE — HPI
74 year old female with a PMH of lung cancer presents with shortness of breath.  She was placed on O2 but kept taking it off, so she was sent to the ICU for closer monitoring.  CT chest showed lung nodules/masses.  She had opacification in the right lung with mucous plugging.  She was placed on Zosyn for pneumonia.  Today, she had a bronch done.  Results pending in the computer.  Patient denies chest pain, shortness of breath, nausea, vomiting, or diarrhea.

## 2024-12-09 NOTE — TRANSFER OF CARE
"Anesthesia Transfer of Care Note    Patient: Mayra Kelly    Procedure(s) Performed: Rigid Bronchoscopy    Patient location: PACU    Anesthesia Type: general    Transport from OR: Transported from OR on room air with adequate spontaneous ventilation    Post pain: adequate analgesia    Post assessment: no apparent anesthetic complications    Post vital signs: stable    Level of consciousness: responds to stimulation and awake    Nausea/Vomiting: no nausea/vomiting    Complications: none    Transfer of care protocol was followed      Last vitals: Visit Vitals  BP (!) 117/53   Pulse 70   Temp 36.3 °C (97.4 °F)   Resp 18   Ht 5' 6" (1.676 m)   Wt 41.8 kg (92 lb 2.4 oz)   LMP  (LMP Unknown)   SpO2 95%   Breastfeeding No   BMI 14.87 kg/m²     "

## 2024-12-09 NOTE — H&P
[facility] Hospital Medicine  History & Physical    Patient Name: Mayra Kelly  MRN: [id number]  Patient Class: IP- Inpatient  Admission Date: (Not on file)  Attending Physician: Nati Blakely DO   Primary Care Provider: Darlene Campoverde NP       Subjective:     Principal Problem:Acute hypoxemic respiratory failure    Chief Complaint:   Chief Complaint   Patient presents with    Shortness of Breath        HPI: 74 year old female with a PMH of lung cancer presents with shortness of breath.  She was placed on O2 but kept taking it off, so she was sent to the ICU for closer monitoring.  CT chest showed lung nodules/masses.  She had opacification in the right lung with mucous plugging.  She was placed on Zosyn for pneumonia.  Today, she had a bronch done.  Results pending in the computer.  Patient denies chest pain, shortness of breath, nausea, vomiting, or diarrhea.       Past Medical History:   Diagnosis Date    Centrilobular emphysema 2024    follows with Dr. Varghese Mccauley    Chronic respiratory failure with hypoxia and hypercapnia     on home oxygen but hasn't been using it    Cigarette nicotine dependence with nicotine-induced disorder 2023    Dyshidrotic eczema 08/10/2021    Neoplastic malignant related fatigue 2024    Paroxysmal atrial fibrillation 2024    Right bundle branch block 2023    Right heart failure due to pulmonary hypertension     RVSP  65 mmHg   EF 55%    Scabies     Squamous cell carcinoma of lung     follows with Dr. Kraft       Past Surgical History:   Procedure Laterality Date    APPENDECTOMY       SECTION  1980    2x    INSERTION OF VENOUS ACCESS PORT         Review of patient's allergies indicates:  No Known Allergies    Current Facility-Administered Medications on File Prior to Encounter   Medication    acetaminophen suppository 650 mg    acetaminophen tablet 1,000 mg    albuterol nebulizer solution 2.5 mg     albuterol-ipratropium 2.5 mg-0.5 mg/3 mL nebulizer solution 3 mL    aluminum & magnesium hydroxide-simethicone 400-400-40 mg/5 mL suspension 30 mL    apixaban tablet 2.5 mg    bisacodyL EC tablet 10 mg    budesonide nebulizer solution 0.5 mg    buPROPion TBSR 12 hr tablet 100 mg    dexmedetomidine (PRECEDEX) 400mcg/100mL 0.9% NaCL infusion    dextromethorphan-guaiFENesin  mg/5 ml liquid 10 mL    diphenhydrAMINE capsule 50 mg    docusate sodium capsule 100 mg    melatonin tablet 6 mg    OLANZapine tablet 5 mg    ondansetron injection 8 mg    piperacillin-tazobactam (ZOSYN) 4.5 g in D5W 100 mL IVPB (MB+)    sodium chloride 0.9% flush 3 mL    sodium chloride 3% nebulizer solution 4 mL    traZODone tablet 50 mg    [DISCONTINUED] heparin (porcine) injection 5,000 Units    [DISCONTINUED] lactated ringers infusion    [DISCONTINUED] LIDOcaine (PF) 20 mg/mL (2%) injection    [DISCONTINUED] LIDOcaine HCL 10 mg/ml (1%) injection    [DISCONTINUED] NORepinephrine 4 mg in dextrose 5% 250 mL infusion (premix)    [DISCONTINUED] PHENYLephrine injection    [DISCONTINUED] propofol (DIPRIVAN) 10 mg/mL infusion    [DISCONTINUED] rocuronium injection    [DISCONTINUED] sugammadex (BRIDION) 100 mg/mL injection     Current Outpatient Medications on File Prior to Encounter   Medication Sig    acetaminophen (TYLENOL) 325 MG tablet Take 650 mg by mouth every 6 (six) hours as needed for Pain.    albuterol (PROVENTIL) 2.5 mg /3 mL (0.083 %) nebulizer solution Take 3 mLs (2.5 mg total) by nebulization every 4 (four) hours as needed (shortness of breath). Rescue    albuterol-ipratropium (DUO-NEB) 2.5 mg-0.5 mg/3 mL nebulizer solution Take 3 mLs by nebulization every 6 (six) hours as needed for Wheezing. Rescue    buPROPion (WELLBUTRIN SR) 100 MG TBSR 12 hr tablet Take 1 tablet (100 mg total) by mouth 2 (two) times daily.    dextromethorphan-guaiFENesin  mg/5 ml (ROBITUSSIN-DM)  mg/5 mL liquid Take 10 mLs by mouth every 6 (six)  hours as needed.    ELIQUIS 5 mg Tab Take 1 tablet (5 mg total) by mouth 2 (two) times daily.    multivitamin with minerals tablet Take 1 tablet by mouth once daily.    potassium chloride SA (K-DUR,KLOR-CON) 20 MEQ tablet Take 1 tablet (20 mEq total) by mouth 2 (two) times daily.    traZODone (DESYREL) 50 MG tablet Take 50 mg by mouth every evening.     Family History       Problem Relation (Age of Onset)    Cancer Father, Maternal Grandmother, Maternal Aunt    Heart disease Paternal Grandmother          Tobacco Use    Smoking status: Former     Current packs/day: 1.00     Types: Cigarettes    Smokeless tobacco: Never   Substance and Sexual Activity    Alcohol use: Never    Drug use: Never    Sexual activity: Not on file     Review of Systems   Respiratory:  Positive for shortness of breath.      Objective:     Vital Signs (Most Recent):    Vital Signs (24h Range):  Temp:  [97.4 °F (36.3 °C)-98.6 °F (37 °C)] 97.4 °F (36.3 °C)  Pulse:  [64-88] 74  Resp:  [10-25] 13  SpO2:  [76 %-96 %] 92 %  BP: ()/(19-91) 106/57        PHYSICAL EXAM:    GEN: NAD; alert   ENT: hearing intact; no oral lesions  CVS: regular rate and rhythm; no murmurs  RESP: clear to auscultation bilaterally; no rhonchi, rales, or wheezes noted  GI: soft, non-tender, non-distended; + bowel sounds  EXTR: no clubbing, cyanosis, or edema  NEURO: no focal or motor deficits; CN II-XII appear to be intact  PSYCH: normal affect  SKIN: no rashes or lesions noted    Assessment/Plan:     * Acute hypoxemic respiratory failure  Due to COPD, lung cancer, and pneumonia; will continue nebs and Zosyn; s/p bronch--results pending; cultures pending; she has opacification of her right lung per CT chest; on high-flow O2;  s/p therapeutic aspiration with bronchoscopy        Paroxysmal atrial fibrillation  Resume Rosalba Blakely DO  Department of Hospital Medicine  [facility]

## 2024-12-09 NOTE — HPI
As per Admission HPI:    HPI: 73 yo F presents to [facility] ED with decreased LOC and increased oxygen demand from [facility].  She is listed as a full code on the paperwork but she is a frail (17 BMI) elderly lady with lung cancer followed by Dr. Kraft and Dr. Mccauley.  She is on high flow NRB and oxygenating at 94% upon arrival.  Switched over to venti mask at 50% and ABG normal pH 7.36 pCO2 55 pO2 57 with a sat of 88%.  She is normally on 2L at NH but she doesn't wear it all the time.       Patient is unable to give a history . She responds to commands and tries to answer questions but mostly mumbles.  I have reviewed Dr. Mccauley's office note from 11/18 and the hospital dc summary from [facility] two months ago when she had LLL pneumonia and MRSA.  She now has prominent infiltrate RML and RLL and could be aspiration or postobstruction but  ED has covered with vanco and zosyn prior to blood cultures.  I will obtain sputum cultures.  She has been admitted with COPD pathway.  Her lactic acid is 1.2 and is hemodynamically stable.  She has wheezing and coarse breath sounds.       See assessment and plan below for problem based evaluation

## 2024-12-09 NOTE — SUBJECTIVE & OBJECTIVE
Past Medical History:   Diagnosis Date    Centrilobular emphysema 2024    follows with Dr. Varghese Mccauley    Chronic respiratory failure with hypoxia and hypercapnia     on home oxygen but hasn't been using it    Cigarette nicotine dependence with nicotine-induced disorder 2023    Dyshidrotic eczema 08/10/2021    Neoplastic malignant related fatigue 2024    Paroxysmal atrial fibrillation 2024    Right bundle branch block 2023    Right heart failure due to pulmonary hypertension     RVSP  65 mmHg   EF 55%    Scabies     Squamous cell carcinoma of lung     follows with Dr. Kraft       Past Surgical History:   Procedure Laterality Date    APPENDECTOMY       SECTION  1980    2x    INSERTION OF VENOUS ACCESS PORT         Review of patient's allergies indicates:  No Known Allergies    Current Facility-Administered Medications on File Prior to Encounter   Medication    acetaminophen suppository 650 mg    acetaminophen tablet 1,000 mg    albuterol nebulizer solution 2.5 mg    albuterol-ipratropium 2.5 mg-0.5 mg/3 mL nebulizer solution 3 mL    aluminum & magnesium hydroxide-simethicone 400-400-40 mg/5 mL suspension 30 mL    apixaban tablet 2.5 mg    bisacodyL EC tablet 10 mg    budesonide nebulizer solution 0.5 mg    buPROPion TBSR 12 hr tablet 100 mg    dexmedetomidine (PRECEDEX) 400mcg/100mL 0.9% NaCL infusion    dextromethorphan-guaiFENesin  mg/5 ml liquid 10 mL    diphenhydrAMINE capsule 50 mg    docusate sodium capsule 100 mg    melatonin tablet 6 mg    OLANZapine tablet 5 mg    ondansetron injection 8 mg    piperacillin-tazobactam (ZOSYN) 4.5 g in D5W 100 mL IVPB (MB+)    sodium chloride 0.9% flush 3 mL    sodium chloride 3% nebulizer solution 4 mL    traZODone tablet 50 mg    [DISCONTINUED] heparin (porcine) injection 5,000 Units    [DISCONTINUED] lactated ringers infusion    [DISCONTINUED] LIDOcaine (PF) 20 mg/mL (2%) injection    [DISCONTINUED] LIDOcaine HCL 10 mg/ml (1%)  injection    [DISCONTINUED] NORepinephrine 4 mg in dextrose 5% 250 mL infusion (premix)    [DISCONTINUED] PHENYLephrine injection    [DISCONTINUED] propofol (DIPRIVAN) 10 mg/mL infusion    [DISCONTINUED] rocuronium injection    [DISCONTINUED] sugammadex (BRIDION) 100 mg/mL injection     Current Outpatient Medications on File Prior to Encounter   Medication Sig    acetaminophen (TYLENOL) 325 MG tablet Take 650 mg by mouth every 6 (six) hours as needed for Pain.    albuterol (PROVENTIL) 2.5 mg /3 mL (0.083 %) nebulizer solution Take 3 mLs (2.5 mg total) by nebulization every 4 (four) hours as needed (shortness of breath). Rescue    albuterol-ipratropium (DUO-NEB) 2.5 mg-0.5 mg/3 mL nebulizer solution Take 3 mLs by nebulization every 6 (six) hours as needed for Wheezing. Rescue    buPROPion (WELLBUTRIN SR) 100 MG TBSR 12 hr tablet Take 1 tablet (100 mg total) by mouth 2 (two) times daily.    dextromethorphan-guaiFENesin  mg/5 ml (ROBITUSSIN-DM)  mg/5 mL liquid Take 10 mLs by mouth every 6 (six) hours as needed.    ELIQUIS 5 mg Tab Take 1 tablet (5 mg total) by mouth 2 (two) times daily.    multivitamin with minerals tablet Take 1 tablet by mouth once daily.    potassium chloride SA (K-DUR,KLOR-CON) 20 MEQ tablet Take 1 tablet (20 mEq total) by mouth 2 (two) times daily.    traZODone (DESYREL) 50 MG tablet Take 50 mg by mouth every evening.     Family History       Problem Relation (Age of Onset)    Cancer Father, Maternal Grandmother, Maternal Aunt    Heart disease Paternal Grandmother          Tobacco Use    Smoking status: Former     Current packs/day: 1.00     Types: Cigarettes    Smokeless tobacco: Never   Substance and Sexual Activity    Alcohol use: Never    Drug use: Never    Sexual activity: Not on file     Review of Systems   Respiratory:  Positive for shortness of breath.      Objective:     Vital Signs (Most Recent):    Vital Signs (24h Range):  Temp:  [97.4 °F (36.3 °C)-98.6 °F (37 °C)] 97.4 °F  (36.3 °C)  Pulse:  [64-88] 74  Resp:  [10-25] 13  SpO2:  [76 %-96 %] 92 %  BP: ()/(19-91) 106/57        PHYSICAL EXAM:    GEN: NAD; alert   ENT: hearing intact; no oral lesions  CVS: regular rate and rhythm; no murmurs  RESP: clear to auscultation bilaterally; no rhonchi, rales, or wheezes noted  GI: soft, non-tender, non-distended; + bowel sounds  EXTR: no clubbing, cyanosis, or edema  NEURO: no focal or motor deficits; CN II-XII appear to be intact  PSYCH: normal affect  SKIN: no rashes or lesions noted

## 2024-12-09 NOTE — ANESTHESIA PREPROCEDURE EVALUATION
12/09/2024  Mayra Kelly is a 74 y.o., female.      Pre-op Assessment    I have reviewed the Patient Summary Reports.     I have reviewed the Nursing Notes. I have reviewed the NPO Status.   I have reviewed the Medications.     Review of Systems  Anesthesia Hx:  No problems with previous Anesthesia             Denies Family Hx of Anesthesia complications.    Denies Personal Hx of Anesthesia complications.                    Social:  No Alcohol Use, Smoker       Hematology/Oncology:       -- Anemia:                  Current/Recent Cancer.            Oncology Comments: Known lung cancer     EENT/Dental:  EENT/Dental Normal           Cardiovascular:  Exercise tolerance: poor       Dysrhythmias atrial fibrillation         ECG has been reviewed. Intermittent requirement of vasopressors while in the ICU                           Pulmonary:  Pneumonia COPD Asthma  Shortness of breath   Lung cancer  Pulmonary HTN    Admitted a few days ago with acute on chronic respiratory failure, has required increased amounts amounts of supplemental oxygen, previous airway stent placed last month, patient now being treated for B/L pneumonia    Oxygen requirements have improved over last couple days in the ICU               Renal/:  Renal/ Normal                 Hepatic/GI:  Hepatic/GI Normal       malnutrition             Musculoskeletal:  Musculoskeletal Normal                Neurological:  Neurology Normal          Acute metabolic encephalopathy that has slowly improved over the last 3 days                            Endocrine:  Endocrine Normal            Dermatological:  Skin Normal    Psych:  Psychiatric History                Past Medical History:   Diagnosis Date    Centrilobular emphysema 08/19/2024    follows with Dr. Varghese Mccauley    Chronic respiratory failure with hypoxia and hypercapnia     on home oxygen but  hasn't been using it    Cigarette nicotine dependence with nicotine-induced disorder 2023    Dyshidrotic eczema 08/10/2021    Neoplastic malignant related fatigue 2024    Paroxysmal atrial fibrillation 2024    Right bundle branch block 2023    Right heart failure due to pulmonary hypertension     RVSP  65 mmHg   EF 55%    Scabies     Squamous cell carcinoma of lung     follows with Dr. Kraft     Past Surgical History:   Procedure Laterality Date    APPENDECTOMY       SECTION  1980    2x    INSERTION OF VENOUS ACCESS PORT           Physical Exam  General: Well nourished    Airway:  Mallampati: II / II  Mouth Opening: Normal  TM Distance: > 6 cm  Tongue: Normal  Neck ROM: Normal ROM    Chest/Lungs:  Clear to auscultation, Normal Respiratory Rate    Heart:  Rate: Normal  Rhythm: Regular Rhythm        Chemistry        Component Value Date/Time     (L) 2024 0434    K 4.1 2024 0434    CL 95 (L) 2024 0434    CO2 31 2024 0434    BUN 19 2024 0434    CREATININE 0.70 2024 0434    GLU 76 (L) 2024 0434        Component Value Date/Time    CALCIUM 9.1 2024 0434    ALKPHOS 83 2024 0033    AST 18 2024 0033    ALT 9 2024 0033    BILITOT 0.2 2024 0033    EGFRNONAA 88 2021 1510        Lab Results   Component Value Date    WBC 11.43 (H) 2024    HGB 9.9 (L) 2024    HCT 35.7 (L) 2024     (H) 2024     Results for orders placed or performed during the hospital encounter of 24   EKG 12-lead    Collection Time: 24  3:56 PM   Result Value Ref Range    QRS Duration 116 ms    OHS QTC Calculation 459 ms    Narrative    Test Reason : R06.02,    Vent. Rate : 088 BPM     Atrial Rate : 000 BPM     P-R Int : 154 ms          QRS Dur : 116 ms      QT Int : 410 ms       P-R-T Axes : 085 098 -77 degrees     QTc Int : 459 ms    Sinus rhythm  Rightward axis  Widespread T wave abnormality may be  due to myocardial ischemia  Abnormal ECG    Confirmed by Mark Barraza MD ([id number]) on 8/25/2024 10:44:51 PM    Referred By: [email or web address]   SELF           Confirmed By:Mark Barraza MD         Anesthesia Plan  Type of Anesthesia, risks & benefits discussed:    Anesthesia Type: Gen ETT  Intra-op Monitoring Plan: Standard ASA Monitors  Post Op Pain Control Plan: multimodal analgesia  Induction:  IV  Airway Plan: Direct, Post-Induction  Informed Consent: Informed consent signed with the Patient and all parties understand the risks and agree with anesthesia plan.  All questions answered. Patient consented to blood products? Yes  ASA Score: 4  Day of Surgery Review of History & Physical: H&P Update referred to the surgeon/provider.I have interviewed and examined the patient. I have reviewed the patient's H&P dated: There are no significant changes.     Ready For Surgery From Anesthesia Perspective.     .

## 2024-12-10 LAB
ESTROGEN SERPL-MCNC: NORMAL PG/ML
INSULIN SERPL-ACNC: NORMAL U[IU]/ML
LAB AP COMMENTS: NORMAL
LAB AP GROSS DESCRIPTION: NORMAL
T3RU NFR SERPL: NORMAL %

## 2024-12-10 PROCEDURE — 99900035 HC TECH TIME PER 15 MIN (STAT)

## 2024-12-10 PROCEDURE — 11000008

## 2024-12-10 PROCEDURE — 27000221 HC OXYGEN, UP TO 24 HOURS

## 2024-12-10 PROCEDURE — A6212 FOAM DRG <=16 SQ IN W/BORDER: HCPCS

## 2024-12-10 PROCEDURE — 25000242 PHARM REV CODE 250 ALT 637 W/ HCPCS: Performed by: HOSPITALIST

## 2024-12-10 PROCEDURE — 94799 UNLISTED PULMONARY SVC/PX: CPT

## 2024-12-10 PROCEDURE — 94761 N-INVAS EAR/PLS OXIMETRY MLT: CPT

## 2024-12-10 PROCEDURE — 94640 AIRWAY INHALATION TREATMENT: CPT

## 2024-12-10 PROCEDURE — 25000003 PHARM REV CODE 250: Performed by: HOSPITALIST

## 2024-12-10 PROCEDURE — 99232 SBSQ HOSP IP/OBS MODERATE 35: CPT | Mod: ,,, | Performed by: HOSPITALIST

## 2024-12-10 RX ORDER — BUDESONIDE 0.5 MG/2ML
0.5 INHALANT ORAL EVERY 12 HOURS
Status: DISCONTINUED | OUTPATIENT
Start: 2024-12-10 | End: 2024-12-31 | Stop reason: HOSPADM

## 2024-12-10 RX ADMIN — MUPIROCIN: 20 OINTMENT TOPICAL at 08:12

## 2024-12-10 RX ADMIN — APIXABAN 5 MG: 5 TABLET, FILM COATED ORAL at 08:12

## 2024-12-10 RX ADMIN — POTASSIUM CHLORIDE 20 MEQ: 1500 TABLET, EXTENDED RELEASE ORAL at 08:12

## 2024-12-10 RX ADMIN — IPRATROPIUM BROMIDE AND ALBUTEROL SULFATE 3 ML: .5; 3 SOLUTION RESPIRATORY (INHALATION) at 08:12

## 2024-12-10 RX ADMIN — BUPROPION HYDROCHLORIDE 100 MG: 100 TABLET, FILM COATED, EXTENDED RELEASE ORAL at 08:12

## 2024-12-10 RX ADMIN — TRAZODONE HYDROCHLORIDE 50 MG: 50 TABLET ORAL at 08:12

## 2024-12-10 RX ADMIN — BUDESONIDE 0.5 MG: 0.5 INHALANT ORAL at 08:12

## 2024-12-10 RX ADMIN — THERA TABS 1 TABLET: TAB at 08:12

## 2024-12-10 NOTE — ASSESSMENT & PLAN NOTE
Due to COPD, lung cancer, and pneumonia; on nebs; completed 7 days of Zosyn; s/p bronch--results pending; cultures pending; she has opacification of her right lung per CT chest; on high-flow O2;  s/p therapeutic aspiration with bronchoscopy

## 2024-12-10 NOTE — PROGRESS NOTES
[facility] Medicine  Progress Note    Patient Name: Mayra Kelly  MRN: [id number]  Patient Class: IP- Inpatient   Admission Date: 12/9/2024  Length of Stay: 1 days  Attending Physician: Nati Blakely DO  Primary Care Provider: Darlene Campoverde NP        Subjective     Principal Problem:Acute hypoxemic respiratory failure        HPI:  74 year old female with a PMH of lung cancer presents with shortness of breath.  She was placed on O2 but kept taking it off, so she was sent to the ICU for closer monitoring.  CT chest showed lung nodules/masses.  She had opacification in the right lung with mucous plugging.  She was placed on Zosyn for pneumonia.  Today, she had a bronch done.  Results pending in the computer.  Patient denies chest pain, shortness of breath, nausea, vomiting, or diarrhea.       Overview/Hospital Course:  74 year old female with a PMH of lung cancer presents with shortness of breath and is now in LTAC for deconditioning and high flow O2; she appears confused      Vitals:    12/10/24 0700 12/10/24 0715 12/10/24 0730 12/10/24 0854   BP: (!) 102/53 105/63 (!) 101/59    Pulse: 82 87 87 92   Resp: 15 19 14 (!) 22   Temp:   97.6 °F (36.4 °C)    TempSrc:   Oral    SpO2: (!) 94% 96% 97% 98%   Weight:       Height:             PHYSICAL EXAMINATION:    GEN: NAD; alert and confused  CVS: regular rate and rhythm  RESP: normal respiratory effort; no audible wheezing noted  GI: non-distended  EXTR: no swelling noted      Assessment and Plan     * Acute hypoxemic respiratory failure  Due to COPD, lung cancer, and pneumonia; on nebs; completed 7 days of Zosyn; s/p bronch--results pending; cultures pending; she has opacification of her right lung per CT chest; on high-flow O2;  s/p therapeutic aspiration with bronchoscopy        Paroxysmal atrial fibrillation  On Eliquis        Nati Blakely DO  Department of Hospital Medicine   [facility]

## 2024-12-10 NOTE — PLAN OF CARE
[facility]  Discharge Final Note    Primary Care Provider: Darlene Campoverde NP    Expected Discharge Date: 12/9/2024    Final Discharge Note (most recent)       Final Note - 12/10/24 0846          Final Note    Assessment Type Final Discharge Note     Anticipated Discharge Disposition Long Term Acute Care        Post-Acute Status    Post-Acute Authorization Placement     Post-Acute Placement Status Set-up Complete/Auth obtained     Patient choice form signed by patient/caregiver List with quality metrics by geographic area provided;List from CMS Compare;List from System Post-Acute Care                     Important Message from Medicare  Important Message from Medicare regarding Discharge Appeal Rights: Explained to patient/caregiver, Signed/date by patient/caregiver     Date IMM was signed: 12/09/24  Time IMM was signed: 1520    After-discharge care                Destination       [facility]   Service: Long Term Acute Care    [address] [employer] [place] [address]   Phone: [phone]

## 2024-12-10 NOTE — PLAN OF CARE
Problem: Infection  Goal: Absence of Infection Signs and Symptoms  Outcome: Progressing     Problem: Infection  Goal: Absence of Infection Signs and Symptoms  Outcome: Progressing     Problem: Skin Injury Risk Increased  Goal: Skin Health and Integrity  Outcome: Progressing     Problem: Skin Injury Risk Increased  Goal: Skin Health and Integrity  Outcome: Progressing     Problem: Breathing Pattern Ineffective  Goal: Effective Breathing Pattern  Outcome: Progressing     Problem: Breathing Pattern Ineffective  Goal: Effective Breathing Pattern  Outcome: Progressing     Problem: Gas Exchange Impaired  Goal: Optimal Gas Exchange  Outcome: Progressing     Problem: Gas Exchange Impaired  Goal: Optimal Gas Exchange  Outcome: Progressing

## 2024-12-10 NOTE — HOSPITAL COURSE
74 year old female with a PMH of lung cancer presents with shortness of breath and is now in LTAC for deconditioning and high flow O2; she is asleep on rounds  12/12 still on high-flow wean oxygen as able  12/13 saturating well breathing comfortably wean oxygen  12/14 continue to wean oxygen as able next  12/15 wean oxygen  12/16 doing well today wean oxygen  12/17 impacted enema today  12/18 rate controlled today  12/19 still having loose stools.  Was concern for impaction.  We will check KUB for stool burden  12/20 does not appear to have a large stool burden on KUB  12/23 continue to wean high-flow as able    12/24 records reviewed.  remains on high-flow oxygen.  When seen was on 25 L at 46% oxygen.  No severe shortness of breath when seen.  Patient had been admitted to [facility] 12/09 from [facility] because of increased oxygen requirements.  Later transferred to [facility] 12/09/24.  Hx treated adenoCa in 2018. Last Nov underwent evaluation by Dr Munguia. Seen then again also by Dr Kraft. Dx new primary squamous cell CA. Treated chemotx. In April saw Dr Kraft and started on Keytruda and given through infusion center. Has continued this through July. Missed her appt with Dr Kraft in May.  Pt continued to smoke 1/2 ppd; encourage to stop.  At home been prior on 2L BNC O2. PHMx: also Eliquis for PAF. Now NSR.    Seen during stay by Dr. Kraft and pulmonary during a August 2024 admission. Patient underwent rigid bronchoscopy with bronchial lavage, dilation, and stent by Dr. Castellon 8/14/24.  Admitted [facility] 08/15 then also.   12/25 no new complaints, encouraged patient to taking better orally.  Reviewed last pathology report suggesting non- small cell carcinoma with previous bronchoscopy  12/26 patient looks weak and not eating well.  Not likely to do well going forward.  Touch bases with oncology.  Have not seen family  12/27 not much improved.  Not eating.  Looking at discharge needs and likely back to  nursing home next.  Hospice reasonable  12/28 No change in status. Agree with hospice and goals of care discussion.   12/29 Appears drowsier today but protecting airway with stable oxygenation. Reduce Seroquel dose to 25 mg.     12/30 Failure to thrive no better. Talked with brother Solo. Discussed poor prognosis. Discussed comfort care and hospice. Brother said family would agree and wished to keep her comfortable. Discussed going back to NH  12/31 back to nursing home on hospice as planned.  Have note patient's states she will eat when she returns to nursing home

## 2024-12-10 NOTE — PLAN OF CARE
[facility]  Initial Discharge Assessment       Primary Care Provider: Darlene Campoverde NP    Admission Diagnosis: Lung cancer [C34.90]    Admission Date: 12/9/2024  Expected Discharge Date: 12/17/2024    Transition of Care Barriers: None    Payor: MEDICARE / Plan: MEDICARE PART A & B / Product Type: Government /     Extended Emergency Contact Information  Primary Emergency Contact: Rainer North  Mobile Phone: [phone]  Relation: Brother  Secondary Emergency Contact: Solo North  Mobile Phone: [phone]  Relation: Brother    Discharge Plan A: Return to nursing home  Discharge Plan B: Long-term acute care facility (LTAC), Return to Nursing Home, Skilled Nursing Facility      [facility] [address] [place] [address] [address] [place] [address]  Phone: [phone] Fax: [phone]    [facility] [address] [facility]  [address] [facility]  [place] [address]  Phone: [phone] Fax: [phone]      Initial Assessment (most recent)       Adult Discharge Assessment - 12/10/24 1039          Discharge Assessment    Assessment Type Discharge Planning Assessment     Confirmed/corrected address, phone number and insurance Yes     Source of Information patient     Communicated TERE with patient/caregiver Date not available/Unable to determine     People in Home facility resident     Facility Arrived From: [facility]     Do you expect to return to your current living situation? Yes     Do you have help at home or someone to help you manage your care at home? Yes     Prior to hospitilization cognitive status: Unable to Assess     Current cognitive status: Alert/Oriented     Equipment Currently Used at Home wheelchair     Patient currently being followed by outpatient case management? No     Do you currently have service(s) that help you manage your care at home? No     Do you take prescription medications?  Yes     Do you have prescription coverage? Yes     Coverage medicare     Do you have any problems affording any of your prescribed medications? No     Is the patient taking medications as prescribed? yes     Who is going to help you get home at discharge? medicare     How do you get to doctors appointments? agency     Are you on dialysis? No     Do you take coumadin? No     Discharge Plan A Return to nursing home     Discharge Plan B Long-term acute care facility (LTAC);Return to Nursing Home;Skilled Nursing Facility     DME Needed Upon Discharge  none     Discharge Plan discussed with: Patient     Transition of Care Barriers None                      Sdoh completed 3 months ago. Ss spoke with pt and pt is a resident at [facility] and plans to return at d/c. Choice obtained. Ss following.

## 2024-12-10 NOTE — PROGRESS NOTES
[facility]  Wound Care    Patient Name:  Mayra Kelly   MRN:  [id number]  Date: 12/10/2024  Diagnosis: Acute hypoxemic respiratory failure    History:     Past Medical History:   Diagnosis Date    Centrilobular emphysema 08/19/2024    follows with Dr. 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 Kraft       Social History     Socioeconomic History    Marital status:    Tobacco Use    Smoking status: Former     Current packs/day: 1.00     Types: Cigarettes    Smokeless tobacco: Never   Substance and Sexual Activity    Alcohol use: Never    Drug use: Never     Social Drivers of Health     Financial Resource Strain: Low Risk  (12/9/2024)    Overall Financial Resource Strain (CARDIA)     Difficulty of Paying Living Expenses: Not hard at all   Food Insecurity: No Food Insecurity (12/9/2024)    Hunger Vital Sign     Worried About Running Out of Food in the Last Year: Never true     Ran Out of Food in the Last Year: Never true   Transportation Needs: No Transportation Needs (12/9/2024)    TRANSPORTATION NEEDS     Transportation : No   Physical Activity: Inactive (8/16/2024)    Exercise Vital Sign     Days of Exercise per Week: 0 days     Minutes of Exercise per Session: 0 min   Stress: No Stress Concern Present (12/9/2024)    [employer] [place] [employer] - Occupational Stress Questionnaire     Feeling of Stress : Only a little   Housing Stability: Low Risk  (12/9/2024)    Housing Stability Vital Sign     Unable to  Pay for Housing in the Last Year: No     Homeless in the Last Year: No       Precautions:     Allergies as of 12/09/2024    (No Known Allergies)       WOC Assessment Details/Treatment     Narrative: Skin evaluation    Patient in bed, Alert. Has high flow oxygen in use. No redness noted over ears from straps. Bilateral heels with out pressure injury.  Sacral area with pink discolored skin, slow blanching noted. Bony , thin fragile skin to sacral. Mepliex foam border to sacral and heels. On a low air loss mattress.   Krunal score 15.    Wound care team to follow prn weekly       12/10/2024

## 2024-12-11 PROBLEM — I95.9 HYPOTENSION: Status: ACTIVE | Noted: 2024-12-11

## 2024-12-11 LAB
CULTURE, LOWER RESPIRATORY: ABNORMAL
CULTURE, LOWER RESPIRATORY: NORMAL

## 2024-12-11 PROCEDURE — 94761 N-INVAS EAR/PLS OXIMETRY MLT: CPT

## 2024-12-11 PROCEDURE — A6212 FOAM DRG <=16 SQ IN W/BORDER: HCPCS

## 2024-12-11 PROCEDURE — 11000008

## 2024-12-11 PROCEDURE — 27000221 HC OXYGEN, UP TO 24 HOURS

## 2024-12-11 PROCEDURE — 25000003 PHARM REV CODE 250: Performed by: HOSPITALIST

## 2024-12-11 PROCEDURE — 99900035 HC TECH TIME PER 15 MIN (STAT)

## 2024-12-11 PROCEDURE — 99900031 HC PATIENT EDUCATION (STAT)

## 2024-12-11 PROCEDURE — 94640 AIRWAY INHALATION TREATMENT: CPT

## 2024-12-11 PROCEDURE — 25000242 PHARM REV CODE 250 ALT 637 W/ HCPCS: Performed by: HOSPITALIST

## 2024-12-11 PROCEDURE — 99232 SBSQ HOSP IP/OBS MODERATE 35: CPT | Mod: ,,, | Performed by: HOSPITALIST

## 2024-12-11 RX ORDER — SODIUM CHLORIDE 450 MG/100ML
INJECTION, SOLUTION INTRAVENOUS CONTINUOUS
Status: DISCONTINUED | OUTPATIENT
Start: 2024-12-11 | End: 2024-12-12

## 2024-12-11 RX ADMIN — BUPROPION HYDROCHLORIDE 100 MG: 100 TABLET, FILM COATED, EXTENDED RELEASE ORAL at 08:12

## 2024-12-11 RX ADMIN — MUPIROCIN: 20 OINTMENT TOPICAL at 08:12

## 2024-12-11 RX ADMIN — IPRATROPIUM BROMIDE AND ALBUTEROL SULFATE 3 ML: .5; 3 SOLUTION RESPIRATORY (INHALATION) at 08:12

## 2024-12-11 RX ADMIN — APIXABAN 5 MG: 5 TABLET, FILM COATED ORAL at 09:12

## 2024-12-11 RX ADMIN — BUPROPION HYDROCHLORIDE 100 MG: 100 TABLET, FILM COATED, EXTENDED RELEASE ORAL at 09:12

## 2024-12-11 RX ADMIN — MUPIROCIN: 20 OINTMENT TOPICAL at 09:12

## 2024-12-11 RX ADMIN — POTASSIUM CHLORIDE 20 MEQ: 1500 TABLET, EXTENDED RELEASE ORAL at 08:12

## 2024-12-11 RX ADMIN — THERA TABS 1 TABLET: TAB at 08:12

## 2024-12-11 RX ADMIN — TRAZODONE HYDROCHLORIDE 50 MG: 50 TABLET ORAL at 09:12

## 2024-12-11 RX ADMIN — BUDESONIDE 0.5 MG: 0.5 INHALANT ORAL at 08:12

## 2024-12-11 RX ADMIN — SODIUM CHLORIDE: 4.5 INJECTION, SOLUTION INTRAVENOUS at 02:12

## 2024-12-11 RX ADMIN — APIXABAN 5 MG: 5 TABLET, FILM COATED ORAL at 08:12

## 2024-12-11 NOTE — ASSESSMENT & PLAN NOTE
Due to COPD, lung cancer, and pneumonia; on nebs; s/p therapeutic aspiration with bronchoscopy---results show NSCLC; cultures show yeast and diphtheroids; she completed 7 days of Zosyn; she has opacification of her right lung per CT chest; on high-flow O2

## 2024-12-11 NOTE — PROGRESS NOTES
[facility]  Adult Nutrition  Progress Note         Reason for Assessment  Reason For Assessment: RD follow-up   Nutrition Risk Screen: no indicators present    Assessment and Plan  12/11/2024 RD Follow up: Patient transferred to LTAC. Patient now ordered a regular diet and tolerating well per flowsheets with 75% intakes on average. Current weight 42.5 kg. Patient with dx cancer with increased energy needs. Recommend to add Boost Plus all meals for additional caloric intakes to promote weight maintenance/gain. RD Following.     12/5/2024: RD follow up. Patient advanced to a dysphagia mechanical soft diet. New diet order with no intake documented at this time. Recommend continue current diet order with Boost Breeze with meals. Encourage good PO intakes. Last BM 12/2 per flowsheet. RD following.     12/03/2024: Patient admitted 12/2 from [facility] [place] with a dx of acute on chronic respiratory failure, COPD exacerbation, pneumonia, and squamous cell carcinoma of lung. Patient is ordered a clear liquid diet.     Patient is 48.1 kg with a BMI of 17.11 and underweight. Patient with hx of severe PCM related to weight loss, physical signs of wasting and poor intakes. Weight has stabilized over the past 3 months, but patient remains severely underweight. Patient continues to meet ASPEN criteria for severe protein calorie malnutrition related to energy intakes less than 75% for greater than or equal to 1 month, and physical findings of muscle and fat loss to orbital, clavicle and temple regions related to chronic and catabolic illness.     Recommend to advance diet to regular as medically appropriate. Add Boost Breeze all meals while on clear liquids. Encourage po intakes as tolerated. RD Following.       Learning Needs/Social Determinants of Health  Learning Assessment       08/15/2024 1217 [facility] (8/15/2024 - 8/29/2024)   Created by Sydnee Goodwin, RN - RN (Nurse)  Status: Complete                 PRIMARY LEARNER     Primary Learner Name:  tasha edwards  - 08/15/2024 1217    Relationship:  Patient  - 08/15/2024 1217    Does the primary learner have any barriers to learning?:  No Barriers  - 08/15/2024 1217    What is the preferred language of the primary learner?:  English [facility] 08/15/2024 1217    Is an  required?:  No [facility] 08/15/2024 1217    How does the primary learner prefer to learn new concepts?:  Listening, Reading, Demonstration  - 08/15/2024 1217    How often do you need to have someone help you read instructions, pamphlets, or written material from your doctor or pharmacy?:  Never [facility] 08/15/2024 1217        CO-LEARNER #1     Co-Learner Name (if applicable):  brian edwards  - 08/15/2024 1217    Relationship:  Family [facility] 08/15/2024 1217    Does the co-learner have any barriers to learning?:  No Barriers [facility] 08/15/2024 1217    What is the preferred language of the co-learner?:  English [facility] 08/15/2024 1217    Is an  required?:  No [facility] 08/15/2024 1217    How does the co-learner prefer to learn new concepts?:  Listening, Reading, Demonstration [facility] 08/15/2024 1217        CO-LEARNER #2     No question answered        SPECIAL TOPICS     No question answered        ANSWERED BY:     No question answered        Comments         Edit History       Sydnee Goodwin, RN - RN (Nurse)   08/15/2024 1217                           Social Drivers of Health     Tobacco Use: Medium Risk (12/3/2024)    Patient History     Smoking Tobacco Use: Former     Smokeless Tobacco Use: Never     Passive Exposure: Not on file   Alcohol Use: Not At Risk (8/16/2024)    AUDIT-C     Frequency of Alcohol Consumption: Never     Average Number of Drinks: Patient does not drink     Frequency of Binge Drinking: Never   Financial Resource Strain: Low Risk  (12/9/2024)    Overall Financial Resource Strain (CARDIA)     Difficulty of Paying Living Expenses: Not hard at all    Food Insecurity: No Food Insecurity (12/9/2024)    Hunger Vital Sign     Worried About Running Out of Food in the Last Year: Never true     Ran Out of Food in the Last Year: Never true   Transportation Needs: No Transportation Needs (12/9/2024)    TRANSPORTATION NEEDS     Transportation : No   Physical Activity: Inactive (8/16/2024)    Exercise Vital Sign     Days of Exercise per Week: 0 days     Minutes of Exercise per Session: 0 min   Stress: No Stress Concern Present (12/9/2024)    [employer] [place] [employer] - Occupational Stress Questionnaire     Feeling of Stress : Only a little   Housing Stability: Low Risk  (12/9/2024)    Housing Stability Vital Sign     Unable to Pay for Housing in the Last Year: No     Homeless in the Last Year: No   Depression: Low Risk  (11/4/2024)    Depression     Last PHQ-4: Flowsheet Data: 0   Utilities: Not At Risk (12/9/2024)    [facility] Utilities     Threatened with loss of utilities: No   Health Literacy: Adequate Health Literacy (12/9/2024)     Health Literacy     Frequency of need for help with medical instructions: Rarely   Social Isolation: Socially Integrated (8/16/2024)    Social Isolation     Social Isolation: 1            Malnutrition  Is Patient Malnourished: Yes Malnutrition Assessment  Malnutrition Context: chronic illness  Malnutrition Level: severe          Energy Intake (Malnutrition): less than 75% for greater than or equal to 1 month  Subcutaneous Fat (Malnutrition): severe depletion  Muscle Mass (Malnutrition): severe depletion   Orbital Region (Subcutaneous Fat Loss): severe depletion   [place] Region (Muscle Loss): severe depletion  Clavicle Bone Region (Muscle Loss): severe depletion                 Nutrition Diagnosis  Malnutrition (Severe) related to Catabolic illness, Chronic illness, Decreased ability to consume sufficient energy, Inadequate Caloric intake, and Inadequate Protein intake as evidenced by energy intake less than 75% for greater  than or equal to 1 month and physical findings of severe muscle depletion to temple and clavicle area and severe fat depletion to orbital area  Comments: add Boost Plus all meals; recommend high calorie high protein diet    Recent Labs   Lab 12/09/24  0434   GLU 76*       Nutrition Prescription / Recommendations  Nutrition Goal Status: progressing towards goal  Communication of RD Recs: discussed on rounds  Current Diet Order: Regular  Nutrition Order Comments: 75% intakes  Oral Nutrition Supplement: Add Boost Plus all meals  Chewing or Swallowing Difficulty?: No Chewing or swallowing difficulty  Recommended Diet: Regular  Recommended Oral Supplement: Boost Plus [360kcals, 14g Protein, 45g Carbs] with meals  Is Nutrition Support Recommended: [facility] Nutrition Support: No  Is Nutrition Education Recommended: No    Monitor and Evaluation  % current Intake: P.O. intake of 50 - 75 %  % intake to meet estimated needs: P.O. + Supplements     Tolerance: tolerating    Current Medical Diagnosis and Past Medical History     Past Medical History:   Diagnosis Date    Centrilobular emphysema 08/19/2024    follows with Dr. 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 Kraft       Nutrition/Diet History  Spiritual, Cultural Beliefs, Gnosticism Practices, Values that Affect Care: no  Factors Affecting Nutritional Intake: None identified at this time    Lab/Procedures/Meds  Recent Labs   Lab 12/09/24  0434   *   K 4.1   BUN 19   CREATININE 0.70   CALCIUM 9.1   CL 95*  "  Note:     Last A1c:   Lab Results   Component Value Date    HGBA1C 5.3 12/03/2024     Lab Results   Component Value Date    RBC 4.15 (L) 12/09/2024    HGB 9.9 (L) 12/09/2024    HCT 35.7 (L) 12/09/2024    MCV 86.0 12/09/2024    MCH 23.9 (L) 12/09/2024    MCHC 27.7 (L) 12/09/2024   Note: H&H low    Pertinent Labs Reviewed: reviewed  Pertinent Medications Reviewed: reviewed  Scheduled Meds:   apixaban  5 mg Oral BID    budesonide  0.5 mg Nebulization Q12H    buPROPion  100 mg Oral BID    multivitamin  1 tablet Oral Daily    mupirocin   Nasal BID    traZODone  50 mg Oral QHS     Continuous Infusions:   0.45% NaCl   Intravenous Continuous           PRN Meds:.  Current Facility-Administered Medications:     acetaminophen, 650 mg, Oral, Q6H PRN    albuterol, 2.5 mg, Nebulization, Q4H PRN    albuterol-ipratropium, 3 mL, Nebulization, Q6H PRN    dextromethorphan-guaiFENesin  mg/5 ml, 10 mL, Oral, Q6H PRN    glucagon (human recombinant), 1 mg, Intramuscular, PRN    glucose, 16 g, Oral, PRN    glucose, 24 g, Oral, PRN    naloxone, 0.02 mg, Intravenous, PRN    sodium chloride 0.9%, 10 mL, Intravenous, Q12H PRN    Anthropometrics  Temp: 97.9 °F (36.6 °C)  Height Method: Stated  Height: 5' 6" (167.6 cm)  Height (inches): 66 in  Weight Method: Bed Scale  Weight: 42.5 kg (93 lb 11.1 oz)  Weight (lb): 93.7 lb  Ideal Body Weight (IBW), Female: 130 lb  % Ideal Body Weight, Female (lb): 72.08 %  BMI (Calculated): 15.1       Estimated/Assessed Needs      Temp: 97.9 °F (36.6 °C)Oral  Weight Used For Calorie Calculations: 42.5 kg (93 lb 11.1 oz)   Energy Need Method: Kcal/kg Energy Calorie Requirements (kcal): 6035-5660     Protein Requirements: 59-71  Estimated Fluid Requirement Method: RDA Method    RDA Method (mL): 1487       Nutrition by Nursing     Intake (%): 100%  Diet/Feeding Assistance: tray set-up  Diet/Feeding Tolerance: good  Last Bowel Movement: 12/02/24                Nutrition Follow-Up  RD Follow-up?: Yes       "           Available via Secure Chat

## 2024-12-11 NOTE — PROGRESS NOTES
[facility] Medicine  Progress Note    Patient Name: Mayra Kelly  MRN: [id number]  Patient Class: IP- Inpatient   Admission Date: 12/9/2024  Length of Stay: 2 days  Attending Physician: Nati Blakely DO  Primary Care Provider: Darlene Campoverde NP        Subjective     Principal Problem:Acute hypoxemic respiratory failure        HPI:  74 year old female with a PMH of lung cancer presents with shortness of breath.  She was placed on O2 but kept taking it off, so she was sent to the ICU for closer monitoring.  CT chest showed lung nodules/masses.  She had opacification in the right lung with mucous plugging.  She was placed on Zosyn for pneumonia.  Today, she had a bronch done.  Results pending in the computer.  Patient denies chest pain, shortness of breath, nausea, vomiting, or diarrhea.       Overview/Hospital Course:  74 year old female with a PMH of lung cancer presents with shortness of breath and is now in LTAC for deconditioning and high flow O2; she is asleep on rounds      Vitals:    12/11/24 0730 12/11/24 0850 12/11/24 0855 12/11/24 1100   BP:       Pulse: 72 89 89    Resp: 12 11 10    Temp:    97.9 °F (36.6 °C)   TempSrc:    Oral   SpO2: (!) 94% 97% 97%    Weight:       Height:             PHYSICAL EXAM:    GEN: NAD; asleep  CVS: regular rate and rhythm; no murmurs  RESP: clear to auscultation bilaterally; no rhonchi, rales, or wheezes noted  GI: soft, non-tender, non-distended; + bowel sounds  EXTR: no clubbing, cyanosis, or edema      Assessment and Plan     * Acute hypoxemic respiratory failure  Due to COPD, lung cancer, and pneumonia; on nebs; s/p therapeutic aspiration with bronchoscopy---results show NSCLC; cultures show yeast and diphtheroids; she completed 7 days of Zosyn; she has opacification of her right lung per CT chest; on high-flow O2       Lung cancer  Cytology from bronch shows NSCLC; will leave to Oncology as  outpt      Hypotension  BP on the low side; will start IVF; she is not on any BP meds at this time      Paroxysmal atrial fibrillation  On Rosalba Blakely DO  Department of Hospital Medicine   [facility] HOW

## 2024-12-11 NOTE — PLAN OF CARE
Problem: Adult Inpatient Plan of Care  Goal: Plan of Care Review  Outcome: Progressing     Problem: Skin Injury Risk Increased  Goal: Skin Health and Integrity  Outcome: Progressing     Problem: Breathing Pattern Ineffective  Goal: Effective Breathing Pattern  Outcome: Progressing     Problem: Fall Injury Risk  Goal: Absence of Fall and Fall-Related Injury  Outcome: Progressing

## 2024-12-11 NOTE — PROGRESS NOTES
Addendum to my H&P.      Correction to H&P.      Blood cultures were drawn prior to antibiotics not antibiotics given prior to blood cultures.  This was a error in my documentation.      Vanco and Zosyn were documented in my H&P which were in accordance with sepsis protocol.

## 2024-12-12 ENCOUNTER — TELEPHONE (OUTPATIENT)
Dept: PULMONOLOGY | Facility: CLINIC | Age: 74
End: 2024-12-12
Payer: MEDICARE

## 2024-12-12 LAB
ANION GAP SERPL CALCULATED.3IONS-SCNC: 12 MMOL/L (ref 7–16)
BASOPHILS # BLD AUTO: 0.06 K/UL (ref 0–0.2)
BASOPHILS NFR BLD AUTO: 0.5 % (ref 0–1)
BUN SERPL-MCNC: 20 MG/DL (ref 10–20)
BUN/CREAT SERPL: 34 (ref 6–20)
CALCIUM SERPL-MCNC: 9 MG/DL (ref 8.4–10.2)
CHLORIDE SERPL-SCNC: 102 MMOL/L (ref 98–107)
CO2 SERPL-SCNC: 28 MMOL/L (ref 23–31)
CREAT SERPL-MCNC: 0.58 MG/DL (ref 0.55–1.02)
DIFFERENTIAL METHOD BLD: ABNORMAL
EGFR (NO RACE VARIABLE) (RUSH/TITUS): 96 ML/MIN/1.73M2
EOSINOPHIL # BLD AUTO: 0.28 K/UL (ref 0–0.5)
EOSINOPHIL NFR BLD AUTO: 2.1 % (ref 1–4)
ERYTHROCYTE [DISTWIDTH] IN BLOOD BY AUTOMATED COUNT: 16.3 % (ref 11.5–14.5)
GLUCOSE SERPL-MCNC: 95 MG/DL (ref 82–115)
HCT VFR BLD AUTO: 34.2 % (ref 38–47)
HGB BLD-MCNC: 9.9 G/DL (ref 12–16)
IMM GRANULOCYTES # BLD AUTO: 0.05 K/UL (ref 0–0.04)
IMM GRANULOCYTES NFR BLD: 0.4 % (ref 0–0.4)
LACTATE SERPL-SCNC: 0.6 MMOL/L (ref 0.5–2.2)
LYMPHOCYTES # BLD AUTO: 1.46 K/UL (ref 1–4.8)
LYMPHOCYTES NFR BLD AUTO: 11.1 % (ref 27–41)
MCH RBC QN AUTO: 24.9 PG (ref 27–31)
MCHC RBC AUTO-ENTMCNC: 28.9 G/DL (ref 32–36)
MCV RBC AUTO: 85.9 FL (ref 80–96)
MONOCYTES # BLD AUTO: 0.91 K/UL (ref 0–0.8)
MONOCYTES NFR BLD AUTO: 6.9 % (ref 2–6)
MPC BLD CALC-MCNC: 9.9 FL (ref 9.4–12.4)
NEUTROPHILS # BLD AUTO: 10.44 K/UL (ref 1.8–7.7)
NEUTROPHILS NFR BLD AUTO: 79 % (ref 53–65)
NRBC # BLD AUTO: 0 X10E3/UL
NRBC, AUTO (.00): 0 %
PLATELET # BLD AUTO: 532 K/UL (ref 150–400)
POTASSIUM SERPL-SCNC: 3.9 MMOL/L (ref 3.5–5.1)
RBC # BLD AUTO: 3.98 M/UL (ref 4.2–5.4)
SODIUM SERPL-SCNC: 138 MMOL/L (ref 136–145)
WBC # BLD AUTO: 13.2 K/UL (ref 4.5–11)

## 2024-12-12 PROCEDURE — 83605 ASSAY OF LACTIC ACID: CPT | Performed by: STUDENT IN AN ORGANIZED HEALTH CARE EDUCATION/TRAINING PROGRAM

## 2024-12-12 PROCEDURE — 25000003 PHARM REV CODE 250: Performed by: HOSPITALIST

## 2024-12-12 PROCEDURE — 11000008

## 2024-12-12 PROCEDURE — 99900035 HC TECH TIME PER 15 MIN (STAT)

## 2024-12-12 PROCEDURE — 25000242 PHARM REV CODE 250 ALT 637 W/ HCPCS: Performed by: HOSPITALIST

## 2024-12-12 PROCEDURE — 25000003 PHARM REV CODE 250: Performed by: STUDENT IN AN ORGANIZED HEALTH CARE EDUCATION/TRAINING PROGRAM

## 2024-12-12 PROCEDURE — 80048 BASIC METABOLIC PNL TOTAL CA: CPT | Performed by: HOSPITALIST

## 2024-12-12 PROCEDURE — 94640 AIRWAY INHALATION TREATMENT: CPT

## 2024-12-12 PROCEDURE — 27000221 HC OXYGEN, UP TO 24 HOURS

## 2024-12-12 PROCEDURE — 36415 COLL VENOUS BLD VENIPUNCTURE: CPT | Performed by: STUDENT IN AN ORGANIZED HEALTH CARE EDUCATION/TRAINING PROGRAM

## 2024-12-12 PROCEDURE — 85025 COMPLETE CBC W/AUTO DIFF WBC: CPT | Performed by: HOSPITALIST

## 2024-12-12 PROCEDURE — 36415 COLL VENOUS BLD VENIPUNCTURE: CPT | Performed by: HOSPITALIST

## 2024-12-12 PROCEDURE — 99233 SBSQ HOSP IP/OBS HIGH 50: CPT | Mod: ,,, | Performed by: STUDENT IN AN ORGANIZED HEALTH CARE EDUCATION/TRAINING PROGRAM

## 2024-12-12 RX ORDER — POLYETHYLENE GLYCOL 3350 17 G/17G
34 POWDER, FOR SOLUTION ORAL 3 TIMES DAILY
Status: DISCONTINUED | OUTPATIENT
Start: 2024-12-12 | End: 2024-12-16

## 2024-12-12 RX ADMIN — POLYETHYLENE GLYCOL 3350 34 G: 17 POWDER, FOR SOLUTION ORAL at 09:12

## 2024-12-12 RX ADMIN — APIXABAN 5 MG: 5 TABLET, FILM COATED ORAL at 09:12

## 2024-12-12 RX ADMIN — BUPROPION HYDROCHLORIDE 100 MG: 100 TABLET, FILM COATED, EXTENDED RELEASE ORAL at 09:12

## 2024-12-12 RX ADMIN — IPRATROPIUM BROMIDE AND ALBUTEROL SULFATE 3 ML: .5; 3 SOLUTION RESPIRATORY (INHALATION) at 07:12

## 2024-12-12 RX ADMIN — THERA TABS 1 TABLET: TAB at 09:12

## 2024-12-12 RX ADMIN — MUPIROCIN: 20 OINTMENT TOPICAL at 09:12

## 2024-12-12 RX ADMIN — BUDESONIDE 0.5 MG: 0.5 INHALANT ORAL at 07:12

## 2024-12-12 RX ADMIN — POLYETHYLENE GLYCOL 3350 34 G: 17 POWDER, FOR SOLUTION ORAL at 03:12

## 2024-12-12 RX ADMIN — TRAZODONE HYDROCHLORIDE 50 MG: 50 TABLET ORAL at 09:12

## 2024-12-12 NOTE — TELEPHONE ENCOUNTER
----- Message from Varghese Castellon MD sent at 12/12/2024  8:43 AM CST -----  Please schedule the patient for a follow up with me in 4 weeks' time, with a chest x-ray prior.      Thank you,     AK

## 2024-12-12 NOTE — SUBJECTIVE & OBJECTIVE
Interval History:  No acute events overnight    Review of Systems  Objective:     Vital Signs (Most Recent):  Temp: 98.5 °F (36.9 °C) (12/12/24 1130)  Pulse: 68 (12/12/24 1130)  Resp: 17 (12/12/24 1130)  BP: (!) 82/42 (12/12/24 1130)  SpO2: (!) 93 % (12/12/24 1130) Vital Signs (24h Range):  Temp:  [97.8 °F (36.6 °C)-98.5 °F (36.9 °C)] 98.5 °F (36.9 °C)  Pulse:  [68-92] 68  Resp:  [12-25] 17  SpO2:  [82 %-98 %] 93 %  BP: ()/(42-54) 82/42     Weight: 34.9 kg (76 lb 14.4 oz)  Body mass index is 12.41 kg/m².    Intake/Output Summary (Last 24 hours) at 12/12/2024 1402  Last data filed at 12/12/2024 0930  Gross per 24 hour   Intake 360 ml   Output 500 ml   Net -140 ml         Physical Exam  Constitutional:       Appearance: Normal appearance. She is normal weight.   HENT:      Head: Normocephalic and atraumatic.      Mouth/Throat:      Mouth: Mucous membranes are moist.   Cardiovascular:      Rate and Rhythm: Normal rate and regular rhythm.      Pulses: Normal pulses.      Heart sounds: Normal heart sounds.   Pulmonary:      Effort: Pulmonary effort is normal.      Breath sounds: Rhonchi and rales present.   Abdominal:      General: Abdomen is flat.   Musculoskeletal:      Cervical back: Normal range of motion.   Skin:     General: Skin is warm.   Neurological:      General: No focal deficit present.      Mental Status: She is alert and oriented to person, place, and time. Mental status is at baseline.      Comments: Oriented x3 for me this morning             Significant Labs: All pertinent labs within the past 24 hours have been reviewed.    Significant Imaging: I have reviewed all pertinent imaging results/findings within the past 24 hours.   Hydroxyzine Counseling: Patient advised that the medication is sedating and not to drive a car after taking this medication.  Patient informed of potential adverse effects including but not limited to dry mouth, urinary retention, and blurry vision.  The patient verbalized understanding of the proper use and possible adverse effects of hydroxyzine.  All of the patient's questions and concerns were addressed.

## 2024-12-12 NOTE — PLAN OF CARE
Problem: Skin Injury Risk Increased  Goal: Skin Health and Integrity  Outcome: Progressing     Problem: Breathing Pattern Ineffective  Goal: Effective Breathing Pattern  Outcome: Progressing     Problem: Gas Exchange Impaired  Goal: Optimal Gas Exchange  Outcome: Progressing     Problem: Airway Clearance Ineffective  Goal: Effective Airway Clearance  Outcome: Progressing

## 2024-12-12 NOTE — PLAN OF CARE
Problem: Adult Inpatient Plan of Care  Goal: Plan of Care Review  Outcome: Progressing  Goal: Patient-Specific Goal (Individualized)  Outcome: Progressing  Goal: Absence of Hospital-Acquired Illness or Injury  Outcome: Progressing  Goal: Optimal Comfort and Wellbeing  Outcome: Progressing  Goal: Readiness for Transition of Care  Outcome: Progressing     Problem: Infection  Goal: Absence of Infection Signs and Symptoms  Outcome: Progressing     Problem: Skin Injury Risk Increased  Goal: Skin Health and Integrity  Outcome: Progressing     Problem: Gas Exchange Impaired  Goal: Optimal Gas Exchange  Outcome: Progressing     Problem: Fall Injury Risk  Goal: Absence of Fall and Fall-Related Injury  Outcome: Progressing

## 2024-12-12 NOTE — ASSESSMENT & PLAN NOTE
Due to COPD, lung cancer, and pneumonia; on nebs; s/p therapeutic aspiration with bronchoscopy---results show NSCLC; cultures show yeast and diphtheroids; she completed 7 days of Zosyn; she has opacification of her right lung per CT chest; on high-flow O2   Continue to wean oxygen as able

## 2024-12-12 NOTE — PLAN OF CARE
12/12/24 0950   Rounds   Attendance Nurse ;Charge nurse;Occupational therapist;Pharmacist  (Dietician and Respiratory therapist)   Discharge Plan A Return to nursing home   Why the patient remains in the hospital Requires continued medical care   Transition of Care Barriers None     Per IDTM. Patient remains on high flow oxygen at 45 liters at 65%. Pulmicort bid and duonebs as needed. She is eating 75 % and has boost ordered. No wounds noted. Asked for PT and OT to screen for rehab. Unsure what patients functional status was at nursing home. Continue with plan of care at ltac. Dc plan will be to return to nursing home when medically ready.

## 2024-12-12 NOTE — TELEPHONE ENCOUNTER
Patient already scheduled for 1/14 at 320pm with a chest x-ray before. Will keep as is at this time.  aware.

## 2024-12-12 NOTE — PROGRESS NOTES
[facility] Medicine  Progress Note    Patient Name: Mayra Kelly  MRN: [id number]  Patient Class: IP- Inpatient   Admission Date: 12/9/2024  Length of Stay: 3 days  Attending Physician: Sathya Chambers DO  Primary Care Provider: Darlene Campoverde NP        Subjective     Principal Problem:Acute hypoxemic respiratory failure        HPI:  74 year old female with a PMH of lung cancer presents with shortness of breath.  She was placed on O2 but kept taking it off, so she was sent to the ICU for closer monitoring.  CT chest showed lung nodules/masses.  She had opacification in the right lung with mucous plugging.  She was placed on Zosyn for pneumonia.  Today, she had a bronch done.  Results pending in the computer.  Patient denies chest pain, shortness of breath, nausea, vomiting, or diarrhea.       Overview/Hospital Course:  74 year old female with a PMH of lung cancer presents with shortness of breath and is now in LTAC for deconditioning and high flow O2; she is asleep on rounds  12/12 still on high-flow wean oxygen as able    Interval History:  No acute events overnight    Review of Systems  Objective:     Vital Signs (Most Recent):  Temp: 98.5 °F (36.9 °C) (12/12/24 1130)  Pulse: 68 (12/12/24 1130)  Resp: 17 (12/12/24 1130)  BP: (!) 82/42 (12/12/24 1130)  SpO2: (!) 93 % (12/12/24 1130) Vital Signs (24h Range):  Temp:  [97.8 °F (36.6 °C)-98.5 °F (36.9 °C)] 98.5 °F (36.9 °C)  Pulse:  [68-92] 68  Resp:  [12-25] 17  SpO2:  [82 %-98 %] 93 %  BP: ()/(42-54) 82/42     Weight: 34.9 kg (76 lb 14.4 oz)  Body mass index is 12.41 kg/m².    Intake/Output Summary (Last 24 hours) at 12/12/2024 1402  Last data filed at 12/12/2024 0930  Gross per 24 hour   Intake 360 ml   Output 500 ml   Net -140 ml         Physical Exam  Constitutional:       Appearance: Normal appearance. She is normal weight.   HENT:      Head: Normocephalic and atraumatic.      Mouth/Throat:      Mouth:  Mucous membranes are moist.   Cardiovascular:      Rate and Rhythm: Normal rate and regular rhythm.      Pulses: Normal pulses.      Heart sounds: Normal heart sounds.   Pulmonary:      Effort: Pulmonary effort is normal.      Breath sounds: Rhonchi and rales present.   Abdominal:      General: Abdomen is flat.   Musculoskeletal:      Cervical back: Normal range of motion.   Skin:     General: Skin is warm.   Neurological:      General: No focal deficit present.      Mental Status: She is alert and oriented to person, place, and time. Mental status is at baseline.      Comments: Oriented x3 for me this morning             Significant Labs: All pertinent labs within the past 24 hours have been reviewed.    Significant Imaging: I have reviewed all pertinent imaging results/findings within the past 24 hours.    Assessment and Plan     * Acute hypoxemic respiratory failure  Due to COPD, lung cancer, and pneumonia; on nebs; s/p therapeutic aspiration with bronchoscopy---results show NSCLC; cultures show yeast and diphtheroids; she completed 7 days of Zosyn; she has opacification of her right lung per CT chest; on high-flow O2   Continue to wean oxygen as able    Hypotension  BP on the low side; will start IVF; she is not on any BP meds at this time  Asymptomatic    Lung cancer  Cytology from bronch shows NSCLC; will leave to Oncology as outpt      Paroxysmal atrial fibrillation  On Eliquis  Rate controlled      VTE Risk Mitigation (From admission, onward)           Ordered     apixaban tablet 5 mg  2 times daily         12/09/24 1920                    Discharge Planning   TERE: 12/17/2024     Code Status: Full Code   Medical Readiness for Discharge Date:   Discharge Plan A: Return to nursing home          Labs and consultant notes reviewed.  Check metabolic panel tomorrow.  Chest x-ray reviewed and independently interpreted no obvious effusion or consolidation              Sathya Chambers DO  [facility]  Medicine   [facility]

## 2024-12-13 PROBLEM — R41.0 DELIRIUM: Status: ACTIVE | Noted: 2024-12-13

## 2024-12-13 PROBLEM — R64 CACHEXIA: Status: ACTIVE | Noted: 2024-12-13

## 2024-12-13 PROCEDURE — 99232 SBSQ HOSP IP/OBS MODERATE 35: CPT | Mod: ,,, | Performed by: STUDENT IN AN ORGANIZED HEALTH CARE EDUCATION/TRAINING PROGRAM

## 2024-12-13 PROCEDURE — 25000003 PHARM REV CODE 250: Performed by: HOSPITALIST

## 2024-12-13 PROCEDURE — 94640 AIRWAY INHALATION TREATMENT: CPT

## 2024-12-13 PROCEDURE — 94761 N-INVAS EAR/PLS OXIMETRY MLT: CPT

## 2024-12-13 PROCEDURE — 11000008

## 2024-12-13 PROCEDURE — 99900035 HC TECH TIME PER 15 MIN (STAT)

## 2024-12-13 PROCEDURE — 25000003 PHARM REV CODE 250: Performed by: STUDENT IN AN ORGANIZED HEALTH CARE EDUCATION/TRAINING PROGRAM

## 2024-12-13 PROCEDURE — 27000221 HC OXYGEN, UP TO 24 HOURS

## 2024-12-13 PROCEDURE — 25000242 PHARM REV CODE 250 ALT 637 W/ HCPCS: Performed by: HOSPITALIST

## 2024-12-13 PROCEDURE — 63700000 PHARM REV CODE 250 ALT 637 W/O HCPCS: Performed by: STUDENT IN AN ORGANIZED HEALTH CARE EDUCATION/TRAINING PROGRAM

## 2024-12-13 RX ORDER — FLUCONAZOLE 100 MG/1
400 TABLET ORAL DAILY
Status: COMPLETED | OUTPATIENT
Start: 2024-12-13 | End: 2024-12-13

## 2024-12-13 RX ORDER — FLUCONAZOLE 100 MG/1
200 TABLET ORAL DAILY
Status: COMPLETED | OUTPATIENT
Start: 2024-12-14 | End: 2024-12-27

## 2024-12-13 RX ORDER — QUETIAPINE FUMARATE 50 MG/1
50 TABLET, EXTENDED RELEASE ORAL DAILY
Status: DISCONTINUED | OUTPATIENT
Start: 2024-12-13 | End: 2024-12-29

## 2024-12-13 RX ADMIN — MUPIROCIN: 20 OINTMENT TOPICAL at 08:12

## 2024-12-13 RX ADMIN — APIXABAN 5 MG: 5 TABLET, FILM COATED ORAL at 09:12

## 2024-12-13 RX ADMIN — MUPIROCIN: 20 OINTMENT TOPICAL at 09:12

## 2024-12-13 RX ADMIN — FLUCONAZOLE 400 MG: 100 TABLET ORAL at 09:12

## 2024-12-13 RX ADMIN — BUPROPION HYDROCHLORIDE 100 MG: 100 TABLET, FILM COATED, EXTENDED RELEASE ORAL at 08:12

## 2024-12-13 RX ADMIN — THERA TABS 1 TABLET: TAB at 09:12

## 2024-12-13 RX ADMIN — TRAZODONE HYDROCHLORIDE 50 MG: 50 TABLET ORAL at 08:12

## 2024-12-13 RX ADMIN — BUPROPION HYDROCHLORIDE 100 MG: 100 TABLET, FILM COATED, EXTENDED RELEASE ORAL at 09:12

## 2024-12-13 RX ADMIN — APIXABAN 5 MG: 5 TABLET, FILM COATED ORAL at 08:12

## 2024-12-13 RX ADMIN — BUDESONIDE 0.5 MG: 0.5 INHALANT ORAL at 08:12

## 2024-12-13 RX ADMIN — POLYETHYLENE GLYCOL 3350 34 G: 17 POWDER, FOR SOLUTION ORAL at 09:12

## 2024-12-13 RX ADMIN — BUDESONIDE 0.5 MG: 0.5 INHALANT ORAL at 07:12

## 2024-12-13 RX ADMIN — POLYETHYLENE GLYCOL 3350 34 G: 17 POWDER, FOR SOLUTION ORAL at 08:12

## 2024-12-13 RX ADMIN — POLYETHYLENE GLYCOL 3350 34 G: 17 POWDER, FOR SOLUTION ORAL at 04:12

## 2024-12-13 RX ADMIN — QUETIAPINE 50 MG: 50 TABLET, FILM COATED, EXTENDED RELEASE ORAL at 01:12

## 2024-12-13 NOTE — CONSULTS
Consult for cachexia received and appreciated. Patient with hx of severe PCM related to weight loss, physical signs of wasting and hx of poor intakes. Weight has stabilized over the past 3 months, but patient remains severely underweight. Patient continues to meet ASPEN criteria for severe protein calorie malnutrition related to energy intakes less than 75% for greater than or equal to 1 month prior to admission, and physical findings of muscle and fat loss to orbital, clavicle and temple regions related to chronic and catabolic illness. Po intakes good per flowsheets. Patient with increased caloric needs due to disease process.    Recommend to continue Boost Plus all meals.  Recommend high calorie/high protein diet as tolerated.RD Following.

## 2024-12-13 NOTE — PROGRESS NOTES
[facility] Medicine  Progress Note    Patient Name: Mayra Kelly  MRN: [id number]  Patient Class: IP- Inpatient   Admission Date: 12/9/2024  Length of Stay: 4 days  Attending Physician: Sathya Chambers DO  Primary Care Provider: Darlene Campoverde NP        Subjective     Principal Problem:Acute hypoxemic respiratory failure        HPI:  74 year old female with a PMH of lung cancer presents with shortness of breath.  She was placed on O2 but kept taking it off, so she was sent to the ICU for closer monitoring.  CT chest showed lung nodules/masses.  She had opacification in the right lung with mucous plugging.  She was placed on Zosyn for pneumonia.  Today, she had a bronch done.  Results pending in the computer.  Patient denies chest pain, shortness of breath, nausea, vomiting, or diarrhea.       Overview/Hospital Course:  74 year old female with a PMH of lung cancer presents with shortness of breath and is now in LTAC for deconditioning and high flow O2; she is asleep on rounds  12/12 still on high-flow wean oxygen as able  12/13 saturating well breathing comfortably wean oxygen    Interval History:  No acute events overnight    Review of Systems  Objective:     Vital Signs (Most Recent):  Temp: 98.3 °F (36.8 °C) (12/13/24 0730)  Pulse: 88 (12/13/24 0728)  Resp: 18 (12/13/24 0728)  BP: (!) 87/40 (12/13/24 0400)  SpO2: 100 % (12/13/24 0728) Vital Signs (24h Range):  Temp:  [97.8 °F (36.6 °C)-99.7 °F (37.6 °C)] 98.3 °F (36.8 °C)  Pulse:  [68-96] 88  Resp:  [17-21] 18  SpO2:  [92 %-100 %] 100 %  BP: ()/(40-60) 87/40     Weight: 34.9 kg (76 lb 14.4 oz)  Body mass index is 12.41 kg/m².    Intake/Output Summary (Last 24 hours) at 12/13/2024 1122  Last data filed at 12/13/2024 0941  Gross per 24 hour   Intake 1838.91 ml   Output 850 ml   Net 988.91 ml         Physical Exam  Constitutional:       Appearance: Normal appearance. She is normal weight.   HENT:      Head:  Normocephalic and atraumatic.      Mouth/Throat:      Mouth: Mucous membranes are moist.   Cardiovascular:      Rate and Rhythm: Normal rate and regular rhythm.      Pulses: Normal pulses.      Heart sounds: Normal heart sounds.   Pulmonary:      Effort: Pulmonary effort is normal.      Breath sounds: Rhonchi and rales present.   Abdominal:      General: Abdomen is flat.   Musculoskeletal:      Cervical back: Normal range of motion.   Skin:     General: Skin is warm.   Neurological:      General: No focal deficit present.      Mental Status: She is alert and oriented to person, place, and time. Mental status is at baseline.      Comments: Oriented x3 for me this morning             Significant Labs: All pertinent labs within the past 24 hours have been reviewed.    Significant Imaging: I have reviewed all pertinent imaging results/findings within the past 24 hours.    Assessment and Plan     * Acute hypoxemic respiratory failure  Due to COPD, lung cancer, and pneumonia; on nebs; s/p therapeutic aspiration with bronchoscopy---results show NSCLC; cultures show yeast and diphtheroids; she completed 7 days of Zosyn; she has opacification of her right lung per CT chest; on high-flow O2   Continue to wean oxygen as able    We will treat yeast from bronchoscopy    Cachexia  Concern for cachexia as evidenced by BMI less than 20 in the presence of known chronic disease. Contributing factors include underlying Malignancy. Treatment to include nutrition consult.      Body mass index is 12.41 kg/m².  Albumin   Date Value Ref Range Status   12/03/2024 2.5 (L) 3.4 - 4.8 g/dL Final         Delirium  Hallucinations.  Start Seroquel      Hypotension  BP on the low side; will start IVF; she is not on any BP meds at this time  Asymptomatic    Lung cancer  Cytology from bronch shows NSCLC; will leave to Oncology as outpt      Paroxysmal atrial fibrillation  On Eliquis  Rate controlled    Severe protein-calorie malnutrition  Nutrition  consulted. Most recent weight and BMI monitored-     Measurements:  Wt Readings from Last 1 Encounters:   12/12/24 34.9 kg (76 lb 14.4 oz)   Body mass index is 12.41 kg/m².    Patient has been screened and assessed by RD.    Malnutrition Type:  Context: chronic illness  Level: severe    Malnutrition Characteristic Summary:  Energy Intake (Malnutrition): less than 75% for greater than or equal to 1 month  Subcutaneous Fat (Malnutrition): severe depletion  Muscle Mass (Malnutrition): severe depletion    Interventions/Recommendations (treatment strategy):           VTE Risk Mitigation (From admission, onward)           Ordered     apixaban tablet 5 mg  2 times daily         12/09/24 1920                    Discharge Planning   TERE: 12/17/2024     Code Status: Full Code   Medical Readiness for Discharge Date:   Discharge Plan A: Return to nursing home            Add Seroquel today for delirium            Sathya Chambers DO  Department of Hospital Medicine   [facility]

## 2024-12-13 NOTE — ASSESSMENT & PLAN NOTE
Concern for cachexia as evidenced by BMI less than 20 in the presence of known chronic disease. Contributing factors include underlying Malignancy. Treatment to include nutrition consult.      Body mass index is 12.41 kg/m².  Albumin   Date Value Ref Range Status   12/03/2024 2.5 (L) 3.4 - 4.8 g/dL Final

## 2024-12-13 NOTE — ASSESSMENT & PLAN NOTE
Nutrition consulted. Most recent weight and BMI monitored-     Measurements:  Wt Readings from Last 1 Encounters:   12/12/24 34.9 kg (76 lb 14.4 oz)   Body mass index is 12.41 kg/m².    Patient has been screened and assessed by RD.    Malnutrition Type:  Context: chronic illness  Level: severe    Malnutrition Characteristic Summary:  Energy Intake (Malnutrition): less than 75% for greater than or equal to 1 month  Subcutaneous Fat (Malnutrition): severe depletion  Muscle Mass (Malnutrition): severe depletion    Interventions/Recommendations (treatment strategy):

## 2024-12-13 NOTE — PLAN OF CARE
Problem: Adult Inpatient Plan of Care  Goal: Absence of Hospital-Acquired Illness or Injury  Outcome: Progressing     Problem: Breathing Pattern Ineffective  Goal: Effective Breathing Pattern  Outcome: Progressing     Problem: Gas Exchange Impaired  Goal: Optimal Gas Exchange  Outcome: Progressing     Problem: Airway Clearance Ineffective  Goal: Effective Airway Clearance  Outcome: Progressing

## 2024-12-13 NOTE — PLAN OF CARE
Problem: Adult Inpatient Plan of Care  Goal: Plan of Care Review  Outcome: Progressing  Goal: Patient-Specific Goal (Individualized)  Outcome: Progressing  Goal: Absence of Hospital-Acquired Illness or Injury  Outcome: Progressing  Goal: Optimal Comfort and Wellbeing  Outcome: Progressing  Goal: Readiness for Transition of Care  Outcome: Progressing     Problem: Infection  Goal: Absence of Infection Signs and Symptoms  Outcome: Progressing     Problem: Skin Injury Risk Increased  Goal: Skin Health and Integrity  Outcome: Progressing     Problem: Breathing Pattern Ineffective  Goal: Effective Breathing Pattern  Outcome: Progressing     Problem: Gas Exchange Impaired  Goal: Optimal Gas Exchange  Outcome: Progressing     Problem: Fall Injury Risk  Goal: Absence of Fall and Fall-Related Injury  Outcome: Progressing

## 2024-12-13 NOTE — SUBJECTIVE & OBJECTIVE
Interval History:  No acute events overnight    Review of Systems  Objective:     Vital Signs (Most Recent):  Temp: 98.3 °F (36.8 °C) (12/13/24 0730)  Pulse: 88 (12/13/24 0728)  Resp: 18 (12/13/24 0728)  BP: (!) 87/40 (12/13/24 0400)  SpO2: 100 % (12/13/24 0728) Vital Signs (24h Range):  Temp:  [97.8 °F (36.6 °C)-99.7 °F (37.6 °C)] 98.3 °F (36.8 °C)  Pulse:  [68-96] 88  Resp:  [17-21] 18  SpO2:  [92 %-100 %] 100 %  BP: ()/(40-60) 87/40     Weight: 34.9 kg (76 lb 14.4 oz)  Body mass index is 12.41 kg/m².    Intake/Output Summary (Last 24 hours) at 12/13/2024 1122  Last data filed at 12/13/2024 0941  Gross per 24 hour   Intake 1838.91 ml   Output 850 ml   Net 988.91 ml         Physical Exam  Constitutional:       Appearance: Normal appearance. She is normal weight.   HENT:      Head: Normocephalic and atraumatic.      Mouth/Throat:      Mouth: Mucous membranes are moist.   Cardiovascular:      Rate and Rhythm: Normal rate and regular rhythm.      Pulses: Normal pulses.      Heart sounds: Normal heart sounds.   Pulmonary:      Effort: Pulmonary effort is normal.      Breath sounds: Rhonchi and rales present.   Abdominal:      General: Abdomen is flat.   Musculoskeletal:      Cervical back: Normal range of motion.   Skin:     General: Skin is warm.   Neurological:      General: No focal deficit present.      Mental Status: She is alert and oriented to person, place, and time. Mental status is at baseline.      Comments: Oriented x3 for me this morning             Significant Labs: All pertinent labs within the past 24 hours have been reviewed.    Significant Imaging: I have reviewed all pertinent imaging results/findings within the past 24 hours.

## 2024-12-13 NOTE — PLAN OF CARE
Problem: Adult Inpatient Plan of Care  Goal: Plan of Care Review  Outcome: Progressing  Goal: Patient-Specific Goal (Individualized)  Outcome: Progressing  Goal: Absence of Hospital-Acquired Illness or Injury  Outcome: Progressing  Goal: Optimal Comfort and Wellbeing  Outcome: Progressing  Goal: Readiness for Transition of Care  Outcome: Progressing     Problem: Infection  Goal: Absence of Infection Signs and Symptoms  Outcome: Progressing     Problem: Skin Injury Risk Increased  Goal: Skin Health and Integrity  Outcome: Progressing     Problem: Breathing Pattern Ineffective  Goal: Effective Breathing Pattern  Outcome: Progressing     Problem: Gas Exchange Impaired  Goal: Optimal Gas Exchange  Outcome: Progressing     Problem: Airway Clearance Ineffective  Goal: Effective Airway Clearance  Outcome: Progressing     Problem: Fall Injury Risk  Goal: Absence of Fall and Fall-Related Injury  Outcome: Progressing

## 2024-12-13 NOTE — ASSESSMENT & PLAN NOTE
Due to COPD, lung cancer, and pneumonia; on nebs; s/p therapeutic aspiration with bronchoscopy---results show NSCLC; cultures show yeast and diphtheroids; she completed 7 days of Zosyn; she has opacification of her right lung per CT chest; on high-flow O2   Continue to wean oxygen as able    We will treat yeast from bronchoscopy

## 2024-12-14 PROCEDURE — 94640 AIRWAY INHALATION TREATMENT: CPT

## 2024-12-14 PROCEDURE — 99232 SBSQ HOSP IP/OBS MODERATE 35: CPT | Mod: ,,, | Performed by: STUDENT IN AN ORGANIZED HEALTH CARE EDUCATION/TRAINING PROGRAM

## 2024-12-14 PROCEDURE — 63700000 PHARM REV CODE 250 ALT 637 W/O HCPCS: Performed by: STUDENT IN AN ORGANIZED HEALTH CARE EDUCATION/TRAINING PROGRAM

## 2024-12-14 PROCEDURE — 11000008

## 2024-12-14 PROCEDURE — 25000003 PHARM REV CODE 250: Performed by: HOSPITALIST

## 2024-12-14 PROCEDURE — 94761 N-INVAS EAR/PLS OXIMETRY MLT: CPT

## 2024-12-14 PROCEDURE — 25000003 PHARM REV CODE 250: Performed by: STUDENT IN AN ORGANIZED HEALTH CARE EDUCATION/TRAINING PROGRAM

## 2024-12-14 PROCEDURE — 99900035 HC TECH TIME PER 15 MIN (STAT)

## 2024-12-14 PROCEDURE — 25000242 PHARM REV CODE 250 ALT 637 W/ HCPCS: Performed by: HOSPITALIST

## 2024-12-14 PROCEDURE — 27000221 HC OXYGEN, UP TO 24 HOURS

## 2024-12-14 RX ADMIN — THERA TABS 1 TABLET: TAB at 09:12

## 2024-12-14 RX ADMIN — POLYETHYLENE GLYCOL 3350 34 G: 17 POWDER, FOR SOLUTION ORAL at 08:12

## 2024-12-14 RX ADMIN — POLYETHYLENE GLYCOL 3350 34 G: 17 POWDER, FOR SOLUTION ORAL at 09:12

## 2024-12-14 RX ADMIN — BUPROPION HYDROCHLORIDE 100 MG: 100 TABLET, FILM COATED, EXTENDED RELEASE ORAL at 08:12

## 2024-12-14 RX ADMIN — MUPIROCIN: 20 OINTMENT TOPICAL at 09:12

## 2024-12-14 RX ADMIN — BUPROPION HYDROCHLORIDE 100 MG: 100 TABLET, FILM COATED, EXTENDED RELEASE ORAL at 09:12

## 2024-12-14 RX ADMIN — TRAZODONE HYDROCHLORIDE 50 MG: 50 TABLET ORAL at 08:12

## 2024-12-14 RX ADMIN — QUETIAPINE 50 MG: 50 TABLET, FILM COATED, EXTENDED RELEASE ORAL at 09:12

## 2024-12-14 RX ADMIN — BUDESONIDE 0.5 MG: 0.5 INHALANT ORAL at 07:12

## 2024-12-14 RX ADMIN — SODIUM CHLORIDE 1000 ML: 9 INJECTION, SOLUTION INTRAVENOUS at 10:12

## 2024-12-14 RX ADMIN — APIXABAN 5 MG: 5 TABLET, FILM COATED ORAL at 08:12

## 2024-12-14 RX ADMIN — FLUCONAZOLE 200 MG: 100 TABLET ORAL at 09:12

## 2024-12-14 RX ADMIN — POLYETHYLENE GLYCOL 3350 34 G: 17 POWDER, FOR SOLUTION ORAL at 03:12

## 2024-12-14 RX ADMIN — APIXABAN 5 MG: 5 TABLET, FILM COATED ORAL at 09:12

## 2024-12-14 NOTE — ASSESSMENT & PLAN NOTE
Nutrition consulted. Most recent weight and BMI monitored-     Measurements:  Wt Readings from Last 1 Encounters:   12/14/24 34.7 kg (76 lb 9.6 oz)   Body mass index is 12.36 kg/m².    Patient has been screened and assessed by RD.    Malnutrition Type:  Context: chronic illness  Level: severe    Malnutrition Characteristic Summary:  Energy Intake (Malnutrition): less than 75% for greater than or equal to 1 month  Subcutaneous Fat (Malnutrition): severe depletion  Muscle Mass (Malnutrition): severe depletion    Interventions/Recommendations (treatment strategy):     Encourage oral intake

## 2024-12-14 NOTE — ASSESSMENT & PLAN NOTE
BP on the low side; will start IVF; she is not on any BP meds at this time  Asymptomatic  We will give 1 L fluid bolus today

## 2024-12-14 NOTE — PROGRESS NOTES
[facility] Medicine  Progress Note    Patient Name: Mayra Kelly  MRN: [id number]  Patient Class: IP- Inpatient   Admission Date: 12/9/2024  Length of Stay: 5 days  Attending Physician: Sathya Chambers DO  Primary Care Provider: Darlene Campoverde NP        Subjective     Principal Problem:Acute hypoxemic respiratory failure        HPI:  74 year old female with a PMH of lung cancer presents with shortness of breath.  She was placed on O2 but kept taking it off, so she was sent to the ICU for closer monitoring.  CT chest showed lung nodules/masses.  She had opacification in the right lung with mucous plugging.  She was placed on Zosyn for pneumonia.  Today, she had a bronch done.  Results pending in the computer.  Patient denies chest pain, shortness of breath, nausea, vomiting, or diarrhea.       Overview/Hospital Course:  74 year old female with a PMH of lung cancer presents with shortness of breath and is now in LTAC for deconditioning and high flow O2; she is asleep on rounds  12/12 still on high-flow wean oxygen as able  12/13 saturating well breathing comfortably wean oxygen  12/14 continue to wean oxygen as able    Interval History:  No acute events overnight    Review of Systems  Objective:     Vital Signs (Most Recent):  Temp: 97.3 °F (36.3 °C) (12/14/24 0700)  Pulse: 83 (12/14/24 0736)  Resp: 20 (12/14/24 0736)  BP: (!) 93/51 (12/14/24 0700)  SpO2: 97 % (12/14/24 0736) Vital Signs (24h Range):  Temp:  [97.3 °F (36.3 °C)-98.8 °F (37.1 °C)] 97.3 °F (36.3 °C)  Pulse:  [] 83  Resp:  [11-23] 20  SpO2:  [84 %-98 %] 97 %  BP: (83-93)/(43-51) 93/51     Weight: 34.7 kg (76 lb 9.6 oz)  Body mass index is 12.36 kg/m².  No intake or output data in the 24 hours ending 12/14/24 1029        Physical Exam  Constitutional:       Appearance: Normal appearance. She is normal weight.   HENT:      Head: Normocephalic and atraumatic.      Mouth/Throat:      Mouth: Mucous  membranes are moist.   Cardiovascular:      Rate and Rhythm: Normal rate and regular rhythm.      Pulses: Normal pulses.      Heart sounds: Normal heart sounds.   Pulmonary:      Effort: Pulmonary effort is normal.      Breath sounds: Rhonchi and rales present.   Abdominal:      General: Abdomen is flat.   Musculoskeletal:      Cervical back: Normal range of motion.   Skin:     General: Skin is warm.   Neurological:      General: No focal deficit present.      Mental Status: She is alert and oriented to person, place, and time. Mental status is at baseline.      Comments: Oriented x3 for me this morning             Significant Labs: All pertinent labs within the past 24 hours have been reviewed.    Significant Imaging: I have reviewed all pertinent imaging results/findings within the past 24 hours.    Assessment and Plan     * Acute hypoxemic respiratory failure  Due to COPD, lung cancer, and pneumonia; on nebs; s/p therapeutic aspiration with bronchoscopy---results show NSCLC; cultures show yeast and diphtheroids; she completed 7 days of Zosyn; she has opacification of her right lung per CT chest; on high-flow O2   Continue to wean oxygen as able    We will treat yeast from bronchoscopy  Wean oxygen as able    Cachexia  Concern for cachexia as evidenced by BMI less than 20 in the presence of known chronic disease. Contributing factors include underlying Malignancy. Treatment to include nutrition consult.      Body mass index is 12.36 kg/m².  Albumin   Date Value Ref Range Status   12/03/2024 2.5 (L) 3.4 - 4.8 g/dL Final         Delirium  Hallucinations.  Start Seroquel      Hypotension  BP on the low side; will start IVF; she is not on any BP meds at this time  Asymptomatic  We will give 1 L fluid bolus today    Lung cancer  Cytology from bronch shows NSCLC; will leave to Oncology as outpt      Paroxysmal atrial fibrillation  On Eliquis  Rate controlled    Severe protein-calorie malnutrition  Nutrition consulted.  Most recent weight and BMI monitored-     Measurements:  Wt Readings from Last 1 Encounters:   12/14/24 34.7 kg (76 lb 9.6 oz)   Body mass index is 12.36 kg/m².    Patient has been screened and assessed by RD.    Malnutrition Type:  Context: chronic illness  Level: severe    Malnutrition Characteristic Summary:  Energy Intake (Malnutrition): less than 75% for greater than or equal to 1 month  Subcutaneous Fat (Malnutrition): severe depletion  Muscle Mass (Malnutrition): severe depletion    Interventions/Recommendations (treatment strategy):     Encourage oral intake      VTE Risk Mitigation (From admission, onward)           Ordered     apixaban tablet 5 mg  2 times daily         12/09/24 1920                    Discharge Planning   TERE: 12/17/2024     Code Status: Full Code   Medical Readiness for Discharge Date:   Discharge Plan A: Return to nursing home            1 L fluid bolus today            Sathya Chambers DO  Department of Hospital Medicine   [facility]

## 2024-12-14 NOTE — ASSESSMENT & PLAN NOTE
Due to COPD, lung cancer, and pneumonia; on nebs; s/p therapeutic aspiration with bronchoscopy---results show NSCLC; cultures show yeast and diphtheroids; she completed 7 days of Zosyn; she has opacification of her right lung per CT chest; on high-flow O2   Continue to wean oxygen as able    We will treat yeast from bronchoscopy  Wean oxygen as able

## 2024-12-14 NOTE — SUBJECTIVE & OBJECTIVE
Interval History:  No acute events overnight    Review of Systems  Objective:     Vital Signs (Most Recent):  Temp: 97.3 °F (36.3 °C) (12/14/24 0700)  Pulse: 83 (12/14/24 0736)  Resp: 20 (12/14/24 0736)  BP: (!) 93/51 (12/14/24 0700)  SpO2: 97 % (12/14/24 0736) Vital Signs (24h Range):  Temp:  [97.3 °F (36.3 °C)-98.8 °F (37.1 °C)] 97.3 °F (36.3 °C)  Pulse:  [] 83  Resp:  [11-23] 20  SpO2:  [84 %-98 %] 97 %  BP: (83-93)/(43-51) 93/51     Weight: 34.7 kg (76 lb 9.6 oz)  Body mass index is 12.36 kg/m².  No intake or output data in the 24 hours ending 12/14/24 1029        Physical Exam  Constitutional:       Appearance: Normal appearance. She is normal weight.   HENT:      Head: Normocephalic and atraumatic.      Mouth/Throat:      Mouth: Mucous membranes are moist.   Cardiovascular:      Rate and Rhythm: Normal rate and regular rhythm.      Pulses: Normal pulses.      Heart sounds: Normal heart sounds.   Pulmonary:      Effort: Pulmonary effort is normal.      Breath sounds: Rhonchi and rales present.   Abdominal:      General: Abdomen is flat.   Musculoskeletal:      Cervical back: Normal range of motion.   Skin:     General: Skin is warm.   Neurological:      General: No focal deficit present.      Mental Status: She is alert and oriented to person, place, and time. Mental status is at baseline.      Comments: Oriented x3 for me this morning             Significant Labs: All pertinent labs within the past 24 hours have been reviewed.    Significant Imaging: I have reviewed all pertinent imaging results/findings within the past 24 hours.

## 2024-12-14 NOTE — ASSESSMENT & PLAN NOTE
Concern for cachexia as evidenced by BMI less than 20 in the presence of known chronic disease. Contributing factors include underlying Malignancy. Treatment to include nutrition consult.      Body mass index is 12.36 kg/m².  Albumin   Date Value Ref Range Status   12/03/2024 2.5 (L) 3.4 - 4.8 g/dL Final

## 2024-12-15 LAB
ANION GAP SERPL CALCULATED.3IONS-SCNC: 12 MMOL/L (ref 7–16)
BASOPHILS # BLD AUTO: 0.05 K/UL (ref 0–0.2)
BASOPHILS NFR BLD AUTO: 0.4 % (ref 0–1)
BUN SERPL-MCNC: 19 MG/DL (ref 10–20)
BUN/CREAT SERPL: 37 (ref 6–20)
CALCIUM SERPL-MCNC: 8.6 MG/DL (ref 8.4–10.2)
CHLORIDE SERPL-SCNC: 102 MMOL/L (ref 98–107)
CO2 SERPL-SCNC: 28 MMOL/L (ref 23–31)
CREAT SERPL-MCNC: 0.51 MG/DL (ref 0.55–1.02)
DIFFERENTIAL METHOD BLD: ABNORMAL
EGFR (NO RACE VARIABLE) (RUSH/TITUS): 99 ML/MIN/1.73M2
EOSINOPHIL # BLD AUTO: 0.24 K/UL (ref 0–0.5)
EOSINOPHIL NFR BLD AUTO: 1.9 % (ref 1–4)
ERYTHROCYTE [DISTWIDTH] IN BLOOD BY AUTOMATED COUNT: 16.6 % (ref 11.5–14.5)
GLUCOSE SERPL-MCNC: 61 MG/DL (ref 82–115)
HCT VFR BLD AUTO: 31.2 % (ref 38–47)
HGB BLD-MCNC: 8.9 G/DL (ref 12–16)
IMM GRANULOCYTES # BLD AUTO: 0.06 K/UL (ref 0–0.04)
IMM GRANULOCYTES NFR BLD: 0.5 % (ref 0–0.4)
LYMPHOCYTES # BLD AUTO: 1.15 K/UL (ref 1–4.8)
LYMPHOCYTES NFR BLD AUTO: 9 % (ref 27–41)
MCH RBC QN AUTO: 24.1 PG (ref 27–31)
MCHC RBC AUTO-ENTMCNC: 28.5 G/DL (ref 32–36)
MCV RBC AUTO: 84.3 FL (ref 80–96)
MONOCYTES # BLD AUTO: 0.72 K/UL (ref 0–0.8)
MONOCYTES NFR BLD AUTO: 5.6 % (ref 2–6)
MPC BLD CALC-MCNC: 10.3 FL (ref 9.4–12.4)
NEUTROPHILS # BLD AUTO: 10.61 K/UL (ref 1.8–7.7)
NEUTROPHILS NFR BLD AUTO: 82.6 % (ref 53–65)
NRBC # BLD AUTO: 0 X10E3/UL
NRBC, AUTO (.00): 0 %
PLATELET # BLD AUTO: 456 K/UL (ref 150–400)
POTASSIUM SERPL-SCNC: 4.1 MMOL/L (ref 3.5–5.1)
RBC # BLD AUTO: 3.7 M/UL (ref 4.2–5.4)
SODIUM SERPL-SCNC: 138 MMOL/L (ref 136–145)
WBC # BLD AUTO: 12.83 K/UL (ref 4.5–11)

## 2024-12-15 PROCEDURE — 25000242 PHARM REV CODE 250 ALT 637 W/ HCPCS: Performed by: HOSPITALIST

## 2024-12-15 PROCEDURE — 99232 SBSQ HOSP IP/OBS MODERATE 35: CPT | Mod: ,,, | Performed by: STUDENT IN AN ORGANIZED HEALTH CARE EDUCATION/TRAINING PROGRAM

## 2024-12-15 PROCEDURE — 25000003 PHARM REV CODE 250: Performed by: STUDENT IN AN ORGANIZED HEALTH CARE EDUCATION/TRAINING PROGRAM

## 2024-12-15 PROCEDURE — 94640 AIRWAY INHALATION TREATMENT: CPT

## 2024-12-15 PROCEDURE — 27000221 HC OXYGEN, UP TO 24 HOURS

## 2024-12-15 PROCEDURE — 25000003 PHARM REV CODE 250: Performed by: HOSPITALIST

## 2024-12-15 PROCEDURE — 11000008

## 2024-12-15 PROCEDURE — 85025 COMPLETE CBC W/AUTO DIFF WBC: CPT | Performed by: HOSPITALIST

## 2024-12-15 PROCEDURE — 94761 N-INVAS EAR/PLS OXIMETRY MLT: CPT

## 2024-12-15 PROCEDURE — 99900035 HC TECH TIME PER 15 MIN (STAT)

## 2024-12-15 PROCEDURE — 36415 COLL VENOUS BLD VENIPUNCTURE: CPT | Performed by: HOSPITALIST

## 2024-12-15 PROCEDURE — 63700000 PHARM REV CODE 250 ALT 637 W/O HCPCS: Performed by: STUDENT IN AN ORGANIZED HEALTH CARE EDUCATION/TRAINING PROGRAM

## 2024-12-15 PROCEDURE — 80048 BASIC METABOLIC PNL TOTAL CA: CPT | Performed by: HOSPITALIST

## 2024-12-15 RX ADMIN — BUDESONIDE 0.5 MG: 0.5 INHALANT ORAL at 07:12

## 2024-12-15 RX ADMIN — TRAZODONE HYDROCHLORIDE 50 MG: 50 TABLET ORAL at 08:12

## 2024-12-15 RX ADMIN — QUETIAPINE 50 MG: 50 TABLET, FILM COATED, EXTENDED RELEASE ORAL at 09:12

## 2024-12-15 RX ADMIN — POLYETHYLENE GLYCOL 3350 34 G: 17 POWDER, FOR SOLUTION ORAL at 08:12

## 2024-12-15 RX ADMIN — FLUCONAZOLE 200 MG: 100 TABLET ORAL at 09:12

## 2024-12-15 RX ADMIN — BUPROPION HYDROCHLORIDE 100 MG: 100 TABLET, FILM COATED, EXTENDED RELEASE ORAL at 08:12

## 2024-12-15 RX ADMIN — POLYETHYLENE GLYCOL 3350 34 G: 17 POWDER, FOR SOLUTION ORAL at 09:12

## 2024-12-15 RX ADMIN — BUPROPION HYDROCHLORIDE 100 MG: 100 TABLET, FILM COATED, EXTENDED RELEASE ORAL at 09:12

## 2024-12-15 RX ADMIN — APIXABAN 5 MG: 5 TABLET, FILM COATED ORAL at 09:12

## 2024-12-15 RX ADMIN — THERA TABS 1 TABLET: TAB at 09:12

## 2024-12-15 RX ADMIN — BUDESONIDE 0.5 MG: 0.5 INHALANT ORAL at 09:12

## 2024-12-15 RX ADMIN — APIXABAN 5 MG: 5 TABLET, FILM COATED ORAL at 08:12

## 2024-12-15 RX ADMIN — POLYETHYLENE GLYCOL 3350 34 G: 17 POWDER, FOR SOLUTION ORAL at 03:12

## 2024-12-15 NOTE — SUBJECTIVE & OBJECTIVE
Interval History:  No acute events overnight    Review of Systems  Objective:     Vital Signs (Most Recent):  Temp: 97.9 °F (36.6 °C) (12/15/24 1200)  Pulse: 84 (12/15/24 1200)  Resp: 16 (12/15/24 1200)  BP: (!) 102/59 (12/15/24 1200)  SpO2: 99 % (12/15/24 1200) Vital Signs (24h Range):  Temp:  [97.2 °F (36.2 °C)-97.9 °F (36.6 °C)] 97.9 °F (36.6 °C)  Pulse:  [69-84] 84  Resp:  [12-18] 16  SpO2:  [94 %-99 %] 99 %  BP: ()/(41-59) 102/59     Weight: 40.9 kg (90 lb 3.2 oz)  Body mass index is 14.56 kg/m².    Intake/Output Summary (Last 24 hours) at 12/15/2024 1346  Last data filed at 12/15/2024 1216  Gross per 24 hour   Intake 360 ml   Output --   Net 360 ml           Physical Exam  Constitutional:       Appearance: Normal appearance. She is normal weight.   HENT:      Head: Normocephalic and atraumatic.      Mouth/Throat:      Mouth: Mucous membranes are moist.   Cardiovascular:      Rate and Rhythm: Normal rate and regular rhythm.      Pulses: Normal pulses.      Heart sounds: Normal heart sounds.   Pulmonary:      Effort: Pulmonary effort is normal.      Breath sounds: Rhonchi and rales present.   Abdominal:      General: Abdomen is flat.   Musculoskeletal:      Cervical back: Normal range of motion.   Skin:     General: Skin is warm.   Neurological:      General: No focal deficit present.      Mental Status: She is alert and oriented to person, place, and time. Mental status is at baseline.      Comments: Oriented x3 for me this morning             Significant Labs: All pertinent labs within the past 24 hours have been reviewed.    Significant Imaging: I have reviewed all pertinent imaging results/findings within the past 24 hours.

## 2024-12-15 NOTE — ASSESSMENT & PLAN NOTE
BP on the low side; will start IVF; she is not on any BP meds at this time  Asymptomatic  We will give 1 L fluid bolus today  Blood pressure at baseline

## 2024-12-15 NOTE — ASSESSMENT & PLAN NOTE
Due to COPD, lung cancer, and pneumonia; on nebs; s/p therapeutic aspiration with bronchoscopy---results show NSCLC; cultures show yeast and diphtheroids; she completed 7 days of Zosyn; she has opacification of her right lung per CT chest; on high-flow O2   Continue to wean oxygen as able    We will treat yeast from bronchoscopy  Wean oxygen as able  Continue to wean oxygen as able

## 2024-12-15 NOTE — ASSESSMENT & PLAN NOTE
Nutrition consulted. Most recent weight and BMI monitored-     Measurements:  Wt Readings from Last 1 Encounters:   12/15/24 40.9 kg (90 lb 3.2 oz)   Body mass index is 14.56 kg/m².    Patient has been screened and assessed by RD.    Malnutrition Type:  Context: chronic illness  Level: severe    Malnutrition Characteristic Summary:  Energy Intake (Malnutrition): less than 75% for greater than or equal to 1 month  Subcutaneous Fat (Malnutrition): severe depletion  Muscle Mass (Malnutrition): severe depletion    Interventions/Recommendations (treatment strategy):     Encourage oral intake

## 2024-12-15 NOTE — ASSESSMENT & PLAN NOTE
Concern for cachexia as evidenced by BMI less than 20 in the presence of known chronic disease. Contributing factors include underlying Malignancy. Treatment to include nutrition consult.      Body mass index is 14.56 kg/m².  Albumin   Date Value Ref Range Status   12/03/2024 2.5 (L) 3.4 - 4.8 g/dL Final

## 2024-12-15 NOTE — PROGRESS NOTES
[facility] Medicine  Progress Note    Patient Name: Mayra Kelly  MRN: [id number]  Patient Class: IP- Inpatient   Admission Date: 12/9/2024  Length of Stay: 6 days  Attending Physician: Sathya Chambers DO  Primary Care Provider: Darlene Campoverde NP        Subjective     Principal Problem:Acute hypoxemic respiratory failure        HPI:  74 year old female with a PMH of lung cancer presents with shortness of breath.  She was placed on O2 but kept taking it off, so she was sent to the ICU for closer monitoring.  CT chest showed lung nodules/masses.  She had opacification in the right lung with mucous plugging.  She was placed on Zosyn for pneumonia.  Today, she had a bronch done.  Results pending in the computer.  Patient denies chest pain, shortness of breath, nausea, vomiting, or diarrhea.       Overview/Hospital Course:  74 year old female with a PMH of lung cancer presents with shortness of breath and is now in LTAC for deconditioning and high flow O2; she is asleep on rounds  12/12 still on high-flow wean oxygen as able  12/13 saturating well breathing comfortably wean oxygen  12/14 continue to wean oxygen as able next  12/15 wean oxygen    Interval History:  No acute events overnight    Review of Systems  Objective:     Vital Signs (Most Recent):  Temp: 97.9 °F (36.6 °C) (12/15/24 1200)  Pulse: 84 (12/15/24 1200)  Resp: 16 (12/15/24 1200)  BP: (!) 102/59 (12/15/24 1200)  SpO2: 99 % (12/15/24 1200) Vital Signs (24h Range):  Temp:  [97.2 °F (36.2 °C)-97.9 °F (36.6 °C)] 97.9 °F (36.6 °C)  Pulse:  [69-84] 84  Resp:  [12-18] 16  SpO2:  [94 %-99 %] 99 %  BP: ()/(41-59) 102/59     Weight: 40.9 kg (90 lb 3.2 oz)  Body mass index is 14.56 kg/m².    Intake/Output Summary (Last 24 hours) at 12/15/2024 1346  Last data filed at 12/15/2024 1216  Gross per 24 hour   Intake 360 ml   Output --   Net 360 ml           Physical Exam  Constitutional:       Appearance: Normal  appearance. She is normal weight.   HENT:      Head: Normocephalic and atraumatic.      Mouth/Throat:      Mouth: Mucous membranes are moist.   Cardiovascular:      Rate and Rhythm: Normal rate and regular rhythm.      Pulses: Normal pulses.      Heart sounds: Normal heart sounds.   Pulmonary:      Effort: Pulmonary effort is normal.      Breath sounds: Rhonchi and rales present.   Abdominal:      General: Abdomen is flat.   Musculoskeletal:      Cervical back: Normal range of motion.   Skin:     General: Skin is warm.   Neurological:      General: No focal deficit present.      Mental Status: She is alert and oriented to person, place, and time. Mental status is at baseline.      Comments: Oriented x3 for me this morning             Significant Labs: All pertinent labs within the past 24 hours have been reviewed.    Significant Imaging: I have reviewed all pertinent imaging results/findings within the past 24 hours.    Assessment and Plan     * Acute hypoxemic respiratory failure  Due to COPD, lung cancer, and pneumonia; on nebs; s/p therapeutic aspiration with bronchoscopy---results show NSCLC; cultures show yeast and diphtheroids; she completed 7 days of Zosyn; she has opacification of her right lung per CT chest; on high-flow O2   Continue to wean oxygen as able    We will treat yeast from bronchoscopy  Wean oxygen as able  Continue to wean oxygen as able    Cachexia  Concern for cachexia as evidenced by BMI less than 20 in the presence of known chronic disease. Contributing factors include underlying Malignancy. Treatment to include nutrition consult.      Body mass index is 14.56 kg/m².  Albumin   Date Value Ref Range Status   12/03/2024 2.5 (L) 3.4 - 4.8 g/dL Final         Delirium  Hallucinations.  Start Seroquel      Hypotension  BP on the low side; will start IVF; she is not on any BP meds at this time  Asymptomatic  We will give 1 L fluid bolus today  Blood pressure at baseline    Lung cancer  Cytology  from bronch shows NSCLC; will leave to Oncology as outpt      Paroxysmal atrial fibrillation  On Eliquis  Rate controlled  Rate controlled    Severe protein-calorie malnutrition  Nutrition consulted. Most recent weight and BMI monitored-     Measurements:  Wt Readings from Last 1 Encounters:   12/15/24 40.9 kg (90 lb 3.2 oz)   Body mass index is 14.56 kg/m².    Patient has been screened and assessed by RD.    Malnutrition Type:  Context: chronic illness  Level: severe    Malnutrition Characteristic Summary:  Energy Intake (Malnutrition): less than 75% for greater than or equal to 1 month  Subcutaneous Fat (Malnutrition): severe depletion  Muscle Mass (Malnutrition): severe depletion    Interventions/Recommendations (treatment strategy):     Encourage oral intake      VTE Risk Mitigation (From admission, onward)           Ordered     apixaban tablet 5 mg  2 times daily         12/09/24 1920                    Discharge Planning   TERE: 12/17/2024     Code Status: Full Code   Medical Readiness for Discharge Date:   Discharge Plan A: Return to nursing home            36 minutes spent on face to face patient interaction, discussion with family, ancillary staff, and/or other physicians as well as review of pertinent labs, images, and notes.               Sathya Chambers DO  Department of Hospital Medicine   [facility]

## 2024-12-16 PROCEDURE — 99232 SBSQ HOSP IP/OBS MODERATE 35: CPT | Mod: ,,, | Performed by: STUDENT IN AN ORGANIZED HEALTH CARE EDUCATION/TRAINING PROGRAM

## 2024-12-16 PROCEDURE — 25000003 PHARM REV CODE 250: Performed by: HOSPITALIST

## 2024-12-16 PROCEDURE — 99900031 HC PATIENT EDUCATION (STAT)

## 2024-12-16 PROCEDURE — 94640 AIRWAY INHALATION TREATMENT: CPT

## 2024-12-16 PROCEDURE — 27000221 HC OXYGEN, UP TO 24 HOURS

## 2024-12-16 PROCEDURE — 11000008

## 2024-12-16 PROCEDURE — 99900035 HC TECH TIME PER 15 MIN (STAT)

## 2024-12-16 PROCEDURE — 63700000 PHARM REV CODE 250 ALT 637 W/O HCPCS: Performed by: STUDENT IN AN ORGANIZED HEALTH CARE EDUCATION/TRAINING PROGRAM

## 2024-12-16 PROCEDURE — 25000242 PHARM REV CODE 250 ALT 637 W/ HCPCS: Performed by: HOSPITALIST

## 2024-12-16 PROCEDURE — 25000003 PHARM REV CODE 250: Performed by: STUDENT IN AN ORGANIZED HEALTH CARE EDUCATION/TRAINING PROGRAM

## 2024-12-16 RX ADMIN — BUPROPION HYDROCHLORIDE 100 MG: 100 TABLET, FILM COATED, EXTENDED RELEASE ORAL at 08:12

## 2024-12-16 RX ADMIN — BUDESONIDE 0.5 MG: 0.5 INHALANT ORAL at 07:12

## 2024-12-16 RX ADMIN — APIXABAN 5 MG: 5 TABLET, FILM COATED ORAL at 09:12

## 2024-12-16 RX ADMIN — FLUCONAZOLE 200 MG: 100 TABLET ORAL at 09:12

## 2024-12-16 RX ADMIN — QUETIAPINE 50 MG: 50 TABLET, FILM COATED, EXTENDED RELEASE ORAL at 09:12

## 2024-12-16 RX ADMIN — IPRATROPIUM BROMIDE AND ALBUTEROL SULFATE 3 ML: .5; 3 SOLUTION RESPIRATORY (INHALATION) at 09:12

## 2024-12-16 RX ADMIN — BUDESONIDE 0.5 MG: 0.5 INHALANT ORAL at 09:12

## 2024-12-16 RX ADMIN — APIXABAN 5 MG: 5 TABLET, FILM COATED ORAL at 08:12

## 2024-12-16 RX ADMIN — TRAZODONE HYDROCHLORIDE 50 MG: 50 TABLET ORAL at 08:12

## 2024-12-16 RX ADMIN — THERA TABS 1 TABLET: TAB at 09:12

## 2024-12-16 RX ADMIN — BUPROPION HYDROCHLORIDE 100 MG: 100 TABLET, FILM COATED, EXTENDED RELEASE ORAL at 09:12

## 2024-12-16 NOTE — SUBJECTIVE & OBJECTIVE
Interval History:  No acute events overnight    Review of Systems  Objective:     Vital Signs (Most Recent):  Temp: 97.5 °F (36.4 °C) (12/16/24 0745)  Pulse: 74 (12/16/24 0950)  Resp: 16 (12/16/24 0950)  BP: (!) 104/56 (12/16/24 0745)  SpO2: 95 % (12/16/24 0950) Vital Signs (24h Range):  Temp:  [97.5 °F (36.4 °C)-98.1 °F (36.7 °C)] 97.5 °F (36.4 °C)  Pulse:  [] 74  Resp:  [13-23] 16  SpO2:  [82 %-100 %] 95 %  BP: ()/(45-59) 104/56     Weight: 33.8 kg (74 lb 9.6 oz)  Body mass index is 12.04 kg/m².    Intake/Output Summary (Last 24 hours) at 12/16/2024 1038  Last data filed at 12/16/2024 0623  Gross per 24 hour   Intake 720 ml   Output --   Net 720 ml           Physical Exam  Constitutional:       Appearance: Normal appearance. She is normal weight.   HENT:      Head: Normocephalic and atraumatic.      Mouth/Throat:      Mouth: Mucous membranes are moist.   Cardiovascular:      Rate and Rhythm: Normal rate and regular rhythm.      Pulses: Normal pulses.      Heart sounds: Normal heart sounds.   Pulmonary:      Effort: Pulmonary effort is normal.      Breath sounds: Rhonchi and rales present.   Abdominal:      General: Abdomen is flat.   Musculoskeletal:      Cervical back: Normal range of motion.   Skin:     General: Skin is warm.   Neurological:      General: No focal deficit present.      Mental Status: She is alert and oriented to person, place, and time. Mental status is at baseline.      Comments: Oriented x3 for me this morning             Significant Labs: All pertinent labs within the past 24 hours have been reviewed.    Significant Imaging: I have reviewed all pertinent imaging results/findings within the past 24 hours.

## 2024-12-16 NOTE — PROGRESS NOTES
[facility] Medicine  Progress Note    Patient Name: Mayra Kelly  MRN: [id number]  Patient Class: IP- Inpatient   Admission Date: 12/9/2024  Length of Stay: 7 days  Attending Physician: Sathya Chambers DO  Primary Care Provider: Darlene Campoverde NP        Subjective     Principal Problem:Acute hypoxemic respiratory failure        HPI:  74 year old female with a PMH of lung cancer presents with shortness of breath.  She was placed on O2 but kept taking it off, so she was sent to the ICU for closer monitoring.  CT chest showed lung nodules/masses.  She had opacification in the right lung with mucous plugging.  She was placed on Zosyn for pneumonia.  Today, she had a bronch done.  Results pending in the computer.  Patient denies chest pain, shortness of breath, nausea, vomiting, or diarrhea.       Overview/Hospital Course:  74 year old female with a PMH of lung cancer presents with shortness of breath and is now in LTAC for deconditioning and high flow O2; she is asleep on rounds  12/12 still on high-flow wean oxygen as able  12/13 saturating well breathing comfortably wean oxygen  12/14 continue to wean oxygen as able next  12/15 wean oxygen  12/16 doing well today wean oxygen    Interval History:  No acute events overnight    Review of Systems  Objective:     Vital Signs (Most Recent):  Temp: 97.5 °F (36.4 °C) (12/16/24 0745)  Pulse: 74 (12/16/24 0950)  Resp: 16 (12/16/24 0950)  BP: (!) 104/56 (12/16/24 0745)  SpO2: 95 % (12/16/24 0950) Vital Signs (24h Range):  Temp:  [97.5 °F (36.4 °C)-98.1 °F (36.7 °C)] 97.5 °F (36.4 °C)  Pulse:  [] 74  Resp:  [13-23] 16  SpO2:  [82 %-100 %] 95 %  BP: ()/(45-59) 104/56     Weight: 33.8 kg (74 lb 9.6 oz)  Body mass index is 12.04 kg/m².    Intake/Output Summary (Last 24 hours) at 12/16/2024 1038  Last data filed at 12/16/2024 0623  Gross per 24 hour   Intake 720 ml   Output --   Net 720 ml           Physical  Exam  Constitutional:       Appearance: Normal appearance. She is normal weight.   HENT:      Head: Normocephalic and atraumatic.      Mouth/Throat:      Mouth: Mucous membranes are moist.   Cardiovascular:      Rate and Rhythm: Normal rate and regular rhythm.      Pulses: Normal pulses.      Heart sounds: Normal heart sounds.   Pulmonary:      Effort: Pulmonary effort is normal.      Breath sounds: Rhonchi and rales present.   Abdominal:      General: Abdomen is flat.   Musculoskeletal:      Cervical back: Normal range of motion.   Skin:     General: Skin is warm.   Neurological:      General: No focal deficit present.      Mental Status: She is alert and oriented to person, place, and time. Mental status is at baseline.      Comments: Oriented x3 for me this morning             Significant Labs: All pertinent labs within the past 24 hours have been reviewed.    Significant Imaging: I have reviewed all pertinent imaging results/findings within the past 24 hours.    Assessment and Plan     * Acute hypoxemic respiratory failure  Due to COPD, lung cancer, and pneumonia; on nebs; s/p therapeutic aspiration with bronchoscopy---results show NSCLC; cultures show yeast and diphtheroids; she completed 7 days of Zosyn; she has opacification of her right lung per CT chest; on high-flow O2   Continue to wean oxygen as able    We will treat yeast from bronchoscopy  Wean oxygen as able  Continue to wean oxygen as able  Continue to wean oxygen    Cachexia  Concern for cachexia as evidenced by BMI less than 20 in the presence of known chronic disease. Contributing factors include underlying Malignancy. Treatment to include nutrition consult.      Body mass index is 12.04 kg/m².  Albumin   Date Value Ref Range Status   12/03/2024 2.5 (L) 3.4 - 4.8 g/dL Final         Delirium  Hallucinations.  Start Seroquel  Better    Hypotension  BP on the low side; will start IVF; she is not on any BP meds at this time  Asymptomatic  We will give  1 L fluid bolus today  Blood pressure at baseline    Lung cancer  Cytology from bronch shows NSCLC; will leave to Oncology as outpt      Paroxysmal atrial fibrillation  On Eliquis  Rate controlled  Rate controlled    Severe protein-calorie malnutrition  Nutrition consulted. Most recent weight and BMI monitored-     Measurements:  Wt Readings from Last 1 Encounters:   12/16/24 33.8 kg (74 lb 9.6 oz)   Body mass index is 12.04 kg/m².    Patient has been screened and assessed by RD.    Malnutrition Type:  Context: chronic illness  Level: severe    Malnutrition Characteristic Summary:  Energy Intake (Malnutrition): less than 75% for greater than or equal to 1 month  Subcutaneous Fat (Malnutrition): severe depletion  Muscle Mass (Malnutrition): severe depletion    Interventions/Recommendations (treatment strategy):     Encourage oral intake      VTE Risk Mitigation (From admission, onward)           Ordered     apixaban tablet 5 mg  2 times daily         12/09/24 1920                    Discharge Planning   TERE: 12/17/2024     Code Status: Full Code   Medical Readiness for Discharge Date:   Discharge Plan A: Return to nursing home          36 minutes spent on face to face patient interaction, discussion with family, ancillary staff, and/or other physicians as well as review of pertinent labs, images, and notes.               Sathya Chambers DO  Department of Hospital Medicine   [facility]

## 2024-12-16 NOTE — ASSESSMENT & PLAN NOTE
Due to COPD, lung cancer, and pneumonia; on nebs; s/p therapeutic aspiration with bronchoscopy---results show NSCLC; cultures show yeast and diphtheroids; she completed 7 days of Zosyn; she has opacification of her right lung per CT chest; on high-flow O2   Continue to wean oxygen as able    We will treat yeast from bronchoscopy  Wean oxygen as able  Continue to wean oxygen as able  Continue to wean oxygen

## 2024-12-16 NOTE — PLAN OF CARE
12/16/24 1241   Rounds   Attendance Provider;Physical therapist;Nurse    Discharge Plan A Return to nursing home   Why the patient remains in the hospital Requires continued medical care   Transition of Care Barriers None     Cont poc, pt remains on high flow nc, continue to wean oxygen, following

## 2024-12-16 NOTE — ASSESSMENT & PLAN NOTE
Concern for cachexia as evidenced by BMI less than 20 in the presence of known chronic disease. Contributing factors include underlying Malignancy. Treatment to include nutrition consult.      Body mass index is 12.04 kg/m².  Albumin   Date Value Ref Range Status   12/03/2024 2.5 (L) 3.4 - 4.8 g/dL Final

## 2024-12-16 NOTE — ASSESSMENT & PLAN NOTE
Nutrition consulted. Most recent weight and BMI monitored-     Measurements:  Wt Readings from Last 1 Encounters:   12/16/24 33.8 kg (74 lb 9.6 oz)   Body mass index is 12.04 kg/m².    Patient has been screened and assessed by RD.    Malnutrition Type:  Context: chronic illness  Level: severe    Malnutrition Characteristic Summary:  Energy Intake (Malnutrition): less than 75% for greater than or equal to 1 month  Subcutaneous Fat (Malnutrition): severe depletion  Muscle Mass (Malnutrition): severe depletion    Interventions/Recommendations (treatment strategy):     Encourage oral intake

## 2024-12-16 NOTE — PHYSICIAN QUERY
Please provide the diagnosis or diagnoses associated with the clinical findings:  Other (please specify): acute hypoxic respiratory failure

## 2024-12-17 PROBLEM — K59.00 CONSTIPATION: Status: ACTIVE | Noted: 2024-12-17

## 2024-12-17 LAB
ALBUMIN SERPL BCP-MCNC: 2.1 G/DL (ref 3.4–4.8)
ALP SERPL-CCNC: 103 U/L (ref 40–150)
ALT SERPL W P-5'-P-CCNC: 14 U/L
AST SERPL W P-5'-P-CCNC: 31 U/L (ref 5–34)
BILIRUB DIRECT SERPL-MCNC: 0.1 MG/DL
BILIRUB SERPL-MCNC: 0.2 MG/DL
PROT SERPL-MCNC: 7.7 G/DL (ref 5.8–7.6)

## 2024-12-17 PROCEDURE — 99900035 HC TECH TIME PER 15 MIN (STAT)

## 2024-12-17 PROCEDURE — 93010 ELECTROCARDIOGRAM REPORT: CPT | Mod: ,,, | Performed by: STUDENT IN AN ORGANIZED HEALTH CARE EDUCATION/TRAINING PROGRAM

## 2024-12-17 PROCEDURE — 63700000 PHARM REV CODE 250 ALT 637 W/O HCPCS: Performed by: STUDENT IN AN ORGANIZED HEALTH CARE EDUCATION/TRAINING PROGRAM

## 2024-12-17 PROCEDURE — 27000221 HC OXYGEN, UP TO 24 HOURS

## 2024-12-17 PROCEDURE — 36415 COLL VENOUS BLD VENIPUNCTURE: CPT | Performed by: STUDENT IN AN ORGANIZED HEALTH CARE EDUCATION/TRAINING PROGRAM

## 2024-12-17 PROCEDURE — 25000003 PHARM REV CODE 250: Performed by: STUDENT IN AN ORGANIZED HEALTH CARE EDUCATION/TRAINING PROGRAM

## 2024-12-17 PROCEDURE — 25000003 PHARM REV CODE 250: Performed by: NURSE PRACTITIONER

## 2024-12-17 PROCEDURE — 94640 AIRWAY INHALATION TREATMENT: CPT

## 2024-12-17 PROCEDURE — 80076 HEPATIC FUNCTION PANEL: CPT | Performed by: STUDENT IN AN ORGANIZED HEALTH CARE EDUCATION/TRAINING PROGRAM

## 2024-12-17 PROCEDURE — 93005 ELECTROCARDIOGRAM TRACING: CPT

## 2024-12-17 PROCEDURE — 11000008

## 2024-12-17 PROCEDURE — 25000242 PHARM REV CODE 250 ALT 637 W/ HCPCS: Performed by: HOSPITALIST

## 2024-12-17 PROCEDURE — 99232 SBSQ HOSP IP/OBS MODERATE 35: CPT | Mod: ,,, | Performed by: STUDENT IN AN ORGANIZED HEALTH CARE EDUCATION/TRAINING PROGRAM

## 2024-12-17 PROCEDURE — 25000003 PHARM REV CODE 250: Performed by: HOSPITALIST

## 2024-12-17 PROCEDURE — 99900031 HC PATIENT EDUCATION (STAT)

## 2024-12-17 RX ORDER — POLYETHYLENE GLYCOL 3350 17 G/17G
34 POWDER, FOR SOLUTION ORAL 2 TIMES DAILY
Status: DISCONTINUED | OUTPATIENT
Start: 2024-12-17 | End: 2024-12-20

## 2024-12-17 RX ORDER — MICONAZOLE NITRATE 2 G/100G
POWDER TOPICAL 2 TIMES DAILY
Status: DISCONTINUED | OUTPATIENT
Start: 2024-12-17 | End: 2024-12-31 | Stop reason: HOSPADM

## 2024-12-17 RX ORDER — SODIUM CHLORIDE 9 MG/ML
INJECTION, SOLUTION INTRAVENOUS CONTINUOUS
Status: DISPENSED | OUTPATIENT
Start: 2024-12-17 | End: 2024-12-18

## 2024-12-17 RX ORDER — METOPROLOL TARTRATE 1 MG/ML
5 INJECTION, SOLUTION INTRAVENOUS EVERY 5 MIN PRN
Status: DISCONTINUED | OUTPATIENT
Start: 2024-12-17 | End: 2024-12-31 | Stop reason: HOSPADM

## 2024-12-17 RX ADMIN — SODIUM CHLORIDE 1000 ML: 9 INJECTION, SOLUTION INTRAVENOUS at 05:12

## 2024-12-17 RX ADMIN — POLYETHYLENE GLYCOL 3350 34 G: 17 POWDER, FOR SOLUTION ORAL at 02:12

## 2024-12-17 RX ADMIN — TRAZODONE HYDROCHLORIDE 50 MG: 50 TABLET ORAL at 08:12

## 2024-12-17 RX ADMIN — SODIUM CHLORIDE: 9 INJECTION, SOLUTION INTRAVENOUS at 06:12

## 2024-12-17 RX ADMIN — APIXABAN 5 MG: 5 TABLET, FILM COATED ORAL at 08:12

## 2024-12-17 RX ADMIN — METOPROLOL TARTRATE 5 MG: 1 INJECTION, SOLUTION INTRAVENOUS at 04:12

## 2024-12-17 RX ADMIN — BUDESONIDE 0.5 MG: 0.5 INHALANT ORAL at 08:12

## 2024-12-17 RX ADMIN — BUPROPION HYDROCHLORIDE 100 MG: 100 TABLET, FILM COATED, EXTENDED RELEASE ORAL at 08:12

## 2024-12-17 RX ADMIN — QUETIAPINE 50 MG: 50 TABLET, FILM COATED, EXTENDED RELEASE ORAL at 09:12

## 2024-12-17 RX ADMIN — FLUCONAZOLE 200 MG: 100 TABLET ORAL at 09:12

## 2024-12-17 RX ADMIN — APIXABAN 5 MG: 5 TABLET, FILM COATED ORAL at 09:12

## 2024-12-17 RX ADMIN — BUPROPION HYDROCHLORIDE 100 MG: 100 TABLET, FILM COATED, EXTENDED RELEASE ORAL at 09:12

## 2024-12-17 RX ADMIN — MICONAZOLE NITRATE ANTIFUNGAL POWDER: 2 POWDER TOPICAL at 08:12

## 2024-12-17 RX ADMIN — THERA TABS 1 TABLET: TAB at 09:12

## 2024-12-17 RX ADMIN — POLYETHYLENE GLYCOL 3350 34 G: 17 POWDER, FOR SOLUTION ORAL at 08:12

## 2024-12-17 NOTE — CARE UPDATE
Notified by nursing staff telemetry monitor was reading V-tach.  Presented to the bedside patient appeared to be a narrow complex irregular tachycardia.  Rate was roughly 180.  Blood pressure was stable at this time.  She was given 5 mg metoprolol IV push.  Heart rate improved to 140 -150s.  However blood pressure dropped.  Patient was lethargic but had a palpable radial pulse.  Blood pressure was reading 60/40s I do not think this was accurate given that she was conscious and radial pulse was palpable.  Amiodarone was discussed however patient converted back to sinus rhythm prior to initiation of amiodarone.  Once patient has converted back to sinus rhythm and blood pressure recovered last 3 to 90s/60s.  Twelve lead EKG confirmed AFib with RVR.  We will give a L IV fluid now in run maintenance fluids overnight as she has been having poor oral intake.  I was on the unit the entirety of this episode.  I was at the bedside majority of the time.

## 2024-12-17 NOTE — PROGRESS NOTES
[facility]  Wound Care    Patient Name:  Mayra Kelly   MRN:  [id number]  Date: 12/17/2024  Diagnosis: Acute hypoxemic respiratory failure    History:     Past Medical History:   Diagnosis Date    Centrilobular emphysema 08/19/2024    follows with Dr. 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 Kraft       Social History     Socioeconomic History    Marital status:    Tobacco Use    Smoking status: Former     Current packs/day: 1.00     Types: Cigarettes    Smokeless tobacco: Never   Substance and Sexual Activity    Alcohol use: Never    Drug use: Never     Social Drivers of Health     Financial Resource Strain: Low Risk  (12/9/2024)    Overall Financial Resource Strain (CARDIA)     Difficulty of Paying Living Expenses: Not hard at all   Food Insecurity: No Food Insecurity (12/9/2024)    Hunger Vital Sign     Worried About Running Out of Food in the Last Year: Never true     Ran Out of Food in the Last Year: Never true   Transportation Needs: No Transportation Needs (12/9/2024)    TRANSPORTATION NEEDS     Transportation : No   Physical Activity: Inactive (8/16/2024)    Exercise Vital Sign     Days of Exercise per Week: 0 days     Minutes of Exercise per Session: 0 min   Stress: No Stress Concern Present (12/9/2024)    [employer] - Occupational Stress Questionnaire     Feeling of Stress : Only a little   Housing Stability: Low Risk  (12/9/2024)    Housing Stability Vital Sign     Unable to  Pay for Housing in the Last Year: No     Homeless in the Last Year: No       Precautions:     Allergies as of 12/09/2024    (No Known Allergies)       WOC Assessment Details/Treatment     Narrative: Skin evaluation    Patient in bed, Alert. Nursing doing perineal care. Having loose stools noted, Has redness and irritation to buttocks and  around rectal area. Recommend miconazole powder . Asked nurse to continue Mepliex foam border to buttocks and use powder to other areas. Cont turn protocol, pillows to offload heels. On low air loss mattress    Wound care team to follow prn        12/17/2024

## 2024-12-17 NOTE — ASSESSMENT & PLAN NOTE
Concern for cachexia as evidenced by BMI less than 20 in the presence of known chronic disease. Contributing factors include underlying Malignancy. Treatment to include nutrition consult.      Body mass index is 12.04 kg/m².  Albumin   Date Value Ref Range Status   12/17/2024 2.1 (L) 3.4 - 4.8 g/dL Final

## 2024-12-17 NOTE — PLAN OF CARE
Problem: Breathing Pattern Ineffective  Goal: Effective Breathing Pattern  Outcome: Progressing  Intervention: Promote Improved Breathing Pattern  Flowsheets (Taken 12/16/2024 2151)  Airway/Ventilation Management:   airway patency maintained   calming measures promoted   humidification applied   pulmonary hygiene promoted  Breathing Techniques/Airway Clearance: deep/controlled cough encouraged  Supportive Measures: active listening utilized  Head of Bed (HOB) Positioning: HOB at 30-45 degrees     Problem: Gas Exchange Impaired  Goal: Optimal Gas Exchange  Outcome: Progressing  Intervention: Optimize Oxygenation and Ventilation  Flowsheets (Taken 12/16/2024 2151)  Airway/Ventilation Management:   airway patency maintained   calming measures promoted   humidification applied   pulmonary hygiene promoted  Head of Bed (HOB) Positioning: HOB at 30-45 degrees

## 2024-12-17 NOTE — PROGRESS NOTES
[facility] Medicine  Progress Note    Patient Name: Mayra Kelly  MRN: [id number]  Patient Class: IP- Inpatient   Admission Date: 12/9/2024  Length of Stay: 8 days  Attending Physician: Sathya Chambers DO  Primary Care Provider: Darlene Campoverde NP        Subjective     Principal Problem:Acute hypoxemic respiratory failure        HPI:  74 year old female with a PMH of lung cancer presents with shortness of breath.  She was placed on O2 but kept taking it off, so she was sent to the ICU for closer monitoring.  CT chest showed lung nodules/masses.  She had opacification in the right lung with mucous plugging.  She was placed on Zosyn for pneumonia.  Today, she had a bronch done.  Results pending in the computer.  Patient denies chest pain, shortness of breath, nausea, vomiting, or diarrhea.       Overview/Hospital Course:  74 year old female with a PMH of lung cancer presents with shortness of breath and is now in LTAC for deconditioning and high flow O2; she is asleep on rounds  12/12 still on high-flow wean oxygen as able  12/13 saturating well breathing comfortably wean oxygen  12/14 continue to wean oxygen as able next  12/15 wean oxygen  12/16 doing well today wean oxygen  12/17 impacted enema today    Interval History:  No acute events overnight    Review of Systems  Objective:     Vital Signs (Most Recent):  Temp: 97.2 °F (36.2 °C) (12/17/24 0500)  Pulse: 89 (12/17/24 0826)  Resp: 11 (12/17/24 0826)  BP: 114/63 (12/17/24 0600)  SpO2: (!) 93 % (12/17/24 0826) Vital Signs (24h Range):  Temp:  [97.2 °F (36.2 °C)-97.8 °F (36.6 °C)] 97.2 °F (36.2 °C)  Pulse:  [65-90] 89  Resp:  [11-22] 11  SpO2:  [89 %-97 %] 93 %  BP: ()/(40-65) 114/63     Weight: 33.8 kg (74 lb 9.6 oz)  Body mass index is 12.04 kg/m².    Intake/Output Summary (Last 24 hours) at 12/17/2024 1026  Last data filed at 12/16/2024 1856  Gross per 24 hour   Intake 540 ml   Output --   Net 540 ml            Physical Exam  Constitutional:       Appearance: Normal appearance. She is normal weight.   HENT:      Head: Normocephalic and atraumatic.      Mouth/Throat:      Mouth: Mucous membranes are moist.   Cardiovascular:      Rate and Rhythm: Normal rate and regular rhythm.      Pulses: Normal pulses.      Heart sounds: Normal heart sounds.   Pulmonary:      Effort: Pulmonary effort is normal.      Breath sounds: Rhonchi and rales present.   Abdominal:      General: Abdomen is flat.   Musculoskeletal:      Cervical back: Normal range of motion.   Skin:     General: Skin is warm.   Neurological:      General: No focal deficit present.      Mental Status: She is alert and oriented to person, place, and time. Mental status is at baseline.      Comments: Oriented x3 for me this morning             Significant Labs: All pertinent labs within the past 24 hours have been reviewed.    Significant Imaging: I have reviewed all pertinent imaging results/findings within the past 24 hours.    Assessment and Plan     * Acute hypoxemic respiratory failure  Due to COPD, lung cancer, and pneumonia; on nebs; s/p therapeutic aspiration with bronchoscopy---results show NSCLC; cultures show yeast and diphtheroids; she completed 7 days of Zosyn; she has opacification of her right lung per CT chest; on high-flow O2   Continue to wean oxygen as able    We will treat yeast from bronchoscopy  Wean oxygen as able  Continue to wean oxygen as able  Continue to wean oxygen  Wean oxygen    Constipation    Enema today    Cachexia  Concern for cachexia as evidenced by BMI less than 20 in the presence of known chronic disease. Contributing factors include underlying Malignancy. Treatment to include nutrition consult.      Body mass index is 12.04 kg/m².  Albumin   Date Value Ref Range Status   12/17/2024 2.1 (L) 3.4 - 4.8 g/dL Final         Delirium  Hallucinations.  Start Seroquel  Better    Hypotension  BP on the low side; will start IVF; she is not  on any BP meds at this time  Asymptomatic  We will give 1 L fluid bolus today  Blood pressure at baseline    Lung cancer  Cytology from bronch shows NSCLC; will leave to Oncology as outpt      Paroxysmal atrial fibrillation  On Eliquis  Rate controlled  Rate controlled    Severe protein-calorie malnutrition  Nutrition consulted. Most recent weight and BMI monitored-     Measurements:  Wt Readings from Last 1 Encounters:   12/16/24 33.8 kg (74 lb 9.6 oz)   Body mass index is 12.04 kg/m².    Patient has been screened and assessed by RD.    Malnutrition Type:  Context: chronic illness  Level: severe    Malnutrition Characteristic Summary:  Energy Intake (Malnutrition): less than 75% for greater than or equal to 1 month  Subcutaneous Fat (Malnutrition): severe depletion  Muscle Mass (Malnutrition): severe depletion    Interventions/Recommendations (treatment strategy):     Encourage oral intake      VTE Risk Mitigation (From admission, onward)           Ordered     apixaban tablet 5 mg  2 times daily         12/09/24 1920                    Discharge Planning   TERE: 12/17/2024     Code Status: Full Code   Medical Readiness for Discharge Date:   Discharge Plan A: Return to nursing home            Enema today          Sathya Chambers DO  Department of Hospital Medicine   [facility]

## 2024-12-17 NOTE — ASSESSMENT & PLAN NOTE
Due to COPD, lung cancer, and pneumonia; on nebs; s/p therapeutic aspiration with bronchoscopy---results show NSCLC; cultures show yeast and diphtheroids; she completed 7 days of Zosyn; she has opacification of her right lung per CT chest; on high-flow O2   Continue to wean oxygen as able    We will treat yeast from bronchoscopy  Wean oxygen as able  Continue to wean oxygen as able  Continue to wean oxygen  Wean oxygen

## 2024-12-17 NOTE — SUBJECTIVE & OBJECTIVE
Interval History:  No acute events overnight    Review of Systems  Objective:     Vital Signs (Most Recent):  Temp: 97.2 °F (36.2 °C) (12/17/24 0500)  Pulse: 89 (12/17/24 0826)  Resp: 11 (12/17/24 0826)  BP: 114/63 (12/17/24 0600)  SpO2: (!) 93 % (12/17/24 0826) Vital Signs (24h Range):  Temp:  [97.2 °F (36.2 °C)-97.8 °F (36.6 °C)] 97.2 °F (36.2 °C)  Pulse:  [65-90] 89  Resp:  [11-22] 11  SpO2:  [89 %-97 %] 93 %  BP: ()/(40-65) 114/63     Weight: 33.8 kg (74 lb 9.6 oz)  Body mass index is 12.04 kg/m².    Intake/Output Summary (Last 24 hours) at 12/17/2024 1026  Last data filed at 12/16/2024 1856  Gross per 24 hour   Intake 540 ml   Output --   Net 540 ml           Physical Exam  Constitutional:       Appearance: Normal appearance. She is normal weight.   HENT:      Head: Normocephalic and atraumatic.      Mouth/Throat:      Mouth: Mucous membranes are moist.   Cardiovascular:      Rate and Rhythm: Normal rate and regular rhythm.      Pulses: Normal pulses.      Heart sounds: Normal heart sounds.   Pulmonary:      Effort: Pulmonary effort is normal.      Breath sounds: Rhonchi and rales present.   Abdominal:      General: Abdomen is flat.   Musculoskeletal:      Cervical back: Normal range of motion.   Skin:     General: Skin is warm.   Neurological:      General: No focal deficit present.      Mental Status: She is alert and oriented to person, place, and time. Mental status is at baseline.      Comments: Oriented x3 for me this morning             Significant Labs: All pertinent labs within the past 24 hours have been reviewed.    Significant Imaging: I have reviewed all pertinent imaging results/findings within the past 24 hours.

## 2024-12-18 LAB
ANION GAP SERPL CALCULATED.3IONS-SCNC: 12 MMOL/L (ref 7–16)
BASOPHILS # BLD AUTO: 0.05 K/UL (ref 0–0.2)
BASOPHILS NFR BLD AUTO: 0.6 % (ref 0–1)
BUN SERPL-MCNC: 15 MG/DL (ref 10–20)
BUN/CREAT SERPL: 30 (ref 6–20)
CALCIUM SERPL-MCNC: 8.3 MG/DL (ref 8.4–10.2)
CHLORIDE SERPL-SCNC: 106 MMOL/L (ref 98–107)
CO2 SERPL-SCNC: 30 MMOL/L (ref 23–31)
CREAT SERPL-MCNC: 0.5 MG/DL (ref 0.55–1.02)
DIFFERENTIAL METHOD BLD: ABNORMAL
EGFR (NO RACE VARIABLE) (RUSH/TITUS): 99 ML/MIN/1.73M2
EOSINOPHIL # BLD AUTO: 0.22 K/UL (ref 0–0.5)
EOSINOPHIL NFR BLD AUTO: 2.5 % (ref 1–4)
ERYTHROCYTE [DISTWIDTH] IN BLOOD BY AUTOMATED COUNT: 16.5 % (ref 11.5–14.5)
GLUCOSE SERPL-MCNC: 76 MG/DL (ref 82–115)
HCT VFR BLD AUTO: 30.5 % (ref 38–47)
HGB BLD-MCNC: 8.8 G/DL (ref 12–16)
IMM GRANULOCYTES # BLD AUTO: 0.03 K/UL (ref 0–0.04)
IMM GRANULOCYTES NFR BLD: 0.3 % (ref 0–0.4)
LYMPHOCYTES # BLD AUTO: 1.44 K/UL (ref 1–4.8)
LYMPHOCYTES NFR BLD AUTO: 16.4 % (ref 27–41)
MCH RBC QN AUTO: 24.5 PG (ref 27–31)
MCHC RBC AUTO-ENTMCNC: 28.9 G/DL (ref 32–36)
MCV RBC AUTO: 85 FL (ref 80–96)
MONOCYTES # BLD AUTO: 0.57 K/UL (ref 0–0.8)
MONOCYTES NFR BLD AUTO: 6.5 % (ref 2–6)
MPC BLD CALC-MCNC: 10 FL (ref 9.4–12.4)
NEUTROPHILS # BLD AUTO: 6.48 K/UL (ref 1.8–7.7)
NEUTROPHILS NFR BLD AUTO: 73.7 % (ref 53–65)
NRBC # BLD AUTO: 0 X10E3/UL
NRBC, AUTO (.00): 0 %
PLATELET # BLD AUTO: 483 K/UL (ref 150–400)
POTASSIUM SERPL-SCNC: 3.1 MMOL/L (ref 3.5–5.1)
RBC # BLD AUTO: 3.59 M/UL (ref 4.2–5.4)
SODIUM SERPL-SCNC: 145 MMOL/L (ref 136–145)
WBC # BLD AUTO: 8.79 K/UL (ref 4.5–11)

## 2024-12-18 PROCEDURE — 99900035 HC TECH TIME PER 15 MIN (STAT)

## 2024-12-18 PROCEDURE — 25000003 PHARM REV CODE 250: Performed by: STUDENT IN AN ORGANIZED HEALTH CARE EDUCATION/TRAINING PROGRAM

## 2024-12-18 PROCEDURE — 94761 N-INVAS EAR/PLS OXIMETRY MLT: CPT

## 2024-12-18 PROCEDURE — 25000003 PHARM REV CODE 250: Performed by: NURSE PRACTITIONER

## 2024-12-18 PROCEDURE — 25000242 PHARM REV CODE 250 ALT 637 W/ HCPCS: Performed by: HOSPITALIST

## 2024-12-18 PROCEDURE — 11000008

## 2024-12-18 PROCEDURE — 63700000 PHARM REV CODE 250 ALT 637 W/O HCPCS: Performed by: STUDENT IN AN ORGANIZED HEALTH CARE EDUCATION/TRAINING PROGRAM

## 2024-12-18 PROCEDURE — 27000221 HC OXYGEN, UP TO 24 HOURS

## 2024-12-18 PROCEDURE — 25000003 PHARM REV CODE 250: Performed by: HOSPITALIST

## 2024-12-18 PROCEDURE — 99232 SBSQ HOSP IP/OBS MODERATE 35: CPT | Mod: ,,, | Performed by: STUDENT IN AN ORGANIZED HEALTH CARE EDUCATION/TRAINING PROGRAM

## 2024-12-18 PROCEDURE — 94640 AIRWAY INHALATION TREATMENT: CPT

## 2024-12-18 PROCEDURE — 85025 COMPLETE CBC W/AUTO DIFF WBC: CPT | Performed by: HOSPITALIST

## 2024-12-18 PROCEDURE — 80048 BASIC METABOLIC PNL TOTAL CA: CPT | Performed by: HOSPITALIST

## 2024-12-18 PROCEDURE — 36415 COLL VENOUS BLD VENIPUNCTURE: CPT | Performed by: HOSPITALIST

## 2024-12-18 RX ORDER — POTASSIUM CHLORIDE 20 MEQ/1
40 TABLET, EXTENDED RELEASE ORAL EVERY 4 HOURS
Status: COMPLETED | OUTPATIENT
Start: 2024-12-18 | End: 2024-12-18

## 2024-12-18 RX ADMIN — THERA TABS 1 TABLET: TAB at 09:12

## 2024-12-18 RX ADMIN — BUDESONIDE 0.5 MG: 0.5 INHALANT ORAL at 09:12

## 2024-12-18 RX ADMIN — MICONAZOLE NITRATE ANTIFUNGAL POWDER: 2 POWDER TOPICAL at 10:12

## 2024-12-18 RX ADMIN — BUPROPION HYDROCHLORIDE 100 MG: 100 TABLET, FILM COATED, EXTENDED RELEASE ORAL at 09:12

## 2024-12-18 RX ADMIN — BUPROPION HYDROCHLORIDE 100 MG: 100 TABLET, FILM COATED, EXTENDED RELEASE ORAL at 08:12

## 2024-12-18 RX ADMIN — APIXABAN 5 MG: 5 TABLET, FILM COATED ORAL at 08:12

## 2024-12-18 RX ADMIN — APIXABAN 5 MG: 5 TABLET, FILM COATED ORAL at 09:12

## 2024-12-18 RX ADMIN — POTASSIUM CHLORIDE 40 MEQ: 1500 TABLET, EXTENDED RELEASE ORAL at 02:12

## 2024-12-18 RX ADMIN — POLYETHYLENE GLYCOL 3350 34 G: 17 POWDER, FOR SOLUTION ORAL at 08:12

## 2024-12-18 RX ADMIN — FLUCONAZOLE 200 MG: 100 TABLET ORAL at 09:12

## 2024-12-18 RX ADMIN — POTASSIUM CHLORIDE 40 MEQ: 1500 TABLET, EXTENDED RELEASE ORAL at 09:12

## 2024-12-18 RX ADMIN — POLYETHYLENE GLYCOL 3350 34 G: 17 POWDER, FOR SOLUTION ORAL at 09:12

## 2024-12-18 RX ADMIN — TRAZODONE HYDROCHLORIDE 50 MG: 50 TABLET ORAL at 08:12

## 2024-12-18 RX ADMIN — MICONAZOLE NITRATE ANTIFUNGAL POWDER: 2 POWDER TOPICAL at 08:12

## 2024-12-18 RX ADMIN — BUDESONIDE 0.5 MG: 0.5 INHALANT ORAL at 07:12

## 2024-12-18 RX ADMIN — QUETIAPINE 50 MG: 50 TABLET, FILM COATED, EXTENDED RELEASE ORAL at 09:12

## 2024-12-18 NOTE — ASSESSMENT & PLAN NOTE
Nutrition consulted. Most recent weight and BMI monitored-     Measurements:  Wt Readings from Last 1 Encounters:   12/18/24 36.8 kg (81 lb 2.1 oz)   Body mass index is 13.09 kg/m².    Patient has been screened and assessed by RD.    Malnutrition Type:  Context: chronic illness  Level: severe    Malnutrition Characteristic Summary:  Energy Intake (Malnutrition): less than 75% for greater than or equal to 1 month  Subcutaneous Fat (Malnutrition): severe depletion  Muscle Mass (Malnutrition): severe depletion    Interventions/Recommendations (treatment strategy):     Encourage oral intake

## 2024-12-18 NOTE — PLAN OF CARE
Problem: Infection  Goal: Absence of Infection Signs and Symptoms  Outcome: Progressing  Intervention: Prevent or Manage Infection  Flowsheets (Taken 12/17/2024 2212)  Infection Management: aseptic technique maintained     Problem: Skin Injury Risk Increased  Goal: Skin Health and Integrity  Outcome: Progressing  Intervention: Optimize Skin Protection  Flowsheets (Taken 12/17/2024 2210)  Pressure Reduction Techniques: frequent weight shift encouraged  Pressure Reduction Devices: foam padding utilized  Skin Protection:   incontinence pads utilized   pectin skin barriers applied  Activity Management: Arm raise - L1  Head of Bed (HOB) Positioning: HOB at 30-45 degrees   CLABSI BUNDLE IN USE

## 2024-12-18 NOTE — PLAN OF CARE
Problem: Adult Inpatient Plan of Care  Goal: Plan of Care Review  Outcome: Progressing  Goal: Patient-Specific Goal (Individualized)  Outcome: Progressing  Goal: Absence of Hospital-Acquired Illness or Injury  Outcome: Progressing  Goal: Optimal Comfort and Wellbeing  Outcome: Progressing     Problem: Skin Injury Risk Increased  Goal: Skin Health and Integrity  Outcome: Progressing     Problem: Breathing Pattern Ineffective  Goal: Effective Breathing Pattern  Outcome: Progressing     Problem: Gas Exchange Impaired  Goal: Optimal Gas Exchange  Outcome: Progressing     Problem: Airway Clearance Ineffective  Goal: Effective Airway Clearance  Outcome: Progressing     Problem: Fall Injury Risk  Goal: Absence of Fall and Fall-Related Injury  Outcome: Progressing

## 2024-12-18 NOTE — PLAN OF CARE
Problem: Gas Exchange Impaired  Goal: Optimal Gas Exchange  Outcome: Progressing     Problem: Airway Clearance Ineffective  Goal: Effective Airway Clearance  Outcome: Progressing     Problem: Airway Clearance Ineffective  Goal: Effective Airway Clearance  Outcome: Progressing

## 2024-12-18 NOTE — SUBJECTIVE & OBJECTIVE
Interval History:  No acute events overnight    Review of Systems  Objective:     Vital Signs (Most Recent):  Temp: 98.3 °F (36.8 °C) (12/18/24 0400)  Pulse: 68 (12/18/24 0902)  Resp: 14 (12/18/24 0902)  BP: (!) 119/52 (12/18/24 0400)  SpO2: (!) 92 % (12/18/24 0902) Vital Signs (24h Range):  Temp:  [97.5 °F (36.4 °C)-98.7 °F (37.1 °C)] 98.3 °F (36.8 °C)  Pulse:  [55-69] 68  Resp:  [12-18] 14  SpO2:  [89 %-94 %] 92 %  BP: (105-119)/(52-57) 119/52     Weight: 36.8 kg (81 lb 2.1 oz)  Body mass index is 13.09 kg/m².    Intake/Output Summary (Last 24 hours) at 12/18/2024 1346  Last data filed at 12/18/2024 0745  Gross per 24 hour   Intake 2416.19 ml   Output --   Net 2416.19 ml           Physical Exam  Constitutional:       Appearance: Normal appearance. She is normal weight.   HENT:      Head: Normocephalic and atraumatic.      Mouth/Throat:      Mouth: Mucous membranes are moist.   Cardiovascular:      Rate and Rhythm: Normal rate and regular rhythm.      Pulses: Normal pulses.      Heart sounds: Normal heart sounds.   Pulmonary:      Effort: Pulmonary effort is normal.      Breath sounds: Rhonchi and rales present.   Abdominal:      General: Abdomen is flat.   Musculoskeletal:      Cervical back: Normal range of motion.   Skin:     General: Skin is warm.   Neurological:      General: No focal deficit present.      Mental Status: She is alert and oriented to person, place, and time. Mental status is at baseline.      Comments: Oriented x3 for me this morning             Significant Labs: All pertinent labs within the past 24 hours have been reviewed.    Significant Imaging: I have reviewed all pertinent imaging results/findings within the past 24 hours.

## 2024-12-18 NOTE — PROGRESS NOTES
[facility] Medicine  Progress Note    Patient Name: Mayra Kelly  MRN: [id number]  Patient Class: IP- Inpatient   Admission Date: 12/9/2024  Length of Stay: 9 days  Attending Physician: Sathya Chambers DO  Primary Care Provider: Darlene Campoverde NP        Subjective     Principal Problem:Acute hypoxemic respiratory failure        HPI:  74 year old female with a PMH of lung cancer presents with shortness of breath.  She was placed on O2 but kept taking it off, so she was sent to the ICU for closer monitoring.  CT chest showed lung nodules/masses.  She had opacification in the right lung with mucous plugging.  She was placed on Zosyn for pneumonia.  Today, she had a bronch done.  Results pending in the computer.  Patient denies chest pain, shortness of breath, nausea, vomiting, or diarrhea.       Overview/Hospital Course:  74 year old female with a PMH of lung cancer presents with shortness of breath and is now in LTAC for deconditioning and high flow O2; she is asleep on rounds  12/12 still on high-flow wean oxygen as able  12/13 saturating well breathing comfortably wean oxygen  12/14 continue to wean oxygen as able next  12/15 wean oxygen  12/16 doing well today wean oxygen  12/17 impacted enema today  12/18 rate controlled today    Interval History:  No acute events overnight    Review of Systems  Objective:     Vital Signs (Most Recent):  Temp: 98.3 °F (36.8 °C) (12/18/24 0400)  Pulse: 68 (12/18/24 0902)  Resp: 14 (12/18/24 0902)  BP: (!) 119/52 (12/18/24 0400)  SpO2: (!) 92 % (12/18/24 0902) Vital Signs (24h Range):  Temp:  [97.5 °F (36.4 °C)-98.7 °F (37.1 °C)] 98.3 °F (36.8 °C)  Pulse:  [55-69] 68  Resp:  [12-18] 14  SpO2:  [89 %-94 %] 92 %  BP: (105-119)/(52-57) 119/52     Weight: 36.8 kg (81 lb 2.1 oz)  Body mass index is 13.09 kg/m².    Intake/Output Summary (Last 24 hours) at 12/18/2024 1346  Last data filed at 12/18/2024 0745  Gross per 24 hour   Intake  2416.19 ml   Output --   Net 2416.19 ml           Physical Exam  Constitutional:       Appearance: Normal appearance. She is normal weight.   HENT:      Head: Normocephalic and atraumatic.      Mouth/Throat:      Mouth: Mucous membranes are moist.   Cardiovascular:      Rate and Rhythm: Normal rate and regular rhythm.      Pulses: Normal pulses.      Heart sounds: Normal heart sounds.   Pulmonary:      Effort: Pulmonary effort is normal.      Breath sounds: Rhonchi and rales present.   Abdominal:      General: Abdomen is flat.   Musculoskeletal:      Cervical back: Normal range of motion.   Skin:     General: Skin is warm.   Neurological:      General: No focal deficit present.      Mental Status: She is alert and oriented to person, place, and time. Mental status is at baseline.      Comments: Oriented x3 for me this morning             Significant Labs: All pertinent labs within the past 24 hours have been reviewed.    Significant Imaging: I have reviewed all pertinent imaging results/findings within the past 24 hours.    Assessment and Plan     * Acute hypoxemic respiratory failure  Due to COPD, lung cancer, and pneumonia; on nebs; s/p therapeutic aspiration with bronchoscopy---results show NSCLC; cultures show yeast and diphtheroids; she completed 7 days of Zosyn; she has opacification of her right lung per CT chest; on high-flow O2   Continue to wean oxygen as able    We will treat yeast from bronchoscopy  Wean oxygen as able  Continue to wean oxygen as able  Continue to wean oxygen  Wean oxygen    Constipation    Enema today    Cachexia  Concern for cachexia as evidenced by BMI less than 20 in the presence of known chronic disease. Contributing factors include underlying Malignancy. Treatment to include nutrition consult.      Body mass index is 13.09 kg/m².  Albumin   Date Value Ref Range Status   12/17/2024 2.1 (L) 3.4 - 4.8 g/dL Final         Delirium  Hallucinations.  Start  Seroquel  Better    Hypotension  BP on the low side; will start IVF; she is not on any BP meds at this time  Asymptomatic  We will give 1 L fluid bolus today  Blood pressure at baseline    Lung cancer  Cytology from bronch shows NSCLC; will leave to Oncology as outpt      Paroxysmal atrial fibrillation  On Eliquis  Rate controlled  Rate controlled    Severe protein-calorie malnutrition  Nutrition consulted. Most recent weight and BMI monitored-     Measurements:  Wt Readings from Last 1 Encounters:   12/18/24 36.8 kg (81 lb 2.1 oz)   Body mass index is 13.09 kg/m².    Patient has been screened and assessed by RD.    Malnutrition Type:  Context: chronic illness  Level: severe    Malnutrition Characteristic Summary:  Energy Intake (Malnutrition): less than 75% for greater than or equal to 1 month  Subcutaneous Fat (Malnutrition): severe depletion  Muscle Mass (Malnutrition): severe depletion    Interventions/Recommendations (treatment strategy):     Encourage oral intake      VTE Risk Mitigation (From admission, onward)           Ordered     apixaban tablet 5 mg  2 times daily         12/09/24 1920                    Discharge Planning   TERE: 12/17/2024     Code Status: Full Code   Medical Readiness for Discharge Date:   Discharge Plan A: Return to nursing home          36 minutes spent on face to face patient interaction, discussion with family, ancillary staff, and/or other physicians as well as review of pertinent labs, images, and notes.                 Sathya Chambers DO  Department of Hospital Medicine   [facility]

## 2024-12-18 NOTE — PROGRESS NOTES
[facility]  Adult Nutrition  Progress Note         Reason for Assessment  Reason For Assessment: RD follow-up   Nutrition Risk Screen: no indicators present    Assessment and Plan  12/18/2024 RD Follow up: Patient is ordered a high protein/high calorie diet with chopped meats and Boost Plus all meals. Po intakes declined this week with 0-25% noted per flowsheets. Patient remains on High flow oxygen. Patient with recent fecal impaction with last BM noted 12/17. Current weight 36.8 kg. Continue diet and ONS as tolerated. Encourage po intakes.     12/11/2024 RD Follow up: Patient transferred to LTAC. Patient now ordered a regular diet and tolerating well per flowsheets with 75% intakes on average. Current weight 42.5 kg. Patient with dx cancer with increased energy needs. Recommend to add Boost Plus all meals for additional caloric intakes to promote weight maintenance/gain. RD Following.     12/5/2024: RD follow up. Patient advanced to a dysphagia mechanical soft diet. New diet order with no intake documented at this time. Recommend continue current diet order with Boost Breeze with meals. Encourage good PO intakes. Last BM 12/2 per flowsheet. RD following.     12/03/2024: Patient admitted 12/2 from [facility] with a dx of acute on chronic respiratory failure, COPD exacerbation, pneumonia, and squamous cell carcinoma of lung. Patient is ordered a clear liquid diet.     Patient is 48.1 kg with a BMI of 17.11 and underweight. Patient with hx of severe PCM related to weight loss, physical signs of wasting and poor intakes. Weight has stabilized over the past 3 months, but patient remains severely underweight. Patient continues to meet ASPEN criteria for severe protein calorie malnutrition related to energy intakes less than 75% for greater than or equal to 1 month, and physical findings of muscle and fat loss to orbital, clavicle and temple regions related to chronic and catabolic illness.      Recommend to advance diet to regular as medically appropriate. Add Boost Breeze all meals while on clear liquids. Encourage po intakes as tolerated. RD Following.       Learning Needs/Social Determinants of Health  Learning Assessment       08/15/2024 1217 [facility] (8/15/2024 - 8/29/2024)   Created by Sydnee Goodwin, RN - RN (Nurse) Status: Complete                 PRIMARY LEARNER     Primary Learner Name:  tasha edwards  - 08/15/2024 1217    Relationship:  Patient  - 08/15/2024 1217    Does the primary learner have any barriers to learning?:  No Barriers  - 08/15/2024 1217    What is the preferred language of the primary learner?:  English [facility] 08/15/2024 1217    Is an  required?:  No  - 08/15/2024 1217    How does the primary learner prefer to learn new concepts?:  Listening, Reading, Demonstration [facility] 08/15/2024 1217    How often do you need to have someone help you read instructions, pamphlets, or written material from your doctor or pharmacy?:  Never  - 08/15/2024 1217        CO-LEARNER #1     Co-Learner Name (if applicable):  brian edwards  - 08/15/2024 1217    Relationship:  Family  - 08/15/2024 1217    Does the co-learner have any barriers to learning?:  No Barriers [facility] 08/15/2024 1217    What is the preferred language of the co-learner?:  English [facility] 08/15/2024 1217    Is an  required?:  No [facility] 08/15/2024 1217    How does the co-learner prefer to learn new concepts?:  Listening, Reading, Demonstration [facility] 08/15/2024 1217        CO-LEARNER #2     No question answered        SPECIAL TOPICS     No question answered        ANSWERED BY:     No question answered        Comments         Edit History       Sydnee Goodwin, RN - RN (Nurse)   08/15/2024 1217                           Social Drivers of Health     Tobacco Use: Medium Risk (12/3/2024)    Patient History     Smoking Tobacco Use: Former     Smokeless Tobacco Use:  Never     Passive Exposure: Not on file   Alcohol Use: Not At Risk (8/16/2024)    AUDIT-C     Frequency of Alcohol Consumption: Never     Average Number of Drinks: Patient does not drink     Frequency of Binge Drinking: Never   Financial Resource Strain: Low Risk  (12/9/2024)    Overall Financial Resource Strain (CARDIA)     Difficulty of Paying Living Expenses: Not hard at all   Food Insecurity: No Food Insecurity (12/9/2024)    Hunger Vital Sign     Worried About Running Out of Food in the Last Year: Never true     Ran Out of Food in the Last Year: Never true   Transportation Needs: No Transportation Needs (12/9/2024)    TRANSPORTATION NEEDS     Transportation : No   Physical Activity: Inactive (8/16/2024)    Exercise Vital Sign     Days of Exercise per Week: 0 days     Minutes of Exercise per Session: 0 min   Stress: No Stress Concern Present (12/9/2024)    [employer] - Occupational Stress Questionnaire     Feeling of Stress : Only a little   Housing Stability: Low Risk  (12/9/2024)    Housing Stability Vital Sign     Unable to Pay for Housing in the Last Year: No     Homeless in the Last Year: No   Depression: Low Risk  (11/4/2024)    Depression     Last PHQ-4: Flowsheet Data: 0   Utilities: Not At Risk (12/9/2024)    [facility] Utilities     Threatened with loss of utilities: No   Health Literacy: Adequate Health Literacy (12/9/2024)     Health Literacy     Frequency of need for help with medical instructions: Rarely   Social Isolation: Socially Integrated (8/16/2024)    Social Isolation     Social Isolation: 1            Malnutrition  Is Patient Malnourished: Yes Malnutrition Assessment  Malnutrition Context: chronic illness  Malnutrition Level: severe          Energy Intake (Malnutrition): less than 75% for greater than or equal to 1 month  Subcutaneous Fat (Malnutrition): severe depletion  Muscle Mass (Malnutrition): severe depletion   Orbital Region (Subcutaneous Fat Loss):  severe depletion   Mu-ism Region (Muscle Loss): severe depletion  Clavicle Bone Region (Muscle Loss): severe depletion                 Nutrition Diagnosis  Malnutrition (Severe) related to Catabolic illness, Chronic illness, Decreased ability to consume sufficient energy, Inadequate Caloric intake, and Inadequate Protein intake as evidenced by energy intake less than 75% for greater than or equal to 1 month and physical findings of severe muscle depletion to temple and clavicle area and severe fat depletion to orbital area  Comments: add Boost Plus all meals; recommend high calorie high protein diet as tolerated    Recent Labs   Lab 12/18/24  0331   GLU 76*       Nutrition Prescription / Recommendations  Nutrition Goal Status: progressing towards goal  Communication of RD Recs: discussed on rounds  Current Diet Order: Regular  Nutrition Order Comments: 75% intakes  Oral Nutrition Supplement: Add Boost Plus all meals  Chewing or Swallowing Difficulty?: No Chewing or swallowing difficulty  Recommended Diet: Regular and Mechanical Soft with chopped meats due to weakness  Recommended Oral Supplement: Boost Plus [360kcals, 14g Protein, 45g Carbs] with meals  Is Nutrition Support Recommended: [facility] Nutrition Support: No  Is Nutrition Education Recommended: No    Monitor and Evaluation  % current Intake: P.O. intake of 10 - 25%  % intake to meet estimated needs: P.O. + Supplements     Tolerance: tolerating    Current Medical Diagnosis and Past Medical History     Past Medical History:   Diagnosis Date    Centrilobular emphysema 08/19/2024    follows with Dr. Varghese Mccauley    Chronic respiratory failure with hypoxia and hypercapnia     on home oxygen but hasn't been using it    Cigarette nicotine dependence with nicotine-induced disorder 11/09/2023    Dyshidrotic eczema 08/10/2021    Neoplastic malignant related fatigue 08/16/2024    Paroxysmal atrial fibrillation 08/08/2024    Right bundle branch block 12/11/2023     Right heart failure due to pulmonary hypertension     RVSP  65 mmHg   EF 55%    Scabies     Squamous cell carcinoma of lung     follows with Dr. Kraft       Nutrition/Diet History  Spiritual, Cultural Beliefs, Hinduism Practices, Values that Affect Care: no  Factors Affecting Nutritional Intake: None identified at this time    Lab/Procedures/Meds  Recent Labs   Lab 12/17/24  0529 12/18/24  0331   NA  --  145   K  --  3.1*   BUN  --  15   CREATININE  --  0.50*   CALCIUM  --  8.3*   ALBUMIN 2.1*  --    CL  --  106   ALT 14  --    AST 31  --    Note:     Last A1c:   Lab Results   Component Value Date    HGBA1C 5.3 12/03/2024     Lab Results   Component Value Date    RBC 3.59 (L) 12/18/2024    HGB 8.8 (L) 12/18/2024    HCT 30.5 (L) 12/18/2024    MCV 85.0 12/18/2024    MCH 24.5 (L) 12/18/2024    MCHC 28.9 (L) 12/18/2024   Note: H&H low    Pertinent Labs Reviewed: reviewed  Pertinent Medications Reviewed: reviewed  Scheduled Meds:   amiodarone in dextrose  150 mg Intravenous Once    apixaban  5 mg Oral BID    budesonide  0.5 mg Nebulization Q12H    buPROPion  100 mg Oral BID    fluconazole  200 mg Oral Daily    miconazole NITRATE 2 %   Topical (Top) BID    multivitamin  1 tablet Oral Daily    polyethylene glycol  34 g Oral BID    potassium chloride  40 mEq Oral Q4H    QUEtiapine  50 mg Oral Daily    traZODone  50 mg Oral QHS     Continuous Infusions:        PRN Meds:.  Current Facility-Administered Medications:     acetaminophen, 650 mg, Oral, Q6H PRN    albuterol, 2.5 mg, Nebulization, Q4H PRN    albuterol-ipratropium, 3 mL, Nebulization, Q6H PRN    dextromethorphan-guaiFENesin  mg/5 ml, 10 mL, Oral, Q6H PRN    glucagon (human recombinant), 1 mg, Intramuscular, PRN    glucose, 16 g, Oral, PRN    glucose, 24 g, Oral, PRN    metoprolol, 5 mg, Intravenous, Q5 Min PRN    naloxone, 0.02 mg, Intravenous, PRN    sodium chloride 0.9%, 10 mL, Intravenous, Q12H PRN    Anthropometrics  Temp: 98.3 °F (36.8 °C)  Height Method:  "Stated  Height: 5' 6" (167.6 cm)  Height (inches): 66 in  Weight Method: Bed Scale  Weight: 36.8 kg (81 lb 2.1 oz)  Weight (lb): 81.13 lb  Ideal Body Weight (IBW), Female: 130 lb  % Ideal Body Weight, Female (lb): 72.08 %  BMI (Calculated): 13.1       Estimated/Assessed Needs      Temp: 98.3 °F (36.8 °C)Oral  Weight Used For Calorie Calculations: 42.5 kg (93 lb 11.1 oz)   Energy Need Method: Kcal/kg Energy Calorie Requirements (kcal): 2423-8416     Protein Requirements: 59-71  Estimated Fluid Requirement Method: RDA Method    RDA Method (mL): 1487       Nutrition by Nursing  Diet/Nutrition Received: regular  Intake (%): other (see comments) (roughly 10% only took a few bites)  Diet/Feeding Assistance: tray set-up  Diet/Feeding Tolerance: fair  Last Bowel Movement: 12/17/24                Nutrition Follow-Up  RD Follow-up?: Yes                 Available via Secure Chat  "

## 2024-12-19 LAB
CULTURE, FUNGUS (OTHER): ABNORMAL
OHS QRS DURATION: 138 MS
OHS QRS DURATION: 146 MS
OHS QTC CALCULATION: 441 MS
OHS QTC CALCULATION: 509 MS
POTASSIUM SERPL-SCNC: 3.7 MMOL/L (ref 3.5–5.1)

## 2024-12-19 PROCEDURE — 36415 COLL VENOUS BLD VENIPUNCTURE: CPT | Performed by: STUDENT IN AN ORGANIZED HEALTH CARE EDUCATION/TRAINING PROGRAM

## 2024-12-19 PROCEDURE — 99900035 HC TECH TIME PER 15 MIN (STAT)

## 2024-12-19 PROCEDURE — 94640 AIRWAY INHALATION TREATMENT: CPT

## 2024-12-19 PROCEDURE — 11000008

## 2024-12-19 PROCEDURE — 99232 SBSQ HOSP IP/OBS MODERATE 35: CPT | Mod: ,,, | Performed by: STUDENT IN AN ORGANIZED HEALTH CARE EDUCATION/TRAINING PROGRAM

## 2024-12-19 PROCEDURE — 25000003 PHARM REV CODE 250: Performed by: HOSPITALIST

## 2024-12-19 PROCEDURE — 25000003 PHARM REV CODE 250: Performed by: STUDENT IN AN ORGANIZED HEALTH CARE EDUCATION/TRAINING PROGRAM

## 2024-12-19 PROCEDURE — 94761 N-INVAS EAR/PLS OXIMETRY MLT: CPT

## 2024-12-19 PROCEDURE — 84132 ASSAY OF SERUM POTASSIUM: CPT | Performed by: STUDENT IN AN ORGANIZED HEALTH CARE EDUCATION/TRAINING PROGRAM

## 2024-12-19 PROCEDURE — 25000242 PHARM REV CODE 250 ALT 637 W/ HCPCS: Performed by: HOSPITALIST

## 2024-12-19 PROCEDURE — 27000221 HC OXYGEN, UP TO 24 HOURS

## 2024-12-19 PROCEDURE — 63700000 PHARM REV CODE 250 ALT 637 W/O HCPCS: Performed by: STUDENT IN AN ORGANIZED HEALTH CARE EDUCATION/TRAINING PROGRAM

## 2024-12-19 RX ADMIN — TRAZODONE HYDROCHLORIDE 50 MG: 50 TABLET ORAL at 09:12

## 2024-12-19 RX ADMIN — APIXABAN 5 MG: 5 TABLET, FILM COATED ORAL at 09:12

## 2024-12-19 RX ADMIN — MICONAZOLE NITRATE ANTIFUNGAL POWDER: 2 POWDER TOPICAL at 09:12

## 2024-12-19 RX ADMIN — BUPROPION HYDROCHLORIDE 100 MG: 100 TABLET, FILM COATED, EXTENDED RELEASE ORAL at 09:12

## 2024-12-19 RX ADMIN — THERA TABS 1 TABLET: TAB at 09:12

## 2024-12-19 RX ADMIN — POLYETHYLENE GLYCOL 3350 34 G: 17 POWDER, FOR SOLUTION ORAL at 09:12

## 2024-12-19 RX ADMIN — BUDESONIDE 0.5 MG: 0.5 INHALANT ORAL at 08:12

## 2024-12-19 RX ADMIN — BUDESONIDE 0.5 MG: 0.5 INHALANT ORAL at 07:12

## 2024-12-19 RX ADMIN — FLUCONAZOLE 200 MG: 100 TABLET ORAL at 09:12

## 2024-12-19 RX ADMIN — QUETIAPINE 50 MG: 50 TABLET, FILM COATED, EXTENDED RELEASE ORAL at 09:12

## 2024-12-19 NOTE — PLAN OF CARE
Problem: Adult Inpatient Plan of Care  Goal: Plan of Care Review  Outcome: Progressing  Goal: Patient-Specific Goal (Individualized)  Outcome: Progressing  Goal: Absence of Hospital-Acquired Illness or Injury  Outcome: Progressing  Goal: Optimal Comfort and Wellbeing  Outcome: Progressing  Goal: Readiness for Transition of Care  Outcome: Progressing     Problem: Infection  Goal: Absence of Infection Signs and Symptoms  Outcome: Progressing     Problem: Skin Injury Risk Increased  Goal: Skin Health and Integrity  Outcome: Progressing     Problem: Breathing Pattern Ineffective  Goal: Effective Breathing Pattern  Outcome: Progressing     Problem: Gas Exchange Impaired  Goal: Optimal Gas Exchange  Outcome: Progressing     Problem: Airway Clearance Ineffective  Goal: Effective Airway Clearance  Outcome: Progressing     Problem: Fall Injury Risk  Goal: Absence of Fall and Fall-Related Injury  Outcome: Progressing     Problem: Wound  Goal: Optimal Coping  Outcome: Progressing  Goal: Optimal Functional Ability  Outcome: Progressing  Goal: Absence of Infection Signs and Symptoms  Outcome: Progressing  Goal: Improved Oral Intake  Outcome: Progressing  Goal: Optimal Pain Control and Function  Outcome: Progressing  Goal: Skin Health and Integrity  Outcome: Progressing  Goal: Optimal Wound Healing  Outcome: Progressing

## 2024-12-19 NOTE — PLAN OF CARE
12/19/24 1516   Rounds   Attendance Provider;;Nurse;Physical therapist;Occupational therapist;Pharmacist   Discharge Plan A Return to nursing home;Skilled Nursing Facility   Why the patient remains in the hospital Requires continued medical care   Transition of Care Barriers None     Patient to return to [facility] at d/c. Continue to follow for d/c assistance.

## 2024-12-19 NOTE — PLAN OF CARE
Problem: Airway Clearance Ineffective  Goal: Effective Airway Clearance  Outcome: Progressing     Problem: Gas Exchange Impaired  Goal: Optimal Gas Exchange  Outcome: Progressing

## 2024-12-19 NOTE — SUBJECTIVE & OBJECTIVE
Interval History:  No acute events overnight    Review of Systems  Objective:     Vital Signs (Most Recent):  Temp: 97.7 °F (36.5 °C) (12/19/24 0719)  Pulse: 83 (12/19/24 0820)  Resp: (!) 1 (12/19/24 0820)  BP: (!) 98/59 (12/19/24 0410)  SpO2: 96 % (12/19/24 0820) Vital Signs (24h Range):  Temp:  [97.4 °F (36.3 °C)-97.7 °F (36.5 °C)] 97.7 °F (36.5 °C)  Pulse:  [67-83] 83  Resp:  [1-19] 1  SpO2:  [94 %-97 %] 96 %  BP: ()/(42-62) 98/59     Weight: 34.3 kg (75 lb 9.6 oz)  Body mass index is 12.2 kg/m².    Intake/Output Summary (Last 24 hours) at 12/19/2024 [facility]  Last data filed at 12/18/2024 1715  Gross per 24 hour   Intake 120 ml   Output --   Net 120 ml           Physical Exam  Constitutional:       Appearance: Normal appearance. She is normal weight.   HENT:      Head: Normocephalic and atraumatic.      Mouth/Throat:      Mouth: Mucous membranes are moist.   Cardiovascular:      Rate and Rhythm: Normal rate and regular rhythm.      Pulses: Normal pulses.      Heart sounds: Normal heart sounds.   Pulmonary:      Effort: Pulmonary effort is normal.      Breath sounds: Rhonchi and rales present.   Abdominal:      General: Abdomen is flat.   Musculoskeletal:      Cervical back: Normal range of motion.   Skin:     General: Skin is warm.   Neurological:      General: No focal deficit present.      Mental Status: She is alert and oriented to person, place, and time. Mental status is at baseline.      Comments: Oriented x3 for me this morning             Significant Labs: All pertinent labs within the past 24 hours have been reviewed.    Significant Imaging: I have reviewed all pertinent imaging results/findings within the past 24 hours.

## 2024-12-19 NOTE — ASSESSMENT & PLAN NOTE
Enema today  Loose stools.  Some concern that diarrhea going around impaction.  We will check KUB today

## 2024-12-19 NOTE — ASSESSMENT & PLAN NOTE
Concern for cachexia as evidenced by BMI less than 20 in the presence of known chronic disease. Contributing factors include underlying Malignancy. Treatment to include nutrition consult.      Body mass index is 12.2 kg/m².  Albumin   Date Value Ref Range Status   12/17/2024 2.1 (L) 3.4 - 4.8 g/dL Final

## 2024-12-19 NOTE — PLAN OF CARE
12/19/24 1045   Rounds   Attendance Provider;;Charge nurse;Physical therapist;Pharmacist   Discharge Plan A Return to nursing home   Why the patient remains in the hospital Requires continued medical care   Transition of Care Barriers None     Chart reivewed. KUB ordered. Pt to return to nh at d/c. Ss following.

## 2024-12-19 NOTE — PLAN OF CARE
Problem: Adult Inpatient Plan of Care  Goal: Plan of Care Review  Outcome: Progressing  Goal: Patient-Specific Goal (Individualized)  Outcome: Progressing  Goal: Absence of Hospital-Acquired Illness or Injury  Outcome: Progressing  Goal: Optimal Comfort and Wellbeing  Outcome: Progressing  Goal: Readiness for Transition of Care  Outcome: Progressing     Problem: Infection  Goal: Absence of Infection Signs and Symptoms  Outcome: Progressing     Problem: Skin Injury Risk Increased  Goal: Skin Health and Integrity  Outcome: Progressing     Problem: Gas Exchange Impaired  Goal: Optimal Gas Exchange  Outcome: Progressing     Problem: Fall Injury Risk  Goal: Absence of Fall and Fall-Related Injury  Outcome: Progressing     Problem: Wound  Goal: Optimal Coping  Outcome: Progressing  Goal: Optimal Functional Ability  Outcome: Progressing  Goal: Absence of Infection Signs and Symptoms  Outcome: Progressing  Goal: Improved Oral Intake  Outcome: Progressing  Goal: Optimal Pain Control and Function  Outcome: Progressing  Goal: Skin Health and Integrity  Outcome: Progressing  Goal: Optimal Wound Healing  Outcome: Progressing

## 2024-12-19 NOTE — PROGRESS NOTES
[facility] [place] Medicine  Progress Note    Patient Name: Mayra Kelly  MRN: [id number]  Patient Class: IP- Inpatient   Admission Date: 12/9/2024  Length of Stay: 10 days  Attending Physician: Sathya Chambers DO  Primary Care Provider: Darlene Campoverde NP        Subjective     Principal Problem:Acute hypoxemic respiratory failure        HPI:  74 year old female with a PMH of lung cancer presents with shortness of breath.  She was placed on O2 but kept taking it off, so she was sent to the ICU for closer monitoring.  CT chest showed lung nodules/masses.  She had opacification in the right lung with mucous plugging.  She was placed on Zosyn for pneumonia.  Today, she had a bronch done.  Results pending in the computer.  Patient denies chest pain, shortness of breath, nausea, vomiting, or diarrhea.       Overview/Hospital Course:  74 year old female with a PMH of lung cancer presents with shortness of breath and is now in LTAC for deconditioning and high flow O2; she is asleep on rounds  12/12 still on high-flow wean oxygen as able  12/13 saturating well breathing comfortably wean oxygen  12/14 continue to wean oxygen as able next  12/15 wean oxygen  12/16 doing well today wean oxygen  12/17 impacted enema today  12/18 rate controlled today  12/19 still having loose stools.  Was concern for impaction.  We will check KUB for stool burden    Interval History:  No acute events overnight    Review of Systems  Objective:     Vital Signs (Most Recent):  Temp: 97.7 °F (36.5 °C) (12/19/24 0719)  Pulse: 83 (12/19/24 0820)  Resp: (!) 1 (12/19/24 0820)  BP: (!) 98/59 (12/19/24 0410)  SpO2: 96 % (12/19/24 0820) Vital Signs (24h Range):  Temp:  [97.4 °F (36.3 °C)-97.7 °F (36.5 °C)] 97.7 °F (36.5 °C)  Pulse:  [67-83] 83  Resp:  [1-19] 1  SpO2:  [94 %-97 %] 96 %  BP: ()/(42-62) 98/59     Weight: 34.3 kg (75 lb 9.6 oz)  Body mass index is 12.2 kg/m².    Intake/Output Summary (Last 24  hours) at 12/19/2024 1016  Last data filed at 12/18/2024 1715  Gross per 24 hour   Intake 120 ml   Output --   Net 120 ml           Physical Exam  Constitutional:       Appearance: Normal appearance. She is normal weight.   HENT:      Head: Normocephalic and atraumatic.      Mouth/Throat:      Mouth: Mucous membranes are moist.   Cardiovascular:      Rate and Rhythm: Normal rate and regular rhythm.      Pulses: Normal pulses.      Heart sounds: Normal heart sounds.   Pulmonary:      Effort: Pulmonary effort is normal.      Breath sounds: Rhonchi and rales present.   Abdominal:      General: Abdomen is flat.   Musculoskeletal:      Cervical back: Normal range of motion.   Skin:     General: Skin is warm.   Neurological:      General: No focal deficit present.      Mental Status: She is alert and oriented to person, place, and time. Mental status is at baseline.      Comments: Oriented x3 for me this morning             Significant Labs: All pertinent labs within the past 24 hours have been reviewed.    Significant Imaging: I have reviewed all pertinent imaging results/findings within the past 24 hours.    Assessment and Plan     * Acute hypoxemic respiratory failure  Due to COPD, lung cancer, and pneumonia; on nebs; s/p therapeutic aspiration with bronchoscopy---results show NSCLC; cultures show yeast and diphtheroids; she completed 7 days of Zosyn; she has opacification of her right lung per CT chest; on high-flow O2   Continue to wean oxygen as able    We will treat yeast from bronchoscopy  Wean oxygen as able  Continue to wean oxygen as able  Continue to wean oxygen  Wean oxygen    Constipation    Enema today  Loose stools.  Some concern that diarrhea going around impaction.  We will check KUB today    Cachexia  Concern for cachexia as evidenced by BMI less than 20 in the presence of known chronic disease. Contributing factors include underlying Malignancy. Treatment to include nutrition consult.      Body mass  index is 12.2 kg/m².  Albumin   Date Value Ref Range Status   12/17/2024 2.1 (L) 3.4 - 4.8 g/dL Final         Delirium  Hallucinations.  Start Seroquel  Better    Hypotension  BP on the low side; will start IVF; she is not on any BP meds at this time  Asymptomatic  We will give 1 L fluid bolus today  Blood pressure at baseline    Lung cancer  Cytology from bronch shows NSCLC; will leave to Oncology as outpt      Paroxysmal atrial fibrillation  On Eliquis  Rate controlled  Rate controlled    Severe protein-calorie malnutrition  Nutrition consulted. Most recent weight and BMI monitored-     Measurements:  Wt Readings from Last 1 Encounters:   12/19/24 34.3 kg (75 lb 9.6 oz)   Body mass index is 12.2 kg/m².    Patient has been screened and assessed by RD.    Malnutrition Type:  Context: chronic illness  Level: severe    Malnutrition Characteristic Summary:  Energy Intake (Malnutrition): less than 75% for greater than or equal to 1 month  Subcutaneous Fat (Malnutrition): severe depletion  Muscle Mass (Malnutrition): severe depletion    Interventions/Recommendations (treatment strategy):     Encourage oral intake      VTE Risk Mitigation (From admission, onward)           Ordered     apixaban tablet 5 mg  2 times daily         12/09/24 1920                    Discharge Planning   TERE: 12/17/2024     Code Status: Full Code   Medical Readiness for Discharge Date:   Discharge Plan A: Return to nursing home        Labs reviewed.  Potassium okay.  KUB ordered.    36 minutes spent on face to face patient interaction, discussion with family, ancillary staff, and/or other physicians as well as review of pertinent labs, images, and notes.               Sathya Chambers DO  Department of Hospital Medicine   [facility]

## 2024-12-20 PROCEDURE — 25000003 PHARM REV CODE 250: Performed by: HOSPITALIST

## 2024-12-20 PROCEDURE — 63700000 PHARM REV CODE 250 ALT 637 W/O HCPCS: Performed by: STUDENT IN AN ORGANIZED HEALTH CARE EDUCATION/TRAINING PROGRAM

## 2024-12-20 PROCEDURE — 25000242 PHARM REV CODE 250 ALT 637 W/ HCPCS: Performed by: HOSPITALIST

## 2024-12-20 PROCEDURE — 94640 AIRWAY INHALATION TREATMENT: CPT

## 2024-12-20 PROCEDURE — 11000008

## 2024-12-20 PROCEDURE — 99900035 HC TECH TIME PER 15 MIN (STAT)

## 2024-12-20 PROCEDURE — 27000221 HC OXYGEN, UP TO 24 HOURS

## 2024-12-20 PROCEDURE — 99900031 HC PATIENT EDUCATION (STAT)

## 2024-12-20 PROCEDURE — 25000003 PHARM REV CODE 250: Performed by: STUDENT IN AN ORGANIZED HEALTH CARE EDUCATION/TRAINING PROGRAM

## 2024-12-20 PROCEDURE — 99232 SBSQ HOSP IP/OBS MODERATE 35: CPT | Mod: ,,, | Performed by: STUDENT IN AN ORGANIZED HEALTH CARE EDUCATION/TRAINING PROGRAM

## 2024-12-20 PROCEDURE — 94761 N-INVAS EAR/PLS OXIMETRY MLT: CPT

## 2024-12-20 RX ADMIN — APIXABAN 5 MG: 5 TABLET, FILM COATED ORAL at 09:12

## 2024-12-20 RX ADMIN — THERA TABS 1 TABLET: TAB at 09:12

## 2024-12-20 RX ADMIN — BUPROPION HYDROCHLORIDE 100 MG: 100 TABLET, FILM COATED, EXTENDED RELEASE ORAL at 09:12

## 2024-12-20 RX ADMIN — BUDESONIDE 0.5 MG: 0.5 INHALANT ORAL at 08:12

## 2024-12-20 RX ADMIN — BUDESONIDE 0.5 MG: 0.5 INHALANT ORAL at 07:12

## 2024-12-20 RX ADMIN — MICONAZOLE NITRATE ANTIFUNGAL POWDER: 2 POWDER TOPICAL at 09:12

## 2024-12-20 RX ADMIN — TRAZODONE HYDROCHLORIDE 50 MG: 50 TABLET ORAL at 09:12

## 2024-12-20 RX ADMIN — FLUCONAZOLE 200 MG: 100 TABLET ORAL at 09:12

## 2024-12-20 RX ADMIN — QUETIAPINE 50 MG: 50 TABLET, FILM COATED, EXTENDED RELEASE ORAL at 09:12

## 2024-12-20 RX ADMIN — POLYETHYLENE GLYCOL 3350 34 G: 17 POWDER, FOR SOLUTION ORAL at 09:12

## 2024-12-20 NOTE — SUBJECTIVE & OBJECTIVE
Interval History:  No acute events overnight    Review of Systems  Objective:     Vital Signs (Most Recent):  Temp: 97.4 °F (36.3 °C) (12/20/24 0800)  Pulse: 91 (12/20/24 0800)  Resp: 18 (12/20/24 0800)  BP: 110/62 (12/20/24 0800)  SpO2: 100 % (12/20/24 0800) Vital Signs (24h Range):  Temp:  [97.3 °F (36.3 °C)-98.1 °F (36.7 °C)] 97.4 °F (36.3 °C)  Pulse:  [63-95] 91  Resp:  [13-18] 18  SpO2:  [90 %-100 %] 100 %  BP: ()/(43-62) 110/62     Weight: 34.3 kg (75 lb 9.6 oz)  Body mass index is 12.2 kg/m².    Intake/Output Summary (Last 24 hours) at 12/20/2024 0951  Last data filed at 12/20/2024 0745  Gross per 24 hour   Intake 50 ml   Output --   Net 50 ml           Physical Exam  Constitutional:       Appearance: Normal appearance. She is normal weight.   HENT:      Head: Normocephalic and atraumatic.      Mouth/Throat:      Mouth: Mucous membranes are moist.   Cardiovascular:      Rate and Rhythm: Normal rate and regular rhythm.      Pulses: Normal pulses.      Heart sounds: Normal heart sounds.   Pulmonary:      Effort: Pulmonary effort is normal.      Breath sounds: Rhonchi and rales present.   Abdominal:      General: Abdomen is flat.   Musculoskeletal:      Cervical back: Normal range of motion.   Skin:     General: Skin is warm.   Neurological:      General: No focal deficit present.      Mental Status: She is alert and oriented to person, place, and time. Mental status is at baseline.      Comments: Oriented x3 for me this morning             Significant Labs: All pertinent labs within the past 24 hours have been reviewed.    Significant Imaging: I have reviewed all pertinent imaging results/findings within the past 24 hours.

## 2024-12-20 NOTE — NURSING
0735  Patient awake and alert with confusion. Resp even and unlabored. Patient is on High flow nasal cannula. Mediport intact. Safety measures intact. Bed alarm on. Call bell in reach. Will continue to monitor.

## 2024-12-20 NOTE — ASSESSMENT & PLAN NOTE
Enema today  Loose stools.  Some concern that diarrhea going around impaction.  We will check KUB today  Can start polyethylene glycol.  KUB today not appear to show impaction or severe constipation   negative - no chest pain

## 2024-12-20 NOTE — PROGRESS NOTES
[facility]  Progress Note    Patient Name: Mayra Kelly  MRN: [id number]  Patient Class: IP- Inpatient   Admission Date: 12/9/2024  Length of Stay: 11 days  Attending Physician: Sathya Chambers DO  Primary Care Provider: Darlene Campoverde NP        Subjective     Principal Problem:Acute hypoxemic respiratory failure        HPI:  74 year old female with a PMH of lung cancer presents with shortness of breath.  She was placed on O2 but kept taking it off, so she was sent to the ICU for closer monitoring.  CT chest showed lung nodules/masses.  She had opacification in the right lung with mucous plugging.  She was placed on Zosyn for pneumonia.  Today, she had a bronch done.  Results pending in the computer.  Patient denies chest pain, shortness of breath, nausea, vomiting, or diarrhea.       Overview/Hospital Course:  74 year old female with a PMH of lung cancer presents with shortness of breath and is now in LTAC for deconditioning and high flow O2; she is asleep on rounds  12/12 still on high-flow wean oxygen as able  12/13 saturating well breathing comfortably wean oxygen  12/14 continue to wean oxygen as able next  12/15 wean oxygen  12/16 doing well today wean oxygen  12/17 impacted enema today  12/18 rate controlled today  12/19 still having loose stools.  Was concern for impaction.  We will check KUB for stool burden  12/20 does not appear to have a large stool burden on KUB      Interval History:  No acute events overnight    Review of Systems  Objective:     Vital Signs (Most Recent):  Temp: 97.4 °F (36.3 °C) (12/20/24 0800)  Pulse: 91 (12/20/24 0800)  Resp: 18 (12/20/24 0800)  BP: 110/62 (12/20/24 0800)  SpO2: 100 % (12/20/24 0800) Vital Signs (24h Range):  Temp:  [97.3 °F (36.3 °C)-98.1 °F (36.7 °C)] 97.4 °F (36.3 °C)  Pulse:  [63-95] 91  Resp:  [13-18] 18  SpO2:  [90 %-100 %] 100 %  BP: ()/(43-62) 110/62     Weight: 34.3 kg (75 lb 9.6 oz)  Body  mass index is 12.2 kg/m².    Intake/Output Summary (Last 24 hours) at 12/20/2024 0951  Last data filed at 12/20/2024 0745  Gross per 24 hour   Intake 50 ml   Output --   Net 50 ml           Physical Exam  Constitutional:       Appearance: Normal appearance. She is normal weight.   HENT:      Head: Normocephalic and atraumatic.      Mouth/Throat:      Mouth: Mucous membranes are moist.   Cardiovascular:      Rate and Rhythm: Normal rate and regular rhythm.      Pulses: Normal pulses.      Heart sounds: Normal heart sounds.   Pulmonary:      Effort: Pulmonary effort is normal.      Breath sounds: Rhonchi and rales present.   Abdominal:      General: Abdomen is flat.   Musculoskeletal:      Cervical back: Normal range of motion.   Skin:     General: Skin is warm.   Neurological:      General: No focal deficit present.      Mental Status: She is alert and oriented to person, place, and time. Mental status is at baseline.      Comments: Oriented x3 for me this morning             Significant Labs: All pertinent labs within the past 24 hours have been reviewed.    Significant Imaging: I have reviewed all pertinent imaging results/findings within the past 24 hours.    Assessment and Plan     * Acute hypoxemic respiratory failure  Due to COPD, lung cancer, and pneumonia; on nebs; s/p therapeutic aspiration with bronchoscopy---results show NSCLC; cultures show yeast and diphtheroids; she completed 7 days of Zosyn; she has opacification of her right lung per CT chest; on high-flow O2   Continue to wean oxygen as able    We will treat yeast from bronchoscopy  Wean oxygen as able  Continue to wean oxygen as able  Continue to wean oxygen  Wean oxygen    Constipation    Enema today  Loose stools.  Some concern that diarrhea going around impaction.  We will check KUB today  Can start polyethylene glycol.  KUB today not appear to show impaction or severe constipation    Cachexia  Concern for cachexia as evidenced by BMI less than  20 in the presence of known chronic disease. Contributing factors include underlying Malignancy. Treatment to include nutrition consult.      Body mass index is 12.2 kg/m².  Albumin   Date Value Ref Range Status   12/17/2024 2.1 (L) 3.4 - 4.8 g/dL Final         Delirium  Hallucinations.  Start Seroquel  Better    Hypotension  BP on the low side; will start IVF; she is not on any BP meds at this time  Asymptomatic  We will give 1 L fluid bolus today  Blood pressure at baseline    Lung cancer  Cytology from bronch shows NSCLC; will leave to Oncology as outpt      Paroxysmal atrial fibrillation  On Eliquis  Rate controlled  Rate controlled    Severe protein-calorie malnutrition  Nutrition consulted. Most recent weight and BMI monitored-     Measurements:  Wt Readings from Last 1 Encounters:   12/19/24 34.3 kg (75 lb 9.6 oz)   Body mass index is 12.2 kg/m².    Patient has been screened and assessed by RD.    Malnutrition Type:  Context: chronic illness  Level: severe    Malnutrition Characteristic Summary:  Energy Intake (Malnutrition): less than 75% for greater than or equal to 1 month  Subcutaneous Fat (Malnutrition): severe depletion  Muscle Mass (Malnutrition): severe depletion    Interventions/Recommendations (treatment strategy):     Encourage oral intake      VTE Risk Mitigation (From admission, onward)           Ordered     apixaban tablet 5 mg  2 times daily         12/09/24 1920                    Discharge Planning   TERE: 12/17/2024     Code Status: Full Code   Medical Readiness for Discharge Date:   Discharge Plan A: Return to nursing home, Skilled Nursing Facility            Og Chambers DO  Department of Hospital Medicine   [facility]

## 2024-12-20 NOTE — ASSESSMENT & PLAN NOTE
Nutrition consulted. Most recent weight and BMI monitored-     Measurements:  Wt Readings from Last 1 Encounters:   12/19/24 34.3 kg (75 lb 9.6 oz)   Body mass index is 12.2 kg/m².    Patient has been screened and assessed by RD.    Malnutrition Type:  Context: chronic illness  Level: severe    Malnutrition Characteristic Summary:  Energy Intake (Malnutrition): less than 75% for greater than or equal to 1 month  Subcutaneous Fat (Malnutrition): severe depletion  Muscle Mass (Malnutrition): severe depletion    Interventions/Recommendations (treatment strategy):     Encourage oral intake

## 2024-12-21 LAB
ANION GAP SERPL CALCULATED.3IONS-SCNC: 16 MMOL/L (ref 7–16)
BASOPHILS # BLD AUTO: 0.06 K/UL (ref 0–0.2)
BASOPHILS NFR BLD AUTO: 0.5 % (ref 0–1)
BUN SERPL-MCNC: 14 MG/DL (ref 10–20)
BUN/CREAT SERPL: 26 (ref 6–20)
CALCIUM SERPL-MCNC: 8.8 MG/DL (ref 8.4–10.2)
CHLORIDE SERPL-SCNC: 100 MMOL/L (ref 98–107)
CO2 SERPL-SCNC: 28 MMOL/L (ref 23–31)
CREAT SERPL-MCNC: 0.54 MG/DL (ref 0.55–1.02)
DIFFERENTIAL METHOD BLD: ABNORMAL
EGFR (NO RACE VARIABLE) (RUSH/TITUS): 97 ML/MIN/1.73M2
EOSINOPHIL # BLD AUTO: 0.18 K/UL (ref 0–0.5)
EOSINOPHIL NFR BLD AUTO: 1.6 % (ref 1–4)
ERYTHROCYTE [DISTWIDTH] IN BLOOD BY AUTOMATED COUNT: 16.8 % (ref 11.5–14.5)
GLUCOSE SERPL-MCNC: 50 MG/DL (ref 82–115)
HCT VFR BLD AUTO: 31.9 % (ref 38–47)
HGB BLD-MCNC: 9.2 G/DL (ref 12–16)
IMM GRANULOCYTES # BLD AUTO: 0.05 K/UL (ref 0–0.04)
IMM GRANULOCYTES NFR BLD: 0.4 % (ref 0–0.4)
LYMPHOCYTES # BLD AUTO: 1.29 K/UL (ref 1–4.8)
LYMPHOCYTES NFR BLD AUTO: 11.3 % (ref 27–41)
MCH RBC QN AUTO: 23.9 PG (ref 27–31)
MCHC RBC AUTO-ENTMCNC: 28.8 G/DL (ref 32–36)
MCV RBC AUTO: 82.9 FL (ref 80–96)
MONOCYTES # BLD AUTO: 0.5 K/UL (ref 0–0.8)
MONOCYTES NFR BLD AUTO: 4.4 % (ref 2–6)
MPC BLD CALC-MCNC: 9.9 FL (ref 9.4–12.4)
NEUTROPHILS # BLD AUTO: 9.35 K/UL (ref 1.8–7.7)
NEUTROPHILS NFR BLD AUTO: 81.8 % (ref 53–65)
NRBC # BLD AUTO: 0 X10E3/UL
NRBC, AUTO (.00): 0 %
PLATELET # BLD AUTO: 485 K/UL (ref 150–400)
POTASSIUM SERPL-SCNC: 3.7 MMOL/L (ref 3.5–5.1)
RBC # BLD AUTO: 3.85 M/UL (ref 4.2–5.4)
SODIUM SERPL-SCNC: 140 MMOL/L (ref 136–145)
WBC # BLD AUTO: 11.43 K/UL (ref 4.5–11)

## 2024-12-21 PROCEDURE — 85025 COMPLETE CBC W/AUTO DIFF WBC: CPT | Performed by: HOSPITALIST

## 2024-12-21 PROCEDURE — 25000003 PHARM REV CODE 250: Performed by: STUDENT IN AN ORGANIZED HEALTH CARE EDUCATION/TRAINING PROGRAM

## 2024-12-21 PROCEDURE — 63700000 PHARM REV CODE 250 ALT 637 W/O HCPCS: Performed by: STUDENT IN AN ORGANIZED HEALTH CARE EDUCATION/TRAINING PROGRAM

## 2024-12-21 PROCEDURE — 25000242 PHARM REV CODE 250 ALT 637 W/ HCPCS: Performed by: HOSPITALIST

## 2024-12-21 PROCEDURE — 99900031 HC PATIENT EDUCATION (STAT)

## 2024-12-21 PROCEDURE — 80048 BASIC METABOLIC PNL TOTAL CA: CPT | Performed by: HOSPITALIST

## 2024-12-21 PROCEDURE — 99232 SBSQ HOSP IP/OBS MODERATE 35: CPT | Mod: ,,, | Performed by: FAMILY MEDICINE

## 2024-12-21 PROCEDURE — 36415 COLL VENOUS BLD VENIPUNCTURE: CPT | Performed by: HOSPITALIST

## 2024-12-21 PROCEDURE — 94640 AIRWAY INHALATION TREATMENT: CPT

## 2024-12-21 PROCEDURE — 27000221 HC OXYGEN, UP TO 24 HOURS

## 2024-12-21 PROCEDURE — 25000003 PHARM REV CODE 250: Performed by: HOSPITALIST

## 2024-12-21 PROCEDURE — 25000003 PHARM REV CODE 250: Performed by: FAMILY MEDICINE

## 2024-12-21 PROCEDURE — 99900035 HC TECH TIME PER 15 MIN (STAT)

## 2024-12-21 PROCEDURE — 11000008

## 2024-12-21 PROCEDURE — 94761 N-INVAS EAR/PLS OXIMETRY MLT: CPT

## 2024-12-21 RX ORDER — SODIUM CHLORIDE 9 MG/ML
INJECTION, SOLUTION INTRAVENOUS CONTINUOUS
Status: DISPENSED | OUTPATIENT
Start: 2024-12-21 | End: 2024-12-22

## 2024-12-21 RX ADMIN — BUPROPION HYDROCHLORIDE 100 MG: 100 TABLET, FILM COATED, EXTENDED RELEASE ORAL at 10:12

## 2024-12-21 RX ADMIN — FLUCONAZOLE 200 MG: 100 TABLET ORAL at 10:12

## 2024-12-21 RX ADMIN — BUDESONIDE 0.5 MG: 0.5 INHALANT ORAL at 08:12

## 2024-12-21 RX ADMIN — TRAZODONE HYDROCHLORIDE 50 MG: 50 TABLET ORAL at 08:12

## 2024-12-21 RX ADMIN — SODIUM CHLORIDE: 9 INJECTION, SOLUTION INTRAVENOUS at 04:12

## 2024-12-21 RX ADMIN — MICONAZOLE NITRATE ANTIFUNGAL POWDER: 2 POWDER TOPICAL at 08:12

## 2024-12-21 RX ADMIN — MICONAZOLE NITRATE ANTIFUNGAL POWDER: 2 POWDER TOPICAL at 10:12

## 2024-12-21 RX ADMIN — APIXABAN 5 MG: 5 TABLET, FILM COATED ORAL at 10:12

## 2024-12-21 RX ADMIN — BUPROPION HYDROCHLORIDE 100 MG: 100 TABLET, FILM COATED, EXTENDED RELEASE ORAL at 08:12

## 2024-12-21 RX ADMIN — APIXABAN 5 MG: 5 TABLET, FILM COATED ORAL at 08:12

## 2024-12-21 RX ADMIN — THERA TABS 1 TABLET: TAB at 10:12

## 2024-12-21 RX ADMIN — QUETIAPINE 50 MG: 50 TABLET, FILM COATED, EXTENDED RELEASE ORAL at 10:12

## 2024-12-21 RX ADMIN — BUDESONIDE 0.5 MG: 0.5 INHALANT ORAL at 09:12

## 2024-12-21 NOTE — ASSESSMENT & PLAN NOTE
Concern for cachexia as evidenced by BMI less than 20 in the presence of known chronic disease. Contributing factors include underlying Malignancy. Treatment to include nutrition consult.      Body mass index is 13.03 kg/m².  Albumin   Date Value Ref Range Status   12/17/2024 2.1 (L) 3.4 - 4.8 g/dL Final

## 2024-12-21 NOTE — PLAN OF CARE
Problem: Adult Inpatient Plan of Care  Goal: Plan of Care Review  Outcome: Progressing     Problem: Breathing Pattern Ineffective  Goal: Effective Breathing Pattern  Outcome: Progressing     Problem: Gas Exchange Impaired  Goal: Optimal Gas Exchange  Outcome: Progressing

## 2024-12-21 NOTE — SUBJECTIVE & OBJECTIVE
Interval History: Patient denies complaints.     Review of Systems  Objective:     Vital Signs (Most Recent):  Temp: 98 °F (36.7 °C) (12/21/24 0800)  Pulse: 89 (12/21/24 0900)  Resp: 15 (12/21/24 0900)  BP: (!) 87/44 (12/21/24 0400)  SpO2: (!) 94 % (12/21/24 0900) Vital Signs (24h Range):  Temp:  [97.5 °F (36.4 °C)-98 °F (36.7 °C)] 98 °F (36.7 °C)  Pulse:  [77-89] 89  Resp:  [15-19] 15  SpO2:  [91 %-95 %] 94 %  BP: (83-87)/(43-44) 87/44     Weight: 36.6 kg (80 lb 11.2 oz)  Body mass index is 13.03 kg/m².    Intake/Output Summary (Last 24 hours) at 12/21/2024 1440  Last data filed at 12/21/2024 1200  Gross per 24 hour   Intake 60 ml   Output --   Net 60 ml         Physical Exam  Constitutional:       Appearance: Normal appearance. She is normal weight.   HENT:      Head: Normocephalic and atraumatic.      Mouth/Throat:      Mouth: Mucous membranes are moist.   Cardiovascular:      Rate and Rhythm: Normal rate and regular rhythm.      Pulses: Normal pulses.      Heart sounds: Normal heart sounds.   Pulmonary:      Effort: Pulmonary effort is normal.      Breath sounds: No rhonchi or rales.   Abdominal:      General: Abdomen is flat.   Musculoskeletal:      Cervical back: Normal range of motion.   Skin:     General: Skin is warm.   Neurological:      General: No focal deficit present.      Mental Status: She is alert and oriented to person, place, and time. Mental status is at baseline.      Comments: Oriented x3 for me this morning             Significant Labs: All pertinent labs within the past 24 hours have been reviewed.  BMP:   Recent Labs   Lab 12/21/24  0329   GLU 50*      K 3.7      CO2 28   BUN 14   CREATININE 0.54*   CALCIUM 8.8     CBC:   Recent Labs   Lab 12/21/24 0329   WBC 11.43*   HGB 9.2*   HCT 31.9*   *       Significant Imaging: I have reviewed all pertinent imaging results/findings within the past 24 hours.

## 2024-12-21 NOTE — ASSESSMENT & PLAN NOTE
BP on the low side; will start IVF; she is not on any BP meds at this time  Asymptomatic  We will give 1 L fluid bolus today  Blood pressure at baseline    12/21 - BP low again. Will give fluid.

## 2024-12-21 NOTE — PLAN OF CARE
Problem: Adult Inpatient Plan of Care  Goal: Plan of Care Review  12/21/2024 0622 by Genet Jones RN  Outcome: Progressing  12/21/2024 0046 by Genet Jones RN  Outcome: Progressing  Goal: Patient-Specific Goal (Individualized)  12/21/2024 0622 by Genet Jones RN  Outcome: Progressing  12/21/2024 0046 by Genet Jones RN  Outcome: Progressing  Goal: Absence of Hospital-Acquired Illness or Injury  12/21/2024 0622 by Genet Jones RN  Outcome: Progressing  12/21/2024 0046 by Genet Jones RN  Outcome: Progressing  Goal: Optimal Comfort and Wellbeing  12/21/2024 0622 by Genet Jones RN  Outcome: Progressing  12/21/2024 0046 by Genet Jones RN  Outcome: Progressing  Goal: Readiness for Transition of Care  12/21/2024 0622 by Genet Jones RN  Outcome: Progressing  12/21/2024 0046 by Genet Jones RN  Outcome: Progressing     Problem: Infection  Goal: Absence of Infection Signs and Symptoms  12/21/2024 0622 by Genet Jones RN  Outcome: Progressing  12/21/2024 0046 by Genet Jones RN  Outcome: Progressing     Problem: Skin Injury Risk Increased  Goal: Skin Health and Integrity  12/21/2024 0622 by Genet Jones RN  Outcome: Progressing  12/21/2024 0046 by Genet Jones RN  Outcome: Progressing     Problem: Breathing Pattern Ineffective  Goal: Effective Breathing Pattern  12/21/2024 0622 by Genet Jones RN  Outcome: Progressing  12/21/2024 0046 by Genet Jones RN  Outcome: Progressing     Problem: Gas Exchange Impaired  Goal: Optimal Gas Exchange  12/21/2024 0622 by Genet Jones RN  Outcome: Progressing  12/21/2024 0046 by Genet Jones RN  Outcome: Progressing     Problem: Airway Clearance Ineffective  Goal: Effective Airway Clearance  12/21/2024 0622 by Genet Jones RN  Outcome: Progressing  12/21/2024 0046 by Genet Jones RN  Outcome: Progressing     Problem: Fall Injury Risk  Goal: Absence of Fall and Fall-Related  Injury  12/21/2024 0622 by Genet Jones RN  Outcome: Progressing  12/21/2024 0046 by Genet Jones RN  Outcome: Progressing     Problem: Wound  Goal: Optimal Coping  12/21/2024 0622 by Genet Jones RN  Outcome: Progressing  12/21/2024 0046 by Genet Jones RN  Outcome: Progressing  Goal: Optimal Functional Ability  12/21/2024 0622 by Genet Jones RN  Outcome: Progressing  12/21/2024 0046 by Genet Jones RN  Outcome: Progressing  Goal: Absence of Infection Signs and Symptoms  12/21/2024 0622 by Genet Jones RN  Outcome: Progressing  12/21/2024 0046 by Genet Jones RN  Outcome: Progressing  Goal: Improved Oral Intake  12/21/2024 0622 by Genet Jones RN  Outcome: Progressing  12/21/2024 0046 by Genet Jones RN  Outcome: Progressing  Goal: Optimal Pain Control and Function  12/21/2024 0622 by Genet Jones RN  Outcome: Progressing  12/21/2024 0046 by Genet Jones RN  Outcome: Progressing  Goal: Skin Health and Integrity  12/21/2024 0622 by Genet Jones RN  Outcome: Progressing  12/21/2024 0046 by Genet Jones RN  Outcome: Progressing  Goal: Optimal Wound Healing  12/21/2024 0622 by Genet Jones RN  Outcome: Progressing  12/21/2024 0046 by Genet Jones RN  Outcome: Progressing

## 2024-12-21 NOTE — ASSESSMENT & PLAN NOTE
Enema today  Loose stools.  Some concern that diarrhea going around impaction.  We will check KUB today  Can start polyethylene glycol.  KUB today not appear to show impaction or severe constipation

## 2024-12-21 NOTE — PROGRESS NOTES
[facility]  Progress Note    Patient Name: Mayra Kelly  MRN: [id number]  Patient Class: IP- Inpatient   Admission Date: 12/9/2024  Length of Stay: 12 days  Attending Physician: Mina Rader Jr., MD  Primary Care Provider: Darlene Campoverde NP        Subjective     Principal Problem:Acute hypoxemic respiratory failure        HPI:  74 year old female with a PMH of lung cancer presents with shortness of breath.  She was placed on O2 but kept taking it off, so she was sent to the ICU for closer monitoring.  CT chest showed lung nodules/masses.  She had opacification in the right lung with mucous plugging.  She was placed on Zosyn for pneumonia.  Today, she had a bronch done.  Results pending in the computer.  Patient denies chest pain, shortness of breath, nausea, vomiting, or diarrhea.       Overview/Hospital Course:  74 year old female with a PMH of lung cancer presents with shortness of breath and is now in LTAC for deconditioning and high flow O2; she is asleep on rounds  12/12 still on high-flow wean oxygen as able  12/13 saturating well breathing comfortably wean oxygen  12/14 continue to wean oxygen as able next  12/15 wean oxygen  12/16 doing well today wean oxygen  12/17 impacted enema today  12/18 rate controlled today  12/19 still having loose stools.  Was concern for impaction.  We will check KUB for stool burden  12/20 does not appear to have a large stool burden on KUB      Interval History: Patient denies complaints.     Review of Systems  Objective:     Vital Signs (Most Recent):  Temp: 98 °F (36.7 °C) (12/21/24 0800)  Pulse: 89 (12/21/24 0900)  Resp: 15 (12/21/24 0900)  BP: (!) 87/44 (12/21/24 0400)  SpO2: (!) 94 % (12/21/24 0900) Vital Signs (24h Range):  Temp:  [97.5 °F (36.4 °C)-98 °F (36.7 °C)] 98 °F (36.7 °C)  Pulse:  [77-89] 89  Resp:  [15-19] 15  SpO2:  [91 %-95 %] 94 %  BP: (83-87)/(43-44) 87/44     Weight: 36.6 kg (80 lb 11.2  oz)  Body mass index is 13.03 kg/m².    Intake/Output Summary (Last 24 hours) at 12/21/2024 1440  Last data filed at 12/21/2024 1200  Gross per 24 hour   Intake 60 ml   Output --   Net 60 ml         Physical Exam  Constitutional:       Appearance: Normal appearance. She is normal weight.   HENT:      Head: Normocephalic and atraumatic.      Mouth/Throat:      Mouth: Mucous membranes are moist.   Cardiovascular:      Rate and Rhythm: Normal rate and regular rhythm.      Pulses: Normal pulses.      Heart sounds: Normal heart sounds.   Pulmonary:      Effort: Pulmonary effort is normal.      Breath sounds: No rhonchi or rales.   Abdominal:      General: Abdomen is flat.   Musculoskeletal:      Cervical back: Normal range of motion.   Skin:     General: Skin is warm.   Neurological:      General: No focal deficit present.      Mental Status: She is alert and oriented to person, place, and time. Mental status is at baseline.      Comments: Oriented x3 for me this morning             Significant Labs: All pertinent labs within the past 24 hours have been reviewed.  BMP:   Recent Labs   Lab 12/21/24  0329   GLU 50*      K 3.7      CO2 28   BUN 14   CREATININE 0.54*   CALCIUM 8.8     CBC:   Recent Labs   Lab 12/21/24  0329   WBC 11.43*   HGB 9.2*   HCT 31.9*   *       Significant Imaging: I have reviewed all pertinent imaging results/findings within the past 24 hours.    Assessment and Plan     * Acute hypoxemic respiratory failure  Due to COPD, lung cancer, and pneumonia; on nebs; s/p therapeutic aspiration with bronchoscopy---results show NSCLC; cultures show yeast and diphtheroids; she completed 7 days of Zosyn; she has opacification of her right lung per CT chest; on high-flow O2   Continue to wean oxygen as able    We will treat yeast from bronchoscopy  Wean oxygen as able  Continue to wean oxygen as able  Continue to wean oxygen  Wean oxygen        Constipation    Enema today  Loose stools.  Some  concern that diarrhea going around impaction.  We will check KUB today  Can start polyethylene glycol.  KUB today not appear to show impaction or severe constipation    Cachexia  Concern for cachexia as evidenced by BMI less than 20 in the presence of known chronic disease. Contributing factors include underlying Malignancy. Treatment to include nutrition consult.      Body mass index is 13.03 kg/m².  Albumin   Date Value Ref Range Status   12/17/2024 2.1 (L) 3.4 - 4.8 g/dL Final         Delirium  Hallucinations.  Start Seroquel  Better    Hypotension  BP on the low side; will start IVF; she is not on any BP meds at this time  Asymptomatic  We will give 1 L fluid bolus today  Blood pressure at baseline    12/21 - BP low again. Will give fluid.     Lung cancer  Cytology from bronch shows NSCLC; will leave to Oncology as outpt      Paroxysmal atrial fibrillation  On Eliquis  Rate controlled  Rate controlled    Severe protein-calorie malnutrition  Nutrition consulted. Most recent weight and BMI monitored-     Measurements:  Wt Readings from Last 1 Encounters:   12/20/24 36.6 kg (80 lb 11.2 oz)   Body mass index is 13.03 kg/m².    Patient has been screened and assessed by RD.    Malnutrition Type:  Context: chronic illness  Level: severe    Malnutrition Characteristic Summary:  Energy Intake (Malnutrition): less than 75% for greater than or equal to 1 month  Subcutaneous Fat (Malnutrition): severe depletion  Muscle Mass (Malnutrition): severe depletion    Interventions/Recommendations (treatment strategy):     Encourage oral intake      VTE Risk Mitigation (From admission, onward)           Ordered     apixaban tablet 5 mg  2 times daily         12/09/24 1920                    Discharge Planning   TERE: 12/17/2024     Code Status: Full Code   Medical Readiness for Discharge Date:   Discharge Plan A: Return to nursing home, Skilled Nursing Facility                        Mina Rader Jr, MD  Department of Hospital  Medicine   [facility]

## 2024-12-21 NOTE — ASSESSMENT & PLAN NOTE
Nutrition consulted. Most recent weight and BMI monitored-     Measurements:  Wt Readings from Last 1 Encounters:   12/20/24 36.6 kg (80 lb 11.2 oz)   Body mass index is 13.03 kg/m².    Patient has been screened and assessed by RD.    Malnutrition Type:  Context: chronic illness  Level: severe    Malnutrition Characteristic Summary:  Energy Intake (Malnutrition): less than 75% for greater than or equal to 1 month  Subcutaneous Fat (Malnutrition): severe depletion  Muscle Mass (Malnutrition): severe depletion    Interventions/Recommendations (treatment strategy):     Encourage oral intake

## 2024-12-22 PROCEDURE — 99232 SBSQ HOSP IP/OBS MODERATE 35: CPT | Mod: ,,, | Performed by: FAMILY MEDICINE

## 2024-12-22 PROCEDURE — 99900035 HC TECH TIME PER 15 MIN (STAT)

## 2024-12-22 PROCEDURE — 25000242 PHARM REV CODE 250 ALT 637 W/ HCPCS: Performed by: HOSPITALIST

## 2024-12-22 PROCEDURE — 25000003 PHARM REV CODE 250: Performed by: STUDENT IN AN ORGANIZED HEALTH CARE EDUCATION/TRAINING PROGRAM

## 2024-12-22 PROCEDURE — 27000221 HC OXYGEN, UP TO 24 HOURS

## 2024-12-22 PROCEDURE — 11000008

## 2024-12-22 PROCEDURE — 99900031 HC PATIENT EDUCATION (STAT)

## 2024-12-22 PROCEDURE — 94640 AIRWAY INHALATION TREATMENT: CPT

## 2024-12-22 PROCEDURE — 94761 N-INVAS EAR/PLS OXIMETRY MLT: CPT

## 2024-12-22 PROCEDURE — 25000003 PHARM REV CODE 250: Performed by: HOSPITALIST

## 2024-12-22 PROCEDURE — 63700000 PHARM REV CODE 250 ALT 637 W/O HCPCS: Performed by: STUDENT IN AN ORGANIZED HEALTH CARE EDUCATION/TRAINING PROGRAM

## 2024-12-22 RX ADMIN — FLUCONAZOLE 200 MG: 100 TABLET ORAL at 09:12

## 2024-12-22 RX ADMIN — APIXABAN 5 MG: 5 TABLET, FILM COATED ORAL at 09:12

## 2024-12-22 RX ADMIN — MICONAZOLE NITRATE ANTIFUNGAL POWDER: 2 POWDER TOPICAL at 08:12

## 2024-12-22 RX ADMIN — BUPROPION HYDROCHLORIDE 100 MG: 100 TABLET, FILM COATED, EXTENDED RELEASE ORAL at 08:12

## 2024-12-22 RX ADMIN — BUPROPION HYDROCHLORIDE 100 MG: 100 TABLET, FILM COATED, EXTENDED RELEASE ORAL at 09:12

## 2024-12-22 RX ADMIN — APIXABAN 5 MG: 5 TABLET, FILM COATED ORAL at 08:12

## 2024-12-22 RX ADMIN — BUDESONIDE 0.5 MG: 0.5 INHALANT ORAL at 07:12

## 2024-12-22 RX ADMIN — QUETIAPINE 50 MG: 50 TABLET, FILM COATED, EXTENDED RELEASE ORAL at 09:12

## 2024-12-22 RX ADMIN — THERA TABS 1 TABLET: TAB at 09:12

## 2024-12-22 RX ADMIN — TRAZODONE HYDROCHLORIDE 50 MG: 50 TABLET ORAL at 08:12

## 2024-12-22 RX ADMIN — MICONAZOLE NITRATE ANTIFUNGAL POWDER: 2 POWDER TOPICAL at 09:12

## 2024-12-22 NOTE — SUBJECTIVE & OBJECTIVE
Interval History: Denies complaints. Nursing reports poor po intake.     Review of Systems  Objective:     Vital Signs (Most Recent):  Temp: 97.6 °F (36.4 °C) (12/22/24 1200)  Pulse: 71 (12/22/24 0800)  Resp: 16 (12/22/24 0800)  BP: (!) 92/47 (12/22/24 0800)  SpO2: (!) 90 % (12/22/24 0800) Vital Signs (24h Range):  Temp:  [97 °F (36.1 °C)-98.8 °F (37.1 °C)] 97.6 °F (36.4 °C)  Pulse:  [71-86] 71  Resp:  [12-21] 16  SpO2:  [87 %-97 %] 90 %  BP: ()/(32-47) 92/47     Weight: 36.6 kg (80 lb 11.2 oz)  Body mass index is 13.03 kg/m².    Intake/Output Summary (Last 24 hours) at 12/22/2024 1541  Last data filed at 12/22/2024 1200  Gross per 24 hour   Intake 1898.8 ml   Output --   Net 1898.8 ml         Physical Exam  Constitutional:       Appearance: She is cachectic. She is ill-appearing. She is not toxic-appearing.      Comments: Chronically ill appearing   HENT:      Head: Normocephalic and atraumatic.      Mouth/Throat:      Mouth: Mucous membranes are moist.   Cardiovascular:      Rate and Rhythm: Normal rate and regular rhythm.      Pulses: Normal pulses.      Heart sounds: Normal heart sounds.   Pulmonary:      Effort: Pulmonary effort is normal.      Breath sounds: No rhonchi or rales.   Abdominal:      General: Abdomen is flat.   Musculoskeletal:      Cervical back: Normal range of motion.   Skin:     General: Skin is warm.   Neurological:      General: No focal deficit present.      Mental Status: She is alert. Mental status is at baseline.             Significant Labs: All pertinent labs within the past 24 hours have been reviewed.  BMP:   Recent Labs   Lab 12/21/24  0329   GLU 50*      K 3.7      CO2 28   BUN 14   CREATININE 0.54*   CALCIUM 8.8     CBC:   Recent Labs   Lab 12/21/24  0329   WBC 11.43*   HGB 9.2*   HCT 31.9*   *       Significant Imaging: I have reviewed all pertinent imaging results/findings within the past 24 hours.

## 2024-12-22 NOTE — PROGRESS NOTES
[facility] Medicine  Progress Note    Patient Name: Mayra Kelly  MRN: [id number]  Patient Class: IP- Inpatient   Admission Date: 12/9/2024  Length of Stay: 13 days  Attending Physician: Mina Rader Jr., MD  Primary Care Provider: Darlene Campoverde NP        Subjective     Principal Problem:Acute hypoxemic respiratory failure        HPI:  74 year old female with a PMH of lung cancer presents with shortness of breath.  She was placed on O2 but kept taking it off, so she was sent to the ICU for closer monitoring.  CT chest showed lung nodules/masses.  She had opacification in the right lung with mucous plugging.  She was placed on Zosyn for pneumonia.  Today, she had a bronch done.  Results pending in the computer.  Patient denies chest pain, shortness of breath, nausea, vomiting, or diarrhea.       Overview/Hospital Course:  74 year old female with a PMH of lung cancer presents with shortness of breath and is now in LTAC for deconditioning and high flow O2; she is asleep on rounds  12/12 still on high-flow wean oxygen as able  12/13 saturating well breathing comfortably wean oxygen  12/14 continue to wean oxygen as able next  12/15 wean oxygen  12/16 doing well today wean oxygen  12/17 impacted enema today  12/18 rate controlled today  12/19 still having loose stools.  Was concern for impaction.  We will check KUB for stool burden  12/20 does not appear to have a large stool burden on KUB      Interval History: Denies complaints. Nursing reports poor po intake.     Review of Systems  Objective:     Vital Signs (Most Recent):  Temp: 97.6 °F (36.4 °C) (12/22/24 1200)  Pulse: 71 (12/22/24 0800)  Resp: 16 (12/22/24 0800)  BP: (!) 92/47 (12/22/24 0800)  SpO2: (!) 90 % (12/22/24 0800) Vital Signs (24h Range):  Temp:  [97 °F (36.1 °C)-98.8 °F (37.1 °C)] 97.6 °F (36.4 °C)  Pulse:  [71-86] 71  Resp:  [12-21] 16  SpO2:  [87 %-97 %] 90 %  BP: ()/(32-47) 92/47      Weight: 36.6 kg (80 lb 11.2 oz)  Body mass index is 13.03 kg/m².    Intake/Output Summary (Last 24 hours) at 12/22/2024 1541  Last data filed at 12/22/2024 1200  Gross per 24 hour   Intake 1898.8 ml   Output --   Net 1898.8 ml         Physical Exam  Constitutional:       Appearance: She is cachectic. She is ill-appearing. She is not toxic-appearing.      Comments: Chronically ill appearing   HENT:      Head: Normocephalic and atraumatic.      Mouth/Throat:      Mouth: Mucous membranes are moist.   Cardiovascular:      Rate and Rhythm: Normal rate and regular rhythm.      Pulses: Normal pulses.      Heart sounds: Normal heart sounds.   Pulmonary:      Effort: Pulmonary effort is normal.      Breath sounds: No rhonchi or rales.   Abdominal:      General: Abdomen is flat.   Musculoskeletal:      Cervical back: Normal range of motion.   Skin:     General: Skin is warm.   Neurological:      General: No focal deficit present.      Mental Status: She is alert. Mental status is at baseline.             Significant Labs: All pertinent labs within the past 24 hours have been reviewed.  BMP:   Recent Labs   Lab 12/21/24  0329   GLU 50*      K 3.7      CO2 28   BUN 14   CREATININE 0.54*   CALCIUM 8.8     CBC:   Recent Labs   Lab 12/21/24  0329   WBC 11.43*   HGB 9.2*   HCT 31.9*   *       Significant Imaging: I have reviewed all pertinent imaging results/findings within the past 24 hours.    Assessment and Plan     * Acute hypoxemic respiratory failure  Due to COPD, lung cancer, and pneumonia; on nebs; s/p therapeutic aspiration with bronchoscopy---results show NSCLC; cultures show yeast and diphtheroids; she completed 7 days of Zosyn; she has opacification of her right lung per CT chest; on high-flow O2   Continue to wean oxygen as able    We will treat yeast from bronchoscopy  Wean oxygen as able  Continue to wean oxygen as able  Continue to wean oxygen  Wean oxygen    12/22 - HFNC 25 liters, 44%          Constipation    Enema today  Loose stools.  Some concern that diarrhea going around impaction.  We will check KUB today  Can start polyethylene glycol.  KUB today not appear to show impaction or severe constipation    Cachexia  Concern for cachexia as evidenced by BMI less than 20 in the presence of known chronic disease. Contributing factors include underlying Malignancy. Treatment to include nutrition consult.      Body mass index is 13.03 kg/m².  Albumin   Date Value Ref Range Status   12/17/2024 2.1 (L) 3.4 - 4.8 g/dL Final     Starting Marinol    Delirium  Hallucinations.  Start Seroquel  Better        Hypotension  BP on the low side; will start IVF; she is not on any BP meds at this time  Asymptomatic  We will give 1 L fluid bolus today  Blood pressure at baseline    12/21 - BP low again. Will give fluid.     Lung cancer  Cytology from bronch shows NSCLC; will leave to Oncology as outpt      Paroxysmal atrial fibrillation  On Eliquis  Rate controlled  Rate controlled    Severe protein-calorie malnutrition  Nutrition consulted. Most recent weight and BMI monitored-     Measurements:  Wt Readings from Last 1 Encounters:   12/20/24 36.6 kg (80 lb 11.2 oz)   Body mass index is 13.03 kg/m².    Patient has been screened and assessed by RD.    Malnutrition Type:  Context: chronic illness  Level: severe    Malnutrition Characteristic Summary:  Energy Intake (Malnutrition): less than 75% for greater than or equal to 1 month  Subcutaneous Fat (Malnutrition): severe depletion  Muscle Mass (Malnutrition): severe depletion    Interventions/Recommendations (treatment strategy):     Encourage oral intake    Starting marinol      VTE Risk Mitigation (From admission, onward)           Ordered     apixaban tablet 5 mg  2 times daily         12/09/24 1920                    Discharge Planning   TERE: 12/17/2024     Code Status: Full Code   Medical Readiness for Discharge Date:   Discharge Plan A: Return to nursing home,  Skilled Nursing Facility                        Mina Rader Jr, MD  Department of Hospital Medicine   [facility]

## 2024-12-22 NOTE — ASSESSMENT & PLAN NOTE
Concern for cachexia as evidenced by BMI less than 20 in the presence of known chronic disease. Contributing factors include underlying Malignancy. Treatment to include nutrition consult.      Body mass index is 13.03 kg/m².  Albumin   Date Value Ref Range Status   12/17/2024 2.1 (L) 3.4 - 4.8 g/dL Final     Starting Marinol

## 2024-12-22 NOTE — ASSESSMENT & PLAN NOTE
Due to COPD, lung cancer, and pneumonia; on nebs; s/p therapeutic aspiration with bronchoscopy---results show NSCLC; cultures show yeast and diphtheroids; she completed 7 days of Zosyn; she has opacification of her right lung per CT chest; on high-flow O2   Continue to wean oxygen as able    We will treat yeast from bronchoscopy  Wean oxygen as able  Continue to wean oxygen as able  Continue to wean oxygen  Wean oxygen    12/22 - HFNC 25 liters, 44%

## 2024-12-22 NOTE — ASSESSMENT & PLAN NOTE
Nutrition consulted. Most recent weight and BMI monitored-     Measurements:  Wt Readings from Last 1 Encounters:   12/20/24 36.6 kg (80 lb 11.2 oz)   Body mass index is 13.03 kg/m².    Patient has been screened and assessed by RD.    Malnutrition Type:  Context: chronic illness  Level: severe    Malnutrition Characteristic Summary:  Energy Intake (Malnutrition): less than 75% for greater than or equal to 1 month  Subcutaneous Fat (Malnutrition): severe depletion  Muscle Mass (Malnutrition): severe depletion    Interventions/Recommendations (treatment strategy):     Encourage oral intake    Starting marinol

## 2024-12-22 NOTE — NURSING
Resting in bed with eyes closed. Chest rising and falling. No s/s of distress noted. High flow oxygen remains in use(02 sats 91%). Safety measures ongoing.

## 2024-12-22 NOTE — NURSING
Patient has not urinated on my shift. States she doesn't feel like she needs to. Bladder scan obtained at 0330 showed 190ml in bladder. 0527 patient still has not voided.  notified. Order rec'd for straight cath prn for bladder scan >400ml. Will continue to monitor.

## 2024-12-23 PROCEDURE — 11000008

## 2024-12-23 PROCEDURE — 94640 AIRWAY INHALATION TREATMENT: CPT

## 2024-12-23 PROCEDURE — 27000221 HC OXYGEN, UP TO 24 HOURS

## 2024-12-23 PROCEDURE — 99232 SBSQ HOSP IP/OBS MODERATE 35: CPT | Mod: ,,, | Performed by: STUDENT IN AN ORGANIZED HEALTH CARE EDUCATION/TRAINING PROGRAM

## 2024-12-23 PROCEDURE — 25000003 PHARM REV CODE 250: Performed by: STUDENT IN AN ORGANIZED HEALTH CARE EDUCATION/TRAINING PROGRAM

## 2024-12-23 PROCEDURE — 63700000 PHARM REV CODE 250 ALT 637 W/O HCPCS: Performed by: STUDENT IN AN ORGANIZED HEALTH CARE EDUCATION/TRAINING PROGRAM

## 2024-12-23 PROCEDURE — 25000242 PHARM REV CODE 250 ALT 637 W/ HCPCS: Performed by: HOSPITALIST

## 2024-12-23 PROCEDURE — 25000003 PHARM REV CODE 250: Performed by: HOSPITALIST

## 2024-12-23 PROCEDURE — 99900035 HC TECH TIME PER 15 MIN (STAT)

## 2024-12-23 RX ADMIN — APIXABAN 5 MG: 5 TABLET, FILM COATED ORAL at 08:12

## 2024-12-23 RX ADMIN — MICONAZOLE NITRATE ANTIFUNGAL POWDER: 2 POWDER TOPICAL at 08:12

## 2024-12-23 RX ADMIN — BUDESONIDE 0.5 MG: 0.5 INHALANT ORAL at 08:12

## 2024-12-23 RX ADMIN — FLUCONAZOLE 200 MG: 100 TABLET ORAL at 08:12

## 2024-12-23 RX ADMIN — TRAZODONE HYDROCHLORIDE 50 MG: 50 TABLET ORAL at 08:12

## 2024-12-23 RX ADMIN — BUPROPION HYDROCHLORIDE 100 MG: 100 TABLET, FILM COATED, EXTENDED RELEASE ORAL at 08:12

## 2024-12-23 RX ADMIN — QUETIAPINE 50 MG: 50 TABLET, FILM COATED, EXTENDED RELEASE ORAL at 08:12

## 2024-12-23 RX ADMIN — THERA TABS 1 TABLET: TAB at 08:12

## 2024-12-23 NOTE — SUBJECTIVE & OBJECTIVE
"Interval History:  No acute events overnight  Review of Systems  Objective:     Vital Signs (Most Recent):  Temp: 97.9 °F (36.6 °C) (12/23/24 0730)  Pulse: 76 (12/23/24 0600)  Resp: 16 (12/23/24 0600)  BP: (!) 88/49 (12/23/24 0400)  SpO2: (!) 94 % (12/23/24 0600) Vital Signs (24h Range):  Temp:  [97 °F (36.1 °C)-98 °F (36.7 °C)] 97.9 °F (36.6 °C)  Pulse:  [] 76  Resp:  [11-37] 16  SpO2:  [71 %-99 %] 94 %  BP: ()/(40-69) 88/49     Weight: 36.6 kg (80 lb 11.2 oz)  Body mass index is 13.03 kg/m².    Intake/Output Summary (Last 24 hours) at 12/23/2024 0755  Last data filed at 12/22/2024 1648  Gross per 24 hour   Intake 597 ml   Output --   Net 597 ml         Physical Exam  Constitutional:       Appearance: She is cachectic. She is ill-appearing. She is not toxic-appearing.      Comments: Chronically ill appearing   HENT:      Head: Normocephalic and atraumatic.      Mouth/Throat:      Mouth: Mucous membranes are moist.   Cardiovascular:      Rate and Rhythm: Normal rate and regular rhythm.      Pulses: Normal pulses.      Heart sounds: Normal heart sounds.   Pulmonary:      Effort: Pulmonary effort is normal.      Breath sounds: No rhonchi or rales.   Abdominal:      General: Abdomen is flat.   Musculoskeletal:      Cervical back: Normal range of motion.   Skin:     General: Skin is warm.   Neurological:      General: No focal deficit present.      Mental Status: She is alert. Mental status is at baseline.             Significant Labs: All pertinent labs within the past 24 hours have been reviewed.  BMP:   No results for input(s): "GLU", "NA", "K", "CL", "CO2", "BUN", "CREATININE", "CALCIUM", "MG" in the last 48 hours.    CBC:   No results for input(s): "WBC", "HGB", "HCT", "PLT" in the last 48 hours.      Significant Imaging: I have reviewed all pertinent imaging results/findings within the past 24 hours.  "

## 2024-12-23 NOTE — CARE UPDATE
12/22/24 1330   PRE-TX-O2   Device (Oxygen Therapy) high flow nasal cannula w/device  (Nurse called due to O2 Sats were low (55%). I found Hiflo cannula was pulled apart and therefore not functioning properely. Cannla was replaced and patient reminded not to pull on hiflo cannula nurse notified)

## 2024-12-23 NOTE — PROGRESS NOTES
[facility] Medicine  Progress Note    Patient Name: Mayra Kelly  MRN: [id number]  Patient Class: IP- Inpatient   Admission Date: 12/9/2024  Length of Stay: 14 days  Attending Physician: Sathya Chambers DO  Primary Care Provider: Darlene Campoverde NP        Subjective     Principal Problem:Acute hypoxemic respiratory failure        HPI:  74 year old female with a PMH of lung cancer presents with shortness of breath.  She was placed on O2 but kept taking it off, so she was sent to the ICU for closer monitoring.  CT chest showed lung nodules/masses.  She had opacification in the right lung with mucous plugging.  She was placed on Zosyn for pneumonia.  Today, she had a bronch done.  Results pending in the computer.  Patient denies chest pain, shortness of breath, nausea, vomiting, or diarrhea.       Overview/Hospital Course:  74 year old female with a PMH of lung cancer presents with shortness of breath and is now in LTAC for deconditioning and high flow O2; she is asleep on rounds  12/12 still on high-flow wean oxygen as able  12/13 saturating well breathing comfortably wean oxygen  12/14 continue to wean oxygen as able next  12/15 wean oxygen  12/16 doing well today wean oxygen  12/17 impacted enema today  12/18 rate controlled today  12/19 still having loose stools.  Was concern for impaction.  We will check KUB for stool burden  12/20 does not appear to have a large stool burden on KUB  12th/23 continue to wean high-flow as able    Interval History:  No acute events overnight  Review of Systems  Objective:     Vital Signs (Most Recent):  Temp: 97.9 °F (36.6 °C) (12/23/24 0730)  Pulse: 76 (12/23/24 0600)  Resp: 16 (12/23/24 0600)  BP: (!) 88/49 (12/23/24 0400)  SpO2: (!) 94 % (12/23/24 0600) Vital Signs (24h Range):  Temp:  [97 °F (36.1 °C)-98 °F (36.7 °C)] 97.9 °F (36.6 °C)  Pulse:  [] 76  Resp:  [11-37] 16  SpO2:  [71 %-99 %] 94 %  BP: ()/(40-69) 88/49  "    Weight: 36.6 kg (80 lb 11.2 oz)  Body mass index is 13.03 kg/m².    Intake/Output Summary (Last 24 hours) at 12/23/2024 0755  Last data filed at 12/22/2024 1648  Gross per 24 hour   Intake 597 ml   Output --   Net 597 ml         Physical Exam  Constitutional:       Appearance: She is cachectic. She is ill-appearing. She is not toxic-appearing.      Comments: Chronically ill appearing   HENT:      Head: Normocephalic and atraumatic.      Mouth/Throat:      Mouth: Mucous membranes are moist.   Cardiovascular:      Rate and Rhythm: Normal rate and regular rhythm.      Pulses: Normal pulses.      Heart sounds: Normal heart sounds.   Pulmonary:      Effort: Pulmonary effort is normal.      Breath sounds: No rhonchi or rales.   Abdominal:      General: Abdomen is flat.   Musculoskeletal:      Cervical back: Normal range of motion.   Skin:     General: Skin is warm.   Neurological:      General: No focal deficit present.      Mental Status: She is alert. Mental status is at baseline.             Significant Labs: All pertinent labs within the past 24 hours have been reviewed.  BMP:   No results for input(s): "GLU", "NA", "K", "CL", "CO2", "BUN", "CREATININE", "CALCIUM", "MG" in the last 48 hours.    CBC:   No results for input(s): "WBC", "HGB", "HCT", "PLT" in the last 48 hours.      Significant Imaging: I have reviewed all pertinent imaging results/findings within the past 24 hours.    Assessment and Plan     * Acute hypoxemic respiratory failure  Due to COPD, lung cancer, and pneumonia; on nebs; s/p therapeutic aspiration with bronchoscopy---results show NSCLC; cultures show yeast and diphtheroids; she completed 7 days of Zosyn; she has opacification of her right lung per CT chest; on high-flow O2   Continue to wean oxygen as able    We will treat yeast from bronchoscopy  Wean oxygen as able  Continue to wean oxygen as able  Continue to wean oxygen  Wean oxygen    12/22 - HFNC 25 liters, 44%     Wean oxygen as " able    Constipation    Enema today  Loose stools.  Some concern that diarrhea going around impaction.  We will check KUB today  Can start polyethylene glycol.  KUB today not appear to show impaction or severe constipation    Cachexia  Concern for cachexia as evidenced by BMI less than 20 in the presence of known chronic disease. Contributing factors include underlying Malignancy. Treatment to include nutrition consult.      Body mass index is 13.03 kg/m².  Albumin   Date Value Ref Range Status   12/17/2024 2.1 (L) 3.4 - 4.8 g/dL Final     Starting Marinol    Delirium  Hallucinations.  Start Seroquel  Better        Hypotension  BP on the low side; will start IVF; she is not on any BP meds at this time  Asymptomatic  We will give 1 L fluid bolus today  Blood pressure at baseline    12/21 - BP low again. Will give fluid.     Lung cancer  Cytology from bronch shows NSCLC; will leave to Oncology as outpt      Paroxysmal atrial fibrillation  On Eliquis  Rate controlled  Rate controlled    Severe protein-calorie malnutrition  Nutrition consulted. Most recent weight and BMI monitored-     Measurements:  Wt Readings from Last 1 Encounters:   12/20/24 36.6 kg (80 lb 11.2 oz)   Body mass index is 13.03 kg/m².    Patient has been screened and assessed by RD.    Malnutrition Type:  Context: chronic illness  Level: severe    Malnutrition Characteristic Summary:  Energy Intake (Malnutrition): less than 75% for greater than or equal to 1 month  Subcutaneous Fat (Malnutrition): severe depletion  Muscle Mass (Malnutrition): severe depletion    Interventions/Recommendations (treatment strategy):     Encourage oral intake    Starting marinol      VTE Risk Mitigation (From admission, onward)           Ordered     apixaban tablet 5 mg  2 times daily         12/09/24 1920                    Discharge Planning   TERE: 12/17/2024     Code Status: Full Code   Medical Readiness for Discharge Date:   Discharge Plan A: Return to nursing home,  Skilled Nursing Facility            36 minutes spent on face to face patient interaction, discussion with family, ancillary staff, and/or other physicians as well as review of pertinent labs, images, and notes.             Sathya Chambers DO  Department of Hospital Medicine   [facility]

## 2024-12-23 NOTE — ASSESSMENT & PLAN NOTE
Due to COPD, lung cancer, and pneumonia; on nebs; s/p therapeutic aspiration with bronchoscopy---results show NSCLC; cultures show yeast and diphtheroids; she completed 7 days of Zosyn; she has opacification of her right lung per CT chest; on high-flow O2   Continue to wean oxygen as able    We will treat yeast from bronchoscopy  Wean oxygen as able  Continue to wean oxygen as able  Continue to wean oxygen  Wean oxygen    12/22 - HFNC 25 liters, 44%     Wean oxygen as able

## 2024-12-23 NOTE — PLAN OF CARE
12/23/24 0944   Rounds   Attendance Provider;;Charge nurse;Physical therapist;Pharmacist   Discharge Plan A Skilled Nursing Facility   Why the patient remains in the hospital Requires continued medical care   Transition of Care Barriers None     Chart reviewed. continue to wean high-flow as able . Pt to return to [facility] at d/c. Ss following.

## 2024-12-24 LAB
ANION GAP SERPL CALCULATED.3IONS-SCNC: 15 MMOL/L (ref 7–16)
BASOPHILS # BLD AUTO: 0.05 K/UL (ref 0–0.2)
BASOPHILS NFR BLD AUTO: 0.4 % (ref 0–1)
BUN SERPL-MCNC: 11 MG/DL (ref 10–20)
BUN/CREAT SERPL: 20 (ref 6–20)
CALCIUM SERPL-MCNC: 8.9 MG/DL (ref 8.4–10.2)
CHLORIDE SERPL-SCNC: 100 MMOL/L (ref 98–107)
CO2 SERPL-SCNC: 30 MMOL/L (ref 23–31)
CREAT SERPL-MCNC: 0.54 MG/DL (ref 0.55–1.02)
DIFFERENTIAL METHOD BLD: ABNORMAL
EGFR (NO RACE VARIABLE) (RUSH/TITUS): 97 ML/MIN/1.73M2
EOSINOPHIL # BLD AUTO: 0.25 K/UL (ref 0–0.5)
EOSINOPHIL NFR BLD AUTO: 2.2 % (ref 1–4)
ERYTHROCYTE [DISTWIDTH] IN BLOOD BY AUTOMATED COUNT: 17.1 % (ref 11.5–14.5)
GLUCOSE SERPL-MCNC: 55 MG/DL (ref 82–115)
HCT VFR BLD AUTO: 30.5 % (ref 38–47)
HGB BLD-MCNC: 8.8 G/DL (ref 12–16)
IMM GRANULOCYTES # BLD AUTO: 0.06 K/UL (ref 0–0.04)
IMM GRANULOCYTES NFR BLD: 0.5 % (ref 0–0.4)
LYMPHOCYTES # BLD AUTO: 1.45 K/UL (ref 1–4.8)
LYMPHOCYTES NFR BLD AUTO: 12.9 % (ref 27–41)
MCH RBC QN AUTO: 23.9 PG (ref 27–31)
MCHC RBC AUTO-ENTMCNC: 28.9 G/DL (ref 32–36)
MCV RBC AUTO: 82.9 FL (ref 80–96)
MONOCYTES # BLD AUTO: 0.5 K/UL (ref 0–0.8)
MONOCYTES NFR BLD AUTO: 4.4 % (ref 2–6)
MPC BLD CALC-MCNC: 10.2 FL (ref 9.4–12.4)
NEUTROPHILS # BLD AUTO: 8.96 K/UL (ref 1.8–7.7)
NEUTROPHILS NFR BLD AUTO: 79.6 % (ref 53–65)
NRBC # BLD AUTO: 0 X10E3/UL
NRBC, AUTO (.00): 0 %
PLATELET # BLD AUTO: 471 K/UL (ref 150–400)
POTASSIUM SERPL-SCNC: 3.5 MMOL/L (ref 3.5–5.1)
RBC # BLD AUTO: 3.68 M/UL (ref 4.2–5.4)
SODIUM SERPL-SCNC: 141 MMOL/L (ref 136–145)
WBC # BLD AUTO: 11.27 K/UL (ref 4.5–11)

## 2024-12-24 PROCEDURE — 36415 COLL VENOUS BLD VENIPUNCTURE: CPT | Performed by: HOSPITALIST

## 2024-12-24 PROCEDURE — 99900035 HC TECH TIME PER 15 MIN (STAT)

## 2024-12-24 PROCEDURE — 94761 N-INVAS EAR/PLS OXIMETRY MLT: CPT

## 2024-12-24 PROCEDURE — 25000242 PHARM REV CODE 250 ALT 637 W/ HCPCS: Performed by: HOSPITALIST

## 2024-12-24 PROCEDURE — 94640 AIRWAY INHALATION TREATMENT: CPT

## 2024-12-24 PROCEDURE — 25000003 PHARM REV CODE 250: Performed by: HOSPITALIST

## 2024-12-24 PROCEDURE — 27000221 HC OXYGEN, UP TO 24 HOURS

## 2024-12-24 PROCEDURE — 85025 COMPLETE CBC W/AUTO DIFF WBC: CPT | Performed by: HOSPITALIST

## 2024-12-24 PROCEDURE — 25000003 PHARM REV CODE 250: Performed by: STUDENT IN AN ORGANIZED HEALTH CARE EDUCATION/TRAINING PROGRAM

## 2024-12-24 PROCEDURE — 11000008

## 2024-12-24 PROCEDURE — 63700000 PHARM REV CODE 250 ALT 637 W/O HCPCS: Performed by: STUDENT IN AN ORGANIZED HEALTH CARE EDUCATION/TRAINING PROGRAM

## 2024-12-24 PROCEDURE — 99900031 HC PATIENT EDUCATION (STAT)

## 2024-12-24 PROCEDURE — 99233 SBSQ HOSP IP/OBS HIGH 50: CPT | Mod: ,,, | Performed by: HOSPITALIST

## 2024-12-24 PROCEDURE — 80048 BASIC METABOLIC PNL TOTAL CA: CPT | Performed by: HOSPITALIST

## 2024-12-24 RX ADMIN — BUPROPION HYDROCHLORIDE 100 MG: 100 TABLET, FILM COATED, EXTENDED RELEASE ORAL at 08:12

## 2024-12-24 RX ADMIN — BUPROPION HYDROCHLORIDE 100 MG: 100 TABLET, FILM COATED, EXTENDED RELEASE ORAL at 09:12

## 2024-12-24 RX ADMIN — APIXABAN 5 MG: 5 TABLET, FILM COATED ORAL at 09:12

## 2024-12-24 RX ADMIN — QUETIAPINE 50 MG: 50 TABLET, FILM COATED, EXTENDED RELEASE ORAL at 09:12

## 2024-12-24 RX ADMIN — MICONAZOLE NITRATE ANTIFUNGAL POWDER: 2 POWDER TOPICAL at 09:12

## 2024-12-24 RX ADMIN — BUDESONIDE 0.5 MG: 0.5 INHALANT ORAL at 07:12

## 2024-12-24 RX ADMIN — MICONAZOLE NITRATE ANTIFUNGAL POWDER: 2 POWDER TOPICAL at 08:12

## 2024-12-24 RX ADMIN — TRAZODONE HYDROCHLORIDE 50 MG: 50 TABLET ORAL at 08:12

## 2024-12-24 RX ADMIN — APIXABAN 5 MG: 5 TABLET, FILM COATED ORAL at 08:12

## 2024-12-24 RX ADMIN — FLUCONAZOLE 200 MG: 100 TABLET ORAL at 09:12

## 2024-12-24 RX ADMIN — THERA TABS 1 TABLET: TAB at 09:12

## 2024-12-24 NOTE — PROGRESS NOTES
[facility]  Adult Nutrition  Follow-up Note         Reason for Assessment  Reason For Assessment: RD follow-up   Nutrition Risk Screen: no indicators present    Assessment and Plan  12/24/2024 RD Follow up: Patient continues on a high protein high calorie chopped meats diet and Boost Plus all meals. Po intakes remain poor. Noted patient remains on High flow oxygen. Last Bm noted 12/21 with concern for impaction but KUB clear.     Secure chat with RN with report that patient does well with finger foods. Will d/c double portions due to poor intakes and change to regular with finger foods to assist with easier po intakes. .    Consider addition of appetite stimulant vs alternate means of nutrition as aligns with goals of care due to dx of severe protein calorie malnutrition and catabolic illness. RD Following.       12/18/2024 RD Follow up: Patient is ordered a high protein/high calorie diet with chopped meats and Boost Plus all meals. Po intakes declined this week with 0-25% noted per flowsheets. Patient remains on High flow oxygen. Patient with recent fecal impaction with last BM noted 12/17. Current weight 36.8 kg. Continue diet and ONS as tolerated. Encourage po intakes.     12/11/2024 RD Follow up: Patient transferred to [facility]. Patient now ordered a regular diet and tolerating well per flowsheets with 75% intakes on average. Current weight 42.5 kg. Patient with dx cancer with increased energy needs. Recommend to add Boost Plus all meals for additional caloric intakes to promote weight maintenance/gain. RD Following.     12/5/2024: RD follow up. Patient advanced to a dysphagia mechanical soft diet. New diet order with no intake documented at this time. Recommend continue current diet order with Boost Breeze with meals. Encourage good PO intakes. Last BM 12/2 per flowsheet. RD following.     12/03/2024: Patient admitted 12/2 from [facility] with a dx of acute on chronic respiratory failure,  COPD exacerbation, pneumonia, and squamous cell carcinoma of lung. Patient is ordered a clear liquid diet.     Patient is 48.1 kg with a BMI of 17.11 and underweight. Patient with hx of severe PCM related to weight loss, physical signs of wasting and poor intakes. Weight has stabilized over the past 3 months, but patient remains severely underweight. Patient continues to meet ASPEN criteria for severe protein calorie malnutrition related to energy intakes less than 75% for greater than or equal to 1 month, and physical findings of muscle and fat loss to orbital, clavicle and temple regions related to chronic and catabolic illness.     Recommend to advance diet to regular as medically appropriate. Add Boost Breeze all meals while on clear liquids. Encourage po intakes as tolerated. RD Following.       Learning Needs/Social Determinants of Health  Learning Assessment       08/15/2024 1217 [facility] (8/15/2024 - 8/29/2024)   Created by Sydnee Goodwin, RN - RN (Nurse) Status: Complete                 PRIMARY LEARNER     Primary Learner Name:  tasha edwards  - 08/15/2024 1217    Relationship:  Patient  - 08/15/2024 1217    Does the primary learner have any barriers to learning?:  No Barriers  - 08/15/2024 1217    What is the preferred language of the primary learner?:  English  - 08/15/2024 1217    Is an  required?:  No  - 08/15/2024 1217    How does the primary learner prefer to learn new concepts?:  Listening, Reading, Demonstration  - 08/15/2024 1217    How often do you need to have someone help you read instructions, pamphlets, or written material from your doctor or pharmacy?:  Never WF - 08/15/2024 1217        CO-LEARNER #1     Co-Learner Name (if applicable):  brian edwards  - 08/15/2024 1217    Relationship:  Family  - 08/15/2024 1217    Does the co-learner have any barriers to learning?:  No Barriers  - 08/15/2024 1217    What is the  preferred language of the co-learner?:  English  - 08/15/2024 1217    Is an  required?:  No [facility] 08/15/2024 1217    How does the co-learner prefer to learn new concepts?:  Listening, Reading, Demonstration  - 08/15/2024 1217        CO-LEARNER #2     No question answered        SPECIAL TOPICS     No question answered        ANSWERED BY:     No question answered        Comments         Edit History       Sydnee Goodwin RN - RN (Nurse)   08/15/2024 1217                           Social Drivers of Health     Tobacco Use: Medium Risk (12/3/2024)    Patient History     Smoking Tobacco Use: Former     Smokeless Tobacco Use: Never     Passive Exposure: Not on file   Alcohol Use: Not At Risk (8/16/2024)    AUDIT-C     Frequency of Alcohol Consumption: Never     Average Number of Drinks: Patient does not drink     Frequency of Binge Drinking: Never   Financial Resource Strain: Low Risk  (12/9/2024)    Overall Financial Resource Strain (CARDIA)     Difficulty of Paying Living Expenses: Not hard at all   Food Insecurity: No Food Insecurity (12/9/2024)    Hunger Vital Sign     Worried About Running Out of Food in the Last Year: Never true     Ran Out of Food in the Last Year: Never true   Transportation Needs: No Transportation Needs (12/9/2024)    TRANSPORTATION NEEDS     Transportation : No   Physical Activity: Inactive (8/16/2024)    Exercise Vital Sign     Days of Exercise per Week: 0 days     Minutes of Exercise per Session: 0 min   Stress: No Stress Concern Present (12/9/2024)    [employer] [place] [employer] - Occupational Stress Questionnaire     Feeling of Stress : Only a little   Housing Stability: Low Risk  (12/9/2024)    Housing Stability Vital Sign     Unable to Pay for Housing in the Last Year: No     Homeless in the Last Year: No   Depression: Low Risk  (11/4/2024)    Depression     Last PHQ-4: Flowsheet Data: 0   Utilities: Not At Risk (12/9/2024)    [facility] Utilities     Threatened with  loss of utilities: No   Health Literacy: Adequate Health Literacy (12/9/2024)     Health Literacy     Frequency of need for help with medical instructions: Rarely   Social Isolation: Socially Integrated (8/16/2024)    Social Isolation     Social Isolation: 1            Malnutrition  Is Patient Malnourished: Yes Malnutrition Assessment  Malnutrition Context: chronic illness  Malnutrition Level: severe          Energy Intake (Malnutrition): less than 75% for greater than or equal to 1 month  Subcutaneous Fat (Malnutrition): severe depletion  Muscle Mass (Malnutrition): severe depletion   Orbital Region (Subcutaneous Fat Loss): severe depletion   [place]aptism Region (Muscle Loss): severe depletion  Clavicle Bone Region (Muscle Loss): severe depletion                 Nutrition Diagnosis  Malnutrition (Severe) related to Catabolic illness, Chronic illness, Decreased ability to consume sufficient energy, Inadequate Caloric intake, and Inadequate Protein intake as evidenced by energy intake less than 75% for greater than or equal to 1 month and physical findings of severe muscle depletion to temple and clavicle area and severe fat depletion to orbital area  Comments: add Boost Plus all meals; recommend high calorie high protein diet as tolerated; consider addition of appetite stimulant vs alternate means of nutrition support as aligns with goals of care    Recent Labs   Lab 12/24/24  0352   GLU 55*   low    Nutrition Prescription / Recommendations  Recommendation/Intervention: Consider nutrition support as aligns with goals of care if po intakes remain poor  Nutrition Goal Status: progressing towards goal  Communication of RD Recs: discussed on rounds  Current Diet Order: regular high protein high calorie  Nutrition Order Comments: 0% intakes over past several days  Oral Nutrition Supplement: Add Boost Plus all meals  Chewing or Swallowing Difficulty?: No Chewing or swallowing difficulty  Recommended Diet: Regular and  finger foods  due to weakness  Recommended Oral Supplement: Boost Plus [360kcals, 14g Protein, 45g Carbs] with meals  Is Nutrition Support Recommended: [facility] Nutrition Support: No  Is Nutrition Education Recommended: No    Monitor and Evaluation  % current Intake: P.O. intake of 0 - 10% and P.O. intake of 10 - 25%  % intake to meet estimated needs: P.O. + Supplements     Energy Calories Required: not meeting needs  Protein Required: not meeting needs  Fluid Required: not meeting needs  Tolerance: not tolerating    Current Medical Diagnosis and Past Medical History     Past Medical History:   Diagnosis Date    Centrilobular emphysema 08/19/2024    follows with Dr. 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 Kraft       Nutrition/Diet History  Spiritual, Cultural Beliefs, Yarsani Practices, Values that Affect Care: no  Factors Affecting Nutritional Intake: decreased appetite, constipation, other (see comments) (hi lisbeth 02)    Lab/Procedures/Meds  Recent Labs   Lab 12/24/24  0352      K 3.5   BUN 11   CREATININE 0.54*   CALCIUM 8.9      Note:     Last A1c:   Lab Results   Component Value Date    HGBA1C 5.3 12/03/2024     Lab Results   Component Value Date    RBC 3.68 (L) 12/24/2024    HGB 8.8 (L) 12/24/2024    HCT 30.5 (L) 12/24/2024    MCV 82.9 12/24/2024    MCH 23.9 (L) 12/24/2024    MCHC 28.9 (L) 12/24/2024   Note: H&H low    Pertinent Labs Reviewed: reviewed  Pertinent Medications Reviewed: reviewed  Scheduled Meds:   amiodarone in dextrose   "150 mg Intravenous Once    apixaban  5 mg Oral BID    budesonide  0.5 mg Nebulization Q12H    buPROPion  100 mg Oral BID    fluconazole  200 mg Oral Daily    miconazole NITRATE 2 %   Topical (Top) BID    multivitamin  1 tablet Oral Daily    QUEtiapine  50 mg Oral Daily    traZODone  50 mg Oral QHS     Continuous Infusions:        PRN Meds:.  Current Facility-Administered Medications:     acetaminophen, 650 mg, Oral, Q6H PRN    albuterol, 2.5 mg, Nebulization, Q4H PRN    albuterol-ipratropium, 3 mL, Nebulization, Q6H PRN    dextromethorphan-guaiFENesin  mg/5 ml, 10 mL, Oral, Q6H PRN    glucagon (human recombinant), 1 mg, Intramuscular, PRN    glucose, 16 g, Oral, PRN    glucose, 24 g, Oral, PRN    metoprolol, 5 mg, Intravenous, Q5 Min PRN    naloxone, 0.02 mg, Intravenous, PRN    sodium chloride 0.9%, 10 mL, Intravenous, Q12H PRN    Anthropometrics  Temp: 97.7 °F (36.5 °C)  Height Method: Stated  Height: 5' 6" (167.6 cm)  Height (inches): 66 in  Weight Method: Standard Scale  Weight: 32 kg (70 lb 8.8 oz)  Weight (lb): 70.55 lb  Ideal Body Weight (IBW), Female: 130 lb  % Ideal Body Weight, Female (lb): 72.08 %  BMI (Calculated): 11.4       Estimated/Assessed Needs      Temp: 97.7 °F (36.5 °C)Oral  Weight Used For Calorie Calculations: 32 kg (70 lb 8.8 oz)   Energy Need Method: Kcal/kg Energy Calorie Requirements (kcal): 7044-2860     Protein Requirements: 59-71  Estimated Fluid Requirement Method: RDA Method    RDA Method (mL): 1487       Nutrition by Nursing  Diet/Nutrition Received: regular  Intake (%): 0%  Diet/Feeding Assistance: tray set-up  Diet/Feeding Tolerance: poor  Last Bowel Movement: 12/21/24                Nutrition Follow-Up  RD Follow-up?: Yes                 Available via Secure Chat  "

## 2024-12-25 PROCEDURE — 94640 AIRWAY INHALATION TREATMENT: CPT

## 2024-12-25 PROCEDURE — 99232 SBSQ HOSP IP/OBS MODERATE 35: CPT | Mod: ,,, | Performed by: HOSPITALIST

## 2024-12-25 PROCEDURE — 94761 N-INVAS EAR/PLS OXIMETRY MLT: CPT

## 2024-12-25 PROCEDURE — 25000242 PHARM REV CODE 250 ALT 637 W/ HCPCS: Performed by: HOSPITALIST

## 2024-12-25 PROCEDURE — A6212 FOAM DRG <=16 SQ IN W/BORDER: HCPCS

## 2024-12-25 PROCEDURE — 99900035 HC TECH TIME PER 15 MIN (STAT)

## 2024-12-25 PROCEDURE — 25000003 PHARM REV CODE 250: Performed by: HOSPITALIST

## 2024-12-25 PROCEDURE — 11000008

## 2024-12-25 PROCEDURE — 25000003 PHARM REV CODE 250: Performed by: STUDENT IN AN ORGANIZED HEALTH CARE EDUCATION/TRAINING PROGRAM

## 2024-12-25 PROCEDURE — 27000221 HC OXYGEN, UP TO 24 HOURS

## 2024-12-25 PROCEDURE — 63700000 PHARM REV CODE 250 ALT 637 W/O HCPCS: Performed by: STUDENT IN AN ORGANIZED HEALTH CARE EDUCATION/TRAINING PROGRAM

## 2024-12-25 RX ADMIN — BUPROPION HYDROCHLORIDE 100 MG: 100 TABLET, FILM COATED, EXTENDED RELEASE ORAL at 09:12

## 2024-12-25 RX ADMIN — BUDESONIDE 0.5 MG: 0.5 INHALANT ORAL at 07:12

## 2024-12-25 RX ADMIN — THERA TABS 1 TABLET: TAB at 08:12

## 2024-12-25 RX ADMIN — FLUCONAZOLE 200 MG: 100 TABLET ORAL at 08:12

## 2024-12-25 RX ADMIN — BUPROPION HYDROCHLORIDE 100 MG: 100 TABLET, FILM COATED, EXTENDED RELEASE ORAL at 08:12

## 2024-12-25 RX ADMIN — QUETIAPINE 50 MG: 50 TABLET, FILM COATED, EXTENDED RELEASE ORAL at 08:12

## 2024-12-25 RX ADMIN — TRAZODONE HYDROCHLORIDE 50 MG: 50 TABLET ORAL at 09:12

## 2024-12-25 RX ADMIN — MICONAZOLE NITRATE ANTIFUNGAL POWDER: 2 POWDER TOPICAL at 08:12

## 2024-12-25 RX ADMIN — BUDESONIDE 0.5 MG: 0.5 INHALANT ORAL at 08:12

## 2024-12-25 RX ADMIN — APIXABAN 5 MG: 5 TABLET, FILM COATED ORAL at 09:12

## 2024-12-25 RX ADMIN — IPRATROPIUM BROMIDE AND ALBUTEROL SULFATE 3 ML: .5; 3 SOLUTION RESPIRATORY (INHALATION) at 07:12

## 2024-12-25 RX ADMIN — APIXABAN 5 MG: 5 TABLET, FILM COATED ORAL at 08:12

## 2024-12-25 RX ADMIN — MICONAZOLE NITRATE ANTIFUNGAL POWDER: 2 POWDER TOPICAL at 09:12

## 2024-12-25 NOTE — ASSESSMENT & PLAN NOTE
Cytology from bronch shows NSCLC; will leave to Oncology as outpt    12/09/24    Final Diagnosis   A. Right lung, bronchial washing:  Negative for malignant cells     B. Lung, right lower lobe, bronchoalveolar lavage:  - Suspicious for non-small cell carcinoma (see comment)  - Acute inflammation     Electronically signed by Jesus Sotelo III, MD on 12/10/2024 at 1107   Comment    The lavage from the right lower lobe (B) includes a few small clusters of atypical cells suspicious for non-small cell carcinoma.  There is insufficient tissue in the cell block to attempt immunostains to better define the lesion.

## 2024-12-25 NOTE — PLAN OF CARE
Problem: Adult Inpatient Plan of Care  Goal: Plan of Care Review  12/25/2024 0216 by Ashlyn Kinney RN  Outcome: Progressing  12/25/2024 0023 by Ashlyn Kinney RN  Outcome: Progressing  Goal: Patient-Specific Goal (Individualized)  12/25/2024 0216 by Ashlyn Kinney RN  Outcome: Progressing  12/25/2024 0023 by Ashlyn Kinney RN  Outcome: Progressing  Goal: Absence of Hospital-Acquired Illness or Injury  12/25/2024 0216 by Ashlyn Kinney RN  Outcome: Progressing  12/25/2024 0023 by Ashlyn Kinney RN  Outcome: Progressing  Goal: Optimal Comfort and Wellbeing  12/25/2024 0216 by Ashlyn Kinney RN  Outcome: Progressing  12/25/2024 0023 by Ashlyn Kinney RN  Outcome: Progressing  Goal: Readiness for Transition of Care  12/25/2024 0216 by Ashlyn Kinney RN  Outcome: Progressing  12/25/2024 0023 by Ashlyn Kinney RN  Outcome: Progressing     Problem: Infection  Goal: Absence of Infection Signs and Symptoms  12/25/2024 0216 by Ashlyn Kinney RN  Outcome: Progressing  12/25/2024 0023 by Ashlyn Kinney RN  Outcome: Progressing     Problem: Skin Injury Risk Increased  Goal: Skin Health and Integrity  12/25/2024 0216 by Ashlyn Kinney RN  Outcome: Progressing  12/25/2024 0023 by Ashlyn Kinney RN  Outcome: Progressing     Problem: Breathing Pattern Ineffective  Goal: Effective Breathing Pattern  12/25/2024 0216 by Ashlyn Kinney RN  Outcome: Progressing  12/25/2024 0023 by Ashlyn Kinney RN  Outcome: Progressing     Problem: Gas Exchange Impaired  Goal: Optimal Gas Exchange  12/25/2024 0216 by Ashlyn Kinney RN  Outcome: Progressing  12/25/2024 0023 by Ashlyn Kinney RN  Outcome: Progressing     Problem: Airway Clearance Ineffective  Goal: Effective Airway Clearance  12/25/2024 0216 by Ashlyn Kinney, RN  Outcome: Progressing  12/25/2024 0023 by Ashlyn Kinney, RN  Outcome: Progressing     Problem: Fall Injury Risk  Goal: Absence of Fall and Fall-Related  Injury  12/25/2024 0216 by Ashlyn Kinney RN  Outcome: Progressing  12/25/2024 0023 by Ashlyn Kinney RN  Outcome: Progressing     Problem: Wound  Goal: Optimal Coping  12/25/2024 0216 by Ashlyn Kinney RN  Outcome: Progressing  12/25/2024 0023 by Ashlyn Kinney RN  Outcome: Progressing  Goal: Optimal Functional Ability  12/25/2024 0216 by Ashlyn Kinney RN  Outcome: Progressing  12/25/2024 0023 by Ashlyn Kinney RN  Outcome: Progressing  Goal: Absence of Infection Signs and Symptoms  12/25/2024 0216 by Ashlyn Kinney RN  Outcome: Progressing  12/25/2024 0023 by Ashlyn Kinney RN  Outcome: Progressing  Goal: Improved Oral Intake  12/25/2024 0216 by Ashlyn Kinney RN  Outcome: Progressing  12/25/2024 0023 by Ashlyn Kinney RN  Outcome: Progressing  Goal: Optimal Pain Control and Function  12/25/2024 0216 by Ashlyn Kinney RN  Outcome: Progressing  12/25/2024 0023 by Ashlyn Kinney RN  Outcome: Progressing  Goal: Skin Health and Integrity  12/25/2024 0216 by Ashlyn Kinney RN  Outcome: Progressing  12/25/2024 0023 by Ashlyn Kinney RN  Outcome: Progressing  Goal: Optimal Wound Healing  12/25/2024 0216 by Ashlyn Kinney RN  Outcome: Progressing  12/25/2024 0023 by Ashlyn Kinney RN  Outcome: Progressing     Problem: Noninvasive Ventilation Acute  Goal: Effective Unassisted Ventilation and Oxygenation  Outcome: Progressing

## 2024-12-25 NOTE — ASSESSMENT & PLAN NOTE
Concern for cachexia as evidenced by BMI less than 20 in the presence of known chronic disease. Contributing factors include underlying Malignancy. Treatment to include nutrition consult.      Body mass index is 11.39 kg/m².  Albumin   Date Value Ref Range Status   12/17/2024 2.1 (L) 3.4 - 4.8 g/dL Final     Starting Marinol    12/25 continue to encourage increased oral intake

## 2024-12-25 NOTE — ASSESSMENT & PLAN NOTE
Due to COPD, lung cancer, and pneumonia; on nebs; s/p therapeutic aspiration with bronchoscopy---results show NSCLC; cultures show yeast and diphtheroids; she completed 7 days of Zosyn; she has opacification of her right lung per CT chest; on high-flow O2   Continue to wean oxygen as able    We will treat yeast from bronchoscopy  Wean oxygen as able  Continue to wean oxygen as able  Continue to wean oxygen  Wean oxygen    12/22 - HFNC 25 liters, 44%     Wean oxygen as able    12/24 remains on similar HFNC at 25 liters 46% O2  12/25 continue oxygen support

## 2024-12-25 NOTE — ASSESSMENT & PLAN NOTE
Due to COPD, lung cancer, and pneumonia; on nebs; s/p therapeutic aspiration with bronchoscopy---results show NSCLC; cultures show yeast and diphtheroids; she completed 7 days of Zosyn; she has opacification of her right lung per CT chest; on high-flow O2   Continue to wean oxygen as able    We will treat yeast from bronchoscopy  Wean oxygen as able  Continue to wean oxygen as able  Continue to wean oxygen  Wean oxygen    12/22 - HFNC 25 liters, 44%     Wean oxygen as able    12/24 remains on similar HFNC at 25 liters 46% O2

## 2024-12-25 NOTE — SUBJECTIVE & OBJECTIVE
Interval History:     Review of Systems   Constitutional:  Positive for fatigue. Negative for appetite change and fever.   HENT:  Negative for congestion, hearing loss and trouble swallowing.    Respiratory:  Positive for shortness of breath. Negative for chest tightness and wheezing.    Cardiovascular:  Negative for chest pain and palpitations.   Gastrointestinal:  Negative for abdominal pain, constipation and nausea.   Genitourinary:  Negative for difficulty urinating and dysuria.   Musculoskeletal:  Negative for back pain and neck stiffness.   Skin:  Negative for pallor and rash.   Neurological:  Negative for dizziness, speech difficulty and headaches.   Psychiatric/Behavioral:  Positive for confusion. Negative for suicidal ideas.      Objective:     Vital Signs (Most Recent):  Temp: 98.3 °F (36.8 °C) (12/25/24 1200)  Pulse: 90 (12/25/24 1333)  Resp: 20 (12/25/24 1333)  BP: (!) 108/36 (12/25/24 1200)  SpO2: 100 % (12/25/24 1333) Vital Signs (24h Range):  Temp:  [97.3 °F (36.3 °C)-98.4 °F (36.9 °C)] 98.3 °F (36.8 °C)  Pulse:  [78-96] 90  Resp:  [9-26] 20  SpO2:  [85 %-100 %] 100 %  BP: ()/(36-59) 108/36     Weight: 32 kg (70 lb 8.8 oz)  Body mass index is 11.39 kg/m².    Intake/Output Summary (Last 24 hours) at 12/25/2024 1556  Last data filed at 12/25/2024 1145  Gross per 24 hour   Intake 220 ml   Output --   Net 220 ml         Physical Exam  Vitals reviewed.   Constitutional:       General: She is awake. She is not in acute distress.     Appearance: She is well-developed. She is cachectic. She is not toxic-appearing.   HENT:      Head: Normocephalic.      Nose: Nose normal.      Mouth/Throat:      Pharynx: Oropharynx is clear.   Eyes:      Extraocular Movements: Extraocular movements intact.      Pupils: Pupils are equal, round, and reactive to light.   Neck:      Thyroid: No thyroid mass.      Vascular: No carotid bruit.   Cardiovascular:      Rate and Rhythm: Normal rate and regular rhythm.      Pulses:  Normal pulses.      Heart sounds: Normal heart sounds. No murmur heard.  Pulmonary:      Effort: Pulmonary effort is normal.      Breath sounds: Normal breath sounds and air entry. No wheezing.   Abdominal:      General: Bowel sounds are normal. There is no distension.      Palpations: Abdomen is soft.      Tenderness: There is no abdominal tenderness.   Musculoskeletal:         General: Normal range of motion.      Cervical back: Neck supple. No rigidity.   Skin:     General: Skin is warm.      Coloration: Skin is not jaundiced.      Findings: No lesion.   Neurological:      General: No focal deficit present.      Mental Status: She is alert and oriented to person, place, and time.      Cranial Nerves: No cranial nerve deficit.   Psychiatric:         Attention and Perception: Attention normal.         Mood and Affect: Mood normal.         Behavior: Behavior normal. Behavior is cooperative.         Thought Content: Thought content normal.         Cognition and Memory: Cognition normal.             Significant Labs: All pertinent labs within the past 24 hours have been reviewed.  BMP:   Recent Labs   Lab 12/24/24  0352   GLU 55*      K 3.5      CO2 30   BUN 11   CREATININE 0.54*   CALCIUM 8.9     CBC:   Recent Labs   Lab 12/24/24  0352   WBC 11.27*   HGB 8.8*   HCT 30.5*   *     CMP:   Recent Labs   Lab 12/24/24  0352      K 3.5      CO2 30   GLU 55*   BUN 11   CREATININE 0.54*   CALCIUM 8.9   ANIONGAP 15       Significant Imaging: I have reviewed all pertinent imaging results/findings within the past 24 hours.      Intake/Output - Last 3 Shifts         12/23 0700  12/24 0659 12/24 0700 12/25 0659 12/25 0700  12/26 0659    P.O. 0 60 160    Total Intake(mL/kg) 0 (0) 60 (1.9) 160 (5)    Net 0 +60 +160           Urine Occurrence 1 x 2 x 1 x          Microbiology Results (last 7 days)       ** No results found for the last 168 hours. **

## 2024-12-25 NOTE — SUBJECTIVE & OBJECTIVE
Interval History:     Review of Systems   Constitutional:  Positive for fatigue. Negative for appetite change and fever.   HENT:  Negative for congestion, hearing loss and trouble swallowing.    Respiratory:  Positive for shortness of breath. Negative for chest tightness and wheezing.    Cardiovascular:  Negative for chest pain and palpitations.   Gastrointestinal:  Negative for abdominal pain, constipation and nausea.   Genitourinary:  Negative for difficulty urinating and dysuria.   Musculoskeletal:  Negative for back pain and neck stiffness.   Skin:  Negative for pallor and rash.   Neurological:  Negative for dizziness, speech difficulty and headaches.   Psychiatric/Behavioral:  Positive for confusion. Negative for suicidal ideas.      Objective:     Vital Signs (Most Recent):  Temp: 98.1 °F (36.7 °C) (12/24/24 1345)  Pulse: 78 (12/24/24 1415)  Resp: 19 (12/24/24 1415)  BP: (!) 107/53 (12/24/24 0400)  SpO2: (!) 87 % (12/24/24 1315) Vital Signs (24h Range):  Temp:  [97.4 °F (36.3 °C)-98.3 °F (36.8 °C)] 98.1 °F (36.7 °C)  Pulse:  [73-86] 78  Resp:  [12-19] 19  SpO2:  [64 %-94 %] 87 %  BP: (105-108)/(49-54) 107/53     Weight: 32 kg (70 lb 8.8 oz)  Body mass index is 11.39 kg/m².  No intake or output data in the 24 hours ending 12/24/24 1813      Physical Exam  Vitals reviewed.   Constitutional:       General: She is awake. She is not in acute distress.     Appearance: She is well-developed. She is cachectic. She is not toxic-appearing.   HENT:      Head: Normocephalic.      Nose: Nose normal.      Mouth/Throat:      Pharynx: Oropharynx is clear.   Eyes:      Extraocular Movements: Extraocular movements intact.      Pupils: Pupils are equal, round, and reactive to light.   Neck:      Thyroid: No thyroid mass.      Vascular: No carotid bruit.   Cardiovascular:      Rate and Rhythm: Normal rate and regular rhythm.      Pulses: Normal pulses.      Heart sounds: Normal heart sounds. No murmur heard.  Pulmonary:       Effort: Pulmonary effort is normal.      Breath sounds: Normal breath sounds and air entry. No wheezing.   Abdominal:      General: Bowel sounds are normal. There is no distension.      Palpations: Abdomen is soft.      Tenderness: There is no abdominal tenderness.   Musculoskeletal:         General: Normal range of motion.      Cervical back: Neck supple. No rigidity.   Skin:     General: Skin is warm.      Coloration: Skin is not jaundiced.      Findings: No lesion.   Neurological:      General: No focal deficit present.      Mental Status: She is alert and oriented to person, place, and time.      Cranial Nerves: No cranial nerve deficit.   Psychiatric:         Attention and Perception: Attention normal.         Mood and Affect: Mood normal.         Behavior: Behavior normal. Behavior is cooperative.         Thought Content: Thought content normal.         Cognition and Memory: Cognition normal.             Significant Labs: All pertinent labs within the past 24 hours have been reviewed.  BMP:   Recent Labs   Lab 12/24/24  0352   GLU 55*      K 3.5      CO2 30   BUN 11   CREATININE 0.54*   CALCIUM 8.9     CBC:   Recent Labs   Lab 12/24/24  0352   WBC 11.27*   HGB 8.8*   HCT 30.5*   *     CMP:   Recent Labs   Lab 12/24/24  0352      K 3.5      CO2 30   GLU 55*   BUN 11   CREATININE 0.54*   CALCIUM 8.9   ANIONGAP 15       Significant Imaging: I have reviewed all pertinent imaging results/findings within the past 24 hours.      Intake/Output - Last 3 Shifts         12/22 0700  12/23 0659 12/23 0700  12/24 0659 12/24 0700  12/25 0659    P.O. 834 0     I.V. (mL/kg)       Total Intake(mL/kg) 834 (22.8) 0 (0)     Net +834 0            Urine Occurrence 4 x 1 x 1 x    Stool Occurrence 1 x            Microbiology Results (last 7 days)       ** No results found for the last 168 hours. **

## 2024-12-25 NOTE — PROGRESS NOTES
[facility] Medicine  Progress Note    Patient Name: Mayra Kelly  MRN: [id number]  Patient Class: IP- Inpatient   Admission Date: 12/9/2024  Length of Stay: 15 days  Attending Physician: Mark Barraza MD  Primary Care Provider: Darlene Campoverde NP        Subjective     Principal Problem:Acute hypoxemic respiratory failure        HPI:  74 year old female with a PMH of lung cancer presents with shortness of breath.  She was placed on O2 but kept taking it off, so she was sent to the ICU for closer monitoring.  CT chest showed lung nodules/masses.  She had opacification in the right lung with mucous plugging.  She was placed on Zosyn for pneumonia.  Today, she had a bronch done.  Results pending in the computer.  Patient denies chest pain, shortness of breath, nausea, vomiting, or diarrhea.       Overview/Hospital Course:  74 year old female with a PMH of lung cancer presents with shortness of breath and is now in LTAC for deconditioning and high flow O2; she is asleep on rounds  12/12 still on high-flow wean oxygen as able  12/13 saturating well breathing comfortably wean oxygen  12/14 continue to wean oxygen as able next  12/15 wean oxygen  12/16 doing well today wean oxygen  12/17 impacted enema today  12/18 rate controlled today  12/19 still having loose stools.  Was concern for impaction.  We will check KUB for stool burden  12/20 does not appear to have a large stool burden on KUB  12th/23 continue to wean high-flow as able    12/24 records reviewed.  remains on high-flow oxygen.  When seen was on 25 L at 46% oxygen.  No severe shortness of breath when seen.  Patient had been admitted to [facility] 12/09 from [facility] because of increased oxygen requirements.  Later transferred to [facility] 12/09/24.  Hx treated adenoCa in 2018. Last Nov underwent evaluation by Dr Munguia. Seen then again also by Dr Kraft. Dx new primary squamous cell CA. Treated chemotx. In April  saw Dr Kraft and started on Keytruda and given through infusion center. Has continued this through July. Missed her appt with Dr Kraft in May.  Pt continued to smoke 1/2 ppd; encourage to stop.  At home been prior on 2L BNC O2. PHMx: also Eliquis for PAF. Now NSR.    Seen during stay by Dr. Kraft and pulmonary during a August 2024 admission. Patient underwent rigid bronchoscopy with bronchial lavage, dilation, and stent by Dr. Castellon 8/14/24.  Admitted specially [facility] 08/15 then also.     Interval History:     Review of Systems   Constitutional:  Positive for fatigue. Negative for appetite change and fever.   HENT:  Negative for congestion, hearing loss and trouble swallowing.    Respiratory:  Positive for shortness of breath. Negative for chest tightness and wheezing.    Cardiovascular:  Negative for chest pain and palpitations.   Gastrointestinal:  Negative for abdominal pain, constipation and nausea.   Genitourinary:  Negative for difficulty urinating and dysuria.   Musculoskeletal:  Negative for back pain and neck stiffness.   Skin:  Negative for pallor and rash.   Neurological:  Negative for dizziness, speech difficulty and headaches.   Psychiatric/Behavioral:  Positive for confusion. Negative for suicidal ideas.      Objective:     Vital Signs (Most Recent):  Temp: 98.1 °F (36.7 °C) (12/24/24 1345)  Pulse: 78 (12/24/24 1415)  Resp: 19 (12/24/24 1415)  BP: (!) 107/53 (12/24/24 0400)  SpO2: (!) 87 % (12/24/24 1315) Vital Signs (24h Range):  Temp:  [97.4 °F (36.3 °C)-98.3 °F (36.8 °C)] 98.1 °F (36.7 °C)  Pulse:  [73-86] 78  Resp:  [12-19] 19  SpO2:  [64 %-94 %] 87 %  BP: (105-108)/(49-54) 107/53     Weight: 32 kg (70 lb 8.8 oz)  Body mass index is 11.39 kg/m².  No intake or output data in the 24 hours ending 12/24/24 1813      Physical Exam  Vitals reviewed.   Constitutional:       General: She is awake. She is not in acute distress.     Appearance: She is well-developed. She is cachectic. She is not  toxic-appearing.   HENT:      Head: Normocephalic.      Nose: Nose normal.      Mouth/Throat:      Pharynx: Oropharynx is clear.   Eyes:      Extraocular Movements: Extraocular movements intact.      Pupils: Pupils are equal, round, and reactive to light.   Neck:      Thyroid: No thyroid mass.      Vascular: No carotid bruit.   Cardiovascular:      Rate and Rhythm: Normal rate and regular rhythm.      Pulses: Normal pulses.      Heart sounds: Normal heart sounds. No murmur heard.  Pulmonary:      Effort: Pulmonary effort is normal.      Breath sounds: Normal breath sounds and air entry. No wheezing.   Abdominal:      General: Bowel sounds are normal. There is no distension.      Palpations: Abdomen is soft.      Tenderness: There is no abdominal tenderness.   Musculoskeletal:         General: Normal range of motion.      Cervical back: Neck supple. No rigidity.   Skin:     General: Skin is warm.      Coloration: Skin is not jaundiced.      Findings: No lesion.   Neurological:      General: No focal deficit present.      Mental Status: She is alert and oriented to person, place, and time.      Cranial Nerves: No cranial nerve deficit.   Psychiatric:         Attention and Perception: Attention normal.         Mood and Affect: Mood normal.         Behavior: Behavior normal. Behavior is cooperative.         Thought Content: Thought content normal.         Cognition and Memory: Cognition normal.             Significant Labs: All pertinent labs within the past 24 hours have been reviewed.  BMP:   Recent Labs   Lab 12/24/24  0352   GLU 55*      K 3.5      CO2 30   BUN 11   CREATININE 0.54*   CALCIUM 8.9     CBC:   Recent Labs   Lab 12/24/24  0352   WBC 11.27*   HGB 8.8*   HCT 30.5*   *     CMP:   Recent Labs   Lab 12/24/24  0352      K 3.5      CO2 30   GLU 55*   BUN 11   CREATININE 0.54*   CALCIUM 8.9   ANIONGAP 15       Significant Imaging: I have reviewed all pertinent imaging  results/findings within the past 24 hours.      Intake/Output - Last 3 Shifts         12/22 0700  12/23 0659 12/23 0700  12/24 0659 12/24 0700  12/25 0659    P.O. 834 0     I.V. (mL/kg)       Total Intake(mL/kg) 834 (22.8) 0 (0)     Net +834 0            Urine Occurrence 4 x 1 x 1 x    Stool Occurrence 1 x            Microbiology Results (last 7 days)       ** No results found for the last 168 hours. **              Assessment and Plan     * Acute hypoxemic respiratory failure  Due to COPD, lung cancer, and pneumonia; on nebs; s/p therapeutic aspiration with bronchoscopy---results show NSCLC; cultures show yeast and diphtheroids; she completed 7 days of Zosyn; she has opacification of her right lung per CT chest; on high-flow O2   Continue to wean oxygen as able    We will treat yeast from bronchoscopy  Wean oxygen as able  Continue to wean oxygen as able  Continue to wean oxygen  Wean oxygen    12/22 - HFNC 25 liters, 44%     Wean oxygen as able    12/24 remains on similar HFNC at 25 liters 46% O2    Constipation    Enema today  Loose stools.  Some concern that diarrhea going around impaction.  We will check KUB today  Can start polyethylene glycol.  KUB today not appear to show impaction or severe constipation    Cachexia  Concern for cachexia as evidenced by BMI less than 20 in the presence of known chronic disease. Contributing factors include underlying Malignancy. Treatment to include nutrition consult.      Body mass index is 13.03 kg/m².  Albumin   Date Value Ref Range Status   12/17/2024 2.1 (L) 3.4 - 4.8 g/dL Final     Starting Marinol    Delirium  Hallucinations.  Start Seroquel  Better        Hypotension  BP on the low side; will start IVF; she is not on any BP meds at this time  Asymptomatic  We will give 1 L fluid bolus today  Blood pressure at baseline    12/21 - BP low again. Will give fluid.     Lung cancer  Cytology from bronch shows NSCLC; will leave to Oncology as outpt      Paroxysmal atrial  fibrillation  On Eliquis  Rate controlled  Rate controlled    Severe protein-calorie malnutrition  Nutrition consulted. Most recent weight and BMI monitored-     Measurements:  Wt Readings from Last 1 Encounters:   12/20/24 36.6 kg (80 lb 11.2 oz)   Body mass index is 13.03 kg/m².    Patient has been screened and assessed by RD.    Malnutrition Type:  Context: chronic illness  Level: severe    Malnutrition Characteristic Summary:  Energy Intake (Malnutrition): less than 75% for greater than or equal to 1 month  Subcutaneous Fat (Malnutrition): severe depletion  Muscle Mass (Malnutrition): severe depletion    Interventions/Recommendations (treatment strategy):     Encourage oral intake    Starting marinol      VTE Risk Mitigation (From admission, onward)           Ordered     apixaban tablet 5 mg  2 times daily         12/09/24 1920                    Discharge Planning   TERE: 12/17/2024     Code Status: Full Code   Medical Readiness for Discharge Date:   Discharge Plan A: Skilled Nursing Facility                        Mark Barraza MD  Department of Hospital Medicine   [facility]

## 2024-12-25 NOTE — PLAN OF CARE
Problem: Adult Inpatient Plan of Care  Goal: Plan of Care Review  Outcome: Progressing  Goal: Absence of Hospital-Acquired Illness or Injury  Outcome: Progressing  Goal: Optimal Comfort and Wellbeing  Outcome: Progressing     Problem: Infection  Goal: Absence of Infection Signs and Symptoms  Outcome: Progressing     Problem: Skin Injury Risk Increased  Goal: Skin Health and Integrity  Outcome: Progressing     Problem: Breathing Pattern Ineffective  Goal: Effective Breathing Pattern  Outcome: Progressing     Problem: Gas Exchange Impaired  Goal: Optimal Gas Exchange  Outcome: Progressing     Problem: Airway Clearance Ineffective  Goal: Effective Airway Clearance  Outcome: Progressing     Problem: Fall Injury Risk  Goal: Absence of Fall and Fall-Related Injury  Outcome: Progressing     Problem: Wound  Goal: Optimal Coping  Outcome: Progressing  Goal: Absence of Infection Signs and Symptoms  Outcome: Progressing  Goal: Optimal Pain Control and Function  Outcome: Progressing  Goal: Skin Health and Integrity  Outcome: Progressing  Goal: Optimal Wound Healing  Outcome: Progressing     Problem: Wound  Goal: Improved Oral Intake  Outcome: Not Progressing

## 2024-12-25 NOTE — PLAN OF CARE
Problem: Breathing Pattern Ineffective  Goal: Effective Breathing Pattern  Outcome: Progressing     Problem: Gas Exchange Impaired  Goal: Optimal Gas Exchange  Outcome: Progressing     Problem: Noninvasive Ventilation Acute  Goal: Effective Unassisted Ventilation and Oxygenation  Outcome: Progressing

## 2024-12-25 NOTE — PROGRESS NOTES
[facility] Medicine  Progress Note    Patient Name: Mayra Kelly  MRN: [id number]  Patient Class: IP- Inpatient   Admission Date: 12/9/2024  Length of Stay: 16 days  Attending Physician: Mark Barraza MD  Primary Care Provider: Darlene Campoverde NP        Subjective     Principal Problem:Acute hypoxemic respiratory failure        HPI:  74 year old female with a PMH of lung cancer presents with shortness of breath.  She was placed on O2 but kept taking it off, so she was sent to the ICU for closer monitoring.  CT chest showed lung nodules/masses.  She had opacification in the right lung with mucous plugging.  She was placed on Zosyn for pneumonia.  Today, she had a bronch done.  Results pending in the computer.  Patient denies chest pain, shortness of breath, nausea, vomiting, or diarrhea.       Overview/Hospital Course:  74 year old female with a PMH of lung cancer presents with shortness of breath and is now in LTAC for deconditioning and high flow O2; she is asleep on rounds  12/12 still on high-flow wean oxygen as able  12/13 saturating well breathing comfortably wean oxygen  12/14 continue to wean oxygen as able next  12/15 wean oxygen  12/16 doing well today wean oxygen  12/17 impacted enema today  12/18 rate controlled today  12/19 still having loose stools.  Was concern for impaction.  We will check KUB for stool burden  12/20 does not appear to have a large stool burden on KUB  12th/23 continue to wean high-flow as able    12/24 records reviewed.  remains on high-flow oxygen.  When seen was on 25 L at 46% oxygen.  No severe shortness of breath when seen.  Patient had been admitted to [facility] 12/09 from [facility] because of increased oxygen requirements.  Later transferred to [facility] 12/09/24.  Hx treated adenoCa in 2018. Last Nov underwent evaluation by Dr Munguia. Seen then again also by Dr Kraft. Dx new primary squamous cell CA. Treated chemotx. In April  saw Dr Kraft and started on Keytruda and given through infusion center. Has continued this through July. Missed her appt with Dr Kraft in May.  Pt continued to smoke 1/2 ppd; encourage to stop.  At home been prior on 2L BNC O2. PHMx: also Eliquis for PAF. Now NSR.    Seen during stay by Dr. Kraft and pulmonary during a August 2024 admission. Patient underwent rigid bronchoscopy with bronchial lavage, dilation, and stent by Dr. Castellon 8/14/24.  Admitted [facility] 08/15 then also.   12/25 no new complaints, encouraged patient to taking better orally.  Reviewed last pathology report suggesting non- small cell carcinoma with previous bronchoscopy    Interval History:     Review of Systems   Constitutional:  Positive for fatigue. Negative for appetite change and fever.   HENT:  Negative for congestion, hearing loss and trouble swallowing.    Respiratory:  Positive for shortness of breath. Negative for chest tightness and wheezing.    Cardiovascular:  Negative for chest pain and palpitations.   Gastrointestinal:  Negative for abdominal pain, constipation and nausea.   Genitourinary:  Negative for difficulty urinating and dysuria.   Musculoskeletal:  Negative for back pain and neck stiffness.   Skin:  Negative for pallor and rash.   Neurological:  Negative for dizziness, speech difficulty and headaches.   Psychiatric/Behavioral:  Positive for confusion. Negative for suicidal ideas.      Objective:     Vital Signs (Most Recent):  Temp: 98.3 °F (36.8 °C) (12/25/24 1200)  Pulse: 90 (12/25/24 1333)  Resp: 20 (12/25/24 1333)  BP: (!) 108/36 (12/25/24 1200)  SpO2: 100 % (12/25/24 1333) Vital Signs (24h Range):  Temp:  [97.3 °F (36.3 °C)-98.4 °F (36.9 °C)] 98.3 °F (36.8 °C)  Pulse:  [78-96] 90  Resp:  [9-26] 20  SpO2:  [85 %-100 %] 100 %  BP: ()/(36-59) 108/36     Weight: 32 kg (70 lb 8.8 oz)  Body mass index is 11.39 kg/m².    Intake/Output Summary (Last 24 hours) at 12/25/2024 9353  Last data filed at  12/25/2024 1145  Gross per 24 hour   Intake 220 ml   Output --   Net 220 ml         Physical Exam  Vitals reviewed.   Constitutional:       General: She is awake. She is not in acute distress.     Appearance: She is well-developed. She is cachectic. She is not toxic-appearing.   HENT:      Head: Normocephalic.      Nose: Nose normal.      Mouth/Throat:      Pharynx: Oropharynx is clear.   Eyes:      Extraocular Movements: Extraocular movements intact.      Pupils: Pupils are equal, round, and reactive to light.   Neck:      Thyroid: No thyroid mass.      Vascular: No carotid bruit.   Cardiovascular:      Rate and Rhythm: Normal rate and regular rhythm.      Pulses: Normal pulses.      Heart sounds: Normal heart sounds. No murmur heard.  Pulmonary:      Effort: Pulmonary effort is normal.      Breath sounds: Normal breath sounds and air entry. No wheezing.   Abdominal:      General: Bowel sounds are normal. There is no distension.      Palpations: Abdomen is soft.      Tenderness: There is no abdominal tenderness.   Musculoskeletal:         General: Normal range of motion.      Cervical back: Neck supple. No rigidity.   Skin:     General: Skin is warm.      Coloration: Skin is not jaundiced.      Findings: No lesion.   Neurological:      General: No focal deficit present.      Mental Status: She is alert and oriented to person, place, and time.      Cranial Nerves: No cranial nerve deficit.   Psychiatric:         Attention and Perception: Attention normal.         Mood and Affect: Mood normal.         Behavior: Behavior normal. Behavior is cooperative.         Thought Content: Thought content normal.         Cognition and Memory: Cognition normal.             Significant Labs: All pertinent labs within the past 24 hours have been reviewed.  BMP:   Recent Labs   Lab 12/24/24  0352   GLU 55*      K 3.5      CO2 30   BUN 11   CREATININE 0.54*   CALCIUM 8.9     CBC:   Recent Labs   Lab 12/24/24  0352   WBC  11.27*   HGB 8.8*   HCT 30.5*   *     CMP:   Recent Labs   Lab 12/24/24  0352      K 3.5      CO2 30   GLU 55*   BUN 11   CREATININE 0.54*   CALCIUM 8.9   ANIONGAP 15       Significant Imaging: I have reviewed all pertinent imaging results/findings within the past 24 hours.      Intake/Output - Last 3 Shifts         12/23 0700 12/24 0659 12/24 0700 12/25 0659 12/25 0700 12/26 0659    P.O. 0 60 160    Total Intake(mL/kg) 0 (0) 60 (1.9) 160 (5)    Net 0 +60 +160           Urine Occurrence 1 x 2 x 1 x          Microbiology Results (last 7 days)       ** No results found for the last 168 hours. **              Assessment and Plan     * Acute hypoxemic respiratory failure  Due to COPD, lung cancer, and pneumonia; on nebs; s/p therapeutic aspiration with bronchoscopy---results show NSCLC; cultures show yeast and diphtheroids; she completed 7 days of Zosyn; she has opacification of her right lung per CT chest; on high-flow O2   Continue to wean oxygen as able    We will treat yeast from bronchoscopy  Wean oxygen as able  Continue to wean oxygen as able  Continue to wean oxygen  Wean oxygen    12/22 - HFNC 25 liters, 44%     Wean oxygen as able    12/24 remains on similar HFNC at 25 liters 46% O2  12/25 continue oxygen support    Constipation    Enema today  Loose stools.  Some concern that diarrhea going around impaction.  We will check KUB today  Can start polyethylene glycol.  KUB today not appear to show impaction or severe constipation    Cachexia  Concern for cachexia as evidenced by BMI less than 20 in the presence of known chronic disease. Contributing factors include underlying Malignancy. Treatment to include nutrition consult.      Body mass index is 11.39 kg/m².  Albumin   Date Value Ref Range Status   12/17/2024 2.1 (L) 3.4 - 4.8 g/dL Final     Starting Marinol    12/25 continue to encourage increased oral intake    Delirium  Hallucinations.  Start Seroquel  Better        Hypotension  BP  on the low side; will start IVF; she is not on any BP meds at this time  Asymptomatic  We will give 1 L fluid bolus today  Blood pressure at baseline    12/21 - BP low again. Will give fluid.     Lung cancer  Cytology from bronch shows NSCLC; will leave to Oncology as outpt    12/09/24    Final Diagnosis   A. Right lung, bronchial washing:  Negative for malignant cells     B. Lung, right lower lobe, bronchoalveolar lavage:  - Suspicious for non-small cell carcinoma (see comment)  - Acute inflammation     Electronically signed by Jesus Sotelo III, MD on 12/10/2024 at 1107   Comment    The lavage from the right lower lobe (B) includes a few small clusters of atypical cells suspicious for non-small cell carcinoma.  There is insufficient tissue in the cell block to attempt immunostains to better define the lesion.         Paroxysmal atrial fibrillation  On Eliquis  Rate controlled  Rate controlled    Severe protein-calorie malnutrition  Nutrition consulted. Most recent weight and BMI monitored-     Measurements:  Wt Readings from Last 1 Encounters:   12/20/24 36.6 kg (80 lb 11.2 oz)   Body mass index is 13.03 kg/m².    Patient has been screened and assessed by RD.    Malnutrition Type:  Context: chronic illness  Level: severe    Malnutrition Characteristic Summary:  Energy Intake (Malnutrition): less than 75% for greater than or equal to 1 month  Subcutaneous Fat (Malnutrition): severe depletion  Muscle Mass (Malnutrition): severe depletion    Interventions/Recommendations (treatment strategy):     Encourage oral intake    Starting marinol      VTE Risk Mitigation (From admission, onward)           Ordered     apixaban tablet 5 mg  2 times daily         12/09/24 1920                    Discharge Planning   TERE: 12/17/2024     Code Status: Full Code   Medical Readiness for Discharge Date:   Discharge Plan A: Skilled Nursing Facility                        Mark Barraza MD  Department of Hospital Medicine    [facility]

## 2024-12-26 PROBLEM — R62.7 FAILURE TO THRIVE IN ADULT: Status: ACTIVE | Noted: 2024-12-26

## 2024-12-26 PROCEDURE — 27000221 HC OXYGEN, UP TO 24 HOURS

## 2024-12-26 PROCEDURE — 99900035 HC TECH TIME PER 15 MIN (STAT)

## 2024-12-26 PROCEDURE — 25000003 PHARM REV CODE 250: Performed by: STUDENT IN AN ORGANIZED HEALTH CARE EDUCATION/TRAINING PROGRAM

## 2024-12-26 PROCEDURE — 25000242 PHARM REV CODE 250 ALT 637 W/ HCPCS: Performed by: HOSPITALIST

## 2024-12-26 PROCEDURE — 94761 N-INVAS EAR/PLS OXIMETRY MLT: CPT

## 2024-12-26 PROCEDURE — 63700000 PHARM REV CODE 250 ALT 637 W/O HCPCS: Performed by: STUDENT IN AN ORGANIZED HEALTH CARE EDUCATION/TRAINING PROGRAM

## 2024-12-26 PROCEDURE — 11000008

## 2024-12-26 PROCEDURE — 99232 SBSQ HOSP IP/OBS MODERATE 35: CPT | Mod: GC,,, | Performed by: HOSPITALIST

## 2024-12-26 PROCEDURE — 94640 AIRWAY INHALATION TREATMENT: CPT

## 2024-12-26 PROCEDURE — 25000003 PHARM REV CODE 250: Performed by: HOSPITALIST

## 2024-12-26 PROCEDURE — 99900031 HC PATIENT EDUCATION (STAT)

## 2024-12-26 RX ADMIN — TRAZODONE HYDROCHLORIDE 50 MG: 50 TABLET ORAL at 08:12

## 2024-12-26 RX ADMIN — BUDESONIDE 0.5 MG: 0.5 INHALANT ORAL at 09:12

## 2024-12-26 RX ADMIN — FLUCONAZOLE 200 MG: 100 TABLET ORAL at 09:12

## 2024-12-26 RX ADMIN — BUDESONIDE 0.5 MG: 0.5 INHALANT ORAL at 07:12

## 2024-12-26 RX ADMIN — MICONAZOLE NITRATE ANTIFUNGAL POWDER: 2 POWDER TOPICAL at 08:12

## 2024-12-26 RX ADMIN — THERA TABS 1 TABLET: TAB at 09:12

## 2024-12-26 RX ADMIN — APIXABAN 5 MG: 5 TABLET, FILM COATED ORAL at 08:12

## 2024-12-26 RX ADMIN — BUPROPION HYDROCHLORIDE 100 MG: 100 TABLET, FILM COATED, EXTENDED RELEASE ORAL at 09:12

## 2024-12-26 RX ADMIN — APIXABAN 5 MG: 5 TABLET, FILM COATED ORAL at 09:12

## 2024-12-26 RX ADMIN — MICONAZOLE NITRATE ANTIFUNGAL POWDER: 2 POWDER TOPICAL at 09:12

## 2024-12-26 RX ADMIN — BUPROPION HYDROCHLORIDE 100 MG: 100 TABLET, FILM COATED, EXTENDED RELEASE ORAL at 08:12

## 2024-12-26 RX ADMIN — QUETIAPINE 50 MG: 50 TABLET, FILM COATED, EXTENDED RELEASE ORAL at 09:12

## 2024-12-26 NOTE — PROGRESS NOTES
[facility] Medicine  Progress Note    Patient Name: Mayra Kelly  MRN: [id number]  Patient Class: IP- Inpatient   Admission Date: 12/9/2024  Length of Stay: 17 days  Attending Physician: Mark Barraza MD  Primary Care Provider: Darlene Campoverde NP        Subjective     Principal Problem:Acute hypoxemic respiratory failure        HPI:  74 year old female with a PMH of lung cancer presents with shortness of breath.  She was placed on O2 but kept taking it off, so she was sent to the ICU for closer monitoring.  CT chest showed lung nodules/masses.  She had opacification in the right lung with mucous plugging.  She was placed on Zosyn for pneumonia.  Today, she had a bronch done.  Results pending in the computer.  Patient denies chest pain, shortness of breath, nausea, vomiting, or diarrhea.       Overview/Hospital Course:  74 year old female with a PMH of lung cancer presents with shortness of breath and is now in LTAC for deconditioning and high flow O2; she is asleep on rounds  12/12 still on high-flow wean oxygen as able  12/13 saturating well breathing comfortably wean oxygen  12/14 continue to wean oxygen as able next  12/15 wean oxygen  12/16 doing well today wean oxygen  12/17 impacted enema today  12/18 rate controlled today  12/19 still having loose stools.  Was concern for impaction.  We will check KUB for stool burden  12/20 does not appear to have a large stool burden on KUB  12th/23 continue to wean high-flow as able    12/24 records reviewed.  remains on high-flow oxygen.  When seen was on 25 L at 46% oxygen.  No severe shortness of breath when seen.  Patient had been admitted to [facility] 12/09 from [facility] because of increased oxygen requirements.  Later transferred to [facility] 12/09/24.  Hx treated adenoCa in 2018. Last Nov underwent evaluation by Dr Munguia. Seen then again also by Dr Kraft. Dx new primary squamous cell CA. Treated chemotx. In April  saw Dr Kraft and started on Keytruda and given through infusion center. Has continued this through July. Missed her appt with Dr Kraft in May.  Pt continued to smoke 1/2 ppd; encourage to stop.  At home been prior on 2L BNC O2. PHMx: also Eliquis for PAF. Now NSR.    Seen during stay by Dr. Kraft and pulmonary during a August 2024 admission. Patient underwent rigid bronchoscopy with bronchial lavage, dilation, and stent by Dr. Castellon 8/14/24.  Admitted [facility] 08/15 then also.   12/25 no new complaints, encouraged patient to taking better orally.  Reviewed last pathology report suggesting non- small cell carcinoma with previous bronchoscopy  12/26 patient looks weak and not eating well.  Not likely to do well going forward.  Touch bases with oncology.  Have not seen family  On 3 L by nasal cannula O2 off high-flow    Interval History:     Review of Systems   Constitutional:  Positive for fatigue. Negative for appetite change and fever.   HENT:  Negative for congestion, hearing loss and trouble swallowing.    Respiratory:  Positive for shortness of breath. Negative for chest tightness and wheezing.    Cardiovascular:  Negative for chest pain and palpitations.   Gastrointestinal:  Negative for abdominal pain, constipation and nausea.   Genitourinary:  Negative for difficulty urinating and dysuria.   Musculoskeletal:  Negative for back pain and neck stiffness.   Skin:  Negative for pallor and rash.   Neurological:  Negative for dizziness, speech difficulty and headaches.   Psychiatric/Behavioral:  Positive for confusion. Negative for suicidal ideas.      Objective:     Vital Signs (Most Recent):  Temp: 98.4 °F (36.9 °C) (12/26/24 1600)  Pulse: 83 (12/26/24 0900)  Resp: 19 (12/26/24 0900)  BP: (!) 115/55 (12/26/24 0700)  SpO2: 100 % (12/26/24 0900) Vital Signs (24h Range):  Temp:  [97.3 °F (36.3 °C)-98.4 °F (36.9 °C)] 98.4 °F (36.9 °C)  Pulse:  [78-88] 83  Resp:  [14-22] 19  SpO2:  [89 %-100 %] 100 %  BP:  ()/(44-55) 115/55     Weight: 30.7 kg (67 lb 9.6 oz)  Body mass index is 10.91 kg/m².    Intake/Output Summary (Last 24 hours) at 12/26/2024 1641  Last data filed at 12/25/2024 1715  Gross per 24 hour   Intake 0 ml   Output --   Net 0 ml         Physical Exam  Vitals reviewed.   Constitutional:       General: She is awake. She is not in acute distress.     Appearance: She is well-developed. She is cachectic. She is not toxic-appearing.   HENT:      Head: Normocephalic.      Nose: Nose normal.      Mouth/Throat:      Pharynx: Oropharynx is clear.   Eyes:      Extraocular Movements: Extraocular movements intact.      Pupils: Pupils are equal, round, and reactive to light.   Neck:      Thyroid: No thyroid mass.      Vascular: No carotid bruit.   Cardiovascular:      Rate and Rhythm: Normal rate and regular rhythm.      Pulses: Normal pulses.      Heart sounds: Normal heart sounds. No murmur heard.  Pulmonary:      Effort: Pulmonary effort is normal.      Breath sounds: Normal breath sounds and air entry. No wheezing.   Abdominal:      General: Bowel sounds are normal. There is no distension.      Palpations: Abdomen is soft.      Tenderness: There is no abdominal tenderness.   Musculoskeletal:         General: Normal range of motion.      Cervical back: Neck supple. No rigidity.   Skin:     General: Skin is warm.      Coloration: Skin is not jaundiced.      Findings: No lesion.   Neurological:      General: No focal deficit present.      Mental Status: She is alert and oriented to person, place, and time.      Cranial Nerves: No cranial nerve deficit.   Psychiatric:         Attention and Perception: Attention normal.         Mood and Affect: Mood normal.         Behavior: Behavior normal. Behavior is cooperative.         Thought Content: Thought content normal.         Cognition and Memory: Cognition normal.             Significant Labs: All pertinent labs within the past 24 hours have been reviewed.  BMP:   No  "results for input(s): "GLU", "NA", "K", "CL", "CO2", "BUN", "CREATININE", "CALCIUM", "MG" in the last 48 hours.    CBC:   No results for input(s): "WBC", "HGB", "HCT", "PLT" in the last 48 hours.    CMP:   No results for input(s): "NA", "K", "CL", "CO2", "GLU", "BUN", "CREATININE", "CALCIUM", "PROT", "ALBUMIN", "BILITOT", "ALKPHOS", "AST", "ALT", "ANIONGAP", "EGFRNONAA" in the last 48 hours.    Invalid input(s): "ESTGFAFRICA"      Significant Imaging: I have reviewed all pertinent imaging results/findings within the past 24 hours.      Intake/Output - Last 3 Shifts         12/24 0700 12/25 0659 12/25 0700 12/26 0659 12/26 0700 12/27 0659    P.O. 60 160     Total Intake(mL/kg) 60 (1.9) 160 (5)     Net +60 +160            Urine Occurrence 2 x 6 x     Stool Occurrence  1 x           Microbiology Results (last 7 days)       ** No results found for the last 168 hours. **              Assessment and Plan     * Acute hypoxemic respiratory failure  Due to COPD, lung cancer, and pneumonia; on nebs; s/p therapeutic aspiration with bronchoscopy---results show NSCLC; cultures show yeast and diphtheroids; she completed 7 days of Zosyn; she has opacification of her right lung per CT chest; on high-flow O2   Continue to wean oxygen as able    We will treat yeast from bronchoscopy  Wean oxygen as able  Continue to wean oxygen as able  Continue to wean oxygen  Wean oxygen    12/22 - HFNC 25 liters, 44%     Wean oxygen as able    12/24 remains on similar HFNC at 25 liters 46% O2  12/25 continue oxygen support    Failure to thrive in adult    Progressively weaker not eating well  No family in room  Will touch bases with Oncology  Does not seem strong enough for any aggressive therapy at this point    Constipation    Enema today  Loose stools.  Some concern that diarrhea going around impaction.  We will check KUB today  Can start polyethylene glycol.  KUB today not appear to show impaction or severe constipation    Cachexia  Concern " for cachexia as evidenced by BMI less than 20 in the presence of known chronic disease. Contributing factors include underlying Malignancy. Treatment to include nutrition consult.      Body mass index is 11.39 kg/m².  Albumin   Date Value Ref Range Status   12/17/2024 2.1 (L) 3.4 - 4.8 g/dL Final     Starting Marinol    12/25 continue to encourage increased oral intake    Delirium  Hallucinations.  Start Seroquel  Better        Hypotension  BP on the low side; will start IVF; she is not on any BP meds at this time  Asymptomatic  We will give 1 L fluid bolus today  Blood pressure at baseline    12/21 - BP low again. Will give fluid.     Lung cancer  Cytology from bronch shows NSCLC; will leave to Oncology as outpt    12/09/24    Final Diagnosis   A. Right lung, bronchial washing:  Negative for malignant cells     B. Lung, right lower lobe, bronchoalveolar lavage:  - Suspicious for non-small cell carcinoma (see comment)  - Acute inflammation     Electronically signed by Jesus Sotelo III, MD on 12/10/2024 at 1107   Comment    The lavage from the right lower lobe (B) includes a few small clusters of atypical cells suspicious for non-small cell carcinoma.  There is insufficient tissue in the cell block to attempt immunostains to better define the lesion.         Paroxysmal atrial fibrillation  On Eliquis  Rate controlled  Rate controlled    Severe protein-calorie malnutrition  Nutrition consulted. Most recent weight and BMI monitored-     Measurements:  Wt Readings from Last 1 Encounters:   12/20/24 36.6 kg (80 lb 11.2 oz)   Body mass index is 13.03 kg/m².    Patient has been screened and assessed by RD.    Malnutrition Type:  Context: chronic illness  Level: severe    Malnutrition Characteristic Summary:  Energy Intake (Malnutrition): less than 75% for greater than or equal to 1 month  Subcutaneous Fat (Malnutrition): severe depletion  Muscle Mass (Malnutrition): severe depletion    Interventions/Recommendations  (treatment strategy):     Encourage oral intake    Starting marinol      VTE Risk Mitigation (From admission, onward)           Ordered     apixaban tablet 5 mg  2 times daily         12/09/24 1920                    Discharge Planning   TERE: 12/17/2024     Code Status: Full Code   Medical Readiness for Discharge Date:   Discharge Plan A: Skilled Nursing Facility                        Mark Barraza MD  Department of Hospital Medicine   [facility]

## 2024-12-26 NOTE — ASSESSMENT & PLAN NOTE
Progressively weaker not eating well  No family in room  Will touch bases with Oncology  Does not seem strong enough for any aggressive therapy at this point

## 2024-12-26 NOTE — PLAN OF CARE
12/26/24 1311   Rounds   Attendance Provider;Nurse ;Physical therapist;Pharmacist   Discharge Plan A Skilled Nursing Facility   Why the patient remains in the hospital Requires continued medical care   Transition of Care Barriers None     Cont POC, cont to wean from hi flow oxygen, dc plan to return to [facility]

## 2024-12-26 NOTE — SUBJECTIVE & OBJECTIVE
Interval History:     Review of Systems   Constitutional:  Positive for fatigue. Negative for appetite change and fever.   HENT:  Negative for congestion, hearing loss and trouble swallowing.    Respiratory:  Positive for shortness of breath. Negative for chest tightness and wheezing.    Cardiovascular:  Negative for chest pain and palpitations.   Gastrointestinal:  Negative for abdominal pain, constipation and nausea.   Genitourinary:  Negative for difficulty urinating and dysuria.   Musculoskeletal:  Negative for back pain and neck stiffness.   Skin:  Negative for pallor and rash.   Neurological:  Negative for dizziness, speech difficulty and headaches.   Psychiatric/Behavioral:  Positive for confusion. Negative for suicidal ideas.      Objective:     Vital Signs (Most Recent):  Temp: 98.4 °F (36.9 °C) (12/26/24 1600)  Pulse: 83 (12/26/24 0900)  Resp: 19 (12/26/24 0900)  BP: (!) 115/55 (12/26/24 0700)  SpO2: 100 % (12/26/24 0900) Vital Signs (24h Range):  Temp:  [97.3 °F (36.3 °C)-98.4 °F (36.9 °C)] 98.4 °F (36.9 °C)  Pulse:  [78-88] 83  Resp:  [14-22] 19  SpO2:  [89 %-100 %] 100 %  BP: ()/(44-55) 115/55     Weight: 30.7 kg (67 lb 9.6 oz)  Body mass index is 10.91 kg/m².    Intake/Output Summary (Last 24 hours) at 12/26/2024 1641  Last data filed at 12/25/2024 1715  Gross per 24 hour   Intake 0 ml   Output --   Net 0 ml         Physical Exam  Vitals reviewed.   Constitutional:       General: She is awake. She is not in acute distress.     Appearance: She is well-developed. She is cachectic. She is not toxic-appearing.   HENT:      Head: Normocephalic.      Nose: Nose normal.      Mouth/Throat:      Pharynx: Oropharynx is clear.   Eyes:      Extraocular Movements: Extraocular movements intact.      Pupils: Pupils are equal, round, and reactive to light.   Neck:      Thyroid: No thyroid mass.      Vascular: No carotid bruit.   Cardiovascular:      Rate and Rhythm: Normal rate and regular rhythm.      Pulses:  "Normal pulses.      Heart sounds: Normal heart sounds. No murmur heard.  Pulmonary:      Effort: Pulmonary effort is normal.      Breath sounds: Normal breath sounds and air entry. No wheezing.   Abdominal:      General: Bowel sounds are normal. There is no distension.      Palpations: Abdomen is soft.      Tenderness: There is no abdominal tenderness.   Musculoskeletal:         General: Normal range of motion.      Cervical back: Neck supple. No rigidity.   Skin:     General: Skin is warm.      Coloration: Skin is not jaundiced.      Findings: No lesion.   Neurological:      General: No focal deficit present.      Mental Status: She is alert and oriented to person, place, and time.      Cranial Nerves: No cranial nerve deficit.   Psychiatric:         Attention and Perception: Attention normal.         Mood and Affect: Mood normal.         Behavior: Behavior normal. Behavior is cooperative.         Thought Content: Thought content normal.         Cognition and Memory: Cognition normal.             Significant Labs: All pertinent labs within the past 24 hours have been reviewed.  BMP:   No results for input(s): "GLU", "NA", "K", "CL", "CO2", "BUN", "CREATININE", "CALCIUM", "MG" in the last 48 hours.    CBC:   No results for input(s): "WBC", "HGB", "HCT", "PLT" in the last 48 hours.    CMP:   No results for input(s): "NA", "K", "CL", "CO2", "GLU", "BUN", "CREATININE", "CALCIUM", "PROT", "ALBUMIN", "BILITOT", "ALKPHOS", "AST", "ALT", "ANIONGAP", "EGFRNONAA" in the last 48 hours.    Invalid input(s): "ESTGFA[place]"      Significant Imaging: I have reviewed all pertinent imaging results/findings within the past 24 hours.      Intake/Output - Last 3 Shifts         12/24 0700 12/25 0659 12/25 0700 12/26 0659 12/26 0700 12/27 0659    P.O. 60 160     Total Intake(mL/kg) 60 (1.9) 160 (5)     Net +60 +160            Urine Occurrence 2 x 6 x     Stool Occurrence  1 x           Microbiology Results (last 7 days)       ** No " results found for the last 168 hours. **

## 2024-12-26 NOTE — PLAN OF CARE
Problem: Adult Inpatient Plan of Care  Goal: Plan of Care Review  Outcome: Progressing  Goal: Patient-Specific Goal (Individualized)  Outcome: Progressing  Goal: Absence of Hospital-Acquired Illness or Injury  Outcome: Progressing  Goal: Optimal Comfort and Wellbeing  Outcome: Progressing  Goal: Readiness for Transition of Care  Outcome: Progressing     Problem: Infection  Goal: Absence of Infection Signs and Symptoms  Outcome: Progressing     Problem: Skin Injury Risk Increased  Goal: Skin Health and Integrity  Outcome: Progressing     Problem: Breathing Pattern Ineffective  Goal: Effective Breathing Pattern  Outcome: Progressing     Problem: Gas Exchange Impaired  Goal: Optimal Gas Exchange  Outcome: Progressing     Problem: Airway Clearance Ineffective  Goal: Effective Airway Clearance  Outcome: Progressing     Problem: Fall Injury Risk  Goal: Absence of Fall and Fall-Related Injury  Outcome: Progressing     Problem: Wound  Goal: Optimal Coping  Outcome: Progressing  Goal: Optimal Functional Ability  Outcome: Progressing  Goal: Absence of Infection Signs and Symptoms  Outcome: Progressing  Goal: Improved Oral Intake  Outcome: Progressing  Goal: Optimal Pain Control and Function  Outcome: Progressing  Goal: Skin Health and Integrity  Outcome: Progressing  Goal: Optimal Wound Healing  Outcome: Progressing     Problem: Noninvasive Ventilation Acute  Goal: Effective Unassisted Ventilation and Oxygenation  Outcome: Progressing

## 2024-12-27 LAB
ANION GAP SERPL CALCULATED.3IONS-SCNC: 15 MMOL/L (ref 7–16)
BASOPHILS # BLD AUTO: 0.05 K/UL (ref 0–0.2)
BASOPHILS NFR BLD AUTO: 0.4 % (ref 0–1)
BUN SERPL-MCNC: 13 MG/DL (ref 10–20)
BUN/CREAT SERPL: 23 (ref 6–20)
CALCIUM SERPL-MCNC: 9.2 MG/DL (ref 8.4–10.2)
CHLORIDE SERPL-SCNC: 97 MMOL/L (ref 98–107)
CO2 SERPL-SCNC: 32 MMOL/L (ref 23–31)
CREAT SERPL-MCNC: 0.57 MG/DL (ref 0.55–1.02)
DIFFERENTIAL METHOD BLD: ABNORMAL
EGFR (NO RACE VARIABLE) (RUSH/TITUS): 96 ML/MIN/1.73M2
EOSINOPHIL # BLD AUTO: 0.16 K/UL (ref 0–0.5)
EOSINOPHIL NFR BLD AUTO: 1.2 % (ref 1–4)
ERYTHROCYTE [DISTWIDTH] IN BLOOD BY AUTOMATED COUNT: 17.1 % (ref 11.5–14.5)
GLUCOSE SERPL-MCNC: 82 MG/DL (ref 82–115)
HCT VFR BLD AUTO: 33.6 % (ref 38–47)
HGB BLD-MCNC: 9.6 G/DL (ref 12–16)
IMM GRANULOCYTES # BLD AUTO: 0.05 K/UL (ref 0–0.04)
IMM GRANULOCYTES NFR BLD: 0.4 % (ref 0–0.4)
LYMPHOCYTES # BLD AUTO: 1.34 K/UL (ref 1–4.8)
LYMPHOCYTES NFR BLD AUTO: 10.1 % (ref 27–41)
MCH RBC QN AUTO: 23.6 PG (ref 27–31)
MCHC RBC AUTO-ENTMCNC: 28.6 G/DL (ref 32–36)
MCV RBC AUTO: 82.6 FL (ref 80–96)
MONOCYTES # BLD AUTO: 0.82 K/UL (ref 0–0.8)
MONOCYTES NFR BLD AUTO: 6.2 % (ref 2–6)
MPC BLD CALC-MCNC: 9.8 FL (ref 9.4–12.4)
NEUTROPHILS # BLD AUTO: 10.8 K/UL (ref 1.8–7.7)
NEUTROPHILS NFR BLD AUTO: 81.7 % (ref 53–65)
NRBC # BLD AUTO: 0 X10E3/UL
NRBC, AUTO (.00): 0 %
PLATELET # BLD AUTO: 501 K/UL (ref 150–400)
POTASSIUM SERPL-SCNC: 3.4 MMOL/L (ref 3.5–5.1)
RBC # BLD AUTO: 4.07 M/UL (ref 4.2–5.4)
SODIUM SERPL-SCNC: 141 MMOL/L (ref 136–145)
WBC # BLD AUTO: 13.22 K/UL (ref 4.5–11)

## 2024-12-27 PROCEDURE — 94640 AIRWAY INHALATION TREATMENT: CPT

## 2024-12-27 PROCEDURE — 99900035 HC TECH TIME PER 15 MIN (STAT)

## 2024-12-27 PROCEDURE — 25000003 PHARM REV CODE 250: Performed by: HOSPITALIST

## 2024-12-27 PROCEDURE — 27000221 HC OXYGEN, UP TO 24 HOURS

## 2024-12-27 PROCEDURE — 80048 BASIC METABOLIC PNL TOTAL CA: CPT | Performed by: HOSPITALIST

## 2024-12-27 PROCEDURE — 99232 SBSQ HOSP IP/OBS MODERATE 35: CPT | Mod: ,,, | Performed by: HOSPITALIST

## 2024-12-27 PROCEDURE — 11000008

## 2024-12-27 PROCEDURE — 63700000 PHARM REV CODE 250 ALT 637 W/O HCPCS: Performed by: STUDENT IN AN ORGANIZED HEALTH CARE EDUCATION/TRAINING PROGRAM

## 2024-12-27 PROCEDURE — 36415 COLL VENOUS BLD VENIPUNCTURE: CPT | Performed by: HOSPITALIST

## 2024-12-27 PROCEDURE — 85025 COMPLETE CBC W/AUTO DIFF WBC: CPT | Performed by: HOSPITALIST

## 2024-12-27 PROCEDURE — 25000003 PHARM REV CODE 250: Performed by: STUDENT IN AN ORGANIZED HEALTH CARE EDUCATION/TRAINING PROGRAM

## 2024-12-27 PROCEDURE — 25000242 PHARM REV CODE 250 ALT 637 W/ HCPCS: Performed by: HOSPITALIST

## 2024-12-27 RX ORDER — ONDANSETRON HYDROCHLORIDE 2 MG/ML
8 INJECTION, SOLUTION INTRAVENOUS EVERY 6 HOURS PRN
Status: DISCONTINUED | OUTPATIENT
Start: 2024-12-27 | End: 2024-12-31 | Stop reason: HOSPADM

## 2024-12-27 RX ORDER — DIPHENHYDRAMINE HCL 25 MG
50 CAPSULE ORAL EVERY 6 HOURS PRN
Status: DISCONTINUED | OUTPATIENT
Start: 2024-12-27 | End: 2024-12-31 | Stop reason: HOSPADM

## 2024-12-27 RX ORDER — ACETAMINOPHEN 650 MG/1
650 SUPPOSITORY RECTAL EVERY 6 HOURS PRN
Status: DISCONTINUED | OUTPATIENT
Start: 2024-12-27 | End: 2024-12-31 | Stop reason: HOSPADM

## 2024-12-27 RX ORDER — DOCUSATE SODIUM 100 MG/1
100 CAPSULE, LIQUID FILLED ORAL 2 TIMES DAILY PRN
Status: DISCONTINUED | OUTPATIENT
Start: 2024-12-27 | End: 2024-12-31 | Stop reason: HOSPADM

## 2024-12-27 RX ORDER — TALC
6 POWDER (GRAM) TOPICAL NIGHTLY PRN
Status: DISCONTINUED | OUTPATIENT
Start: 2024-12-27 | End: 2024-12-31 | Stop reason: HOSPADM

## 2024-12-27 RX ORDER — BISACODYL 5 MG
10 TABLET, DELAYED RELEASE (ENTERIC COATED) ORAL DAILY PRN
Status: DISCONTINUED | OUTPATIENT
Start: 2024-12-27 | End: 2024-12-31 | Stop reason: HOSPADM

## 2024-12-27 RX ORDER — TRAZODONE HYDROCHLORIDE 50 MG/1
50 TABLET ORAL NIGHTLY PRN
Status: DISCONTINUED | OUTPATIENT
Start: 2024-12-27 | End: 2024-12-31 | Stop reason: HOSPADM

## 2024-12-27 RX ORDER — ALUMINUM HYDROXIDE, MAGNESIUM HYDROXIDE, AND SIMETHICONE 2400; 240; 2400 MG/30ML; MG/30ML; MG/30ML
30 SUSPENSION ORAL EVERY 6 HOURS PRN
Status: DISCONTINUED | OUTPATIENT
Start: 2024-12-27 | End: 2024-12-31 | Stop reason: HOSPADM

## 2024-12-27 RX ORDER — ACETAMINOPHEN 500 MG
1000 TABLET ORAL EVERY 6 HOURS PRN
Status: DISCONTINUED | OUTPATIENT
Start: 2024-12-27 | End: 2024-12-31 | Stop reason: HOSPADM

## 2024-12-27 RX ORDER — GUAIFENESIN AND DEXTROMETHORPHAN HYDROBROMIDE 10; 100 MG/5ML; MG/5ML
10 SYRUP ORAL EVERY 6 HOURS PRN
Status: DISCONTINUED | OUTPATIENT
Start: 2024-12-27 | End: 2024-12-31 | Stop reason: HOSPADM

## 2024-12-27 RX ADMIN — MICONAZOLE NITRATE ANTIFUNGAL POWDER: 2 POWDER TOPICAL at 09:12

## 2024-12-27 RX ADMIN — THERA TABS 1 TABLET: TAB at 08:12

## 2024-12-27 RX ADMIN — APIXABAN 5 MG: 5 TABLET, FILM COATED ORAL at 09:12

## 2024-12-27 RX ADMIN — BUDESONIDE 0.5 MG: 0.5 INHALANT ORAL at 07:12

## 2024-12-27 RX ADMIN — MICONAZOLE NITRATE ANTIFUNGAL POWDER: 2 POWDER TOPICAL at 08:12

## 2024-12-27 RX ADMIN — BUDESONIDE 0.5 MG: 0.5 INHALANT ORAL at 08:12

## 2024-12-27 RX ADMIN — BUPROPION HYDROCHLORIDE 100 MG: 100 TABLET, FILM COATED, EXTENDED RELEASE ORAL at 08:12

## 2024-12-27 RX ADMIN — QUETIAPINE 50 MG: 50 TABLET, FILM COATED, EXTENDED RELEASE ORAL at 08:12

## 2024-12-27 RX ADMIN — FLUCONAZOLE 200 MG: 100 TABLET ORAL at 08:12

## 2024-12-27 RX ADMIN — APIXABAN 5 MG: 5 TABLET, FILM COATED ORAL at 08:12

## 2024-12-27 RX ADMIN — BUPROPION HYDROCHLORIDE 100 MG: 100 TABLET, FILM COATED, EXTENDED RELEASE ORAL at 09:12

## 2024-12-27 NOTE — PROGRESS NOTES
Lying in bed, alert, resp even, NADN, brother called and updated on status, poor po intake to continue w/ meals will drink at times, staff tries to feed her but still the same occurs, states, she doesn't won't it and will not open her mouth, she will at times eat little things like fruit or small finger foods but then she'll just stop and staff picks up, safety measures on going, no  change in status, report given

## 2024-12-28 PROBLEM — E87.6 HYPOKALEMIA: Status: ACTIVE | Noted: 2024-12-28

## 2024-12-28 PROCEDURE — 27000221 HC OXYGEN, UP TO 24 HOURS

## 2024-12-28 PROCEDURE — 11000008

## 2024-12-28 PROCEDURE — 25000003 PHARM REV CODE 250: Performed by: HOSPITALIST

## 2024-12-28 PROCEDURE — 99900035 HC TECH TIME PER 15 MIN (STAT)

## 2024-12-28 PROCEDURE — 99232 SBSQ HOSP IP/OBS MODERATE 35: CPT | Mod: ,,, | Performed by: INTERNAL MEDICINE

## 2024-12-28 PROCEDURE — 94761 N-INVAS EAR/PLS OXIMETRY MLT: CPT

## 2024-12-28 PROCEDURE — 25000003 PHARM REV CODE 250: Performed by: STUDENT IN AN ORGANIZED HEALTH CARE EDUCATION/TRAINING PROGRAM

## 2024-12-28 PROCEDURE — 25000242 PHARM REV CODE 250 ALT 637 W/ HCPCS: Performed by: HOSPITALIST

## 2024-12-28 PROCEDURE — 94640 AIRWAY INHALATION TREATMENT: CPT

## 2024-12-28 RX ADMIN — BUDESONIDE 0.5 MG: 0.5 INHALANT ORAL at 07:12

## 2024-12-28 RX ADMIN — APIXABAN 5 MG: 5 TABLET, FILM COATED ORAL at 09:12

## 2024-12-28 RX ADMIN — MELATONIN 6 MG: 3 TAB ORAL at 09:12

## 2024-12-28 RX ADMIN — BUDESONIDE 0.5 MG: 0.5 INHALANT ORAL at 08:12

## 2024-12-28 RX ADMIN — MICONAZOLE NITRATE ANTIFUNGAL POWDER: 2 POWDER TOPICAL at 09:12

## 2024-12-28 RX ADMIN — BUPROPION HYDROCHLORIDE 100 MG: 100 TABLET, FILM COATED, EXTENDED RELEASE ORAL at 09:12

## 2024-12-28 RX ADMIN — QUETIAPINE 50 MG: 50 TABLET, FILM COATED, EXTENDED RELEASE ORAL at 09:12

## 2024-12-28 RX ADMIN — THERA TABS 1 TABLET: TAB at 09:12

## 2024-12-28 RX ADMIN — IPRATROPIUM BROMIDE AND ALBUTEROL SULFATE 3 ML: .5; 3 SOLUTION RESPIRATORY (INHALATION) at 07:12

## 2024-12-28 NOTE — PROGRESS NOTES
[facility] Medicine  Progress Note    Patient Name: Mayra Kelly  MRN: [id number]  Patient Class: IP- Inpatient   Admission Date: 12/9/2024  Length of Stay: 18 days  Attending Physician: Mark Barraza MD  Primary Care Provider: Darlene Campoverde NP        Subjective     Principal Problem:Acute hypoxemic respiratory failure        HPI:  74 year old female with a PMH of lung cancer presents with shortness of breath.  She was placed on O2 but kept taking it off, so she was sent to the ICU for closer monitoring.  CT chest showed lung nodules/masses.  She had opacification in the right lung with mucous plugging.  She was placed on Zosyn for pneumonia.  Today, she had a bronch done.  Results pending in the computer.  Patient denies chest pain, shortness of breath, nausea, vomiting, or diarrhea.       Overview/Hospital Course:  74 year old female with a PMH of lung cancer presents with shortness of breath and is now in LTAC for deconditioning and high flow O2; she is asleep on rounds  12/12 still on high-flow wean oxygen as able  12/13 saturating well breathing comfortably wean oxygen  12/14 continue to wean oxygen as able next  12/15 wean oxygen  12/16 doing well today wean oxygen  12/17 impacted enema today  12/18 rate controlled today  12/19 still having loose stools.  Was concern for impaction.  We will check KUB for stool burden  12/20 does not appear to have a large stool burden on KUB  12th/23 continue to wean high-flow as able    12/24 records reviewed.  remains on high-flow oxygen.  When seen was on 25 L at 46% oxygen.  No severe shortness of breath when seen.  Patient had been admitted to [facility] 12/09 from [facility] because of increased oxygen requirements.  Later transferred to [facility] 12/09/24.  Hx treated adenoCa in 2018. Last Nov underwent evaluation by Dr Munguia. Seen then again also by Dr Kraft. Dx new primary squamous cell CA. Treated chemotx. In April  saw Dr Kraft and started on Keytruda and given through infusion center. Has continued this through July. Missed her appt with Dr Kraft in May.  Pt continued to smoke 1/2 ppd; encourage to stop.  At home been prior on 2L BNC O2. PHMx: also Eliquis for PAF. Now NSR.    Seen during stay by Dr. Kraft and pulmonary during a August 2024 admission. Patient underwent rigid bronchoscopy with bronchial lavage, dilation, and stent by Dr. Castellon 8/14/24.  Admitted specially [facility] 08/15 then also.   12/25 no new complaints, encouraged patient to taking better orally.  Reviewed last pathology report suggesting non- small cell carcinoma with previous bronchoscopy  12/26 patient looks weak and not eating well.  Not likely to do well going forward.  Touch bases with oncology.  Have not seen family  12/27 not much improved.  Not eating.  Looking at discharge needs and likely back to nursing home next.  Hospice reasonable    Interval History:     Review of Systems   Constitutional:  Positive for fatigue. Negative for appetite change and fever.   HENT:  Negative for congestion, hearing loss and trouble swallowing.    Respiratory:  Positive for shortness of breath. Negative for chest tightness and wheezing.    Cardiovascular:  Negative for chest pain and palpitations.   Gastrointestinal:  Negative for abdominal pain, constipation and nausea.   Genitourinary:  Negative for difficulty urinating and dysuria.   Musculoskeletal:  Negative for back pain and neck stiffness.   Skin:  Negative for pallor and rash.   Neurological:  Negative for dizziness, speech difficulty and headaches.   Psychiatric/Behavioral:  Positive for confusion. Negative for suicidal ideas.      Objective:     Vital Signs (Most Recent):  Temp: 97.6 °F (36.4 °C) (12/27/24 1600)  Pulse: 95 (12/27/24 0800)  Resp: (!) 21 (12/27/24 0800)  BP: (!) 113/44 (12/27/24 0800)  SpO2: 100 % (12/27/24 0800) Vital Signs (24h Range):  Temp:  [97.5 °F (36.4 °C)-98.2 °F (36.8 °C)]  97.6 °F (36.4 °C)  Pulse:  [83-96] 95  Resp:  [13-24] 21  SpO2:  [90 %-100 %] 100 %  BP: (102-113)/(44-52) 113/44     Weight: 30.7 kg (67 lb 9.6 oz)  Body mass index is 10.91 kg/m².    Intake/Output Summary (Last 24 hours) at 12/27/2024 1853  Last data filed at 12/27/2024 1715  Gross per 24 hour   Intake 120 ml   Output --   Net 120 ml         Physical Exam  Vitals reviewed.   Constitutional:       General: She is awake. She is not in acute distress.     Appearance: She is well-developed. She is cachectic. She is not toxic-appearing.   HENT:      Head: Normocephalic.      Nose: Nose normal.      Mouth/Throat:      Pharynx: Oropharynx is clear.   Eyes:      Extraocular Movements: Extraocular movements intact.      Pupils: Pupils are equal, round, and reactive to light.   Neck:      Thyroid: No thyroid mass.      Vascular: No carotid bruit.   Cardiovascular:      Rate and Rhythm: Normal rate and regular rhythm.      Pulses: Normal pulses.      Heart sounds: Normal heart sounds. No murmur heard.  Pulmonary:      Effort: Pulmonary effort is normal.      Breath sounds: Normal breath sounds and air entry. No wheezing.   Abdominal:      General: Bowel sounds are normal. There is no distension.      Palpations: Abdomen is soft.      Tenderness: There is no abdominal tenderness.   Musculoskeletal:         General: Normal range of motion.      Cervical back: Neck supple. No rigidity.   Skin:     General: Skin is warm.      Coloration: Skin is not jaundiced.      Findings: No lesion.   Neurological:      General: No focal deficit present.      Mental Status: She is alert and oriented to person, place, and time.      Cranial Nerves: No cranial nerve deficit.   Psychiatric:         Attention and Perception: Attention normal.         Mood and Affect: Mood normal.         Behavior: Behavior normal. Behavior is cooperative.         Thought Content: Thought content normal.         Cognition and Memory: Cognition normal.              Significant Labs: All pertinent labs within the past 24 hours have been reviewed.  BMP:   Recent Labs   Lab 12/27/24  0356   GLU 82      K 3.4*   CL 97*   CO2 32*   BUN 13   CREATININE 0.57   CALCIUM 9.2       CBC:   Recent Labs   Lab 12/27/24  0355   WBC 13.22*   HGB 9.6*   HCT 33.6*   *       CMP:   Recent Labs   Lab 12/27/24  0356      K 3.4*   CL 97*   CO2 32*   GLU 82   BUN 13   CREATININE 0.57   CALCIUM 9.2   ANIONGAP 15         Significant Imaging: I have reviewed all pertinent imaging results/findings within the past 24 hours.      Intake/Output - Last 3 Shifts         12/25 0700 12/26 0659 12/26 0700 12/27 0659 12/27 0700 12/28 0659    P.O. 160 660 0    Total Intake(mL/kg) 160 (5) 660 (21.5) 0 (0)    Net +160 +660 0           Urine Occurrence 6 x 3 x     Stool Occurrence 1 x 0 x           Microbiology Results (last 7 days)       ** No results found for the last 168 hours. **              Assessment and Plan     * Acute hypoxemic respiratory failure  Due to COPD, lung cancer, and pneumonia; on nebs; s/p therapeutic aspiration with bronchoscopy---results show NSCLC; cultures show yeast and diphtheroids; she completed 7 days of Zosyn; she has opacification of her right lung per CT chest; on high-flow O2   Continue to wean oxygen as able    We will treat yeast from bronchoscopy  Wean oxygen as able  Continue to wean oxygen as able  Continue to wean oxygen  Wean oxygen    12/22 - HFNC 25 liters, 44%     Wean oxygen as able    12/24 remains on similar HFNC at 25 liters 46% O2  12/25 continue oxygen support  12/27 comfortable on O2 3 L by nasal cannula    Failure to thrive in adult    Progressively weaker not eating well  No family in room  Will touch bases with Oncology  Does not seem strong enough for any aggressive therapy at this point    12/27 encouraged patient to eat but staff states still not eating well    Constipation    Enema today  Loose stools.  Some concern that diarrhea  going around impaction.  We will check KUB today  Can start polyethylene glycol.  KUB today not appear to show impaction or severe constipation    Cachexia  Concern for cachexia as evidenced by BMI less than 20 in the presence of known chronic disease. Contributing factors include underlying Malignancy. Treatment to include nutrition consult.      Body mass index is 11.39 kg/m².  Albumin   Date Value Ref Range Status   12/17/2024 2.1 (L) 3.4 - 4.8 g/dL Final     Starting Marinol    12/25 continue to encourage increased oral intake    Delirium  Hallucinations.  Start Seroquel  Better        Hypotension  BP on the low side; will start IVF; she is not on any BP meds at this time  Asymptomatic  We will give 1 L fluid bolus today  Blood pressure at baseline    12/21 - BP low again. Will give fluid.     Lung cancer  Cytology from bronch shows NSCLC; will leave to Oncology as outpt    12/09/24    Final Diagnosis   A. Right lung, bronchial washing:  Negative for malignant cells     B. Lung, right lower lobe, bronchoalveolar lavage:  - Suspicious for non-small cell carcinoma (see comment)  - Acute inflammation     Electronically signed by Jesus Sotelo III, MD on 12/10/2024 at 1107   Comment    The lavage from the right lower lobe (B) includes a few small clusters of atypical cells suspicious for non-small cell carcinoma.  There is insufficient tissue in the cell block to attempt immunostains to better define the lesion.         Paroxysmal atrial fibrillation  On Eliquis  Rate controlled  Rate controlled    Severe protein-calorie malnutrition  Nutrition consulted. Most recent weight and BMI monitored-     Measurements:  Wt Readings from Last 1 Encounters:   12/20/24 36.6 kg (80 lb 11.2 oz)   Body mass index is 13.03 kg/m².    Patient has been screened and assessed by RD.    Malnutrition Type:  Context: chronic illness  Level: severe    Malnutrition Characteristic Summary:  Energy Intake (Malnutrition): less than 75%  for greater than or equal to 1 month  Subcutaneous Fat (Malnutrition): severe depletion  Muscle Mass (Malnutrition): severe depletion    Interventions/Recommendations (treatment strategy):     Encourage oral intake    Starting marinol      VTE Risk Mitigation (From admission, onward)           Ordered     apixaban tablet 5 mg  2 times daily         12/09/24 1920                    Discharge Planning   TERE: 12/17/2024     Code Status: Full Code   Medical Readiness for Discharge Date:   Discharge Plan A: Skilled Nursing Facility                        Mark Barraza MD  Department of Hospital Medicine   [facility]

## 2024-12-28 NOTE — PROGRESS NOTES
[facility] Medicine  Progress Note    Patient Name: Mayra Kelly  MRN: [id number]  Patient Class: IP- Inpatient   Admission Date: 12/9/2024  Length of Stay: 19 days  Attending Physician: Jorge Alberto Nieto MD  Primary Care Provider: Darlene Campoverde NP        Subjective     Principal Problem:Acute hypoxemic respiratory failure        HPI:  74 year old female with a PMH of lung cancer presents with shortness of breath.  She was placed on O2 but kept taking it off, so she was sent to the ICU for closer monitoring.  CT chest showed lung nodules/masses.  She had opacification in the right lung with mucous plugging.  She was placed on Zosyn for pneumonia.  Today, she had a bronch done.  Results pending in the computer.  Patient denies chest pain, shortness of breath, nausea, vomiting, or diarrhea.       Overview/Hospital Course:  74 year old female with a PMH of lung cancer presents with shortness of breath and is now in LTAC for deconditioning and high flow O2; she is asleep on rounds  12/12 still on high-flow wean oxygen as able  12/13 saturating well breathing comfortably wean oxygen  12/14 continue to wean oxygen as able next  12/15 wean oxygen  12/16 doing well today wean oxygen  12/17 impacted enema today  12/18 rate controlled today  12/19 still having loose stools.  Was concern for impaction.  We will check KUB for stool burden  12/20 does not appear to have a large stool burden on KUB  12th/23 continue to wean high-flow as able    12/24 records reviewed.  remains on high-flow oxygen.  When seen was on 25 L at 46% oxygen.  No severe shortness of breath when seen.  Patient had been admitted to [facility] 12/09 from [facility] because of increased oxygen requirements.  Later transferred to [facility] 12/09/24.  Hx treated adenoCa in 2018. Last Nov underwent evaluation by Dr Munguia. Seen then again also by Dr Kraft. Dx new primary squamous cell CA. Treated chemotx. In  April saw Dr Kraft and started on Keytruda and given through infusion center. Has continued this through July. Missed her appt with Dr Kraft in May.  Pt continued to smoke 1/2 ppd; encourage to stop.  At home been prior on 2L BNC O2. PHMx: also Eliquis for PAF. Now NSR.    Seen during stay by Dr. Kraft and pulmonary during a August 2024 admission. Patient underwent rigid bronchoscopy with bronchial lavage, dilation, and stent by Dr. Castellon 8/14/24.  Admitted specially [facility] 08/15 then also.   12/25 no new complaints, encouraged patient to taking better orally.  Reviewed last pathology report suggesting non- small cell carcinoma with previous bronchoscopy  12/26 patient looks weak and not eating well.  Not likely to do well going forward.  Touch bases with oncology.  Have not seen family  12/27 not much improved.  Not eating.  Looking at discharge needs and likely back to nursing home next.  Hospice reasonable  12/28 No change in status. Agree with hospice and goals of care discussion.     Interval History: Patient seen and examined at the bedside, reports no change in symptoms.     Review of Systems   Constitutional:  Positive for fatigue.   Neurological:  Positive for weakness.   All other systems reviewed and are negative.    Objective:     Vital Signs (Most Recent):  Temp: 97.6 °F (36.4 °C) (12/28/24 0730)  Pulse: 91 (12/28/24 0857)  Resp: 20 (12/28/24 0857)  BP: (!) 111/41 (12/28/24 0730)  SpO2: 99 % (12/28/24 0857) Vital Signs (24h Range):  Temp:  [97 °F (36.1 °C)-98.2 °F (36.8 °C)] 97.6 °F (36.4 °C)  Pulse:  [85-92] 91  Resp:  [16-22] 20  SpO2:  [89 %-100 %] 99 %  BP: (103-115)/(31-53) 111/41     Weight: 31.4 kg (69 lb 3.2 oz)  Body mass index is 11.17 kg/m².    Intake/Output Summary (Last 24 hours) at 12/28/2024 1059  Last data filed at 12/28/2024 0745  Gross per 24 hour   Intake 300 ml   Output --   Net 300 ml         Physical Exam  Constitutional:       Appearance: She is cachectic. She is  ill-appearing.   HENT:      Head: Normocephalic and atraumatic.      Mouth/Throat:      Mouth: Mucous membranes are dry.   Cardiovascular:      Rate and Rhythm: Normal rate and regular rhythm.   Pulmonary:      Effort: Pulmonary effort is normal.      Breath sounds: Wheezing present.   Abdominal:      General: Bowel sounds are normal.      Palpations: Abdomen is soft.   Musculoskeletal:         General: No swelling.      Right lower leg: No edema.      Left lower leg: No edema.   Skin:     General: Skin is dry.   Neurological:      General: No focal deficit present.      Mental Status: She is alert and oriented to person, place, and time. Mental status is at baseline.             Significant Labs: All pertinent labs within the past 24 hours have been reviewed.    Significant Imaging: I have reviewed all pertinent imaging results/findings within the past 24 hours.    Assessment and Plan     * Acute hypoxemic respiratory failure  Due to COPD, lung cancer, and pneumonia; on nebs; s/p therapeutic aspiration with bronchoscopy---results show NSCLC; cultures show yeast and diphtheroids; she completed 7 days of Zosyn; she has opacification of her right lung per CT chest; on high-flow O2  Continue to wean oxygen as able  We will treat yeast from bronchoscopy    12/22 - HFNC 25 liters, 44%- Wean oxygen as able    12/24 - remains on similar HFNC at 25 liters 46% O2    12/27- weaned to NC and remains comfortable on O2 3 L by nasal cannula    12/28- Recommend GOC/hospice discussion with family given overall poor prognosis.     Hypokalemia  Patient's most recent potassium results are listed below.   Recent Labs     12/27/24  0356   K 3.4*     Plan  - Replete potassium per protocol  - Monitor potassium Daily  - Patient's hypokalemia is stable    Failure to thrive in adult  Progressively weaker not eating well  No family in room  Will touch bases with Oncology  Does not seem strong enough for any aggressive therapy at this  point    12/27 encouraged patient to eat but staff states still not eating well.    12/28 Guarded prognosis.     Constipation  S/p enema and bowel regimen.  Now with regular BM, off laxatives.    Cachexia  Concern for cachexia as evidenced by BMI less than 20 in the presence of known chronic disease. Contributing factors include underlying Malignancy. Treatment to include nutrition consult.      Body mass index is 11.17 kg/m².  Albumin   Date Value Ref Range Status   12/17/2024 2.1 (L) 3.4 - 4.8 g/dL Final     Starting Marinol    12/25 continue to encourage increased oral intake    Delirium  Hallucinations noted earlier in the course.    Started on Seroquel  Improved mental status.        Hypotension  BP on the low side; will start IVF; she is not on any BP meds at this time  Asymptomatic  We will give 1 L fluid bolus today  Blood pressure at baseline    12/21 - BP low again. Will give fluid.     12/27- Noted to be low, MAP 55-60. Will consider midodrine if MAP drops below 55.     Lung cancer  Cytology from bronch shows NSCLC; will leave to Oncology as outpt    12/09/24    Final Diagnosis   A. Right lung, bronchial washing:  Negative for malignant cells     B. Lung, right lower lobe, bronchoalveolar lavage:  - Suspicious for non-small cell carcinoma (see comment)  - Acute inflammation     Electronically signed by Jesus Sotelo III, MD on 12/10/2024 at 1107   Comment    The lavage from the right lower lobe (B) includes a few small clusters of atypical cells suspicious for non-small cell carcinoma.  There is insufficient tissue in the cell block to attempt immunostains to better define the lesion.     Poor prognosis.     Paroxysmal atrial fibrillation  On Eliquis  Rate controlled without meds.       Severe protein-calorie malnutrition  Nutrition consulted. Most recent weight and BMI monitored-     Measurements:  Wt Readings from Last 1 Encounters:   12/28/24 31.4 kg (69 lb 3.2 oz)   Body mass index is 11.17  kg/m².    Patient has been screened and assessed by RD.    Malnutrition Type:  Context: chronic illness  Level: severe    Malnutrition Characteristic Summary:  Energy Intake (Malnutrition): less than 75% for greater than or equal to 1 month  Subcutaneous Fat (Malnutrition): severe depletion  Muscle Mass (Malnutrition): severe depletion    Interventions/Recommendations (treatment strategy):  Consider nutrition support as aligns with goals of care if po intakes remain poor  Encourage oral intake      VTE Risk Mitigation (From admission, onward)           Ordered     apixaban tablet 5 mg  2 times daily         12/09/24 1920                    Discharge Planning   TERE: 1/3/2025     Code Status: Full Code   Medical Readiness for Discharge Date:   Discharge Plan A: Skilled Nursing Facility                        EUSEBIA BENAVIDEZ MD  Department of Hospital Medicine   [facility]

## 2024-12-28 NOTE — ASSESSMENT & PLAN NOTE
Cytology from bronch shows NSCLC; will leave to Oncology as outpt    12/09/24    Final Diagnosis   A. Right lung, bronchial washing:  Negative for malignant cells     B. Lung, right lower lobe, bronchoalveolar lavage:  - Suspicious for non-small cell carcinoma (see comment)  - Acute inflammation     Electronically signed by Jesus Sotelo III, MD on 12/10/2024 at 1107   Comment    The lavage from the right lower lobe (B) includes a few small clusters of atypical cells suspicious for non-small cell carcinoma.  There is insufficient tissue in the cell block to attempt immunostains to better define the lesion.     Poor prognosis.

## 2024-12-28 NOTE — ASSESSMENT & PLAN NOTE
Due to COPD, lung cancer, and pneumonia; on nebs; s/p therapeutic aspiration with bronchoscopy---results show NSCLC; cultures show yeast and diphtheroids; she completed 7 days of Zosyn; she has opacification of her right lung per CT chest; on high-flow O2  Continue to wean oxygen as able  We will treat yeast from bronchoscopy    12/22 - HFNC 25 liters, 44%- Wean oxygen as able    12/24 - remains on similar HFNC at 25 liters 46% O2    12/27- weaned to NC and remains comfortable on O2 3 L by nasal cannula    12/28- Recommend GOC/hospice discussion with family given overall poor prognosis.

## 2024-12-28 NOTE — ASSESSMENT & PLAN NOTE
Nutrition consulted. Most recent weight and BMI monitored-     Measurements:  Wt Readings from Last 1 Encounters:   12/28/24 31.4 kg (69 lb 3.2 oz)   Body mass index is 11.17 kg/m².    Patient has been screened and assessed by RD.    Malnutrition Type:  Context: chronic illness  Level: severe    Malnutrition Characteristic Summary:  Energy Intake (Malnutrition): less than 75% for greater than or equal to 1 month  Subcutaneous Fat (Malnutrition): severe depletion  Muscle Mass (Malnutrition): severe depletion    Interventions/Recommendations (treatment strategy):  Consider nutrition support as aligns with goals of care if po intakes remain poor  Encourage oral intake

## 2024-12-28 NOTE — ASSESSMENT & PLAN NOTE
BP on the low side; will start IVF; she is not on any BP meds at this time  Asymptomatic  We will give 1 L fluid bolus today  Blood pressure at baseline    12/21 - BP low again. Will give fluid.     12/27- Noted to be low, MAP 55-60. Will consider midodrine if MAP drops below 55.

## 2024-12-28 NOTE — ASSESSMENT & PLAN NOTE
Progressively weaker not eating well  No family in room  Will touch bases with Oncology  Does not seem strong enough for any aggressive therapy at this point    12/27 encouraged patient to eat but staff states still not eating well.    12/28 Guarded prognosis.

## 2024-12-28 NOTE — SUBJECTIVE & OBJECTIVE
Interval History:     Review of Systems   Constitutional:  Positive for fatigue. Negative for appetite change and fever.   HENT:  Negative for congestion, hearing loss and trouble swallowing.    Respiratory:  Positive for shortness of breath. Negative for chest tightness and wheezing.    Cardiovascular:  Negative for chest pain and palpitations.   Gastrointestinal:  Negative for abdominal pain, constipation and nausea.   Genitourinary:  Negative for difficulty urinating and dysuria.   Musculoskeletal:  Negative for back pain and neck stiffness.   Skin:  Negative for pallor and rash.   Neurological:  Negative for dizziness, speech difficulty and headaches.   Psychiatric/Behavioral:  Positive for confusion. Negative for suicidal ideas.      Objective:     Vital Signs (Most Recent):  Temp: 97.6 °F (36.4 °C) (12/27/24 1600)  Pulse: 95 (12/27/24 0800)  Resp: (!) 21 (12/27/24 0800)  BP: (!) 113/44 (12/27/24 0800)  SpO2: 100 % (12/27/24 0800) Vital Signs (24h Range):  Temp:  [97.5 °F (36.4 °C)-98.2 °F (36.8 °C)] 97.6 °F (36.4 °C)  Pulse:  [83-96] 95  Resp:  [13-24] 21  SpO2:  [90 %-100 %] 100 %  BP: (102-113)/(44-52) 113/44     Weight: 30.7 kg (67 lb 9.6 oz)  Body mass index is 10.91 kg/m².    Intake/Output Summary (Last 24 hours) at 12/27/2024 1853  Last data filed at 12/27/2024 1715  Gross per 24 hour   Intake 120 ml   Output --   Net 120 ml         Physical Exam  Vitals reviewed.   Constitutional:       General: She is awake. She is not in acute distress.     Appearance: She is well-developed. She is cachectic. She is not toxic-appearing.   HENT:      Head: Normocephalic.      Nose: Nose normal.      Mouth/Throat:      Pharynx: Oropharynx is clear.   Eyes:      Extraocular Movements: Extraocular movements intact.      Pupils: Pupils are equal, round, and reactive to light.   Neck:      Thyroid: No thyroid mass.      Vascular: No carotid bruit.   Cardiovascular:      Rate and Rhythm: Normal rate and regular rhythm.       Pulses: Normal pulses.      Heart sounds: Normal heart sounds. No murmur heard.  Pulmonary:      Effort: Pulmonary effort is normal.      Breath sounds: Normal breath sounds and air entry. No wheezing.   Abdominal:      General: Bowel sounds are normal. There is no distension.      Palpations: Abdomen is soft.      Tenderness: There is no abdominal tenderness.   Musculoskeletal:         General: Normal range of motion.      Cervical back: Neck supple. No rigidity.   Skin:     General: Skin is warm.      Coloration: Skin is not jaundiced.      Findings: No lesion.   Neurological:      General: No focal deficit present.      Mental Status: She is alert and oriented to person, place, and time.      Cranial Nerves: No cranial nerve deficit.   Psychiatric:         Attention and Perception: Attention normal.         Mood and Affect: Mood normal.         Behavior: Behavior normal. Behavior is cooperative.         Thought Content: Thought content normal.         Cognition and Memory: Cognition normal.             Significant Labs: All pertinent labs within the past 24 hours have been reviewed.  BMP:   Recent Labs   Lab 12/27/24  0356   GLU 82      K 3.4*   CL 97*   CO2 32*   BUN 13   CREATININE 0.57   CALCIUM 9.2       CBC:   Recent Labs   Lab 12/27/24  0355   WBC 13.22*   HGB 9.6*   HCT 33.6*   *       CMP:   Recent Labs   Lab 12/27/24  0356      K 3.4*   CL 97*   CO2 32*   GLU 82   BUN 13   CREATININE 0.57   CALCIUM 9.2   ANIONGAP 15         Significant Imaging: I have reviewed all pertinent imaging results/findings within the past 24 hours.      Intake/Output - Last 3 Shifts         12/25 0700 12/26 0659 12/26 0700 12/27 0659 12/27 0700  12/28 0659    P.O. 160 660 0    Total Intake(mL/kg) 160 (5) 660 (21.5) 0 (0)    Net +160 +660 0           Urine Occurrence 6 x 3 x     Stool Occurrence 1 x 0 x           Microbiology Results (last 7 days)       ** No results found for the last 168 hours. **

## 2024-12-28 NOTE — ASSESSMENT & PLAN NOTE
Patient's most recent potassium results are listed below.   Recent Labs     12/27/24  0356   K 3.4*     Plan  - Replete potassium per protocol  - Monitor potassium Daily  - Patient's hypokalemia is stable

## 2024-12-28 NOTE — ASSESSMENT & PLAN NOTE
Progressively weaker not eating well  No family in room  Will touch bases with Oncology  Does not seem strong enough for any aggressive therapy at this point    12/27 encouraged patient to eat but staff states still not eating well

## 2024-12-28 NOTE — ASSESSMENT & PLAN NOTE
Concern for cachexia as evidenced by BMI less than 20 in the presence of known chronic disease. Contributing factors include underlying Malignancy. Treatment to include nutrition consult.      Body mass index is 11.17 kg/m².  Albumin   Date Value Ref Range Status   12/17/2024 2.1 (L) 3.4 - 4.8 g/dL Final     Starting Marinol    12/25 continue to encourage increased oral intake

## 2024-12-28 NOTE — ASSESSMENT & PLAN NOTE
Due to COPD, lung cancer, and pneumonia; on nebs; s/p therapeutic aspiration with bronchoscopy---results show NSCLC; cultures show yeast and diphtheroids; she completed 7 days of Zosyn; she has opacification of her right lung per CT chest; on high-flow O2   Continue to wean oxygen as able    We will treat yeast from bronchoscopy  Wean oxygen as able  Continue to wean oxygen as able  Continue to wean oxygen  Wean oxygen    12/22 - HFNC 25 liters, 44%     Wean oxygen as able    12/24 remains on similar HFNC at 25 liters 46% O2  12/25 continue oxygen support  12/27 comfortable on O2 3 L by nasal cannula

## 2024-12-28 NOTE — SUBJECTIVE & OBJECTIVE
Interval History: Patient seen and examined at the bedside, reports no change in symptoms.     Review of Systems   Constitutional:  Positive for fatigue.   Neurological:  Positive for weakness.   All other systems reviewed and are negative.    Objective:     Vital Signs (Most Recent):  Temp: 97.6 °F (36.4 °C) (12/28/24 0730)  Pulse: 91 (12/28/24 0857)  Resp: 20 (12/28/24 0857)  BP: (!) 111/41 (12/28/24 0730)  SpO2: 99 % (12/28/24 0857) Vital Signs (24h Range):  Temp:  [97 °F (36.1 °C)-98.2 °F (36.8 °C)] 97.6 °F (36.4 °C)  Pulse:  [85-92] 91  Resp:  [16-22] 20  SpO2:  [89 %-100 %] 99 %  BP: (103-115)/(31-53) 111/41     Weight: 31.4 kg (69 lb 3.2 oz)  Body mass index is 11.17 kg/m².    Intake/Output Summary (Last 24 hours) at 12/28/2024 1059  Last data filed at 12/28/2024 0745  Gross per 24 hour   Intake 300 ml   Output --   Net 300 ml         Physical Exam  Constitutional:       Appearance: She is cachectic. She is ill-appearing.   HENT:      Head: Normocephalic and atraumatic.      Mouth/Throat:      Mouth: Mucous membranes are dry.   Cardiovascular:      Rate and Rhythm: Normal rate and regular rhythm.   Pulmonary:      Effort: Pulmonary effort is normal.      Breath sounds: Wheezing present.   Abdominal:      General: Bowel sounds are normal.      Palpations: Abdomen is soft.   Musculoskeletal:         General: No swelling.      Right lower leg: No edema.      Left lower leg: No edema.   Skin:     General: Skin is dry.   Neurological:      General: No focal deficit present.      Mental Status: She is alert and oriented to person, place, and time. Mental status is at baseline.             Significant Labs: All pertinent labs within the past 24 hours have been reviewed.    Significant Imaging: I have reviewed all pertinent imaging results/findings within the past 24 hours.

## 2024-12-29 PROCEDURE — 94640 AIRWAY INHALATION TREATMENT: CPT

## 2024-12-29 PROCEDURE — 25000003 PHARM REV CODE 250: Performed by: HOSPITALIST

## 2024-12-29 PROCEDURE — 25000003 PHARM REV CODE 250: Performed by: STUDENT IN AN ORGANIZED HEALTH CARE EDUCATION/TRAINING PROGRAM

## 2024-12-29 PROCEDURE — 99232 SBSQ HOSP IP/OBS MODERATE 35: CPT | Mod: ,,, | Performed by: INTERNAL MEDICINE

## 2024-12-29 PROCEDURE — 25000242 PHARM REV CODE 250 ALT 637 W/ HCPCS: Performed by: HOSPITALIST

## 2024-12-29 PROCEDURE — 94761 N-INVAS EAR/PLS OXIMETRY MLT: CPT

## 2024-12-29 PROCEDURE — 27000221 HC OXYGEN, UP TO 24 HOURS

## 2024-12-29 PROCEDURE — 99900035 HC TECH TIME PER 15 MIN (STAT)

## 2024-12-29 PROCEDURE — 11000008

## 2024-12-29 RX ORDER — QUETIAPINE FUMARATE 25 MG/1
25 TABLET, FILM COATED ORAL DAILY
Status: DISCONTINUED | OUTPATIENT
Start: 2024-12-30 | End: 2024-12-30

## 2024-12-29 RX ADMIN — BUDESONIDE 0.5 MG: 0.5 INHALANT ORAL at 07:12

## 2024-12-29 RX ADMIN — THERA TABS 1 TABLET: TAB at 09:12

## 2024-12-29 RX ADMIN — MICONAZOLE NITRATE ANTIFUNGAL POWDER: 2 POWDER TOPICAL at 09:12

## 2024-12-29 RX ADMIN — BUPROPION HYDROCHLORIDE 100 MG: 100 TABLET, FILM COATED, EXTENDED RELEASE ORAL at 09:12

## 2024-12-29 RX ADMIN — QUETIAPINE 50 MG: 50 TABLET, FILM COATED, EXTENDED RELEASE ORAL at 09:12

## 2024-12-29 RX ADMIN — APIXABAN 5 MG: 5 TABLET, FILM COATED ORAL at 09:12

## 2024-12-29 RX ADMIN — BUDESONIDE 0.5 MG: 0.5 INHALANT ORAL at 08:12

## 2024-12-29 NOTE — SUBJECTIVE & OBJECTIVE
Interval History: Patient seen and examined at the bedside, appears more lethargic.     Review of Systems   Constitutional:  Positive for fatigue.   Neurological:  Positive for weakness.   All other systems reviewed and are negative.    Objective:     Vital Signs (Most Recent):  Temp: 97.9 °F (36.6 °C) (12/29/24 1200)  Pulse: 81 (12/29/24 1200)  Resp: 15 (12/29/24 1200)  BP: (!) 91/41 (12/29/24 1200)  SpO2: 100 % (12/29/24 1200) Vital Signs (24h Range):  Temp:  [97.1 °F (36.2 °C)-97.9 °F (36.6 °C)] 97.9 °F (36.6 °C)  Pulse:  [74-99] 81  Resp:  [15-29] 15  SpO2:  [79 %-100 %] 100 %  BP: ()/(40-44) 91/41     Weight: 31.4 kg (69 lb 3.2 oz)  Body mass index is 11.17 kg/m².    Intake/Output Summary (Last 24 hours) at 12/29/2024 1217  Last data filed at 12/29/2024 0745  Gross per 24 hour   Intake 60 ml   Output --   Net 60 ml         Physical Exam  Constitutional:       Appearance: She is cachectic. She is ill-appearing.   HENT:      Head: Normocephalic and atraumatic.      Mouth/Throat:      Mouth: Mucous membranes are dry.   Cardiovascular:      Rate and Rhythm: Normal rate and regular rhythm.   Pulmonary:      Effort: Pulmonary effort is normal.      Breath sounds: Wheezing present.   Abdominal:      General: Bowel sounds are normal.      Palpations: Abdomen is soft.   Musculoskeletal:         General: No swelling.      Right lower leg: No edema.      Left lower leg: No edema.   Skin:     General: Skin is dry.   Neurological:      General: No focal deficit present.      Mental Status: She is lethargic and disoriented.             Significant Labs: All pertinent labs within the past 24 hours have been reviewed.    Significant Imaging: I have reviewed all pertinent imaging results/findings within the past 24 hours.

## 2024-12-29 NOTE — ASSESSMENT & PLAN NOTE
Hallucinations noted earlier in the course.    Started on Seroquel  Improved mental status but now more drowsy.  Reduce Seroquel dose.

## 2024-12-29 NOTE — PROGRESS NOTES
[facility] Medicine  Progress Note    Patient Name: Mayra Kelly  MRN: [id number]  Patient Class: IP- Inpatient   Admission Date: 12/9/2024  Length of Stay: 20 days  Attending Physician: Jorge Alberto Nieto MD  Primary Care Provider: Darlene Campoverde NP        Subjective     Principal Problem:Acute hypoxemic respiratory failure        HPI:  74 year old female with a PMH of lung cancer presents with shortness of breath.  She was placed on O2 but kept taking it off, so she was sent to the ICU for closer monitoring.  CT chest showed lung nodules/masses.  She had opacification in the right lung with mucous plugging.  She was placed on Zosyn for pneumonia.  Today, she had a bronch done.  Results pending in the computer.  Patient denies chest pain, shortness of breath, nausea, vomiting, or diarrhea.       Overview/Hospital Course:  74 year old female with a PMH of lung cancer presents with shortness of breath and is now in LTAC for deconditioning and high flow O2; she is asleep on rounds  12/12 still on high-flow wean oxygen as able  12/13 saturating well breathing comfortably wean oxygen  12/14 continue to wean oxygen as able next  12/15 wean oxygen  12/16 doing well today wean oxygen  12/17 impacted enema today  12/18 rate controlled today  12/19 still having loose stools.  Was concern for impaction.  We will check KUB for stool burden  12/20 does not appear to have a large stool burden on KUB  12/23 continue to wean high-flow as able    12/24 records reviewed.  remains on high-flow oxygen.  When seen was on 25 L at 46% oxygen.  No severe shortness of breath when seen.  Patient had been admitted to [facility] 12/09 from [facility] because of increased oxygen requirements.  Later transferred to [facility] 12/09/24.  Hx treated adenoCa in 2018. Last Nov underwent evaluation by Dr Munguia. Seen then again also by Dr Kraft. Dx new primary squamous cell CA. Treated chemotx. In April  saw Dr Kraft and started on Keytruda and given through infusion center. Has continued this through July. Missed her appt with Dr Kraft in May.  Pt continued to smoke 1/2 ppd; encourage to stop.  At home been prior on 2L BNC O2. PHMx: also Eliquis for PAF. Now NSR.    Seen during stay by Dr. Kraft and pulmonary during a August 2024 admission. Patient underwent rigid bronchoscopy with bronchial lavage, dilation, and stent by Dr. Castellon 8/14/24.  Admitted specially [facility] 08/15 then also.   12/25 no new complaints, encouraged patient to taking better orally.  Reviewed last pathology report suggesting non- small cell carcinoma with previous bronchoscopy  12/26 patient looks weak and not eating well.  Not likely to do well going forward.  Touch bases with oncology.  Have not seen family  12/27 not much improved.  Not eating.  Looking at discharge needs and likely back to nursing home next.  Hospice reasonable  12/28 No change in status. Agree with hospice and goals of care discussion.   12/29 Appears drowsier today but protecting airway with stable oxygenation. Reduce Seroquel dose to 25 mg.     Interval History: Patient seen and examined at the bedside, appears more lethargic.     Review of Systems   Constitutional:  Positive for fatigue.   Neurological:  Positive for weakness.   All other systems reviewed and are negative.    Objective:     Vital Signs (Most Recent):  Temp: 97.9 °F (36.6 °C) (12/29/24 1200)  Pulse: 81 (12/29/24 1200)  Resp: 15 (12/29/24 1200)  BP: (!) 91/41 (12/29/24 1200)  SpO2: 100 % (12/29/24 1200) Vital Signs (24h Range):  Temp:  [97.1 °F (36.2 °C)-97.9 °F (36.6 °C)] 97.9 °F (36.6 °C)  Pulse:  [74-99] 81  Resp:  [15-29] 15  SpO2:  [79 %-100 %] 100 %  BP: ()/(40-44) 91/41     Weight: 31.4 kg (69 lb 3.2 oz)  Body mass index is 11.17 kg/m².    Intake/Output Summary (Last 24 hours) at 12/29/2024 1217  Last data filed at 12/29/2024 0745  Gross per 24 hour   Intake 60 ml   Output --   Net 60  ml         Physical Exam  Constitutional:       Appearance: She is cachectic. She is ill-appearing.   HENT:      Head: Normocephalic and atraumatic.      Mouth/Throat:      Mouth: Mucous membranes are dry.   Cardiovascular:      Rate and Rhythm: Normal rate and regular rhythm.   Pulmonary:      Effort: Pulmonary effort is normal.      Breath sounds: Wheezing present.   Abdominal:      General: Bowel sounds are normal.      Palpations: Abdomen is soft.   Musculoskeletal:         General: No swelling.      Right lower leg: No edema.      Left lower leg: No edema.   Skin:     General: Skin is dry.   Neurological:      General: No focal deficit present.      Mental Status: She is lethargic and disoriented.             Significant Labs: All pertinent labs within the past 24 hours have been reviewed.    Significant Imaging: I have reviewed all pertinent imaging results/findings within the past 24 hours.    Assessment and Plan     * Acute hypoxemic respiratory failure  Due to COPD, lung cancer, and pneumonia; on nebs; s/p therapeutic aspiration with bronchoscopy---results show NSCLC; cultures show yeast and diphtheroids; she completed 7 days of Zosyn; she has opacification of her right lung per CT chest; on high-flow O2  Continue to wean oxygen as able  We will treat yeast from bronchoscopy    12/22 - HFNC 25 liters, 44%- Wean oxygen as able    12/24 - remains on similar HFNC at 25 liters 46% O2    12/27- weaned to NC and remains comfortable on O2 3 L by nasal cannula    12/28- Recommend GOC/hospice discussion with family given overall poor prognosis.     Hypokalemia  Patient's most recent potassium results are listed below.   Recent Labs     12/27/24  0356   K 3.4*       Plan  - Replete potassium per protocol  - Monitor potassium Daily  - Patient's hypokalemia is stable    Failure to thrive in adult  Progressively weaker not eating well  No family in room  Will touch bases with Oncology  Does not seem strong enough for  any aggressive therapy at this point    12/27 encouraged patient to eat but staff states still not eating well.    12/28 Guarded prognosis.     Constipation  S/p enema and bowel regimen.  Now with regular BM, off laxatives.    Cachexia  Concern for cachexia as evidenced by BMI less than 20 in the presence of known chronic disease. Contributing factors include underlying Malignancy. Treatment to include nutrition consult.      Body mass index is 11.17 kg/m².  Albumin   Date Value Ref Range Status   12/17/2024 2.1 (L) 3.4 - 4.8 g/dL Final     Starting Marinol    12/25 continue to encourage increased oral intake    Delirium  Hallucinations noted earlier in the course.    Started on Seroquel  Improved mental status but now more drowsy.  Reduce Seroquel dose.         Hypotension  BP on the low side; will start IVF; she is not on any BP meds at this time  Asymptomatic  We will give 1 L fluid bolus today  Blood pressure at baseline    12/21 - BP low again. Will give fluid.     12/27- Noted to be low, MAP 55-60. Will consider midodrine if MAP drops below 55.     Lung cancer  Cytology from bronch shows NSCLC; will leave to Oncology as outpt    12/09/24    Final Diagnosis   A. Right lung, bronchial washing:  Negative for malignant cells     B. Lung, right lower lobe, bronchoalveolar lavage:  - Suspicious for non-small cell carcinoma (see comment)  - Acute inflammation     Electronically signed by Jesus Sotelo III, MD on 12/10/2024 at 1107   Comment    The lavage from the right lower lobe (B) includes a few small clusters of atypical cells suspicious for non-small cell carcinoma.  There is insufficient tissue in the cell block to attempt immunostains to better define the lesion.     Poor prognosis.     Paroxysmal atrial fibrillation  On Eliquis  Rate controlled without meds.       Severe protein-calorie malnutrition  Nutrition consulted. Most recent weight and BMI monitored-     Measurements:  Wt Readings from Last 1  Encounters:   12/28/24 31.4 kg (69 lb 3.2 oz)   Body mass index is 11.17 kg/m².    Patient has been screened and assessed by RD.    Malnutrition Type:  Context: chronic illness  Level: severe    Malnutrition Characteristic Summary:  Energy Intake (Malnutrition): less than 75% for greater than or equal to 1 month  Subcutaneous Fat (Malnutrition): severe depletion  Muscle Mass (Malnutrition): severe depletion    Interventions/Recommendations (treatment strategy):  Consider nutrition support as aligns with goals of care if po intakes remain poor  Encourage oral intake      VTE Risk Mitigation (From admission, onward)           Ordered     apixaban tablet 5 mg  2 times daily         12/09/24 1920                    Discharge Planning   TERE: 1/3/2025     Code Status: Full Code   Medical Readiness for Discharge Date:   Discharge Plan A: Skilled Nursing Facility                        EUSEBIA BENAVIDEZ MD  Department of Hospital Medicine   [facility]

## 2024-12-30 LAB
ANION GAP SERPL CALCULATED.3IONS-SCNC: 13 MMOL/L (ref 7–16)
BASOPHILS # BLD AUTO: 0.07 K/UL (ref 0–0.2)
BASOPHILS NFR BLD AUTO: 0.5 % (ref 0–1)
BUN SERPL-MCNC: 14 MG/DL (ref 10–20)
BUN/CREAT SERPL: 25 (ref 6–20)
CALCIUM SERPL-MCNC: 10.1 MG/DL (ref 8.4–10.2)
CHLORIDE SERPL-SCNC: 98 MMOL/L (ref 98–107)
CO2 SERPL-SCNC: 35 MMOL/L (ref 23–31)
CREAT SERPL-MCNC: 0.57 MG/DL (ref 0.55–1.02)
DIFFERENTIAL METHOD BLD: ABNORMAL
EGFR (NO RACE VARIABLE) (RUSH/TITUS): 96 ML/MIN/1.73M2
EOSINOPHIL # BLD AUTO: 0.18 K/UL (ref 0–0.5)
EOSINOPHIL NFR BLD AUTO: 1.4 % (ref 1–4)
ERYTHROCYTE [DISTWIDTH] IN BLOOD BY AUTOMATED COUNT: 17.2 % (ref 11.5–14.5)
GLUCOSE SERPL-MCNC: 101 MG/DL (ref 82–115)
HCT VFR BLD AUTO: 33.6 % (ref 38–47)
HGB BLD-MCNC: 9.6 G/DL (ref 12–16)
IMM GRANULOCYTES # BLD AUTO: 0.05 K/UL (ref 0–0.04)
IMM GRANULOCYTES NFR BLD: 0.4 % (ref 0–0.4)
LYMPHOCYTES # BLD AUTO: 1.54 K/UL (ref 1–4.8)
LYMPHOCYTES NFR BLD AUTO: 12 % (ref 27–41)
MCH RBC QN AUTO: 23.5 PG (ref 27–31)
MCHC RBC AUTO-ENTMCNC: 28.6 G/DL (ref 32–36)
MCV RBC AUTO: 82.4 FL (ref 80–96)
MONOCYTES # BLD AUTO: 0.5 K/UL (ref 0–0.8)
MONOCYTES NFR BLD AUTO: 3.9 % (ref 2–6)
MPC BLD CALC-MCNC: 10.1 FL (ref 9.4–12.4)
NEUTROPHILS # BLD AUTO: 10.5 K/UL (ref 1.8–7.7)
NEUTROPHILS NFR BLD AUTO: 81.8 % (ref 53–65)
NRBC # BLD AUTO: 0 X10E3/UL
NRBC, AUTO (.00): 0 %
PLATELET # BLD AUTO: 451 K/UL (ref 150–400)
POTASSIUM SERPL-SCNC: 3.5 MMOL/L (ref 3.5–5.1)
RBC # BLD AUTO: 4.08 M/UL (ref 4.2–5.4)
SODIUM SERPL-SCNC: 142 MMOL/L (ref 136–145)
WBC # BLD AUTO: 12.84 K/UL (ref 4.5–11)

## 2024-12-30 PROCEDURE — 99900031 HC PATIENT EDUCATION (STAT)

## 2024-12-30 PROCEDURE — 99900035 HC TECH TIME PER 15 MIN (STAT)

## 2024-12-30 PROCEDURE — 80048 BASIC METABOLIC PNL TOTAL CA: CPT | Performed by: HOSPITALIST

## 2024-12-30 PROCEDURE — 36415 COLL VENOUS BLD VENIPUNCTURE: CPT | Performed by: HOSPITALIST

## 2024-12-30 PROCEDURE — 99233 SBSQ HOSP IP/OBS HIGH 50: CPT | Mod: ,,, | Performed by: HOSPITALIST

## 2024-12-30 PROCEDURE — 94640 AIRWAY INHALATION TREATMENT: CPT

## 2024-12-30 PROCEDURE — 27000221 HC OXYGEN, UP TO 24 HOURS

## 2024-12-30 PROCEDURE — 11000008

## 2024-12-30 PROCEDURE — 25000003 PHARM REV CODE 250: Performed by: HOSPITALIST

## 2024-12-30 PROCEDURE — 25000242 PHARM REV CODE 250 ALT 637 W/ HCPCS: Performed by: HOSPITALIST

## 2024-12-30 PROCEDURE — 25000003 PHARM REV CODE 250: Performed by: INTERNAL MEDICINE

## 2024-12-30 PROCEDURE — 85025 COMPLETE CBC W/AUTO DIFF WBC: CPT | Performed by: HOSPITALIST

## 2024-12-30 PROCEDURE — 25000003 PHARM REV CODE 250: Performed by: STUDENT IN AN ORGANIZED HEALTH CARE EDUCATION/TRAINING PROGRAM

## 2024-12-30 RX ORDER — DILTIAZEM HYDROCHLORIDE 180 MG/1
180 CAPSULE, COATED, EXTENDED RELEASE ORAL DAILY
Status: DISCONTINUED | OUTPATIENT
Start: 2024-12-30 | End: 2024-12-30

## 2024-12-30 RX ORDER — DILTIAZEM HYDROCHLORIDE 120 MG/1
120 CAPSULE, COATED, EXTENDED RELEASE ORAL DAILY
Status: DISCONTINUED | OUTPATIENT
Start: 2024-12-30 | End: 2024-12-31 | Stop reason: HOSPADM

## 2024-12-30 RX ADMIN — DILTIAZEM HYDROCHLORIDE 120 MG: 120 CAPSULE, COATED, EXTENDED RELEASE ORAL at 09:12

## 2024-12-30 RX ADMIN — BUDESONIDE 0.5 MG: 0.5 INHALANT ORAL at 08:12

## 2024-12-30 RX ADMIN — MICONAZOLE NITRATE ANTIFUNGAL POWDER: 2 POWDER TOPICAL at 11:12

## 2024-12-30 RX ADMIN — MELATONIN 6 MG: 3 TAB ORAL at 11:12

## 2024-12-30 RX ADMIN — ALBUTEROL SULFATE 2.5 MG: 2.5 SOLUTION RESPIRATORY (INHALATION) at 08:12

## 2024-12-30 RX ADMIN — METOPROLOL TARTRATE 5 MG: 1 INJECTION, SOLUTION INTRAVENOUS at 05:12

## 2024-12-30 RX ADMIN — THERA TABS 1 TABLET: TAB at 08:12

## 2024-12-30 RX ADMIN — MICONAZOLE NITRATE ANTIFUNGAL POWDER: 2 POWDER TOPICAL at 08:12

## 2024-12-30 RX ADMIN — APIXABAN 5 MG: 5 TABLET, FILM COATED ORAL at 08:12

## 2024-12-30 RX ADMIN — BUPROPION HYDROCHLORIDE 100 MG: 100 TABLET, FILM COATED, EXTENDED RELEASE ORAL at 11:12

## 2024-12-30 RX ADMIN — QUETIAPINE FUMARATE 25 MG: 25 TABLET ORAL at 08:12

## 2024-12-30 RX ADMIN — BUPROPION HYDROCHLORIDE 100 MG: 100 TABLET, FILM COATED, EXTENDED RELEASE ORAL at 08:12

## 2024-12-30 NOTE — PLAN OF CARE
12/30/24 1209   Rounds   Attendance Provider;Nurse ;Physical therapist;Pharmacist   Discharge Plan A Skilled Nursing Facility   Why the patient remains in the hospital Requires continued medical care   Transition of Care Barriers None     Dr. Barraza will talk to family about hospice then pt will be ready to return to [facility] 12/31/24, updates faxed to curry at [facility], she will let me know what hospice the facility is in contract with if family decides on hospice care

## 2024-12-31 VITALS
DIASTOLIC BLOOD PRESSURE: 48 MMHG | RESPIRATION RATE: 15 BRPM | WEIGHT: 69.38 LBS | TEMPERATURE: 98 F | SYSTOLIC BLOOD PRESSURE: 108 MMHG | HEIGHT: 66 IN | OXYGEN SATURATION: 100 % | HEART RATE: 75 BPM | BODY MASS INDEX: 11.15 KG/M2

## 2024-12-31 PROBLEM — J96.01 ACUTE HYPOXEMIC RESPIRATORY FAILURE: Status: RESOLVED | Noted: 2024-12-09 | Resolved: 2024-12-31

## 2024-12-31 PROBLEM — I95.9 HYPOTENSION: Status: RESOLVED | Noted: 2024-12-11 | Resolved: 2024-12-31

## 2024-12-31 PROCEDURE — 99239 HOSP IP/OBS DSCHRG MGMT >30: CPT | Mod: ,,, | Performed by: HOSPITALIST

## 2024-12-31 PROCEDURE — 25000242 PHARM REV CODE 250 ALT 637 W/ HCPCS: Performed by: HOSPITALIST

## 2024-12-31 PROCEDURE — 25000003 PHARM REV CODE 250: Performed by: HOSPITALIST

## 2024-12-31 PROCEDURE — 94640 AIRWAY INHALATION TREATMENT: CPT

## 2024-12-31 PROCEDURE — 99900035 HC TECH TIME PER 15 MIN (STAT)

## 2024-12-31 PROCEDURE — 27000221 HC OXYGEN, UP TO 24 HOURS

## 2024-12-31 PROCEDURE — 25000003 PHARM REV CODE 250: Performed by: NURSE PRACTITIONER

## 2024-12-31 RX ORDER — DILTIAZEM HYDROCHLORIDE 120 MG/1
120 CAPSULE, COATED, EXTENDED RELEASE ORAL DAILY
Start: 2025-01-01 | End: 2026-01-01

## 2024-12-31 RX ORDER — QUETIAPINE FUMARATE 25 MG/1
TABLET, FILM COATED ORAL
Start: 2025-01-01

## 2024-12-31 RX ADMIN — BUPROPION HYDROCHLORIDE 100 MG: 100 TABLET, FILM COATED, EXTENDED RELEASE ORAL at 08:12

## 2024-12-31 RX ADMIN — MICONAZOLE NITRATE ANTIFUNGAL POWDER: 2 POWDER TOPICAL at 08:12

## 2024-12-31 RX ADMIN — DILTIAZEM HYDROCHLORIDE 120 MG: 120 CAPSULE, COATED, EXTENDED RELEASE ORAL at 08:12

## 2024-12-31 RX ADMIN — APIXABAN 2.5 MG: 2.5 TABLET, FILM COATED ORAL at 08:12

## 2024-12-31 RX ADMIN — BUDESONIDE 0.5 MG: 0.5 INHALANT ORAL at 07:12

## 2024-12-31 RX ADMIN — THERA TABS 1 TABLET: TAB at 08:12

## 2024-12-31 RX ADMIN — QUETIAPINE FUMARATE 12.5 MG: 25 TABLET ORAL at 09:12

## 2024-12-31 NOTE — PROGRESS NOTES
[facility] Medicine  Progress Note    Patient Name: Mayra Kelly  MRN: [id number]  Patient Class: IP- Inpatient   Admission Date: 12/9/2024  Length of Stay: 21 days  Attending Physician: Mark Barraza MD  Primary Care Provider: Darlene Campoverde NP        Subjective     Principal Problem:Acute hypoxemic respiratory failure        HPI:  74 year old female with a PMH of lung cancer presents with shortness of breath.  She was placed on O2 but kept taking it off, so she was sent to the ICU for closer monitoring.  CT chest showed lung nodules/masses.  She had opacification in the right lung with mucous plugging.  She was placed on Zosyn for pneumonia.  Today, she had a bronch done.  Results pending in the computer.  Patient denies chest pain, shortness of breath, nausea, vomiting, or diarrhea.       Overview/Hospital Course:  74 year old female with a PMH of lung cancer presents with shortness of breath and is now in LTAC for deconditioning and high flow O2; she is asleep on rounds  12/12 still on high-flow wean oxygen as able  12/13 saturating well breathing comfortably wean oxygen  12/14 continue to wean oxygen as able next  12/15 wean oxygen  12/16 doing well today wean oxygen  12/17 impacted enema today  12/18 rate controlled today  12/19 still having loose stools.  Was concern for impaction.  We will check KUB for stool burden  12/20 does not appear to have a large stool burden on KUB  12/23 continue to wean high-flow as able    12/24 records reviewed.  remains on high-flow oxygen.  When seen was on 25 L at 46% oxygen.  No severe shortness of breath when seen.  Patient had been admitted to [facility] 12/09 from [facility] because of increased oxygen requirements.  Later transferred to [facility] 12/09/24.  Hx treated adenoCa in 2018. Last Nov underwent evaluation by Dr Munguia. Seen then again also by Dr Kraft. Dx new primary squamous cell CA. Treated chemotx. In April  saw Dr Kraft and started on Keytruda and given through infusion center. Has continued this through July. Missed her appt with Dr Kraft in May.  Pt continued to smoke 1/2 ppd; encourage to stop.  At home been prior on 2L BNC O2. PHMx: also Eliquis for PAF. Now NSR.    Seen during stay by Dr. Kraft and pulmonary during a August 2024 admission. Patient underwent rigid bronchoscopy with bronchial lavage, dilation, and stent by Dr. Castellon 8/14/24.  Admitted [facility] 08/15 then also.   12/25 no new complaints, encouraged patient to taking better orally.  Reviewed last pathology report suggesting non- small cell carcinoma with previous bronchoscopy  12/26 patient looks weak and not eating well.  Not likely to do well going forward.  Touch bases with oncology.  Have not seen family  12/27 not much improved.  Not eating.  Looking at discharge needs and likely back to nursing home next.  Hospice reasonable  12/28 No change in status. Agree with hospice and goals of care discussion.   12/29 Appears drowsier today but protecting airway with stable oxygenation. Reduce Seroquel dose to 25 mg.     12/30 Failure to thrive no better. Talked with brother Solo. Discussed poor prognosis. Discussed comfort care and hospice. Brother said family would agree and wished to keep her comfortable. Discussed going back to NH    Interval History:     Review of Systems   Constitutional:  Positive for fatigue. Negative for appetite change and fever.   HENT:  Negative for congestion, hearing loss and trouble swallowing.    Respiratory:  Positive for shortness of breath. Negative for chest tightness and wheezing.    Cardiovascular:  Negative for chest pain and palpitations.   Gastrointestinal:  Negative for abdominal pain, constipation and nausea.   Genitourinary:  Negative for difficulty urinating and dysuria.   Musculoskeletal:  Negative for back pain and neck stiffness.   Skin:  Negative for pallor and rash.   Neurological:   Negative for dizziness, speech difficulty and headaches.   Psychiatric/Behavioral:  Positive for confusion. Negative for suicidal ideas.      Objective:     Vital Signs (Most Recent):  Temp: 97.9 °F (36.6 °C) (12/30/24 2000)  Pulse: 74 (12/30/24 2027)  Resp: 19 (12/30/24 2027)  BP: (!) 121/59 (12/30/24 1738)  SpO2: 100 % (12/30/24 2027) Vital Signs (24h Range):  Temp:  [97.1 °F (36.2 °C)-97.9 °F (36.6 °C)] 97.9 °F (36.6 °C)  Pulse:  [] 74  Resp:  [17-20] 19  SpO2:  [97 %-100 %] 100 %  BP: ()/(45-59) 121/59     Weight: 31.5 kg (69 lb 6.4 oz)  Body mass index is 11.2 kg/m².    Intake/Output Summary (Last 24 hours) at 12/30/2024 2139  Last data filed at 12/30/2024 1708  Gross per 24 hour   Intake 480 ml   Output --   Net 480 ml         Physical Exam  Vitals reviewed.   Constitutional:       General: She is awake. She is not in acute distress.     Appearance: She is well-developed. She is cachectic. She is not toxic-appearing.   HENT:      Head: Normocephalic.      Nose: Nose normal.      Mouth/Throat:      Pharynx: Oropharynx is clear.   Eyes:      Extraocular Movements: Extraocular movements intact.      Pupils: Pupils are equal, round, and reactive to light.   Neck:      Thyroid: No thyroid mass.      Vascular: No carotid bruit.   Cardiovascular:      Rate and Rhythm: Normal rate and regular rhythm.      Pulses: Normal pulses.      Heart sounds: Normal heart sounds. No murmur heard.  Pulmonary:      Effort: Pulmonary effort is normal.      Breath sounds: Normal breath sounds and air entry. No wheezing.   Abdominal:      General: Bowel sounds are normal. There is no distension.      Palpations: Abdomen is soft.      Tenderness: There is no abdominal tenderness.   Musculoskeletal:         General: Normal range of motion.      Cervical back: Neck supple. No rigidity.   Skin:     General: Skin is warm.      Coloration: Skin is not jaundiced.      Findings: No lesion.   Neurological:      General: No focal  deficit present.      Mental Status: She is alert and oriented to person, place, and time.      Cranial Nerves: No cranial nerve deficit.   Psychiatric:         Attention and Perception: Attention normal.         Mood and Affect: Mood normal.         Behavior: Behavior normal. Behavior is cooperative.         Thought Content: Thought content normal.         Cognition and Memory: Cognition normal.             Significant Labs: All pertinent labs within the past 24 hours have been reviewed.  BMP:   Recent Labs   Lab 12/30/24  0402         K 3.5   CL 98   CO2 35*   BUN 14   CREATININE 0.57   CALCIUM 10.1       CBC:   Recent Labs   Lab 12/30/24  0402   WBC 12.84*   HGB 9.6*   HCT 33.6*   *       CMP:   Recent Labs   Lab 12/30/24  0402      K 3.5   CL 98   CO2 35*      BUN 14   CREATININE 0.57   CALCIUM 10.1   ANIONGAP 13         Significant Imaging: I have reviewed all pertinent imaging results/findings within the past 24 hours.      Intake/Output - Last 3 Shifts         12/29 0700  12/30 0659 12/30 0700  12/31 0659    P.O. 0 480    Total Intake(mL/kg) 0 (0) 480 (15.2)    Net 0 +480          Urine Occurrence 2 x 3 x    Stool Occurrence 2 x 3 x          Microbiology Results (last 7 days)       ** No results found for the last 168 hours. **              Assessment and Plan     * Acute hypoxemic respiratory failure  Due to COPD, lung cancer, and pneumonia; on nebs; s/p therapeutic aspiration with bronchoscopy---results show NSCLC; cultures show yeast and diphtheroids; she completed 7 days of Zosyn; she has opacification of her right lung per CT chest; on high-flow O2  Continue to wean oxygen as able  We will treat yeast from bronchoscopy    12/22 - HFNC 25 liters, 44%- Wean oxygen as able    12/24 - remains on similar HFNC at 25 liters 46% O2    12/27- weaned to NC and remains comfortable on O2 3 L by nasal cannula    12/28- Recommend GOC/hospice discussion with family given overall poor  prognosis.     Hypokalemia  Patient's most recent potassium results are listed below.   Recent Labs     12/30/24  0402   K 3.5       Plan  - Replete potassium per protocol  - Monitor potassium Daily  - Patient's hypokalemia is stable    Failure to thrive in adult  Progressively weaker not eating well  No family in room  Will touch bases with Oncology  Does not seem strong enough for any aggressive therapy at this point    12/27 encouraged patient to eat but staff states still not eating well.    12/28 Guarded prognosis.     12/30 very poor prognosis. Pt barely taking orally.    Constipation  S/p enema and bowel regimen.  Now with regular BM, off laxatives.    Cachexia  Concern for cachexia as evidenced by BMI less than 20 in the presence of known chronic disease. Contributing factors include underlying Malignancy. Treatment to include nutrition consult.      Body mass index is 11.17 kg/m².  Albumin   Date Value Ref Range Status   12/17/2024 2.1 (L) 3.4 - 4.8 g/dL Final     Starting Marinol    12/25 continue to encourage increased oral intake    Delirium  Hallucinations noted earlier in the course.    Started on Seroquel  Improved mental status but now more drowsy.  Reduce Seroquel dose.         Hypotension  BP on the low side; will start IVF; she is not on any BP meds at this time  Asymptomatic  We will give 1 L fluid bolus today  Blood pressure at baseline    12/21 - BP low again. Will give fluid.     12/27- Noted to be low, MAP 55-60. Will consider midodrine if MAP drops below 55.     Lung cancer  Cytology from bronch shows NSCLC; will leave to Oncology as outpt    12/09/24    Final Diagnosis   A. Right lung, bronchial washing:  Negative for malignant cells     B. Lung, right lower lobe, bronchoalveolar lavage:  - Suspicious for non-small cell carcinoma (see comment)  - Acute inflammation     Electronically signed by Jesus Sotelo III, MD on 12/10/2024 at 1107   Comment    The lavage from the right lower  lobe (B) includes a few small clusters of atypical cells suspicious for non-small cell carcinoma.  There is insufficient tissue in the cell block to attempt immunostains to better define the lesion.     Poor prognosis.     12/30 Discussed with Dr Kraft 12/27. No treatment options plus pt had repeatly refused earlier. Dr Kraft agreed with comfort care.  Talked with brother Solo today and he says family would approve hospice. Pt to go back to NH. Comfort care there.    Paroxysmal atrial fibrillation  On Eliquis  Rate controlled without meds.     12/30 run AF with rapid rate today. Added Cardizem CD      Severe protein-calorie malnutrition  Nutrition consulted. Most recent weight and BMI monitored-     Measurements:  Wt Readings from Last 1 Encounters:   12/28/24 31.4 kg (69 lb 3.2 oz)   Body mass index is 11.17 kg/m².    Patient has been screened and assessed by RD.    Malnutrition Type:  Context: chronic illness  Level: severe    Malnutrition Characteristic Summary:  Energy Intake (Malnutrition): less than 75% for greater than or equal to 1 month  Subcutaneous Fat (Malnutrition): severe depletion  Muscle Mass (Malnutrition): severe depletion    Interventions/Recommendations (treatment strategy):  Consider nutrition support as aligns with goals of care if po intakes remain poor  Encourage oral intake      VTE Risk Mitigation (From admission, onward)           Ordered     apixaban tablet 5 mg  2 times daily         12/09/24 1920                    Discharge Planning   TERE: 1/3/2025     Code Status: Full Code   Medical Readiness for Discharge Date:   Discharge Plan A: Skilled Nursing Facility                        Mark Barraza MD  Department of Hospital Medicine   [facility]

## 2024-12-31 NOTE — PLAN OF CARE
Problem: Adult Inpatient Plan of Care  Goal: Plan of Care Review  Outcome: Progressing  Goal: Patient-Specific Goal (Individualized)  Outcome: Progressing  Goal: Absence of Hospital-Acquired Illness or Injury  Outcome: Progressing  Goal: Optimal Comfort and Wellbeing  Outcome: Progressing  Goal: Readiness for Transition of Care  Outcome: Progressing     Problem: Infection  Goal: Absence of Infection Signs and Symptoms  Outcome: Progressing     Problem: Skin Injury Risk Increased  Goal: Skin Health and Integrity  Outcome: Progressing     Problem: Breathing Pattern Ineffective  Goal: Effective Breathing Pattern  Outcome: Progressing     Problem: Gas Exchange Impaired  Goal: Optimal Gas Exchange  Outcome: Progressing     Problem: Airway Clearance Ineffective  Goal: Effective Airway Clearance  Outcome: Progressing     Problem: Fall Injury Risk  Goal: Absence of Fall and Fall-Related Injury  Outcome: Progressing     Problem: Wound  Goal: Optimal Coping  Outcome: Progressing  Goal: Optimal Functional Ability  Outcome: Progressing  Goal: Absence of Infection Signs and Symptoms  Outcome: Progressing  Goal: Improved Oral Intake  Outcome: Progressing  Goal: Optimal Pain Control and Function  Outcome: Progressing  Goal: Skin Health and Integrity  Outcome: Progressing  Goal: Optimal Wound Healing  Outcome: Progressing        TRANSITIONS OF CARE (MIGNON) LOG    MIGNON tasks should be completed by the CC within one (1) business day of notification of each transition. Follow up contact with member is required after return to their usual care setting.  Note:  If CC finds out about the transitions fifteen (15) days or more after the member has returned to their usual care setting, no MIGNON log is needed. However, the CC should check in with the member to discuss the transition process, any changes needed to the care plan and document it in a case note.     Member Name:  [facility] [id number]   Product: [facility] Care Coordinator Contact:  Lennie Saeed RN, PHN Agency/County/Care System: [facility]   Transition Communication Actions from Care Management Contact   Transition #1   Notification Date: 10/04/24 Transition Date:   10/03/24 Transition From: Home     Is this the member s usual care setting?               yes Transition To: Hospital, [facility]   Transition Type:  Unplanned    Documentation from conversation with the member/responsible party, provider, discharging and receiving facility:   Date: 10/04/24: Received notification of admission to hospital with dx of bladder infection   CC contacted Hospital /discharge planner,  (Petar [phone] for Name and Phone Number) and left a message with this CC contact information, reviewed community support plan as well requested to be notified of concerns, care conferences and discharge planning.  CC reached out to adult daughter Lamonte  regarding transition and offered support as needed.  Reviewed and update support plan as needed.  Notified community service providers and placed services PCA on hold as needed.  Transition log initiated.   PCP, Arie Dahl, notified of hospitalization via EMR.  Lennie Saeed RN PHN  Care Coordinator  [facility]  O: [phone]  C:[phone]  F:[phone]     Transition #2   Notification  Date: 10/08/24 Transition Date:   10/05/24 Transition From: Hospital, [facility]     Is this the member s usual care setting?               no Transition To: Home   Transition Type:  Planned    Documentation from conversation with the member/responsible party, provider, discharging and receiving facility:   Date: 10/08/24: Received notification of transition to home.  CC contacted adult daughter Lamonte Paz  and reviewed discharge summary.  Member has a follow-up appointment with PCP in 7 days: No: Offered Assistance with setting up a follow up appointment   Member has had a change in condition: No  Home visit needed: No  Support Plan reviewed and updated.  The following home based services PCA were resumed.  New referrals placed: No  Transition log completed.   PCP, Arie Dahl, notified of transition back to home via EMR.  Lennie Saeed RN PHN  Care Coordinator  [facility]  O: [phone]  C:[phone]  F:[phone]       *RETURN TO USUAL CARE SETTING: *Complete tasks below when the member is discharging TO their usual care setting within one (1) business day of notification..      For situations where the Care Coordinator is notified of the discharge prior to the date of discharge, the Care Coordinator must follow up with the member or designated representative to confirm that discharge actually occurred and discuss required MIGNON tasks as outlined in the MIGNON Instructions.  (This includes situations where it may be a  new  usual care setting for the member. (i.e., a community member who decides upon permanent nursing home placement following hospitalization and rehab).    Discuss with Member/Responsible Party:    Check  Yes  - if the member, family member and/or SNF/facility staff manages the following:    If  No  provide explanation in the comments section.          Date completed: 10/08/24 Communicated with member or their designated representative about the following:  care transition  process; about changes to the member s health status; plan of care updates; education about transitions and how to prevent unplanned transitions/readmissions    Four Pillars for Optimal Transition:    Check  Yes  - if the member, family member and/or SNF/facility staff manages the following:    If  No  provide explanation in the comments section.          []  Yes     [x]  No Does the member have a follow-up appointment scheduled with primary care or specialist? (Mental health hospitalizations--the appt. should be w/in 7 days)              For mental health hospitalizations:  []  Yes     []  No     Does the member have a follow-up appointment scheduled with a mental health practitioner within 7 days of discharge?  [x]  Yes     []  No     Has a medication review been completed with member? If no, refer to PCP, home care nurse, MTM, pharmacist  [x]  Yes     []  No     Can the member manage their medications or is there a system in place to manage medications (e.g. home care set-up)?         [x]  Yes     []  No     Can the member verbalize warning signs and symptoms to watch for and how to respond?  [x]  Yes     []  No     Does the member have a copy of and understand their discharge instructions?  If no, assist to obtain copy of discharge instructions, review discharge instructions, and assist to contact PCP to discuss questions about their recent hospitalization.  [x]  Yes     []  No     Does the member have adequate food, housing and transportation?  If no, add goal and discuss additional supports available to the member                                                                                                                                                                                 [x]  Yes     []  No     Is the member safe in their home?  If no, document needs and support provided                                                                                                                                                                           []  Yes     [x]  No     Are there any concerns of vulnerability, abuse, or neglect?  If yes, document concerns and actions taken by Care Coordinator as a mandated                                                                                                                                                                              [x]  Yes     []  No     Does the member use a Personal Health Care Record?  Check  Yes  if visit summary, discharge summary, and/or healthcare summary are being used as a PHR.                                                                                                                                                                                  [x]  Yes     []  No     Have you reviewed the discharge summary with the member? If  No  provide explanation in comments.  [x]  Yes     []  No     Have you updated the member s care plan/support plan? Add new diagnosis, medications, treatments, goals & interventions, as applicable. If No, provide explanation in comments.    Comments:           Notes from conversation with the member/responsible party, provider, discharging and receiving facility (as applicable): VIGNESH Saeed RN PHN  Care Coordinator  [facility]  O: [phone]  C:[phone]  F:[phone]

## 2024-12-31 NOTE — ASSESSMENT & PLAN NOTE
Attempted to see patient for Physical Therapy session but unable at this time. Patient was not available for their therapy session at this time due to: nursing with patient.  Pt just arrived to floor, nursing orienting pt to room.     Will re-attempt tomorrow.     Cytology from bronch shows NSCLC; will leave to Oncology as outpt    12/09/24    Final Diagnosis   A. Right lung, bronchial washing:  Negative for malignant cells     B. Lung, right lower lobe, bronchoalveolar lavage:  - Suspicious for non-small cell carcinoma (see comment)  - Acute inflammation     Electronically signed by Jesus Sotelo III, MD on 12/10/2024 at 1107   Comment    The lavage from the right lower lobe (B) includes a few small clusters of atypical cells suspicious for non-small cell carcinoma.  There is insufficient tissue in the cell block to attempt immunostains to better define the lesion.     Poor prognosis.     12/30 Discussed with Dr Kraft 12/27. No treatment options plus pt had repeatly refused earlier. Dr Kraft agreed with comfort care.  Talked with brother Solo today and he says family would approve hospice. Pt to go back to NH. Comfort care there.

## 2024-12-31 NOTE — DISCHARGE SUMMARY
[facility] Medicine  Discharge Summary      Patient Name: Mayra Kelly  MRN: [id number]  HAIDER: [id number]  Patient Class: IP- Inpatient  Admission Date: 12/9/2024  Hospital Length of Stay: 22 days  Discharge Date and Time:  12/31/2024 11:40 AM  Attending Physician: Mark Barraza MD   Discharging Provider: Mark Barraza MD  Primary Care Provider: Darlene Campoverde NP    Primary Care Team: Networked reference to record PCT     HPI:   74 year old female with a PMH of lung cancer presents with shortness of breath.  She was placed on O2 but kept taking it off, so she was sent to the ICU for closer monitoring.  CT chest showed lung nodules/masses.  She had opacification in the right lung with mucous plugging.  She was placed on Zosyn for pneumonia.  Today, she had a bronch done.  Results pending in the computer.  Patient denies chest pain, shortness of breath, nausea, vomiting, or diarrhea.       * No surgery found *      Hospital Course:   74 year old female with a PMH of lung cancer presents with shortness of breath and is now in LTAC for deconditioning and high flow O2; she is asleep on rounds  12/12 still on high-flow wean oxygen as able  12/13 saturating well breathing comfortably wean oxygen  12/14 continue to wean oxygen as able next  12/15 wean oxygen  12/16 doing well today wean oxygen  12/17 impacted enema today  12/18 rate controlled today  12/19 still having loose stools.  Was concern for impaction.  We will check KUB for stool burden  12/20 does not appear to have a large stool burden on KUB  12/23 continue to wean high-flow as able    12/24 records reviewed.  remains on high-flow oxygen.  When seen was on 25 L at 46% oxygen.  No severe shortness of breath when seen.  Patient had been admitted to [facility] 12/09 from [facility] because of increased oxygen requirements.  Later transferred to [facility] 12/09/24.  Hx treated adenoCa in 2018. Last Nov underwent  evaluation by Dr Munguia. Seen then again also by Dr Kraft. Dx new primary squamous cell CA. Treated chemotx. In April saw Dr Kraft and started on Keytruda and given through infusion center. Has continued this through July. Missed her appt with Dr Kraft in May.  Pt continued to smoke 1/2 ppd; encourage to stop.  At home been prior on 2L BNC O2. PHMx: also Eliquis for PAF. Now NSR.    Seen during stay by Dr. Kraft and pulmonary during a August 2024 admission. Patient underwent rigid bronchoscopy with bronchial lavage, dilation, and stent by Dr. Castellon 8/14/24.  Admitted [facility] 08/15 then also.   12/25 no new complaints, encouraged patient to taking better orally.  Reviewed last pathology report suggesting non- small cell carcinoma with previous bronchoscopy  12/26 patient looks weak and not eating well.  Not likely to do well going forward.  Touch bases with oncology.  Have not seen family  12/27 not much improved.  Not eating.  Looking at discharge needs and likely back to nursing home next.  Hospice reasonable  12/28 No change in status. Agree with hospice and goals of care discussion.   12/29 Appears drowsier today but protecting airway with stable oxygenation. Reduce Seroquel dose to 25 mg.     12/30 Failure to thrive no better. Talked with brother Solo. Discussed poor prognosis. Discussed comfort care and hospice. Brother said family would agree and wished to keep her comfortable. Discussed going back to NH  12/31 back to nursing home on hospice as planned.  Have note patient's states she will eat when she returns to nursing home     Goals of Care Treatment Preferences:  Code Status: DNR          What is most important right now is to focus on spending time at home, remaining as independent as possible, symptom/pain control, quality of life, even if it means sacrificing a little time.  Accordingly, we have decided that the best plan to meet the patient's goals includes continuing with  treatment.      SDOH Screening:  The patient was screened for utility difficulties, food insecurity, transport difficulties, housing insecurity, and interpersonal safety and there were no concerns identified this admission.     Consults:   Consults (From admission, onward)          Status Ordering Provider     Inpatient consult to Social Work  Once        Provider:  (Not yet assigned)    Completed NANCY LARKIN     Inpatient consult to Registered Dietitian/Nutritionist  Once        Provider:  (Not yet assigned)    Completed ROSELINE GILLETTE            Hypokalemia  Patient's most recent potassium results are listed below.   Recent Labs     12/30/24  0402   K 3.5       Plan  - Replete potassium per protocol  - Monitor potassium Daily  - Patient's hypokalemia is stable    Failure to thrive in adult  Progressively weaker not eating well  No family in room  Will touch bases with Oncology  Does not seem strong enough for any aggressive therapy at this point    12/27 encouraged patient to eat but staff states still not eating well.    12/28 Guarded prognosis.     12/30 very poor prognosis. Pt barely taking orally.  12/31 continue to encourage oral intake nursing home.  Patient states when she gets a nursing home she will eat better.  Hopefully this is true but would be surprising    Constipation  S/p enema and bowel regimen.  Now with regular BM, off laxatives.    Cachexia  Concern for cachexia as evidenced by BMI less than 20 in the presence of known chronic disease. Contributing factors include underlying Malignancy. Treatment to include nutrition consult.      Body mass index is 11.2 kg/m².  Albumin   Date Value Ref Range Status   12/17/2024 2.1 (L) 3.4 - 4.8 g/dL Final     Starting Marinol    12/25 continue to encourage increased oral intake    Delirium  Hallucinations noted earlier in the course.    Started on Seroquel  Improved mental status but now more drowsy.  Reduce Seroquel dose.         Lung cancer  Cytology  from bronch shows NSCLC; will leave to Oncology as outpt    12/09/24    Final Diagnosis   A. Right lung, bronchial washing:  Negative for malignant cells     B. Lung, right lower lobe, bronchoalveolar lavage:  - Suspicious for non-small cell carcinoma (see comment)  - Acute inflammation     Electronically signed by Jesus Sotelo III, MD on 12/10/2024 at 1107   Comment    The lavage from the right lower lobe (B) includes a few small clusters of atypical cells suspicious for non-small cell carcinoma.  There is insufficient tissue in the cell block to attempt immunostains to better define the lesion.     Poor prognosis.     12/30 Discussed with Dr Kraft 12/27. No treatment options plus pt had repeatly refused earlier. Dr Kraft agreed with comfort care.  Talked with brother Solo today and he says family would approve hospice. Pt to go back to NH. Comfort care there.    Paroxysmal atrial fibrillation  On Eliquis  Rate controlled without meds.     12/30 run AF with rapid rate today. Added Cardizem CDNo re  12/31 no return of fast rhythm    Severe protein-calorie malnutrition  Nutrition consulted. Most recent weight and BMI monitored-     Measurements:  Wt Readings from Last 1 Encounters:   12/30/24 31.5 kg (69 lb 6.4 oz)   Body mass index is 11.2 kg/m².    Patient has been screened and assessed by RD.    Malnutrition Type:  Context: chronic illness  Level: severe    Malnutrition Characteristic Summary:  Energy Intake (Malnutrition): less than 75% for greater than or equal to 1 month  Subcutaneous Fat (Malnutrition): severe depletion  Muscle Mass (Malnutrition): severe depletion    Interventions/Recommendations (treatment strategy):  Consider nutrition support as aligns with goals of care if po intakes remain poor  Encourage oral intake      Final Active Diagnoses:    Diagnosis Date Noted POA    Hypokalemia [E87.6] 12/28/2024 No    Failure to thrive in adult [R62.7] 12/26/2024 Yes    Constipation [K59.00] 12/17/2024  No    Delirium [R41.0] 12/13/2024 Yes    Cachexia [R64] 12/13/2024 Yes    Lung cancer [C34.90] 12/03/2024 Yes    Severe protein-calorie malnutrition [E43] 08/09/2024 Yes    Paroxysmal atrial fibrillation [I48.0] 08/08/2024 Yes      Problems Resolved During this Admission:    Diagnosis Date Noted Date Resolved POA    PRINCIPAL PROBLEM:  Acute hypoxemic respiratory failure [J96.01] 12/09/2024 12/31/2024 Yes    Hypotension [I95.9] 12/11/2024 12/31/2024 Yes       Discharged Condition: poor    Disposition: Skilled Nursing Facility    Follow Up:    Patient Instructions:      ACCEPT - Ambulatory referral/consult to Cardiology   Standing Status: Future   Referral Priority: Routine Referral Type: Consultation   Referral Reason: Specialty Services Required   Requested Specialty: Cardiology   Number of Visits Requested: 1     Diet Adult Regular     Activity as tolerated       Significant Diagnostic Studies: Labs: BMP:   Recent Labs   Lab 12/30/24  0402         K 3.5   CL 98   CO2 35*   BUN 14   CREATININE 0.57   CALCIUM 10.1   , CMP   Recent Labs   Lab 12/30/24  0402      K 3.5   CL 98   CO2 35*      BUN 14   CREATININE 0.57   CALCIUM 10.1   ANIONGAP 13   , and CBC   Recent Labs   Lab 12/30/24  0402   WBC 12.84*   HGB 9.6*   HCT 33.6*   *       Intake/Output - Last 3 Shifts         12/29 0700  12/30 0659 12/30 0700  12/31 0659 12/31 0700  01/01 0659    P.O. 0 480     Total Intake(mL/kg) 0 (0) 480 (15.2)     Net 0 +480            Urine Occurrence 2 x 4 x     Stool Occurrence 2 x 4 x           Microbiology Results (last 7 days)       ** No results found for the last 168 hours. **                Pending Diagnostic Studies:       Procedure Component Value Units Date/Time    EXTRA TUBES [[id number]] Collected: 12/19/24 0746    Order Status: Sent Lab Status: In process Updated: 12/19/24 0756    Specimen: Blood, Venous     Narrative:      The following orders were created for panel order EXTRA  TUBES.  Procedure                               Abnormality         Status                     ---------                               -----------         ------                     Lavender Top Hold[[id number]]                               In process                   Please view results for these tests on the individual orders.           Medications:  Reconciled Home Medications:      Medication List        START taking these medications      diltiaZEM 120 MG Cp24  Commonly known as: CARDIZEM CD  Take 1 capsule (120 mg total) by mouth once daily.  Start taking on: January 1, 2025     QUEtiapine 25 MG Tab  Commonly known as: SEROQUEL  1/2 of a 25 mg tablet at bedtime orally q.h.s.  Start taking on: January 1, 2025            CHANGE how you take these medications      apixaban 2.5 mg Tab  Commonly known as: ELIQUIS  Take 1 tablet (2.5 mg total) by mouth 2 (two) times daily.  What changed:   medication strength  how much to take            CONTINUE taking these medications      acetaminophen 325 MG tablet  Commonly known as: TYLENOL  Take 650 mg by mouth every 6 (six) hours as needed for Pain.     albuterol 2.5 mg /3 mL (0.083 %) nebulizer solution  Commonly known as: PROVENTIL  Take 3 mLs (2.5 mg total) by nebulization every 4 (four) hours as needed (shortness of breath). Rescue     albuterol-ipratropium 2.5 mg-0.5 mg/3 mL nebulizer solution  Commonly known as: DUO-NEB  Take 3 mLs by nebulization every 6 (six) hours as needed for Wheezing. Rescue     buPROPion 100 MG TBSR 12 hr tablet  Commonly known as: WELLBUTRIN SR  Take 1 tablet (100 mg total) by mouth 2 (two) times daily.     dextromethorphan-guaiFENesin  mg/5 ml  mg/5 mL liquid  Commonly known as: ROBITUSSIN-DM  Take 10 mLs by mouth every 6 (six) hours as needed.     multivitamin with minerals tablet  Take 1 tablet by mouth once daily.     potassium chloride SA 20 MEQ tablet  Commonly known as: K-DUR,KLOR-CON  Take 1 tablet (20 mEq total) by mouth  2 (two) times daily.     traZODone 50 MG tablet  Commonly known as: DESYREL  Take 50 mg by mouth every evening.              At nursing home to remain on O2 2 L by nasal cannula continuously  To be seen by hospice at nursing home  Plan comfort care at nursing home.  DNR status.  Above as discussed with patient's brother Solo      Indwelling Lines/Drains at time of discharge:   Lines/Drains/Airways       Central Venous Catheter Line  Duration             Port A Cath Single Lumen 12/03/24 0013 Atrial Right 28 days                    Time spent on the discharge of patient: more 30 minutes         Mark Barraza MD  Department of Hospital Medicine  [facility]

## 2024-12-31 NOTE — ASSESSMENT & PLAN NOTE
Progressively weaker not eating well  No family in room  Will touch bases with Oncology  Does not seem strong enough for any aggressive therapy at this point    12/27 encouraged patient to eat but staff states still not eating well.    12/28 Guarded prognosis.     12/30 very poor prognosis. Pt barely taking orally.  12/31 continue to encourage oral intake nursing home.  Patient states when she gets a nursing home she will eat better.  Hopefully this is true but would be surprising

## 2024-12-31 NOTE — ASSESSMENT & PLAN NOTE
Nutrition consulted. Most recent weight and BMI monitored-     Measurements:  Wt Readings from Last 1 Encounters:   12/30/24 31.5 kg (69 lb 6.4 oz)   Body mass index is 11.2 kg/m².    Patient has been screened and assessed by RD.    Malnutrition Type:  Context: chronic illness  Level: severe    Malnutrition Characteristic Summary:  Energy Intake (Malnutrition): less than 75% for greater than or equal to 1 month  Subcutaneous Fat (Malnutrition): severe depletion  Muscle Mass (Malnutrition): severe depletion    Interventions/Recommendations (treatment strategy):  Consider nutrition support as aligns with goals of care if po intakes remain poor  Encourage oral intake

## 2024-12-31 NOTE — ASSESSMENT & PLAN NOTE
On Eliquis  Rate controlled without meds.     12/30 run AF with rapid rate today. Added Cardizem CDNo re  12/31 no return of fast rhythm

## 2024-12-31 NOTE — ASSESSMENT & PLAN NOTE
Patient's most recent potassium results are listed below.   Recent Labs     12/30/24  0402   K 3.5       Plan  - Replete potassium per protocol  - Monitor potassium Daily  - Patient's hypokalemia is stable

## 2024-12-31 NOTE — ASSESSMENT & PLAN NOTE
Cytology from bronch shows NSCLC; will leave to Oncology as outpt    12/09/24    Final Diagnosis   A. Right lung, bronchial washing:  Negative for malignant cells     B. Lung, right lower lobe, bronchoalveolar lavage:  - Suspicious for non-small cell carcinoma (see comment)  - Acute inflammation     Electronically signed by Jesus Sotelo III, MD on 12/10/2024 at 1107   Comment    The lavage from the right lower lobe (B) includes a few small clusters of atypical cells suspicious for non-small cell carcinoma.  There is insufficient tissue in the cell block to attempt immunostains to better define the lesion.     Poor prognosis.     12/30 Discussed with Dr Kraft 12/27. No treatment options plus pt had repeatly refused earlier. Dr Kraft agreed with comfort care.  Talked with brother Solo today and he says family would approve hospice. Pt to go back to NH. Comfort care there.

## 2024-12-31 NOTE — ASSESSMENT & PLAN NOTE
Progressively weaker not eating well  No family in room  Will touch bases with Oncology  Does not seem strong enough for any aggressive therapy at this point    12/27 encouraged patient to eat but staff states still not eating well.    12/28 Guarded prognosis.     12/30 very poor prognosis. Pt barely taking orally.

## 2024-12-31 NOTE — SUBJECTIVE & OBJECTIVE
Interval History:     Review of Systems   Constitutional:  Positive for fatigue. Negative for appetite change and fever.   HENT:  Negative for congestion, hearing loss and trouble swallowing.    Respiratory:  Positive for shortness of breath. Negative for chest tightness and wheezing.    Cardiovascular:  Negative for chest pain and palpitations.   Gastrointestinal:  Negative for abdominal pain, constipation and nausea.   Genitourinary:  Negative for difficulty urinating and dysuria.   Musculoskeletal:  Negative for back pain and neck stiffness.   Skin:  Negative for pallor and rash.   Neurological:  Negative for dizziness, speech difficulty and headaches.   Psychiatric/Behavioral:  Positive for confusion. Negative for suicidal ideas.      Objective:     Vital Signs (Most Recent):  Temp: 97.9 °F (36.6 °C) (12/30/24 2000)  Pulse: 74 (12/30/24 2027)  Resp: 19 (12/30/24 2027)  BP: (!) 121/59 (12/30/24 1738)  SpO2: 100 % (12/30/24 2027) Vital Signs (24h Range):  Temp:  [97.1 °F (36.2 °C)-97.9 °F (36.6 °C)] 97.9 °F (36.6 °C)  Pulse:  [] 74  Resp:  [17-20] 19  SpO2:  [97 %-100 %] 100 %  BP: ()/(45-59) 121/59     Weight: 31.5 kg (69 lb 6.4 oz)  Body mass index is 11.2 kg/m².    Intake/Output Summary (Last 24 hours) at 12/30/2024 2139  Last data filed at 12/30/2024 1708  Gross per 24 hour   Intake 480 ml   Output --   Net 480 ml         Physical Exam  Vitals reviewed.   Constitutional:       General: She is awake. She is not in acute distress.     Appearance: She is well-developed. She is cachectic. She is not toxic-appearing.   HENT:      Head: Normocephalic.      Nose: Nose normal.      Mouth/Throat:      Pharynx: Oropharynx is clear.   Eyes:      Extraocular Movements: Extraocular movements intact.      Pupils: Pupils are equal, round, and reactive to light.   Neck:      Thyroid: No thyroid mass.      Vascular: No carotid bruit.   Cardiovascular:      Rate and Rhythm: Normal rate and regular rhythm.       Pulses: Normal pulses.      Heart sounds: Normal heart sounds. No murmur heard.  Pulmonary:      Effort: Pulmonary effort is normal.      Breath sounds: Normal breath sounds and air entry. No wheezing.   Abdominal:      General: Bowel sounds are normal. There is no distension.      Palpations: Abdomen is soft.      Tenderness: There is no abdominal tenderness.   Musculoskeletal:         General: Normal range of motion.      Cervical back: Neck supple. No rigidity.   Skin:     General: Skin is warm.      Coloration: Skin is not jaundiced.      Findings: No lesion.   Neurological:      General: No focal deficit present.      Mental Status: She is alert and oriented to person, place, and time.      Cranial Nerves: No cranial nerve deficit.   Psychiatric:         Attention and Perception: Attention normal.         Mood and Affect: Mood normal.         Behavior: Behavior normal. Behavior is cooperative.         Thought Content: Thought content normal.         Cognition and Memory: Cognition normal.             Significant Labs: All pertinent labs within the past 24 hours have been reviewed.  BMP:   Recent Labs   Lab 12/30/24  0402         K 3.5   CL 98   CO2 35*   BUN 14   CREATININE 0.57   CALCIUM 10.1       CBC:   Recent Labs   Lab 12/30/24  0402   WBC 12.84*   HGB 9.6*   HCT 33.6*   *       CMP:   Recent Labs   Lab 12/30/24  0402      K 3.5   CL 98   CO2 35*      BUN 14   CREATININE 0.57   CALCIUM 10.1   ANIONGAP 13         Significant Imaging: I have reviewed all pertinent imaging results/findings within the past 24 hours.      Intake/Output - Last 3 Shifts         12/29 0700  12/30 0659 12/30 0700  12/31 0659    P.O. 0 480    Total Intake(mL/kg) 0 (0) 480 (15.2)    Net 0 +480          Urine Occurrence 2 x 3 x    Stool Occurrence 2 x 3 x          Microbiology Results (last 7 days)       ** No results found for the last 168 hours. **

## 2024-12-31 NOTE — PLAN OF CARE
[facility]  Discharge Final Note    Primary Care Provider: Darlene Campoverde NP    Expected Discharge Date: 1/3/2025    Final Discharge Note (most recent)       Final Note - 12/31/24 0939          Final Note    Assessment Type Final Discharge Note     Anticipated Discharge Disposition shelter Nursing Facility        Post-Acute Status    Post-Acute Placement Status Set-up Complete/Auth obtained     Discharge Delays None known at this time                     Important Message from Medicare  Important Message from Medicare regarding Discharge Appeal Rights: Given to patient/caregiver, Explained to patient/caregiver, Signed/date by patient/caregiver     Date IMM was signed: 12/31/24  Time IMM was signed: 0940    spoke with pt's brother jose, hospice options discussed, choice for [facility] hospice, [facility] wilmer [facility] pt is back at facility, packet to nurse, will fax dc clinicals once available

## 2024-12-31 NOTE — ASSESSMENT & PLAN NOTE
Concern for cachexia as evidenced by BMI less than 20 in the presence of known chronic disease. Contributing factors include underlying Malignancy. Treatment to include nutrition consult.      Body mass index is 11.2 kg/m².  Albumin   Date Value Ref Range Status   12/17/2024 2.1 (L) 3.4 - 4.8 g/dL Final     Starting Marinol    12/25 continue to encourage increased oral intake

## 2024-12-31 NOTE — DISCHARGE SUMMARY
[facility]  Discharge Summary      Patient Name: Mayra Kelly  MRN: [id number]  HAIDER: [id number]  Patient Class: IP- Inpatient  Admission Date: 12/9/2024  Hospital Length of Stay: 22 days  Discharge Date and Time: No discharge date for patient encounter.  Attending Physician: Mark Barraza MD   Discharging Provider: Mark Barraza MD  Primary Care Provider: Darlene Campoverde NP    Primary Care Team: Networked reference to record PCT     HPI:   74 year old female with a PMH of lung cancer presents with shortness of breath.  She was placed on O2 but kept taking it off, so she was sent to the ICU for closer monitoring.  CT chest showed lung nodules/masses.  She had opacification in the right lung with mucous plugging.  She was placed on Zosyn for pneumonia.  Today, she had a bronch done.  Results pending in the computer.  Patient denies chest pain, shortness of breath, nausea, vomiting, or diarrhea.       * No surgery found *      Hospital Course:   74 year old female with a PMH of lung cancer presents with shortness of breath and is now in LTAC for deconditioning and high flow O2; she is asleep on rounds  12/12 still on high-flow wean oxygen as able  12/13 saturating well breathing comfortably wean oxygen  12/14 continue to wean oxygen as able next  12/15 wean oxygen  12/16 doing well today wean oxygen  12/17 impacted enema today  12/18 rate controlled today  12/19 still having loose stools.  Was concern for impaction.  We will check KUB for stool burden  12/20 does not appear to have a large stool burden on KUB  12/23 continue to wean high-flow as able    12/24 records reviewed.  remains on high-flow oxygen.  When seen was on 25 L at 46% oxygen.  No severe shortness of breath when seen.  Patient had been admitted to [facility] 12/09 from [facility] because of increased oxygen requirements.  Later transferred to [facility] 12/09/24.  Hx treated adenoCa in 2018.  Last Nov underwent evaluation by Dr Munguia. Seen then again also by Dr Kraft. Dx new primary squamous cell CA. Treated chemotx. In April saw Dr Kraft and started on Keytruda and given through infusion center. Has continued this through July. Missed her appt with Dr Kraft in May.  Pt continued to smoke 1/2 ppd; encourage to stop.  At home been prior on 2L BNC O2. PHMx: also Eliquis for PAF. Now NSR.    Seen during stay by Dr. Kraft and pulmonary during a August 2024 admission. Patient underwent rigid bronchoscopy with bronchial lavage, dilation, and stent by Dr. Castellon 8/14/24.  Admitted [facility] 08/15 then also.   12/25 no new complaints, encouraged patient to taking better orally.  Reviewed last pathology report suggesting non- small cell carcinoma with previous bronchoscopy  12/26 patient looks weak and not eating well.  Not likely to do well going forward.  Touch bases with oncology.  Have not seen family  12/27 not much improved.  Not eating.  Looking at discharge needs and likely back to nursing home next.  Hospice reasonable  12/28 No change in status. Agree with hospice and goals of care discussion.   12/29 Appears drowsier today but protecting airway with stable oxygenation. Reduce Seroquel dose to 25 mg.     12/30 Failure to thrive no better. Talked with brother Solo. Discussed poor prognosis. Discussed comfort care and hospice. Brother said family would agree and wished to keep her comfortable. Discussed going back to NH  12/31 back to nursing home on hospice as planned.  Have note patient's states she will eat when she returns to nursing home     Goals of Care Treatment Preferences:  Code Status: DNR          What is most important right now is to focus on spending time at home, remaining as independent as possible, symptom/pain control, quality of life, even if it means sacrificing a little time.  Accordingly, we have decided that the best plan to meet the patient's goals includes continuing  with treatment.      SDOH Screening:  The patient was screened for utility difficulties, food insecurity, transport difficulties, housing insecurity, and interpersonal safety and there were no concerns identified this admission.     Consults:   Consults (From admission, onward)          Status Ordering Provider     Inpatient consult to Social Work  Once        Provider:  (Not yet assigned)    Completed NANCY LARKIN     Inpatient consult to Registered Dietitian/Nutritionist  Once        Provider:  (Not yet assigned)    Completed ROSELINE GILLETTE            Hypokalemia  Patient's most recent potassium results are listed below.   Recent Labs     12/30/24  0402   K 3.5       Plan  - Replete potassium per protocol  - Monitor potassium Daily  - Patient's hypokalemia is stable    Failure to thrive in adult  Progressively weaker not eating well  No family in room  Will touch bases with Oncology  Does not seem strong enough for any aggressive therapy at this point    12/27 encouraged patient to eat but staff states still not eating well.    12/28 Guarded prognosis.     12/30 very poor prognosis. Pt barely taking orally.    Constipation  S/p enema and bowel regimen.  Now with regular BM, off laxatives.    Cachexia  Concern for cachexia as evidenced by BMI less than 20 in the presence of known chronic disease. Contributing factors include underlying Malignancy. Treatment to include nutrition consult.      Body mass index is 11.2 kg/m².  Albumin   Date Value Ref Range Status   12/17/2024 2.1 (L) 3.4 - 4.8 g/dL Final     Starting Marinol    12/25 continue to encourage increased oral intake    Delirium  Hallucinations noted earlier in the course.    Started on Seroquel  Improved mental status but now more drowsy.  Reduce Seroquel dose.         Lung cancer  Cytology from bronch shows NSCLC; will leave to Oncology as outpt    12/09/24    Final Diagnosis   A. Right lung, bronchial washing:  Negative for malignant cells     B. Lung,  right lower lobe, bronchoalveolar lavage:  - Suspicious for non-small cell carcinoma (see comment)  - Acute inflammation     Electronically signed by Jesus Sotelo III, MD on 12/10/2024 at 1107   Comment    The lavage from the right lower lobe (B) includes a few small clusters of atypical cells suspicious for non-small cell carcinoma.  There is insufficient tissue in the cell block to attempt immunostains to better define the lesion.     Poor prognosis.     12/30 Discussed with Dr Kraft 12/27. No treatment options plus pt had repeatly refused earlier. Dr Kraft agreed with comfort care.  Talked with brother Solo today and he says family would approve hospice. Pt to go back to NH. Comfort care there.    Paroxysmal atrial fibrillation  On Eliquis  Rate controlled without meds.     12/30 run AF with rapid rate today. Added Cardizem CD      Severe protein-calorie malnutrition  Nutrition consulted. Most recent weight and BMI monitored-     Measurements:  Wt Readings from Last 1 Encounters:   12/30/24 31.5 kg (69 lb 6.4 oz)   Body mass index is 11.2 kg/m².    Patient has been screened and assessed by RD.    Malnutrition Type:  Context: chronic illness  Level: severe    Malnutrition Characteristic Summary:  Energy Intake (Malnutrition): less than 75% for greater than or equal to 1 month  Subcutaneous Fat (Malnutrition): severe depletion  Muscle Mass (Malnutrition): severe depletion    Interventions/Recommendations (treatment strategy):  Consider nutrition support as aligns with goals of care if po intakes remain poor  Encourage oral intake      Final Active Diagnoses:    Diagnosis Date Noted POA    Hypokalemia [E87.6] 12/28/2024 No    Failure to thrive in adult [R62.7] 12/26/2024 Yes    Constipation [K59.00] 12/17/2024 No    Delirium [R41.0] 12/13/2024 Yes    Cachexia [R64] 12/13/2024 Yes    Lung cancer [C34.90] 12/03/2024 Yes    Severe protein-calorie malnutrition [E43] 08/09/2024 Yes    Paroxysmal atrial  fibrillation [I48.0] 08/08/2024 Yes      Problems Resolved During this Admission:    Diagnosis Date Noted Date Resolved POA    PRINCIPAL PROBLEM:  Acute hypoxemic respiratory failure [J96.01] 12/09/2024 12/31/2024 Yes    Hypotension [I95.9] 12/11/2024 12/31/2024 Yes       Discharged Condition: poor    Disposition:     Follow Up:    Patient Instructions:   No discharge procedures on file.    Significant Diagnostic Studies: Labs: BMP:   Recent Labs   Lab 12/30/24  0402         K 3.5   CL 98   CO2 35*   BUN 14   CREATININE 0.57   CALCIUM 10.1   , CMP   Recent Labs   Lab 12/30/24  0402      K 3.5   CL 98   CO2 35*      BUN 14   CREATININE 0.57   CALCIUM 10.1   ANIONGAP 13   , and CBC   Recent Labs   Lab 12/30/24  0402   WBC 12.84*   HGB 9.6*   HCT 33.6*   *         Intake/Output - Last 3 Shifts         12/29 0700 12/30 0659 12/30 0700 12/31 0659 12/31 0700 01/01 0659    P.O. 0 480     Total Intake(mL/kg) 0 (0) 480 (15.2)     Net 0 +480            Urine Occurrence 2 x 4 x     Stool Occurrence 2 x 4 x           Microbiology Results (last 7 days)       ** No results found for the last 168 hours. **                Pending Diagnostic Studies:       Procedure Component Value Units Date/Time    EXTRA TUBES [[id number]] Collected: 12/19/24 0746    Order Status: Sent Lab Status: In process Updated: 12/19/24 0756    Specimen: Blood, Venous     Narrative:      The following orders were created for panel order EXTRA TUBES.  Procedure                               Abnormality         Status                     ---------                               -----------         ------                     Lavender Top Hold[[id number]]                               In process                   Please view results for these tests on the individual orders.           Medications:  Reconciled Home Medications:      Medication List        ASK your doctor about these medications      acetaminophen 325 MG  tablet  Commonly known as: TYLENOL  Take 650 mg by mouth every 6 (six) hours as needed for Pain.     albuterol 2.5 mg /3 mL (0.083 %) nebulizer solution  Commonly known as: PROVENTIL  Take 3 mLs (2.5 mg total) by nebulization every 4 (four) hours as needed (shortness of breath). Rescue     albuterol-ipratropium 2.5 mg-0.5 mg/3 mL nebulizer solution  Commonly known as: DUO-NEB  Take 3 mLs by nebulization every 6 (six) hours as needed for Wheezing. Rescue     buPROPion 100 MG TBSR 12 hr tablet  Commonly known as: WELLBUTRIN SR  Take 1 tablet (100 mg total) by mouth 2 (two) times daily.     dextromethorphan-guaiFENesin  mg/5 ml  mg/5 mL liquid  Commonly known as: ROBITUSSIN-DM  Take 10 mLs by mouth every 6 (six) hours as needed.     ELIQUIS 5 mg Tab  Generic drug: apixaban  Take 1 tablet (5 mg total) by mouth 2 (two) times daily.     multivitamin with minerals tablet  Take 1 tablet by mouth once daily.     potassium chloride SA 20 MEQ tablet  Commonly known as: K-DUR,KLOR-CON  Take 1 tablet (20 mEq total) by mouth 2 (two) times daily.     traZODone 50 MG tablet  Commonly known as: DESYREL  Take 50 mg by mouth every evening.              Indwelling Lines/Drains at time of discharge:   Lines/Drains/Airways       Central Venous Catheter Line  Duration             Port A Cath Single Lumen 12/03/24 0013 Atrial Right 28 days                    Time spent on the discharge of patient: more 30 minutes         Mark Barraza MD  Department of Hospital Medicine  [facility]

## 2024-12-31 NOTE — PLAN OF CARE
Consult to return to NH on hospice, left vm for pt's brother Solo to discuss dc plan, spoke with Mercedes at [facility] and they use [facility], will send referral once confirmed choice with pt's brother

## 2024-12-31 NOTE — ASSESSMENT & PLAN NOTE
On Eliquis  Rate controlled without meds.     12/30 run AF with rapid rate today. Added Cardizem CD